# Patient Record
Sex: MALE | Race: WHITE | NOT HISPANIC OR LATINO | URBAN - METROPOLITAN AREA
[De-identification: names, ages, dates, MRNs, and addresses within clinical notes are randomized per-mention and may not be internally consistent; named-entity substitution may affect disease eponyms.]

---

## 2018-12-12 ENCOUNTER — INPATIENT (INPATIENT)
Facility: HOSPITAL | Age: 58
LOS: 6 days | Discharge: HOME | DRG: 270 | End: 2018-12-19
Attending: FAMILY MEDICINE | Admitting: FAMILY MEDICINE
Payer: COMMERCIAL

## 2018-12-12 VITALS
WEIGHT: 250 LBS | TEMPERATURE: 98 F | OXYGEN SATURATION: 94 % | SYSTOLIC BLOOD PRESSURE: 170 MMHG | RESPIRATION RATE: 22 BRPM | DIASTOLIC BLOOD PRESSURE: 95 MMHG | HEIGHT: 73 IN | HEART RATE: 120 BPM

## 2018-12-12 DIAGNOSIS — J18.9 PNEUMONIA, UNSPECIFIED ORGANISM: ICD-10-CM

## 2018-12-12 LAB
ALBUMIN SERPL ELPH-MCNC: 3.6 G/DL — SIGNIFICANT CHANGE UP (ref 3.3–5)
ALP SERPL-CCNC: 96 U/L — SIGNIFICANT CHANGE UP (ref 40–120)
ALT FLD-CCNC: 33 U/L — SIGNIFICANT CHANGE UP (ref 12–78)
ANION GAP SERPL CALC-SCNC: 10 MMOL/L — SIGNIFICANT CHANGE UP (ref 5–17)
AST SERPL-CCNC: 27 U/L — SIGNIFICANT CHANGE UP (ref 15–37)
BASOPHILS # BLD AUTO: 0.03 K/UL — SIGNIFICANT CHANGE UP (ref 0–0.2)
BASOPHILS NFR BLD AUTO: 0.3 % — SIGNIFICANT CHANGE UP (ref 0–2)
BILIRUB SERPL-MCNC: 1 MG/DL — SIGNIFICANT CHANGE UP (ref 0.2–1.2)
BUN SERPL-MCNC: 6 MG/DL — LOW (ref 7–23)
CALCIUM SERPL-MCNC: 8 MG/DL — LOW (ref 8.5–10.1)
CHLORIDE SERPL-SCNC: 83 MMOL/L — LOW (ref 96–108)
CO2 SERPL-SCNC: 26 MMOL/L — SIGNIFICANT CHANGE UP (ref 22–31)
CREAT SERPL-MCNC: 0.52 MG/DL — SIGNIFICANT CHANGE UP (ref 0.5–1.3)
EOSINOPHIL # BLD AUTO: 0.18 K/UL — SIGNIFICANT CHANGE UP (ref 0–0.5)
EOSINOPHIL NFR BLD AUTO: 2.1 % — SIGNIFICANT CHANGE UP (ref 0–6)
GLUCOSE SERPL-MCNC: 119 MG/DL — HIGH (ref 70–99)
HCT VFR BLD CALC: 33.8 % — LOW (ref 39–50)
HGB BLD-MCNC: 12.2 G/DL — LOW (ref 13–17)
IMM GRANULOCYTES NFR BLD AUTO: 0.2 % — SIGNIFICANT CHANGE UP (ref 0–1.5)
LACTATE SERPL-SCNC: 1.4 MMOL/L — SIGNIFICANT CHANGE UP (ref 0.7–2)
LYMPHOCYTES # BLD AUTO: 2.43 K/UL — SIGNIFICANT CHANGE UP (ref 1–3.3)
LYMPHOCYTES # BLD AUTO: 27.9 % — SIGNIFICANT CHANGE UP (ref 13–44)
MCHC RBC-ENTMCNC: 28.6 PG — SIGNIFICANT CHANGE UP (ref 27–34)
MCHC RBC-ENTMCNC: 36.1 GM/DL — HIGH (ref 32–36)
MCV RBC AUTO: 79.3 FL — LOW (ref 80–100)
MONOCYTES # BLD AUTO: 0.71 K/UL — SIGNIFICANT CHANGE UP (ref 0–0.9)
MONOCYTES NFR BLD AUTO: 8.2 % — SIGNIFICANT CHANGE UP (ref 2–14)
NEUTROPHILS # BLD AUTO: 5.34 K/UL — SIGNIFICANT CHANGE UP (ref 1.8–7.4)
NEUTROPHILS NFR BLD AUTO: 61.3 % — SIGNIFICANT CHANGE UP (ref 43–77)
NRBC # BLD: 0 /100 WBCS — SIGNIFICANT CHANGE UP (ref 0–0)
PLATELET # BLD AUTO: 182 K/UL — SIGNIFICANT CHANGE UP (ref 150–400)
POTASSIUM SERPL-MCNC: 3.7 MMOL/L — SIGNIFICANT CHANGE UP (ref 3.5–5.3)
POTASSIUM SERPL-SCNC: 3.7 MMOL/L — SIGNIFICANT CHANGE UP (ref 3.5–5.3)
PROT SERPL-MCNC: 6.5 G/DL — SIGNIFICANT CHANGE UP (ref 6–8.3)
RBC # BLD: 4.26 M/UL — SIGNIFICANT CHANGE UP (ref 4.2–5.8)
RBC # FLD: 12.3 % — SIGNIFICANT CHANGE UP (ref 10.3–14.5)
SODIUM SERPL-SCNC: 119 MMOL/L — CRITICAL LOW (ref 135–145)
URATE SERPL-MCNC: 1.8 MG/DL — LOW (ref 3.4–8.8)
WBC # BLD: 8.71 K/UL — SIGNIFICANT CHANGE UP (ref 3.8–10.5)
WBC # FLD AUTO: 8.71 K/UL — SIGNIFICANT CHANGE UP (ref 3.8–10.5)

## 2018-12-12 PROCEDURE — 71046 X-RAY EXAM CHEST 2 VIEWS: CPT | Mod: 26

## 2018-12-12 PROCEDURE — 99285 EMERGENCY DEPT VISIT HI MDM: CPT

## 2018-12-12 PROCEDURE — 93010 ELECTROCARDIOGRAM REPORT: CPT

## 2018-12-12 RX ORDER — SODIUM CHLORIDE 9 MG/ML
1 INJECTION INTRAMUSCULAR; INTRAVENOUS; SUBCUTANEOUS THREE TIMES A DAY
Qty: 0 | Refills: 0 | Status: DISCONTINUED | OUTPATIENT
Start: 2018-12-12 | End: 2018-12-13

## 2018-12-12 RX ORDER — IPRATROPIUM/ALBUTEROL SULFATE 18-103MCG
3 AEROSOL WITH ADAPTER (GRAM) INHALATION ONCE
Qty: 0 | Refills: 0 | Status: COMPLETED | OUTPATIENT
Start: 2018-12-12 | End: 2018-12-12

## 2018-12-12 RX ORDER — IPRATROPIUM/ALBUTEROL SULFATE 18-103MCG
3 AEROSOL WITH ADAPTER (GRAM) INHALATION EVERY 6 HOURS
Qty: 0 | Refills: 0 | Status: DISCONTINUED | OUTPATIENT
Start: 2018-12-12 | End: 2018-12-15

## 2018-12-12 RX ORDER — SODIUM CHLORIDE 9 MG/ML
1000 INJECTION INTRAMUSCULAR; INTRAVENOUS; SUBCUTANEOUS
Qty: 0 | Refills: 0 | Status: DISCONTINUED | OUTPATIENT
Start: 2018-12-12 | End: 2018-12-13

## 2018-12-12 RX ORDER — SODIUM CHLORIDE 9 MG/ML
1 INJECTION INTRAMUSCULAR; INTRAVENOUS; SUBCUTANEOUS DAILY
Qty: 0 | Refills: 0 | Status: DISCONTINUED | OUTPATIENT
Start: 2018-12-12 | End: 2018-12-12

## 2018-12-12 RX ORDER — SODIUM CHLORIDE 9 MG/ML
1000 INJECTION INTRAMUSCULAR; INTRAVENOUS; SUBCUTANEOUS ONCE
Qty: 0 | Refills: 0 | Status: COMPLETED | OUTPATIENT
Start: 2018-12-12 | End: 2018-12-12

## 2018-12-12 RX ORDER — HEPARIN SODIUM 5000 [USP'U]/ML
5000 INJECTION INTRAVENOUS; SUBCUTANEOUS EVERY 12 HOURS
Qty: 0 | Refills: 0 | Status: DISCONTINUED | OUTPATIENT
Start: 2018-12-12 | End: 2018-12-13

## 2018-12-12 RX ORDER — BUDESONIDE, MICRONIZED 100 %
0.5 POWDER (GRAM) MISCELLANEOUS
Qty: 0 | Refills: 0 | Status: DISCONTINUED | OUTPATIENT
Start: 2018-12-12 | End: 2018-12-15

## 2018-12-12 RX ORDER — LORATADINE 10 MG/1
10 TABLET ORAL DAILY
Qty: 0 | Refills: 0 | Status: DISCONTINUED | OUTPATIENT
Start: 2018-12-12 | End: 2018-12-15

## 2018-12-12 RX ORDER — POTASSIUM CHLORIDE 20 MEQ
10 PACKET (EA) ORAL DAILY
Qty: 0 | Refills: 0 | Status: DISCONTINUED | OUTPATIENT
Start: 2018-12-12 | End: 2018-12-12

## 2018-12-12 RX ORDER — METOPROLOL TARTRATE 50 MG
100 TABLET ORAL DAILY
Qty: 0 | Refills: 0 | Status: DISCONTINUED | OUTPATIENT
Start: 2018-12-12 | End: 2018-12-14

## 2018-12-12 RX ORDER — LOSARTAN POTASSIUM 100 MG/1
100 TABLET, FILM COATED ORAL DAILY
Qty: 0 | Refills: 0 | Status: DISCONTINUED | OUTPATIENT
Start: 2018-12-12 | End: 2018-12-14

## 2018-12-12 RX ORDER — POTASSIUM CHLORIDE 20 MEQ
10 PACKET (EA) ORAL DAILY
Qty: 0 | Refills: 0 | Status: DISCONTINUED | OUTPATIENT
Start: 2018-12-12 | End: 2018-12-13

## 2018-12-12 RX ORDER — TIOTROPIUM BROMIDE 18 UG/1
1 CAPSULE ORAL; RESPIRATORY (INHALATION) DAILY
Qty: 0 | Refills: 0 | Status: DISCONTINUED | OUTPATIENT
Start: 2018-12-12 | End: 2018-12-15

## 2018-12-12 RX ADMIN — Medication 125 MILLIGRAM(S): at 21:45

## 2018-12-12 RX ADMIN — SODIUM CHLORIDE 1000 MILLILITER(S): 9 INJECTION INTRAMUSCULAR; INTRAVENOUS; SUBCUTANEOUS at 22:48

## 2018-12-12 RX ADMIN — Medication 3 MILLILITER(S): at 21:45

## 2018-12-12 RX ADMIN — SODIUM CHLORIDE 1000 MILLILITER(S): 9 INJECTION INTRAMUSCULAR; INTRAVENOUS; SUBCUTANEOUS at 21:48

## 2018-12-12 NOTE — ED PROVIDER NOTE - OBJECTIVE STATEMENT
59yo male who presents with cough and sob for several weeks. pt c/o dry non productive cough, no fever, chills, pt was seen at urgent care had xr done and was put on zpak last day was today, maribell pt states his cough was worse, no sick contacts, recent travel, no other copmalints

## 2018-12-12 NOTE — ED PROVIDER NOTE - CARE PLAN
Principal Discharge DX:	Pneumonia Principal Discharge DX:	Pneumonia  Secondary Diagnosis:	Hyponatremia

## 2018-12-12 NOTE — PROGRESS NOTE ADULT - SUBJECTIVE AND OBJECTIVE BOX
Hyponatremia - Asymptomatic. No indication for hypertonic saline. Full consult to follow in AM. Urine Na, K, Osm. Serum Osm, TSH, cortisol level, uric acid. NaCl tabs.     Full consult to follow in AM

## 2018-12-12 NOTE — ED ADULT NURSE NOTE - NSIMPLEMENTINTERV_GEN_ALL_ED
Implemented All Universal Safety Interventions:  Irene to call system. Call bell, personal items and telephone within reach. Instruct patient to call for assistance. Room bathroom lighting operational. Non-slip footwear when patient is off stretcher. Physically safe environment: no spills, clutter or unnecessary equipment. Stretcher in lowest position, wheels locked, appropriate side rails in place.

## 2018-12-12 NOTE — ED PROVIDER NOTE - CPE EDP MUSC NORM
Pt. States \"I ate at a restaurant and I started having real bad hot flashes, trembling and vomiting\" Denies any diarrhea. normal...

## 2018-12-12 NOTE — ED ADULT NURSE NOTE - OBJECTIVE STATEMENT
Received pt awake alert and oriented x 3. MILLER airway patent. Pt presents C/O cough and cold like symptoms for a few days, pt currently finishing his Z-Pack but still not feeling well. Iv placed, lab sent. Meds given as ordered. will continue to monitor

## 2018-12-13 DIAGNOSIS — J44.0 CHRONIC OBSTRUCTIVE PULMONARY DISEASE WITH (ACUTE) LOWER RESPIRATORY INFECTION: ICD-10-CM

## 2018-12-13 DIAGNOSIS — R55 SYNCOPE AND COLLAPSE: ICD-10-CM

## 2018-12-13 DIAGNOSIS — E87.1 HYPO-OSMOLALITY AND HYPONATREMIA: ICD-10-CM

## 2018-12-13 DIAGNOSIS — I10 ESSENTIAL (PRIMARY) HYPERTENSION: ICD-10-CM

## 2018-12-13 DIAGNOSIS — F17.200 NICOTINE DEPENDENCE, UNSPECIFIED, UNCOMPLICATED: ICD-10-CM

## 2018-12-13 DIAGNOSIS — J18.9 PNEUMONIA, UNSPECIFIED ORGANISM: ICD-10-CM

## 2018-12-13 LAB
ANION GAP SERPL CALC-SCNC: 11 MMOL/L — SIGNIFICANT CHANGE UP (ref 5–17)
ANION GAP SERPL CALC-SCNC: 11 MMOL/L — SIGNIFICANT CHANGE UP (ref 5–17)
ANION GAP SERPL CALC-SCNC: 9 MMOL/L — SIGNIFICANT CHANGE UP (ref 5–17)
BUN SERPL-MCNC: 12 MG/DL — SIGNIFICANT CHANGE UP (ref 7–23)
BUN SERPL-MCNC: 5 MG/DL — LOW (ref 7–23)
BUN SERPL-MCNC: 7 MG/DL — SIGNIFICANT CHANGE UP (ref 7–23)
CALCIUM SERPL-MCNC: 7.9 MG/DL — LOW (ref 8.5–10.1)
CALCIUM SERPL-MCNC: 8.3 MG/DL — LOW (ref 8.5–10.1)
CALCIUM SERPL-MCNC: 8.3 MG/DL — LOW (ref 8.5–10.1)
CHLORIDE SERPL-SCNC: 83 MMOL/L — LOW (ref 96–108)
CHLORIDE SERPL-SCNC: 84 MMOL/L — LOW (ref 96–108)
CHLORIDE SERPL-SCNC: 84 MMOL/L — LOW (ref 96–108)
CO2 SERPL-SCNC: 24 MMOL/L — SIGNIFICANT CHANGE UP (ref 22–31)
CO2 SERPL-SCNC: 25 MMOL/L — SIGNIFICANT CHANGE UP (ref 22–31)
CO2 SERPL-SCNC: 27 MMOL/L — SIGNIFICANT CHANGE UP (ref 22–31)
CORTIS AM PEAK SERPL-MCNC: 25.5 UG/DL — HIGH (ref 6–18.4)
CREAT SERPL-MCNC: 0.55 MG/DL — SIGNIFICANT CHANGE UP (ref 0.5–1.3)
CREAT SERPL-MCNC: 0.64 MG/DL — SIGNIFICANT CHANGE UP (ref 0.5–1.3)
CREAT SERPL-MCNC: 0.67 MG/DL — SIGNIFICANT CHANGE UP (ref 0.5–1.3)
GLUCOSE SERPL-MCNC: 135 MG/DL — HIGH (ref 70–99)
GLUCOSE SERPL-MCNC: 148 MG/DL — HIGH (ref 70–99)
GLUCOSE SERPL-MCNC: 148 MG/DL — HIGH (ref 70–99)
HBA1C BLD-MCNC: 5.6 % — SIGNIFICANT CHANGE UP (ref 4–5.6)
OSMOLALITY SERPL: 242 MOS/KG — LOW (ref 275–300)
OSMOLALITY SERPL: 245 MOS/KG — LOW (ref 275–300)
OSMOLALITY UR: 415 MOS/KG — SIGNIFICANT CHANGE UP (ref 50–1200)
POTASSIUM SERPL-MCNC: 3.9 MMOL/L — SIGNIFICANT CHANGE UP (ref 3.5–5.3)
POTASSIUM SERPL-MCNC: 4 MMOL/L — SIGNIFICANT CHANGE UP (ref 3.5–5.3)
POTASSIUM SERPL-MCNC: 4 MMOL/L — SIGNIFICANT CHANGE UP (ref 3.5–5.3)
POTASSIUM SERPL-SCNC: 3.9 MMOL/L — SIGNIFICANT CHANGE UP (ref 3.5–5.3)
POTASSIUM SERPL-SCNC: 4 MMOL/L — SIGNIFICANT CHANGE UP (ref 3.5–5.3)
POTASSIUM SERPL-SCNC: 4 MMOL/L — SIGNIFICANT CHANGE UP (ref 3.5–5.3)
POTASSIUM UR-SCNC: 50 MMOL/L — SIGNIFICANT CHANGE UP
RAPID RVP RESULT: SIGNIFICANT CHANGE UP
SODIUM SERPL-SCNC: 119 MMOL/L — CRITICAL LOW (ref 135–145)
SODIUM SERPL-SCNC: 119 MMOL/L — CRITICAL LOW (ref 135–145)
SODIUM SERPL-SCNC: 120 MMOL/L — CRITICAL LOW (ref 135–145)
SODIUM UR-SCNC: 33 MMOL/L — SIGNIFICANT CHANGE UP
TSH SERPL-MCNC: 0.33 UIU/ML — LOW (ref 0.36–3.74)

## 2018-12-13 PROCEDURE — 71250 CT THORAX DX C-: CPT | Mod: 26

## 2018-12-13 PROCEDURE — 93010 ELECTROCARDIOGRAM REPORT: CPT

## 2018-12-13 RX ORDER — FUROSEMIDE 40 MG
20 TABLET ORAL ONCE
Qty: 0 | Refills: 0 | Status: COMPLETED | OUTPATIENT
Start: 2018-12-13 | End: 2018-12-13

## 2018-12-13 RX ORDER — SODIUM CHLORIDE 9 MG/ML
2 INJECTION INTRAMUSCULAR; INTRAVENOUS; SUBCUTANEOUS THREE TIMES A DAY
Qty: 0 | Refills: 0 | Status: DISCONTINUED | OUTPATIENT
Start: 2018-12-13 | End: 2018-12-15

## 2018-12-13 RX ORDER — HEPARIN SODIUM 5000 [USP'U]/ML
5000 INJECTION INTRAVENOUS; SUBCUTANEOUS EVERY 8 HOURS
Qty: 0 | Refills: 0 | Status: DISCONTINUED | OUTPATIENT
Start: 2018-12-13 | End: 2018-12-14

## 2018-12-13 RX ORDER — MECLIZINE HCL 12.5 MG
12.5 TABLET ORAL THREE TIMES A DAY
Qty: 0 | Refills: 0 | Status: DISCONTINUED | OUTPATIENT
Start: 2018-12-13 | End: 2018-12-15

## 2018-12-13 RX ORDER — AZITHROMYCIN 500 MG/1
250 TABLET, FILM COATED ORAL DAILY
Qty: 0 | Refills: 0 | Status: DISCONTINUED | OUTPATIENT
Start: 2018-12-13 | End: 2018-12-13

## 2018-12-13 RX ORDER — NICOTINE POLACRILEX 2 MG
1 GUM BUCCAL DAILY
Qty: 0 | Refills: 0 | Status: DISCONTINUED | OUTPATIENT
Start: 2018-12-13 | End: 2018-12-15

## 2018-12-13 RX ORDER — INFLUENZA VIRUS VACCINE 15; 15; 15; 15 UG/.5ML; UG/.5ML; UG/.5ML; UG/.5ML
0.5 SUSPENSION INTRAMUSCULAR ONCE
Qty: 0 | Refills: 0 | Status: DISCONTINUED | OUTPATIENT
Start: 2018-12-13 | End: 2018-12-19

## 2018-12-13 RX ADMIN — Medication 0.5 MILLIGRAM(S): at 07:39

## 2018-12-13 RX ADMIN — LORATADINE 10 MILLIGRAM(S): 10 TABLET ORAL at 12:34

## 2018-12-13 RX ADMIN — Medication 3 MILLILITER(S): at 01:16

## 2018-12-13 RX ADMIN — Medication 3 MILLILITER(S): at 19:11

## 2018-12-13 RX ADMIN — SODIUM CHLORIDE 2 GRAM(S): 9 INJECTION INTRAMUSCULAR; INTRAVENOUS; SUBCUTANEOUS at 22:11

## 2018-12-13 RX ADMIN — Medication 20 MILLIGRAM(S): at 22:11

## 2018-12-13 RX ADMIN — HEPARIN SODIUM 5000 UNIT(S): 5000 INJECTION INTRAVENOUS; SUBCUTANEOUS at 05:15

## 2018-12-13 RX ADMIN — SODIUM CHLORIDE 1 GRAM(S): 9 INJECTION INTRAMUSCULAR; INTRAVENOUS; SUBCUTANEOUS at 13:11

## 2018-12-13 RX ADMIN — Medication 50 MILLIGRAM(S): at 05:17

## 2018-12-13 RX ADMIN — Medication 1 PATCH: at 12:32

## 2018-12-13 RX ADMIN — Medication 1 PATCH: at 21:17

## 2018-12-13 RX ADMIN — HEPARIN SODIUM 5000 UNIT(S): 5000 INJECTION INTRAVENOUS; SUBCUTANEOUS at 13:11

## 2018-12-13 RX ADMIN — SODIUM CHLORIDE 1 GRAM(S): 9 INJECTION INTRAMUSCULAR; INTRAVENOUS; SUBCUTANEOUS at 05:17

## 2018-12-13 RX ADMIN — SODIUM CHLORIDE 2 GRAM(S): 9 INJECTION INTRAMUSCULAR; INTRAVENOUS; SUBCUTANEOUS at 13:11

## 2018-12-13 RX ADMIN — Medication 3 MILLILITER(S): at 07:40

## 2018-12-13 RX ADMIN — Medication 40 MILLIGRAM(S): at 22:12

## 2018-12-13 RX ADMIN — SODIUM CHLORIDE 50 MILLILITER(S): 9 INJECTION INTRAMUSCULAR; INTRAVENOUS; SUBCUTANEOUS at 13:10

## 2018-12-13 RX ADMIN — LOSARTAN POTASSIUM 100 MILLIGRAM(S): 100 TABLET, FILM COATED ORAL at 05:17

## 2018-12-13 RX ADMIN — HEPARIN SODIUM 5000 UNIT(S): 5000 INJECTION INTRAVENOUS; SUBCUTANEOUS at 22:10

## 2018-12-13 RX ADMIN — Medication 3 MILLILITER(S): at 13:57

## 2018-12-13 RX ADMIN — Medication 100 MILLIGRAM(S): at 05:17

## 2018-12-13 RX ADMIN — Medication 0.5 MILLIGRAM(S): at 19:11

## 2018-12-13 NOTE — CONSULT NOTE ADULT - ASSESSMENT
Hyponatremia  Coughing  Low BUN  Possible Seizure  HTN  +Smoker      -Renal indices are stable; BUN is abnormally low; poor nutrition for the past few days  -Uric acid is low; urine studies are pending; high likelihood of SIADH; urine is dark appears where dehydration may be a factor, but with active lung symptoms and chronic smoker, SIADH seems likely  -Check a CXR or consider a CT chest with history   -Oral fluid restriction to 1L for now  -Salt tabs 1gm TID for now  -Pending morning labs for review; serial BMPs  -No acute indication for hypertonic saline or tolvaptan at this time  -Smoking cessation discussed  -Increase protein intake; no salt restriction in diet  -BP is stable; continue with current regimen    Thank you

## 2018-12-13 NOTE — DIETITIAN INITIAL EVALUATION ADULT. - ORAL INTAKE PTA
Pt reports acute poor po intake x1 week PTA. Pt could not specify why, just stated he had lack of appetite and was not at baseline. Pt endorses typically good appetite however only consumes 1 meal per day; works overnight and typically eats leftover dinner for breakfast. Pt admits he dislikes vegetables and most fish, mostly eats meat and potatoes. Pt states he does not drink many fluids throughout the day (only coffee and soda). Purposely does not drink many fluids during overnight work shift () as he does not have access to a bathroom.

## 2018-12-13 NOTE — DIETITIAN INITIAL EVALUATION ADULT. - OTHER INFO
Pt seen for nutrition referral for education. Per chart, pt is 59 Y/O M with PMH of COPD, HTN, type 2 DM (HgbA1c 5.6%) admitted for intractable cough and near syncope. Pt found to have hyponatremia upon admission, on 1L fluid restriction. Pt states UBW of 250 pounds with highest weight 275 pounds. Endorsed improved po intake in house, consuming 75% of lunch and 100% of breakfast.  Denied N/V/diarrhea/constipation, last BM yesterday night. No difficulties chewing/swallowing, NKFA.

## 2018-12-13 NOTE — DIETITIAN INITIAL EVALUATION ADULT. - NS AS NUTRI INTERV MEALS SNACK
Continue regular diet as it remains appropriate at this time with 1L fluid restriction as per medical team. Pt provided with written and verbal nutrition education related to weight management and fluid restriction: topics discussed include portion-controlled frequent meals (4-5 meals per day), increased intake of fruits/vegetables/whole grains/lean protein, sources of fluids, fluid restriction 1000 ml per day, and adequate hydration as pt states he was not drinking very much prior to admission. Food preferences obtained and communicated to diet office./General/healthful diet

## 2018-12-13 NOTE — DIETITIAN INITIAL EVALUATION ADULT. - ENERGY NEEDS
Wt: 250 pounds (admission) AdjustedBW: 184 pounds Ht: 73 inches, BMI: 33.0 kg/m2, IBW: 184 pounds +/-10%, %IBW: 136%  +1 generalized edema, no pressure injuries noted

## 2018-12-13 NOTE — CHART NOTE - NSCHARTNOTEFT_GEN_A_CORE
Case discussed with Dr. Marquez of thoracic surgery at [facility]    He has agreed to accept the patient in transfer. The patient may require EBUS or mediastinoscopy for tissue diagnosis

## 2018-12-13 NOTE — CHART NOTE - NSCHARTNOTEFT_GEN_A_CORE
consult dictated    Impression:    Acute Bronchitis  COPD  Active Smoker  Extensive mediastinal and hilar adenopathy - possibly sarcoid or neoplasm  Hyponatremia    Plan:    IV antibiotics  Will require EBUS in future  Treatment of hyponatremia

## 2018-12-13 NOTE — H&P ADULT - RS GEN PE MLT RESP DETAILS PC
wheezes/airway patent/no subcutaneous emphysema/diminished breath sounds, R/diminished breath sounds, L/no chest wall tenderness

## 2018-12-13 NOTE — H&P ADULT - NSHPLABSRESULTS_GEN_ALL_CORE
12.2   8.71  )-----------( 182      ( 12 Dec 2018 21:38 )             33.8     13 Dec 2018 07:22    119    |  84     |  5      ----------------------------<  148    3.9     |  24     |  0.67     Ca    7.9        13 Dec 2018 07:22    TPro  6.5    /  Alb  3.6    /  TBili  1.0    /  DBili  x      /  AST  27     /  ALT  33     /  AlkPhos  96     12 Dec 2018 21:39    LIVER FUNCTIONS - ( 12 Dec 2018 21:39 )  Alb: 3.6 g/dL / Pro: 6.5 g/dL / ALK PHOS: 96 U/L / ALT: 33 U/L / AST: 27 U/L / GGT: x             CAPILLARY BLOOD GLUCOSE

## 2018-12-13 NOTE — PROVIDER CONTACT NOTE (CRITICAL VALUE NOTIFICATION) - ASSESSMENT
Pt resting in bed, denies dizziness, A & O x 4
Pt resting in bed, denies dizziness, no decrease in LOC, Pt resting inbed

## 2018-12-13 NOTE — CONSULT NOTE ADULT - SUBJECTIVE AND OBJECTIVE BOX
Patient is a 58y old  Male who presents with a chief complaint of  Acute  renal failure.    HPI: 58 year old male with PMH of HTN, DM2 presented after having a severe cough for 2-3 days. The patient's wife brought the patient to the ER after he coughed severely, fell to the floor, and had a seizure like episode. The patient was found to have hyponatremia prompting a renal consult. Denies a history of CHF and liver diease. He is an active smoker. Admits to poor intake for the past 2-3 days. Denies imbalance, frequent falls, dizziness, lightheadedness, abd symptoms, urinary symptoms.    Hx renal stones x1 not aware of type, no other renal  problems; elevated creatinine on admission.    PAST MEDICAL & SURGICAL HISTORY:  HTN (hypertension)   DM 2, MO, hypothyroidism, prior DVT and IVC filter; Cholecystectomy    FAMILY HISTORY:  [place]    Social History:Non smoker    MEDICATIONS  (STANDING):  ALBUTerol/ipratropium for Nebulization 3 milliLiter(s) Nebulizer every 6 hours  buDESOnide   0.5 milliGRAM(s) Respule 0.5 milliGRAM(s) Inhalation two times a day  heparin  Injectable 5000 Unit(s) SubCutaneous every 12 hours  influenza   Vaccine 0.5 milliLiter(s) IntraMuscular once  levoFLOXacin IVPB 500 milliGRAM(s) IV Intermittent every 24 hours  loratadine 10 milliGRAM(s) Oral daily  losartan 100 milliGRAM(s) Oral daily  metoprolol succinate  milliGRAM(s) Oral daily  potassium chloride    Tablet ER 10 milliEquivalent(s) Oral daily  predniSONE   Tablet 50 milliGRAM(s) Oral daily  sodium chloride 1 Gram(s) Oral three times a day  sodium chloride 0.9%. 1000 milliLiter(s) (50 mL/Hr) IV Continuous <Continuous>  tiotropium 18 MICROgram(s) Capsule 1 Capsule(s) Inhalation daily    MEDICATIONS  (PRN):  guaiFENesin    Syrup 100 milliGRAM(s) Oral every 6 hours PRN Cough   Meds reviewed    Allergies    penicillin (Unknown)    Intolerances         REVIEW OF SYSTEMS:    CONSTITUTIONAL:  no fever, no chills, +poor intake  EYES: No eye pain, visual disturbances, or discharge  ENMT:  No difficulty hearing, tinnitus, vertigo; No sinus or throat pain  NECK: No pain or stiffness  BREASTS: No pain, masses, or nipple discharge  RESPIRATORY: coughing, SOB  CARDIOVASCULAR: No chest pain, palpitations, dizziness  GASTROINTESTINAL: No abdominal or epigastric pain. No nausea, vomiting, or hematemesis; No diarrhea or constipation. No melena   GENITOURINARY: No dysuria, frequency, hematuria, or incontinence  NEUROLOGICAL: +seizure like episode  SKIN: No erythema, no blisters  LYMPH NODES: No enlarged glands  ENDOCRINE: No heat or cold intolerance; No hair loss  MUSCULOSKELETAL: No edema   PSYCHIATRIC: No depression, anxiety, mood swings, or difficulty sleeping  HEME/LYMPH: No easy bruising, or bleeding gums  ALLERGY AND IMMUNOLOGIC: No hives or eczema    Vital Signs Last 24 Hrs  T(C): 36.6 (13 Dec 2018 04:15), Max: 36.6 (13 Dec 2018 04:15)  T(F): 97.9 (13 Dec 2018 04:15), Max: 97.9 (13 Dec 2018 04:15)  HR: 112 (13 Dec 2018 04:15) (93 - 120)  BP: 144/79 (13 Dec 2018 04:15) (130/92 - 170/95)  BP(mean): --  RR: 19 (13 Dec 2018 04:15) (19 - 22)  SpO2: 98% (13 Dec 2018 04:15) (94% - 98%)  Daily Height in cm: 185.42 (12 Dec 2018 21:07)    Daily Weight in k.6 (13 Dec 2018 04:15)    PHYSICAL EXAM:    GENERAL: No distress, well built  HEAD:  Atraumatic, Normocephalic  EYES: Conjunctiva and sclera clear  ENMT: Moist mucous membranes, Good dentition, No lesions  NECK: Supple, no JVD  NERVOUS SYSTEM:  Alert & Oriented X3, Good concentration; Motor Strength wnl upper and lower extremities  CHEST/LUNG: Clear to percussion bilaterally; No rales, rhonchi, wheezing, or rubs  HEART: Regular rate and rhythm; No murmurs, rubs, or gallops  ABDOMEN: Soft, Nontender, Nondistended; Bowel sounds present  EXTREMITIES:  Trace edema to right lower leg only  LYMPH: No lymphadenopathy noted  SKIN: No rashes or lesions    LABS:                        12.2   8.71  )-----------( 182      ( 12 Dec 2018 21:38 )             33.8     12-12    119<LL>  |  83<L>  |  6<L>  ----------------------------<  119<H>  3.7   |  26  |  0.52    Ca    8.0<L>      12 Dec 2018 21:39    TPro  6.5  /  Alb  3.6  /  TBili  1.0  /  DBili  x   /  AST  27  /  ALT  33  /  AlkPhos  96  12-12              RADIOLOGY & ADDITIONAL TESTS:

## 2018-12-13 NOTE — PROGRESS NOTE ADULT - SUBJECTIVE AND OBJECTIVE BOX
Patient is a 58y old  Male who presents with a chief complaint of cough, hyponatremia (13 Dec 2018 07:12)      INTERVAL /OVERNIGHT EVENTS: feels shaky occasionally    MEDICATIONS  (STANDING):  ALBUTerol/ipratropium for Nebulization 3 milliLiter(s) Nebulizer every 6 hours  buDESOnide   0.5 milliGRAM(s) Respule 0.5 milliGRAM(s) Inhalation two times a day  heparin  Injectable 5000 Unit(s) SubCutaneous every 8 hours  influenza   Vaccine 0.5 milliLiter(s) IntraMuscular once  loratadine 10 milliGRAM(s) Oral daily  losartan 100 milliGRAM(s) Oral daily  metoprolol succinate  milliGRAM(s) Oral daily  nicotine - 21 mG/24Hr(s) Patch 1 Patch Transdermal daily  predniSONE   Tablet 40 milliGRAM(s) Oral daily  sodium chloride 2 Gram(s) Oral three times a day  tiotropium 18 MICROgram(s) Capsule 1 Capsule(s) Inhalation daily    MEDICATIONS  (PRN):  guaiFENesin    Syrup 100 milliGRAM(s) Oral every 6 hours PRN Cough      Allergies    penicillin (Unknown)    Intolerances        REVIEW OF SYSTEMS:  CONSTITUTIONAL: No fever, weight loss, or fatigue  EYES: No eye pain, visual disturbances, or discharge  ENMT:  No difficulty hearing, tinnitus, vertigo; No sinus or throat pain  NECK: No pain or stiffness  RESPIRATORY: + cough, wheezing, chills or hemoptysis; No shortness of breath  CARDIOVASCULAR: No chest pain, palpitations, dizziness, or leg swelling  GASTROINTESTINAL: No abdominal or epigastric pain. No nausea, vomiting, or hematemesis; No diarrhea or constipation. No melena or hematochezia.  GENITOURINARY: No dysuria, frequency, hematuria, or incontinence  NEUROLOGICAL: No headaches, memory loss, loss of strength, numbness, or tremors  SKIN: No itching, burning, rashes, or lesions   LYMPH NODES: No enlarged glands  ENDOCRINE: No heat or cold intolerance; No hair loss; No polydipsia or polyuria  MUSCULOSKELETAL: No joint pain or swelling; No muscle, back, or extremity pain  PSYCHIATRIC: No depression, anxiety, mood swings, or difficulty sleeping  HEME/LYMPH: No easy bruising, or bleeding gums  ALLERGY AND IMMUNOLOGIC: No hives or eczema    Vital Signs Last 24 Hrs  T(C): 36.6 (13 Dec 2018 19:48), Max: 36.7 (13 Dec 2018 16:00)  T(F): 97.8 (13 Dec 2018 19:48), Max: 98.1 (13 Dec 2018 16:00)  HR: 100 (13 Dec 2018 19:48) (93 - 120)  BP: 135/77 (13 Dec 2018 19:48) (130/92 - 170/95)  BP(mean): --  RR: 18 (13 Dec 2018 19:48) (18 - 22)  SpO2: 94% (13 Dec 2018 19:48) (92% - 98%)    PHYSICAL EXAM:  GENERAL: NAD, well-groomed, well-developed  HEAD:  Atraumatic, Normocephalic  EYES: EOMI, PERRLA, conjunctiva and sclera clear  ENMT: No tonsillar erythema, exudates, or enlargement; Moist mucous membranes, Good dentition, No lesions  NECK: Supple, No JVD, Normal thyroid  NERVOUS SYSTEM:  Alert & Oriented X3, Good concentration; Motor Strength 5/5 B/L upper and lower extremities; DTRs 2+ intact and symmetric  CHEST/LUNG: Clear to auscultation bilaterally; No rales, rhonchi, wheezing, or rubs  HEART: Regular rate and rhythm; No murmurs, rubs, or gallops  ABDOMEN: Soft, Nontender, Nondistended; Bowel sounds present  EXTREMITIES:  2+ Peripheral Pulses, No clubbing, cyanosis, or edema  LYMPH: No lymphadenopathy noted  SKIN: No rashes or lesions    LABS:                        12.2   8.71  )-----------( 182      ( 12 Dec 2018 21:38 )             33.8     13 Dec 2018 18:35    120    |  84     |  12     ----------------------------<  148    4.0     |  27     |  0.64     Ca    8.3        13 Dec 2018 18:35    TPro  6.5    /  Alb  3.6    /  TBili  1.0    /  DBili  x      /  AST  27     /  ALT  33     /  AlkPhos  96     12 Dec 2018 21:39        CAPILLARY BLOOD GLUCOSE          RADIOLOGY & ADDITIONAL TESTS:    Notes Reviewed:  [x ] YES  [ ] NO    Care Discussed with Consultants/Other Providers [x ] YES  [ ] NO

## 2018-12-14 ENCOUNTER — TRANSCRIPTION ENCOUNTER (OUTPATIENT)
Age: 58
End: 2018-12-14

## 2018-12-14 DIAGNOSIS — I48.91 UNSPECIFIED ATRIAL FIBRILLATION: ICD-10-CM

## 2018-12-14 DIAGNOSIS — E22.2 SYNDROME OF INAPPROPRIATE SECRETION OF ANTIDIURETIC HORMONE: ICD-10-CM

## 2018-12-14 DIAGNOSIS — I31.3 PERICARDIAL EFFUSION (NONINFLAMMATORY): ICD-10-CM

## 2018-12-14 PROBLEM — Z00.00 ENCOUNTER FOR PREVENTIVE HEALTH EXAMINATION: Status: ACTIVE | Noted: 2018-12-14

## 2018-12-14 LAB
ANION GAP SERPL CALC-SCNC: 10 MMOL/L — SIGNIFICANT CHANGE UP (ref 5–17)
ANION GAP SERPL CALC-SCNC: 9 MMOL/L — SIGNIFICANT CHANGE UP (ref 5–17)
BUN SERPL-MCNC: 12 MG/DL — SIGNIFICANT CHANGE UP (ref 7–23)
BUN SERPL-MCNC: 9 MG/DL — SIGNIFICANT CHANGE UP (ref 7–23)
CALCIUM SERPL-MCNC: 8.5 MG/DL — SIGNIFICANT CHANGE UP (ref 8.5–10.1)
CALCIUM SERPL-MCNC: 8.6 MG/DL — SIGNIFICANT CHANGE UP (ref 8.5–10.1)
CHLORIDE SERPL-SCNC: 84 MMOL/L — LOW (ref 96–108)
CHLORIDE SERPL-SCNC: 85 MMOL/L — LOW (ref 96–108)
CK MB BLD-MCNC: 2.4 % — SIGNIFICANT CHANGE UP (ref 0–3.5)
CK MB CFR SERPL CALC: 15 NG/ML — HIGH (ref 0–3.6)
CK SERPL-CCNC: 596 U/L — HIGH (ref 26–308)
CK SERPL-CCNC: 624 U/L — HIGH (ref 26–308)
CK SERPL-CCNC: 647 U/L — HIGH (ref 26–308)
CO2 SERPL-SCNC: 28 MMOL/L — SIGNIFICANT CHANGE UP (ref 22–31)
CO2 SERPL-SCNC: 29 MMOL/L — SIGNIFICANT CHANGE UP (ref 22–31)
CREAT SERPL-MCNC: 0.61 MG/DL — SIGNIFICANT CHANGE UP (ref 0.5–1.3)
CREAT SERPL-MCNC: 0.69 MG/DL — SIGNIFICANT CHANGE UP (ref 0.5–1.3)
D DIMER BLD IA.RAPID-MCNC: 308 NG/ML DDU — HIGH
GLUCOSE SERPL-MCNC: 136 MG/DL — HIGH (ref 70–99)
GLUCOSE SERPL-MCNC: 140 MG/DL — HIGH (ref 70–99)
HCT VFR BLD CALC: 33.4 % — LOW (ref 39–50)
HGB BLD-MCNC: 12 G/DL — LOW (ref 13–17)
MAGNESIUM SERPL-MCNC: 1.6 MG/DL — SIGNIFICANT CHANGE UP (ref 1.6–2.6)
MAGNESIUM SERPL-MCNC: 2 MG/DL — SIGNIFICANT CHANGE UP (ref 1.6–2.6)
MCHC RBC-ENTMCNC: 28.6 PG — SIGNIFICANT CHANGE UP (ref 27–34)
MCHC RBC-ENTMCNC: 35.9 GM/DL — SIGNIFICANT CHANGE UP (ref 32–36)
MCV RBC AUTO: 79.7 FL — LOW (ref 80–100)
NRBC # BLD: 0 /100 WBCS — SIGNIFICANT CHANGE UP (ref 0–0)
NT-PROBNP SERPL-SCNC: 605 PG/ML — HIGH (ref 0–125)
PHOSPHATE SERPL-MCNC: 3.4 MG/DL — SIGNIFICANT CHANGE UP (ref 2.5–4.5)
PHOSPHATE SERPL-MCNC: 3.4 MG/DL — SIGNIFICANT CHANGE UP (ref 2.5–4.5)
PLATELET # BLD AUTO: 220 K/UL — SIGNIFICANT CHANGE UP (ref 150–400)
POTASSIUM SERPL-MCNC: 3.7 MMOL/L — SIGNIFICANT CHANGE UP (ref 3.5–5.3)
POTASSIUM SERPL-MCNC: 4.2 MMOL/L — SIGNIFICANT CHANGE UP (ref 3.5–5.3)
POTASSIUM SERPL-SCNC: 3.7 MMOL/L — SIGNIFICANT CHANGE UP (ref 3.5–5.3)
POTASSIUM SERPL-SCNC: 4.2 MMOL/L — SIGNIFICANT CHANGE UP (ref 3.5–5.3)
RBC # BLD: 4.19 M/UL — LOW (ref 4.2–5.8)
RBC # FLD: 12.5 % — SIGNIFICANT CHANGE UP (ref 10.3–14.5)
SODIUM SERPL-SCNC: 122 MMOL/L — LOW (ref 135–145)
SODIUM SERPL-SCNC: 123 MMOL/L — LOW (ref 135–145)
T3 SERPL-MCNC: 102 NG/DL — SIGNIFICANT CHANGE UP (ref 80–200)
T4 AB SER-ACNC: 7.9 UG/DL — SIGNIFICANT CHANGE UP (ref 4.6–12)
TROPONIN I SERPL-MCNC: 0.05 NG/ML — HIGH (ref 0.01–0.04)
TROPONIN I SERPL-MCNC: 0.05 NG/ML — HIGH (ref 0.01–0.04)
WBC # BLD: 13.6 K/UL — HIGH (ref 3.8–10.5)
WBC # FLD AUTO: 13.6 K/UL — HIGH (ref 3.8–10.5)

## 2018-12-14 PROCEDURE — 93010 ELECTROCARDIOGRAM REPORT: CPT | Mod: 76

## 2018-12-14 PROCEDURE — 99291 CRITICAL CARE FIRST HOUR: CPT

## 2018-12-14 PROCEDURE — 93971 EXTREMITY STUDY: CPT | Mod: 26,RT

## 2018-12-14 PROCEDURE — 93306 TTE W/DOPPLER COMPLETE: CPT | Mod: 26

## 2018-12-14 PROCEDURE — 70450 CT HEAD/BRAIN W/O DYE: CPT | Mod: 26

## 2018-12-14 RX ORDER — MECLIZINE HCL 12.5 MG
1 TABLET ORAL
Qty: 0 | Refills: 0 | COMMUNITY
Start: 2018-12-14

## 2018-12-14 RX ORDER — TOLVAPTAN 15 MG/1
15 TABLET ORAL ONCE
Qty: 0 | Refills: 0 | Status: COMPLETED | OUTPATIENT
Start: 2018-12-14 | End: 2018-12-14

## 2018-12-14 RX ORDER — MAGNESIUM SULFATE 500 MG/ML
1 VIAL (ML) INJECTION ONCE
Qty: 0 | Refills: 0 | Status: DISCONTINUED | OUTPATIENT
Start: 2018-12-14 | End: 2018-12-14

## 2018-12-14 RX ORDER — IPRATROPIUM/ALBUTEROL SULFATE 18-103MCG
3 AEROSOL WITH ADAPTER (GRAM) INHALATION
Qty: 0 | Refills: 0 | COMMUNITY
Start: 2018-12-14

## 2018-12-14 RX ORDER — ACETAMINOPHEN AND CHLORPHENIRAMINE MALEATE 325; 2 MG/1; MG/1
1 TABLET ORAL
Qty: 0 | Refills: 0 | COMMUNITY

## 2018-12-14 RX ORDER — MAGNESIUM SULFATE 500 MG/ML
1 VIAL (ML) INJECTION ONCE
Qty: 0 | Refills: 0 | Status: COMPLETED | OUTPATIENT
Start: 2018-12-14 | End: 2018-12-14

## 2018-12-14 RX ORDER — LORATADINE 10 MG/1
1 TABLET ORAL
Qty: 0 | Refills: 0 | DISCHARGE
Start: 2018-12-14

## 2018-12-14 RX ORDER — MAGNESIUM OXIDE 400 MG ORAL TABLET 241.3 MG
400 TABLET ORAL DAILY
Qty: 0 | Refills: 0 | Status: DISCONTINUED | OUTPATIENT
Start: 2018-12-14 | End: 2018-12-15

## 2018-12-14 RX ORDER — IRBESARTAN 75 MG/1
1 TABLET ORAL
Qty: 0 | Refills: 0 | COMMUNITY

## 2018-12-14 RX ORDER — TIOTROPIUM BROMIDE 18 UG/1
1 CAPSULE ORAL; RESPIRATORY (INHALATION)
Qty: 0 | Refills: 0 | COMMUNITY
Start: 2018-12-14

## 2018-12-14 RX ORDER — ASPIRIN/CALCIUM CARB/MAGNESIUM 324 MG
325 TABLET ORAL DAILY
Qty: 0 | Refills: 0 | Status: DISCONTINUED | OUTPATIENT
Start: 2018-12-14 | End: 2018-12-14

## 2018-12-14 RX ORDER — SODIUM CHLORIDE 9 MG/ML
2 INJECTION INTRAMUSCULAR; INTRAVENOUS; SUBCUTANEOUS
Qty: 0 | Refills: 0 | COMMUNITY
Start: 2018-12-14

## 2018-12-14 RX ORDER — MAGNESIUM OXIDE 400 MG ORAL TABLET 241.3 MG
1 TABLET ORAL
Qty: 0 | Refills: 0 | DISCHARGE
Start: 2018-12-14

## 2018-12-14 RX ORDER — FUROSEMIDE 40 MG
40 TABLET ORAL ONCE
Qty: 0 | Refills: 0 | Status: COMPLETED | OUTPATIENT
Start: 2018-12-14 | End: 2018-12-14

## 2018-12-14 RX ORDER — ENOXAPARIN SODIUM 100 MG/ML
113 INJECTION SUBCUTANEOUS ONCE
Qty: 0 | Refills: 0 | Status: COMPLETED | OUTPATIENT
Start: 2018-12-14 | End: 2018-12-14

## 2018-12-14 RX ORDER — METOPROLOL TARTRATE 50 MG
50 TABLET ORAL EVERY 6 HOURS
Qty: 0 | Refills: 0 | Status: DISCONTINUED | OUTPATIENT
Start: 2018-12-14 | End: 2018-12-15

## 2018-12-14 RX ORDER — METOPROLOL TARTRATE 50 MG
100 TABLET ORAL DAILY
Qty: 0 | Refills: 0 | Status: DISCONTINUED | OUTPATIENT
Start: 2018-12-14 | End: 2018-12-14

## 2018-12-14 RX ORDER — CETIRIZINE HYDROCHLORIDE 10 MG/1
1 TABLET ORAL
Qty: 0 | Refills: 0 | COMMUNITY

## 2018-12-14 RX ORDER — BUDESONIDE, MICRONIZED 100 %
0 POWDER (GRAM) MISCELLANEOUS
Qty: 0 | Refills: 0 | COMMUNITY
Start: 2018-12-14

## 2018-12-14 RX ORDER — NICOTINE POLACRILEX 2 MG
0 GUM BUCCAL
Qty: 0 | Refills: 0 | COMMUNITY
Start: 2018-12-14

## 2018-12-14 RX ORDER — METOPROLOL TARTRATE 50 MG
25 TABLET ORAL
Qty: 0 | Refills: 0 | Status: DISCONTINUED | OUTPATIENT
Start: 2018-12-14 | End: 2018-12-14

## 2018-12-14 RX ADMIN — Medication 50 MILLIGRAM(S): at 17:19

## 2018-12-14 RX ADMIN — Medication 3 MILLILITER(S): at 00:34

## 2018-12-14 RX ADMIN — ENOXAPARIN SODIUM 113 MILLIGRAM(S): 100 INJECTION SUBCUTANEOUS at 08:52

## 2018-12-14 RX ADMIN — Medication 3 MILLILITER(S): at 13:20

## 2018-12-14 RX ADMIN — HEPARIN SODIUM 5000 UNIT(S): 5000 INJECTION INTRAVENOUS; SUBCUTANEOUS at 05:04

## 2018-12-14 RX ADMIN — Medication 25 MILLIGRAM(S): at 08:53

## 2018-12-14 RX ADMIN — Medication 100 MILLIGRAM(S): at 05:05

## 2018-12-14 RX ADMIN — Medication 3 MILLILITER(S): at 19:51

## 2018-12-14 RX ADMIN — SODIUM CHLORIDE 2 GRAM(S): 9 INJECTION INTRAMUSCULAR; INTRAVENOUS; SUBCUTANEOUS at 22:00

## 2018-12-14 RX ADMIN — TOLVAPTAN 15 MILLIGRAM(S): 15 TABLET ORAL at 19:26

## 2018-12-14 RX ADMIN — Medication 1 PATCH: at 07:30

## 2018-12-14 RX ADMIN — Medication 50 MILLIGRAM(S): at 23:47

## 2018-12-14 RX ADMIN — Medication 1 PATCH: at 12:44

## 2018-12-14 RX ADMIN — LOSARTAN POTASSIUM 100 MILLIGRAM(S): 100 TABLET, FILM COATED ORAL at 05:05

## 2018-12-14 RX ADMIN — Medication 40 MILLIGRAM(S): at 05:05

## 2018-12-14 RX ADMIN — SODIUM CHLORIDE 2 GRAM(S): 9 INJECTION INTRAMUSCULAR; INTRAVENOUS; SUBCUTANEOUS at 05:04

## 2018-12-14 RX ADMIN — Medication 0.5 MILLIGRAM(S): at 19:52

## 2018-12-14 RX ADMIN — Medication 100 GRAM(S): at 05:04

## 2018-12-14 NOTE — PROGRESS NOTE ADULT - SUBJECTIVE AND OBJECTIVE BOX
Patient is a 58y old  Male who presents with a chief complaint of Intractable cough and Near Syncope (14 Dec 2018 15:41)      INTERVAL HPI/OVERNIGHT EVENTS:    Transferred to ICU earlier today. Found to have large pericardial effusion with evidence of tamponade. No change in cough and shortness of breath    MEDICATIONS  (STANDING):  ALBUTerol/ipratropium for Nebulization 3 milliLiter(s) Nebulizer every 6 hours  buDESOnide   0.5 milliGRAM(s) Respule 0.5 milliGRAM(s) Inhalation two times a day  influenza   Vaccine 0.5 milliLiter(s) IntraMuscular once  loratadine 10 milliGRAM(s) Oral daily  magnesium oxide 400 milliGRAM(s) Oral daily  metoprolol tartrate 50 milliGRAM(s) Oral every 6 hours  nicotine - 21 mG/24Hr(s) Patch 1 Patch Transdermal daily  predniSONE   Tablet 40 milliGRAM(s) Oral daily  sodium chloride 2 Gram(s) Oral three times a day  tiotropium 18 MICROgram(s) Capsule 1 Capsule(s) Inhalation daily      MEDICATIONS  (PRN):  guaiFENesin    Syrup 100 milliGRAM(s) Oral every 6 hours PRN Cough  meclizine 12.5 milliGRAM(s) Oral three times a day PRN Dizziness      Allergies    penicillin (Unknown)    Intolerances        PAST MEDICAL & SURGICAL HISTORY:  HTN (hypertension)  No significant past surgical history      Vital Signs Last 24 Hrs  T(C): 37.2 (14 Dec 2018 15:58), Max: 37.2 (14 Dec 2018 15:58)  T(F): 98.9 (14 Dec 2018 15:58), Max: 98.9 (14 Dec 2018 15:58)  HR: 107 (14 Dec 2018 19:52) (90 - 121)  BP: 125/77 (14 Dec 2018 19:00) (111/72 - 149/95)  BP(mean): 92 (14 Dec 2018 19:00) (87 - 92)  RR: 33 (14 Dec 2018 19:00) (18 - 33)  SpO2: 95% (14 Dec 2018 19:52) (93% - 98%)    PHYSICAL EXAMINATION:    GENERAL: The patient is awake and alert, and coughing frequently but in no apparent distress.     HEENT: Head is normocephalic and atraumatic. Extraocular muscles are intact. Mucous membranes are moist.    NECK: Supple.    LUNGS: Clear to auscultation without wheezing, rales or rhonchi; respirations unlabored    HEART: Regular rate and rhythm with pericardial rub    ABDOMEN: Soft, nontender, and nondistended.      EXTREMITIES: Without any cyanosis, clubbing, rash, lesions or edema.    NEUROLOGIC: Grossly intact.    SKIN: No ulceration or induration present.      LABS:                        12.0   13.60 )-----------( 220      ( 14 Dec 2018 09:07 )             33.4     12-14    123<L>  |  85<L>  |  12  ----------------------------<  140<H>  3.7   |  28  |  0.69    Ca    8.5      14 Dec 2018 18:22  Phos  3.4     12-14  Mg     2.0     12-14    TPro  6.5  /  Alb  3.6  /  TBili  1.0  /  DBili  x   /  AST  27  /  ALT  33  /  AlkPhos  96  12-12          CARDIAC MARKERS ( 14 Dec 2018 14:39 )  .053 ng/mL / x     / 596 U/L / x     / x      CARDIAC MARKERS ( 14 Dec 2018 03:50 )  .054 ng/mL / x     / 624 U/L / x     / 15.0 ng/mL      D-Dimer Assay, Quantitative: 308 ng/mL DDU (12-14-18 @ 09:07)    Serum Pro-Brain Natriuretic Peptide: 605 pg/mL (12-14-18 @ 09:07)      Procalcitonin, Serum: 0.16 ng/mL (12-12-18 @ 21:39)      MICROBIOLOGY:  Culture Results:   No growth to date. (12-13 @ 00:52)  Culture Results:   No growth to date. 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

## 2018-12-14 NOTE — DISCHARGE NOTE ADULT - HOSPITAL COURSE
admitted for hyponatremeia  fluid restriction and NACL  found to have pericardial effusion on CT  possible tamponade on TTE  new onset AF  near syncope  transfer to tertiary care for escalation of care admitted for hyponatremeia  fluid restriction and NACL  found to have pericardial effusion on CT  possible tamponade on TTE  new onset AF  near syncope  underwent pericardial window  rate control with cardizem and beta blocker  DC after cardio and surgical clearance

## 2018-12-14 NOTE — PROGRESS NOTE ADULT - SUBJECTIVE AND OBJECTIVE BOX
Patient is a 58y old  Male who presents with a chief complaint of Intractable cough and Near Syncope (14 Dec 2018 14:17)      INTERVAL /OVERNIGHT EVENTS: denies SOB, cough better    MEDICATIONS  (STANDING):  ALBUTerol/ipratropium for Nebulization 3 milliLiter(s) Nebulizer every 6 hours  buDESOnide   0.5 milliGRAM(s) Respule 0.5 milliGRAM(s) Inhalation two times a day  influenza   Vaccine 0.5 milliLiter(s) IntraMuscular once  loratadine 10 milliGRAM(s) Oral daily  magnesium oxide 400 milliGRAM(s) Oral daily  metoprolol tartrate 50 milliGRAM(s) Oral every 6 hours  nicotine - 21 mG/24Hr(s) Patch 1 Patch Transdermal daily  predniSONE   Tablet 40 milliGRAM(s) Oral daily  sodium chloride 2 Gram(s) Oral three times a day  tiotropium 18 MICROgram(s) Capsule 1 Capsule(s) Inhalation daily    MEDICATIONS  (PRN):  guaiFENesin    Syrup 100 milliGRAM(s) Oral every 6 hours PRN Cough  meclizine 12.5 milliGRAM(s) Oral three times a day PRN Dizziness      Allergies    penicillin (Unknown)    Intolerances        REVIEW OF SYSTEMS:  CONSTITUTIONAL: No fever, weight loss, or fatigue  EYES: No eye pain, visual disturbances, or discharge  ENMT:  No difficulty hearing, tinnitus, vertigo; No sinus or throat pain  NECK: No pain or stiffness  BREASTS: No pain, masses, or nipple discharge  RESPIRATORY: + cough, wheezing, chills or hemoptysis; No shortness of breath  CARDIOVASCULAR: No chest pain, palpitations, dizziness, or leg swelling  GASTROINTESTINAL: No abdominal or epigastric pain. No nausea, vomiting, or hematemesis; No diarrhea or constipation. No melena or hematochezia.  GENITOURINARY: No dysuria, frequency, hematuria, or incontinence  NEUROLOGICAL: No headaches, memory loss, loss of strength, numbness, or tremors  SKIN: No itching, burning, rashes, or lesions   LYMPH NODES: No enlarged glands  ENDOCRINE: No heat or cold intolerance; No hair loss; No polydipsia or polyuria  MUSCULOSKELETAL: No joint pain or swelling; No muscle, back, or extremity pain  PSYCHIATRIC: No depression, anxiety, mood swings, or difficulty sleeping  HEME/LYMPH: No easy bruising, or bleeding gums  ALLERGY AND IMMUNOLOGIC: No hives or eczema    Vital Signs Last 24 Hrs  T(C): 36.4 (14 Dec 2018 07:33), Max: 36.7 (13 Dec 2018 16:00)  T(F): 97.5 (14 Dec 2018 07:33), Max: 98.1 (13 Dec 2018 16:00)  HR: 90 (14 Dec 2018 13:21) (90 - 121)  BP: 134/85 (14 Dec 2018 07:33) (122/88 - 149/95)  BP(mean): --  RR: 19 (14 Dec 2018 07:33) (18 - 19)  SpO2: 94% (14 Dec 2018 07:33) (92% - 98%)    PHYSICAL EXAM:  GENERAL: NAD, well-groomed, well-developed  HEAD:  Atraumatic, Normocephalic  EYES: EOMI, PERRLA, conjunctiva and sclera clear  ENMT: No tonsillar erythema, exudates, or enlargement; Moist mucous membranes, Good dentition, No lesions  NECK: Supple, No JVD, Normal thyroid  NERVOUS SYSTEM:  Alert & Oriented X3, Good concentration; Motor Strength 5/5 B/L upper and lower extremities; DTRs 2+ intact and symmetric  CHEST/LUNG: Clear to auscultation bilaterally; No rales, rhonchi, wheezing, or rubs  HEART: Regular rate and rhythm; No murmurs, rubs, or gallops  ABDOMEN: Soft, Nontender, Nondistended; Bowel sounds present  EXTREMITIES:  2+ Peripheral Pulses, No clubbing, cyanosis, or edema  LYMPH: No lymphadenopathy noted  SKIN: No rashes or lesions    LABS:                        12.0   13.60 )-----------( 220      ( 14 Dec 2018 09:07 )             33.4     14 Dec 2018 05:41    122    |  84     |  9      ----------------------------<  136    4.2     |  29     |  0.61     Ca    8.6        14 Dec 2018 05:41  Phos  3.4       14 Dec 2018 03:50  Mg     1.6       14 Dec 2018 03:50          CAPILLARY BLOOD GLUCOSE          RADIOLOGY & ADDITIONAL TESTS:    Notes Reviewed:  [x ] YES  [ ] NO    Care Discussed with Consultants/Other Providers [x ] YES  [ ] NO

## 2018-12-14 NOTE — CONSULT NOTE ADULT - SUBJECTIVE AND OBJECTIVE BOX
[facility]         Gurpreet Bush, Emily, Corey, Andrae, Sajan, Vida        [phone] (office)    CHIEF COMPLAINT: Patient is a 58y old  Male who presents with a chief complaint of Intractable cough and Near Syncope (13 Dec 2018 20:37)    CHARTING IN PROGRESS    HPI:  RAFA SHAFER is a 58 year old male brought to ER after having severe cough for 2-3 days. Before patient's wife brought him to the ER after he coughed severely, fell to the floor, and had a seizure like activity. The patient have hyponatremia on ER blood work. Denies a history of CHF and liver diease. He is an active smoker. Admits to poor intake for the past 2-3 days. Denies imbalance, frequent falls, dizziness, lightheadedness, abd symptoms, urinary symptoms.   Past medical history of HTN, DM2. (13 Dec 2018 05:59)      PAST MEDICAL & SURGICAL HISTORY:  HTN (hypertension)  No significant past surgical history      SOCIAL HISTORY: No active tobacco, alcohol or illicit drug use.    FAMILY HISTORY:  No pertinent family history in first degree relatives      Home Medications:  Coricidin 325 mg-2 mg oral tablet: 1 tab(s) orally every 6 hours (12 Dec 2018 23:03)  irbesartan 300 mg oral tablet: 1 tab(s) orally once a day (12 Dec 2018 23:03)  metoprolol succinate 100 mg oral tablet, extended release: 1 tab(s) orally once a day (12 Dec 2018 23:03)  Wal-Zyr 10 mg oral tablet: 1 tab(s) orally once a day (12 Dec 2018 23:03)      MEDICATIONS  (STANDING):  ALBUTerol/ipratropium for Nebulization 3 milliLiter(s) Nebulizer every 6 hours  buDESOnide   0.5 milliGRAM(s) Respule 0.5 milliGRAM(s) Inhalation two times a day  influenza   Vaccine 0.5 milliLiter(s) IntraMuscular once  loratadine 10 milliGRAM(s) Oral daily  magnesium oxide 400 milliGRAM(s) Oral daily  metoprolol tartrate 25 milliGRAM(s) Oral four times a day  nicotine - 21 mG/24Hr(s) Patch 1 Patch Transdermal daily  predniSONE   Tablet 40 milliGRAM(s) Oral daily  sodium chloride 2 Gram(s) Oral three times a day  tiotropium 18 MICROgram(s) Capsule 1 Capsule(s) Inhalation daily    MEDICATIONS  (PRN):  guaiFENesin    Syrup 100 milliGRAM(s) Oral every 6 hours PRN Cough  meclizine 12.5 milliGRAM(s) Oral three times a day PRN Dizziness      Allergies    penicillin (Unknown)    Intolerances      REVIEW OF SYSTEMS: Is negative for eye, ENT, GI, , allergic, dermatologic, musculoskeletal and neurologic, except as described above.    VITAL SIGNS:   Vital Signs Last 24 Hrs  T(C): 36.4 (14 Dec 2018 07:33), Max: 36.7 (13 Dec 2018 16:00)  T(F): 97.5 (14 Dec 2018 07:33), Max: 98.1 (13 Dec 2018 16:00)  HR: 105 (14 Dec 2018 07:33) (97 - 121)  BP: 134/85 (14 Dec 2018 07:33) (122/88 - 149/95)  BP(mean): --  RR: 19 (14 Dec 2018 07:33) (18 - 19)  SpO2: 94% (14 Dec 2018 07:33) (92% - 98%)    I&O's Summary    13 Dec 2018 07:01  -  14 Dec 2018 07:00  --------------------------------------------------------  IN: 300 mL / OUT: 0 mL / NET: 300 mL      PHYSICAL EXAM:  Constitutional: NAD, awake and alert, well-developed  Eyes: EOMI, no oral cyanosis, conjunctivae clear, anicteric  Pulmonary: Non-labored, breath sounds are clear bilaterally, no wheezing, rales or rhonchi  Cardiovascular: Irregular S1 and S2, in af, normal rate. No murmur  Gastrointestinal: Bowel sounds present, soft, nontender  Lymph: No peripheral edema   Neurological: Alert, strength and sensitivity are grossly intact  Skin: Warm, well-perfused  Psych: Mood and affect appropriate    LABS: All Labs Reviewed:                        12.0   13.60 )-----------( 220      ( 14 Dec 2018 09:07 )             33.4                         12.2   8.71  )-----------( 182      ( 12 Dec 2018 21:38 )             33.8     14 Dec 2018 05:41    122    |  84     |  9      ----------------------------<  136    4.2     |  29     |  0.61   13 Dec 2018 18:35    120    |  84     |  12     ----------------------------<  148    4.0     |  27     |  0.64   13 Dec 2018 12:46    119    |  83     |  7      ----------------------------<  135    4.0     |  25     |  0.55     Ca    8.6        14 Dec 2018 05:41  Ca    8.3        13 Dec 2018 18:35  Ca    8.3        13 Dec 2018 12:46  Phos  3.4       14 Dec 2018 03:50  Mg     1.6       14 Dec 2018 03:50    TPro  6.5    /  Alb  3.6    /  TBili  1.0    /  DBili  x      /  AST  27     /  ALT  33     /  AlkPhos  96     12 Dec 2018 21:39    CARDIAC MARKERS ( 14 Dec 2018 03:50 )  .054 ng/mL / x     / 624 U/L / x     / 15.0 ng/mL    Blood Culture: Organism --  Gram Stain Blood -- Gram Stain --  Specimen Source .Blood Blood-Peripheral  Culture-Blood --    12-14 @ 05:41  TSH: 0.36  12-13 @ 12:46  TSH: 0.28  12-13 @ 07:22  TSH: 0.33    RADIOLOGY:  < from: CT Chest No Cont (12.13.18 @ 08:30) >  1. Small bilateral pleural effusions, moderate-sized pericardial   effusion, and interlobular septal thickening suggestive of edema.   Findings suggest volume overload.  2. Extensive mediastinal adenopathy and hilar adenopathy, difficult to   delineate without intravenous contrast. No suspicious lung mass   identified, though hilar mass cannot be excluded. Consider repeat imaging   with contrast.   3. Emphysema. 2 mm right upper lobe nodules for which follow-up CT in 12   months is recommended.  < end of copied text >    EKG: A fib with RVR at 129 bpm, nonspecific ST abnormality\ [facility]         Gurpreet Bush, Emily, Corey, Andrae, Sajan, Vida        [phone] (office)    CHIEF COMPLAINT: Patient is a 58y old  Male who presents with a chief complaint of Intractable cough and Near Syncope (13 Dec 2018 20:37)    CHARTING IN PROGRESS    HPI:  RAFA SHAFER is a 58 year old male brought to ER after having severe cough for 2-3 days. Before patient's wife brought him to the ER after he coughed severely, fell to the floor, and had a seizure like activity. The patient have hyponatremia on ER blood work. Denies a history of CHF and liver diease. He is an active smoker. Admits to poor intake for the past 2-3 days. Denies imbalance, frequent falls, dizziness, lightheadedness, abd symptoms, urinary symptoms.   Past medical history of HTN, DM2. (13 Dec 2018 05:59)    Patient seen and examined at bedside for cardiology evaluation. He reports that he has been coughing and had new onset dyspnea on exertion for a couple weeks. Previously, he did not have SOB with exertion. Currently, he is gasping for air after climbing ~1-2 flights of stairs. He came to the ED due to a coughing fit after which he felt dizzy, fell forward and hit the radiator at home. Denied chest pain, palpitations, SOB at rest, PND, orthopnea, near syncope, syncope, or lower extremity edema.    Patient had stress echo performed in May or June 2018 for work, reports that it was "fine." PMD is Dr. Aranda. Does not see a cardiologist. Previously on diuretic for HTN but now only taking Irbesartan and Metoprolol to decrease his HR. He denies any history of A fib to his knowledge. Denies having CHF, MI, CVA or cardiac history.      PAST MEDICAL & SURGICAL HISTORY:  HTN (hypertension)  No significant past surgical history    SOCIAL HISTORY: No active tobacco (currently quitting on Chantix, previously smoked 1PPD for 40 years), alcohol or illicit drug use.    FAMILY HISTORY:  No pertinent family history in first degree relatives      Home Medications:  Coricidin 325 mg-2 mg oral tablet: 1 tab(s) orally every 6 hours (12 Dec 2018 23:03)  irbesartan 300 mg oral tablet: 1 tab(s) orally once a day (12 Dec 2018 23:03)  metoprolol succinate 100 mg oral tablet, extended release: 1 tab(s) orally once a day (12 Dec 2018 23:03)  Wal-Zyr 10 mg oral tablet: 1 tab(s) orally once a day (12 Dec 2018 23:03)      MEDICATIONS  (STANDING):  ALBUTerol/ipratropium for Nebulization 3 milliLiter(s) Nebulizer every 6 hours  buDESOnide   0.5 milliGRAM(s) Respule 0.5 milliGRAM(s) Inhalation two times a day  influenza   Vaccine 0.5 milliLiter(s) IntraMuscular once  loratadine 10 milliGRAM(s) Oral daily  magnesium oxide 400 milliGRAM(s) Oral daily  metoprolol tartrate 25 milliGRAM(s) Oral four times a day  nicotine - 21 mG/24Hr(s) Patch 1 Patch Transdermal daily  predniSONE   Tablet 40 milliGRAM(s) Oral daily  sodium chloride 2 Gram(s) Oral three times a day  tiotropium 18 MICROgram(s) Capsule 1 Capsule(s) Inhalation daily    MEDICATIONS  (PRN):  guaiFENesin    Syrup 100 milliGRAM(s) Oral every 6 hours PRN Cough  meclizine 12.5 milliGRAM(s) Oral three times a day PRN Dizziness      Allergies    penicillin (Unknown)    Intolerances      REVIEW OF SYSTEMS: Is negative for eye, ENT, GI, , allergic, dermatologic, musculoskeletal and neurologic, except as described above.    VITAL SIGNS:   Vital Signs Last 24 Hrs  T(C): 36.4 (14 Dec 2018 07:33), Max: 36.7 (13 Dec 2018 16:00)  T(F): 97.5 (14 Dec 2018 07:33), Max: 98.1 (13 Dec 2018 16:00)  HR: 105 (14 Dec 2018 07:33) (97 - 121)  BP: 134/85 (14 Dec 2018 07:33) (122/88 - 149/95)  BP(mean): --  RR: 19 (14 Dec 2018 07:33) (18 - 19)  SpO2: 94% (14 Dec 2018 07:33) (92% - 98%)    I&O's Summary    13 Dec 2018 07:01  -  14 Dec 2018 07:00  --------------------------------------------------------  IN: 300 mL / OUT: 0 mL / NET: 300 mL      PHYSICAL EXAM:  Constitutional: NAD, awake and alert, well-developed  Eyes: EOMI, no oral cyanosis, conjunctivae clear, anicteric  Pulmonary: Non-labored, on nasal cannula, scattered wheezes bilaterally  Cardiovascular: Regular S1 and S2. No murmur  Gastrointestinal: Bowel sounds present, soft, nontender  Lymph: No peripheral edema   Neurological: Alert, strength and sensitivity are grossly intact  Skin: Warm, well-perfused  Psych: Mood and affect appropriate    LABS: All Labs Reviewed:                        12.0   13.60 )-----------( 220      ( 14 Dec 2018 09:07 )             33.4                         12.2   8.71  )-----------( 182      ( 12 Dec 2018 21:38 )             33.8     14 Dec 2018 05:41    122    |  84     |  9      ----------------------------<  136    4.2     |  29     |  0.61   13 Dec 2018 18:35    120    |  84     |  12     ----------------------------<  148    4.0     |  27     |  0.64   13 Dec 2018 12:46    119    |  83     |  7      ----------------------------<  135    4.0     |  25     |  0.55     Ca    8.6        14 Dec 2018 05:41  Ca    8.3        13 Dec 2018 18:35  Ca    8.3        13 Dec 2018 12:46  Phos  3.4       14 Dec 2018 03:50  Mg     1.6       14 Dec 2018 03:50    TPro  6.5    /  Alb  3.6    /  TBili  1.0    /  DBili  x      /  AST  27     /  ALT  33     /  AlkPhos  96     12 Dec 2018 21:39    CARDIAC MARKERS ( 14 Dec 2018 03:50 )  .054 ng/mL / x     / 624 U/L / x     / 15.0 ng/mL    Blood Culture: Organism --  Gram Stain Blood -- Gram Stain --  Specimen Source .Blood Blood-Peripheral  Culture-Blood --    12-14 @ 05:41  TSH: 0.36  12-13 @ 12:46  TSH: 0.28  12-13 @ 07:22  TSH: 0.33    RADIOLOGY:  < from: CT Chest No Cont (12.13.18 @ 08:30) >  1. Small bilateral pleural effusions, moderate-sized pericardial   effusion, and interlobular septal thickening suggestive of edema.   Findings suggest volume overload.  2. Extensive mediastinal adenopathy and hilar adenopathy, difficult to   delineate without intravenous contrast. No suspicious lung mass   identified, though hilar mass cannot be excluded. Consider repeat imaging   with contrast.   3. Emphysema. 2 mm right upper lobe nodules for which follow-up CT in 12   months is recommended.  < end of copied text >    EKG: A fib with RVR at 129 bpm, nonspecific ST abnormality\ [facility]         Gurpreet Bush, Emily, Corey, Andrae, Sajan, Vida        [phone] (office)    CHIEF COMPLAINT: Patient is a 58y old  Male who presents with a chief complaint of Intractable cough and Near Syncope (13 Dec 2018 20:37)    HPI: RAFA SHAFER is a 58 year old male brought to ER after having severe cough for 2-3 days. Before patient's wife brought him to the ER after he coughed severely, fell to the floor, and had a seizure like activity. The patient have hyponatremia on ER blood work. Denies a history of CHF and liver diease. He is an active smoker. Admits to poor intake for the past 2-3 days. Denies imbalance, frequent falls, dizziness, lightheadedness, abd symptoms, urinary symptoms.   Past medical history of HTN, DM2. (13 Dec 2018 05:59)    Patient seen and examined at bedside for cardiology evaluation. He reports that he has been coughing and had new onset dyspnea on exertion for a couple weeks. Previously, he did not have SOB with exertion. Currently, he is gasping for air after climbing ~1-2 flights of stairs. He came to the ED due to a coughing fit after which he felt dizzy, fell forward and hit the radiator at home. Denied chest pain, palpitations, SOB at rest, PND, orthopnea, near syncope, syncope, or lower extremity edema.    Patient had stress echo performed in May or June 2018 for work, reports that it was "fine." PMD is Dr. Aranda. Does not see a cardiologist. Previously on diuretic for HTN but now only taking Irbesartan and Metoprolol to decrease his HR. He denies any history of A fib to his knowledge. Denies having CHF, MI, CVA or cardiac history.    PAST MEDICAL & SURGICAL HISTORY:  HTN (hypertension)  No significant past surgical history    SOCIAL HISTORY: No active tobacco (currently quitting on Chantix, previously smoked 1PPD for 40 years), alcohol or illicit drug use.    FAMILY HISTORY: No pertinent family history of CAD in first degree relatives    Home Medications:  Coricidin 325 mg-2 mg oral tablet: 1 tab(s) orally every 6 hours (12 Dec 2018 23:03)  irbesartan 300 mg oral tablet: 1 tab(s) orally once a day (12 Dec 2018 23:03)  metoprolol succinate 100 mg oral tablet, extended release: 1 tab(s) orally once a day (12 Dec 2018 23:03)  Wal-Zyr 10 mg oral tablet: 1 tab(s) orally once a day (12 Dec 2018 23:03)      MEDICATIONS  (STANDING):  ALBUTerol/ipratropium for Nebulization 3 milliLiter(s) Nebulizer every 6 hours  buDESOnide   0.5 milliGRAM(s) Respule 0.5 milliGRAM(s) Inhalation two times a day  influenza   Vaccine 0.5 milliLiter(s) IntraMuscular once  loratadine 10 milliGRAM(s) Oral daily  magnesium oxide 400 milliGRAM(s) Oral daily  metoprolol tartrate 25 milliGRAM(s) Oral four times a day  nicotine - 21 mG/24Hr(s) Patch 1 Patch Transdermal daily  predniSONE   Tablet 40 milliGRAM(s) Oral daily  sodium chloride 2 Gram(s) Oral three times a day  tiotropium 18 MICROgram(s) Capsule 1 Capsule(s) Inhalation daily    MEDICATIONS  (PRN):  guaiFENesin    Syrup 100 milliGRAM(s) Oral every 6 hours PRN Cough  meclizine 12.5 milliGRAM(s) Oral three times a day PRN Dizziness      Allergies    penicillin (Unknown)    Intolerances      REVIEW OF SYSTEMS: Is negative for eye, ENT, GI, , allergic, dermatologic, musculoskeletal and neurologic, except as described above.    VITAL SIGNS:   Vital Signs Last 24 Hrs  T(C): 36.4 (14 Dec 2018 07:33), Max: 36.7 (13 Dec 2018 16:00)  T(F): 97.5 (14 Dec 2018 07:33), Max: 98.1 (13 Dec 2018 16:00)  HR: 105 (14 Dec 2018 07:33) (97 - 121)  BP: 134/85 (14 Dec 2018 07:33) (122/88 - 149/95)  BP(mean): --  RR: 19 (14 Dec 2018 07:33) (18 - 19)  SpO2: 94% (14 Dec 2018 07:33) (92% - 98%)    I&O's Summary    13 Dec 2018 07:01  -  14 Dec 2018 07:00  --------------------------------------------------------  IN: 300 mL / OUT: 0 mL / NET: 300 mL      PHYSICAL EXAM:  Constitutional: NAD, awake and alert, well-developed  Eyes: EOMI, no oral cyanosis, conjunctivae clear, anicteric  Pulmonary: Non-labored, on nasal cannula, scattered wheezes bilaterally  Cardiovascular: Irregular S1 and S2. No murmur  Gastrointestinal: Bowel sounds present, soft, nontender  Lymph: +Peripheral edema (R > L)  Neurological: Alert, strength and sensitivity are grossly intact  Skin: Warm, well-perfused  Psych: Mood and affect appropriate    LABS: All Labs Reviewed:                        12.0   13.60 )-----------( 220      ( 14 Dec 2018 09:07 )             33.4                         12.2   8.71  )-----------( 182      ( 12 Dec 2018 21:38 )             33.8     14 Dec 2018 05:41    122    |  84     |  9      ----------------------------<  136    4.2     |  29     |  0.61   13 Dec 2018 18:35    120    |  84     |  12     ----------------------------<  148    4.0     |  27     |  0.64   13 Dec 2018 12:46    119    |  83     |  7      ----------------------------<  135    4.0     |  25     |  0.55     Ca    8.6        14 Dec 2018 05:41  Ca    8.3        13 Dec 2018 18:35  Ca    8.3        13 Dec 2018 12:46  Phos  3.4       14 Dec 2018 03:50  Mg     1.6       14 Dec 2018 03:50    TPro  6.5    /  Alb  3.6    /  TBili  1.0    /  DBili  x      /  AST  27     /  ALT  33     /  AlkPhos  96     12 Dec 2018 21:39    CARDIAC MARKERS ( 14 Dec 2018 03:50 )  .054 ng/mL / x     / 624 U/L / x     / 15.0 ng/mL    Blood Culture: Organism --  Gram Stain Blood -- Gram Stain --  Specimen Source .Blood Blood-Peripheral  Culture-Blood --    12-14 @ 05:41  TSH: 0.36  12-13 @ 12:46  TSH: 0.28  12-13 @ 07:22  TSH: 0.33    RADIOLOGY:  < from: CT Chest No Cont (12.13.18 @ 08:30) >  1. Small bilateral pleural effusions, moderate-sized pericardial   effusion, and interlobular septal thickening suggestive of edema.   Findings suggest volume overload.  2. Extensive mediastinal adenopathy and hilar adenopathy, difficult to   delineate without intravenous contrast. No suspicious lung mass   identified, though hilar mass cannot be excluded. Consider repeat imaging   with contrast.   3. Emphysema. 2 mm right upper lobe nodules for which follow-up CT in 12   months is recommended.  < end of copied text >    EKG: A fib with RVR at 129 bpm, nonspecific ST abnormality\ [facility]         Gurpreet Bush, Emily, Corey, Andrae, Sajan, Vida        [phone] (office)    CHIEF COMPLAINT: Patient is a 58y old  Male who presents with a chief complaint of Intractable cough and Near Syncope (13 Dec 2018 20:37)    HPI: RAFA SHAFER is a 58 year old male brought to ER after having severe cough for 2-3 days. Before patient's wife brought him to the ER after he coughed severely, fell to the floor, and had a seizure like activity. The patient have hyponatremia on ER blood work. Denies a history of CHF and liver diease. He is an active smoker. Admits to poor intake for the past 2-3 days. Denies imbalance, frequent falls, dizziness, lightheadedness, abd symptoms, urinary symptoms.   Past medical history of HTN, DM2. (13 Dec 2018 05:59)    Patient seen and examined at bedside for cardiology evaluation. He reports that he has been coughing and had new onset dyspnea on exertion for a couple weeks. Previously, he did not have SOB with exertion. Currently, he is gasping for air after climbing ~1-2 flights of stairs. He came to the ED due to a coughing fit after which he felt dizzy, fell forward and hit the radiator at home. Denied chest pain, palpitations, SOB at rest, PND, orthopnea, near syncope, syncope, or lower extremity edema.    Patient had stress echo performed in May or June 2018 for work, reports that it was "fine." PMD is Dr. Aranda. Does not see a cardiologist. Previously on diuretic for HTN but now only taking Irbesartan and Metoprolol to decrease his HR. He denies any history of A fib to his knowledge. Denies having CHF, MI, CVA or cardiac history.    PAST MEDICAL & SURGICAL HISTORY:  HTN (hypertension)  No significant past surgical history    SOCIAL HISTORY: No active tobacco (currently quitting on Chantix, previously smoked 1PPD for 40 years), alcohol or illicit drug use.    FAMILY HISTORY: No pertinent family history of CAD or SCD in first degree relatives    Home Medications:  Coricidin 325 mg-2 mg oral tablet: 1 tab(s) orally every 6 hours (12 Dec 2018 23:03)  irbesartan 300 mg oral tablet: 1 tab(s) orally once a day (12 Dec 2018 23:03)  metoprolol succinate 100 mg oral tablet, extended release: 1 tab(s) orally once a day (12 Dec 2018 23:03)  Wal-Zyr 10 mg oral tablet: 1 tab(s) orally once a day (12 Dec 2018 23:03)      MEDICATIONS  (STANDING):  ALBUTerol/ipratropium for Nebulization 3 milliLiter(s) Nebulizer every 6 hours  buDESOnide   0.5 milliGRAM(s) Respule 0.5 milliGRAM(s) Inhalation two times a day  influenza   Vaccine 0.5 milliLiter(s) IntraMuscular once  loratadine 10 milliGRAM(s) Oral daily  magnesium oxide 400 milliGRAM(s) Oral daily  metoprolol tartrate 25 milliGRAM(s) Oral four times a day  nicotine - 21 mG/24Hr(s) Patch 1 Patch Transdermal daily  predniSONE   Tablet 40 milliGRAM(s) Oral daily  sodium chloride 2 Gram(s) Oral three times a day  tiotropium 18 MICROgram(s) Capsule 1 Capsule(s) Inhalation daily    MEDICATIONS  (PRN):  guaiFENesin    Syrup 100 milliGRAM(s) Oral every 6 hours PRN Cough  meclizine 12.5 milliGRAM(s) Oral three times a day PRN Dizziness      Allergies    penicillin (Unknown)    Intolerances      REVIEW OF SYSTEMS: Is negative for eye, ENT, GI, , allergic, dermatologic, musculoskeletal and neurologic, except as described above.    VITAL SIGNS:   Vital Signs Last 24 Hrs  T(C): 36.4 (14 Dec 2018 07:33), Max: 36.7 (13 Dec 2018 16:00)  T(F): 97.5 (14 Dec 2018 07:33), Max: 98.1 (13 Dec 2018 16:00)  HR: 105 (14 Dec 2018 07:33) (97 - 121)  BP: 134/85 (14 Dec 2018 07:33) (122/88 - 149/95)  BP(mean): --  RR: 19 (14 Dec 2018 07:33) (18 - 19)  SpO2: 94% (14 Dec 2018 07:33) (92% - 98%)    I&O's Summary    13 Dec 2018 07:01  -  14 Dec 2018 07:00  --------------------------------------------------------  IN: 300 mL / OUT: 0 mL / NET: 300 mL      PHYSICAL EXAM:  Constitutional: NAD, awake and alert, well-developed  Eyes: EOMI, no oral cyanosis, conjunctivae clear, anicteric  Pulmonary: Non-labored, on nasal cannula, scattered wheezes bilaterally  Cardiovascular: Irregular S1 and S2. No murmur  Gastrointestinal: Bowel sounds present, soft, nontender  Lymph: +Peripheral edema (R > L)  Neurological: Alert, strength and sensitivity are grossly intact  Skin: Warm, well-perfused  Psych: Mood and affect appropriate    LABS: All Labs Reviewed:                        12.0   13.60 )-----------( 220      ( 14 Dec 2018 09:07 )             33.4                         12.2   8.71  )-----------( 182      ( 12 Dec 2018 21:38 )             33.8     14 Dec 2018 05:41    122    |  84     |  9      ----------------------------<  136    4.2     |  29     |  0.61   13 Dec 2018 18:35    120    |  84     |  12     ----------------------------<  148    4.0     |  27     |  0.64   13 Dec 2018 12:46    119    |  83     |  7      ----------------------------<  135    4.0     |  25     |  0.55     Ca    8.6        14 Dec 2018 05:41  Ca    8.3        13 Dec 2018 18:35  Ca    8.3        13 Dec 2018 12:46  Phos  3.4       14 Dec 2018 03:50  Mg     1.6       14 Dec 2018 03:50    TPro  6.5    /  Alb  3.6    /  TBili  1.0    /  DBili  x      /  AST  27     /  ALT  33     /  AlkPhos  96     12 Dec 2018 21:39    CARDIAC MARKERS ( 14 Dec 2018 03:50 )  .054 ng/mL / x     / 624 U/L / x     / 15.0 ng/mL    Blood Culture: Organism --  Gram Stain Blood -- Gram Stain --  Specimen Source .Blood Blood-Peripheral  Culture-Blood --    12-14 @ 05:41  TSH: 0.36  12-13 @ 12:46  TSH: 0.28  12-13 @ 07:22  TSH: 0.33    RADIOLOGY:  < from: CT Chest No Cont (12.13.18 @ 08:30) >  1. Small bilateral pleural effusions, moderate-sized pericardial   effusion, and interlobular septal thickening suggestive of edema.   Findings suggest volume overload.  2. Extensive mediastinal adenopathy and hilar adenopathy, difficult to   delineate without intravenous contrast. No suspicious lung mass   identified, though hilar mass cannot be excluded. Consider repeat imaging   with contrast.   3. Emphysema. 2 mm right upper lobe nodules for which follow-up CT in 12   months is recommended.  < end of copied text >    EKG: A fib with RVR at 129 bpm, nonspecific ST abnormality\

## 2018-12-14 NOTE — DISCHARGE NOTE ADULT - PATIENT PORTAL LINK FT
You can access the [email or web address], offered by [facility], by registering with the following website: [email or web address]

## 2018-12-14 NOTE — DISCHARGE NOTE ADULT - CARE PROVIDERS DIRECT ADDRESSES
,[email or web address]

## 2018-12-14 NOTE — DISCHARGE NOTE ADULT - MEDICATION SUMMARY - MEDICATIONS TO TAKE
I will START or STAY ON the medications listed below when I get home from the hospital:    predniSONE 20 mg oral tablet  -- 2 tab(s) by mouth once a day  -- Indication: For =    budesonide 0.5 mg/2 mL inhalation suspension  -- inhaled 2 times a day  -- Indication: For =    meclizine 12.5 mg oral tablet  -- 1 tab(s) by mouth 3 times a day, As needed, Dizziness  -- Indication: For =    loratadine 10 mg oral tablet  -- 1 tab(s) by mouth once a day  -- Indication: For =    metoprolol succinate 100 mg oral tablet, extended release  -- 1 tab(s) by mouth once a day  -- Indication: For =    ipratropium-albuterol 0.5 mg-2.5 mg/3 mLinhalation solution  -- 3 milliliter(s) inhaled every 6 hours  -- Indication: For =    tiotropium 18 mcg inhalation capsule  -- 1 cap(s) inhaled once a day  -- Indication: For =    guaiFENesin 100 mg/5 mL oral liquid  -- 5 milliliter(s) by mouth every 6 hours, As needed, Cough  -- Indication: For =    magnesium oxide 400 mg (241.3 mg elemental magnesium) oral tablet  -- 1 tab(s) by mouth once a day  -- Indication: For =    sodium chloride 1 g oral tablet  -- 2 tab(s) by mouth 3 times a day  -- Indication: For =    nicotine 21 mg/24 hr transdermal film, extended release  -- transdermal once a day  -- Indication: For = I will START or STAY ON the medications listed below when I get home from the hospital:    aspirin 81 mg oral tablet, chewable  -- 1 tab(s) by mouth once a day  -- Indication: For Prevent heart diasease    Cardizem  mg/24 hours oral tablet, extended release  -- 1 tab(s) by mouth once a day  -- Indication: For Atrial fibrillation with rapid ventricular response    loratadine 10 mg oral tablet  -- 1 tab(s) by mouth once a day  -- Indication: For Allergies    Toprol- mg oral tablet, extended release  -- 1 tab(s) by mouth once a day  -- Indication: For Atrial fibrillation with rapid ventricular response    Proventil HFA 90 mcg/inh inhalation aerosol  -- 2 puff(s) inhaled 4 times a day, As Needed   -- For inhalation only.  It is very important that you take or use this exactly as directed.  Do not skip doses or discontinue unless directed by your doctor.  Obtain medical advice before taking any non-prescription drugs as some may affect the action of this medication.  Shake well before use.    -- Indication: For COPD (chronic obstructive pulmonary disease) with acute bronchitis    tiotropium 18 mcg inhalation capsule  -- 1 cap(s) inhaled once a day  -- Indication: For COPD (chronic obstructive pulmonary disease) with acute bronchitis    Advair Diskus 250 mcg-50 mcg inhalation powder  -- 1 puff(s) inhaled 2 times a day   -- Check with your doctor before becoming pregnant.  For inhalation only.  Obtain medical advice before taking any non-prescription drugs as some may affect the action of this medication.  Rinse mouth thoroughly after use.    -- Indication: For COPD (chronic obstructive pulmonary disease) with acute bronchitis    guaiFENesin 100 mg/5 mL oral liquid  -- 5 milliliter(s) by mouth every 6 hours, As needed, Cough  -- Indication: For COugh    magnesium oxide 400 mg (241.3 mg elemental magnesium) oral tablet  -- 1 tab(s) by mouth once a day  -- Indication: For Suplement    magnesium oxide 400 mg (241.3 mg elemental magnesium) oral tablet  -- 1 tab(s) by mouth once a day   -- Indication: For Suplement    Sodium Chloride 1 g oral tablet  -- 2 tab(s) by mouth 3 times a day   -- Indication: For Suplement

## 2018-12-14 NOTE — CONSULT NOTE ADULT - ATTENDING COMMENTS
I saw and examined the patient personally. Spoke with above provider regarding this case. I reviewed the above findings completely.  I agree with the above history, physical, and plan which I have edited where appropriate.   He was noted to be more orthopneic and dyspneic. Prelim echo shows a large pericardial effusion at ~2.1 cm with echo signs of tamponade. Possibly malignant given symptoms. Clinically hemodynamically stable.   Spoke with Dr. Weiss (CTsx) plan for pericardial window and biopsy tomorrow.   Hold all AC.   Avoid preload reduction.   Cont bb.   ICU evaluation    Patient at high risk for decompensation. Critically ill. >35 minutes of critical care time was spent with this patient.
Agree with above  57 y/o male with moderate pericardial effusion, likely malignant   Clinically stable, SBP 120s, HR   Scheduled for pericardial window in am   Overnight monitoring in ICU  Hold lovenox  NPO past midnight

## 2018-12-14 NOTE — CONSULT NOTE ADULT - ASSESSMENT
57 yo M with PMH of HTN presents with new Prajapati, LE edema, and now found to be in A fib.    - Elevated cardiac enzymes, continue to trend.   - CHADSVASC score of 1, start Aspirin 325 mg daily.  - If cardiac enzymes uptrending, may need further anticoagulation.  - For volume overload, gave 1 time dose of IV Lasix 40 mg.  - Will follow up echo as well as RLE doppler to R/O DVT.  - BP slightly elevated at times, continue home BP meds and monitor routine hemodynamics.  - Monitor and replete lytes, keep K>4, Mg>2.  - Other cardiovascular workup will depend on clinical course. All other workup per primary team.  - Will follow. 59 yo M with PMH of HTN presents with new Prajapati, LE edema, and now found to be in A fib.    - Elevated cardiac enzymes, continue to trend.   - CHADSVASC score of 1, start Aspirin 325 mg daily.  - If cardiac enzymes uptrending, may need further anticoagulation.  - Will follow up echo as well as RLE doppler to R/O DVT.  - BP slightly elevated at times, continue home BP meds and monitor routine hemodynamics.  - Monitor and replete lytes, keep K>4, Mg>2.  - Other cardiovascular workup will depend on clinical course. All other workup per primary team.  - Will follow.

## 2018-12-14 NOTE — PROGRESS NOTE ADULT - ASSESSMENT
Hyponatremia  Coughing  Low BUN  Possible Seizure  HTN  +Smoker      -Renal indices are stable; BUN is abnormally low; poor nutrition for the past few days  -Uric acid is low; urine studies are pending; high likelihood of SIADH  -Check a CXR or consider a CT chest with history   -Oral fluid restriction to 1L for now  -Salt tabs 2 gm TID for now. We will give one dose Tolvaptan if not further improve   -No acute indication for hypertonic saline or tolvaptan at this time  -Smoking cessation discussed  -Increase protein intake; no salt restriction in diet  -BP is stable; continue with current regimen    Thank you

## 2018-12-14 NOTE — DISCHARGE NOTE ADULT - PROVIDER TOKENS
TOKEN:'[id number]:'[id number]' TOKEN:'[id number]:'[id number]'

## 2018-12-14 NOTE — CHART NOTE - NSCHARTNOTEFT_GEN_A_CORE
Called by RN because patient tachycardic to 130s on monitor. 57 yo M PMH HTN, COPD, admitted with PNA and hyponatremia, found to have mediastinal adenopathy on CT. Tele tech unsure if patient in a sinus rhythm. Patient seen and examined. Denies CP, dizziness, confusion, N/V, abdominal pain. Admits some SOB that has been present since admission.    Vital Signs Last 24 Hrs  T(C): 36.7 (14 Dec 2018 00:00), Max: 36.7 (13 Dec 2018 16:00)  T(F): 98 (14 Dec 2018 00:00), Max: 98.1 (13 Dec 2018 16:00)  HR: 101 (14 Dec 2018 00:35) (96 - 112)  BP: 142/92 (14 Dec 2018 00:00) (135/77 - 148/80)  BP(mean): --  RR: 19 (14 Dec 2018 00:00) (18 - 19)  SpO2: 93% (14 Dec 2018 00:35) (92% - 98%)    Physical Exam:  General: No Acute Distress  HEENT: Normocephallic Atraumatic, PERRLA, EOMI bl, moist mucous membranes   Neurology: AA&Ox3, nonfocal  Respiratory: Rhonchi in RLL  CV: tachycardic, irregular rhythm, +S1/S2, no murmurs, rubs or gallops  Abdominal: Soft, Non-Tender, obese +Bowel Soundsx4  Extremities: No Clubbing, cyanosis or edema, + peripheral pulses    A/P 57 yo M PMH HTN, COPD admitted with PNA and hyponatremia with mediastinal/hilar adenopathy now tachycardic and asymptomatic.  - 12 lead EKG showed Afib with RVR. EKG from admission was sinus rhythm. On metoprolol succinate 100 mg qd  - Patient has no known history of Afib, not on AC    - 10 mg IV cardizem push given  - follow up stat cardiac enzymes  - follow up stat mag and phos  - RN to call with updates Called by RN because patient tachycardic to 130s on monitor. 59 yo M PMH HTN, COPD, admitted with PNA and hyponatremia, found to have mediastinal adenopathy on CT. Tele tech unsure if patient in a sinus rhythm. Patient seen and examined. Denies CP, dizziness, confusion, N/V, abdominal pain. Admits some SOB that has been present since admission.    Vital Signs Last 24 Hrs  T(C): 36.7 (14 Dec 2018 00:00), Max: 36.7 (13 Dec 2018 16:00)  T(F): 98 (14 Dec 2018 00:00), Max: 98.1 (13 Dec 2018 16:00)  HR: 101 (14 Dec 2018 00:35) (96 - 112)  BP: 142/92 (14 Dec 2018 00:00) (135/77 - 148/80)  BP(mean): --  RR: 19 (14 Dec 2018 00:00) (18 - 19)  SpO2: 93% (14 Dec 2018 00:35) (92% - 98%)    Physical Exam:  General: No Acute Distress  HEENT: Normocephallic Atraumatic, PERRLA, EOMI bl, moist mucous membranes   Neurology: AA&Ox3, nonfocal  Respiratory: Rhonchi in RLL  CV: tachycardic, irregular rhythm, +S1/S2, no murmurs, rubs or gallops  Abdominal: Soft, Non-Tender, obese +Bowel Soundsx4  Extremities: No Clubbing, cyanosis or edema, + peripheral pulses    A/P 59 yo M PMH HTN, COPD admitted with PNA and hyponatremia with mediastinal/hilar adenopathy now tachycardic and asymptomatic.  - 12 lead EKG showed Afib with RVR. EKG from admission was sinus rhythm. On metoprolol succinate 100 mg qd  - Patient has no known history of Afib, not on AC    - 10 mg IV cardizem push given  - follow up stat cardiac enzymes  - follow up stat mag and phos  - RN to call with updates    Addendum:    Patient still tachycardic to 120s, still in Afib. Labs significant for trops of 0.054, , Mag 1.6 Patient asymptomatic  Elevated trops likely 2/2 demand ischemia  - 5 mg IV cardizem ordered  - mag sulfate IVPB ordered  - will follow

## 2018-12-14 NOTE — DISCHARGE NOTE ADULT - MEDICATION SUMMARY - MEDICATIONS TO STOP TAKING
I will STOP taking the medications listed below when I get home from the hospital:  None I will STOP taking the medications listed below when I get home from the hospital:    Wal-Zyr 10 mg oral tablet  -- 1 tab(s) by mouth once a day    Coricidin 325 mg-2 mg oral tablet  -- 1 tab(s) by mouth every 6 hours    irbesartan 300 mg oral tablet  -- 1 tab(s) by mouth once a day

## 2018-12-14 NOTE — PROGRESS NOTE ADULT - SUBJECTIVE AND OBJECTIVE BOX
neuro cons dict.  presented with dizziness and feeling of passing out,   preceded by severe cough cw near syncope doubt tia,  hyponatremia.  mediastinal lymphadenopathy.  follow up bmp luiza. NA.

## 2018-12-14 NOTE — DISCHARGE NOTE ADULT - SECONDARY DIAGNOSIS.
Essential hypertension Near syncope COPD (chronic obstructive pulmonary disease) with acute bronchitis

## 2018-12-14 NOTE — CONSULT NOTE ADULT - SUBJECTIVE AND OBJECTIVE BOX
Patient is a 58y old  Male who presents with a chief complaint of Intractable cough and Near Syncope (14 Dec 2018 14:17)      BRIEF HOSPITAL COURSE: 59 y/o male with pmhx of HTN presented to ED on 12/12   Events last 24 hours: ***  PAST MEDICAL & SURGICAL HISTORY:  HTN (hypertension)  No significant past surgical history    Allergies    penicillin (Unknown)    Intolerances      FAMILY HISTORY:  No pertinent family history in first degree relatives      Family history otherwise noncontributory.      Review of Systems:  CONSTITUTIONAL: No fever, chills, or fatigue  EYES: No eye pain, visual disturbances, or discharge  ENMT:  No difficulty hearing, tinnitus, vertigo; No sinus or throat pain  NECK: No pain or stiffness  RESPIRATORY: No cough, wheezing, chills or hemoptysis; No shortness of breath  CARDIOVASCULAR: No chest pain, palpitations, dizziness, or leg swelling  GASTROINTESTINAL: No abdominal or epigastric pain. No nausea, vomiting, or hematemesis; No diarrhea or constipation. No melena or hematochezia.  GENITOURINARY: No dysuria, frequency, hematuria, or incontinence  NEUROLOGICAL: No headaches, memory loss, loss of strength, numbness, or tremors  SKIN: No itching, burning, rashes, or lesions   MUSCULOSKELETAL: No joint pain or swelling; No muscle, back, or extremity pain  PSYCHIATRIC: No depression, anxiety, mood swings, or difficulty sleeping    All other review of systems negative except as mentioned in HPI.      Social History: ***      Medications:    metoprolol tartrate 50 milliGRAM(s) Oral every 6 hours    ALBUTerol/ipratropium for Nebulization 3 milliLiter(s) Nebulizer every 6 hours  buDESOnide   0.5 milliGRAM(s) Respule 0.5 milliGRAM(s) Inhalation two times a day  guaiFENesin    Syrup 100 milliGRAM(s) Oral every 6 hours PRN  loratadine 10 milliGRAM(s) Oral daily  tiotropium 18 MICROgram(s) Capsule 1 Capsule(s) Inhalation daily    meclizine 12.5 milliGRAM(s) Oral three times a day PRN            predniSONE   Tablet 40 milliGRAM(s) Oral daily    magnesium oxide 400 milliGRAM(s) Oral daily  sodium chloride 2 Gram(s) Oral three times a day    influenza   Vaccine 0.5 milliLiter(s) IntraMuscular once      nicotine - 21 mG/24Hr(s) Patch 1 Patch Transdermal daily          ICU Vital Signs Last 24 Hrs  T(C): 36.4 (14 Dec 2018 07:33), Max: 36.7 (13 Dec 2018 16:00)  T(F): 97.5 (14 Dec 2018 07:33), Max: 98.1 (13 Dec 2018 16:00)  HR: 90 (14 Dec 2018 13:21) (90 - 121)  BP: 134/85 (14 Dec 2018 07:33) (122/88 - 149/95)  BP(mean): --  ABP: --  ABP(mean): --  RR: 19 (14 Dec 2018 07:33) (18 - 19)  SpO2: 94% (14 Dec 2018 07:33) (92% - 98%)    Vital Signs Last 24 Hrs  T(C): 36.4 (14 Dec 2018 07:33), Max: 36.7 (13 Dec 2018 16:00)  T(F): 97.5 (14 Dec 2018 07:33), Max: 98.1 (13 Dec 2018 16:00)  HR: 90 (14 Dec 2018 13:21) (90 - 121)  BP: 134/85 (14 Dec 2018 07:33) (122/88 - 149/95)  BP(mean): --  RR: 19 (14 Dec 2018 07:33) (18 - 19)  SpO2: 94% (14 Dec 2018 07:33) (92% - 98%)        I&O's Detail    13 Dec 2018 07:01  -  14 Dec 2018 07:00  --------------------------------------------------------  IN:    sodium chloride 0.9%: 300 mL  Total IN: 300 mL    OUT:  Total OUT: 0 mL    Total NET: 300 mL            LABS:                        12.0   13.60 )-----------( 220      ( 14 Dec 2018 09:07 )             33.4     12-14    122<L>  |  84<L>  |  9   ----------------------------<  136<H>  4.2   |  29  |  0.61    Ca    8.6      14 Dec 2018 05:41  Phos  3.4     12-14  Mg     1.6     12-14    TPro  6.5  /  Alb  3.6  /  TBili  1.0  /  DBili  x   /  AST  27  /  ALT  33  /  AlkPhos  96  12-12      CARDIAC MARKERS ( 14 Dec 2018 14:39 )  .053 ng/mL / x     / 596 U/L / x     / x      CARDIAC MARKERS ( 14 Dec 2018 03:50 )  .054 ng/mL / x     / 624 U/L / x     / 15.0 ng/mL      CAPILLARY BLOOD GLUCOSE            CULTURES:  Culture Results:   No growth to date. (12-13 @ 00:52)  Culture Results:   No growth to date. (12-13 @ 00:52)      Physical Examination:    GENERAL: No acute distress.      EYES: Pupils equal, reactive to light.  Symmetric.    EARS, NOSE, THROAT: Normal; supple neck, no lymphadenopathy; trachea midline    PULM: Clear to auscultation bilaterally, no significant sputum production. No use of accessory respiratory muscles.    CVS: Regular rate and rhythm, no murmurs, rubs, or gallops    GI: Soft, nondistended, nontender, normoactive bowel sounds, no masses, no guarding    EXTREMITIES: No edema. DP and radial pulses 2+ bilaterally.    SKIN: Warm and well perfused, no rashes noted.    NEURO: Alert, oriented, interactive, nonfocal    DEVICES:     RADIOLOGY: ***    CRITICAL CARE TIME SPENT: *** Patient is a 58y old  Male who presents with a chief complaint of Intractable cough and Near Syncope (14 Dec 2018 14:17)      BRIEF HOSPITAL COURSE: 59 y/o male with pmhx of HTN presented to ED on 12/12 with ?seizure like activity and increasing shortness of breath over the last few weeks but especially over the last 2-3 days. His wife brought him to the ER after he had a severe coughing episode, fell to the ground, and had seizure like activity. He works as an [employer] employee and has trouble going up a flight of stairs because he gets short of breath. He was found to be hyponatremic (119). Overnight residents were called to bedside after patient developed afib with RVR rate to 130, given 5 mg cardizem IVP with transient improvement.   Events last 24 hours: ***  PAST MEDICAL & SURGICAL HISTORY:  HTN (hypertension)  No significant past surgical history    Allergies    penicillin (Unknown)    Intolerances      FAMILY HISTORY:  No pertinent family history in first degree relatives      Family history otherwise noncontributory.      Review of Systems:  CONSTITUTIONAL: No fever, chills, or fatigue  EYES: No eye pain, visual disturbances, or discharge  ENMT:  No difficulty hearing, tinnitus, vertigo; No sinus or throat pain  NECK: No pain or stiffness  RESPIRATORY: No cough, wheezing, chills or hemoptysis; No shortness of breath  CARDIOVASCULAR: No chest pain, palpitations, dizziness, or leg swelling  GASTROINTESTINAL: No abdominal or epigastric pain. No nausea, vomiting, or hematemesis; No diarrhea or constipation. No melena or hematochezia.  GENITOURINARY: No dysuria, frequency, hematuria, or incontinence  NEUROLOGICAL: No headaches, memory loss, loss of strength, numbness, or tremors  SKIN: No itching, burning, rashes, or lesions   MUSCULOSKELETAL: No joint pain or swelling; No muscle, back, or extremity pain  PSYCHIATRIC: No depression, anxiety, mood swings, or difficulty sleeping    All other review of systems negative except as mentioned in HPI.      Social History: ***      Medications:    metoprolol tartrate 50 milliGRAM(s) Oral every 6 hours    ALBUTerol/ipratropium for Nebulization 3 milliLiter(s) Nebulizer every 6 hours  buDESOnide   0.5 milliGRAM(s) Respule 0.5 milliGRAM(s) Inhalation two times a day  guaiFENesin    Syrup 100 milliGRAM(s) Oral every 6 hours PRN  loratadine 10 milliGRAM(s) Oral daily  tiotropium 18 MICROgram(s) Capsule 1 Capsule(s) Inhalation daily    meclizine 12.5 milliGRAM(s) Oral three times a day PRN            predniSONE   Tablet 40 milliGRAM(s) Oral daily    magnesium oxide 400 milliGRAM(s) Oral daily  sodium chloride 2 Gram(s) Oral three times a day    influenza   Vaccine 0.5 milliLiter(s) IntraMuscular once      nicotine - 21 mG/24Hr(s) Patch 1 Patch Transdermal daily          ICU Vital Signs Last 24 Hrs  T(C): 36.4 (14 Dec 2018 07:33), Max: 36.7 (13 Dec 2018 16:00)  T(F): 97.5 (14 Dec 2018 07:33), Max: 98.1 (13 Dec 2018 16:00)  HR: 90 (14 Dec 2018 13:21) (90 - 121)  BP: 134/85 (14 Dec 2018 07:33) (122/88 - 149/95)  BP(mean): --  ABP: --  ABP(mean): --  RR: 19 (14 Dec 2018 07:33) (18 - 19)  SpO2: 94% (14 Dec 2018 07:33) (92% - 98%)    Vital Signs Last 24 Hrs  T(C): 36.4 (14 Dec 2018 07:33), Max: 36.7 (13 Dec 2018 16:00)  T(F): 97.5 (14 Dec 2018 07:33), Max: 98.1 (13 Dec 2018 16:00)  HR: 90 (14 Dec 2018 13:21) (90 - 121)  BP: 134/85 (14 Dec 2018 07:33) (122/88 - 149/95)  BP(mean): --  RR: 19 (14 Dec 2018 07:33) (18 - 19)  SpO2: 94% (14 Dec 2018 07:33) (92% - 98%)        I&O's Detail    13 Dec 2018 07:01  -  14 Dec 2018 07:00  --------------------------------------------------------  IN:    sodium chloride 0.9%: 300 mL  Total IN: 300 mL    OUT:  Total OUT: 0 mL    Total NET: 300 mL            LABS:                        12.0   13.60 )-----------( 220      ( 14 Dec 2018 09:07 )             33.4     12-14    122<L>  |  84<L>  |  9   ----------------------------<  136<H>  4.2   |  29  |  0.61    Ca    8.6      14 Dec 2018 05:41  Phos  3.4     12-14  Mg     1.6     12-14    TPro  6.5  /  Alb  3.6  /  TBili  1.0  /  DBili  x   /  AST  27  /  ALT  33  /  AlkPhos  96  12-12      CARDIAC MARKERS ( 14 Dec 2018 14:39 )  .053 ng/mL / x     / 596 U/L / x     / x      CARDIAC MARKERS ( 14 Dec 2018 03:50 )  .054 ng/mL / x     / 624 U/L / x     / 15.0 ng/mL      CAPILLARY BLOOD GLUCOSE            CULTURES:  Culture Results:   No growth to date. (12-13 @ 00:52)  Culture Results:   No growth to date. (12-13 @ 00:52)      Physical Examination:    GENERAL: No acute distress.      EYES: Pupils equal, reactive to light.  Symmetric.    EARS, NOSE, THROAT: Normal; supple neck, no lymphadenopathy; trachea midline    PULM: Clear to auscultation bilaterally, no significant sputum production. No use of accessory respiratory muscles.    CVS: Regular rate and rhythm, no murmurs, rubs, or gallops    GI: Soft, nondistended, nontender, normoactive bowel sounds, no masses, no guarding    EXTREMITIES: No edema. DP and radial pulses 2+ bilaterally.    SKIN: Warm and well perfused, no rashes noted.    NEURO: Alert, oriented, interactive, nonfocal    DEVICES:     RADIOLOGY: ***    CRITICAL CARE TIME SPENT: *** Patient is a 58y old  Male who presents with a chief complaint of Intractable cough and Near Syncope (14 Dec 2018 14:17)      BRIEF HOSPITAL COURSE: 59 y/o male with pmhx of HTN presented to ED on 12/12 with ?seizure like activity and increasing shortness of breath over the last few weeks but especially over the last 2-3 days. His wife brought him to the ER after he had a severe coughing episode, fell to the ground, and had seizure like activity. He works as an [employer] employee and has trouble going up a flight of stairs because he gets short of breath. He was found to be hyponatremic (119). Overnight residents were called to bedside after patient developed afib with RVR rate to 130, given 5 mg cardizem IVP with transient improvement. Patient seen by nephrology and likely diagnosed with SIADH.  Events last 24 hours: ***  PAST MEDICAL & SURGICAL HISTORY:  HTN (hypertension)  No significant past surgical history    Allergies    penicillin (Unknown)    Intolerances      FAMILY HISTORY:  No pertinent family history in first degree relatives      Family history otherwise noncontributory.      Review of Systems:  CONSTITUTIONAL: No fever, chills, or fatigue  EYES: No eye pain, visual disturbances, or discharge  ENMT:  No difficulty hearing, tinnitus, vertigo; No sinus or throat pain  NECK: No pain or stiffness  RESPIRATORY: No cough, wheezing, chills or hemoptysis; No shortness of breath  CARDIOVASCULAR: No chest pain, palpitations, dizziness, or leg swelling  GASTROINTESTINAL: No abdominal or epigastric pain. No nausea, vomiting, or hematemesis; No diarrhea or constipation. No melena or hematochezia.  GENITOURINARY: No dysuria, frequency, hematuria, or incontinence  NEUROLOGICAL: No headaches, memory loss, loss of strength, numbness, or tremors  SKIN: No itching, burning, rashes, or lesions   MUSCULOSKELETAL: No joint pain or swelling; No muscle, back, or extremity pain  PSYCHIATRIC: No depression, anxiety, mood swings, or difficulty sleeping    All other review of systems negative except as mentioned in HPI.      Social History: ***      Medications:    metoprolol tartrate 50 milliGRAM(s) Oral every 6 hours    ALBUTerol/ipratropium for Nebulization 3 milliLiter(s) Nebulizer every 6 hours  buDESOnide   0.5 milliGRAM(s) Respule 0.5 milliGRAM(s) Inhalation two times a day  guaiFENesin    Syrup 100 milliGRAM(s) Oral every 6 hours PRN  loratadine 10 milliGRAM(s) Oral daily  tiotropium 18 MICROgram(s) Capsule 1 Capsule(s) Inhalation daily    meclizine 12.5 milliGRAM(s) Oral three times a day PRN            predniSONE   Tablet 40 milliGRAM(s) Oral daily    magnesium oxide 400 milliGRAM(s) Oral daily  sodium chloride 2 Gram(s) Oral three times a day    influenza   Vaccine 0.5 milliLiter(s) IntraMuscular once      nicotine - 21 mG/24Hr(s) Patch 1 Patch Transdermal daily          ICU Vital Signs Last 24 Hrs  T(C): 36.4 (14 Dec 2018 07:33), Max: 36.7 (13 Dec 2018 16:00)  T(F): 97.5 (14 Dec 2018 07:33), Max: 98.1 (13 Dec 2018 16:00)  HR: 90 (14 Dec 2018 13:21) (90 - 121)  BP: 134/85 (14 Dec 2018 07:33) (122/88 - 149/95)  BP(mean): --  ABP: --  ABP(mean): --  RR: 19 (14 Dec 2018 07:33) (18 - 19)  SpO2: 94% (14 Dec 2018 07:33) (92% - 98%)    Vital Signs Last 24 Hrs  T(C): 36.4 (14 Dec 2018 07:33), Max: 36.7 (13 Dec 2018 16:00)  T(F): 97.5 (14 Dec 2018 07:33), Max: 98.1 (13 Dec 2018 16:00)  HR: 90 (14 Dec 2018 13:21) (90 - 121)  BP: 134/85 (14 Dec 2018 07:33) (122/88 - 149/95)  BP(mean): --  RR: 19 (14 Dec 2018 07:33) (18 - 19)  SpO2: 94% (14 Dec 2018 07:33) (92% - 98%)        I&O's Detail    13 Dec 2018 07:01  -  14 Dec 2018 07:00  --------------------------------------------------------  IN:    sodium chloride 0.9%: 300 mL  Total IN: 300 mL    OUT:  Total OUT: 0 mL    Total NET: 300 mL            LABS:                        12.0   13.60 )-----------( 220      ( 14 Dec 2018 09:07 )             33.4     12-14    122<L>  |  84<L>  |  9   ----------------------------<  136<H>  4.2   |  29  |  0.61    Ca    8.6      14 Dec 2018 05:41  Phos  3.4     12-14  Mg     1.6     12-14    TPro  6.5  /  Alb  3.6  /  TBili  1.0  /  DBili  x   /  AST  27  /  ALT  33  /  AlkPhos  96  12-12      CARDIAC MARKERS ( 14 Dec 2018 14:39 )  .053 ng/mL / x     / 596 U/L / x     / x      CARDIAC MARKERS ( 14 Dec 2018 03:50 )  .054 ng/mL / x     / 624 U/L / x     / 15.0 ng/mL      CAPILLARY BLOOD GLUCOSE            CULTURES:  Culture Results:   No growth to date. (12-13 @ 00:52)  Culture Results:   No growth to date. (12-13 @ 00:52)      Physical Examination:    GENERAL: No acute distress.      EYES: Pupils equal, reactive to light.  Symmetric.    EARS, NOSE, THROAT: Normal; supple neck, no lymphadenopathy; trachea midline    PULM: Clear to auscultation bilaterally, no significant sputum production. No use of accessory respiratory muscles.    CVS: Regular rate and rhythm, no murmurs, rubs, or gallops    GI: Soft, nondistended, nontender, normoactive bowel sounds, no masses, no guarding    EXTREMITIES: No edema. DP and radial pulses 2+ bilaterally.    SKIN: Warm and well perfused, no rashes noted.    NEURO: Alert, oriented, interactive, nonfocal    DEVICES:     RADIOLOGY: ***    CRITICAL CARE TIME SPENT: *** Patient is a 58y old  Male who presents with a chief complaint of Intractable cough and Near Syncope (14 Dec 2018 14:17)      BRIEF HOSPITAL COURSE: 59 y/o male with pmhx of HTN presented to ED on 12/12 with ?seizure like activity and increasing shortness of breath over the last few weeks but especially over the last 2-3 days. His wife brought him to the ER after he had a severe coughing episode, fell to the ground, and had seizure like activity. He works as an [employer] employee and has trouble going up a flight of stairs because he gets short of breath. He was found to be hyponatremic (119). Overnight residents were called to bedside after patient developed afib with RVR rate to 130, given 5 mg cardizem IVP with transient improvement. Patient seen by nephrology and likely diagnosed with SIADH. ICU was consulted today after TTE showed moderately sized pericardial effusion with tamponade physiology that is worsened when patients afib is not controlled. BP is 120/84. HR is 124 in afib. CT showed mediastinal adenopathy.    Given that patient received 100 mg lovenox this morning and is currently hemodynamically stable, cardiac window is scheduled for tomorrow (12/15) morning unless patients condition worsens. Will be brought to ICU for monitoring.     Of note, patient admits to 30 pound involuntary weight loss over the last couple of months.    Events last 24 hours: as above    Denies chest pain, shortness of breath, abdominal pain, night sweats, fever.    PAST MEDICAL & SURGICAL HISTORY:  HTN (hypertension)  No significant past surgical history    Allergies    penicillin (Unknown)    Intolerances      FAMILY HISTORY:  No pertinent family history in first degree relatives      Family history otherwise noncontributory.      Review of Systems:  CONSTITUTIONAL: No fever, chills, or fatigue  EYES: No eye pain, visual disturbances, or discharge  ENMT:  No difficulty hearing, tinnitus, vertigo; No sinus or throat pain  NECK: No pain or stiffness  RESPIRATORY: No cough, wheezing, chills or hemoptysis; No shortness of breath  CARDIOVASCULAR: No chest pain, palpitations, dizziness, or leg swelling  GASTROINTESTINAL: No abdominal or epigastric pain. No nausea, vomiting, or hematemesis; No diarrhea or constipation. No melena or hematochezia.  GENITOURINARY: No dysuria, frequency, hematuria, or incontinence  NEUROLOGICAL: No headaches, memory loss, loss of strength, numbness, or tremors  SKIN: No itching, burning, rashes, or lesions   MUSCULOSKELETAL: No joint pain or swelling; No muscle, back, or extremity pain  PSYCHIATRIC: No depression, anxiety, mood swings, or difficulty sleeping    All other review of systems negative except as mentioned in HPI.      Social History: smokes 1 ppd (states he quit 2 days ago, was prescribed chantix). denies etoh abuse or illicit drug use. [employer] employee      Medications:    metoprolol tartrate 50 milliGRAM(s) Oral every 6 hours    ALBUTerol/ipratropium for Nebulization 3 milliLiter(s) Nebulizer every 6 hours  buDESOnide   0.5 milliGRAM(s) Respule 0.5 milliGRAM(s) Inhalation two times a day  guaiFENesin    Syrup 100 milliGRAM(s) Oral every 6 hours PRN  loratadine 10 milliGRAM(s) Oral daily  tiotropium 18 MICROgram(s) Capsule 1 Capsule(s) Inhalation daily    meclizine 12.5 milliGRAM(s) Oral three times a day PRN            predniSONE   Tablet 40 milliGRAM(s) Oral daily    magnesium oxide 400 milliGRAM(s) Oral daily  sodium chloride 2 Gram(s) Oral three times a day    influenza   Vaccine 0.5 milliLiter(s) IntraMuscular once      nicotine - 21 mG/24Hr(s) Patch 1 Patch Transdermal daily          ICU Vital Signs Last 24 Hrs  T(C): 36.4 (14 Dec 2018 07:33), Max: 36.7 (13 Dec 2018 16:00)  T(F): 97.5 (14 Dec 2018 07:33), Max: 98.1 (13 Dec 2018 16:00)  HR: 90 (14 Dec 2018 13:21) (90 - 121)  BP: 134/85 (14 Dec 2018 07:33) (122/88 - 149/95)  BP(mean): --  ABP: --  ABP(mean): --  RR: 19 (14 Dec 2018 07:33) (18 - 19)  SpO2: 94% (14 Dec 2018 07:33) (92% - 98%)    Vital Signs Last 24 Hrs  T(C): 36.4 (14 Dec 2018 07:33), Max: 36.7 (13 Dec 2018 16:00)  T(F): 97.5 (14 Dec 2018 07:33), Max: 98.1 (13 Dec 2018 16:00)  HR: 90 (14 Dec 2018 13:21) (90 - 121)  BP: 134/85 (14 Dec 2018 07:33) (122/88 - 149/95)  BP(mean): --  RR: 19 (14 Dec 2018 07:33) (18 - 19)  SpO2: 94% (14 Dec 2018 07:33) (92% - 98%)        I&O's Detail    13 Dec 2018 07:01  -  14 Dec 2018 07:00  --------------------------------------------------------  IN:    sodium chloride 0.9%: 300 mL  Total IN: 300 mL    OUT:  Total OUT: 0 mL    Total NET: 300 mL            LABS:                        12.0   13.60 )-----------( 220      ( 14 Dec 2018 09:07 )             33.4     12-14    122<L>  |  84<L>  |  9   ----------------------------<  136<H>  4.2   |  29  |  0.61    Ca    8.6      14 Dec 2018 05:41  Phos  3.4     12-14  Mg     1.6     12-14    TPro  6.5  /  Alb  3.6  /  TBili  1.0  /  DBili  x   /  AST  27  /  ALT  33  /  AlkPhos  96  12-12      CARDIAC MARKERS ( 14 Dec 2018 14:39 )  .053 ng/mL / x     / 596 U/L / x     / x      CARDIAC MARKERS ( 14 Dec 2018 03:50 )  .054 ng/mL / x     / 624 U/L / x     / 15.0 ng/mL      CAPILLARY BLOOD GLUCOSE            CULTURES:  Culture Results:   No growth to date. (12-13 @ 00:52)  Culture Results:   No growth to date. (12-13 @ 00:52)      Physical Examination:    GENERAL: No acute distress.      EYES: Pupils equal, reactive to light.  Symmetric.    EARS, NOSE, THROAT: Normal; supple neck, no lymphadenopathy; trachea midline    PULM: Clear to auscultation bilaterally, no significant sputum production. No use of accessory respiratory muscles.    CVS: Regular rate and rhythm, no murmurs, rubs, or gallops    GI: Soft, nondistended, nontender, normoactive bowel sounds, no masses, no guarding    EXTREMITIES: No edema. DP and radial pulses 2+ bilaterally.    SKIN: Warm and well perfused, no rashes noted.    NEURO: Alert, oriented, interactive, nonfocal    DEVICES: none        RADIOLOGY:   EXAM:  CT CHEST                            PROCEDURE DATE:  12/13/2018          INTERPRETATION:  CLINICAL INFORMATION: Smoker with severe cough, found to   have hyponatremia. Rule out lung mass.    COMPARISON: None.    PROCEDURE:   CT of the Chest was performed without intravenous contrast.  Sagittal and coronal reformats were performed.      FINDINGS:    CHEST:     LUNGS AND LARGE AIRWAYS: Patent central airways.  Emphysema. Subsegmental   right middle lobe atelectasis. 2 mm right upper lobe nodules (example   series 2, image 57). Interlobular septal thickening suggestive of edema.  PLEURA: Small bilateral pleural effusions.  VESSELS: Atherosclerotic ossifications of the aorta and coronary arteries.  HEART: Heart size is normal. Moderate-sized pericardial effusion.  MEDIASTINUM AND NELLA: Extensive mediastinal adenopathy including large   with the conglomerate of paratracheal nodes, difficult to delineate   without intravenous contrast. Bilateral hilar adenopathy also identified   but difficult to delineate.  CHEST WALL AND LOWER NECK: Multinodular right thyroid lobe.  VISUALIZED UPPER ABDOMEN: Within normal limits.  BONES: No suspicious osseous lesion. Multilevel Schmorl's nodes and loss   of vertebral body height, likely on a chronic basis.    IMPRESSION:     1. Small bilateral pleural effusions, moderate-sized pericardial   effusion, and interlobular septal thickening suggestive of edema.   Findings suggest volume overload.  2. Extensive mediastinal adenopathy and hilar adenopathy, difficult to   delineate without intravenous contrast. No suspicious lung mass   identified, though hilar mass cannot be excluded. Consider repeat imaging   with contrast.   3. Emphysema. 2 mm right upper lobe nodules for which follow-up CT in 12   months is recommended.                    ARNOLD RYAN M.D., ATTENDING RADIOLOGIST  This document has been electronically signed. Dec 13 2018  9:08AM    CT head negative and duplex right lower extremity negative for DVT.

## 2018-12-14 NOTE — CONSULT NOTE ADULT - ASSESSMENT
57 y/o male with pmhx of HTN admitted with hyponatremia now found to have mediastinal adenopathy and moderate sized pericardial effusion with tamponade physiology TTE with hospital course further complicated by new onset afib with RVR. Although patient is currently hemodynamically stable, he requires ICU level of care due to high risk for cardiovascular decompensation if effusion worsens and/or if HR not well controlled. Cannot rule out oncologic etiology of SIADH, effusion, weight loss and adenopathy.      NEURO: no active issues  CVS: scheduled for cardiac window tomorrow since he is stable and received full dose lovenox this morning. Urgent pericardial drain if patient becomes unstable. rate control afib-metoprolol 50 q6 hours ordered, but first dose not given yet. Will give first dose now and monitor for worsening of symptoms. trend cardiac enzymes, first two 0.54 and 0.053  PULM: small bilateral pleural effusion. respiratory status stable on room air  GI: NPO after midnight.  RENAL: patient received 40 lasix. hyponatremia secondary to SIADH. continue fluid restriction as per nephro. salt tabs. bmp q 6 hours.   ID: no active issues. elevated white count likely reactionary. afebrile and no symptoms of infection.   PROPH: hold chemical DVT prophylaxis as going for cardiac window tomorrow.

## 2018-12-14 NOTE — DISCHARGE NOTE ADULT - CARE PLAN
Principal Discharge DX:	Hyponatremia  Goal:	better sodium levels  Assessment and plan of treatment:	follow up with PMD  Secondary Diagnosis:	Essential hypertension  Secondary Diagnosis:	Near syncope  Secondary Diagnosis:	COPD (chronic obstructive pulmonary disease) with acute bronchitis

## 2018-12-14 NOTE — DISCHARGE NOTE ADULT - CARE PROVIDER_API CALL
Isiah Moeller), Internal Medicine  [address] [place] [address]  Phone: [phone]  Fax: [phone]    Jermaine Marquez), Surgery; Thoracic Surgery  [address] [place] [address]  Phone: [phone]  Fax: [phone] Isiah Moeller), Internal Medicine  [address] [place] [address]  Phone: [phone]  Fax: [phone]    Wale Weiss), Thoracic Surgery  [address] [place] [address]  Phone: [phone]  Fax: [phone]    Shahab Soto (MD), Internal Medicine; Pulmonary Disease  [address] [place] [address] [address]  [place] [address]  Phone: [phone]  Fax: [phone]    Jermaine Diaz), Cardiovascular Disease  [address] [place] [address]  Phone: [phone]  Fax: [phone]

## 2018-12-14 NOTE — DISCHARGE NOTE ADULT - MEDICATION SUMMARY - MEDICATIONS TO CHANGE
I will SWITCH the dose or number of times a day I take the medications listed below when I get home from the hospital:  None I will SWITCH the dose or number of times a day I take the medications listed below when I get home from the hospital:    metoprolol succinate 100 mg oral tablet, extended release  -- 1 tab(s) by mouth once a day

## 2018-12-14 NOTE — PROGRESS NOTE ADULT - SUBJECTIVE AND OBJECTIVE BOX
Patient is a 58y old  Male who presents with a chief complaint of  Acute  renal failure.    HPI: 58 year old male with PMH of HTN, DM2 presented after having a severe cough for 2-3 days. The patient's wife brought the patient to the ER after he coughed severely, fell to the floor, and had a seizure like episode. The patient was found to have hyponatremia prompting a renal consult. Denies a history of CHF and liver diease. He is an active smoker. Admits to poor intake for the past 2-3 days. Denies imbalance, frequent falls, dizziness, lightheadedness, abd symptoms, urinary symptoms.       PAST MEDICAL & SURGICAL HISTORY:  HTN (hypertension)   DM 2, MO, hypothyroidism, prior DVT and IVC filter; Cholecystectomy    FAMILY HISTORY:  NC    Social History:Non smoker    MEDICATIONS  (STANDING):  ALBUTerol/ipratropium for Nebulization 3 milliLiter(s) Nebulizer every 6 hours  buDESOnide   0.5 milliGRAM(s) Respule 0.5 milliGRAM(s) Inhalation two times a day  heparin  Injectable 5000 Unit(s) SubCutaneous every 12 hours  influenza   Vaccine 0.5 milliLiter(s) IntraMuscular once  levoFLOXacin IVPB 500 milliGRAM(s) IV Intermittent every 24 hours  loratadine 10 milliGRAM(s) Oral daily  losartan 100 milliGRAM(s) Oral daily  metoprolol succinate  milliGRAM(s) Oral daily  potassium chloride    Tablet ER 10 milliEquivalent(s) Oral daily  predniSONE   Tablet 50 milliGRAM(s) Oral daily  sodium chloride 1 Gram(s) Oral three times a day  sodium chloride 0.9%. 1000 milliLiter(s) (50 mL/Hr) IV Continuous <Continuous>  tiotropium 18 MICROgram(s) Capsule 1 Capsule(s) Inhalation daily    MEDICATIONS  (PRN):  guaiFENesin    Syrup 100 milliGRAM(s) Oral every 6 hours PRN Cough   Meds reviewed    Allergies    penicillin (Unknown)    Intolerances         REVIEW OF SYSTEMS:    CONSTITUTIONAL:  no fever, no chills, +poor intake  EYES: No eye pain, visual disturbances, or discharge  ENMT:  No difficulty hearing, tinnitus, vertigo; No sinus or throat pain  NECK: No pain or stiffness  BREASTS: No pain, masses, or nipple discharge  RESPIRATORY: coughing, SOB  CARDIOVASCULAR: No chest pain, palpitations, dizziness  GASTROINTESTINAL: No abdominal or epigastric pain. No nausea, vomiting, or hematemesis; No diarrhea or constipation. No melena   GENITOURINARY: No dysuria, frequency, hematuria, or incontinence  NEUROLOGICAL: +seizure like episode  SKIN: No erythema, no blisters  LYMPH NODES: No enlarged glands  ENDOCRINE: No heat or cold intolerance; No hair loss  MUSCULOSKELETAL: No edema   PSYCHIATRIC: No depression, anxiety, mood swings, or difficulty sleeping  HEME/LYMPH: No easy bruising, or bleeding gums  ALLERGY AND IMMUNOLOGIC: No hives or eczema    Vital Signs Last 24 Hrs  T(C): 36.6 (13 Dec 2018 04:15), Max: 36.6 (13 Dec 2018 04:15)  T(F): 97.9 (13 Dec 2018 04:15), Max: 97.9 (13 Dec 2018 04:15)  HR: 112 (13 Dec 2018 04:15) (93 - 120)  BP: 144/79 (13 Dec 2018 04:15) (130/92 - 170/95)  BP(mean): --  RR: 19 (13 Dec 2018 04:15) (19 - 22)  SpO2: 98% (13 Dec 2018 04:15) (94% - 98%)  Daily Height in cm: 185.42 (12 Dec 2018 21:07)    Daily Weight in k.6 (13 Dec 2018 04:15)    PHYSICAL EXAM:    GENERAL: No distress, well built  HEAD:  Atraumatic, Normocephalic  EYES: Conjunctiva and sclera clear  ENMT: Moist mucous membranes, Good dentition, No lesions  NECK: Supple, no JVD  NERVOUS SYSTEM:  Alert & Oriented X3, Good concentration; Motor Strength wnl upper and lower extremities  CHEST/LUNG: Clear to percussion bilaterally; No rales, rhonchi, wheezing, or rubs  HEART: Regular rate and rhythm; No murmurs, rubs, or gallops  ABDOMEN: Soft, Nontender, Nondistended; Bowel sounds present  EXTREMITIES:  Trace edema to right lower leg only  LYMPH: No lymphadenopathy noted  SKIN: No rashes or lesions    LABS:  Na 122

## 2018-12-15 ENCOUNTER — APPOINTMENT (OUTPATIENT)
Dept: THORACIC SURGERY | Facility: HOSPITAL | Age: 58
End: 2018-12-15
Payer: SELF-PAY

## 2018-12-15 ENCOUNTER — RESULT REVIEW (OUTPATIENT)
Age: 58
End: 2018-12-15

## 2018-12-15 PROBLEM — I10 ESSENTIAL (PRIMARY) HYPERTENSION: Chronic | Status: ACTIVE | Noted: 2018-12-12

## 2018-12-15 LAB
ALBUMIN SERPL ELPH-MCNC: 3.4 G/DL — SIGNIFICANT CHANGE UP (ref 3.3–5)
ALP SERPL-CCNC: 78 U/L — SIGNIFICANT CHANGE UP (ref 40–120)
ALT FLD-CCNC: 43 U/L — SIGNIFICANT CHANGE UP (ref 12–78)
ANION GAP SERPL CALC-SCNC: 3 MMOL/L — LOW (ref 5–17)
ANION GAP SERPL CALC-SCNC: 7 MMOL/L — SIGNIFICANT CHANGE UP (ref 5–17)
ANION GAP SERPL CALC-SCNC: 7 MMOL/L — SIGNIFICANT CHANGE UP (ref 5–17)
ANION GAP SERPL CALC-SCNC: 9 MMOL/L — SIGNIFICANT CHANGE UP (ref 5–17)
APPEARANCE UR: CLEAR — SIGNIFICANT CHANGE UP
APTT BLD: 30.3 SEC — SIGNIFICANT CHANGE UP (ref 27.5–36.3)
AST SERPL-CCNC: 38 U/L — HIGH (ref 15–37)
B PERT IGG+IGM PNL SER: ABNORMAL
BASOPHILS # BLD AUTO: 0.02 K/UL — SIGNIFICANT CHANGE UP (ref 0–0.2)
BASOPHILS NFR BLD AUTO: 0.2 % — SIGNIFICANT CHANGE UP (ref 0–2)
BILIRUB SERPL-MCNC: 0.7 MG/DL — SIGNIFICANT CHANGE UP (ref 0.2–1.2)
BILIRUB UR-MCNC: NEGATIVE — SIGNIFICANT CHANGE UP
BUN SERPL-MCNC: 10 MG/DL — SIGNIFICANT CHANGE UP (ref 7–23)
BUN SERPL-MCNC: 11 MG/DL — SIGNIFICANT CHANGE UP (ref 7–23)
BUN SERPL-MCNC: 12 MG/DL — SIGNIFICANT CHANGE UP (ref 7–23)
BUN SERPL-MCNC: 14 MG/DL — SIGNIFICANT CHANGE UP (ref 7–23)
CALCIUM SERPL-MCNC: 7.9 MG/DL — LOW (ref 8.5–10.1)
CALCIUM SERPL-MCNC: 8.4 MG/DL — LOW (ref 8.5–10.1)
CALCIUM SERPL-MCNC: 8.5 MG/DL — SIGNIFICANT CHANGE UP (ref 8.5–10.1)
CALCIUM SERPL-MCNC: 8.6 MG/DL — SIGNIFICANT CHANGE UP (ref 8.5–10.1)
CHLORIDE SERPL-SCNC: 88 MMOL/L — LOW (ref 96–108)
CHLORIDE SERPL-SCNC: 96 MMOL/L — SIGNIFICANT CHANGE UP (ref 96–108)
CHLORIDE SERPL-SCNC: 97 MMOL/L — SIGNIFICANT CHANGE UP (ref 96–108)
CHLORIDE SERPL-SCNC: 98 MMOL/L — SIGNIFICANT CHANGE UP (ref 96–108)
CK SERPL-CCNC: 451 U/L — HIGH (ref 26–308)
CO2 SERPL-SCNC: 30 MMOL/L — SIGNIFICANT CHANGE UP (ref 22–31)
CO2 SERPL-SCNC: 31 MMOL/L — SIGNIFICANT CHANGE UP (ref 22–31)
CO2 SERPL-SCNC: 32 MMOL/L — HIGH (ref 22–31)
CO2 SERPL-SCNC: 35 MMOL/L — HIGH (ref 22–31)
COLOR FLD: SIGNIFICANT CHANGE UP
COLOR SPEC: YELLOW — SIGNIFICANT CHANGE UP
CREAT SERPL-MCNC: 0.61 MG/DL — SIGNIFICANT CHANGE UP (ref 0.5–1.3)
CREAT SERPL-MCNC: 0.68 MG/DL — SIGNIFICANT CHANGE UP (ref 0.5–1.3)
CREAT SERPL-MCNC: 0.71 MG/DL — SIGNIFICANT CHANGE UP (ref 0.5–1.3)
CREAT SERPL-MCNC: 0.75 MG/DL — SIGNIFICANT CHANGE UP (ref 0.5–1.3)
DIFF PNL FLD: NEGATIVE — SIGNIFICANT CHANGE UP
EOSINOPHIL # BLD AUTO: 0.07 K/UL — SIGNIFICANT CHANGE UP (ref 0–0.5)
EOSINOPHIL # FLD: 3 % — SIGNIFICANT CHANGE UP
EOSINOPHIL NFR BLD AUTO: 0.6 % — SIGNIFICANT CHANGE UP (ref 0–6)
FLUID INTAKE SUBSTANCE CLASS: SIGNIFICANT CHANGE UP
FLUID SEGMENTED GRANULOCYTES: 30 % — SIGNIFICANT CHANGE UP
GLUCOSE SERPL-MCNC: 100 MG/DL — HIGH (ref 70–99)
GLUCOSE SERPL-MCNC: 106 MG/DL — HIGH (ref 70–99)
GLUCOSE SERPL-MCNC: 174 MG/DL — HIGH (ref 70–99)
GLUCOSE SERPL-MCNC: 190 MG/DL — HIGH (ref 70–99)
GLUCOSE UR QL: NEGATIVE — SIGNIFICANT CHANGE UP
GRAM STN FLD: SIGNIFICANT CHANGE UP
HCT VFR BLD CALC: 34.5 % — LOW (ref 39–50)
HGB BLD-MCNC: 11.9 G/DL — LOW (ref 13–17)
IMM GRANULOCYTES NFR BLD AUTO: 0.4 % — SIGNIFICANT CHANGE UP (ref 0–1.5)
KETONES UR-MCNC: NEGATIVE — SIGNIFICANT CHANGE UP
LEUKOCYTE ESTERASE UR-ACNC: NEGATIVE — SIGNIFICANT CHANGE UP
LYMPHOCYTES # BLD AUTO: 2.89 K/UL — SIGNIFICANT CHANGE UP (ref 1–3.3)
LYMPHOCYTES # BLD AUTO: 26.6 % — SIGNIFICANT CHANGE UP (ref 13–44)
LYMPHOCYTES # FLD: 15 % — SIGNIFICANT CHANGE UP
MAGNESIUM SERPL-MCNC: 2.3 MG/DL — SIGNIFICANT CHANGE UP (ref 1.6–2.6)
MCHC RBC-ENTMCNC: 28.7 PG — SIGNIFICANT CHANGE UP (ref 27–34)
MCHC RBC-ENTMCNC: 34.5 GM/DL — SIGNIFICANT CHANGE UP (ref 32–36)
MCV RBC AUTO: 83.3 FL — SIGNIFICANT CHANGE UP (ref 80–100)
MESOTHL CELL # FLD: 13 % — SIGNIFICANT CHANGE UP
MONOCYTES # BLD AUTO: 1.11 K/UL — HIGH (ref 0–0.9)
MONOCYTES NFR BLD AUTO: 10.2 % — SIGNIFICANT CHANGE UP (ref 2–14)
MONOS+MACROS # FLD: 35 % — SIGNIFICANT CHANGE UP
NEUTROPHILS # BLD AUTO: 6.72 K/UL — SIGNIFICANT CHANGE UP (ref 1.8–7.4)
NEUTROPHILS NFR BLD AUTO: 62 % — SIGNIFICANT CHANGE UP (ref 43–77)
NITRITE UR-MCNC: NEGATIVE — SIGNIFICANT CHANGE UP
OTHER CELLS FLD MANUAL: 4 % — SIGNIFICANT CHANGE UP
PH UR: 7 — SIGNIFICANT CHANGE UP (ref 5–8)
PHOSPHATE SERPL-MCNC: 3.1 MG/DL — SIGNIFICANT CHANGE UP (ref 2.5–4.5)
PLATELET # BLD AUTO: 226 K/UL — SIGNIFICANT CHANGE UP (ref 150–400)
POTASSIUM SERPL-MCNC: 3.7 MMOL/L — SIGNIFICANT CHANGE UP (ref 3.5–5.3)
POTASSIUM SERPL-MCNC: 3.9 MMOL/L — SIGNIFICANT CHANGE UP (ref 3.5–5.3)
POTASSIUM SERPL-MCNC: 3.9 MMOL/L — SIGNIFICANT CHANGE UP (ref 3.5–5.3)
POTASSIUM SERPL-MCNC: 4.1 MMOL/L — SIGNIFICANT CHANGE UP (ref 3.5–5.3)
POTASSIUM SERPL-SCNC: 3.7 MMOL/L — SIGNIFICANT CHANGE UP (ref 3.5–5.3)
POTASSIUM SERPL-SCNC: 3.9 MMOL/L — SIGNIFICANT CHANGE UP (ref 3.5–5.3)
POTASSIUM SERPL-SCNC: 3.9 MMOL/L — SIGNIFICANT CHANGE UP (ref 3.5–5.3)
POTASSIUM SERPL-SCNC: 4.1 MMOL/L — SIGNIFICANT CHANGE UP (ref 3.5–5.3)
PROT SERPL-MCNC: 6.2 G/DL — SIGNIFICANT CHANGE UP (ref 6–8.3)
PROT UR-MCNC: NEGATIVE — SIGNIFICANT CHANGE UP
RBC # BLD: 4.14 M/UL — LOW (ref 4.2–5.8)
RBC # FLD: 12.6 % — SIGNIFICANT CHANGE UP (ref 10.3–14.5)
RCV VOL RI: HIGH /UL (ref 0–5)
SODIUM SERPL-SCNC: 128 MMOL/L — LOW (ref 135–145)
SODIUM SERPL-SCNC: 134 MMOL/L — LOW (ref 135–145)
SODIUM SERPL-SCNC: 134 MMOL/L — LOW (ref 135–145)
SODIUM SERPL-SCNC: 137 MMOL/L — SIGNIFICANT CHANGE UP (ref 135–145)
SP GR SPEC: 1 — LOW (ref 1.01–1.02)
SPECIMEN SOURCE: SIGNIFICANT CHANGE UP
T3 SERPL-MCNC: 99 NG/DL — SIGNIFICANT CHANGE UP (ref 80–200)
T4 AB SER-ACNC: 8.3 UG/DL — SIGNIFICANT CHANGE UP (ref 4.6–12)
TOTAL NUCLEATED CELL COUNT, BODY FLUID: 4136 /UL — HIGH (ref 0–5)
TUBE TYPE: SIGNIFICANT CHANGE UP
UROBILINOGEN FLD QL: NEGATIVE — SIGNIFICANT CHANGE UP
WBC # BLD: 10.85 K/UL — HIGH (ref 3.8–10.5)
WBC # FLD AUTO: 10.85 K/UL — HIGH (ref 3.8–10.5)

## 2018-12-15 PROCEDURE — 33025 INCISION OF HEART SAC: CPT

## 2018-12-15 PROCEDURE — 88311 DECALCIFY TISSUE: CPT | Mod: 26

## 2018-12-15 PROCEDURE — 88305 TISSUE EXAM BY PATHOLOGIST: CPT | Mod: 26

## 2018-12-15 PROCEDURE — ZZZZZ: CPT

## 2018-12-15 PROCEDURE — 71045 X-RAY EXAM CHEST 1 VIEW: CPT | Mod: 26

## 2018-12-15 PROCEDURE — 99291 CRITICAL CARE FIRST HOUR: CPT

## 2018-12-15 PROCEDURE — 88108 CYTOPATH CONCENTRATE TECH: CPT | Mod: 26

## 2018-12-15 PROCEDURE — 88304 TISSUE EXAM BY PATHOLOGIST: CPT | Mod: 26

## 2018-12-15 RX ORDER — DILTIAZEM HCL 120 MG
5 CAPSULE, EXT RELEASE 24 HR ORAL
Qty: 125 | Refills: 0 | Status: DISCONTINUED | OUTPATIENT
Start: 2018-12-15 | End: 2018-12-16

## 2018-12-15 RX ORDER — ENOXAPARIN SODIUM 100 MG/ML
40 INJECTION SUBCUTANEOUS DAILY
Qty: 0 | Refills: 0 | Status: DISCONTINUED | OUTPATIENT
Start: 2018-12-15 | End: 2018-12-19

## 2018-12-15 RX ORDER — CHLORHEXIDINE GLUCONATE 213 G/1000ML
1 SOLUTION TOPICAL
Qty: 0 | Refills: 0 | Status: DISCONTINUED | OUTPATIENT
Start: 2018-12-15 | End: 2018-12-19

## 2018-12-15 RX ORDER — HYDROMORPHONE HYDROCHLORIDE 2 MG/ML
0.5 INJECTION INTRAMUSCULAR; INTRAVENOUS; SUBCUTANEOUS EVERY 4 HOURS
Qty: 0 | Refills: 0 | Status: DISCONTINUED | OUTPATIENT
Start: 2018-12-15 | End: 2018-12-16

## 2018-12-15 RX ORDER — SODIUM CHLORIDE 9 MG/ML
1000 INJECTION, SOLUTION INTRAVENOUS
Qty: 0 | Refills: 0 | Status: DISCONTINUED | OUTPATIENT
Start: 2018-12-15 | End: 2018-12-16

## 2018-12-15 RX ORDER — BUDESONIDE, MICRONIZED 100 %
0.5 POWDER (GRAM) MISCELLANEOUS
Qty: 0 | Refills: 0 | Status: DISCONTINUED | OUTPATIENT
Start: 2018-12-15 | End: 2018-12-19

## 2018-12-15 RX ORDER — HYDROMORPHONE HYDROCHLORIDE 2 MG/ML
0.5 INJECTION INTRAMUSCULAR; INTRAVENOUS; SUBCUTANEOUS
Qty: 0 | Refills: 0 | Status: DISCONTINUED | OUTPATIENT
Start: 2018-12-15 | End: 2018-12-16

## 2018-12-15 RX ORDER — ASPIRIN/CALCIUM CARB/MAGNESIUM 324 MG
81 TABLET ORAL DAILY
Qty: 0 | Refills: 0 | Status: DISCONTINUED | OUTPATIENT
Start: 2018-12-15 | End: 2018-12-19

## 2018-12-15 RX ORDER — METOPROLOL TARTRATE 50 MG
5 TABLET ORAL ONCE
Qty: 0 | Refills: 0 | Status: COMPLETED | OUTPATIENT
Start: 2018-12-15 | End: 2018-12-15

## 2018-12-15 RX ORDER — ONDANSETRON 8 MG/1
4 TABLET, FILM COATED ORAL ONCE
Qty: 0 | Refills: 0 | Status: DISCONTINUED | OUTPATIENT
Start: 2018-12-15 | End: 2018-12-17

## 2018-12-15 RX ORDER — MAGNESIUM OXIDE 400 MG ORAL TABLET 241.3 MG
400 TABLET ORAL DAILY
Qty: 0 | Refills: 0 | Status: DISCONTINUED | OUTPATIENT
Start: 2018-12-15 | End: 2018-12-19

## 2018-12-15 RX ORDER — SODIUM CHLORIDE 9 MG/ML
1000 INJECTION, SOLUTION INTRAVENOUS
Qty: 0 | Refills: 0 | Status: DISCONTINUED | OUTPATIENT
Start: 2018-12-15 | End: 2018-12-15

## 2018-12-15 RX ORDER — LORATADINE 10 MG/1
10 TABLET ORAL DAILY
Qty: 0 | Refills: 0 | Status: DISCONTINUED | OUTPATIENT
Start: 2018-12-15 | End: 2018-12-19

## 2018-12-15 RX ORDER — MECLIZINE HCL 12.5 MG
12.5 TABLET ORAL THREE TIMES A DAY
Qty: 0 | Refills: 0 | Status: DISCONTINUED | OUTPATIENT
Start: 2018-12-15 | End: 2018-12-17

## 2018-12-15 RX ORDER — METOPROLOL TARTRATE 50 MG
50 TABLET ORAL EVERY 6 HOURS
Qty: 0 | Refills: 0 | Status: DISCONTINUED | OUTPATIENT
Start: 2018-12-15 | End: 2018-12-19

## 2018-12-15 RX ORDER — METOPROLOL TARTRATE 50 MG
5 TABLET ORAL EVERY 6 HOURS
Qty: 0 | Refills: 0 | Status: DISCONTINUED | OUTPATIENT
Start: 2018-12-15 | End: 2018-12-15

## 2018-12-15 RX ORDER — METOPROLOL TARTRATE 50 MG
50 TABLET ORAL EVERY 6 HOURS
Qty: 0 | Refills: 0 | Status: CANCELLED | OUTPATIENT
Start: 2018-12-16 | End: 2018-12-15

## 2018-12-15 RX ORDER — HYDROMORPHONE HYDROCHLORIDE 2 MG/ML
1 INJECTION INTRAMUSCULAR; INTRAVENOUS; SUBCUTANEOUS EVERY 4 HOURS
Qty: 0 | Refills: 0 | Status: DISCONTINUED | OUTPATIENT
Start: 2018-12-15 | End: 2018-12-16

## 2018-12-15 RX ORDER — DESMOPRESSIN ACETATE 0.1 MG/1
2 TABLET ORAL ONCE
Qty: 0 | Refills: 0 | Status: COMPLETED | OUTPATIENT
Start: 2018-12-15 | End: 2018-12-15

## 2018-12-15 RX ORDER — HYDROMORPHONE HYDROCHLORIDE 2 MG/ML
1 INJECTION INTRAMUSCULAR; INTRAVENOUS; SUBCUTANEOUS
Qty: 0 | Refills: 0 | Status: DISCONTINUED | OUTPATIENT
Start: 2018-12-15 | End: 2018-12-16

## 2018-12-15 RX ORDER — TIOTROPIUM BROMIDE 18 UG/1
1 CAPSULE ORAL; RESPIRATORY (INHALATION) DAILY
Qty: 0 | Refills: 0 | Status: COMPLETED | OUTPATIENT
Start: 2018-12-15 | End: 2019-11-13

## 2018-12-15 RX ORDER — NICOTINE POLACRILEX 2 MG
1 GUM BUCCAL DAILY
Qty: 0 | Refills: 0 | Status: DISCONTINUED | OUTPATIENT
Start: 2018-12-15 | End: 2018-12-19

## 2018-12-15 RX ADMIN — SODIUM CHLORIDE 125 MILLILITER(S): 9 INJECTION, SOLUTION INTRAVENOUS at 21:01

## 2018-12-15 RX ADMIN — DESMOPRESSIN ACETATE 2 MICROGRAM(S): 0.1 TABLET ORAL at 18:22

## 2018-12-15 RX ADMIN — Medication 3 MILLILITER(S): at 07:31

## 2018-12-15 RX ADMIN — Medication 20 MILLIGRAM(S): at 11:20

## 2018-12-15 RX ADMIN — CHLORHEXIDINE GLUCONATE 1 APPLICATION(S): 213 SOLUTION TOPICAL at 17:11

## 2018-12-15 RX ADMIN — SODIUM CHLORIDE 75 MILLILITER(S): 9 INJECTION, SOLUTION INTRAVENOUS at 07:40

## 2018-12-15 RX ADMIN — Medication 5 MG/HR: at 17:10

## 2018-12-15 RX ADMIN — Medication 81 MILLIGRAM(S): at 17:08

## 2018-12-15 RX ADMIN — Medication 0.5 MILLIGRAM(S): at 19:56

## 2018-12-15 RX ADMIN — Medication 100 MILLIGRAM(S): at 23:25

## 2018-12-15 RX ADMIN — Medication 3 MILLILITER(S): at 02:07

## 2018-12-15 RX ADMIN — SODIUM CHLORIDE 75 MILLILITER(S): 9 INJECTION, SOLUTION INTRAVENOUS at 10:19

## 2018-12-15 RX ADMIN — Medication 1 PATCH: at 19:15

## 2018-12-15 RX ADMIN — SODIUM CHLORIDE 125 MILLILITER(S): 9 INJECTION, SOLUTION INTRAVENOUS at 14:40

## 2018-12-15 RX ADMIN — HYDROMORPHONE HYDROCHLORIDE 0.5 MILLIGRAM(S): 2 INJECTION INTRAMUSCULAR; INTRAVENOUS; SUBCUTANEOUS at 22:50

## 2018-12-15 RX ADMIN — Medication 5 MILLIGRAM(S): at 09:53

## 2018-12-15 RX ADMIN — MAGNESIUM OXIDE 400 MG ORAL TABLET 400 MILLIGRAM(S): 241.3 TABLET ORAL at 11:11

## 2018-12-15 RX ADMIN — Medication 5 MILLIGRAM(S): at 06:14

## 2018-12-15 RX ADMIN — Medication 50 MILLIGRAM(S): at 17:08

## 2018-12-15 RX ADMIN — HYDROMORPHONE HYDROCHLORIDE 1 MILLIGRAM(S): 2 INJECTION INTRAMUSCULAR; INTRAVENOUS; SUBCUTANEOUS at 17:06

## 2018-12-15 RX ADMIN — Medication 1 PATCH: at 11:11

## 2018-12-15 RX ADMIN — HYDROMORPHONE HYDROCHLORIDE 1 MILLIGRAM(S): 2 INJECTION INTRAMUSCULAR; INTRAVENOUS; SUBCUTANEOUS at 17:36

## 2018-12-15 RX ADMIN — Medication 50 MILLIGRAM(S): at 11:11

## 2018-12-15 RX ADMIN — Medication 100 MILLIGRAM(S): at 17:11

## 2018-12-15 RX ADMIN — HYDROMORPHONE HYDROCHLORIDE 0.5 MILLIGRAM(S): 2 INJECTION INTRAMUSCULAR; INTRAVENOUS; SUBCUTANEOUS at 23:05

## 2018-12-15 RX ADMIN — LORATADINE 10 MILLIGRAM(S): 10 TABLET ORAL at 11:11

## 2018-12-15 RX ADMIN — Medication 0.5 MILLIGRAM(S): at 07:31

## 2018-12-15 NOTE — PROGRESS NOTE ADULT - ASSESSMENT
59 y/o M PMH HTN admitted with hyponatremia now found to have mediastinal adenopathy and moderate sized pericardial effusion with tamponade physiology TTE with hospital course further complicated by new onset afib with RVR. Although patient is currently hemodynamically stable, he requires ICU level of care due to high risk for cardiovascular decompensation if effusion worsens and/or if HR not well controlled. Cannot rule out oncologic etiology of SIADH, effusion, weight loss and adenopathy.    1. Pericardial Effusion with tamponade physiology  2. Afib with RVR   3. Hyponatremia in the setting of SIADH   4. Mediastinal LAD     Plan                   Critical Care time: 60 min assessing presenting problems of acute illness that poses high probability of life threatening deterioration or end organ damage/dysfunction.  Medical decision making inculding initiating plan of care, reviewing data, reviewing radiology, direct patient bedside evaluation and interpretation of vital signs, any necessary ventilator management, discussion with multidisciplinary team, discussing goals of care with patient/family, all non inclusive of procedures. 59 y/o M PMH HTN admitted with hyponatremia now found to have mediastinal adenopathy and moderate sized pericardial effusion with tamponade physiology TTE with hospital course further complicated by new onset afib with RVR. Although patient is currently hemodynamically stable, he requires ICU level of care due to high risk for cardiovascular decompensation if effusion worsens and/or if HR not well controlled. Cannot rule out oncologic etiology of SIADH, effusion, weight loss and adenopathy.    1. Pericardial Effusion with tamponade physiology  2. Afib with RVR   3. Hyponatremia in the setting of SIADH   4. Mediastinal LAD     Plan   1.  Pt is scheduled for cardiac window this AM. L radial Beatrice placed ON for cardiac monitoring and in preparation for procedure. Urgent pericardial drain if patient becomes unstable.   2. Afib rate control with metoprolol, monitor for worsening of symptoms. Trend cardiac enzymes, first two 0.054 and 0.053  3. Pt given sodium tabs for SIADH, sodium improving and back up, Na now 127, f/u AM sodium   4.  Mediastinal LAD, cannot r/o malignancy, plan to acquire bx specimen during procedure, moderate pericardial effusion, likely malignant                   Critical Care time: 60 min assessing presenting problems of acute illness that poses high probability of life threatening deterioration or end organ damage/dysfunction.  Medical decision making inculding initiating plan of care, reviewing data, reviewing radiology, direct patient bedside evaluation and interpretation of vital signs, any necessary ventilator management, discussion with multidisciplinary team, discussing goals of care with patient/family, all non inclusive of procedures.

## 2018-12-15 NOTE — PROGRESS NOTE ADULT - SUBJECTIVE AND OBJECTIVE BOX
Patient is a 58y old  Male who presents with a chief complaint of Intractable cough and Near Syncope (15 Dec 2018 09:06)    24 hour events: s/p pericardial window with drainage of 650ml of hemorrhagic fluid  brought to ICU from OR - slightly confused from anesthesia   denies pain    REVIEW OF SYSTEMS: UTO    T(F): 97.5 (12-15-18 @ 07:35), Max: 98.9 (18 @ 15:58)  HR: 104 (12-15-18 @ 07:34) (90 - 131)  BP: 115/86 (12-15-18 @ 07:00) (95/68 - 141/60)  RR: 25 (12-15-18 @ 07:00) (18 - 35)  SpO2: 95% (12-15-18 @ 07:34) (91% - 99%)    I&O's Summary     @ 07:01  -  12-15 @ 07:00  --------------------------------------------------------  IN: 420 mL / OUT: 4700 mL / NET: -4280 mL    12-15 @ 07:01  -  12-15 @ 09:49  --------------------------------------------------------  IN: 0 mL / OUT: 600 mL / NET: -600 mL    PHYSICAL EXAM  General: seen post-op, NAD  CNS: little confused from anesthesia, moves all extremities  HEENT: pupils equal and reactive, abnormal eye movements   Resp: clear b/l, no wheeze  CVS: S1S2, tachy, irregular. subxiphoid drain with hemorrhagic fluid  Abd: soft, NT, +BS  Ext: no edema  Skin: warm, not mottled     MEDICATIONS  metoprolol tartrate Injectable IV Push  influenza   Vaccine IntraMuscular                        11.9   10.85 )-----------( 226      ( 15 Dec 2018 05:32 )             34.5     12-15    134<L>  |  96  |  12  ----------------------------<  106<H>  4.1   |  35<H>  |  0.75    Ca    8.6      15 Dec 2018 05:32  Phos  3.1     12-15  Mg     2.3     12-15    TPro  6.2  /  Alb  3.4  /  TBili  0.7  /  DBili  x   /  AST  38<H>  /  ALT  43  /  AlkPhos  78  12-15    CARDIAC MARKERS ( 15 Dec 2018 05:32 )  x     / x     / 451 U/L / x     / x      CARDIAC MARKERS ( 14 Dec 2018 20:47 )  x     / x     / 647 U/L / x     / x      CARDIAC MARKERS ( 14 Dec 2018 14:39 )  .053 ng/mL / x     / 596 U/L / x     / x      CARDIAC MARKERS ( 14 Dec 2018 03:50 )  .054 ng/mL / x     / 624 U/L / x     / 15.0 ng/mL    PT/INR - ( 15 Dec 2018 05:32 )   PT: 13.9 sec;   INR: 1.22 ratio       PTT - ( 15 Dec 2018 05:32 )  PTT:30.3 sec  Urinalysis Basic - ( 15 Dec 2018 05:44 )    Color: Yellow / Appearance: Clear / S.000 / pH: x  Gluc: x / Ketone: Negative  / Bili: Negative / Urobili: Negative   Blood: x / Protein: Negative / Nitrite: Negative   Leuk Esterase: Negative / RBC: x / WBC x   Sq Epi: x / Non Sq Epi: x / Bacteria: x    .Blood Blood-Peripheral   No growth to date. --  @ 00:52    Rapid RVP Result: NotDetec ( @ 22:33)    CENTRAL LINE: N           BENAVIDES: N                        A-LINE: N                           GLOBAL ISSUE/BEST PRACTICE  Analgesia:   Sedation:   CAM-ICU:   HOB elevation: yes  Stress ulcer prophylaxis:   VTE prophylaxis:   Glycemic control:   Nutrition:     CODE STATUS: full  GOC discussion: Y

## 2018-12-15 NOTE — PROGRESS NOTE ADULT - ASSESSMENT
Hyponatremia  Coughing  Low BUN  Possible Seizure  HTN  +Smoker  Pericardial tamponade s/p window     -Renal indices are stable; BUN is abnormally low; poor nutrition for the past few days  -Uric acid is low; urine studies are pending; high likelihood of SIADH  -Oral fluid restriction to 1L for now  -Off NaCl tabs now NPO. On LR. We will monitor sodium level   -S/p Tolvaptan 15 mg po x 1 on 12/14/18  -No acute indication for hypertonic saline or tolvaptan at this time  -Smoking cessation discussed  -Increase protein intake; no salt restriction in diet  -BP is stable; continue with current regimen  -Follow up pericardial fluid pathology     Thank you

## 2018-12-15 NOTE — PROCEDURE NOTE - NSINDICATIONS_GEN_A_CORE
blood sampling/cannulation purposes/arterial puncture to obtain ABG's/monitoring purposes/critical patient

## 2018-12-15 NOTE — PROGRESS NOTE ADULT - SUBJECTIVE AND OBJECTIVE BOX
Patient is a 58y old  Male who presents with a chief complaint of Intractable cough and Near Syncope (15 Dec 2018 11:28)      INTERVAL /OVERNIGHT EVENTS: denies pain    MEDICATIONS  (STANDING):  aspirin  chewable 81 milliGRAM(s) Oral daily  buDESOnide   0.5 milliGRAM(s) Respule 0.5 milliGRAM(s) Inhalation two times a day  chlorhexidine 2% Cloths 1 Application(s) Topical two times a day  dextrose 5%. 1000 milliLiter(s) (125 mL/Hr) IV Continuous <Continuous>  diltiazem Infusion 5 mG/Hr (5 mL/Hr) IV Continuous <Continuous>  enoxaparin Injectable 40 milliGRAM(s) SubCutaneous daily  influenza   Vaccine 0.5 milliLiter(s) IntraMuscular once  loratadine 10 milliGRAM(s) Oral daily  magnesium oxide 400 milliGRAM(s) Oral daily  metoprolol tartrate 50 milliGRAM(s) Oral every 6 hours  nicotine - 21 mG/24Hr(s) Patch 1 Patch Transdermal daily  predniSONE   Tablet 20 milliGRAM(s) Oral daily  tiotropium 18 MICROgram(s) Capsule 1 Capsule(s) Inhalation daily    MEDICATIONS  (PRN):  guaiFENesin    Syrup 100 milliGRAM(s) Oral every 6 hours PRN Cough  HYDROmorphone  Injectable 0.5 milliGRAM(s) IV Push every 10 minutes PRN Moderate Pain (4 - 6)  HYDROmorphone  Injectable 1 milliGRAM(s) IV Push every 10 minutes PRN Severe Pain (7 - 10)  HYDROmorphone  Injectable 0.5 milliGRAM(s) IV Push every 4 hours PRN Moderate Pain (4 - 6)  HYDROmorphone  Injectable 1 milliGRAM(s) IV Push every 4 hours PRN Severe Pain (7 - 10)  meclizine 12.5 milliGRAM(s) Oral three times a day PRN Dizziness  ondansetron Injectable 4 milliGRAM(s) IV Push once PRN Nausea and/or Vomiting      Allergies    penicillin (Unknown)    Intolerances        REVIEW OF SYSTEMS:  CONSTITUTIONAL: No fever, weight loss, or fatigue  EYES: No eye pain, visual disturbances, or discharge  ENMT:  No difficulty hearing, tinnitus, vertigo; No sinus or throat pain  NECK: No pain or stiffness  RESPIRATORY: No cough, wheezing, chills or hemoptysis; No shortness of breath  CARDIOVASCULAR: No chest pain, palpitations, dizziness, or leg swelling  GASTROINTESTINAL: No abdominal or epigastric pain. No nausea, vomiting, or hematemesis; No diarrhea or constipation. No melena or hematochezia.  GENITOURINARY: No dysuria, frequency, hematuria, or incontinence  NEUROLOGICAL: No headaches, memory loss, loss of strength, numbness, or tremors  SKIN: No itching, burning, rashes, or lesions   LYMPH NODES: No enlarged glands  ENDOCRINE: No heat or cold intolerance; No hair loss; No polydipsia or polyuria  MUSCULOSKELETAL: No joint pain or swelling; No muscle, back, or extremity pain  PSYCHIATRIC: No depression, anxiety, mood swings, or difficulty sleeping  HEME/LYMPH: No easy bruising, or bleeding gums  ALLERGY AND IMMUNOLOGIC: No hives or eczema    Vital Signs Last 24 Hrs  T(C): 37.1 (15 Dec 2018 15:39), Max: 37.1 (15 Dec 2018 15:39)  T(F): 98.7 (15 Dec 2018 15:39), Max: 98.7 (15 Dec 2018 15:39)  HR: 119 (15 Dec 2018 16:00) (99 - 140)  BP: 129/98 (15 Dec 2018 16:00) (95/68 - 169/67)  BP(mean): 110 (15 Dec 2018 16:00) (74 - 110)  RR: 26 (15 Dec 2018 16:00) (14 - 35)  SpO2: 95% (15 Dec 2018 16:00) (91% - 100%)    PHYSICAL EXAM:  GENERAL: NAD, well-groomed, well-developed  HEAD:  Atraumatic, Normocephalic  EYES: EOMI, PERRLA, conjunctiva and sclera clear  ENMT: No tonsillar erythema, exudates, or enlargement; Moist mucous membranes, Good dentition, No lesions  NECK: Supple, No JVD, Normal thyroid  NERVOUS SYSTEM:  Alert & Oriented X3, Good concentration; Motor Strength 5/5 B/L upper and lower extremities; DTRs 2+ intact and symmetric  CHEST/LUNG: Clear to auscultation bilaterally; No rales, rhonchi, wheezing, or rubs  HEART: Regular rate and rhythm; No murmurs, rubs, or gallops  ABDOMEN: Soft, Nontender, Nondistended; Bowel sounds present  EXTREMITIES:  2+ Peripheral Pulses, No clubbing, cyanosis, or edema  LYMPH: No lymphadenopathy noted  SKIN: No rashes or lesions    LABS:                        11.9   10.85 )-----------( 226      ( 15 Dec 2018 05:32 )             34.5     15 Dec 2018 13:46    137    |  98     |  10     ----------------------------<  190    3.9     |  32     |  0.68     Ca    8.5        15 Dec 2018 13:46  Phos  3.1       15 Dec 2018 05:32  Mg     2.3       15 Dec 2018 05:32    TPro  6.2    /  Alb  3.4    /  TBili  0.7    /  DBili  x      /  AST  38     /  ALT  43     /  AlkPhos  78     15 Dec 2018 05:32    PT/INR - ( 15 Dec 2018 05:32 )   PT: 13.9 sec;   INR: 1.22 ratio         PTT - ( 15 Dec 2018 05:32 )  PTT:30.3 sec  Urinalysis Basic - ( 15 Dec 2018 05:44 )    Color: Yellow / Appearance: Clear / S.000 / pH: x  Gluc: x / Ketone: Negative  / Bili: Negative / Urobili: Negative   Blood: x / Protein: Negative / Nitrite: Negative   Leuk Esterase: Negative / RBC: x / WBC x   Sq Epi: x / Non Sq Epi: x / Bacteria: x      CAPILLARY BLOOD GLUCOSE          RADIOLOGY & ADDITIONAL TESTS:    Notes Reviewed:  [x ] YES  [ ] NO    Care Discussed with Consultants/Other Providers [x ] YES  [ ] NO

## 2018-12-15 NOTE — PROGRESS NOTE ADULT - SUBJECTIVE AND OBJECTIVE BOX
REASON FOR CONSULT: ***    CONSULT REQUESTED BY: ***    Patient is a 58y old  Male who presents with a chief complaint of Intractable cough and Near Syncope (14 Dec 2018 22:00)      BRIEF HOSPITAL COURSE: ***    Events last 24 hours: ***    PAST MEDICAL & SURGICAL HISTORY:  HTN (hypertension)  No significant past surgical history    Allergies    penicillin (Unknown)    Intolerances      FAMILY HISTORY:  No pertinent family history in first degree relatives      Review of Systems:  CONSTITUTIONAL: No fever, chills, or fatigue  EYES: No eye pain, visual disturbances, or discharge  ENMT:  No difficulty hearing, tinnitus, vertigo; No sinus or throat pain  NECK: No pain or stiffness  RESPIRATORY: No cough, wheezing, chills or hemoptysis; No shortness of breath  CARDIOVASCULAR: No chest pain, palpitations, dizziness, or leg swelling  GASTROINTESTINAL: No abdominal or epigastric pain. No nausea, vomiting, or hematemesis; No diarrhea or constipation. No melena or hematochezia.  GENITOURINARY: No dysuria, frequency, hematuria, or incontinence  NEUROLOGICAL: No headaches, memory loss, loss of strength, numbness, or tremors  SKIN: No itching, burning, rashes, or lesions   MUSCULOSKELETAL: No joint pain or swelling; No muscle, back, or extremity pain  PSYCHIATRIC: No depression, anxiety, mood swings, or difficulty sleeping      Medications:    metoprolol tartrate 50 milliGRAM(s) Oral every 6 hours  metoprolol tartrate Injectable 5 milliGRAM(s) IV Push every 6 hours    ALBUTerol/ipratropium for Nebulization 3 milliLiter(s) Nebulizer every 6 hours  buDESOnide   0.5 milliGRAM(s) Respule 0.5 milliGRAM(s) Inhalation two times a day  guaiFENesin    Syrup 100 milliGRAM(s) Oral every 6 hours PRN  loratadine 10 milliGRAM(s) Oral daily  tiotropium 18 MICROgram(s) Capsule 1 Capsule(s) Inhalation daily    meclizine 12.5 milliGRAM(s) Oral three times a day PRN            predniSONE   Tablet 40 milliGRAM(s) Oral daily    magnesium oxide 400 milliGRAM(s) Oral daily  sodium chloride 2 Gram(s) Oral three times a day    influenza   Vaccine 0.5 milliLiter(s) IntraMuscular once      nicotine - 21 mG/24Hr(s) Patch 1 Patch Transdermal daily          ICU Vital Signs Last 24 Hrs  T(C): 36.6 (14 Dec 2018 20:50), Max: 37.2 (14 Dec 2018 15:58)  T(F): 97.9 (14 Dec 2018 20:50), Max: 98.9 (14 Dec 2018 15:58)  HR: 115 (15 Dec 2018 00:25) (90 - 121)  BP: 112/76 (15 Dec 2018 00:25) (105/86 - 149/95)  BP(mean): 90 (15 Dec 2018 00:25) (87 - 96)  ABP: 103/93 (15 Dec 2018 00:25) (103/93 - 103/93)  ABP(mean): 98 (15 Dec 2018 00:25) (98 - 98)  RR: 33 (15 Dec 2018 00:00) (18 - 35)  SpO2: 94% (15 Dec 2018 00:25) (92% - 99%)    Vital Signs Last 24 Hrs  T(C): 36.6 (14 Dec 2018 20:50), Max: 37.2 (14 Dec 2018 15:58)  T(F): 97.9 (14 Dec 2018 20:50), Max: 98.9 (14 Dec 2018 15:58)  HR: 115 (15 Dec 2018 00:25) (90 - 121)  BP: 112/76 (15 Dec 2018 00:25) (105/86 - 149/95)  BP(mean): 90 (15 Dec 2018 00:25) (87 - 96)  RR: 33 (15 Dec 2018 00:00) (18 - 35)  SpO2: 94% (15 Dec 2018 00:25) (92% - 99%)        I&O's Detail    13 Dec 2018 07:01  -  14 Dec 2018 07:00  --------------------------------------------------------  IN:    sodium chloride 0.9%: 300 mL  Total IN: 300 mL    OUT:  Total OUT: 0 mL    Total NET: 300 mL      14 Dec 2018 07:01  -  15 Dec 2018 01:01  --------------------------------------------------------  IN:    Oral Fluid: 420 mL  Total IN: 420 mL    OUT:    Voided: 2550 mL  Total OUT: 2550 mL    Total NET: -2130 mL            LABS:                        12.0   13.60 )-----------( 220      ( 14 Dec 2018 09:07 )             33.4     12-14    128<L>  |  88<L>  |  14  ----------------------------<  100<H>  3.7   |  31  |  0.61    Ca    8.4<L>      14 Dec 2018 23:53  Phos  3.4     12-14  Mg     2.0     12-14        CARDIAC MARKERS ( 14 Dec 2018 20:47 )  x     / x     / 647 U/L / x     / x      CARDIAC MARKERS ( 14 Dec 2018 14:39 )  .053 ng/mL / x     / 596 U/L / x     / x      CARDIAC MARKERS ( 14 Dec 2018 03:50 )  .054 ng/mL / x     / 624 U/L / x     / 15.0 ng/mL      CAPILLARY BLOOD GLUCOSE            CULTURES:  Culture Results:   No growth to date. (12-13 @ 00:52)  Culture Results:   No growth to date. (12-13 @ 00:52)        Physical Examination:  General: No acute distress.  Alert, oriented, interactive, nonfocal  HEENT: Pupils equal, reactive to light.  Symmetric.  PULM: Clear to auscultation bilaterally, no significant sputum production  CVS: Regular rate and rhythm, no murmurs, rubs, or gallops  ABD: Soft, nondistended, nontender, normoactive bowel sounds, no masses  EXT: No edema, nontender  SKIN: Warm and well perfused, no rashes noted.    EKG: ***    RADIOLOGY: ***    POCUS:          LUNG:               +/- A-lines  +/- B-lines predominantly anteriorly              +/- Effusions +/- infiltrate +/- atelectasis +/- B-lines at bases, +/- curtain's sign               +/-  Lung sliding          CARDIAC:               +/- LV contractility/EF WNL, +/-LVH              +/- parasternal short view concentric w/o wall motion abnormality              +/- No noted signs of RV strain, with septal flattening/D'ing, +/-Irvin's sign               +/- No noted pericardial effusion               +/- No noted valvular dz or notable vegetations               +/- 5 chamber VTI              +/-  Subcostal view IVC ***cm              ABDOMEN:              +/- abdominal ascites              +/- hydronephrosis          DVT STUDY: +/- noted thrombus [X] common femoral [X] deep femoral [X] popliteal       CENTRAL LINE: Y/N          DATE INSERTED:              REMOVE: Y/N    BENAVIDES: Y/N                        DATE INSERTED:              REMOVE: Y/N    A-LINE: Y/N                       DATE INSERTED:              REMOVE: Y/N    GLOBAL ISSUE/BEST PRACTICE:  Analgesia:   Sedation:  HOB elevation: yes  Stress ulcer prophylaxis: protonix   VTE prophylaxis: SCD/Heparin SQ/Lovenox  Glycemic control: ISS  Nutrition: NPO/Diet      CODE STATUS: ***  GOC discussion: Y      Critical Care time: ***assessing presenting problems of acute illness that poses high probability of life threatening deterioration or end organ damage/dysfunction.  Medical decision making inculding initiating plan of care, reviewing data, reviewing radiology,direct patient bedside evaluation and interpretation of vital signs, any necessary ventilator management, discusion with multidisciplinary team, discussing goals of care with patient/family, all non inclusive of procedures. Patient is a 58y old  Male who presents with a chief complaint of Intractable cough and Near Syncope (14 Dec 2018 22:00)      BRIEF HOSPITAL COURSE:    57 y/o male with pmhx of HTN presented to ED on 12/12 with ?seizure like activity and increasing shortness of breath over the last few weeks but especially over the last 2-3 days. His wife brought him to the ER after he had a severe coughing episode, fell to the ground, and had seizure like activity. He works as an [employer] employee and has trouble going up a flight of stairs because he gets short of breath. Pt admitted to the floor, hospital course c/b afib with RVR on floor and hyponatremic (119), nephrology stating likely 2/2 SIADH. ICU was consulted today after TTE showed moderately sized pericardial effusion with tamponade physiology that is worsened when patients afib is not controlled. CT showed mediastinal adenopathy, admits to 30 pound involuntary weight loss over the last couple of months.    Pt brought the ICU to monitor ON. Cardiac window is scheduled for (12/15) morning unless patients condition worsens    Events last 24 hours: ON a-line was placed in preparation for cardiac window in AM.  Pt rate controlled with lopressor for afib. Pt started on sodium tabs for SIADH, repeat chemistries performed, Na trending back up last sodium 127   UOP responding well to diuresis during the day     PAST MEDICAL & SURGICAL HISTORY:  HTN (hypertension)  No significant past surgical history    Allergies    penicillin (Unknown)    Intolerances      FAMILY HISTORY:  No pertinent family history in first degree relatives      Review of Systems:  CONSTITUTIONAL: No fever, chills, or fatigue  EYES: No eye pain, visual disturbances, or discharge  RESPIRATORY: + dry cough, No wheezing, chills or hemoptysis; No shortness of breath  CARDIOVASCULAR: No chest pain, palpitations, dizziness, or leg swelling  GASTROINTESTINAL: No abdominal or epigastric pain. No nausea, vomiting, or hematemesis; No diarrhea or constipation. No melena or hematochezia.  GENITOURINARY: No dysuria, frequency, hematuria, or incontinence  NEUROLOGICAL: No headaches, memory loss, loss of strength, numbness, or tremors  SKIN: No itching, burning, rashes, or lesions   MUSCULOSKELETAL: No joint pain or swelling; No muscle, back, or extremity pain  PSYCHIATRIC: No depression, anxiety, mood swings, or difficulty sleeping      Medications:    metoprolol tartrate 50 milliGRAM(s) Oral every 6 hours  metoprolol tartrate Injectable 5 milliGRAM(s) IV Push every 6 hours  ALBUTerol/ipratropium for Nebulization 3 milliLiter(s) Nebulizer every 6 hours  buDESOnide   0.5 milliGRAM(s) Respule 0.5 milliGRAM(s) Inhalation two times a day  guaiFENesin    Syrup 100 milliGRAM(s) Oral every 6 hours PRN  loratadine 10 milliGRAM(s) Oral daily  tiotropium 18 MICROgram(s) Capsule 1 Capsule(s) Inhalation daily  meclizine 12.5 milliGRAM(s) Oral three times a day PRN  predniSONE   Tablet 40 milliGRAM(s) Oral daily  magnesium oxide 400 milliGRAM(s) Oral daily  sodium chloride 2 Gram(s) Oral three times a day  influenza   Vaccine 0.5 milliLiter(s) IntraMuscular once  nicotine - 21 mG/24Hr(s) Patch 1 Patch Transdermal daily          ICU Vital Signs Last 24 Hrs  T(C): 36.6 (14 Dec 2018 20:50), Max: 37.2 (14 Dec 2018 15:58)  T(F): 97.9 (14 Dec 2018 20:50), Max: 98.9 (14 Dec 2018 15:58)  HR: 115 (15 Dec 2018 00:25) (90 - 121)  BP: 112/76 (15 Dec 2018 00:25) (105/86 - 149/95)  BP(mean): 90 (15 Dec 2018 00:25) (87 - 96)  ABP: 103/93 (15 Dec 2018 00:25) (103/93 - 103/93)  ABP(mean): 98 (15 Dec 2018 00:25) (98 - 98)  RR: 33 (15 Dec 2018 00:00) (18 - 35)  SpO2: 94% (15 Dec 2018 00:25) (92% - 99%)    Vital Signs Last 24 Hrs  T(C): 36.6 (14 Dec 2018 20:50), Max: 37.2 (14 Dec 2018 15:58)  T(F): 97.9 (14 Dec 2018 20:50), Max: 98.9 (14 Dec 2018 15:58)  HR: 115 (15 Dec 2018 00:25) (90 - 121)  BP: 112/76 (15 Dec 2018 00:25) (105/86 - 149/95)  BP(mean): 90 (15 Dec 2018 00:25) (87 - 96)  RR: 33 (15 Dec 2018 00:00) (18 - 35)  SpO2: 94% (15 Dec 2018 00:25) (92% - 99%)        I&O's Detail    13 Dec 2018 07:01  -  14 Dec 2018 07:00  --------------------------------------------------------  IN:    sodium chloride 0.9%: 300 mL  Total IN: 300 mL    OUT:  Total OUT: 0 mL    Total NET: 300 mL      14 Dec 2018 07:01  -  15 Dec 2018 01:01  --------------------------------------------------------  IN:    Oral Fluid: 420 mL  Total IN: 420 mL    OUT:    Voided: 2550 mL  Total OUT: 2550 mL    Total NET: -2130 mL            LABS:                        12.0   13.60 )-----------( 220      ( 14 Dec 2018 09:07 )             33.4     12-14    128<L>  |  88<L>  |  14  ----------------------------<  100<H>  3.7   |  31  |  0.61    Ca    8.4<L>      14 Dec 2018 23:53  Phos  3.4     12-14  Mg     2.0     12-14        CARDIAC MARKERS ( 14 Dec 2018 20:47 )  x     / x     / 647 U/L / x     / x      CARDIAC MARKERS ( 14 Dec 2018 14:39 )  .053 ng/mL / x     / 596 U/L / x     / x      CARDIAC MARKERS ( 14 Dec 2018 03:50 )  .054 ng/mL / x     / 624 U/L / x     / 15.0 ng/mL      CAPILLARY BLOOD GLUCOSE      CULTURES:  Culture Results:   No growth to date. (12-13 @ 00:52)  Culture Results:   No growth to date. (12-13 @ 00:52)        Physical Examination:  General: No acute distress.  Alert, oriented, interactive, nonfocal  HEENT: Pupils equal, reactive to light.  Symmetric.  PULM: Clear to auscultation bilaterally, no significant sputum production  CVS: Regular rate and irregular rhythm, no murmurs, rubs, or gallops  ABD: Soft, nondistended, nontender, normoactive bowel sounds, no masses  EXT: No edema, nontender  SKIN: Warm and well perfused, no rashes noted.    EKG: afib HR 80's     RADIOLOGY: < from: CT Chest No Cont (12.13.18 @ 08:30) >  IMPRESSION:     1. Small bilateral pleural effusions, moderate-sized pericardial   effusion, and interlobular septal thickening suggestive of edema.   Findings suggest volume overload.  2. Extensive mediastinal adenopathy and hilar adenopathy, difficult to   delineate without intravenous contrast. No suspicious lung mass   identified, though hilar mass cannot be excluded. Consider repeat imaging   with contrast.   3. Emphysema. 2 mm right upper lobe nodules for which follow-up CT in 12   months is recommended.    < end of copied text >      CENTRAL LINE: Y         DATE INSERTED:              REMOVE: Y/N    BENAVIDES: N                        DATE INSERTED:              REMOVE: Y/N    A-LINE: Y                       DATE INSERTED: 12/15             REMOVE: Y/N    GLOBAL ISSUE/BEST PRACTICE:  Analgesia: N/A  Sedation: N/A  HOB elevation: yes  Stress ulcer prophylaxis: protonix   VTE prophylaxis: SCD  Glycemic control:   Nutrition: NPO      CODE STATUS: Full Code   GOC discussion: Y

## 2018-12-15 NOTE — CONSULT NOTE ADULT - SUBJECTIVE AND OBJECTIVE BOX
Consult Note Adult-Cardiology Resident/Attending [Charted Location: [facility] [address] W1] [Authored: 14-Dec-2018 09:08]- for Visit: [id number], Complete, Revised, Signed in Full, General      CHIEF COMPLAINT: Patient is a 58y old  Male who presents with a chief complaint of Intractable cough and Near Syncope     HPI: RAFA SHAFER is a 58 year old male brought to ER after having severe cough for 2-3 days. Before patient's wife brought him to the ER after he coughed severely, fell to the floor, and had a seizure like activity. The patient have hyponatremia on ER blood work. Denies a history of CHF and liver diease. He is an active smoker. Admits to poor intake for the past 2-3 days. Denies imbalance, frequent falls, dizziness, lightheadedness, abd symptoms, urinary symptoms.   Past medical history of HTN, DM2.  Patient seen and examined at bedside for cardiology evaluation. He reports that he has been coughing and had new onset dyspnea on exertion for a couple weeks. Previously, he did not have SOB with exertion. Currently, he is gasping for air after climbing ~1-2 flights of stairs. He came to the ED due to a coughing fit after which he felt dizzy, fell forward and hit the radiator at home. Denied chest pain, palpitations, SOB at rest, PND, orthopnea, near syncope, syncope, or lower extremity edema.    Patient had stress echo performed in May or June 2018 for work, reports that it was "fine." PMD is Dr. Aranda. Does not see a cardiologist. Previously on diuretic for HTN but now only taking Irbesartan and Metoprolol to decrease his HR. He denies any history of A fib to his knowledge. Denies having CHF, MI, CVA or cardiac history.    PAST MEDICAL & SURGICAL HISTORY:  HTN (hypertension)  No significant past surgical history    SOCIAL HISTORY: No active tobacco (currently quitting on Chantix, previously smoked 1PPD for 40 years), alcohol or illicit drug use.    FAMILY HISTORY: No pertinent family history of CAD or SCD in first degree relatives    Home Medications:  Coricidin 325 mg-2 mg oral tablet: 1 tab(s) orally every 6 hours (12 Dec 2018 23:03)  irbesartan 300 mg oral tablet: 1 tab(s) orally once a day (12 Dec 2018 23:03)  metoprolol succinate 100 mg oral tablet, extended release: 1 tab(s) orally once a day (12 Dec 2018 23:03)  Wal-Zyr 10 mg oral tablet: 1 tab(s) orally once a day (12 Dec 2018 23:03)      MEDICATIONS  (STANDING):  ALBUTerol/ipratropium for Nebulization 3 milliLiter(s) Nebulizer every 6 hours  buDESOnide   0.5 milliGRAM(s) Respule 0.5 milliGRAM(s) Inhalation two times a day  influenza   Vaccine 0.5 milliLiter(s) IntraMuscular once  loratadine 10 milliGRAM(s) Oral daily  magnesium oxide 400 milliGRAM(s) Oral daily  metoprolol tartrate 25 milliGRAM(s) Oral four times a day  nicotine - 21 mG/24Hr(s) Patch 1 Patch Transdermal daily  predniSONE   Tablet 40 milliGRAM(s) Oral daily  sodium chloride 2 Gram(s) Oral three times a day  tiotropium 18 MICROgram(s) Capsule 1 Capsule(s) Inhalation daily    MEDICATIONS  (PRN):  guaiFENesin    Syrup 100 milliGRAM(s) Oral every 6 hours PRN Cough  meclizine 12.5 milliGRAM(s) Oral three times a day PRN Dizziness      Allergies    penicillin (Unknown)    Intolerances      REVIEW OF SYSTEMS: Is negative for eye, ENT, GI, , allergic, dermatologic, musculoskeletal and neurologic, except as described above.    VITAL SIGNS:   Vital Signs Last 24 Hrs  T(C): 36.4 (14 Dec 2018 07:33), Max: 36.7 (13 Dec 2018 16:00)  T(F): 97.5 (14 Dec 2018 07:33), Max: 98.1 (13 Dec 2018 16:00)  HR: 105 (14 Dec 2018 07:33) (97 - 121)  BP: 134/85 (14 Dec 2018 07:33) (122/88 - 149/95)  BP(mean): --  RR: 19 (14 Dec 2018 07:33) (18 - 19)  SpO2: 94% (14 Dec 2018 07:33) (92% - 98%)    I&O's Summary    13 Dec 2018 07:01  -  14 Dec 2018 07:00  --------------------------------------------------------  IN: 300 mL / OUT: 0 mL / NET: 300 mL      PHYSICAL EXAM:  Constitutional: NAD, awake and alert, well-developed  Eyes: EOMI, no oral cyanosis, conjunctivae clear, anicteric  Pulmonary: Non-labored, on nasal cannula, scattered wheezes bilaterally  Cardiovascular: Irregular S1 and S2. No murmur  Gastrointestinal: Bowel sounds present, soft, nontender  Lymph: +Peripheral edema (R > L)

## 2018-12-15 NOTE — PROGRESS NOTE ADULT - SUBJECTIVE AND OBJECTIVE BOX
NEPHROLOGY INTERVAL HPI/OVERNIGHT EVENTS:  HPI:  RAFA SHAFER is a 58 year old male brought to ER after having severe cough for 2-3 days. Before patient's wife brought him to the ER after he coughed severely, fell to the floor, and had a seizure like activity. The patient have hyponatremia on ER blood work. Denies a history of CHF and liver diease. He is an active smoker. Admits to poor intake for the past 2-3 days. Denies imbalance, frequent falls, dizziness, lightheadedness, abd symptoms, urinary symptoms.     Follow up hyponatremia  S/p pericardial window     PAST MEDICAL & SURGICAL HISTORY:  HTN (hypertension)  No significant past surgical history      MEDICATIONS  (STANDING):  influenza   Vaccine 0.5 milliLiter(s) IntraMuscular once  lactated ringers. 1000 milliLiter(s) (50 mL/Hr) IV Continuous <Continuous>  metoprolol tartrate Injectable 5 milliGRAM(s) IV Push once    MEDICATIONS  (PRN):  HYDROmorphone  Injectable 0.5 milliGRAM(s) IV Push every 10 minutes PRN Moderate Pain (4 - 6)  HYDROmorphone  Injectable 1 milliGRAM(s) IV Push every 10 minutes PRN Severe Pain (7 - 10)  ondansetron Injectable 4 milliGRAM(s) IV Push once PRN Nausea and/or Vomiting      Allergies    penicillin (Unknown)    Intolerances        Vital Signs Last 24 Hrs  T(C): 36.7 (15 Dec 2018 09:35), Max: 37.2 (14 Dec 2018 15:58)  T(F): 98 (15 Dec 2018 09:35), Max: 98.9 (14 Dec 2018 15:58)  HR: 132 (15 Dec 2018 09:50) (90 - 133)  BP: 143/91 (15 Dec 2018 09:50) (95/68 - 169/67)  BP(mean): 95 (15 Dec 2018 07:00) (74 - 96)  RR: 14 (15 Dec 2018 09:50) (14 - 35)  SpO2: 100% (15 Dec 2018 09:50) (91% - 100%)  Daily Height in cm: 185.42 (15 Dec 2018 01:29)    Daily Weight in k.5 (15 Dec 2018 05:00)    PHYSICAL EXAM:  General: No acute distress.  Alert, oriented, interactive, nonfocal  HEENT: Pupils equal, reactive to light.  Symmetric.  PULM: Clear to auscultation bilaterally, no significant sputum production  CVS: Regular rate and irregular rhythm, no murmurs, rubs, or gallops  ABD: Soft, nondistended, nontender, normoactive bowel sounds, no masses  EXT: No edema, nontender  SKIN: Warm and well perfused, no rashes noted.  LABS:                        11.9   10.85 )-----------( 226      ( 15 Dec 2018 05:32 )             34.5     12-15    134<L>  |  96  |  12  ----------------------------<  106<H>  4.1   |  35<H>  |  0.75    Ca    8.6      15 Dec 2018 05:32  Phos  3.1     12-15  Mg     2.3     12-15    TPro  6.2  /  Alb  3.4  /  TBili  0.7  /  DBili  x   /  AST  38<H>  /  ALT  43  /  AlkPhos  78  12-15    PT/INR - ( 15 Dec 2018 05:32 )   PT: 13.9 sec;   INR: 1.22 ratio         PTT - ( 15 Dec 2018 05:32 )  PTT:30.3 sec  Urinalysis Basic - ( 15 Dec 2018 05:44 )    Color: Yellow / Appearance: Clear / S.000 / pH: x  Gluc: x / Ketone: Negative  / Bili: Negative / Urobili: Negative   Blood: x / Protein: Negative / Nitrite: Negative   Leuk Esterase: Negative / RBC: x / WBC x   Sq Epi: x / Non Sq Epi: x / Bacteria: x      Magnesium, Serum: 2.3 mg/dL (12-15 @ 05:32)  Phosphorus Level, Serum: 3.1 mg/dL (12-15 @ 05:32)  Magnesium, Serum: 2.0 mg/dL ( @ 18:22)  Phosphorus Level, Serum: 3.4 mg/dL ( @ 18:22)          RADIOLOGY & ADDITIONAL TESTS:

## 2018-12-15 NOTE — PROGRESS NOTE ADULT - SUBJECTIVE AND OBJECTIVE BOX
Neurology follow up note  COVERING DR VENESSA SHAFER58yMale      Interval History:    Patient feels ok no new complaints.    MEDICATIONS    buDESOnide   0.5 milliGRAM(s) Respule 0.5 milliGRAM(s) Inhalation two times a day  dextrose 5%. 1000 milliLiter(s) IV Continuous <Continuous>  guaiFENesin    Syrup 100 milliGRAM(s) Oral every 6 hours PRN  HYDROmorphone  Injectable 0.5 milliGRAM(s) IV Push every 10 minutes PRN  HYDROmorphone  Injectable 1 milliGRAM(s) IV Push every 10 minutes PRN  HYDROmorphone  Injectable 0.5 milliGRAM(s) IV Push every 4 hours PRN  HYDROmorphone  Injectable 1 milliGRAM(s) IV Push every 4 hours PRN  influenza   Vaccine 0.5 milliLiter(s) IntraMuscular once  loratadine 10 milliGRAM(s) Oral daily  magnesium oxide 400 milliGRAM(s) Oral daily  meclizine 12.5 milliGRAM(s) Oral three times a day PRN  metoprolol tartrate 50 milliGRAM(s) Oral every 6 hours  nicotine - 21 mG/24Hr(s) Patch 1 Patch Transdermal daily  ondansetron Injectable 4 milliGRAM(s) IV Push once PRN  predniSONE   Tablet 20 milliGRAM(s) Oral daily  tiotropium 18 MICROgram(s) Capsule 1 Capsule(s) Inhalation daily      Allergies    penicillin (Unknown)    Intolerances        Height (cm): 185.42 (12-15 @ 05:34)  Weight (kg): 106.5 (12-15 @ 05:34)  BMI (kg/m2): 31 (12-15 @ 05:34)    Vital Signs Last 24 Hrs  T(C): 36.7 (15 Dec 2018 10:05), Max: 37.2 (14 Dec 2018 15:58)  T(F): 98.1 (15 Dec 2018 10:05), Max: 98.9 (14 Dec 2018 15:58)  HR: 137 (15 Dec 2018 11:00) (90 - 137)  BP: 146/90 (15 Dec 2018 11:00) (95/68 - 169/67)  BP(mean): 105 (15 Dec 2018 11:00) (74 - 105)  RR: 20 (15 Dec 2018 11:00) (14 - 35)  SpO2: 91% (15 Dec 2018 11:00) (91% - 100%)      REVIEW OF SYSTEMS:    Constitutional: No fever, chills, fatigue, weakness  Eyes: no eye pain, visual disturbances, or discharge  ENT:  No difficulty hearing, tinnitus, vertigo; No sinus or throat pain  Neck: No pain or stiffness  Respiratory: No cough, dyspnea, wheezing   Cardiovascular: No chest pain, palpitations,   Gastrointestinal: No abdominal or epigastric pain. No nausea, vomiting  No diarrhea or constipation.   Genitourinary: No dysuria, frequency, hematuria or incontinence  Neurological: No headaches, lightheadedness, vertigo, numbness or tremors  Psychiatric: No depression, anxiety, mood swings or difficulty sleeping  Musculoskeletal: No joint pain or swelling; No muscle, back or extremity pain  Skin: No itching, burning, rashes or lesions   Lymph Nodes: No enlarged glands  Endocrine: No heat or cold intolerance; No hair loss   Allergy and Immunologic: No hives or eczema    On Neurological Examination:    Mental Status - Patient is alert, awake, oriented X3.       Follow simple commands  Follow complex commands      Speech -   Fluent             Cranial Nerves - Pupils 3 mm equal and reactive to light,   extraocular eye movements intact.   smile symmetric  intact bilateral NLF    Motor Exam -   Right upper 5/5  Left upper5/5  Right lower5/5  Left lower 5/5    Muscle tone - is normal all over.  No asymmetry is seen.      Sensory    Bilateral intact to light touch    GENERAL Exam: Nontoxic , No Acute Distress   	  HEENT:  normocephalic, atraumatic  		  LUNGS:   No Wheeze    	  HEART: Normal S1S2   No murmur RRR        	  GI/ ABDOMEN:  Soft  Non tender    EXTREMITIES:   No Edema  No Clubbing  No Cyanosis No Edema    MUSCULOSKELETAL: Normal Range of Motion  	   SKIN: Normal  No Ecchymosis               LABS:  CBC Full  -  ( 15 Dec 2018 05:32 )  WBC Count : 10.85 K/uL  Hemoglobin : 11.9 g/dL  Hematocrit : 34.5 %  Platelet Count - Automated : 226 K/uL  Mean Cell Volume : 83.3 fl  Mean Cell Hemoglobin : 28.7 pg  Mean Cell Hemoglobin Concentration : 34.5 gm/dL  Auto Neutrophil # : 6.72 K/uL  Auto Lymphocyte # : 2.89 K/uL  Auto Monocyte # : 1.11 K/uL  Auto Eosinophil # : 0.07 K/uL  Auto Basophil # : 0.02 K/uL  Auto Neutrophil % : 62.0 %  Auto Lymphocyte % : 26.6 %  Auto Monocyte % : 10.2 %  Auto Eosinophil % : 0.6 %  Auto Basophil % : 0.2 %    Urinalysis Basic - ( 15 Dec 2018 05:44 )    Color: Yellow / Appearance: Clear / S.000 / pH: x  Gluc: x / Ketone: Negative  / Bili: Negative / Urobili: Negative   Blood: x / Protein: Negative / Nitrite: Negative   Leuk Esterase: Negative / RBC: x / WBC x   Sq Epi: x / Non Sq Epi: x / Bacteria: x      12-15    134<L>  |  96  |  12  ----------------------------<  106<H>  4.1   |  35<H>  |  0.75    Ca    8.6      15 Dec 2018 05:32  Phos  3.1     12-15  Mg     2.3     12-15    TPro  6.2  /  Alb  3.4  /  TBili  0.7  /  DBili  x   /  AST  38<H>  /  ALT  43  /  AlkPhos  78  12-15    Hemoglobin A1C:     LIVER FUNCTIONS - ( 15 Dec 2018 05:32 )  Alb: 3.4 g/dL / Pro: 6.2 g/dL / ALK PHOS: 78 U/L / ALT: 43 U/L / AST: 38 U/L / GGT: x           Vitamin B12   PT/INR - ( 15 Dec 2018 05:32 )   PT: 13.9 sec;   INR: 1.22 ratio         PTT - ( 15 Dec 2018 05:32 )  PTT:30.3 sec      RADIOLOGY    ASSESSMENT AND PLAN     This is a 58-year-old male presented to the emergency room with episodes of lightheadedness followed by generalized weakness, fall preceded by severe bout of coughing consisting with near pre syncope  suspect underlying cardiac issues   S/P window  cariology follow up  doubt CVA/SZ  30 minutes spent on total encounter; more than 50% of the visit was spent counseling and/or coordinating care by the attending physician.

## 2018-12-15 NOTE — PROGRESS NOTE ADULT - ATTENDING COMMENTS
58M PMH HTN, COPD, active smoker, now with moderate pericardial effusion and new onset A.fib. S/p pericardial window today, POD1.     PRN dilaudid for pain  Monitor neuro/mental status  A.fib with RVR now - gave metoprolol 5mg ivp, will start po metoprolol once recovered  Will resume AC once cleared by thoracic sx  Resp status stable  Continue BDs  Order IS  Pericardial effusion likely malignant, f/u cyto, path, cx, LDH  Hyponatremia due to SIADH, sodium improved by 15 in 48hrs - appropriate, avoid further increase at this time, was given tolvaptan last night with subsequent NPO and lack of water - likely causing rapid sodium increase, hold off on salt tabs at this time, place on D5, repeat BMP in pm  Further w/u and mx based on pericardial fluid result    CC time: 35mins 58M PMH HTN, COPD, active smoker, now with moderate pericardial effusion and new onset A.fib. S/p pericardial window today, POD1.     PRN dilaudid for pain  Monitor neuro/mental status  A.fib with RVR now - gave metoprolol 5mg ivp, will start po metoprolol once recovered  Will resume AC once cleared by thoracic sx  Resp status stable  Continue BDs  Order IS  Pericardial effusion likely malignant, f/u cyto, path, cx, LDH  TSH is normal  Hyponatremia due to SIADH, sodium improved by 15 in 48hrs - appropriate, avoid further increase at this time, was given tolvaptan last night with subsequent NPO and lack of water - likely causing rapid sodium increase, hold off on salt tabs at this time, place on D5, repeat BMP in pm  Further w/u and mx based on pericardial fluid result    CC time: 35mins

## 2018-12-15 NOTE — PROGRESS NOTE ADULT - SUBJECTIVE AND OBJECTIVE BOX
Patient is a 58y old  Male who presents with a chief complaint of Intractable cough and Near Syncope (15 Dec 2018 11:28)      INTERVAL HPI/OVERNIGHT EVENTS:    Underwent pericardial window today in OR. Shortness of breath has improved    MEDICATIONS  (STANDING):  aspirin  chewable 81 milliGRAM(s) Oral daily  buDESOnide   0.5 milliGRAM(s) Respule 0.5 milliGRAM(s) Inhalation two times a day  chlorhexidine 2% Cloths 1 Application(s) Topical two times a day  dextrose 5%. 1000 milliLiter(s) (125 mL/Hr) IV Continuous <Continuous>  diltiazem Infusion 5 mG/Hr (5 mL/Hr) IV Continuous <Continuous>  enoxaparin Injectable 40 milliGRAM(s) SubCutaneous daily  influenza   Vaccine 0.5 milliLiter(s) IntraMuscular once  loratadine 10 milliGRAM(s) Oral daily  magnesium oxide 400 milliGRAM(s) Oral daily  metoprolol tartrate 50 milliGRAM(s) Oral every 6 hours  nicotine - 21 mG/24Hr(s) Patch 1 Patch Transdermal daily  predniSONE   Tablet 20 milliGRAM(s) Oral daily  tiotropium 18 MICROgram(s) Capsule 1 Capsule(s) Inhalation daily      MEDICATIONS  (PRN):  guaiFENesin    Syrup 100 milliGRAM(s) Oral every 6 hours PRN Cough  HYDROmorphone  Injectable 0.5 milliGRAM(s) IV Push every 10 minutes PRN Moderate Pain (4 - 6)  HYDROmorphone  Injectable 1 milliGRAM(s) IV Push every 10 minutes PRN Severe Pain (7 - 10)  HYDROmorphone  Injectable 0.5 milliGRAM(s) IV Push every 4 hours PRN Moderate Pain (4 - 6)  HYDROmorphone  Injectable 1 milliGRAM(s) IV Push every 4 hours PRN Severe Pain (7 - 10)  meclizine 12.5 milliGRAM(s) Oral three times a day PRN Dizziness  ondansetron Injectable 4 milliGRAM(s) IV Push once PRN Nausea and/or Vomiting      Allergies    penicillin (Unknown)    Intolerances        PAST MEDICAL & SURGICAL HISTORY:  HTN (hypertension)  No significant past surgical history      Vital Signs Last 24 Hrs  T(C): 37.1 (15 Dec 2018 15:39), Max: 37.1 (15 Dec 2018 15:39)  T(F): 98.7 (15 Dec 2018 15:39), Max: 98.7 (15 Dec 2018 15:39)  HR: 119 (15 Dec 2018 16:00) (99 - 140)  BP: 129/98 (15 Dec 2018 16:00) (95/68 - 169/67)  BP(mean): 110 (15 Dec 2018 16:00) (74 - 110)  RR: 26 (15 Dec 2018 16:00) (14 - 35)  SpO2: 95% (15 Dec 2018 16:00) (91% - 100%)    PHYSICAL EXAMINATION:    GENERAL: The patient is awake and alert in no apparent distress.     HEENT: Head is normocephalic and atraumatic. Extraocular muscles are intact. Mucous membranes are moist.    NECK: Supple.    LUNGS: Clear to auscultation without wheezing, rales or rhonchi; respirations unlabored    HEART: Regular rate and rhythm without murmur.    ABDOMEN: Soft, nontender, and nondistended.      EXTREMITIES: Without any cyanosis, clubbing, rash, lesions or edema.    NEUROLOGIC: Grossly intact.    SKIN: No ulceration or induration present.      LABS:                        11.9   10.85 )-----------( 226      ( 15 Dec 2018 05:32 )             34.5     12-15    137  |  98  |  10  ----------------------------<  190<H>  3.9   |  32<H>  |  0.68    Ca    8.5      15 Dec 2018 13:46  Phos  3.1     12-15  Mg     2.3     12-15    TPro  6.2  /  Alb  3.4  /  TBili  0.7  /  DBili  x   /  AST  38<H>  /  ALT  43  /  AlkPhos  78  12-15    PT/INR - ( 15 Dec 2018 05:32 )   PT: 13.9 sec;   INR: 1.22 ratio         PTT - ( 15 Dec 2018 05:32 )  PTT:30.3 sec  Urinalysis Basic - ( 15 Dec 2018 05:44 )    Color: Yellow / Appearance: Clear / S.000 / pH: x  Gluc: x / Ketone: Negative  / Bili: Negative / Urobili: Negative   Blood: x / Protein: Negative / Nitrite: Negative   Leuk Esterase: Negative / RBC: x / WBC x   Sq Epi: x / Non Sq Epi: x / Bacteria: x        CARDIAC MARKERS ( 15 Dec 2018 05:32 )  .048 ng/mL / x     / 451 U/L / x     / x      CARDIAC MARKERS ( 14 Dec 2018 20:47 )  x     / x     / 647 U/L / x     / x      CARDIAC MARKERS ( 14 Dec 2018 14:39 )  .053 ng/mL / x     / 596 U/L / x     / x      CARDIAC MARKERS ( 14 Dec 2018 03:50 )  .054 ng/mL / x     / 624 U/L / x     / 15.0 ng/mL        Serum Pro-Brain Natriuretic Peptide: 605 pg/mL (18 @ 09:07)      Procalcitonin, Serum: 0.16 ng/mL (18 @ 21:39)      MICROBIOLOGY:  Culture Results:   No growth to date. ( @ 00:52)  Culture Results:   No growth to date. 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

## 2018-12-15 NOTE — PROCEDURE NOTE - NSPROCDETAILS_GEN_ALL_CORE
connected to a pressurized flush line/sutured in place/all materials/supplies accounted for at end of procedure/positive blood return obtained via catheter/Seldinger technique/location identified, draped/prepped, sterile technique used, needle inserted/introduced/hemostasis with direct pressure, dressing applied

## 2018-12-15 NOTE — CONSULT NOTE ADULT - REASON FOR ADMISSION
Intractable cough and Near Syncope
Intractable cough and Near Syncope
cough, hyponatremia
Intractable cough and Near Syncope

## 2018-12-15 NOTE — BRIEF OPERATIVE NOTE - PROCEDURE
<<-----Click on this checkbox to enter Procedure Pericardial window operation  12/15/2018    Active  [id number]

## 2018-12-16 LAB
ALBUMIN SERPL ELPH-MCNC: 3.1 G/DL — LOW (ref 3.3–5)
ALP SERPL-CCNC: 79 U/L — SIGNIFICANT CHANGE UP (ref 40–120)
ALT FLD-CCNC: 38 U/L — SIGNIFICANT CHANGE UP (ref 12–78)
ANION GAP SERPL CALC-SCNC: 6 MMOL/L — SIGNIFICANT CHANGE UP (ref 5–17)
ANION GAP SERPL CALC-SCNC: 7 MMOL/L — SIGNIFICANT CHANGE UP (ref 5–17)
APTT BLD: 29.7 SEC — SIGNIFICANT CHANGE UP (ref 28.5–37)
AST SERPL-CCNC: 25 U/L — SIGNIFICANT CHANGE UP (ref 15–37)
BASOPHILS # BLD AUTO: 0.03 K/UL — SIGNIFICANT CHANGE UP (ref 0–0.2)
BASOPHILS NFR BLD AUTO: 0.2 % — SIGNIFICANT CHANGE UP (ref 0–2)
BILIRUB SERPL-MCNC: 1.2 MG/DL — SIGNIFICANT CHANGE UP (ref 0.2–1.2)
BUN SERPL-MCNC: 10 MG/DL — SIGNIFICANT CHANGE UP (ref 7–23)
BUN SERPL-MCNC: 9 MG/DL — SIGNIFICANT CHANGE UP (ref 7–23)
CALCIUM SERPL-MCNC: 8.1 MG/DL — LOW (ref 8.5–10.1)
CALCIUM SERPL-MCNC: 8.2 MG/DL — LOW (ref 8.5–10.1)
CHLORIDE SERPL-SCNC: 90 MMOL/L — LOW (ref 96–108)
CHLORIDE SERPL-SCNC: 92 MMOL/L — LOW (ref 96–108)
CO2 SERPL-SCNC: 31 MMOL/L — SIGNIFICANT CHANGE UP (ref 22–31)
CO2 SERPL-SCNC: 32 MMOL/L — HIGH (ref 22–31)
CREAT SERPL-MCNC: 0.49 MG/DL — LOW (ref 0.5–1.3)
CREAT SERPL-MCNC: 0.63 MG/DL — SIGNIFICANT CHANGE UP (ref 0.5–1.3)
EOSINOPHIL # BLD AUTO: 0.08 K/UL — SIGNIFICANT CHANGE UP (ref 0–0.5)
EOSINOPHIL NFR BLD AUTO: 0.6 % — SIGNIFICANT CHANGE UP (ref 0–6)
GLUCOSE SERPL-MCNC: 136 MG/DL — HIGH (ref 70–99)
GLUCOSE SERPL-MCNC: 152 MG/DL — HIGH (ref 70–99)
HCT VFR BLD CALC: 34.2 % — LOW (ref 39–50)
HGB BLD-MCNC: 11.9 G/DL — LOW (ref 13–17)
IMM GRANULOCYTES NFR BLD AUTO: 0.5 % — SIGNIFICANT CHANGE UP (ref 0–1.5)
INR BLD: 1.32 RATIO — HIGH (ref 0.88–1.16)
LYMPHOCYTES # BLD AUTO: 1.55 K/UL — SIGNIFICANT CHANGE UP (ref 1–3.3)
LYMPHOCYTES # BLD AUTO: 12 % — LOW (ref 13–44)
MAGNESIUM SERPL-MCNC: 2 MG/DL — SIGNIFICANT CHANGE UP (ref 1.6–2.6)
MCHC RBC-ENTMCNC: 28.7 PG — SIGNIFICANT CHANGE UP (ref 27–34)
MCHC RBC-ENTMCNC: 34.8 GM/DL — SIGNIFICANT CHANGE UP (ref 32–36)
MCV RBC AUTO: 82.6 FL — SIGNIFICANT CHANGE UP (ref 80–100)
MONOCYTES # BLD AUTO: 1.35 K/UL — HIGH (ref 0–0.9)
MONOCYTES NFR BLD AUTO: 10.4 % — SIGNIFICANT CHANGE UP (ref 2–14)
NEUTROPHILS # BLD AUTO: 9.88 K/UL — HIGH (ref 1.8–7.4)
NEUTROPHILS NFR BLD AUTO: 76.3 % — SIGNIFICANT CHANGE UP (ref 43–77)
NIGHT BLUE STAIN TISS: SIGNIFICANT CHANGE UP
PHOSPHATE SERPL-MCNC: 4.1 MG/DL — SIGNIFICANT CHANGE UP (ref 2.5–4.5)
PLATELET # BLD AUTO: 202 K/UL — SIGNIFICANT CHANGE UP (ref 150–400)
POTASSIUM SERPL-MCNC: 3.6 MMOL/L — SIGNIFICANT CHANGE UP (ref 3.5–5.3)
POTASSIUM SERPL-MCNC: 4.2 MMOL/L — SIGNIFICANT CHANGE UP (ref 3.5–5.3)
POTASSIUM SERPL-SCNC: 3.6 MMOL/L — SIGNIFICANT CHANGE UP (ref 3.5–5.3)
POTASSIUM SERPL-SCNC: 4.2 MMOL/L — SIGNIFICANT CHANGE UP (ref 3.5–5.3)
PROT SERPL-MCNC: 6 G/DL — SIGNIFICANT CHANGE UP (ref 6–8.3)
PROTHROM AB SERPL-ACNC: 15.2 SEC — HIGH (ref 10–12.9)
RBC # BLD: 4.14 M/UL — LOW (ref 4.2–5.8)
RBC # FLD: 12.7 % — SIGNIFICANT CHANGE UP (ref 10.3–14.5)
SODIUM SERPL-SCNC: 128 MMOL/L — LOW (ref 135–145)
SODIUM SERPL-SCNC: 130 MMOL/L — LOW (ref 135–145)
SPECIMEN SOURCE: SIGNIFICANT CHANGE UP
WBC # BLD: 12.95 K/UL — HIGH (ref 3.8–10.5)
WBC # FLD AUTO: 12.95 K/UL — HIGH (ref 3.8–10.5)

## 2018-12-16 PROCEDURE — 99233 SBSQ HOSP IP/OBS HIGH 50: CPT

## 2018-12-16 PROCEDURE — 99291 CRITICAL CARE FIRST HOUR: CPT

## 2018-12-16 RX ORDER — SODIUM CHLORIDE 9 MG/ML
2 INJECTION INTRAMUSCULAR; INTRAVENOUS; SUBCUTANEOUS THREE TIMES A DAY
Qty: 0 | Refills: 0 | Status: DISCONTINUED | OUTPATIENT
Start: 2018-12-16 | End: 2018-12-19

## 2018-12-16 RX ADMIN — ENOXAPARIN SODIUM 40 MILLIGRAM(S): 100 INJECTION SUBCUTANEOUS at 05:58

## 2018-12-16 RX ADMIN — Medication 1 PATCH: at 21:34

## 2018-12-16 RX ADMIN — Medication 1 PATCH: at 12:30

## 2018-12-16 RX ADMIN — Medication 81 MILLIGRAM(S): at 12:30

## 2018-12-16 RX ADMIN — CHLORHEXIDINE GLUCONATE 1 APPLICATION(S): 213 SOLUTION TOPICAL at 17:33

## 2018-12-16 RX ADMIN — Medication 50 MILLIGRAM(S): at 17:33

## 2018-12-16 RX ADMIN — SODIUM CHLORIDE 125 MILLILITER(S): 9 INJECTION, SOLUTION INTRAVENOUS at 04:10

## 2018-12-16 RX ADMIN — SODIUM CHLORIDE 2 GRAM(S): 9 INJECTION INTRAMUSCULAR; INTRAVENOUS; SUBCUTANEOUS at 22:10

## 2018-12-16 RX ADMIN — Medication 50 MILLIGRAM(S): at 00:03

## 2018-12-16 RX ADMIN — Medication 50 MILLIGRAM(S): at 05:59

## 2018-12-16 RX ADMIN — Medication 50 MILLIGRAM(S): at 23:54

## 2018-12-16 RX ADMIN — Medication 20 MILLIGRAM(S): at 05:58

## 2018-12-16 RX ADMIN — Medication 50 MILLIGRAM(S): at 12:30

## 2018-12-16 RX ADMIN — MAGNESIUM OXIDE 400 MG ORAL TABLET 400 MILLIGRAM(S): 241.3 TABLET ORAL at 12:30

## 2018-12-16 RX ADMIN — CHLORHEXIDINE GLUCONATE 1 APPLICATION(S): 213 SOLUTION TOPICAL at 05:59

## 2018-12-16 RX ADMIN — Medication 0.5 MILLIGRAM(S): at 07:53

## 2018-12-16 RX ADMIN — Medication 0.5 MILLIGRAM(S): at 20:08

## 2018-12-16 RX ADMIN — Medication 5 MG/HR: at 03:09

## 2018-12-16 RX ADMIN — LORATADINE 10 MILLIGRAM(S): 10 TABLET ORAL at 12:30

## 2018-12-16 NOTE — PROGRESS NOTE ADULT - SUBJECTIVE AND OBJECTIVE BOX
Patient is a 58y old  Male who presents with a chief complaint of Intractable cough and Near Syncope (15 Dec 2018 11:28)      INTERVAL /OVERNIGHT EVENTS: denies pain    MEDICATIONS  (STANDING):  aspirin  chewable 81 milliGRAM(s) Oral daily  buDESOnide   0.5 milliGRAM(s) Respule 0.5 milliGRAM(s) Inhalation two times a day  chlorhexidine 2% Cloths 1 Application(s) Topical two times a day  dextrose 5%. 1000 milliLiter(s) (125 mL/Hr) IV Continuous <Continuous>  diltiazem Infusion 5 mG/Hr (5 mL/Hr) IV Continuous <Continuous>  enoxaparin Injectable 40 milliGRAM(s) SubCutaneous daily  influenza   Vaccine 0.5 milliLiter(s) IntraMuscular once  loratadine 10 milliGRAM(s) Oral daily  magnesium oxide 400 milliGRAM(s) Oral daily  metoprolol tartrate 50 milliGRAM(s) Oral every 6 hours  nicotine - 21 mG/24Hr(s) Patch 1 Patch Transdermal daily  predniSONE   Tablet 20 milliGRAM(s) Oral daily  tiotropium 18 MICROgram(s) Capsule 1 Capsule(s) Inhalation daily    MEDICATIONS  (PRN):  guaiFENesin    Syrup 100 milliGRAM(s) Oral every 6 hours PRN Cough  HYDROmorphone  Injectable 0.5 milliGRAM(s) IV Push every 10 minutes PRN Moderate Pain (4 - 6)  HYDROmorphone  Injectable 1 milliGRAM(s) IV Push every 10 minutes PRN Severe Pain (7 - 10)  HYDROmorphone  Injectable 0.5 milliGRAM(s) IV Push every 4 hours PRN Moderate Pain (4 - 6)  HYDROmorphone  Injectable 1 milliGRAM(s) IV Push every 4 hours PRN Severe Pain (7 - 10)  meclizine 12.5 milliGRAM(s) Oral three times a day PRN Dizziness  ondansetron Injectable 4 milliGRAM(s) IV Push once PRN Nausea and/or Vomiting      Allergies    penicillin (Unknown)    Intolerances        REVIEW OF SYSTEMS:  CONSTITUTIONAL: No fever, weight loss, or fatigue  EYES: No eye pain, visual disturbances, or discharge  ENMT:  No difficulty hearing, tinnitus, vertigo; No sinus or throat pain  NECK: No pain or stiffness  RESPIRATORY: No cough, wheezing, chills or hemoptysis; No shortness of breath  CARDIOVASCULAR: No chest pain, palpitations, dizziness, or leg swelling  GASTROINTESTINAL: No abdominal or epigastric pain. No nausea, vomiting, or hematemesis; No diarrhea or constipation. No melena or hematochezia.  GENITOURINARY: No dysuria, frequency, hematuria, or incontinence  NEUROLOGICAL: No headaches, memory loss, loss of strength, numbness, or tremors  SKIN: No itching, burning, rashes, or lesions   LYMPH NODES: No enlarged glands  ENDOCRINE: No heat or cold intolerance; No hair loss; No polydipsia or polyuria  MUSCULOSKELETAL: No joint pain or swelling; No muscle, back, or extremity pain  PSYCHIATRIC: No depression, anxiety, mood swings, or difficulty sleeping  HEME/LYMPH: No easy bruising, or bleeding gums  ALLERGY AND IMMUNOLOGIC: No hives or eczema    ICU Vital Signs Last 24 Hrs  T(C): 36.9 (16 Dec 2018 11:53), Max: 37.2 (16 Dec 2018 07:39)  T(F): 98.5 (16 Dec 2018 11:53), Max: 98.9 (16 Dec 2018 07:39)  HR: 109 (16 Dec 2018 13:00) (79 - 132)  BP: 111/63 (16 Dec 2018 13:00) (96/64 - 133/83)  BP(mean): 82 (16 Dec 2018 13:00) (72 - 110)  ABP: 60/30 (16 Dec 2018 06:00) (60/30 - 151/74)  ABP(mean): 57 (16 Dec 2018 06:00) (57 - 97)  RR: 25 (16 Dec 2018 13:00) (16 - 33)  SpO2: 100% (16 Dec 2018 13:00) (91% - 100%)    PHYSICAL EXAM:  GENERAL: NAD, well-groomed, well-developed  HEAD:  Atraumatic, Normocephalic  EYES: EOMI, PERRLA, conjunctiva and sclera clear  ENMT: No tonsillar erythema, exudates, or enlargement; Moist mucous membranes, Good dentition, No lesions  NECK: Supple, No JVD, Normal thyroid  NERVOUS SYSTEM:  Alert & Oriented X3, Good concentration; Motor Strength 5/5 B/L upper and lower extremities; DTRs 2+ intact and symmetric  CHEST/LUNG: Clear to auscultation bilaterally; No rales, rhonchi, wheezing, or rubs  HEART: Regular rate and rhythm; No murmurs, rubs, or gallops  ABDOMEN: Soft, Nontender, Nondistended; Bowel sounds present  EXTREMITIES:  2+ Peripheral Pulses, No clubbing, cyanosis, or edema  LYMPH: No lymphadenopathy noted  SKIN: No rashes or lesions    LABS:                        11.9   12.95 )-----------( 202      ( 16 Dec 2018 05:39 )             34.2     16 Dec 2018 05:39    130    |  92     |  9      ----------------------------<  136    3.6     |  31     |  0.49     Ca    8.1        16 Dec 2018 05:39  Phos  4.1       16 Dec 2018 05:39  Mg     2.0       16 Dec 2018 05:39    TPro  6.0    /  Alb  3.1    /  TBili  1.2    /  DBili  x      /  AST  25     /  ALT  38     /  AlkPhos  79     16 Dec 2018 05:39    LIVER FUNCTIONS - ( 16 Dec 2018 05:39 )  Alb: 3.1 g/dL / Pro: 6.0 g/dL / ALK PHOS: 79 U/L / ALT: 38 U/L / AST: 25 U/L / GGT: x           PT/INR - ( 16 Dec 2018 05:39 )   PT: 15.2 sec;   INR: 1.32 ratio         PTT - ( 16 Dec 2018 05:39 )  PTT:29.7 sec  CAPILLARY BLOOD GLUCOSE    RADIOLOGY & ADDITIONAL TESTS:    Notes Reviewed:  [x ] YES  [ ] NO    Care Discussed with Consultants/Other Providers [x ] YES  [ ] NO

## 2018-12-16 NOTE — PROGRESS NOTE ADULT - ASSESSMENT
Hyponatremia  Coughing  Low BUN  Possible Seizure  HTN  +Smoker  Pericardial tamponade s/p window     -Sodium was improving faster than optimal, and D5W was started. Now Na back down to 130. Can d/c all IVF  -Oral fluid restriction to 1L for now  -S/p Tolvaptan 15 mg po x 1 on 12/14/18  -Smoking cessation discussed  -Increase protein intake; no salt restriction in diet  -BP is stable; continue with current regimen  -Follow up pericardial fluid pathology     Thank you

## 2018-12-16 NOTE — PROGRESS NOTE ADULT - SUBJECTIVE AND OBJECTIVE BOX
Patient is a 58y old  Male who presents with a chief complaint of Intractable cough and Near Syncope (16 Dec 2018 16:34)      INTERVAL HPI/OVERNIGHT EVENTS:    Admits to occasional cough. Denies shortness of breath    MEDICATIONS  (STANDING):  aspirin  chewable 81 milliGRAM(s) Oral daily  buDESOnide   0.5 milliGRAM(s) Respule 0.5 milliGRAM(s) Inhalation two times a day  chlorhexidine 2% Cloths 1 Application(s) Topical two times a day  diltiazem    Tablet 30 milliGRAM(s) Oral every 6 hours  enoxaparin Injectable 40 milliGRAM(s) SubCutaneous daily  influenza   Vaccine 0.5 milliLiter(s) IntraMuscular once  loratadine 10 milliGRAM(s) Oral daily  magnesium oxide 400 milliGRAM(s) Oral daily  metoprolol tartrate 50 milliGRAM(s) Oral every 6 hours  nicotine - 21 mG/24Hr(s) Patch 1 Patch Transdermal daily  sodium chloride 2 Gram(s) Oral three times a day  tiotropium 18 MICROgram(s) Capsule 1 Capsule(s) Inhalation daily      MEDICATIONS  (PRN):  guaiFENesin    Syrup 100 milliGRAM(s) Oral every 6 hours PRN Cough  meclizine 12.5 milliGRAM(s) Oral three times a day PRN Dizziness  ondansetron Injectable 4 milliGRAM(s) IV Push once PRN Nausea and/or Vomiting      Allergies    penicillin (Unknown)    Intolerances        PAST MEDICAL & SURGICAL HISTORY:  HTN (hypertension)  No significant past surgical history      Vital Signs Last 24 Hrs  T(C): 36.6 (16 Dec 2018 15:59), Max: 37.2 (16 Dec 2018 07:39)  T(F): 97.9 (16 Dec 2018 15:59), Max: 98.9 (16 Dec 2018 07:39)  HR: 96 (16 Dec 2018 16:00) (79 - 121)  BP: 114/64 (16 Dec 2018 16:00) (96/64 - 133/83)  BP(mean): 84 (16 Dec 2018 16:00) (72 - 93)  RR: 19 (16 Dec 2018 16:00) (16 - 33)  SpO2: 99% (16 Dec 2018 16:00) (91% - 100%)    PHYSICAL EXAMINATION:    GENERAL: The patient is awake and alert in no apparent distress.     HEENT: Head is normocephalic and atraumatic. Extraocular muscles are intact. Mucous membranes are moist.    NECK: Supple.    LUNGS: Clear to auscultation without wheezing, rales or rhonchi; respirations unlabored    HEART: Regular rate and rhythm without murmur.    ABDOMEN: Soft, nontender, and nondistended.      EXTREMITIES: Without any cyanosis, clubbing, rash, lesions or edema.    NEUROLOGIC: Grossly intact.    SKIN: No ulceration or induration present.      LABS:                        11.9   12.95 )-----------( 202      ( 16 Dec 2018 05:39 )             34.2     12-16    128<L>  |  90<L>  |  10  ----------------------------<  152<H>  4.2   |  32<H>  |  0.63    Ca    8.2<L>      16 Dec 2018 14:51  Phos  4.1       Mg     2.0         TPro  6.0  /  Alb  3.1<L>  /  TBili  1.2  /  DBili  x   /  AST  25  /  ALT  38  /  AlkPhos  79  12-16    PT/INR - ( 16 Dec 2018 05:39 )   PT: 15.2 sec;   INR: 1.32 ratio         PTT - ( 16 Dec 2018 05:39 )  PTT:29.7 sec  Urinalysis Basic - ( 15 Dec 2018 05:44 )    Color: Yellow / Appearance: Clear / S.000 / pH: x  Gluc: x / Ketone: Negative  / Bili: Negative / Urobili: Negative   Blood: x / Protein: Negative / Nitrite: Negative   Leuk Esterase: Negative / RBC: x / WBC x   Sq Epi: x / Non Sq Epi: x / Bacteria: x        CARDIAC MARKERS ( 15 Dec 2018 05:32 )  .048 ng/mL / x     / 451 U/L / x     / x      CARDIAC MARKERS ( 14 Dec 2018 20:47 )  x     / x     / 647 U/L / x     / x            Serum Pro-Brain Natriuretic Peptide: 605 pg/mL (18 @ 09:07)          MICROBIOLOGY:  Culture Results:   No growth (12-15 @ 13:46)  Culture Results:   No growth to date. ( @ 00:52)  Culture Results:   No growth to date. ( @ 00:52)      RADIOLOGY & ADDITIONAL STUDIES:    Assessment:    S/P Pericardial Window  Mediastinal and hilar adenopathy - suspect small cell carcinoma  SIADH  Hx Hypertension  Nicotine addiction    Plan:    Continue oxygen  Smoke Cessation  Follow serum sodium  Probable transfer to floor  Check pathology results

## 2018-12-16 NOTE — PROGRESS NOTE ADULT - ATTENDING COMMENTS
58M PMH HTN, COPD, active smoker, now with moderate pericardial effusion and new onset A.fib. S/p pericardial window and Bx 12/15. Started on diltiazem drip for A.fib with RVR - now off.     Clinically stable, feels better  Denies pain - d/c dilaudid  Remains in A.fib, but rate controlled, SBP , hold off on starting po diltiazem, continue po metoprolol  Continue aspirin, not a candidate for AC  Resp status stable  Continue BDs, IS  Pericardial effusion likely malignant (patient aware), f/u cyto, path, cx, LDH  Hyponatremia due to SIADH, sodium increased rapidly and had polyuria - gave a dose of DDAVP 2mcg 12/15 with decrease in sodium, will d/c D5  Repeat BMP in pm  OOB    Keep in ICU today, likely transfer to tele tomorrow 58M PMH HTN, COPD, active smoker, now with moderate pericardial effusion and new onset A.fib. S/p pericardial window and Bx 12/15. Started on diltiazem drip for A.fib with RVR - now off.     Clinically stable, feels better  Denies pain - d/c dilaudid  Remains in A.fib, but rate controlled, SBP , hold off on starting po diltiazem, continue po metoprolol  Continue aspirin, not a candidate for AC  Resp status stable  Continue BDs, IS  Pericardial effusion likely malignant (patient aware), f/u cyto, path, cx, LDH  Hyponatremia due to SIADH, sodium increased rapidly and had polyuria - gave a dose of DDAVP 2mcg 12/15 with decrease in sodium, will d/c D5  Repeat BMP in pm  OOB  Lovenox for DVT ppx    Keep in ICU today, likely transfer to tele tomorrow 58M PMH HTN, COPD, active smoker, now with moderate pericardial effusion and new onset A.fib. S/p pericardial window and Bx 12/15. Started on diltiazem drip for A.fib with RVR - now off.     Clinically stable, feels better  Denies pain - d/c dilaudid  Remains in A.fib, but rate controlled, SBP , hold off on starting po diltiazem, continue po metoprolol  Continue aspirin, not a candidate for AC  Resp status stable  Continue BDs, IS  Wean off prednisone  Pericardial effusion likely malignant (patient aware), f/u cyto, path, cx, LDH  Hyponatremia due to SIADH, sodium increased rapidly and had polyuria - gave a dose of DDAVP 2mcg 12/15 with decrease in sodium, will d/c D5  Repeat BMP in pm  OOB  Lovenox for DVT ppx    Keep in ICU today, likely transfer to tele tomorrow

## 2018-12-16 NOTE — PROGRESS NOTE ADULT - SUBJECTIVE AND OBJECTIVE BOX
NEPHROLOGY INTERVAL HPI/OVERNIGHT EVENTS:  HPI:  RAFA SHAFER is a 58 year old male brought to ER after having severe cough for 2-3 days. Before patient's wife brought him to the ER after he coughed severely, fell to the floor, and had a seizure like activity. The patient have hyponatremia on ER blood work. Denies a history of CHF and liver diease. He is an active smoker. Admits to poor intake for the past 2-3 days. Denies imbalance, frequent falls, dizziness, lightheadedness, abd symptoms, urinary symptoms.     Follow up hyponatremia  S/p pericardial window     PAST MEDICAL & SURGICAL HISTORY:  HTN (hypertension)  No significant past surgical history      MEDICATIONS  (STANDING):  influenza   Vaccine 0.5 milliLiter(s) IntraMuscular once  lactated ringers. 1000 milliLiter(s) (50 mL/Hr) IV Continuous <Continuous>  metoprolol tartrate Injectable 5 milliGRAM(s) IV Push once    MEDICATIONS  (PRN):  HYDROmorphone  Injectable 0.5 milliGRAM(s) IV Push every 10 minutes PRN Moderate Pain (4 - 6)  HYDROmorphone  Injectable 1 milliGRAM(s) IV Push every 10 minutes PRN Severe Pain (7 - 10)  ondansetron Injectable 4 milliGRAM(s) IV Push once PRN Nausea and/or Vomiting      Allergies    penicillin (Unknown)    Intolerances        Vital Signs Last 24 Hrs  T(C): 36.7 (15 Dec 2018 09:35), Max: 37.2 (14 Dec 2018 15:58)  T(F): 98 (15 Dec 2018 09:35), Max: 98.9 (14 Dec 2018 15:58)  HR: 132 (15 Dec 2018 09:50) (90 - 133)  BP: 143/91 (15 Dec 2018 09:50) (95/68 - 169/67)  BP(mean): 95 (15 Dec 2018 07:00) (74 - 96)  RR: 14 (15 Dec 2018 09:50) (14 - 35)  SpO2: 100% (15 Dec 2018 09:50) (91% - 100%)  Daily Height in cm: 185.42 (15 Dec 2018 01:29)    Daily Weight in k.5 (15 Dec 2018 05:00)    PHYSICAL EXAM:  General: No acute distress.  Alert, oriented, interactive, nonfocal  HEENT: Pupils equal, reactive to light.  Symmetric.  PULM: Clear to auscultation bilaterally, no significant sputum production  CVS: Regular rate and irregular rhythm, no murmurs, rubs, or gallops  ABD: Soft, nondistended, nontender, normoactive bowel sounds, no masses  EXT: No edema, nontender  SKIN: Warm and well perfused, no rashes noted.  LABS:  Na - 130

## 2018-12-16 NOTE — PROGRESS NOTE ADULT - SUBJECTIVE AND OBJECTIVE BOX
Patient is a 58y old  Male who presents with a chief complaint of Intractable cough and Near Syncope (16 Dec 2018 08:54)    24 hour events: s/p pericardial window 12/15  feels better today  a-line d/c'd overnight  off diltiazem drip as HR controlled    REVIEW OF SYSTEMS  Constitutional: No fever, chills, fatigue  Neuro: No headache, numbness, weakness  Resp: +cough, No wheezing, shortness of breath  CVS: No chest pain, palpitations, leg swelling  GI: No abdominal pain, nausea, vomiting, diarrhea   : No dysuria, frequency, incontinence  Skin: No itching, burning, rashes, or lesions   Msk: No joint pain or swelling  Psych: No depression, anxiety, mood swings  Heme: No bleeding    T(F): 98.9 (18 @ 07:39), Max: 98.9 (18 @ 07:39)  HR: 89 (18 @ 09:00) (79 - 140)  BP: 101/58 (18 @ 09:00) (101/58 - 151/80)  RR: 24 (18 @ 09:00) (16 - 33)  SpO2: 93% (18 @ 09:00) (91% - 100%)    I&O's Summary    12-15 @ 07:01  -   @ 07:00  --------------------------------------------------------  IN: 2755 mL / OUT: 4390 mL / NET: -1635 mL     @ 07:01  -   @ 10:01  --------------------------------------------------------  IN: 210 mL / OUT: 200 mL / NET: 10 mL    PHYSICAL EXAM  General: NAD  CNS: AAOx3, no focal deficits  HEENT: pupils equal and reactive, moist mucus membranes  Resp: clear with scattered rales, no wheezing  CVS: S1S2, regular, not tachy  Abd: soft, NT, +BS  Ext: no edema  Skin: warm    MEDICATIONS  diltiazem Infusion IV Continuous  metoprolol tartrate Oral  buDESOnide   0.5 milliGRAM(s) Respule Inhalation  guaiFENesin    Syrup Oral PRN  loratadine Oral  tiotropium 18 MICROgram(s) Capsule Inhalation  meclizine Oral PRN  ondansetron Injectable IV Push PRN  aspirin  chewable Oral  enoxaparin Injectable SubCutaneous  magnesium oxide Oral  influenza   Vaccine IntraMuscular  chlorhexidine 2% Cloths Topical  nicotine - 21 mG/24Hr(s) Patch Transdermal                        11.9   12.95 )-----------( 202      ( 16 Dec 2018 05:39 )             34.2     12-16    130<L>  |  92<L>  |  9   ----------------------------<  136<H>  3.6   |  31  |  0.49<L>    Ca    8.1<L>      16 Dec 2018 05:39  Phos  4.1     12-  Mg     2.0     12-16    TPro  6.0  /  Alb  3.1<L>  /  TBili  1.2  /  DBili  x   /  AST  25  /  ALT  38  /  AlkPhos  79  12-16    CARDIAC MARKERS ( 15 Dec 2018 05:32 )  .048 ng/mL / x     / 451 U/L / x     / x      CARDIAC MARKERS ( 14 Dec 2018 20:47 )  x     / x     / 647 U/L / x     / x      CARDIAC MARKERS ( 14 Dec 2018 14:39 )  .053 ng/mL / x     / 596 U/L / x     / x        PT/INR - ( 16 Dec 2018 05:39 )   PT: 15.2 sec;   INR: 1.32 ratio      PTT - ( 16 Dec 2018 05:39 )  PTT:29.7 sec  Urinalysis Basic - ( 15 Dec 2018 05:44 )    Color: Yellow / Appearance: Clear / S.000 / pH: x  Gluc: x / Ketone: Negative  / Bili: Negative / Urobili: Negative   Blood: x / Protein: Negative / Nitrite: Negative   Leuk Esterase: Negative / RBC: x / WBC x   Sq Epi: x / Non Sq Epi: x / Bacteria: x    .Body Fluid Pericardial Fluid   No growth   polymorphonuclear leukocytes seen per low power field  No organisms seen per oil power field  by cytocentrifuge 12-15 @ 13:46  .Blood Blood-Peripheral   No growth to date. --  @ 00:52    Rapid RVP Result: NotDetec ( @ 22:33)    CENTRAL LINE: N         BENAVIDES: N                          A-LINE: N                        GLOBAL ISSUE/BEST PRACTICE  Analgesia: NA  Sedation: NA  CAM-ICU: neg  HOB elevation: yes  Stress ulcer prophylaxis: NA  VTE prophylaxis: Y  Glycemic control: Y  Nutrition: Y    CODE STATUS: full  GOC discussion: Y

## 2018-12-16 NOTE — PROGRESS NOTE ADULT - SUBJECTIVE AND OBJECTIVE BOX
[facility] -- Gurpreet Bush, Emily, Corey, Sajan Abebe Savella  Office # [phone]      Follow Up:  AF, tamponade    Subjective/Observations: Patient seen and examined. Events noted. Resting comfortably in bed. No complaints of chest pain,  or palpitations reported. No signs of orthopnea or PND. Dyspnea improved. Still with cough      REVIEW OF SYSTEMS: All other review of systems is negative unless indicated above    PAST MEDICAL & SURGICAL HISTORY:  HTN (hypertension)  No significant past surgical history      MEDICATIONS  (STANDING):  aspirin  chewable 81 milliGRAM(s) Oral daily  buDESOnide   0.5 milliGRAM(s) Respule 0.5 milliGRAM(s) Inhalation two times a day  chlorhexidine 2% Cloths 1 Application(s) Topical two times a day  diltiazem Infusion 5 mG/Hr (5 mL/Hr) IV Continuous <Continuous>  enoxaparin Injectable 40 milliGRAM(s) SubCutaneous daily  influenza   Vaccine 0.5 milliLiter(s) IntraMuscular once  loratadine 10 milliGRAM(s) Oral daily  magnesium oxide 400 milliGRAM(s) Oral daily  metoprolol tartrate 50 milliGRAM(s) Oral every 6 hours  nicotine - 21 mG/24Hr(s) Patch 1 Patch Transdermal daily  tiotropium 18 MICROgram(s) Capsule 1 Capsule(s) Inhalation daily    MEDICATIONS  (PRN):  guaiFENesin    Syrup 100 milliGRAM(s) Oral every 6 hours PRN Cough  HYDROmorphone  Injectable 0.5 milliGRAM(s) IV Push every 10 minutes PRN Moderate Pain (4 - 6)  HYDROmorphone  Injectable 1 milliGRAM(s) IV Push every 10 minutes PRN Severe Pain (7 - 10)  HYDROmorphone  Injectable 0.5 milliGRAM(s) IV Push every 4 hours PRN Moderate Pain (4 - 6)  HYDROmorphone  Injectable 1 milliGRAM(s) IV Push every 4 hours PRN Severe Pain (7 - 10)  meclizine 12.5 milliGRAM(s) Oral three times a day PRN Dizziness  ondansetron Injectable 4 milliGRAM(s) IV Push once PRN Nausea and/or Vomiting      Allergies    penicillin (Unknown)    Intolerances            Vital Signs Last 24 Hrs  T(C): 37.2 (16 Dec 2018 07:39), Max: 37.2 (16 Dec 2018 07:39)  T(F): 98.9 (16 Dec 2018 07:39), Max: 98.9 (16 Dec 2018 07:39)  HR: 92 (16 Dec 2018 08:00) (79 - 140)  BP: 115/67 (16 Dec 2018 08:00) (104/64 - 169/67)  BP(mean): 82 (16 Dec 2018 08:00) (75 - 110)  RR: 20 (16 Dec 2018 08:00) (14 - 33)  SpO2: 98% (16 Dec 2018 08:00) (91% - 100%)    I&O's Summary    15 Dec 2018 07:01  -  16 Dec 2018 07:00  --------------------------------------------------------  IN: 2755 mL / OUT: 4390 mL / NET: -1635 mL    16 Dec 2018 07:01  -  16 Dec 2018 08:54  --------------------------------------------------------  IN: 5 mL / OUT: 0 mL / NET: 5 mL          PHYSICAL EXAM:  TELE: AF 90-130s  Constitutional: NAD, awake and alert, well-developed  HEENT: Moist Mucous Membranes, Anicteric  Pulmonary: Decreased breath sounds b/l. coarse  Cardiovascular: IRRR, S1 and S2, No murmurs, rubs, gallops or clicks  Gastrointestinal: Bowel Sounds present, soft, nontender.   Lymph: trace peripheral edema. No lymphadenopathy.  Skin: No visible rashes or ulcers. pericardial drain  Psych:  Mood & affect appropriate for situation    LABS: All Labs Reviewed:                        11.9   12.95 )-----------( 202      ( 16 Dec 2018 05:39 )             34.2                         11.9   10.85 )-----------( 226      ( 15 Dec 2018 05:32 )             34.5                         12.0   13.60 )-----------( 220      ( 14 Dec 2018 09:07 )             33.4     16 Dec 2018 05:39    130    |  92     |  9      ----------------------------<  136    3.6     |  31     |  0.49   15 Dec 2018 20:18    134    |  97     |  11     ----------------------------<  174    3.9     |  30     |  0.71   15 Dec 2018 13:46    137    |  98     |  10     ----------------------------<  190    3.9     |  32     |  0.68     Ca    8.1        16 Dec 2018 05:39  Ca    7.9        15 Dec 2018 20:18  Ca    8.5        15 Dec 2018 13:46  Phos  4.1       16 Dec 2018 05:39  Phos  3.1       15 Dec 2018 05:32  Phos  3.4       14 Dec 2018 18:22  Mg     2.0       16 Dec 2018 05:39  Mg     2.3       15 Dec 2018 05:32  Mg     2.0       14 Dec 2018 18:22    TPro  6.0    /  Alb  3.1    /  TBili  1.2    /  DBili  x      /  AST  25     /  ALT  38     /  AlkPhos  79     16 Dec 2018 05:39  TPro  6.2    /  Alb  3.4    /  TBili  0.7    /  DBili  x      /  AST  38     /  ALT  43     /  AlkPhos  78     15 Dec 2018 05:32    PT/INR - ( 16 Dec 2018 05:39 )   PT: 15.2 sec;   INR: 1.32 ratio         PTT - ( 16 Dec 2018 05:39 )  PTT:29.7 sec  CARDIAC MARKERS ( 15 Dec 2018 05:32 )  .048 ng/mL / x     / 451 U/L / x     / x      CARDIAC MARKERS ( 14 Dec 2018 20:47 )  x     / x     / 647 U/L / x     / x      CARDIAC MARKERS ( 14 Dec 2018 14:39 )  .053 ng/mL / x     / 596 U/L / x     / x

## 2018-12-16 NOTE — PROGRESS NOTE ADULT - SUBJECTIVE AND OBJECTIVE BOX
The patient was interviewed and evaluated.    58y Male, OOB.  No complaints.    T(C): 37.2 (12-16-18 @ 07:39), Max: 37.2 (12-16-18 @ 07:39)  HR: 91 (12-16-18 @ 10:00) (79 - 140)  BP: 96/64 (12-16-18 @ 10:00) (96/64 - 146/90)  RR: 20 (12-16-18 @ 10:00) (16 - 33)  SpO2: 98% (12-16-18 @ 10:00) (91% - 98%)  Wt(kg): --    No Nausea/vomiting, recall, sore throat or headache.    No anesthesia related complaints or sequelae.

## 2018-12-16 NOTE — PROGRESS NOTE ADULT - SUBJECTIVE AND OBJECTIVE BOX
Patient is a 58y old  Male who presents with a chief complaint of Intractable cough and Near Syncope (16 Dec 2018 13:58)    PAST MEDICAL & SURGICAL HISTORY:  HTN (hypertension)  No significant past surgical history    RAFA SHAFER   58y    Male    BRIEF HOSPITAL COURSE:    Review of Systems:                       All other ROS are negative.    Allergies    penicillin (Unknown)    Intolerances          ICU Vital Signs Last 24 Hrs  T(C): 36.6 (16 Dec 2018 15:59), Max: 37.2 (16 Dec 2018 07:39)  T(F): 97.9 (16 Dec 2018 15:59), Max: 98.9 (16 Dec 2018 07:39)  HR: 95 (16 Dec 2018 14:00) (79 - 127)  BP: 110/61 (16 Dec 2018 14:00) (96/64 - 133/83)  BP(mean): 78 (16 Dec 2018 14:00) (72 - 96)  ABP: 60/30 (16 Dec 2018 06:00) (60/30 - 143/73)  ABP(mean): 57 (16 Dec 2018 06:00) (57 - 96)  RR: 23 (16 Dec 2018 14:00) (16 - 33)  SpO2: 100% (16 Dec 2018 14:00) (91% - 100%)    Physical Examination:    General: NAD  CNS: AAOx3, no focal deficits  HEENT: pupils equal and reactive, moist mucus membranes  Resp: clear with scattered rales, no wheezing  CVS: S1S2,irregular, not tachy  Abd: soft, NT, +BS  Ext: no edema  Skin: warm no rash         LABS:                        11.9   12.95 )-----------( 202      ( 16 Dec 2018 05:39 )             34.2     12-16    128<L>  |  90<L>  |  10  ----------------------------<  152<H>  4.2   |  32<H>  |  0.63    Ca    8.2<L>      16 Dec 2018 14:51  Phos  4.1     12-16  Mg     2.0     12-16    TPro  6.0  /  Alb  3.1<L>  /  TBili  1.2  /  DBili  x   /  AST  25  /  ALT  38  /  AlkPhos  79  12-16      CARDIAC MARKERS ( 15 Dec 2018 05:32 )  .048 ng/mL / x     / 451 U/L / x     / x      CARDIAC MARKERS ( 14 Dec 2018 20:47 )  x     / x     / 647 U/L / x     / x          CAPILLARY BLOOD GLUCOSE        PT/INR - ( 16 Dec 2018 05:39 )   PT: 15.2 sec;   INR: 1.32 ratio         PTT - ( 16 Dec 2018 05:39 )  PTT:29.7 sec  Urinalysis Basic - ( 15 Dec 2018 05:44 )    Color: Yellow / Appearance: Clear / S.000 / pH: x  Gluc: x / Ketone: Negative  / Bili: Negative / Urobili: Negative   Blood: x / Protein: Negative / Nitrite: Negative   Leuk Esterase: Negative / RBC: x / WBC x   Sq Epi: x / Non Sq Epi: x / Bacteria: x      CULTURES:  Culture Results:   No growth (12-15 @ 13:46)  Culture Results:   No growth to date. ( @ 00:52)  Culture Results:   No growth to date. ( @ 00:52)  Rapid RVP Result: NotDetec ( @ 22:33)      Medications:  MEDICATIONS  (STANDING):  aspirin  chewable 81 milliGRAM(s) Oral daily  buDESOnide   0.5 milliGRAM(s) Respule 0.5 milliGRAM(s) Inhalation two times a day  chlorhexidine 2% Cloths 1 Application(s) Topical two times a day  diltiazem    Tablet 30 milliGRAM(s) Oral every 6 hours  enoxaparin Injectable 40 milliGRAM(s) SubCutaneous daily  influenza   Vaccine 0.5 milliLiter(s) IntraMuscular once  loratadine 10 milliGRAM(s) Oral daily  magnesium oxide 400 milliGRAM(s) Oral daily  metoprolol tartrate 50 milliGRAM(s) Oral every 6 hours  nicotine - 21 mG/24Hr(s) Patch 1 Patch Transdermal daily  sodium chloride 2 Gram(s) Oral three times a day  tiotropium 18 MICROgram(s) Capsule 1 Capsule(s) Inhalation daily    MEDICATIONS  (PRN):  guaiFENesin    Syrup 100 milliGRAM(s) Oral every 6 hours PRN Cough  meclizine 12.5 milliGRAM(s) Oral three times a day PRN Dizziness  ondansetron Injectable 4 milliGRAM(s) IV Push once PRN Nausea and/or Vomiting        -15 @ 07:01  -  12-16 @ 07:00  --------------------------------------------------------  IN: 2755 mL / OUT: 4390 mL / NET: -1635 mL    12 @ 07:01  -  1216 @ 16:35  --------------------------------------------------------  IN: 210 mL / OUT: 600 mL / NET: -390 mL        RADIOLOGY/IMAGING/ECHO    < from: CT Chest No Cont (18 @ 08:30) >  IMPRESSION:     1. Small bilateral pleural effusions, moderate-sized pericardial   effusion, and interlobular septal thickening suggestive of edema.   Findings suggest volume overload.  2. Extensive mediastinal adenopathy and hilar adenopathy, difficult to   delineate without intravenous contrast. No suspicious lung mass   identified, though hilar mass cannot be excluded. Consider repeat imaging   with contrast.   3. Emphysema. 2 mm right upper lobe nodules for which follow-up CT in 12   months is recommended.        < from: Xray Chest 1 View-PORTABLE IMMEDIATE (12.15.18 @ 10:21) >  Impression: Extensive mediastinal lymphadenopathy. No significant change.          Assessment/Plan:    58M hx HTN admit with near syncope and cough.    POD #1 pericardial window for bloody pericardial effusion in the setting of mediastinal adenopathy concerning for malignancy     New onset a fib with RVR  Cardizem drip tapered off placed on Cardizem 30 q6hrs   &  metoprolol 50 q6     Hyponatremia SIADH  restart salt tabs    DVT P   ASA  Full AC not indicate [id number]  =1. Patient is a 58y old  Male who presents with a chief complaint of Intractable cough and Near Syncope (16 Dec 2018 13:58)    PAST MEDICAL & SURGICAL HISTORY:  HTN (hypertension)  No significant past surgical history    RAFA SHAFER   58y    Male      Review of Systems:  + cough no SOB         All other ROS are negative.    Allergies    penicillin (Unknown)    Intolerances          ICU Vital Signs Last 24 Hrs  T(C): 36.6 (16 Dec 2018 15:59), Max: 37.2 (16 Dec 2018 07:39)  T(F): 97.9 (16 Dec 2018 15:59), Max: 98.9 (16 Dec 2018 07:39)  HR: 95 (16 Dec 2018 14:00) (79 - 127)  BP: 110/61 (16 Dec 2018 14:00) (96/64 - 133/83)  BP(mean): 78 (16 Dec 2018 14:00) (72 - 96)  ABP: 60/30 (16 Dec 2018 06:00) (60/30 - 143/73)  ABP(mean): 57 (16 Dec 2018 06:00) (57 - 96)  RR: 23 (16 Dec 2018 14:00) (16 - 33)  SpO2: 100% (16 Dec 2018 14:00) (91% - 100%)    Physical Examination:    General: NAD  CNS: AAOx3, no focal deficits  HEENT: pupils equal and reactive, moist mucus membranes  Resp: clear with scattered rales, no wheezing  CVS: S1S2,irregular, not tachy  Abd: soft, NT, +BS  Ext: no edema  Skin: warm no rash         LABS:                        11.9   12.95 )-----------( 202      ( 16 Dec 2018 05:39 )             34.2     12-16    128<L>  |  90<L>  |  10  ----------------------------<  152<H>  4.2   |  32<H>  |  0.63    Ca    8.2<L>      16 Dec 2018 14:51  Phos  4.1     12-16  Mg     2.0     12-16    TPro  6.0  /  Alb  3.1<L>  /  TBili  1.2  /  DBili  x   /  AST  25  /  ALT  38  /  AlkPhos  79  12-16      CARDIAC MARKERS ( 15 Dec 2018 05:32 )  .048 ng/mL / x     / 451 U/L / x     / x      CARDIAC MARKERS ( 14 Dec 2018 20:47 )  x     / x     / 647 U/L / x     / x          CAPILLARY BLOOD GLUCOSE        PT/INR - ( 16 Dec 2018 05:39 )   PT: 15.2 sec;   INR: 1.32 ratio         PTT - ( 16 Dec 2018 05:39 )  PTT:29.7 sec  Urinalysis Basic - ( 15 Dec 2018 05:44 )    Color: Yellow / Appearance: Clear / S.000 / pH: x  Gluc: x / Ketone: Negative  / Bili: Negative / Urobili: Negative   Blood: x / Protein: Negative / Nitrite: Negative   Leuk Esterase: Negative / RBC: x / WBC x   Sq Epi: x / Non Sq Epi: x / Bacteria: x      CULTURES:  Culture Results:   No growth (12-15 @ 13:46)  Culture Results:   No growth to date. ( @ 00:52)  Culture Results:   No growth to date. ( @ 00:52)  Rapid RVP Result: NotDetec ( @ 22:33)      Medications:  MEDICATIONS  (STANDING):  aspirin  chewable 81 milliGRAM(s) Oral daily  buDESOnide   0.5 milliGRAM(s) Respule 0.5 milliGRAM(s) Inhalation two times a day  chlorhexidine 2% Cloths 1 Application(s) Topical two times a day  diltiazem    Tablet 30 milliGRAM(s) Oral every 6 hours  enoxaparin Injectable 40 milliGRAM(s) SubCutaneous daily  influenza   Vaccine 0.5 milliLiter(s) IntraMuscular once  loratadine 10 milliGRAM(s) Oral daily  magnesium oxide 400 milliGRAM(s) Oral daily  metoprolol tartrate 50 milliGRAM(s) Oral every 6 hours  nicotine - 21 mG/24Hr(s) Patch 1 Patch Transdermal daily  sodium chloride 2 Gram(s) Oral three times a day  tiotropium 18 MICROgram(s) Capsule 1 Capsule(s) Inhalation daily    MEDICATIONS  (PRN):  guaiFENesin    Syrup 100 milliGRAM(s) Oral every 6 hours PRN Cough  meclizine 12.5 milliGRAM(s) Oral three times a day PRN Dizziness  ondansetron Injectable 4 milliGRAM(s) IV Push once PRN Nausea and/or Vomiting        -15 @ 07:01  -  12-16 @ 07:00  --------------------------------------------------------  IN: 2755 mL / OUT: 4390 mL / NET: -1635 mL    12 @ 07:01  -  1216 @ 16:35  --------------------------------------------------------  IN: 210 mL / OUT: 600 mL / NET: -390 mL        RADIOLOGY/IMAGING/ECHO    < from: CT Chest No Cont (18 @ 08:30) >  IMPRESSION:     1. Small bilateral pleural effusions, moderate-sized pericardial   effusion, and interlobular septal thickening suggestive of edema.   Findings suggest volume overload.  2. Extensive mediastinal adenopathy and hilar adenopathy, difficult to   delineate without intravenous contrast. No suspicious lung mass   identified, though hilar mass cannot be excluded. Consider repeat imaging   with contrast.   3. Emphysema. 2 mm right upper lobe nodules for which follow-up CT in 12   months is recommended.        < from: Xray Chest 1 View-PORTABLE IMMEDIATE (12.15.18 @ 10:21) >  Impression: Extensive mediastinal lymphadenopathy. No significant change.          Assessment/Plan:    58M hx HTN admit with near syncope and cough.    POD #1 pericardial window for bloody pericardial effusion in the setting of mediastinal adenopathy concerning for malignancy     New onset a fib with RVR  Cardizem drip tapered off placed on Cardizem 30 q6hrs   &  metoprolol 50 q6     Hyponatremia due to SIADH  restart salt tabs    DVT P   ASA  Full AC not indicate [id number]  =1.

## 2018-12-16 NOTE — CHART NOTE - NSCHARTNOTEFT_GEN_A_CORE
Assessment/Follow up: Pt A+Ox4. Tolerating Dash/TLC diet well w/ good appetite/po intake since admission. 100% consumed 12/16 per plate waste observation. No report N/V. BM 12/15. S/p pericardial window 12/15. Hyponatremia overall improved since admission however appears to have dropped slightly today, Na 130(12/16). Per conversation with Cardiologist likely SIADH. Recommend 1.5L po FR per MD discretion if Na worsens. Per CT, extensive mediastinal & hilar adenopathy. Per MD consult 12/16, pt with likely malignant process given clinical scenario. Awaiting path from pericardium. Per pt gradual weight loss of ~40lbs over the past year (unintentional vs intentional). Food preferences/meal alternatives obtained. Encourage consumption of HBV protein sources. Pt with no further dietary questions/concerns. Will remain available prn.     Factors impacting intake: [x ] none [ ] nausea  [ ] vomiting [ ] diarrhea [ ] constipation  [ ]chewing problems [ ] swallowing issues  [ ] other:     Diet Presciption: Diet, DASH/TLC:   Sodium & Cholesterol Restricted (12-15-18 @ 11:11)    Intake: good; 100% intake 12/16 per plate waste observation    Current Weight: Weight (kg): 106.5 (12-15 @ 05:34); generalize 1+edema, elona legs 1+edema       Pertinent Medications: MEDICATIONS  (STANDING):  aspirin  chewable 81 milliGRAM(s) Oral daily  buDESOnide   0.5 milliGRAM(s) Respule 0.5 milliGRAM(s) Inhalation two times a day  chlorhexidine 2% Cloths 1 Application(s) Topical two times a day  diltiazem Infusion 5 mG/Hr (5 mL/Hr) IV Continuous <Continuous>  enoxaparin Injectable 40 milliGRAM(s) SubCutaneous daily  influenza   Vaccine 0.5 milliLiter(s) IntraMuscular once  loratadine 10 milliGRAM(s) Oral daily  magnesium oxide 400 milliGRAM(s) Oral daily  metoprolol tartrate 50 milliGRAM(s) Oral every 6 hours  nicotine - 21 mG/24Hr(s) Patch 1 Patch Transdermal daily  tiotropium 18 MICROgram(s) Capsule 1 Capsule(s) Inhalation daily    MEDICATIONS  (PRN):  guaiFENesin    Syrup 100 milliGRAM(s) Oral every 6 hours PRN Cough  HYDROmorphone  Injectable 0.5 milliGRAM(s) IV Push every 10 minutes PRN Moderate Pain (4 - 6)  HYDROmorphone  Injectable 1 milliGRAM(s) IV Push every 10 minutes PRN Severe Pain (7 - 10)  HYDROmorphone  Injectable 0.5 milliGRAM(s) IV Push every 4 hours PRN Moderate Pain (4 - 6)  HYDROmorphone  Injectable 1 milliGRAM(s) IV Push every 4 hours PRN Severe Pain (7 - 10)  meclizine 12.5 milliGRAM(s) Oral three times a day PRN Dizziness  ondansetron Injectable 4 milliGRAM(s) IV Push once PRN Nausea and/or Vomiting    Pertinent Labs: 12-16 Na130 mmol/L<L> Glu 136 mg/dL<H> K+ 3.6 mmol/L Cr  0.49 mg/dL<L> BUN 9 mg/dL 12-16 Phos 4.1 mg/dL 12-16 Alb 3.1 g/dL<L> 12-13 [id number] 5.6 %     CAPILLARY BLOOD GLUCOSE        Skin: chest surgical incision. Aditya 21    Estimated Needs:   [x ] no change since previous assessment  [ ] recalculated:     Previous Nutrition Diagnosis:   [ ] Inadequate Energy Intake [ ]Inadequate Oral Intake [ ] Excessive Energy Intake   [ ] Underweight [ ] Increased Nutrient Needs [x ] Overweight/Obesity   [ ] Altered GI Function [ ] Unintended Weight Loss [ ] Food & Nutrition Related Knowledge Deficit [ ] Malnutrition     Nutrition Diagnosis is [x ] ongoing  [ ] resolved [ ] not applicable     New Nutrition Diagnosis: [ x] Altered nutrition related lab values (Sodium) related to likely SIADH as evidenced by Na 130 improved since admission Na 119, previously on NaCl & po fluid restriction       Interventions:   Recommend  [ ] Change Diet To:   [ ] Nutrition Supplement  [ ] Nutrition Support  [x ] Other: If Na levels worsen recommend 1.5L po FR per MD discretion, Encourage consumption of HBV protein sources.     Monitoring and Evaluation:   [ x] PO intake [ x ] Tolerance to diet prescription [ x ] weights [ x ] labs[ x ] follow up per protocol  [ ] other: Assessment/Follow up: Pt A+Ox4. Tolerating Dash/TLC diet well w/ good appetite/po intake since admission. 100% consumed 12/16 per plate waste observation. No report N/V. BM 12/15. S/p pericardial window 12/15. Hyponatremia overall improved since admission however appears to have dropped slightly today, Na 130(12/16). Per conversation with Cardiologist likely SIADH. Recommend 1.5L po FR per MD discretion if Na worsens. Per CT, extensive mediastinal & hilar adenopathy. Per MD consult 12/16, pt with likely malignant process given clinical scenario. Awaiting path from pericardium. Per pt gradual weight loss of ~40lbs over the past year (unintentional vs intentional). Food preferences/meal alternatives obtained. Encourage consumption of HBV protein sources. Pt with no further dietary questions/concerns. Will remain available prn.     Factors impacting intake: [x ] none [ ] nausea  [ ] vomiting [ ] diarrhea [ ] constipation  [ ]chewing problems [ ] swallowing issues  [ ] other:     Diet Presciption: Diet, DASH/TLC:   Sodium & Cholesterol Restricted (12-15-18 @ 11:11)    Intake: good; 100% intake 12/16 per plate waste observation    Current Weight: Weight (kg): 106.5 (12-15 @ 05:34); generalize 1+edema, leona legs 1+edema       Pertinent Medications: MEDICATIONS  (STANDING):  aspirin  chewable 81 milliGRAM(s) Oral daily  buDESOnide   0.5 milliGRAM(s) Respule 0.5 milliGRAM(s) Inhalation two times a day  chlorhexidine 2% Cloths 1 Application(s) Topical two times a day  diltiazem Infusion 5 mG/Hr (5 mL/Hr) IV Continuous <Continuous>  enoxaparin Injectable 40 milliGRAM(s) SubCutaneous daily  influenza   Vaccine 0.5 milliLiter(s) IntraMuscular once  loratadine 10 milliGRAM(s) Oral daily  magnesium oxide 400 milliGRAM(s) Oral daily  metoprolol tartrate 50 milliGRAM(s) Oral every 6 hours  nicotine - 21 mG/24Hr(s) Patch 1 Patch Transdermal daily  tiotropium 18 MICROgram(s) Capsule 1 Capsule(s) Inhalation daily    MEDICATIONS  (PRN):  guaiFENesin    Syrup 100 milliGRAM(s) Oral every 6 hours PRN Cough  HYDROmorphone  Injectable 0.5 milliGRAM(s) IV Push every 10 minutes PRN Moderate Pain (4 - 6)  HYDROmorphone  Injectable 1 milliGRAM(s) IV Push every 10 minutes PRN Severe Pain (7 - 10)  HYDROmorphone  Injectable 0.5 milliGRAM(s) IV Push every 4 hours PRN Moderate Pain (4 - 6)  HYDROmorphone  Injectable 1 milliGRAM(s) IV Push every 4 hours PRN Severe Pain (7 - 10)  meclizine 12.5 milliGRAM(s) Oral three times a day PRN Dizziness  ondansetron Injectable 4 milliGRAM(s) IV Push once PRN Nausea and/or Vomiting    Pertinent Labs: 12-16 Na130 mmol/L<L> Glu 136 mg/dL<H> K+ 3.6 mmol/L Cr  0.49 mg/dL<L> BUN 9 mg/dL 12-16 Phos 4.1 mg/dL 12-16 Alb 3.1 g/dL<L> 12-13 [id number] 5.6 %     CAPILLARY BLOOD GLUCOSE        Skin: chest surgical incision. Aditya 21    Estimated Needs:   [x ] no change since previous assessment  [ ] recalculated:     Previous Nutrition Diagnosis:   [ ] Inadequate Energy Intake [ ]Inadequate Oral Intake [ ] Excessive Energy Intake   [ ] Underweight [ ] Increased Nutrient Needs [x ] Overweight/Obesity   [ ] Altered GI Function [ ] Unintended Weight Loss [ ] Food & Nutrition Related Knowledge Deficit [ ] Malnutrition     Nutrition Diagnosis is [x ] ongoing  [ ] resolved [ ] not applicable     New Nutrition Diagnosis: [ x] Altered nutrition related lab values (Sodium) related to likely SIADH as evidenced by Na 130 improved since admission Na 119, previously on NaCl & po fluid restriction       Interventions:   Recommend  [x ] Change Diet To: Liberalize to regular per MD discretion  [ ] Nutrition Supplement  [ ] Nutrition Support  [x ] Other: If Na levels worsen recommend 1.5L po FR per MD discretion, Encourage consumption of HBV protein sources.     Monitoring and Evaluation:   [ x] PO intake [ x ] Tolerance to diet prescription [ x ] weights [ x ] labs[ x ] follow up per protocol  [ ] other:

## 2018-12-16 NOTE — PROGRESS NOTE ADULT - SUBJECTIVE AND OBJECTIVE BOX
Neurology follow up note  COVERING DR VENESSA SHAFER58yMale      Interval History:    Patient feels ok no new complaints.    MEDICATIONS    aspirin  chewable 81 milliGRAM(s) Oral daily  buDESOnide   0.5 milliGRAM(s) Respule 0.5 milliGRAM(s) Inhalation two times a day  chlorhexidine 2% Cloths 1 Application(s) Topical two times a day  diltiazem    Tablet 30 milliGRAM(s) Oral every 6 hours  enoxaparin Injectable 40 milliGRAM(s) SubCutaneous daily  guaiFENesin    Syrup 100 milliGRAM(s) Oral every 6 hours PRN  influenza   Vaccine 0.5 milliLiter(s) IntraMuscular once  loratadine 10 milliGRAM(s) Oral daily  magnesium oxide 400 milliGRAM(s) Oral daily  meclizine 12.5 milliGRAM(s) Oral three times a day PRN  metoprolol tartrate 50 milliGRAM(s) Oral every 6 hours  nicotine - 21 mG/24Hr(s) Patch 1 Patch Transdermal daily  ondansetron Injectable 4 milliGRAM(s) IV Push once PRN  tiotropium 18 MICROgram(s) Capsule 1 Capsule(s) Inhalation daily      Allergies    penicillin (Unknown)    Intolerances            Vital Signs Last 24 Hrs  T(C): 36.9 (16 Dec 2018 11:53), Max: 37.2 (16 Dec 2018 07:39)  T(F): 98.5 (16 Dec 2018 11:53), Max: 98.9 (16 Dec 2018 07:39)  HR: 102 (16 Dec 2018 12:00) (79 - 132)  BP: 108/56 (16 Dec 2018 12:00) (96/64 - 133/83)  BP(mean): 77 (16 Dec 2018 12:00) (72 - 110)  RR: 19 (16 Dec 2018 12:00) (16 - 33)  SpO2: 99% (16 Dec 2018 12:00) (91% - 99%)      REVIEW OF SYSTEMS:    Constitutional: No fever, chills, fatigue, weakness  Eyes: no eye pain, visual disturbances, or discharge  ENT:  No difficulty hearing, tinnitus, vertigo; No sinus or throat pain  Neck: No pain or stiffness  Respiratory: No cough, dyspnea, wheezing   Cardiovascular: No chest pain, palpitations,   Gastrointestinal: No abdominal or epigastric pain. No nausea, vomiting  No diarrhea or constipation.   Genitourinary: No dysuria, frequency, hematuria or incontinence  Neurological: No headaches, lightheadedness, vertigo, numbness or tremors  Psychiatric: No depression, anxiety, mood swings or difficulty sleeping  Musculoskeletal: No joint pain or swelling; No muscle, back or extremity pain  Skin: No itching, burning, rashes or lesions   Lymph Nodes: No enlarged glands  Endocrine: No heat or cold intolerance; No hair loss   Allergy and Immunologic: No hives or eczema    On Neurological Examination:    Mental Status - Patient is alert, awake, oriented X3.       Follow simple commands  Follow complex commands      Speech -   Fluent             Cranial Nerves - Pupils 3 mm equal and reactive to light,   extraocular eye movements intact.   smile symmetric  intact bilateral NLF    Motor Exam -   Right upper 5/5  Left upper5/5  Right lower5/5  Left lower 5/5    Muscle tone - is normal all over.  No asymmetry is seen.      Sensory    Bilateral intact to light touch    GENERAL Exam: Nontoxic , No Acute Distress   	  HEENT:  normocephalic, atraumatic  		  LUNGS:   No Wheeze    	  HEART: Normal S1S2   No murmur RRR        	  GI/ ABDOMEN:  Soft  Non tender    EXTREMITIES:   No Edema  No Clubbing  No Cyanosis     MUSCULOSKELETAL: Normal Range of Motion  	   SKIN: Normal  No Ecchymosis               LABS:  CBC Full  -  ( 16 Dec 2018 05:39 )  WBC Count : 12.95 K/uL  Hemoglobin : 11.9 g/dL  Hematocrit : 34.2 %  Platelet Count - Automated : 202 K/uL  Mean Cell Volume : 82.6 fl  Mean Cell Hemoglobin : 28.7 pg  Mean Cell Hemoglobin Concentration : 34.8 gm/dL  Auto Neutrophil # : 9.88 K/uL  Auto Lymphocyte # : 1.55 K/uL  Auto Monocyte # : 1.35 K/uL  Auto Eosinophil # : 0.08 K/uL  Auto Basophil # : 0.03 K/uL  Auto Neutrophil % : 76.3 %  Auto Lymphocyte % : 12.0 %  Auto Monocyte % : 10.4 %  Auto Eosinophil % : 0.6 %  Auto Basophil % : 0.2 %    Urinalysis Basic - ( 15 Dec 2018 05:44 )    Color: Yellow / Appearance: Clear / S.000 / pH: x  Gluc: x / Ketone: Negative  / Bili: Negative / Urobili: Negative   Blood: x / Protein: Negative / Nitrite: Negative   Leuk Esterase: Negative / RBC: x / WBC x   Sq Epi: x / Non Sq Epi: x / Bacteria: x      12-    130<L>  |  92<L>  |  9   ----------------------------<  136<H>  3.6   |  31  |  0.49<L>    Ca    8.1<L>      16 Dec 2018 05:39  Phos  4.1       Mg     2.0         TPro  6.0  /  Alb  3.1<L>  /  TBili  1.2  /  DBili  x   /  AST  25  /  ALT  38  /  AlkPhos  79  -    Hemoglobin A1C:     LIVER FUNCTIONS - ( 16 Dec 2018 05:39 )  Alb: 3.1 g/dL / Pro: 6.0 g/dL / ALK PHOS: 79 U/L / ALT: 38 U/L / AST: 25 U/L / GGT: x           Vitamin B12   PT/INR - ( 16 Dec 2018 05:39 )   PT: 15.2 sec;   INR: 1.32 ratio         PTT - ( 16 Dec 2018 05:39 )  PTT:29.7 sec      RADIOLOGY    ASSESSMENT AND PLAN     This is a 58-year-old male presented to the emergency room with episodes of lightheadedness followed by generalized weakness, fall preceded by severe bout of coughing consisting with near pre syncope  suspect underlying cardiac issues   S/P window  cariology follow up  doubt CVA/SZ  no new events  30 minutes spent on total encounter; more than 50% of the visit was spent counseling and/or coordinating care by the attending physician.

## 2018-12-16 NOTE — PROGRESS NOTE ADULT - ASSESSMENT
57 yo M with PMH of HTN presents with [facility].     Course has been complicated by findings of hilar adenopathy on CT. Developed AF with RVR. Large pericardial effusion with signs of increased pericardial pressures on echo. Now s/p pericardial window with 650cc of blood fluid drained on 12/15.    - pt with likely malignant process given clinical senario.   - Will await path from pericardium  - Pericardial drain in place with minimal outpt. F/U CTsx  - Likely will need Heme/Onc input    - Rapid AF.   - Cont  metoprolol 50mg PO q6  - Cont cardizem gtt. Would start Cardizem at 30mg q6 and try to titrate off gtt.   - CHADS-VaSc = 1. Would at this point just give ASA.     - Maintain adequately hydrated.   - Will need pulm f/u    - Monitor and replete electrolytes. Keep K>4.0 and Mg>2.0.   - Further cardiac workup will depend on clinical course.     Patient at high risk for decompensation. Critically ill. >35 minutes of critical care time was spent with this patient.

## 2018-12-17 LAB
ANION GAP SERPL CALC-SCNC: 7 MMOL/L — SIGNIFICANT CHANGE UP (ref 5–17)
BUN SERPL-MCNC: 10 MG/DL — SIGNIFICANT CHANGE UP (ref 7–23)
CALCIUM SERPL-MCNC: 8.1 MG/DL — LOW (ref 8.5–10.1)
CHLORIDE SERPL-SCNC: 91 MMOL/L — LOW (ref 96–108)
CO2 SERPL-SCNC: 32 MMOL/L — HIGH (ref 22–31)
CREAT SERPL-MCNC: 0.49 MG/DL — LOW (ref 0.5–1.3)
GLUCOSE SERPL-MCNC: 108 MG/DL — HIGH (ref 70–99)
HCT VFR BLD CALC: 33.4 % — LOW (ref 39–50)
HGB BLD-MCNC: 11.5 G/DL — LOW (ref 13–17)
MAGNESIUM SERPL-MCNC: 1.9 MG/DL — SIGNIFICANT CHANGE UP (ref 1.6–2.6)
MCHC RBC-ENTMCNC: 28.5 PG — SIGNIFICANT CHANGE UP (ref 27–34)
MCHC RBC-ENTMCNC: 34.4 GM/DL — SIGNIFICANT CHANGE UP (ref 32–36)
MCV RBC AUTO: 82.7 FL — SIGNIFICANT CHANGE UP (ref 80–100)
NRBC # BLD: 0 /100 WBCS — SIGNIFICANT CHANGE UP (ref 0–0)
PHOSPHATE SERPL-MCNC: 4.4 MG/DL — SIGNIFICANT CHANGE UP (ref 2.5–4.5)
PLATELET # BLD AUTO: 196 K/UL — SIGNIFICANT CHANGE UP (ref 150–400)
POTASSIUM SERPL-MCNC: 3.6 MMOL/L — SIGNIFICANT CHANGE UP (ref 3.5–5.3)
POTASSIUM SERPL-SCNC: 3.6 MMOL/L — SIGNIFICANT CHANGE UP (ref 3.5–5.3)
RBC # BLD: 4.04 M/UL — LOW (ref 4.2–5.8)
RBC # FLD: 12.5 % — SIGNIFICANT CHANGE UP (ref 10.3–14.5)
SODIUM SERPL-SCNC: 130 MMOL/L — LOW (ref 135–145)
WBC # BLD: 12.26 K/UL — HIGH (ref 3.8–10.5)
WBC # FLD AUTO: 12.26 K/UL — HIGH (ref 3.8–10.5)

## 2018-12-17 PROCEDURE — 99291 CRITICAL CARE FIRST HOUR: CPT

## 2018-12-17 PROCEDURE — 99233 SBSQ HOSP IP/OBS HIGH 50: CPT | Mod: GC

## 2018-12-17 RX ORDER — TIOTROPIUM BROMIDE 18 UG/1
1 CAPSULE ORAL; RESPIRATORY (INHALATION) DAILY
Qty: 0 | Refills: 0 | Status: DISCONTINUED | OUTPATIENT
Start: 2018-12-17 | End: 2018-12-19

## 2018-12-17 RX ADMIN — SODIUM CHLORIDE 2 GRAM(S): 9 INJECTION INTRAMUSCULAR; INTRAVENOUS; SUBCUTANEOUS at 11:33

## 2018-12-17 RX ADMIN — CHLORHEXIDINE GLUCONATE 1 APPLICATION(S): 213 SOLUTION TOPICAL at 17:37

## 2018-12-17 RX ADMIN — MAGNESIUM OXIDE 400 MG ORAL TABLET 400 MILLIGRAM(S): 241.3 TABLET ORAL at 11:30

## 2018-12-17 RX ADMIN — Medication 0.5 MILLIGRAM(S): at 19:56

## 2018-12-17 RX ADMIN — SODIUM CHLORIDE 2 GRAM(S): 9 INJECTION INTRAMUSCULAR; INTRAVENOUS; SUBCUTANEOUS at 21:57

## 2018-12-17 RX ADMIN — Medication 10 MILLIGRAM(S): at 06:08

## 2018-12-17 RX ADMIN — SODIUM CHLORIDE 2 GRAM(S): 9 INJECTION INTRAMUSCULAR; INTRAVENOUS; SUBCUTANEOUS at 06:08

## 2018-12-17 RX ADMIN — Medication 50 MILLIGRAM(S): at 23:11

## 2018-12-17 RX ADMIN — Medication 50 MILLIGRAM(S): at 06:08

## 2018-12-17 RX ADMIN — Medication 1 PATCH: at 21:47

## 2018-12-17 RX ADMIN — Medication 1 PATCH: at 11:31

## 2018-12-17 RX ADMIN — Medication 81 MILLIGRAM(S): at 11:31

## 2018-12-17 RX ADMIN — LORATADINE 10 MILLIGRAM(S): 10 TABLET ORAL at 11:30

## 2018-12-17 RX ADMIN — CHLORHEXIDINE GLUCONATE 1 APPLICATION(S): 213 SOLUTION TOPICAL at 06:07

## 2018-12-17 RX ADMIN — Medication 1 PATCH: at 11:29

## 2018-12-17 RX ADMIN — Medication 50 MILLIGRAM(S): at 11:31

## 2018-12-17 RX ADMIN — Medication 0.5 MILLIGRAM(S): at 07:51

## 2018-12-17 RX ADMIN — ENOXAPARIN SODIUM 40 MILLIGRAM(S): 100 INJECTION SUBCUTANEOUS at 11:30

## 2018-12-17 RX ADMIN — Medication 50 MILLIGRAM(S): at 17:37

## 2018-12-17 NOTE — PROGRESS NOTE ADULT - SUBJECTIVE AND OBJECTIVE BOX
NEPHROLOGY INTERVAL HPI/OVERNIGHT EVENTS:  HPI:  RAFA SHAFER is a 58 year old male brought to ER after having severe cough for 2-3 days. Before patient's wife brought him to the ER after he coughed severely, fell to the floor, and had a seizure like activity. The patient have hyponatremia on ER blood work. Denies a history of CHF and liver diease. He is an active smoker. Admits to poor intake for the past 2-3 days. Denies imbalance, frequent falls, dizziness, lightheadedness, abd symptoms, urinary symptoms.   Past medical history of HTN, DM2. (13 Dec 2018 05:59)    Follow up hyponatremia/SIADH  No complaints  S/P Pericardial window; seen in ICU    PAST MEDICAL & SURGICAL HISTORY:  HTN (hypertension)  No significant past surgical history      MEDICATIONS  (STANDING):  aspirin  chewable 81 milliGRAM(s) Oral daily  buDESOnide   0.5 milliGRAM(s) Respule 0.5 milliGRAM(s) Inhalation two times a day  chlorhexidine 2% Cloths 1 Application(s) Topical two times a day  diltiazem    Tablet 30 milliGRAM(s) Oral every 6 hours  enoxaparin Injectable 40 milliGRAM(s) SubCutaneous daily  influenza   Vaccine 0.5 milliLiter(s) IntraMuscular once  loratadine 10 milliGRAM(s) Oral daily  magnesium oxide 400 milliGRAM(s) Oral daily  metoprolol tartrate 50 milliGRAM(s) Oral every 6 hours  nicotine - 21 mG/24Hr(s) Patch 1 Patch Transdermal daily  predniSONE   Tablet 10 milliGRAM(s) Oral daily  sodium chloride 2 Gram(s) Oral three times a day  tiotropium 18 MICROgram(s) Capsule 1 Capsule(s) Inhalation daily    MEDICATIONS  (PRN):  guaiFENesin    Syrup 100 milliGRAM(s) Oral every 6 hours PRN Cough  meclizine 12.5 milliGRAM(s) Oral three times a day PRN Dizziness  ondansetron Injectable 4 milliGRAM(s) IV Push once PRN Nausea and/or Vomiting      Allergies    penicillin (Unknown)    Intolerances        Vital Signs Last 24 Hrs  T(C): 36.4 (17 Dec 2018 07:39), Max: 36.9 (16 Dec 2018 11:53)  T(F): 97.6 (17 Dec 2018 07:39), Max: 98.5 (16 Dec 2018 11:53)  HR: 87 (17 Dec 2018 07:53) (80 - 151)  BP: 116/76 (17 Dec 2018 07:00) (96/64 - 135/72)  BP(mean): 90 (17 Dec 2018 07:00) (72 - 99)  RR: 19 (17 Dec 2018 07:00) (17 - 35)  SpO2: 100% (17 Dec 2018 07:53) (93% - 100%)  Daily     Daily Weight in k (17 Dec 2018 06:00)    PHYSICAL EXAM:  GENERAL: No distress, occasional coughing noted  HEAD:  Atraumatic, Normocephalic  EYES: Conjunctiva and sclera clear  ENMT: Moist mucous membranes, Good dentition, No lesions  NECK: Supple  NERVOUS SYSTEM:  Alert & Oriented X3, follows commands well  CHEST/LUNG: Clear to percussion bilaterally; No rales, rhonchi, wheezing, or rubs  HEART: irregular rhythm; tachycardia noted  ABDOMEN: Soft, Nontender, Nondistended; Bowel sounds present  EXTREMITIES:  2+ Peripheral Pulses, No clubbing, cyanosis, or edema  SKIN: No rashes or lesions    LABS:                        11.5   12.26 )-----------( 196      ( 17 Dec 2018 05:48 )             33.4         130<L>  |  91<L>  |  10  ----------------------------<  108<H>  3.6   |  32<H>  |  0.49<L>    Ca    8.1<L>      17 Dec 2018 05:48  Phos  4.4       Mg     1.9         TPro  6.0  /  Alb  3.1<L>  /  TBili  1.2  /  DBili  x   /  AST  25  /  ALT  38  /  AlkPhos  79  12-16    PT/INR - ( 16 Dec 2018 05:39 )   PT: 15.2 sec;   INR: 1.32 ratio         PTT - ( 16 Dec 2018 05:39 )  PTT:29.7 sec    Magnesium, Serum: 1.9 mg/dL ( @ 05:48)  Phosphorus Level, Serum: 4.4 mg/dL ( @ 05:48)        RADIOLOGY & ADDITIONAL TESTS:

## 2018-12-17 NOTE — PROGRESS NOTE ADULT - SUBJECTIVE AND OBJECTIVE BOX
REASON FOR CONSULT: ***    CONSULT REQUESTED BY: ***    Patient is a 58y old  Male who presents with a chief complaint of Intractable cough and Near Syncope (16 Dec 2018 18:16)      BRIEF HOSPITAL COURSE: ***    Events last 24 hours: ***    PAST MEDICAL & SURGICAL HISTORY:  HTN (hypertension)  No significant past surgical history    Allergies    penicillin (Unknown)    Intolerances      FAMILY HISTORY:  No pertinent family history in first degree relatives      Review of Systems:  CONSTITUTIONAL: No fever, chills, or fatigue  EYES: No eye pain, visual disturbances, or discharge  ENMT:  No difficulty hearing, tinnitus, vertigo; No sinus or throat pain  NECK: No pain or stiffness  RESPIRATORY: No cough, wheezing, chills or hemoptysis; No shortness of breath  CARDIOVASCULAR: No chest pain, palpitations, dizziness, or leg swelling  GASTROINTESTINAL: No abdominal or epigastric pain. No nausea, vomiting, or hematemesis; No diarrhea or constipation. No melena or hematochezia.  GENITOURINARY: No dysuria, frequency, hematuria, or incontinence  NEUROLOGICAL: No headaches, memory loss, loss of strength, numbness, or tremors  SKIN: No itching, burning, rashes, or lesions   MUSCULOSKELETAL: No joint pain or swelling; No muscle, back, or extremity pain  PSYCHIATRIC: No depression, anxiety, mood swings, or difficulty sleeping      Medications:    diltiazem    Tablet 30 milliGRAM(s) Oral every 6 hours  metoprolol tartrate 50 milliGRAM(s) Oral every 6 hours    buDESOnide   0.5 milliGRAM(s) Respule 0.5 milliGRAM(s) Inhalation two times a day  guaiFENesin    Syrup 100 milliGRAM(s) Oral every 6 hours PRN  loratadine 10 milliGRAM(s) Oral daily  tiotropium 18 MICROgram(s) Capsule 1 Capsule(s) Inhalation daily    meclizine 12.5 milliGRAM(s) Oral three times a day PRN  ondansetron Injectable 4 milliGRAM(s) IV Push once PRN      aspirin  chewable 81 milliGRAM(s) Oral daily  enoxaparin Injectable 40 milliGRAM(s) SubCutaneous daily        predniSONE   Tablet 10 milliGRAM(s) Oral daily    magnesium oxide 400 milliGRAM(s) Oral daily  sodium chloride 2 Gram(s) Oral three times a day    influenza   Vaccine 0.5 milliLiter(s) IntraMuscular once    chlorhexidine 2% Cloths 1 Application(s) Topical two times a day    nicotine - 21 mG/24Hr(s) Patch 1 Patch Transdermal daily          ICU Vital Signs Last 24 Hrs  T(C): 36.8 (17 Dec 2018 00:00), Max: 37.2 (16 Dec 2018 07:39)  T(F): 98.2 (17 Dec 2018 00:00), Max: 98.9 (16 Dec 2018 07:39)  HR: 114 (17 Dec 2018 00:00) (80 - 121)  BP: 121/80 (17 Dec 2018 00:00) (96/64 - 135/72)  BP(mean): 99 (17 Dec 2018 00:00) (72 - 99)  ABP: 60/30 (16 Dec 2018 06:00) (60/30 - 143/68)  ABP(mean): 57 (16 Dec 2018 06:00) (57 - 90)  RR: 21 (17 Dec 2018 00:00) (16 - 33)  SpO2: 100% (17 Dec 2018 00:00) (91% - 100%)    Vital Signs Last 24 Hrs  T(C): 36.8 (17 Dec 2018 00:00), Max: 37.2 (16 Dec 2018 07:39)  T(F): 98.2 (17 Dec 2018 00:00), Max: 98.9 (16 Dec 2018 07:39)  HR: 114 (17 Dec 2018 00:00) (80 - 121)  BP: 121/80 (17 Dec 2018 00:00) (96/64 - 135/72)  BP(mean): 99 (17 Dec 2018 00:00) (72 - 99)  RR: 21 (17 Dec 2018 00:00) (16 - 33)  SpO2: 100% (17 Dec 2018 00:00) (91% - 100%)        I&O's Detail    15 Dec 2018 07:01  -  16 Dec 2018 07:00  --------------------------------------------------------  IN:    dextrose 5%.: 75 mL    dextrose 5%.: 375 mL    dextrose 5%.: 1925 mL    diltiazem Infusion: 140 mL    Oral Fluid: 240 mL  Total IN: 2755 mL    OUT:    Bulb: 390 mL    Voided: 4000 mL  Total OUT: 4390 mL    Total NET: -1635 mL      16 Dec 2018 07:01  -  17 Dec 2018 01:45  --------------------------------------------------------  IN:    diltiazem Infusion: 10 mL    Oral Fluid: 320 mL  Total IN: 330 mL    OUT:    Bulb: 20 mL    Voided: 1450 mL  Total OUT: 1470 mL    Total NET: -1140 mL            LABS:                        11.9   12.95 )-----------( 202      ( 16 Dec 2018 05:39 )             34.2         128<L>  |  90<L>  |  10  ----------------------------<  152<H>  4.2   |  32<H>  |  0.63    Ca    8.2<L>      16 Dec 2018 14:51  Phos  4.1       Mg     2.0         TPro  6.0  /  Alb  3.1<L>  /  TBili  1.2  /  DBili  x   /  AST  25  /  ALT  38  /  AlkPhos  79  12-      CARDIAC MARKERS ( 15 Dec 2018 05:32 )  .048 ng/mL / x     / 451 U/L / x     / x          CAPILLARY BLOOD GLUCOSE        PT/INR - ( 16 Dec 2018 05:39 )   PT: 15.2 sec;   INR: 1.32 ratio         PTT - ( 16 Dec 2018 05:39 )  PTT:29.7 sec  Urinalysis Basic - ( 15 Dec 2018 05:44 )    Color: Yellow / Appearance: Clear / S.000 / pH: x  Gluc: x / Ketone: Negative  / Bili: Negative / Urobili: Negative   Blood: x / Protein: Negative / Nitrite: Negative   Leuk Esterase: Negative / RBC: x / WBC x   Sq Epi: x / Non Sq Epi: x / Bacteria: x      CULTURES:  Culture Results:   No growth (12-15 @ 13:46)  Culture Results:   No growth to date. ( @ 00:52)  Culture Results:   No growth to date. ( @ 00:52)        Physical Examination:  General: No acute distress.  Alert, oriented, interactive, nonfocal  HEENT: Pupils equal, reactive to light.  Symmetric.  PULM: Clear to auscultation bilaterally, no significant sputum production  CVS: Regular rate and rhythm, no murmurs, rubs, or gallops  ABD: Soft, nondistended, nontender, normoactive bowel sounds, no masses  EXT: No edema, nontender  SKIN: Warm and well perfused, no rashes noted.    EKG: ***    RADIOLOGY: ***    POCUS:          LUNG:               +/- A-lines  +/- B-lines predominantly anteriorly              +/- Effusions +/- infiltrate +/- atelectasis +/- B-lines at bases, +/- curtain's sign               +/-  Lung sliding          CARDIAC:               +/- LV contractility/EF WNL, +/-LVH              +/- parasternal short view concentric w/o wall motion abnormality              +/- No noted signs of RV strain, with septal flattening/D'ing, +/-Irvin's sign               +/- No noted pericardial effusion               +/- No noted valvular dz or notable vegetations               +/- 5 chamber VTI              +/-  Subcostal view IVC ***cm              ABDOMEN:              +/- abdominal ascites              +/- hydronephrosis          DVT STUDY: +/- noted thrombus [X] common femoral [X] deep femoral [X] popliteal       CENTRAL LINE: Y/N          DATE INSERTED:              REMOVE: Y/N    BENAVIDES: Y/N                        DATE INSERTED:              REMOVE: Y/N    A-LINE: Y/N                       DATE INSERTED:              REMOVE: Y/N    GLOBAL ISSUE/BEST PRACTICE:  Analgesia:   Sedation:  HOB elevation: yes  Stress ulcer prophylaxis: protonix   VTE prophylaxis: SCD/Heparin SQ/Lovenox  Glycemic control: ISS  Nutrition: NPO/Diet      CODE STATUS: ***  GOC discussion: Y      Critical Care time: ***assessing presenting problems of acute illness that poses high probability of life threatening deterioration or end organ damage/dysfunction.  Medical decision making inculding initiating plan of care, reviewing data, reviewing radiology,direct patient bedside evaluation and interpretation of vital signs, any necessary ventilator management, discusion with multidisciplinary team, discussing goals of care with patient/family, all non inclusive of procedures.

## 2018-12-17 NOTE — PROGRESS NOTE ADULT - SUBJECTIVE AND OBJECTIVE BOX
Neurology Follow up note    RAFA [id number]    HPI:  RAFA SHAFER is a 58 year old male brought to ER after having severe cough for 2-3 days. Before patient's wife brought him to the ER after he coughed severely, fell to the floor, and had a seizure like activity. The patient have hyponatremia on ER blood work. Denies a history of CHF and liver diease. He is an active smoker. Admits to poor intake for the past 2-3 days. Denies imbalance, frequent falls, dizziness, lightheadedness, abd symptoms, urinary symptoms.   Past medical history of HTN, DM2. (13 Dec 2018 05:59)      Interval History - no new events,    Patient is seen, chart was reviewed and case was discussed with the treatment team.  Pt is not in any distress.   Lying on bed comfortably.   No events reported overnight.   No clinical seizure was reported.  Sitting on chair bed comfortably.    is at bedside.    Vital Signs Last 24 Hrs  T(C): 36.7 (17 Dec 2018 12:00), Max: 36.9 (16 Dec 2018 20:00)  T(F): 98.1 (17 Dec 2018 12:00), Max: 98.4 (16 Dec 2018 20:00)  HR: 120 (17 Dec 2018 12:00) (83 - 151)  BP: 116/72 (17 Dec 2018 12:00) (102/71 - 135/72)  BP(mean): 111 (17 Dec 2018 11:00) (76 - 111)  RR: 26 (17 Dec 2018 12:00) (17 - 35)  SpO2: 100% (17 Dec 2018 12:00) (95% - 100%)        REVIEW OF SYSTEMS:    Constitutional: No fever, weight loss or fatigue  Eyes: No eye pain, visual disturbances, or discharge  ENT:  No difficulty hearing, tinnitus, vertigo; No sinus or throat pain  Neck: No pain or stiffness  Respiratory: No cough, wheezing, chills or hemoptysis  Cardiovascular: No chest pain, palpitations,   Gastrointestinal: No abdominal or epigastric pain. No nausea, vomiting or hematemesis;  Genitourinary: No dysuria, frequency, hematuria or incontinence  Neurological: No headaches, memory loss, loss of strength, numbness or tremors  Psychiatric: No depression, anxiety, mood swings or difficulty sleeping  Musculoskeletal: No joint pain or swelling;  Skin: No itching, burning, rashes or lesions   Lymph Nodes: No enlarged glands  Endocrine: No heat or cold intolerance; No hair loss, No h/o diabetes or thyroid dysfunction  Allergy and Immunologic: No hives or eczema    On Neurological Examination:    Mental Status - Pt is alert, awake, oriented X3.Follows commands well and able to answer questions appropriately  .Mood and affect  normal    Speech -  Normal.     Cranial Nerves - Pupils 3 mm equal and reactive to light, extraocular eye movements intact. P  Pt has no  facial asymmetry. Facial sensation is intact.Tongue - is in midline.    Muscle tone - is normal all over. Moves all extremities equally. No asymmetry is seen.      Motor Exam - 5/5     Sensory Exam - P Pt withdraws all extremities equally on stimulation. No asymmetry seen. No complaints of tingling, numbness.      Finger to nose: normal      Deep tendon Reflexes - 2 plus all over.       Neck Supple -  Yes.     MEDICATIONS    aspirin  chewable 81 milliGRAM(s) Oral daily  buDESOnide   0.5 milliGRAM(s) Respule 0.5 milliGRAM(s) Inhalation two times a day  chlorhexidine 2% Cloths 1 Application(s) Topical two times a day  diltiazem    Tablet 60 milliGRAM(s) Oral every 6 hours  enoxaparin Injectable 40 milliGRAM(s) SubCutaneous daily  influenza   Vaccine 0.5 milliLiter(s) IntraMuscular once  loratadine 10 milliGRAM(s) Oral daily  magnesium oxide 400 milliGRAM(s) Oral daily  metoprolol tartrate 50 milliGRAM(s) Oral every 6 hours  nicotine - 21 mG/24Hr(s) Patch 1 Patch Transdermal daily  predniSONE   Tablet 10 milliGRAM(s) Oral daily  sodium chloride 2 Gram(s) Oral three times a day  tiotropium 18 MICROgram(s) Capsule 1 Capsule(s) Inhalation daily      Allergies    penicillin (Unknown)    Intolerances        LABS:  CBC Full  -  ( 17 Dec 2018 05:48 )  WBC Count : 12.26 K/uL  Hemoglobin : 11.5 g/dL  Hematocrit : 33.4 %  Platelet Count - Automated : 196 K/uL  Mean Cell Volume : 82.7 fl  Mean Cell Hemoglobin : 28.5 pg  Mean Cell Hemoglobin Concentration : 34.4 gm/dL  Auto Neutrophil # : x  Auto Lymphocyte # : x  Auto Monocyte # : x  Auto Eosinophil # : x  Auto Basophil # : x  Auto Neutrophil % : x  Auto Lymphocyte % : x  Auto Monocyte % : x  Auto Eosinophil % : x  Auto Basophil % : x      12-17    130<L>  |  91<L>  |  10  ----------------------------<  108<H>  3.6   |  32<H>  |  0.49<L>    Ca    8.1<L>      17 Dec 2018 05:48  Phos  4.4     12-17  Mg     1.9     12-17    TPro  6.0  /  Alb  3.1<L>  /  TBili  1.2  /  DBili  x   /  AST  25  /  ALT  38  /  AlkPhos  79  12-16    Hemoglobin A1C:     Vitamin B12     RADIOLOGY    ASSESSMENT AND PLAN:      syncope preceded by coughing.  ?seizure related to hyponatremia,  sp pericardial window,    would get brain mri wwo as part of malignancy /hyponatremia work up.  Physical therapy evaluation.  OOB to chair/ambulation with assistance only.  Pain is accessed and addressed.  Would continue to follow.

## 2018-12-17 NOTE — PROGRESS NOTE ADULT - SUBJECTIVE AND OBJECTIVE BOX
Patient is a 58y old  Male who presents with a chief complaint of Intractable cough and Near Syncope (17 Dec 2018 13:02)    Interval History: NICK drain removed this am. Patient remains in afib in 100-140's.    REVIEW OF SYSTEMS  Constitutional: No fever, chills, fatigue  Neuro: No headache, numbness, weakness  Resp: No cough, wheezing, shortness of breath  CVS: No chest pain, palpitations, leg swelling  GI: No abdominal pain, nausea, vomiting, diarrhea   : No dysuria, frequency, incontinence  Skin: No itching, burning, rashes, or lesions   Msk: No joint pain or swelling  Psych: No depression, anxiety, mood swings  Heme: No bleeding    T(F): 98.1 (12-17-18 @ 12:00), Max: 98.4 (12-16-18 @ 20:00)  HR: 86 (12-17-18 @ 13:00) (83 - 151)  BP: 110/65 (12-17-18 @ 13:00) (102/71 - 135/72)  RR: 23 (12-17-18 @ 13:00) (17 - 35)  SpO2: 100% (12-17-18 @ 13:00) (95% - 100%)  Wt(kg): --      CAPILLARY BLOOD GLUCOSE      I&O's Summary    12-16 @ 07:01  -  12-17 @ 07:00  --------------------------------------------------------  IN: 390 mL / OUT: 2140 mL / NET: -1750 mL      PHYSICAL EXAM  General: NAD  CNS: AAOx3, no focal deficits  HEENT: pupils equal and reactive, moist mucus membranes  Resp: clear with scattered rales, no wheezing  CVS: S1S2, irreg rate, irreg rhythm  Abd: soft, NT, +BS  Ext: no edema  Skin: warm    MEDICATIONS    diltiazem    Tablet Oral  metoprolol tartrate Oral    predniSONE   Tablet Oral    buDESOnide   0.5 milliGRAM(s) Respule Inhalation  loratadine Oral  tiotropium 18 MICROgram(s) Capsule Inhalation        aspirin  chewable Oral  enoxaparin Injectable SubCutaneous        magnesium oxide Oral  sodium chloride Oral    influenza   Vaccine IntraMuscular    chlorhexidine 2% Cloths Topical    nicotine - 21 mG/24Hr(s) Patch Transdermal                          11.5   12.26 )-----------( 196      ( 17 Dec 2018 05:48 )             33.4       12-17    130<L>  |  91<L>  |  10  ----------------------------<  108<H>  3.6   |  32<H>  |  0.49<L>    Ca    8.1<L>      17 Dec 2018 05:48  Phos  4.4     12-17  Mg     1.9     12-17    TPro  6.0  /  Alb  3.1<L>  /  TBili  1.2  /  DBili  x   /  AST  25  /  ALT  38  /  AlkPhos  79  12-16          PT/INR - ( 16 Dec 2018 05:39 )   PT: 15.2 sec;   INR: 1.32 ratio         PTT - ( 16 Dec 2018 05:39 )  PTT:29.7 sec    .Body Fluid Pericardial Fluid   No growth   polymorphonuclear leukocytes seen per low power field  No organisms seen per oil power field  by cytocentrifuge 12-15 @ 13:46  .Blood Blood-Peripheral   No growth to date. -- 12-13 @ 00:52      Rapid RVP Result: NotDetec (12-12 @ 22:33)    Radiology: ***  Bedside lung ultrasound: ***  Bedside ECHO: ***    CENTRAL LINE: Y/N          DATE INSERTED:              REMOVE: Y/N  BENAVIDES: Y/N                        DATE INSERTED:              REMOVE: Y/N  A-LINE: Y/N                       DATE INSERTED:              REMOVE: Y/N    GLOBAL ISSUE/BEST PRACTICE  Analgesia: N/A  Sedation: N/A  HOB elevation: Y  Stress ulcer prophylaxis:   VTE prophylaxis:   Glycemic control: N/A  Nutrition:     CODE STATUS: ***  [facility] discussion: Y Patient is a 58y old  Male who presents with a chief complaint of Intractable cough and Near Syncope (17 Dec 2018 13:02)    Interval History: NICK drain removed this am. Patient remains in afib in 100-140's.    REVIEW OF SYSTEMS  Constitutional: No fever, chills, fatigue  Neuro: No headache, numbness, weakness  Resp: No cough, wheezing, shortness of breath  CVS: No chest pain, palpitations, leg swelling  GI: No abdominal pain, nausea, vomiting, diarrhea   : No dysuria, frequency, incontinence  Skin: No itching, burning, rashes, or lesions   Heme: No bleeding    T(F): 98.1 (12-17-18 @ 12:00), Max: 98.4 (12-16-18 @ 20:00)  HR: 86 (12-17-18 @ 13:00) (83 - 151)  BP: 110/65 (12-17-18 @ 13:00) (102/71 - 135/72)  RR: 23 (12-17-18 @ 13:00) (17 - 35)  SpO2: 100% (12-17-18 @ 13:00) (95% - 100%)  Wt(kg): --      CAPILLARY BLOOD GLUCOSE      I&O's Summary    12-16 @ 07:01  -  12-17 @ 07:00  --------------------------------------------------------  IN: 390 mL / OUT: 2140 mL / NET: -1750 mL      PHYSICAL EXAM  General: NAD  CNS: AAOx3, no focal deficits  HEENT: pupils equal and reactive, moist mucus membranes  Resp: clear with scattered rales, no wheezing  CVS: S1S2, irreg rate, irreg rhythm  Abd: soft, NT, +BS  Ext: no edema  Skin: warm    MEDICATIONS    diltiazem    Tablet Oral  metoprolol tartrate Oral    predniSONE   Tablet Oral    buDESOnide   0.5 milliGRAM(s) Respule Inhalation  loratadine Oral  tiotropium 18 MICROgram(s) Capsule Inhalation        aspirin  chewable Oral  enoxaparin Injectable SubCutaneous        magnesium oxide Oral  sodium chloride Oral    influenza   Vaccine IntraMuscular    chlorhexidine 2% Cloths Topical    nicotine - 21 mG/24Hr(s) Patch Transdermal                          11.5   12.26 )-----------( 196      ( 17 Dec 2018 05:48 )             33.4       12-17    130<L>  |  91<L>  |  10  ----------------------------<  108<H>  3.6   |  32<H>  |  0.49<L>    Ca    8.1<L>      17 Dec 2018 05:48  Phos  4.4     12-17  Mg     1.9     12-17    TPro  6.0  /  Alb  3.1<L>  /  TBili  1.2  /  DBili  x   /  AST  25  /  ALT  38  /  AlkPhos  79  12-16          PT/INR - ( 16 Dec 2018 05:39 )   PT: 15.2 sec;   INR: 1.32 ratio         PTT - ( 16 Dec 2018 05:39 )  PTT:29.7 sec    .Body Fluid Pericardial Fluid   No growth   polymorphonuclear leukocytes seen per low power field  No organisms seen per oil power field  by cytocentrifuge 12-15 @ 13:46  .Blood Blood-Peripheral   No growth to date. -- 12-13 @ 00:52      Rapid RVP Result: NotDetec (12-12 @ 22:33)    Radiology: Pending MRI head  Bedside lung ultrasound: N/A  Bedside ECHO: N/A    CENTRAL LINE: N               BENAVIDES: N                         A-LINE: N                         GLOBAL ISSUE/BEST PRACTICE  Analgesia: N/A  Sedation: N/A  HOB elevation: Y  Stress ulcer prophylaxis:   VTE prophylaxis:   Glycemic control: N/A  Nutrition:     CODE STATUS: Full code  GOC discussion: Y Patient is a 58y old  Male who presents with a chief complaint of Intractable cough and Near Syncope (17 Dec 2018 13:02)    Interval History: NICK drain removed this am. Patient remains in afib in 100-140's.    REVIEW OF SYSTEMS  Constitutional: No fever, chills, fatigue  Neuro: No headache, numbness, weakness  Resp: No cough, wheezing, shortness of breath  CVS: No chest pain, palpitations, leg swelling  GI: No abdominal pain, nausea, vomiting, diarrhea   : No dysuria, frequency, incontinence  Skin: No itching, burning, rashes, or lesions   Heme: No bleeding    T(F): 98.1 (12-17-18 @ 12:00), Max: 98.4 (12-16-18 @ 20:00)  HR: 86 (12-17-18 @ 13:00) (83 - 151)  BP: 110/65 (12-17-18 @ 13:00) (102/71 - 135/72)  RR: 23 (12-17-18 @ 13:00) (17 - 35)  SpO2: 100% (12-17-18 @ 13:00) (95% - 100%)  Wt(kg): --      CAPILLARY BLOOD GLUCOSE      I&O's Summary    12-16 @ 07:01  -  12-17 @ 07:00  --------------------------------------------------------  IN: 390 mL / OUT: 2140 mL / NET: -1750 mL      PHYSICAL EXAM  General: NAD  CNS: AAOx3, no focal deficits  HEENT: pupils equal and reactive, moist mucus membranes  Resp: clear with scattered rales, no wheezing  CVS: S1S2, irreg rate, irreg rhythm  Abd: soft, NT, +BS  Ext: no edema  Skin: Dressing, clean, dry, intact    MEDICATIONS    diltiazem    Tablet Oral  metoprolol tartrate Oral    predniSONE   Tablet Oral    buDESOnide   0.5 milliGRAM(s) Respule Inhalation  loratadine Oral  tiotropium 18 MICROgram(s) Capsule Inhalation        aspirin  chewable Oral  enoxaparin Injectable SubCutaneous        magnesium oxide Oral  sodium chloride Oral    influenza   Vaccine IntraMuscular    chlorhexidine 2% Cloths Topical    nicotine - 21 mG/24Hr(s) Patch Transdermal                          11.5   12.26 )-----------( 196      ( 17 Dec 2018 05:48 )             33.4       12-17    130<L>  |  91<L>  |  10  ----------------------------<  108<H>  3.6   |  32<H>  |  0.49<L>    Ca    8.1<L>      17 Dec 2018 05:48  Phos  4.4     12-17  Mg     1.9     12-17    TPro  6.0  /  Alb  3.1<L>  /  TBili  1.2  /  DBili  x   /  AST  25  /  ALT  38  /  AlkPhos  79  12-16          PT/INR - ( 16 Dec 2018 05:39 )   PT: 15.2 sec;   INR: 1.32 ratio         PTT - ( 16 Dec 2018 05:39 )  PTT:29.7 sec    .Body Fluid Pericardial Fluid   No growth   polymorphonuclear leukocytes seen per low power field  No organisms seen per oil power field  by cytocentrifuge 12-15 @ 13:46  .Blood Blood-Peripheral   No growth to date. -- 12-13 @ 00:52      Rapid RVP Result: NotDetec (12-12 @ 22:33)    Radiology: Pending MRI head  Bedside lung ultrasound: N/A  Bedside ECHO: N/A    CENTRAL LINE: N               BENAVIDES: N                         A-LINE: N                         GLOBAL ISSUE/BEST PRACTICE  Analgesia: N/A  Sedation: N/A  HOB elevation: Y  Stress ulcer prophylaxis:   VTE prophylaxis:   Glycemic control: N/A  Nutrition:     CODE STATUS: Full code  [facility] discussion: Y Patient is a 58y old  Male who presents with a chief complaint of Intractable cough and Near Syncope (17 Dec 2018 13:02)    Interval History: NICK drain removed this am. Patient remains in afib in 100-140's.    REVIEW OF SYSTEMS  Constitutional: No fever, chills, fatigue  Neuro: No headache, numbness, weakness  Resp: No cough, wheezing, shortness of breath  CVS: No chest pain, palpitations, leg swelling  GI: No abdominal pain, nausea, vomiting, diarrhea   : No dysuria, frequency, incontinence  Skin: No itching, burning, rashes, or lesions   Heme: No bleeding    T(F): 98.1 (12-17-18 @ 12:00), Max: 98.4 (12-16-18 @ 20:00)  HR: 86 (12-17-18 @ 13:00) (83 - 151)  BP: 110/65 (12-17-18 @ 13:00) (102/71 - 135/72)  RR: 23 (12-17-18 @ 13:00) (17 - 35)  SpO2: 100% (12-17-18 @ 13:00) (95% - 100%)  Wt(kg): --      CAPILLARY BLOOD GLUCOSE      I&O's Summary    12-16 @ 07:01  -  12-17 @ 07:00  --------------------------------------------------------  IN: 390 mL / OUT: 2140 mL / NET: -1750 mL      PHYSICAL EXAM  General: NAD  CNS: AAOx3, no focal deficits  HEENT: pupils equal and reactive, moist mucus membranes  Resp: clear with scattered rales, no wheezing  CVS: S1S2, irreg rate, irreg rhythm  Abd: soft, NT, +BS  Ext: no edema  Skin: Dressing, clean, dry, intact    MEDICATIONS    diltiazem    Tablet Oral  metoprolol tartrate Oral    predniSONE   Tablet Oral    buDESOnide   0.5 milliGRAM(s) Respule Inhalation  loratadine Oral  tiotropium 18 MICROgram(s) Capsule Inhalation        aspirin  chewable Oral  enoxaparin Injectable SubCutaneous        magnesium oxide Oral  sodium chloride Oral    influenza   Vaccine IntraMuscular    chlorhexidine 2% Cloths Topical    nicotine - 21 mG/24Hr(s) Patch Transdermal                          11.5   12.26 )-----------( 196      ( 17 Dec 2018 05:48 )             33.4       12-17    130<L>  |  91<L>  |  10  ----------------------------<  108<H>  3.6   |  32<H>  |  0.49<L>    Ca    8.1<L>      17 Dec 2018 05:48  Phos  4.4     12-17  Mg     1.9     12-17    TPro  6.0  /  Alb  3.1<L>  /  TBili  1.2  /  DBili  x   /  AST  25  /  ALT  38  /  AlkPhos  79  12-16          PT/INR - ( 16 Dec 2018 05:39 )   PT: 15.2 sec;   INR: 1.32 ratio         PTT - ( 16 Dec 2018 05:39 )  PTT:29.7 sec    .Body Fluid Pericardial Fluid   No growth   polymorphonuclear leukocytes seen per low power field  No organisms seen per oil power field  by cytocentrifuge 12-15 @ 13:46  .Blood Blood-Peripheral   No growth to date. -- 12-13 @ 00:52      Rapid RVP Result: NotDetec (12-12 @ 22:33)    Radiology: Pending MRI head      CENTRAL LINE: N               BENAVIDES: N                         A-LINE: N                           GLOBAL ISSUE/BEST PRACTICE  Analgesia: N/A  Sedation: N/A  HOB elevation: Y  Stress ulcer prophylaxis:   VTE prophylaxis:   Glycemic control: N/A  Nutrition:     CODE STATUS: Full code

## 2018-12-17 NOTE — PROGRESS NOTE ADULT - ATTENDING COMMENTS
58M PMH HTN, COPD, active smoker, found to have extensive mediastinal/hilar LAD with moderate pericardial effusion and new onset A.fib, with hyponatremia. Now s/p pericardial window and Bx POD 2.  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    --Afib poorly controlled  increase dilt to 60 q6h, continue lopressor  no AC for now  --s/p pericardial window, drain removed this am  await for cyto and path  --COPD exacerbation  cont prednisone taper, pulmicort and spiriva  --tolerating diet  --hyponatremia improving, cont salt tabs  --if Afib better controlled is ok for tele  --discussed with pt

## 2018-12-17 NOTE — PROGRESS NOTE ADULT - ASSESSMENT
59 yo M with PMH of HTN presents with [facility].     Course has been complicated by findings of hilar adenopathy on CT. Developed AF with RVR. Large pericardial effusion with signs of increased pericardial pressures on echo. Now s/p pericardial window with 650cc of blood fluid drained on 12/15.    - pt with likely malignant process given clinical senario.   - await path from pericardium  - Pericardial drain with minimal outpt, removed this am by cts    - Rapid AF.   - Cont  metoprolol 50mg PO q6  - off cardizem gtt, but was tachycardic on 30 q6 of dilt po; agree with 60 q6 for now, can uptitrate as needed.    - CHADS-VaSc = 1. Would at this point just give ASA, though he is presumably hypercoagulable on the basis of malignancy.     - Maintain adequately hydrated.   - Will need pulm f/u    - Monitor and replete electrolytes. Keep K>4.0 and Mg>2.0.   - Further cardiac workup will depend on clinical course.     Patient at high risk for decompensation. Critically ill. >35 minutes of critical care time was spent with this patient.

## 2018-12-17 NOTE — PROGRESS NOTE ADULT - SUBJECTIVE AND OBJECTIVE BOX
[facility]    Gurpreet Bush, Emily, Corey, Andrae, Sajan, Vida      [phone]    CHIEF COMPLAINT: Patient is a 58y old  Male who presents with a chief complaint of Intractable cough and Near Syncope (17 Dec 2018 10:47)      Follow Up: af, tamponade, malignancy pending workup    Interim history: The patient reports persistent cough.  Denies chest discomfort and shortness of breath.  No abdominal pain.  No new neurologic symptoms.      MEDICATIONS  (STANDING):  aspirin  chewable 81 milliGRAM(s) Oral daily  buDESOnide   0.5 milliGRAM(s) Respule 0.5 milliGRAM(s) Inhalation two times a day  chlorhexidine 2% Cloths 1 Application(s) Topical two times a day  diltiazem    Tablet 60 milliGRAM(s) Oral every 6 hours  enoxaparin Injectable 40 milliGRAM(s) SubCutaneous daily  influenza   Vaccine 0.5 milliLiter(s) IntraMuscular once  loratadine 10 milliGRAM(s) Oral daily  magnesium oxide 400 milliGRAM(s) Oral daily  metoprolol tartrate 50 milliGRAM(s) Oral every 6 hours  nicotine - 21 mG/24Hr(s) Patch 1 Patch Transdermal daily  predniSONE   Tablet 10 milliGRAM(s) Oral daily  sodium chloride 2 Gram(s) Oral three times a day  tiotropium 18 MICROgram(s) Capsule 1 Capsule(s) Inhalation daily    MEDICATIONS  (PRN):      REVIEW OF SYSTEMS:  eye, ent, GI, , allergic, dermatologic, musculoskeletal and neurologic are negative except as described above    Vital Signs Last 24 Hrs  T(C): 36.4 (17 Dec 2018 07:39), Max: 36.9 (16 Dec 2018 11:53)  T(F): 97.6 (17 Dec 2018 07:39), Max: 98.5 (16 Dec 2018 11:53)  HR: 137 (17 Dec 2018 10:00) (83 - 151)  BP: 117/75 (17 Dec 2018 10:00) (102/71 - 135/72)  BP(mean): 97 (17 Dec 2018 10:00) (76 - 99)  RR: 21 (17 Dec 2018 10:00) (17 - 35)  SpO2: 98% (17 Dec 2018 10:00) (96% - 100%)    I&O's Summary    16 Dec 2018 07:01  -  17 Dec 2018 07:00  --------------------------------------------------------  IN: 390 mL / OUT: 2140 mL / NET: -1750 mL        Telemetry past 24h: af accel rao 130s-150s this am    PHYSICAL EXAM:    Constitutional: well-nourished, well-developed, NAD   HEENT:  MMM, sclerae anicteric, conjunctivae clear, no oral cyanosis.  Pulmonary: Non-labored, breath sounds are clear bilaterally, No wheezing, rales or rhonchi  Cardiovascular: tachy irregular, S1 and S2.  No murmur.  No rubs, gallops or clicks  Gastrointestinal: Bowel Sounds present, soft, nontender.   Lymph: mild peripheral edema.   Neurological: Alert, no focal deficits  Skin: No rashes.  Psych:  Mood & affect appropriate    LABS: All Labs Reviewed:                        11.5   12.26 )-----------( 196      ( 17 Dec 2018 05:48 )             33.4                         11.9   12.95 )-----------( 202      ( 16 Dec 2018 05:39 )             34.2                         11.9   10.85 )-----------( 226      ( 15 Dec 2018 05:32 )             34.5     17 Dec 2018 05:48    130    |  91     |  10     ----------------------------<  108    3.6     |  32     |  0.49   16 Dec 2018 14:51    128    |  90     |  10     ----------------------------<  152    4.2     |  32     |  0.63   16 Dec 2018 05:39    130    |  92     |  9      ----------------------------<  136    3.6     |  31     |  0.49     Ca    8.1        17 Dec 2018 05:48  Ca    8.2        16 Dec 2018 14:51  Ca    8.1        16 Dec 2018 05:39  Phos  4.4       17 Dec 2018 05:48  Phos  4.1       16 Dec 2018 05:39  Phos  3.1       15 Dec 2018 05:32  Mg     1.9       17 Dec 2018 05:48  Mg     2.0       16 Dec 2018 05:39  Mg     2.3       15 Dec 2018 05:32    TPro  6.0    /  Alb  3.1    /  TBili  1.2    /  DBili  x      /  AST  25     /  ALT  38     /  AlkPhos  79     16 Dec 2018 05:39  TPro  6.2    /  Alb  3.4    /  TBili  0.7    /  DBili  x      /  AST  38     /  ALT  43     /  AlkPhos  78     15 Dec 2018 05:32    PT/INR - ( 16 Dec 2018 05:39 )   PT: 15.2 sec;   INR: 1.32 ratio         PTT - ( 16 Dec 2018 05:39 )  PTT:29.7 sec      Blood Culture: Organism --  Gram Stain Blood -- Gram Stain   polymorphonuclear leukocytes seen per low power field  No organisms seen per oil power field  by cytocentrifuge  Specimen Source .Body Fluid Pericardial Fluid  Culture-Blood --    Organism --  Gram Stain Blood -- Gram Stain --  Specimen Source .Blood Blood-Peripheral  Culture-Blood --            RADIOLOGY:    EKG:    Echo:

## 2018-12-17 NOTE — PROGRESS NOTE ADULT - SUBJECTIVE AND OBJECTIVE BOX
Patient is a 58y old  Male who presents with a chief complaint of Intractable cough and Near Syncope (17 Dec 2018 11:38)      INTERVAL /OVERNIGHT EVENTS: waiting for path reports    MEDICATIONS  (STANDING):  aspirin  chewable 81 milliGRAM(s) Oral daily  buDESOnide   0.5 milliGRAM(s) Respule 0.5 milliGRAM(s) Inhalation two times a day  chlorhexidine 2% Cloths 1 Application(s) Topical two times a day  diltiazem    Tablet 60 milliGRAM(s) Oral every 6 hours  enoxaparin Injectable 40 milliGRAM(s) SubCutaneous daily  influenza   Vaccine 0.5 milliLiter(s) IntraMuscular once  loratadine 10 milliGRAM(s) Oral daily  magnesium oxide 400 milliGRAM(s) Oral daily  metoprolol tartrate 50 milliGRAM(s) Oral every 6 hours  nicotine - 21 mG/24Hr(s) Patch 1 Patch Transdermal daily  predniSONE   Tablet 10 milliGRAM(s) Oral daily  sodium chloride 2 Gram(s) Oral three times a day  tiotropium 18 MICROgram(s) Capsule 1 Capsule(s) Inhalation daily    MEDICATIONS  (PRN):      Allergies    penicillin (Unknown)    Intolerances        REVIEW OF SYSTEMS:  CONSTITUTIONAL: No fever, weight loss, or fatigue  EYES: No eye pain, visual disturbances, or discharge  ENMT:  No difficulty hearing, tinnitus, vertigo; No sinus or throat pain  NECK: No pain or stiffness  RESPIRATORY: No cough, wheezing, chills or hemoptysis; No shortness of breath  CARDIOVASCULAR: No chest pain, palpitations, dizziness, or leg swelling  GASTROINTESTINAL: No abdominal or epigastric pain. No nausea, vomiting, or hematemesis; No diarrhea or constipation. No melena or hematochezia.  GENITOURINARY: No dysuria, frequency, hematuria, or incontinence  NEUROLOGICAL: No headaches, memory loss, loss of strength, numbness, or tremors  SKIN: No itching, burning, rashes, or lesions   LYMPH NODES: +enlarged glands  ENDOCRINE: No heat or cold intolerance; No hair loss; No polydipsia or polyuria  MUSCULOSKELETAL: No joint pain or swelling; No muscle, back, or extremity pain  PSYCHIATRIC: No depression, anxiety, mood swings, or difficulty sleeping  HEME/LYMPH: No easy bruising, or bleeding gums  ALLERGY AND IMMUNOLOGIC: No hives or eczema    Vital Signs Last 24 Hrs  T(C): 36.7 (17 Dec 2018 12:00), Max: 36.9 (16 Dec 2018 20:00)  T(F): 98.1 (17 Dec 2018 12:00), Max: 98.4 (16 Dec 2018 20:00)  HR: 120 (17 Dec 2018 12:00) (83 - 151)  BP: 116/72 (17 Dec 2018 12:00) (102/71 - 135/72)  BP(mean): 111 (17 Dec 2018 11:00) (76 - 111)  RR: 26 (17 Dec 2018 12:00) (17 - 35)  SpO2: 100% (17 Dec 2018 12:00) (95% - 100%)    PHYSICAL EXAM:  GENERAL: NAD, well-groomed, well-developed  HEAD:  Atraumatic, Normocephalic  EYES: EOMI, PERRLA, conjunctiva and sclera clear  ENMT: No tonsillar erythema, exudates, or enlargement; Moist mucous membranes, Good dentition, No lesions  NECK: Supple, No JVD, Normal thyroid  NERVOUS SYSTEM:  Alert & Oriented X3, Good concentration; Motor Strength 5/5 B/L upper and lower extremities; DTRs 2+ intact and symmetric  CHEST/LUNG: Clear to auscultation bilaterally; No rales, rhonchi, wheezing, or rubs  HEART: Regular rate and rhythm; No murmurs, rubs, or gallops  ABDOMEN: Soft, Nontender, Nondistended; Bowel sounds present  EXTREMITIES:  2+ Peripheral Pulses, No clubbing, cyanosis, or edema  LYMPH: No lymphadenopathy noted  SKIN: No rashes or lesions    LABS:                        11.5   12.26 )-----------( 196      ( 17 Dec 2018 05:48 )             33.4     17 Dec 2018 05:48    130    |  91     |  10     ----------------------------<  108    3.6     |  32     |  0.49     Ca    8.1        17 Dec 2018 05:48  Phos  4.4       17 Dec 2018 05:48  Mg     1.9       17 Dec 2018 05:48      PT/INR - ( 16 Dec 2018 05:39 )   PT: 15.2 sec;   INR: 1.32 ratio         PTT - ( 16 Dec 2018 05:39 )  PTT:29.7 sec    CAPILLARY BLOOD GLUCOSE          RADIOLOGY & ADDITIONAL TESTS:    Notes Reviewed:  [x ] YES  [ ] NO    Care Discussed with Consultants/Other Providers [ x] YES  [ ] NO

## 2018-12-17 NOTE — PROGRESS NOTE ADULT - SUBJECTIVE AND OBJECTIVE BOX
58M with PMHx of htn, copd, active smoker who presented with sob, weakness, syncopal episode with possible seizure activity, new onset afib. Pt found to be hyponatremic (119), moderate pericardial effusion on ct chest with tamponade physiology on TTE. Pt now s/p pericardial window placement 12/15, with 650cc sanguinous fluid drained.     24 hour events: Pt seen and examined at bedside. Chart/labs reviewed. Pericardial drain dc'd.    PAST MEDICAL & SURGICAL HISTORY:  HTN (hypertension)  No significant past surgical history      Review of Systems:  Constitutional: No fever, chills, fatigue  Neuro: No headache, numbness, weakness  Resp: No cough, wheezing, shortness of breath  CVS: No chest pain, palpitations, leg swelling  GI: No abdominal pain, nausea, vomiting, diarrhea   : No dysuria, frequency, incontinence  Skin: No itching, burning, rashes, or lesions   Msk: No joint pain or swelling  Psych: No depression, anxiety, mood swings    T(F): 98.4 (12-17-18 @ 20:24), Max: 98.4 (12-17-18 @ 20:24)  HR: 81 (12-17-18 @ 21:00) (80 - 151)  BP: 104/66 (12-17-18 @ 21:00) (102/71 - 125/78)  RR: 23 (12-17-18 @ 21:00) (17 - 37)  SpO2: 100% (12-17-18 @ 21:00) (95% - 100%)  Wt(kg): --        I&O's Summary    16 Dec 2018 07:01  -  17 Dec 2018 07:00  --------------------------------------------------------  IN: 390 mL / OUT: 2140 mL / NET: -1750 mL    17 Dec 2018 07:01  -  17 Dec 2018 22:13  --------------------------------------------------------  IN: 0 mL / OUT: 350 mL / NET: -350 mL        Physical Exam:     Gen: WD/WG male  Neuro: A&Ox3, non-focal  HEENT: NC/AT  Resp: Scattered rales  CVS: nl S1/S2, irregular, wound c/d/i  Abd: soft, nt, nd, +bs  Ext: no edema, +pulses  Skin: well perfused, warm      Meds:    diltiazem    Tablet Oral  metoprolol tartrate Oral  predniSONE   Tablet Oral  buDESOnide   0.5 milliGRAM(s) Respule Inhalation  loratadine Oral  tiotropium 18 MICROgram(s) Capsule Inhalation  aspirin  chewable Oral  enoxaparin Injectable SubCutaneous  magnesium oxide Oral  sodium chloride Oral  influenza   Vaccine IntraMuscular  chlorhexidine 2% Cloths Topical  nicotine - 21 mG/24Hr(s) Patch Transdermal                            11.5   12.26 )-----------( 196      ( 17 Dec 2018 05:48 )             33.4       12-17    130<L>  |  91<L>  |  10  ----------------------------<  108<H>  3.6   |  32<H>  |  0.49<L>    Ca    8.1<L>      17 Dec 2018 05:48  Phos  4.4     12-17  Mg     1.9     12-17    TPro  6.0  /  Alb  3.1<L>  /  TBili  1.2  /  DBili  x   /  AST  25  /  ALT  38  /  AlkPhos  79  12-16          PT/INR - ( 16 Dec 2018 05:39 )   PT: 15.2 sec;   INR: 1.32 ratio         PTT - ( 16 Dec 2018 05:39 )  PTT:29.7 sec    .Body Fluid Pericardial Fluid   No growth   polymorphonuclear leukocytes seen per low power field  No organisms seen per oil power field  by cytocentrifuge 12-15 @ 13:46  .Blood Blood-Peripheral   No growth to date. -- 12-13 @ 00:52      Rapid RVP Result: NotDetec (12-12 @ 22:33)        CENTRAL LINE: no    BENAVIDES: no    A-LINE: no    GLOBAL ISSUE/BEST PRACTICE:  Analgesia: yes  Sedation: no  HOB elevation: yes  Stress ulcer prophylaxis: no  VTE prophylaxis: yes  Glycemic control: no  Nutrition: yes      CODE STATUS: Full  GOC discussion: Y

## 2018-12-17 NOTE — PROGRESS NOTE ADULT - ASSESSMENT
58M with PMHx of htn, copd, active smoker who presented with sob, weakness, syncopal episode with possible seizure activity, new onset afib. Pt found to be hyponatremic (119), moderate pericardial effusion on ct chest with tamponade physiology on TTE. Pt now s/p pericardial window placement 12/15, with 650cc sanguinous fluid drained. SIADH, hypomagnesemia    -Pain management  -Afib rate controlled with BB/cardizem po. Monitor HD's  -F/U fluid cytology/path  -Copd exacerbation, continue chest pt/nebs/steroids taper  -PO diet  -Monitor UOP/Lytes  -Continue salt tabs  -Na+ slowly improving  -DVT ppx  -Supportive care

## 2018-12-17 NOTE — PROGRESS NOTE ADULT - PROBLEM SELECTOR PLAN 6
? etiology, S/p pericardial window   TTE with possible tamponade  CT SX eval with Dr. KEN COLES appreciated

## 2018-12-17 NOTE — PROGRESS NOTE ADULT - ASSESSMENT
Hyponatremia  Coughing  Low BUN  Possible Seizure  HTN  +Smoker  Pericardial tamponade s/p window   Hilar mass?? adenopathy noted    -Sodium was improving faster than optimal s/p Tolvaptan; received D5W which stabilized sodium which is now stable at 130  -Oral fluid restriction to 1L for now  -Continue with NaCl tabs 2gm TID for now  -No IVF needed  -Smoking cessation discussed  -Increase protein intake; no salt restriction in diet  -BP is stable; continue with current regimen  -Follow up pericardial fluid pathology   -Pulm follow up noted    Thank you    D/W ICU PA

## 2018-12-17 NOTE — PROGRESS NOTE ADULT - ASSESSMENT
58M PMH HTN, COPD, tobacco abuse admitted with hyponatremia with syncopal found to have hilar lymphadenopathy with moderate pericardial effusion and new onset A.fib. S/p pericardial window and Bx 12/15. Started on diltiazem drip for A.fib with RVR .    NEURO: D/c'd meclizine. MRI head w/wo contrast. Physical therapy cs.  CARDIO: AFIB in 100-140's. Increased Diltiazem 60mg q6h. Continue with metoprolol and ASA. Patient not a candidate AC. NICK drain removed. Pericardial effusion likely malignant (patient aware), f/u cyto, path, cx, LDH  PULM: Prednisone taper. Continue with Spiriva and Pulmicort. Tobacco cessation: Nicotine.   GI: Regular diet fluid restriction   : Hyponatremia - improved -continue with sodium tabs  HEME/ONC:  ID:   SKIN: N/A  ENDO: N/A  PPX: DVT PPX - lovenox 40mg daily 58M PMH HTN, COPD, tobacco abuse admitted with hyponatremia with syncopal found to have mediastinal and hilar adenopathy with moderate pericardial effusion and new onset A.fib. S/p pericardial window and Bx 12/15 with 650cc of blood fluid. S/P diltiazem drip now on po diltiazem for A.fib with RVR.    NEURO: D/c'd meclizine. MRI head w/wo contrast. Physical therapy cs.  CARDIO: AFIB in 100-140's. Increased Diltiazem 60mg q6h. Continue with metoprolol and ASA. CHADSVASC 1. Patient not a candidate AC. NICK drain removed. Pericardial effusion likely malignant (patient aware), f/u cyto, path, cx, LDH  PULM: Prednisone taper. Continue with Spiriva and Pulmicort. Tobacco cessation: Nicotine. Oxygen via NC.  GI: Regular diet fluid restriction. D/C'd zofran.  : Hyponatremia - improved -continue with sodium tabs  HEME/ONC:  ID: leukocytosis stable likely 2/2 steroids  SKIN: N/A  ENDO: N/A  PPX: DVT PPX - lovenox 40mg daily 58M PMH HTN, COPD, tobacco abuse admitted with hyponatremia with syncopal found to have mediastinal and hilar adenopathy with moderate pericardial effusion and new onset A.fib. S/p pericardial window and Bx 12/15 with 650cc of blood fluid. S/P diltiazem drip now on po diltiazem for A.fib with RVR.    NEURO: D/c'd meclizine. MRI head w/wo contrast. Physical therapy cs.  CARDIO: AFIB in 100-140's. Increased Diltiazem 60mg q6h. Continue with metoprolol and ASA. CHADSVASC 1. Patient not a candidate AC. NICK drain removed. Pericardial effusion likely malignant possible small cell ca (patient aware), f/u cyto, path, cx, LDH  PULM: Prednisone taper. Continue with Spiriva, Pulmicort, and claritin. Tobacco cessation: Nicotine. Oxygen via NC.  GI: Regular diet fluid restriction. D/C'd zofran.  : Hyponatremia - improved -continue with sodium tabs  HEME/ONC: Hemodynamically stable. anemia. Monitor H/H  ID: leukocytosis stable likely 2/2 steroids  SKIN: N/A  ENDO: N/A  PPX: DVT PPX - lovenox 40mg daily

## 2018-12-17 NOTE — PROGRESS NOTE ADULT - SUBJECTIVE AND OBJECTIVE BOX
Patient is a 58y old  Male who presents with a chief complaint of Intractable cough and Near Syncope (17 Dec 2018 15:45)      INTERVAL HPI/OVERNIGHT EVENTS:    Feels better. Has minimal cough    MEDICATIONS  (STANDING):  aspirin  chewable 81 milliGRAM(s) Oral daily  buDESOnide   0.5 milliGRAM(s) Respule 0.5 milliGRAM(s) Inhalation two times a day  chlorhexidine 2% Cloths 1 Application(s) Topical two times a day  diltiazem    Tablet 60 milliGRAM(s) Oral every 6 hours  enoxaparin Injectable 40 milliGRAM(s) SubCutaneous daily  influenza   Vaccine 0.5 milliLiter(s) IntraMuscular once  loratadine 10 milliGRAM(s) Oral daily  magnesium oxide 400 milliGRAM(s) Oral daily  metoprolol tartrate 50 milliGRAM(s) Oral every 6 hours  nicotine - 21 mG/24Hr(s) Patch 1 Patch Transdermal daily  predniSONE   Tablet 10 milliGRAM(s) Oral daily  sodium chloride 2 Gram(s) Oral three times a day  tiotropium 18 MICROgram(s) Capsule 1 Capsule(s) Inhalation daily      MEDICATIONS  (PRN):      Allergies    penicillin (Unknown)    Intolerances        PAST MEDICAL & SURGICAL HISTORY:  HTN (hypertension)  No significant past surgical history      Vital Signs Last 24 Hrs  T(C): 36.9 (17 Dec 2018 20:24), Max: 36.9 (17 Dec 2018 20:24)  T(F): 98.4 (17 Dec 2018 20:24), Max: 98.4 (17 Dec 2018 20:24)  HR: 80 (17 Dec 2018 20:04) (80 - 151)  BP: 118/82 (17 Dec 2018 19:00) (102/71 - 132/71)  BP(mean): 107 (17 Dec 2018 19:00) (82 - 111)  RR: 37 (17 Dec 2018 19:00) (17 - 37)  SpO2: 100% (17 Dec 2018 20:04) (95% - 100%)    PHYSICAL EXAMINATION:    GENERAL: The patient is awake and alert in no apparent distress.     HEENT: Head is normocephalic and atraumatic. Extraocular muscles are intact. Mucous membranes are moist.    NECK: Supple.    LUNGS: Clear to auscultation without wheezing, rales or rhonchi; respirations unlabored    HEART: Regular rate and rhythm without murmur.    ABDOMEN: Soft, nontender, and nondistended.      EXTREMITIES: Without any cyanosis, clubbing, rash, lesions or edema.    NEUROLOGIC: Grossly intact.    SKIN: No ulceration or induration present.      LABS:                        11.5   12.26 )-----------( 196      ( 17 Dec 2018 05:48 )             33.4     12-17    130<L>  |  91<L>  |  10  ----------------------------<  108<H>  3.6   |  32<H>  |  0.49<L>    Ca    8.1<L>      17 Dec 2018 05:48  Phos  4.4     12-17  Mg     1.9     12-17    TPro  6.0  /  Alb  3.1<L>  /  TBili  1.2  /  DBili  x   /  AST  25  /  ALT  38  /  AlkPhos  79  12-16    PT/INR - ( 16 Dec 2018 05:39 )   PT: 15.2 sec;   INR: 1.32 ratio         PTT - ( 16 Dec 2018 05:39 )  PTT:29.7 sec                    MICROBIOLOGY:  Culture Results:   No growth (12-15 @ 13:46)  Culture Results:   Testing in progress (12-15 @ 13:46)      RADIOLOGY & ADDITIONAL STUDIES:    Assessment:    Pericardial Effusion - S/P Pericardial Window and biopsy  Mediastinal and hilar adenopathy  SIADH  Atrial Fibrillation    Plan:    Check pericardial fluid cytology and pericardial biopsy results  Cardiac monitoring

## 2018-12-18 LAB
ALBUMIN SERPL ELPH-MCNC: 3 G/DL — LOW (ref 3.3–5)
ALP SERPL-CCNC: 75 U/L — SIGNIFICANT CHANGE UP (ref 40–120)
ALT FLD-CCNC: 59 U/L — SIGNIFICANT CHANGE UP (ref 12–78)
ANION GAP SERPL CALC-SCNC: 9 MMOL/L — SIGNIFICANT CHANGE UP (ref 5–17)
AST SERPL-CCNC: 31 U/L — SIGNIFICANT CHANGE UP (ref 15–37)
BASOPHILS # BLD AUTO: 0.04 K/UL — SIGNIFICANT CHANGE UP (ref 0–0.2)
BASOPHILS NFR BLD AUTO: 0.3 % — SIGNIFICANT CHANGE UP (ref 0–2)
BILIRUB SERPL-MCNC: 1 MG/DL — SIGNIFICANT CHANGE UP (ref 0.2–1.2)
BUN SERPL-MCNC: 11 MG/DL — SIGNIFICANT CHANGE UP (ref 7–23)
CALCIUM SERPL-MCNC: 8.3 MG/DL — LOW (ref 8.5–10.1)
CHLORIDE SERPL-SCNC: 91 MMOL/L — LOW (ref 96–108)
CO2 SERPL-SCNC: 29 MMOL/L — SIGNIFICANT CHANGE UP (ref 22–31)
COMMENT - FLUIDS: SIGNIFICANT CHANGE UP
COMMENT - FLUIDS: SIGNIFICANT CHANGE UP
CREAT SERPL-MCNC: 0.52 MG/DL — SIGNIFICANT CHANGE UP (ref 0.5–1.3)
CULTURE RESULTS: SIGNIFICANT CHANGE UP
CULTURE RESULTS: SIGNIFICANT CHANGE UP
EOSINOPHIL # BLD AUTO: 0.32 K/UL — SIGNIFICANT CHANGE UP (ref 0–0.5)
EOSINOPHIL NFR BLD AUTO: 2.6 % — SIGNIFICANT CHANGE UP (ref 0–6)
GLUCOSE SERPL-MCNC: 109 MG/DL — HIGH (ref 70–99)
HCT VFR BLD CALC: 35.1 % — LOW (ref 39–50)
HGB BLD-MCNC: 12.3 G/DL — LOW (ref 13–17)
IMM GRANULOCYTES NFR BLD AUTO: 0.2 % — SIGNIFICANT CHANGE UP (ref 0–1.5)
LYMPHOCYTES # BLD AUTO: 2.49 K/UL — SIGNIFICANT CHANGE UP (ref 1–3.3)
LYMPHOCYTES # BLD AUTO: 20 % — SIGNIFICANT CHANGE UP (ref 13–44)
MAGNESIUM SERPL-MCNC: 1.9 MG/DL — SIGNIFICANT CHANGE UP (ref 1.6–2.6)
MCHC RBC-ENTMCNC: 29.1 PG — SIGNIFICANT CHANGE UP (ref 27–34)
MCHC RBC-ENTMCNC: 35 GM/DL — SIGNIFICANT CHANGE UP (ref 32–36)
MCV RBC AUTO: 83 FL — SIGNIFICANT CHANGE UP (ref 80–100)
MONOCYTES # BLD AUTO: 1.3 K/UL — HIGH (ref 0–0.9)
MONOCYTES NFR BLD AUTO: 10.5 % — SIGNIFICANT CHANGE UP (ref 2–14)
NEUTROPHILS # BLD AUTO: 8.26 K/UL — HIGH (ref 1.8–7.4)
NEUTROPHILS NFR BLD AUTO: 66.4 % — SIGNIFICANT CHANGE UP (ref 43–77)
PHOSPHATE SERPL-MCNC: 4 MG/DL — SIGNIFICANT CHANGE UP (ref 2.5–4.5)
PLATELET # BLD AUTO: 221 K/UL — SIGNIFICANT CHANGE UP (ref 150–400)
POTASSIUM SERPL-MCNC: 3.9 MMOL/L — SIGNIFICANT CHANGE UP (ref 3.5–5.3)
POTASSIUM SERPL-SCNC: 3.9 MMOL/L — SIGNIFICANT CHANGE UP (ref 3.5–5.3)
PROT SERPL-MCNC: 6.2 G/DL — SIGNIFICANT CHANGE UP (ref 6–8.3)
RBC # BLD: 4.23 M/UL — SIGNIFICANT CHANGE UP (ref 4.2–5.8)
RBC # FLD: 12.5 % — SIGNIFICANT CHANGE UP (ref 10.3–14.5)
SODIUM SERPL-SCNC: 129 MMOL/L — LOW (ref 135–145)
SPECIMEN SOURCE: SIGNIFICANT CHANGE UP
SPECIMEN SOURCE: SIGNIFICANT CHANGE UP
WBC # BLD: 12.44 K/UL — HIGH (ref 3.8–10.5)
WBC # FLD AUTO: 12.44 K/UL — HIGH (ref 3.8–10.5)

## 2018-12-18 PROCEDURE — 99233 SBSQ HOSP IP/OBS HIGH 50: CPT

## 2018-12-18 RX ORDER — FUROSEMIDE 40 MG
20 TABLET ORAL ONCE
Qty: 0 | Refills: 0 | Status: COMPLETED | OUTPATIENT
Start: 2018-12-18 | End: 2018-12-18

## 2018-12-18 RX ADMIN — Medication 10 MILLIGRAM(S): at 05:31

## 2018-12-18 RX ADMIN — LORATADINE 10 MILLIGRAM(S): 10 TABLET ORAL at 11:38

## 2018-12-18 RX ADMIN — MAGNESIUM OXIDE 400 MG ORAL TABLET 400 MILLIGRAM(S): 241.3 TABLET ORAL at 11:39

## 2018-12-18 RX ADMIN — Medication 81 MILLIGRAM(S): at 11:39

## 2018-12-18 RX ADMIN — ENOXAPARIN SODIUM 40 MILLIGRAM(S): 100 INJECTION SUBCUTANEOUS at 11:38

## 2018-12-18 RX ADMIN — Medication 50 MILLIGRAM(S): at 11:40

## 2018-12-18 RX ADMIN — Medication 0.5 MILLIGRAM(S): at 07:33

## 2018-12-18 RX ADMIN — CHLORHEXIDINE GLUCONATE 1 APPLICATION(S): 213 SOLUTION TOPICAL at 05:32

## 2018-12-18 RX ADMIN — CHLORHEXIDINE GLUCONATE 1 APPLICATION(S): 213 SOLUTION TOPICAL at 17:24

## 2018-12-18 RX ADMIN — Medication 1 PATCH: at 11:39

## 2018-12-18 RX ADMIN — Medication 20 MILLIGRAM(S): at 08:52

## 2018-12-18 RX ADMIN — SODIUM CHLORIDE 2 GRAM(S): 9 INJECTION INTRAMUSCULAR; INTRAVENOUS; SUBCUTANEOUS at 14:00

## 2018-12-18 RX ADMIN — Medication 0.5 MILLIGRAM(S): at 19:54

## 2018-12-18 RX ADMIN — Medication 1 PATCH: at 19:27

## 2018-12-18 RX ADMIN — Medication 50 MILLIGRAM(S): at 17:18

## 2018-12-18 RX ADMIN — SODIUM CHLORIDE 2 GRAM(S): 9 INJECTION INTRAMUSCULAR; INTRAVENOUS; SUBCUTANEOUS at 05:32

## 2018-12-18 RX ADMIN — SODIUM CHLORIDE 2 GRAM(S): 9 INJECTION INTRAMUSCULAR; INTRAVENOUS; SUBCUTANEOUS at 22:25

## 2018-12-18 RX ADMIN — Medication 50 MILLIGRAM(S): at 05:31

## 2018-12-18 RX ADMIN — Medication 1 PATCH: at 06:07

## 2018-12-18 NOTE — PROGRESS NOTE ADULT - SUBJECTIVE AND OBJECTIVE BOX
SURGERY            58y male s/p pericardial window, POD 3,    SUBJECTIVE:   Patient seen at bedside, no overnight events, no complaints noted.  Patient denies any headaches, chest pain, shortness of breath, nausea, vomiting, fever, chills, weakness  Patient A+Ox3 in NAD at time of visit.    OBJECTIVE:   T(C): 36.5 (12-18-18 @ 08:00), Max: 36.9 (12-17-18 @ 20:24)  HR: 90 (12-18-18 @ 08:00) (80 - 129)  BP: 113/69 (12-18-18 @ 08:00) (104/66 - 129/81)  RR: 19 (12-18-18 @ 04:32) (18 - 37)  SpO2: 98% (12-18-18 @ 07:30) (95% - 100%)  Wt(kg): --  CAPILLARY BLOOD GLUCOSE        I&O's Detail    17 Dec 2018 07:01  -  18 Dec 2018 07:00  --------------------------------------------------------  IN:    Oral Fluid: 305 mL  Total IN: 305 mL    OUT:    Voided: 750 mL  Total OUT: 750 mL    Total NET: -445 mL          Physical exam:  General: A+O x 3 in NAD  HEENT: PERRLA, EOM intact  Neck: trachea midline  Chest: good air entry, mild bibasilar rales noted  Heart: irregulary irregular,   Abdomen: soft non distended, non tympanic, BS x 4,, incision , no guarding noted  Incision: clean with some serosanguinous drainage on gauze from open drain site    MEDICATIONS  (STANDING):  aspirin  chewable 81 milliGRAM(s) Oral daily  buDESOnide   0.5 milliGRAM(s) Respule 0.5 milliGRAM(s) Inhalation two times a day  chlorhexidine 2% Cloths 1 Application(s) Topical two times a day  diltiazem    Tablet 60 milliGRAM(s) Oral every 6 hours  enoxaparin Injectable 40 milliGRAM(s) SubCutaneous daily  influenza   Vaccine 0.5 milliLiter(s) IntraMuscular once  loratadine 10 milliGRAM(s) Oral daily  magnesium oxide 400 milliGRAM(s) Oral daily  metoprolol tartrate 50 milliGRAM(s) Oral every 6 hours  nicotine - 21 mG/24Hr(s) Patch 1 Patch Transdermal daily  sodium chloride 2 Gram(s) Oral three times a day  tiotropium 18 MICROgram(s) Capsule 1 Capsule(s) Inhalation daily    MEDICATIONS  (PRN):      LABS:                        12.3   12.44 )-----------( 221      ( 18 Dec 2018 06:42 )             35.1     12-18    129<L>  |  91<L>  |  11  ----------------------------<  109<H>  3.9   |  29  |  0.52    Ca    8.3<L>      18 Dec 2018 06:42  Phos  4.0     12-18  Mg     1.9     12-18    TPro  6.2  /  Alb  3.0<L>  /  TBili  1.0  /  DBili  x   /  AST  31  /  ALT  59  /  AlkPhos  75  12-18    Body Fluid Pericardial Fluid   No growth   polymorphonuclear leukocytes seen per low power field  No organisms seen per oil power field  No acid fast noted      RADIOLOGY & ADDITIONAL STUDIES:    Assessment  Patient is a 58y old  Male s/p pericardial window, POD 3 , HD stable with no complaints      Plan  - f/u cytology  - dressing changed  - plan as per primary team

## 2018-12-18 NOTE — PROGRESS NOTE ADULT - SUBJECTIVE AND OBJECTIVE BOX
NEPHROLOGY INTERVAL HPI/OVERNIGHT EVENTS:  HPI:  RAFA SHAFER is a 58 year old male brought to ER after having severe cough for 2-3 days. Before patient's wife brought him to the ER after he coughed severely, fell to the floor, and had a seizure like activity. The patient have hyponatremia on ER blood work. Denies a history of CHF and liver diease. He is an active smoker. Admits to poor intake for the past 2-3 days. Denies imbalance, frequent falls, dizziness, lightheadedness, abd symptoms, urinary symptoms.   Past medical history of HTN, DM2. (13 Dec 2018 05:59)    Follow up Hyponatremia/SIADH  No complaints    PAST MEDICAL & SURGICAL HISTORY:  HTN (hypertension)  No significant past surgical history      MEDICATIONS  (STANDING):  aspirin  chewable 81 milliGRAM(s) Oral daily  buDESOnide   0.5 milliGRAM(s) Respule 0.5 milliGRAM(s) Inhalation two times a day  chlorhexidine 2% Cloths 1 Application(s) Topical two times a day  diltiazem    Tablet 60 milliGRAM(s) Oral every 6 hours  enoxaparin Injectable 40 milliGRAM(s) SubCutaneous daily  influenza   Vaccine 0.5 milliLiter(s) IntraMuscular once  loratadine 10 milliGRAM(s) Oral daily  magnesium oxide 400 milliGRAM(s) Oral daily  metoprolol tartrate 50 milliGRAM(s) Oral every 6 hours  nicotine - 21 mG/24Hr(s) Patch 1 Patch Transdermal daily  sodium chloride 2 Gram(s) Oral three times a day  tiotropium 18 MICROgram(s) Capsule 1 Capsule(s) Inhalation daily    MEDICATIONS  (PRN):      Allergies    penicillin (Unknown)    Intolerances        Vital Signs Last 24 Hrs  T(C): 36.3 (18 Dec 2018 04:32), Max: 36.9 (17 Dec 2018 20:24)  T(F): 97.4 (18 Dec 2018 04:32), Max: 98.4 (17 Dec 2018 20:24)  HR: 88 (18 Dec 2018 04:32) (80 - 137)  BP: 125/72 (18 Dec 2018 04:32) (104/66 - 129/81)  BP(mean): 90 (17 Dec 2018 23:00) (78 - 111)  RR: 19 (18 Dec 2018 04:32) (18 - 37)  SpO2: 97% (18 Dec 2018 04:32) (95% - 100%)  Daily     Daily Weight in k.7 (18 Dec 2018 04:32)    PHYSICAL EXAM:  GENERAL: No distress, sitting in chair, comfortable  HEAD:  Atraumatic, Normocephalic  EYES: Conjunctiva and sclera clear  ENMT: Moist mucous membranes, Good dentition, No lesions  NECK: Supple  NERVOUS SYSTEM:  Alert & Oriented X3, follows commands well  CHEST/LUNG: Clear to percussion bilaterally; No rales, rhonchi, wheezing, or rubs  HEART: irregular rhythm  ABDOMEN: Soft, Nontender, Nondistended; Bowel sounds present  EXTREMITIES:  2+ Peripheral Pulses, No clubbing, cyanosis, or edema  SKIN: No rashes or lesions    LABS:                        11.5   12.26 )-----------( 196      ( 17 Dec 2018 05:48 )             33.4     12-17    130<L>  |  91<L>  |  10  ----------------------------<  108<H>  3.6   |  32<H>  |  0.49<L>    Ca    8.1<L>      17 Dec 2018 05:48  Phos  4.4     12-  Mg     1.9     12-17                RADIOLOGY & ADDITIONAL TESTS:

## 2018-12-18 NOTE — PHYSICAL THERAPY INITIAL EVALUATION ADULT - ADDITIONAL COMMENTS
Pt lives at home in a private house /c 3 DIAMANTE and one rail. Pt has 1 flight of steps inside to his bedroom /c one rail. Pt was independent /c all functional mobility and ADLs prior to admission. Pt drives and does work. Pt does not have or use any adaptive or assistive devices prior to admission.

## 2018-12-18 NOTE — PROGRESS NOTE ADULT - ASSESSMENT
Hyponatremia  Coughing  Low BUN  Possible Seizure  HTN  +Smoker  Pericardial tamponade s/p window   Hilar mass?? adenopathy noted    -Sodium was improving faster than optimal s/p Tolvaptan; received D5W which stabilized sodium  -Last sodium measured to be 130; acceptable if remains above 130. Pending repeat BMP. Will follow up  -Oral fluid restriction to 1L for now  -Continue with NaCl tabs 2gm TID for now  -No IVF needed  -Smoking cessation discussed  -Increase protein intake; no salt restriction in diet  -BP is stable; continue with current regimen  -Follow up pericardial fluid pathology   -Pulm follow up noted    Thank you Hyponatremia  Coughing  Low BUN  Possible Seizure  HTN  +Smoker  Pericardial tamponade s/p window   Hilar mass?? adenopathy noted    -Sodium was improving faster than optimal s/p Tolvaptan; received D5W which stabilized sodium  -Last sodium measured to be 130; acceptable if remains above 130. Pending repeat BMP. Will follow up  -Oral fluid restriction to 1L for now  -Continue with NaCl tabs 2gm TID for now  -Lasix 20mg x 1  -No IVF needed  -Smoking cessation discussed  -Increase protein intake; no salt restriction in diet  -BP is stable; continue with current regimen  -Follow up pericardial fluid pathology   -Pulm follow up noted    Thank you

## 2018-12-18 NOTE — PHYSICAL THERAPY INITIAL EVALUATION ADULT - PLANNED THERAPY INTERVENTIONS, PT EVAL
bed mobility training/Assess stairs in 1-3 sessions or when appropriate/gait training/balance training/transfer training

## 2018-12-18 NOTE — PROGRESS NOTE ADULT - SUBJECTIVE AND OBJECTIVE BOX
Neurology Follow up note    RAFA [id number]    HPI:  RAFA SHAFER is a 58 year old male brought to ER after having severe cough for 2-3 days. Before patient's wife brought him to the ER after he coughed severely, fell to the floor, and had a seizure like activity. The patient have hyponatremia on ER blood work. Denies a history of CHF and liver diease. He is an active smoker. Admits to poor intake for the past 2-3 days. Denies imbalance, frequent falls, dizziness, lightheadedness, abd symptoms, urinary symptoms.   Past medical history of HTN, DM2. (13 Dec 2018 05:59)      Interval History - overall doing well.    Patient is seen, chart was reviewed and case was discussed with the treatment team.  Pt is not in any distress.   Lying on bed comfortably.   No events reported overnight.   No clinical seizure was reported.  Sitting on chair bed comfortably.    is at bedside.    Vital Signs Last 24 Hrs  T(C): 36.3 (18 Dec 2018 16:06), Max: 36.9 (17 Dec 2018 20:24)  T(F): 97.3 (18 Dec 2018 16:06), Max: 98.4 (17 Dec 2018 20:24)  HR: 82 (18 Dec 2018 16:06) (80 - 115)  BP: 108/66 (18 Dec 2018 16:06) (104/66 - 129/81)  BP(mean): 90 (17 Dec 2018 23:00) (78 - 107)  RR: 18 (18 Dec 2018 16:06) (18 - 37)  SpO2: 96% (18 Dec 2018 16:06) (96% - 100%)        REVIEW OF SYSTEMS:    Constitutional: No fever, weight loss or fatigue  Eyes: No eye pain, visual disturbances, or discharge  ENT:  No difficulty hearing, tinnitus, vertigo; No sinus or throat pain  Neck: No pain or stiffness  Respiratory: No  wheezing, chills or hemoptysis  Cardiovascular: No chest pain, palpitations,   Gastrointestinal: No abdominal or epigastric pain. No nausea, vomiting or hematemesis;  Genitourinary: No dysuria, frequency, hematuria or incontinence  Neurological: No headaches, memory loss, loss of strength, numbness or tremors  Psychiatric: No depression, anxiety, mood swings or difficulty sleeping  Musculoskeletal: No joint pain or swelling;  Skin: No itching, burning, rashes or lesions   Lymph Nodes: No enlarged glands  Endocrine: No heat or cold intolerance; No hair loss, No h/o diabetes or thyroid dysfunction  Allergy and Immunologic: No hives or eczema    On Neurological Examination:    Mental Status - Pt is alert, awake, oriented X3.Follows commands well and able to answer questions appropriately  .Mood and affect  normal    Speech -  Normal.     Cranial Nerves - Pupils 3 mm equal and reactive to light, extraocular eye movements intact. P  Pt has no  facial asymmetry. Facial sensation is intact.Tongue - is in midline.    Muscle tone - is normal all over. Moves all extremities equally. No asymmetry is seen.      Motor Exam - 5/5     Sensory Exam - P Pt withdraws all extremities equally on stimulation. No asymmetry seen. No complaints of tingling, numbness.      Finger to nose: normal      Deep tendon Reflexes - 2 plus all over.       Neck Supple -  Yes.     MEDICATIONS    aspirin  chewable 81 milliGRAM(s) Oral daily  buDESOnide   0.5 milliGRAM(s) Respule 0.5 milliGRAM(s) Inhalation two times a day  chlorhexidine 2% Cloths 1 Application(s) Topical two times a day  diltiazem    Tablet 60 milliGRAM(s) Oral every 6 hours  enoxaparin Injectable 40 milliGRAM(s) SubCutaneous daily  influenza   Vaccine 0.5 milliLiter(s) IntraMuscular once  loratadine 10 milliGRAM(s) Oral daily  magnesium oxide 400 milliGRAM(s) Oral daily  metoprolol tartrate 50 milliGRAM(s) Oral every 6 hours  nicotine - 21 mG/24Hr(s) Patch 1 Patch Transdermal daily  predniSONE   Tablet 10 milliGRAM(s) Oral daily  sodium chloride 2 Gram(s) Oral three times a day  tiotropium 18 MICROgram(s) Capsule 1 Capsule(s) Inhalation daily      Allergies    penicillin (Unknown)    Intolerances        LABS:  CBC Full  -  ( 17 Dec 2018 05:48 )  WBC Count : 12.26 K/uL  Hemoglobin : 11.5 g/dL  Hematocrit : 33.4 %  Platelet Count - Automated : 196 K/uL  Mean Cell Volume : 82.7 fl  Mean Cell Hemoglobin : 28.5 pg  Mean Cell Hemoglobin Concentration : 34.4 gm/dL  Auto Neutrophil # : x  Auto Lymphocyte # : x  Auto Monocyte # : x  : x  Auto Basophil % : x      12-17    130<L>  |  91<L>  |  10  ----------------------------<  108<H>  3.6   |  32<H>  |  0.49<L>    Ca    8.1<L>      17 Dec 2018 05:48  Phos  4.4     12-17  Mg     1.9     12-17    TPro  6.0  /  Alb  3.1<L>  /  TBili  1.2  /  DBili  x   /  AST  25  /  ALT  38  /  AlkPhos  79  12-16    Hemoglobin A1C:     Vitamin B12     RADIOLOGY    ASSESSMENT AND PLAN:      syncope preceded by coughing.  ?seizure related to hyponatremia,  sp pericardial window,     brain mri wwo is pending  as part of malignancy /hyponatremia work up.  Physical therapy evaluation.  OOB to chair/ambulation with assistance only.  Pain is accessed and addressed.  Would continue to follow.

## 2018-12-18 NOTE — PROGRESS NOTE ADULT - SUBJECTIVE AND OBJECTIVE BOX
Patient is a 58y old  Male who presents with a chief complaint of Intractable cough and Near Syncope (18 Dec 2018 17:59)      INTERVAL HPI/OVERNIGHT EVENTS:    Denies shortness of breath. Pathology from pericardial effusion and pericardial fluid is negative    MEDICATIONS  (STANDING):  aspirin  chewable 81 milliGRAM(s) Oral daily  buDESOnide   0.5 milliGRAM(s) Respule 0.5 milliGRAM(s) Inhalation two times a day  chlorhexidine 2% Cloths 1 Application(s) Topical two times a day  diltiazem    Tablet 60 milliGRAM(s) Oral every 6 hours  enoxaparin Injectable 40 milliGRAM(s) SubCutaneous daily  influenza   Vaccine 0.5 milliLiter(s) IntraMuscular once  loratadine 10 milliGRAM(s) Oral daily  magnesium oxide 400 milliGRAM(s) Oral daily  metoprolol tartrate 50 milliGRAM(s) Oral every 6 hours  nicotine - 21 mG/24Hr(s) Patch 1 Patch Transdermal daily  sodium chloride 2 Gram(s) Oral three times a day  tiotropium 18 MICROgram(s) Capsule 1 Capsule(s) Inhalation daily      MEDICATIONS  (PRN):      Allergies    penicillin (Unknown)    Intolerances        PAST MEDICAL & SURGICAL HISTORY:  HTN (hypertension)  No significant past surgical history      Vital Signs Last 24 Hrs  T(C): 36.3 (18 Dec 2018 16:06), Max: 36.5 (18 Dec 2018 08:00)  T(F): 97.3 (18 Dec 2018 16:06), Max: 97.7 (18 Dec 2018 08:00)  HR: 85 (18 Dec 2018 19:54) (82 - 115)  BP: 108/66 (18 Dec 2018 16:06) (107/71 - 129/81)  BP(mean): 90 (17 Dec 2018 23:00) (85 - 90)  RR: 18 (18 Dec 2018 16:06) (18 - 37)  SpO2: 98% (18 Dec 2018 19:54) (96% - 100%)    PHYSICAL EXAMINATION:    GENERAL: The patient is awake and alert in no apparent distress.     HEENT: Head is normocephalic and atraumatic. Extraocular muscles are intact. Mucous membranes are moist.    NECK: Supple.    LUNGS: Clear to auscultation without wheezing, rales or rhonchi; respirations unlabored    HEART: Regular rate and rhythm without murmur.    ABDOMEN: Soft, nontender, and nondistended.      EXTREMITIES: Without any cyanosis, clubbing, rash, lesions or edema.    NEUROLOGIC: Grossly intact.    SKIN: No ulceration or induration present.      LABS:                        12.3   12.44 )-----------( 221      ( 18 Dec 2018 06:42 )             35.1     12-18    129<L>  |  91<L>  |  11  ----------------------------<  109<H>  3.9   |  29  |  0.52    Ca    8.3<L>      18 Dec 2018 06:42  Phos  4.0     12-18  Mg     1.9     12-18    TPro  6.2  /  Alb  3.0<L>  /  TBili  1.0  /  DBili  x   /  AST  31  /  ALT  59  /  AlkPhos  75  12-18                        MICROBIOLOGY:  Culture Results:   No growth (12-15 @ 13:46)  Culture Results:   Testing in progress (12-15 @ 13:46)      RADIOLOGY & ADDITIONAL STUDIES:    Assessment:    Probable small cell CA with mediastinal and hilar adenopathy and pericardial effusion and SIADH    Plan:    Will require EBUS and/or mediastinoscopy  Discussed with Dr. Wale Weiss  Will consider hospital discharge and schedule procedure as out-patient

## 2018-12-18 NOTE — PROGRESS NOTE ADULT - SUBJECTIVE AND OBJECTIVE BOX
Patient is a 58y old  Male who presents with a chief complaint of Intractable cough and Near Syncope (18 Dec 2018 10:29)      INTERVAL /OVERNIGHT EVENTS: still tachycardic on ambulation    MEDICATIONS  (STANDING):  aspirin  chewable 81 milliGRAM(s) Oral daily  buDESOnide   0.5 milliGRAM(s) Respule 0.5 milliGRAM(s) Inhalation two times a day  chlorhexidine 2% Cloths 1 Application(s) Topical two times a day  diltiazem    Tablet 60 milliGRAM(s) Oral every 6 hours  enoxaparin Injectable 40 milliGRAM(s) SubCutaneous daily  influenza   Vaccine 0.5 milliLiter(s) IntraMuscular once  loratadine 10 milliGRAM(s) Oral daily  magnesium oxide 400 milliGRAM(s) Oral daily  metoprolol tartrate 50 milliGRAM(s) Oral every 6 hours  nicotine - 21 mG/24Hr(s) Patch 1 Patch Transdermal daily  sodium chloride 2 Gram(s) Oral three times a day  tiotropium 18 MICROgram(s) Capsule 1 Capsule(s) Inhalation daily    MEDICATIONS  (PRN):      Allergies    penicillin (Unknown)    Intolerances        REVIEW OF SYSTEMS:  CONSTITUTIONAL: No fever, weight loss, or fatigue  EYES: No eye pain, visual disturbances, or discharge  ENMT:  No difficulty hearing, tinnitus, vertigo; No sinus or throat pain  NECK: No pain or stiffness  RESPIRATORY: No cough, wheezing, chills or hemoptysis; No shortness of breath  CARDIOVASCULAR: No chest pain, palpitations, dizziness, or leg swelling  GASTROINTESTINAL: No abdominal or epigastric pain. No nausea, vomiting, or hematemesis; No diarrhea or constipation. No melena or hematochezia.  GENITOURINARY: No dysuria, frequency, hematuria, or incontinence  NEUROLOGICAL: No headaches, memory loss, loss of strength, numbness, or tremors  SKIN: No itching, burning, rashes, or lesions   LYMPH NODES: No enlarged glands  ENDOCRINE: No heat or cold intolerance; No hair loss; No polydipsia or polyuria  MUSCULOSKELETAL: No joint pain or swelling; No muscle, back, or extremity pain  PSYCHIATRIC: No depression, anxiety, mood swings, or difficulty sleeping  HEME/LYMPH: No easy bruising, or bleeding gums  ALLERGY AND IMMUNOLOGIC: No hives or eczema    Vital Signs Last 24 Hrs  T(C): 36.3 (18 Dec 2018 16:06), Max: 36.9 (17 Dec 2018 20:24)  T(F): 97.3 (18 Dec 2018 16:06), Max: 98.4 (17 Dec 2018 20:24)  HR: 82 (18 Dec 2018 16:06) (80 - 115)  BP: 108/66 (18 Dec 2018 16:06) (104/66 - 129/81)  BP(mean): 90 (17 Dec 2018 23:00) (78 - 107)  RR: 18 (18 Dec 2018 16:06) (18 - 37)  SpO2: 96% (18 Dec 2018 16:06) (96% - 100%)    PHYSICAL EXAM:  GENERAL: NAD, well-groomed, well-developed  HEAD:  Atraumatic, Normocephalic  EYES: EOMI, PERRLA, conjunctiva and sclera clear  ENMT: No tonsillar erythema, exudates, or enlargement; Moist mucous membranes, Good dentition, No lesions  NECK: Supple, No JVD, Normal thyroid  NERVOUS SYSTEM:  Alert & Oriented X3, Good concentration; Motor Strength 5/5 B/L upper and lower extremities; DTRs 2+ intact and symmetric  CHEST/LUNG: Clear to auscultation bilaterally; No rales, rhonchi, wheezing, or rubs  HEART: Regular rate and rhythm; No murmurs, rubs, or gallops  ABDOMEN: Soft, Nontender, Nondistended; Bowel sounds present  EXTREMITIES:  2+ Peripheral Pulses, No clubbing, cyanosis, or edema  LYMPH: No lymphadenopathy noted  SKIN: No rashes or lesions    LABS:                        12.3   12.44 )-----------( 221      ( 18 Dec 2018 06:42 )             35.1     18 Dec 2018 06:42    129    |  91     |  11     ----------------------------<  109    3.9     |  29     |  0.52     Ca    8.3        18 Dec 2018 06:42  Phos  4.0       18 Dec 2018 06:42  Mg     1.9       18 Dec 2018 06:42    TPro  6.2    /  Alb  3.0    /  TBili  1.0    /  DBili  x      /  AST  31     /  ALT  59     /  AlkPhos  75     18 Dec 2018 06:42        CAPILLARY BLOOD GLUCOSE          RADIOLOGY & ADDITIONAL TESTS:    Notes Reviewed:  [x ] YES  [ ] NO    Care Discussed with Consultants/Other Providers [x ] YES  [ ] NO

## 2018-12-18 NOTE — PROGRESS NOTE ADULT - NSHPATTENDINGPLANDISCUSS_GEN_ALL_CORE
patient
wife at bedside
patient and RN
patient
patient and RN
patient, son and RN

## 2018-12-18 NOTE — PROGRESS NOTE ADULT - PROBLEM SELECTOR PLAN 6
? etiology, S/p pericardial window   TTE with possible tamponade, repeat TTE to r/o reccurence  CT SX eval with Dr. KEN COLES appreciated

## 2018-12-18 NOTE — PHYSICAL THERAPY INITIAL EVALUATION ADULT - DID THE PATIENT HAVE SURGERY?
Chest X-ray: Extensive mediastinal lymphadenopathy; CT Head: (-) acute pathology; CT Chest: Small bilateral pleural effusion, emphysema, 2 mm upper lobe; pending MRI due to seizure activity & dizziness/n/a

## 2018-12-18 NOTE — PHYSICAL THERAPY INITIAL EVALUATION ADULT - GAIT DEVIATIONS NOTED, PT EVAL
decreased weight-shifting ability/decreased velocity of limb motion/decreased stride length/postural sway noted, veers to right at times, looks down and deconditioning

## 2018-12-18 NOTE — PROGRESS NOTE ADULT - SUBJECTIVE AND OBJECTIVE BOX
58M with PMHx of htn, copd, active smoker who presented with sob, weakness, syncopal episode with possible seizure activity, new onset afib. Pt found to be hyponatremic (119), moderate pericardial effusion on ct chest with tamponade physiology on TTE. Pt now s/p pericardial window placement 12/15, with 650cc sanguinous fluid drained.     24 hour events: Pt seen and examined at bedside. Chart/labs reviewed. Pericardial drain dc'd.    PAST MEDICAL & SURGICAL HISTORY:  HTN (hypertension)  No significant past surgical history      Review of Systems:  Constitutional: No fever, chills, fatigue  Neuro: No headache, numbness, weakness  Resp: No cough, wheezing, shortness of breath  CVS: No chest pain, palpitations, leg swelling  GI: No abdominal pain, nausea, vomiting, diarrhea   : No dysuria, frequency, incontinence  Skin: No itching, burning, rashes, or lesions   Msk: No joint pain or swelling  Psych: No depression, anxiety, mood swings    T(F): 97.3 (12-18-18 @ 00:00), Max: 98.4 (12-17-18 @ 20:24)  HR: 90 (12-18-18 @ 00:00) (80 - 151)  BP: 129/81 (12-18-18 @ 00:00) (102/71 - 129/81)  RR: 20 (12-18-18 @ 00:00) (18 - 37)  SpO2: 98% (12-18-18 @ 00:00) (95% - 100%)  Wt(kg): --        I&O's Summary    16 Dec 2018 07:01  -  17 Dec 2018 07:00  --------------------------------------------------------  IN: 390 mL / OUT: 2140 mL / NET: -1750 mL    17 Dec 2018 07:01  -  18 Dec 2018 01:58  --------------------------------------------------------  IN: 185 mL / OUT: 550 mL / NET: -365 mL        Physical Exam:     Gen: WD/WG male  Neuro: A&Ox3, non-focal  HEENT: NC/AT  Resp: Scattered rales  CVS: nl S1/S2, irregular, wound c/d/i  Abd: soft, nt, nd, +bs  Ext: no edema, +pulses  Skin: well perfused, warm      Meds:    diltiazem    Tablet Oral  metoprolol tartrate Oral  predniSONE   Tablet Oral  buDESOnide   0.5 milliGRAM(s) Respule Inhalation  loratadine Oral  tiotropium 18 MICROgram(s) Capsule Inhalation  aspirin  chewable Oral  enoxaparin Injectable SubCutaneous  magnesium oxide Oral  sodium chloride Oral  influenza   Vaccine IntraMuscular  chlorhexidine 2% Cloths Topical  nicotine - 21 mG/24Hr(s) Patch Transdermal                            11.5   12.26 )-----------( 196      ( 17 Dec 2018 05:48 )             33.4       12-17    130<L>  |  91<L>  |  10  ----------------------------<  108<H>  3.6   |  32<H>  |  0.49<L>    Ca    8.1<L>      17 Dec 2018 05:48  Phos  4.4     12-17  Mg     1.9     12-17    TPro  6.0  /  Alb  3.1<L>  /  TBili  1.2  /  DBili  x   /  AST  25  /  ALT  38  /  AlkPhos  79  12-16          PT/INR - ( 16 Dec 2018 05:39 )   PT: 15.2 sec;   INR: 1.32 ratio         PTT - ( 16 Dec 2018 05:39 )  PTT:29.7 sec    .Body Fluid Pericardial Fluid   No growth   polymorphonuclear leukocytes seen per low power field  No organisms seen per oil power field  by cytocentrifuge 12-15 @ 13:46        CENTRAL LINE: no    BENAVIDES: no    A-LINE: no    GLOBAL ISSUE/BEST PRACTICE:  Analgesia: yes  Sedation: no  HOB elevation: yes  Stress ulcer prophylaxis: no  VTE prophylaxis: yes  Glycemic control: no  Nutrition: yes      CODE STATUS: Full  GOC discussion: Y

## 2018-12-18 NOTE — PROGRESS NOTE ADULT - PROBLEM SELECTOR PLAN 1
CT Brain  neuro eval
CT Brain  neuro eval with Dr. CLIFFORD  likely vasovagal
CT Brain  neuro eval with Dr. CLIFFORD appreciated  likely vasovagal
CT Brain  neuro eval with Dr. CLIFFORD appreciated  likely vasovagal
CT Brain, check MRI  neuro eval with Dr. CLIFFORD appreciated  likely vasovagal
CT Brain, check MRI to r/o mets  neuro eval with Dr. CLIFFORD appreciated  likely vasovagal
-Drain removed  -Plan as per ICU

## 2018-12-18 NOTE — PHYSICAL THERAPY INITIAL EVALUATION ADULT - PERTINENT HX OF CURRENT PROBLEM, REHAB EVAL
As per H&P:"RAFA SHAFER is a 58 year old male brought to ER after having severe cough for 2-3 days. Before patient's wife brought him to the ER after he coughed severely, fell to the floor, and had a seizure like activity. The patient have hyponatremia on ER blood work. Denies a history of CHF and liver diease. He is an active smoker. Admits to poor intake for the past 2-3 days."

## 2018-12-18 NOTE — PROGRESS NOTE ADULT - SUBJECTIVE AND OBJECTIVE BOX
[facility] -- Gurpreet Bush, Emily, Corey, Sajan Abebe Savella  Office # [phone]      Follow Up:    af, tamponade, malignancy pending workup  Subjective/Observations:   No events overnight resting comfortably in chair.  No complaints of chest pain, dyspnea, or palpitations reported. No signs of orthopnea or PND.  Tachy to 130 with ambulation w/ physical therapy    REVIEW OF SYSTEMS: All other review of systems is negative unless indicated above    PAST MEDICAL & SURGICAL HISTORY:  HTN (hypertension)  No significant past surgical history      MEDICATIONS  (STANDING):  aspirin  chewable 81 milliGRAM(s) Oral daily  buDESOnide   0.5 milliGRAM(s) Respule 0.5 milliGRAM(s) Inhalation two times a day  chlorhexidine 2% Cloths 1 Application(s) Topical two times a day  diltiazem    Tablet 60 milliGRAM(s) Oral every 6 hours  enoxaparin Injectable 40 milliGRAM(s) SubCutaneous daily  influenza   Vaccine 0.5 milliLiter(s) IntraMuscular once  loratadine 10 milliGRAM(s) Oral daily  magnesium oxide 400 milliGRAM(s) Oral daily  metoprolol tartrate 50 milliGRAM(s) Oral every 6 hours  nicotine - 21 mG/24Hr(s) Patch 1 Patch Transdermal daily  sodium chloride 2 Gram(s) Oral three times a day  tiotropium 18 MICROgram(s) Capsule 1 Capsule(s) Inhalation daily    MEDICATIONS  (PRN):      Allergies    penicillin (Unknown)    Intolerances        Vital Signs Last 24 Hrs  T(C): 36.5 (18 Dec 2018 08:00), Max: 36.9 (17 Dec 2018 20:24)  T(F): 97.7 (18 Dec 2018 08:00), Max: 98.4 (17 Dec 2018 20:24)  HR: 115 (18 Dec 2018 10:15) (80 - 129)  BP: 113/69 (18 Dec 2018 08:00) (104/66 - 129/81)  BP(mean): 90 (17 Dec 2018 23:00) (78 - 111)  RR: 19 (18 Dec 2018 04:32) (18 - 37)  SpO2: 96% (18 Dec 2018 10:15) (95% - 100%)    I&O's Summary    17 Dec 2018 07:01  -  18 Dec 2018 07:00  --------------------------------------------------------  IN: 305 mL / OUT: 750 mL / NET: -445 mL          PHYSICAL EXAM:  TELE: afib No events of tele overnight   Constitutional: NAD, awake and alert, well-developed  HEENT: Moist Mucous Membranes, Anicteric  Pulmonary: Non-labored, breath sounds are clear bilaterally, No wheezing, crackles or rhonchi  Cardiovascular: Regular, S1 and S2 nl, No murmurs, rubs, gallops or clicks  Gastrointestinal: Bowel Sounds present, soft, nontender.   Lymph: No lymphadenopathy. No peripheral edema.  Skin: No visible rashes or ulcers. exphoid puncture site DSD not signs of bleeding  Psych:  Mood & affect appropriate    LABS: All Labs Reviewed:                        12.3   12.44 )-----------( 221      ( 18 Dec 2018 06:42 )             35.1                         11.5   12.26 )-----------( 196      ( 17 Dec 2018 05:48 )             33.4                         11.9   12.95 )-----------( 202      ( 16 Dec 2018 05:39 )             34.2     18 Dec 2018 06:42    129    |  91     |  11     ----------------------------<  109    3.9     |  29     |  0.52   17 Dec 2018 05:48    130    |  91     |  10     ----------------------------<  108    3.6     |  32     |  0.49   16 Dec 2018 14:51    128    |  90     |  10     ----------------------------<  152    4.2     |  32     |  0.63     Ca    8.3        18 Dec 2018 06:42  Ca    8.1        17 Dec 2018 05:48  Ca    8.2        16 Dec 2018 14:51  Phos  4.0       18 Dec 2018 06:42  Phos  4.4       17 Dec 2018 05:48  Phos  4.1       16 Dec 2018 05:39  Mg     1.9       18 Dec 2018 06:42  Mg     1.9       17 Dec 2018 05:48  Mg     2.0       16 Dec 2018 05:39    TPro  6.2    /  Alb  3.0    /  TBili  1.0    /  DBili  x      /  AST  31     /  ALT  59     /  AlkPhos  75     18 Dec 2018 06:42  TPro  6.0    /  Alb  3.1    /  TBili  1.2    /  DBili  x      /  AST  25     /  ALT  38     /  AlkPhos  79     16 Dec 2018 05:39             ECG:  < from: 12 Lead ECG (12.14.18 @ 09:41) >  Ventricular Rate 116 BPM    Atrial Rate 120 BPM    QRS Duration 102 ms    Q-T Interval 328 ms    QTC Calculation(Bezet) 455 ms    R Axis 61 degrees    T Axis -10 degrees    Diagnosis Line Atrial fibrillation  Abnormal ECG  When compared with ECG of14-DEC-2018 03:03, (Unconfirmed)  No significant change was found  Confirmed by KWAN AGUAYO ([id number]) on 12/15/2018 10:15:08 AM    < end of copied text >    Echo:  < from: TTE Echo Doppler w/o Cont (12.14.18 @ 12:28) >  EXAM:  ECHO TTE WO CON COMP W DOPPLR         PROCEDURE DATE:  12/14/2018        INTERPRETATION:  INDICATION: AFIB  Referring M.D.:Alamuri  Blood Pressure 134/85        Weight (kg) :113     Height (cm):185       BSA (sq m): 2.37  Technician: AS    Dimensions:    LA 4.7       Normal Values: 2.0 - 4.0 cm    Ao 3.6        Normal Values: 2.0 - 3.8 cm  SEPTUM 0.9       Normal Values: 0.6 - 1.2 cm  PWT 1.0       Normal Values: 0.6 - 1.1 cm  LVIDd 5.3         Normal Values: 3.0 - 5.6 cm  LVIDs 3.2    Normal Values: 1.8 - 4.0 cm      OBSERVATIONS:  Technically difficult study  Mitral Valve: Mac with normal mitral valve leaflet opening. Trace mitral   regurgitation.  Aortic Valve/Aorta: The aortic valve is not well visualized. Appears to   opennormally  Tricuspid Valve: Normal tricuspid valve. Trace tricuspid regurgitation.  Pulmonic Valve: The pulmonic valve is not well visualized. Probably   normal.  Left Atrium: Mildly enlarged  Right Atrium: Normal  Left Ventricle: Overall preserved left ventricular systolic function. The   EF is approximately 65-70%.  Right Ventricle: Normal right ventricular size and function.  Pericardium/Pleura: Thickened pericardium. Large pericardial effusion   that appears to be circumferential with the largest diameter at 2.1 cm   surrounding the apical lateral wall area. The atrial fibrillation   fibrillation makes assessment of tamponade physiology difficult. However   there is greater than 25% difference in the mitral E-wave inflow.  The   IVC measures 2.4 cm shows no signs of respiratory collapse. These   findings are consistent with early cardiographic tamponade findings.   Appears to be some layering of heterogeneous material in the pericardial   space consistent with possibly an inflammatory/malignant process.  Pulmonary/RV Pressure: Insufficient tricuspid regurgitation Doppler in   order to estimate the right ventricular systolic pressure      Conclusion: Technically difficult. Overall preserved left ventricular   systolic function. The EF is approximately 65-70%. Thickened pericardium.   Large pericardial effusion that appears to be circumferential with the   largest diameter at 2.1 cm surrounding the apical lateral wall area. The   atrial fibrillation fibrillation makes assessment of tamponade physiology   difficult. However there is greater than 25% difference in the mitral   E-wave inflow.  The IVC measures 2.4 cm shows no signs of respiratory   collapse. These findings are consistent with early cardiographic   tamponade findings. Appears to be some layering of heterogeneous material   in the pericardial space consistent with possibly an   inflammatory/malignant process.      KWAN AGUAYO M.D., ATTENDING CARDIOLOGIST  This document has been electronically signed. Dec 15 2018  7:57AM    < end of copied text >    Radiology:  < from: Xray Chest 1 View-PORTABLE IMMEDIATE (12.15.18 @ 10:21) >  EXAM:  XR CHEST PORTABLE IMMED 1V                            PROCEDURE DATE:  12/15/2018          INTERPRETATION:  History: Pericardial effusion status post pericardial   window    Semiupright portable chest radiograph compared to 12/12/2018.    The heart is again enlarged. The trachea is midline. There is no focal   infiltrate or pleural effusion. There is again widening of the superior   mediastinum consistent with known adenopathy. The osseous structures are   unremarkable.    Impression: Extensive mediastinal lymphadenopathy. No significant change.          MAIRA GANNON M.D.,ATTENDING RADIOLOGIST  This document has been electronically signed. Dec 15 2018 11:08AM    < end of copied text >           Jermaine Dave [facility]   Cardiology [facility] -- Gurpreet Bush, Emily, Corey, Sajan Abebe Savella  Office # [phone]      Follow Up:    af, tamponade, malignancy pending workup  Subjective/Observations:   No events overnight resting comfortably in chair.  No complaints of chest pain, dyspnea, or palpitations reported. No signs of orthopnea or PND.  Tachy to 130 with ambulation w/ physical therapy    REVIEW OF SYSTEMS: All other review of systems is negative unless indicated above    PAST MEDICAL & SURGICAL HISTORY:  HTN (hypertension)  No significant past surgical history      MEDICATIONS  (STANDING):  aspirin  chewable 81 milliGRAM(s) Oral daily  buDESOnide   0.5 milliGRAM(s) Respule 0.5 milliGRAM(s) Inhalation two times a day  chlorhexidine 2% Cloths 1 Application(s) Topical two times a day  diltiazem    Tablet 60 milliGRAM(s) Oral every 6 hours  enoxaparin Injectable 40 milliGRAM(s) SubCutaneous daily  influenza   Vaccine 0.5 milliLiter(s) IntraMuscular once  loratadine 10 milliGRAM(s) Oral daily  magnesium oxide 400 milliGRAM(s) Oral daily  metoprolol tartrate 50 milliGRAM(s) Oral every 6 hours  nicotine - 21 mG/24Hr(s) Patch 1 Patch Transdermal daily  sodium chloride 2 Gram(s) Oral three times a day  tiotropium 18 MICROgram(s) Capsule 1 Capsule(s) Inhalation daily    MEDICATIONS  (PRN):      Allergies    penicillin (Unknown)    Intolerances        Vital Signs Last 24 Hrs  T(C): 36.5 (18 Dec 2018 08:00), Max: 36.9 (17 Dec 2018 20:24)  T(F): 97.7 (18 Dec 2018 08:00), Max: 98.4 (17 Dec 2018 20:24)  HR: 115 (18 Dec 2018 10:15) (80 - 129)  BP: 113/69 (18 Dec 2018 08:00) (104/66 - 129/81)  BP(mean): 90 (17 Dec 2018 23:00) (78 - 111)  RR: 19 (18 Dec 2018 04:32) (18 - 37)  SpO2: 96% (18 Dec 2018 10:15) (95% - 100%)    I&O's Summary    17 Dec 2018 07:01  -  18 Dec 2018 07:00  --------------------------------------------------------  IN: 305 mL / OUT: 750 mL / NET: -445 mL          PHYSICAL EXAM:  TELE: afib No events of tele overnight   Constitutional: NAD, awake and alert, well-developed  HEENT: Moist Mucous Membranes, Anicteric  Pulmonary: Non-labored, breath sounds are clear bilaterally, No wheezing, crackles or rhonchi  Cardiovascular: Regular, S1 and S2 nl, No murmurs, rubs, gallops or clicks  Gastrointestinal: Bowel Sounds present, soft, nontender.   Lymph: No lymphadenopathy. Bilat LE peripheral edema.  Skin: No visible rashes or ulcers. exphoid puncture site DSD not signs of bleeding  Psych:  Mood & affect appropriate    LABS: All Labs Reviewed:                        12.3   12.44 )-----------( 221      ( 18 Dec 2018 06:42 )             35.1                         11.5   12.26 )-----------( 196      ( 17 Dec 2018 05:48 )             33.4                         11.9   12.95 )-----------( 202      ( 16 Dec 2018 05:39 )             34.2     18 Dec 2018 06:42    129    |  91     |  11     ----------------------------<  109    3.9     |  29     |  0.52   17 Dec 2018 05:48    130    |  91     |  10     ----------------------------<  108    3.6     |  32     |  0.49   16 Dec 2018 14:51    128    |  90     |  10     ----------------------------<  152    4.2     |  32     |  0.63     Ca    8.3        18 Dec 2018 06:42  Ca    8.1        17 Dec 2018 05:48  Ca    8.2        16 Dec 2018 14:51  Phos  4.0       18 Dec 2018 06:42  Phos  4.4       17 Dec 2018 05:48  Phos  4.1       16 Dec 2018 05:39  Mg     1.9       18 Dec 2018 06:42  Mg     1.9       17 Dec 2018 05:48  Mg     2.0       16 Dec 2018 05:39    TPro  6.2    /  Alb  3.0    /  TBili  1.0    /  DBili  x      /  AST  31     /  ALT  59     /  AlkPhos  75     18 Dec 2018 06:42  TPro  6.0    /  Alb  3.1    /  TBili  1.2    /  DBili  x      /  AST  25     /  ALT  38     /  AlkPhos  79     16 Dec 2018 05:39             ECG:  < from: 12 Lead ECG (12.14.18 @ 09:41) >  Ventricular Rate 116 BPM    Atrial Rate 120 BPM    QRS Duration 102 ms    Q-T Interval 328 ms    QTC Calculation(Bezet) 455 ms    R Axis 61 degrees    T Axis -10 degrees    Diagnosis Line Atrial fibrillation  Abnormal ECG  When compared with ECG of14-DEC-2018 03:03, (Unconfirmed)  No significant change was found  Confirmed by KWAN AGUAYO ([id number]) on 12/15/2018 10:15:08 AM    < end of copied text >    Echo:  < from: TTE Echo Doppler w/o Cont (12.14.18 @ 12:28) >  EXAM:  ECHO TTE WO CON COMP W DOPPLR         PROCEDURE DATE:  12/14/2018        INTERPRETATION:  INDICATION: AFIB  Referring M.D.:Alamuri  Blood Pressure 134/85        Weight (kg) :113     Height (cm):185       BSA (sq m): 2.37  Technician: AS    Dimensions:    LA 4.7       Normal Values: 2.0 - 4.0 cm    Ao 3.6        Normal Values: 2.0 - 3.8 cm  SEPTUM 0.9       Normal Values: 0.6 - 1.2 cm  PWT 1.0       Normal Values: 0.6 - 1.1 cm  LVIDd 5.3         Normal Values: 3.0 - 5.6 cm  LVIDs 3.2    Normal Values: 1.8 - 4.0 cm      OBSERVATIONS:  Technically difficult study  Mitral Valve: Mac with normal mitral valve leaflet opening. Trace mitral   regurgitation.  Aortic Valve/Aorta: The aortic valve is not well visualized. Appears to   opennormally  Tricuspid Valve: Normal tricuspid valve. Trace tricuspid regurgitation.  Pulmonic Valve: The pulmonic valve is not well visualized. Probably   normal.  Left Atrium: Mildly enlarged  Right Atrium: Normal  Left Ventricle: Overall preserved left ventricular systolic function. The   EF is approximately 65-70%.  Right Ventricle: Normal right ventricular size and function.  Pericardium/Pleura: Thickened pericardium. Large pericardial effusion   that appears to be circumferential with the largest diameter at 2.1 cm   surrounding the apical lateral wall area. The atrial fibrillation   fibrillation makes assessment of tamponade physiology difficult. However   there is greater than 25% difference in the mitral E-wave inflow.  The   IVC measures 2.4 cm shows no signs of respiratory collapse. These   findings are consistent with early cardiographic tamponade findings.   Appears to be some layering of heterogeneous material in the pericardial   space consistent with possibly an inflammatory/malignant process.  Pulmonary/RV Pressure: Insufficient tricuspid regurgitation Doppler in   order to estimate the right ventricular systolic pressure      Conclusion: Technically difficult. Overall preserved left ventricular   systolic function. The EF is approximately 65-70%. Thickened pericardium.   Large pericardial effusion that appears to be circumferential with the   largest diameter at 2.1 cm surrounding the apical lateral wall area. The   atrial fibrillation fibrillation makes assessment of tamponade physiology   difficult. However there is greater than 25% difference in the mitral   E-wave inflow.  The IVC measures 2.4 cm shows no signs of respiratory   collapse. These findings are consistent with early cardiographic   tamponade findings. Appears to be some layering of heterogeneous material   in the pericardial space consistent with possibly an   inflammatory/malignant process.      KWAN AGUAYO M.D., ATTENDING CARDIOLOGIST  This document has been electronically signed. Dec 15 2018  7:57AM    < end of copied text >    Radiology:  < from: Xray Chest 1 View-PORTABLE IMMEDIATE (12.15.18 @ 10:21) >  EXAM:  XR CHEST PORTABLE IMMED 1V                            PROCEDURE DATE:  12/15/2018          INTERPRETATION:  History: Pericardial effusion status post pericardial   window    Semiupright portable chest radiograph compared to 12/12/2018.    The heart is again enlarged. The trachea is midline. There is no focal   infiltrate or pleural effusion. There is again widening of the superior   mediastinum consistent with known adenopathy. The osseous structures are   unremarkable.    Impression: Extensive mediastinal lymphadenopathy. No significant change.          MAIRA GANNON M.D.,ATTENDING RADIOLOGIST  This document has been electronically signed. Dec 15 2018 11:08AM    < end of copied text >           Jermaine Dave [facility]   Cardiology

## 2018-12-18 NOTE — PHYSICAL THERAPY INITIAL EVALUATION ADULT - ANKLE STRATEGY ASSESSMENT, REHAB EVAL
SaO2 at rest 99% /c 3 L of O2, at rest 98% room air and after ambulation 95-96% room air and HR max 137 bpm (A-fib)

## 2018-12-18 NOTE — PROGRESS NOTE ADULT - ASSESSMENT
59 yo M with PMH of HTN presents with [facility].     Course has been complicated by findings of hilar adenopathy on CT. Developed AF with RVR. Large pericardial effusion with signs of increased pericardial pressures on echo. Now s/p pericardial window with 650cc of blood fluid drained on 12/15.  S/p pericardial drain    - pt with likely malignant process given clinical senario.   - await path from pericardium      - Rapid AF.   - Cont  metoprolol 50mg PO q6  -  C/w cardizem 60 q6 for now, can uptitrate as needed.    - CHADS-VaSc = 1. Would at this point just give ASA, though he is presumably hypercoagulable on the basis of malignancy.     - Maintain adequately hydrated.   - Will need pulm f/u    -Monitor and replete lytes, keep K>4 and Mg >2  - All other workup per primary team  Thank you for the consult  Will continue to follow  Jermaine LOVETT  Cardiology 57 yo M with PMH of HTN presents with PARKS.     Course has been complicated by findings of hilar adenopathy on CT. Developed AF with RVR. Large pericardial effusion with signs of increased pericardial pressures on echo. Now s/p pericardial window with 650cc of blood fluid drained on 12/15.  S/p pericardial drain    - pt with likely malignant process given clinical senario.   - await path from pericardium  - Drain d/c yesterday.  Repeat echocardiogram to assess for reaccumulation.  There is no evidence to this clinically.      - Rapid AF.   - Cont  metoprolol 50mg PO q6  -  C/w cardizem 60 q6 for now, can uptitrate as needed.    - CHADS-VaSc = 1. Would at this point just give ASA, though he is presumably hypercoagulable on the basis of presumed malignancy  - May consider AC in the future.  It is too high risk to start at the present time    - Maintain adequately hydrated.   - Will need pulm f/u    -Monitor and replete lytes, keep K>4 and Mg >2  - All other workup per primary team  Thank you for the consult  Will continue to follow  Jermaine LOVETT  Cardiology

## 2018-12-18 NOTE — PHYSICAL THERAPY INITIAL EVALUATION ADULT - GENERAL OBSERVATIONS, REHAB EVAL
Pt rec'd sitting in chair in room in tele /c NAD or c/o. Pt on 3 L of O2 via nasal canula and eager to ambulate and participate /c PT consult.

## 2018-12-19 VITALS — HEART RATE: 68 BPM | SYSTOLIC BLOOD PRESSURE: 133 MMHG | DIASTOLIC BLOOD PRESSURE: 75 MMHG

## 2018-12-19 LAB
ANION GAP SERPL CALC-SCNC: 8 MMOL/L — SIGNIFICANT CHANGE UP (ref 5–17)
BUN SERPL-MCNC: 13 MG/DL — SIGNIFICANT CHANGE UP (ref 7–23)
CALCIUM SERPL-MCNC: 8.2 MG/DL — LOW (ref 8.5–10.1)
CHLORIDE SERPL-SCNC: 93 MMOL/L — LOW (ref 96–108)
CO2 SERPL-SCNC: 29 MMOL/L — SIGNIFICANT CHANGE UP (ref 22–31)
CREAT SERPL-MCNC: 0.47 MG/DL — LOW (ref 0.5–1.3)
GLUCOSE SERPL-MCNC: 96 MG/DL — SIGNIFICANT CHANGE UP (ref 70–99)
HCT VFR BLD CALC: 35.7 % — LOW (ref 39–50)
HGB BLD-MCNC: 12 G/DL — LOW (ref 13–17)
MCHC RBC-ENTMCNC: 28.2 PG — SIGNIFICANT CHANGE UP (ref 27–34)
MCHC RBC-ENTMCNC: 33.6 GM/DL — SIGNIFICANT CHANGE UP (ref 32–36)
MCV RBC AUTO: 83.8 FL — SIGNIFICANT CHANGE UP (ref 80–100)
NRBC # BLD: 0 /100 WBCS — SIGNIFICANT CHANGE UP (ref 0–0)
PLATELET # BLD AUTO: 232 K/UL — SIGNIFICANT CHANGE UP (ref 150–400)
POTASSIUM SERPL-MCNC: 3.9 MMOL/L — SIGNIFICANT CHANGE UP (ref 3.5–5.3)
POTASSIUM SERPL-SCNC: 3.9 MMOL/L — SIGNIFICANT CHANGE UP (ref 3.5–5.3)
RBC # BLD: 4.26 M/UL — SIGNIFICANT CHANGE UP (ref 4.2–5.8)
RBC # FLD: 12.5 % — SIGNIFICANT CHANGE UP (ref 10.3–14.5)
SODIUM SERPL-SCNC: 130 MMOL/L — LOW (ref 135–145)
WBC # BLD: 10.34 K/UL — SIGNIFICANT CHANGE UP (ref 3.8–10.5)
WBC # FLD AUTO: 10.34 K/UL — SIGNIFICANT CHANGE UP (ref 3.8–10.5)

## 2018-12-19 PROCEDURE — 84480 ASSAY TRIIODOTHYRONINE (T3): CPT

## 2018-12-19 PROCEDURE — 81003 URINALYSIS AUTO W/O SCOPE: CPT

## 2018-12-19 PROCEDURE — 93971 EXTREMITY STUDY: CPT

## 2018-12-19 PROCEDURE — 83605 ASSAY OF LACTIC ACID: CPT

## 2018-12-19 PROCEDURE — 88311 DECALCIFY TISSUE: CPT

## 2018-12-19 PROCEDURE — 84550 ASSAY OF BLOOD/URIC ACID: CPT

## 2018-12-19 PROCEDURE — 99221 1ST HOSP IP/OBS SF/LOW 40: CPT

## 2018-12-19 PROCEDURE — A9579: CPT

## 2018-12-19 PROCEDURE — 71250 CT THORAX DX C-: CPT

## 2018-12-19 PROCEDURE — 93306 TTE W/DOPPLER COMPLETE: CPT | Mod: 26

## 2018-12-19 PROCEDURE — 88108 CYTOPATH CONCENTRATE TECH: CPT

## 2018-12-19 PROCEDURE — 89051 BODY FLUID CELL COUNT: CPT

## 2018-12-19 PROCEDURE — 84300 ASSAY OF URINE SODIUM: CPT

## 2018-12-19 PROCEDURE — 84484 ASSAY OF TROPONIN QUANT: CPT

## 2018-12-19 PROCEDURE — 99232 SBSQ HOSP IP/OBS MODERATE 35: CPT

## 2018-12-19 PROCEDURE — 86850 RBC ANTIBODY SCREEN: CPT

## 2018-12-19 PROCEDURE — 70450 CT HEAD/BRAIN W/O DYE: CPT

## 2018-12-19 PROCEDURE — 87486 CHLMYD PNEUM DNA AMP PROBE: CPT

## 2018-12-19 PROCEDURE — 86901 BLOOD TYPING SEROLOGIC RH(D): CPT

## 2018-12-19 PROCEDURE — 87206 SMEAR FLUORESCENT/ACID STAI: CPT

## 2018-12-19 PROCEDURE — 83880 ASSAY OF NATRIURETIC PEPTIDE: CPT

## 2018-12-19 PROCEDURE — 97110 THERAPEUTIC EXERCISES: CPT

## 2018-12-19 PROCEDURE — 94640 AIRWAY INHALATION TREATMENT: CPT

## 2018-12-19 PROCEDURE — 94760 N-INVAS EAR/PLS OXIMETRY 1: CPT

## 2018-12-19 PROCEDURE — 80053 COMPREHEN METABOLIC PANEL: CPT

## 2018-12-19 PROCEDURE — 96365 THER/PROPH/DIAG IV INF INIT: CPT

## 2018-12-19 PROCEDURE — 97116 GAIT TRAINING THERAPY: CPT

## 2018-12-19 PROCEDURE — 84100 ASSAY OF PHOSPHORUS: CPT

## 2018-12-19 PROCEDURE — 82533 TOTAL CORTISOL: CPT

## 2018-12-19 PROCEDURE — 88305 TISSUE EXAM BY PATHOLOGIST: CPT

## 2018-12-19 PROCEDURE — 83930 ASSAY OF BLOOD OSMOLALITY: CPT

## 2018-12-19 PROCEDURE — 87015 SPECIMEN INFECT AGNT CONCNTJ: CPT

## 2018-12-19 PROCEDURE — 99285 EMERGENCY DEPT VISIT HI MDM: CPT | Mod: 25

## 2018-12-19 PROCEDURE — 93005 ELECTROCARDIOGRAM TRACING: CPT

## 2018-12-19 PROCEDURE — 86900 BLOOD TYPING SEROLOGIC ABO: CPT

## 2018-12-19 PROCEDURE — 71046 X-RAY EXAM CHEST 2 VIEWS: CPT

## 2018-12-19 PROCEDURE — 96375 TX/PRO/DX INJ NEW DRUG ADDON: CPT

## 2018-12-19 PROCEDURE — 84443 ASSAY THYROID STIM HORMONE: CPT

## 2018-12-19 PROCEDURE — 85610 PROTHROMBIN TIME: CPT

## 2018-12-19 PROCEDURE — 87798 DETECT AGENT NOS DNA AMP: CPT

## 2018-12-19 PROCEDURE — 80048 BASIC METABOLIC PNL TOTAL CA: CPT

## 2018-12-19 PROCEDURE — 83615 LACTATE (LD) (LDH) ENZYME: CPT

## 2018-12-19 PROCEDURE — 85027 COMPLETE CBC AUTOMATED: CPT

## 2018-12-19 PROCEDURE — 83935 ASSAY OF URINE OSMOLALITY: CPT

## 2018-12-19 PROCEDURE — 88304 TISSUE EXAM BY PATHOLOGIST: CPT

## 2018-12-19 PROCEDURE — 36415 COLL VENOUS BLD VENIPUNCTURE: CPT

## 2018-12-19 PROCEDURE — 85379 FIBRIN DEGRADATION QUANT: CPT

## 2018-12-19 PROCEDURE — 83036 HEMOGLOBIN GLYCOSYLATED A1C: CPT

## 2018-12-19 PROCEDURE — 87116 MYCOBACTERIA CULTURE: CPT

## 2018-12-19 PROCEDURE — 85730 THROMBOPLASTIN TIME PARTIAL: CPT

## 2018-12-19 PROCEDURE — 82553 CREATINE MB FRACTION: CPT

## 2018-12-19 PROCEDURE — 82550 ASSAY OF CK (CPK): CPT

## 2018-12-19 PROCEDURE — 87633 RESP VIRUS 12-25 TARGETS: CPT

## 2018-12-19 PROCEDURE — 84145 PROCALCITONIN (PCT): CPT

## 2018-12-19 PROCEDURE — 70553 MRI BRAIN STEM W/O & W/DYE: CPT

## 2018-12-19 PROCEDURE — 87581 M.PNEUMON DNA AMP PROBE: CPT

## 2018-12-19 PROCEDURE — 84133 ASSAY OF URINE POTASSIUM: CPT

## 2018-12-19 PROCEDURE — 84436 ASSAY OF TOTAL THYROXINE: CPT

## 2018-12-19 PROCEDURE — 87040 BLOOD CULTURE FOR BACTERIA: CPT

## 2018-12-19 PROCEDURE — 87102 FUNGUS ISOLATION CULTURE: CPT

## 2018-12-19 PROCEDURE — 87075 CULTR BACTERIA EXCEPT BLOOD: CPT

## 2018-12-19 PROCEDURE — 71045 X-RAY EXAM CHEST 1 VIEW: CPT

## 2018-12-19 PROCEDURE — 70553 MRI BRAIN STEM W/O & W/DYE: CPT | Mod: 26

## 2018-12-19 PROCEDURE — 93306 TTE W/DOPPLER COMPLETE: CPT

## 2018-12-19 PROCEDURE — 87070 CULTURE OTHR SPECIMN AEROBIC: CPT

## 2018-12-19 PROCEDURE — 97162 PT EVAL MOD COMPLEX 30 MIN: CPT

## 2018-12-19 PROCEDURE — 87205 SMEAR GRAM STAIN: CPT

## 2018-12-19 PROCEDURE — 83735 ASSAY OF MAGNESIUM: CPT

## 2018-12-19 RX ORDER — DILTIAZEM HCL 120 MG
1 CAPSULE, EXT RELEASE 24 HR ORAL
Qty: 30 | Refills: 0 | OUTPATIENT
Start: 2018-12-19 | End: 2019-01-17

## 2018-12-19 RX ORDER — METOPROLOL TARTRATE 50 MG
1 TABLET ORAL
Qty: 30 | Refills: 0
Start: 2018-12-19 | End: 2019-01-17

## 2018-12-19 RX ORDER — SODIUM CHLORIDE 9 MG/ML
2 INJECTION INTRAMUSCULAR; INTRAVENOUS; SUBCUTANEOUS
Qty: 180 | Refills: 0 | OUTPATIENT
Start: 2018-12-19 | End: 2019-01-17

## 2018-12-19 RX ORDER — ALBUTEROL 90 UG/1
2 AEROSOL, METERED ORAL
Qty: 1 | Refills: 0 | OUTPATIENT
Start: 2018-12-19

## 2018-12-19 RX ORDER — FLUTICASONE PROPIONATE AND SALMETEROL 50; 250 UG/1; UG/1
1 POWDER ORAL; RESPIRATORY (INHALATION)
Qty: 1 | Refills: 0
Start: 2018-12-19 | End: 2019-01-17

## 2018-12-19 RX ORDER — ASPIRIN/CALCIUM CARB/MAGNESIUM 324 MG
1 TABLET ORAL
Qty: 0 | Refills: 0 | DISCHARGE
Start: 2018-12-19

## 2018-12-19 RX ORDER — MAGNESIUM OXIDE 400 MG ORAL TABLET 241.3 MG
1 TABLET ORAL
Qty: 30 | Refills: 0 | OUTPATIENT
Start: 2018-12-19 | End: 2019-01-17

## 2018-12-19 RX ORDER — TIOTROPIUM BROMIDE 18 UG/1
1 CAPSULE ORAL; RESPIRATORY (INHALATION)
Qty: 30 | Refills: 0 | OUTPATIENT
Start: 2018-12-19 | End: 2019-01-17

## 2018-12-19 RX ORDER — METOPROLOL TARTRATE 50 MG
1 TABLET ORAL
Qty: 0 | Refills: 0 | COMMUNITY

## 2018-12-19 RX ADMIN — LORATADINE 10 MILLIGRAM(S): 10 TABLET ORAL at 12:02

## 2018-12-19 RX ADMIN — Medication 50 MILLIGRAM(S): at 17:32

## 2018-12-19 RX ADMIN — Medication 1 PATCH: at 07:30

## 2018-12-19 RX ADMIN — Medication 0.5 MILLIGRAM(S): at 07:28

## 2018-12-19 RX ADMIN — ENOXAPARIN SODIUM 40 MILLIGRAM(S): 100 INJECTION SUBCUTANEOUS at 12:02

## 2018-12-19 RX ADMIN — Medication 81 MILLIGRAM(S): at 12:02

## 2018-12-19 RX ADMIN — SODIUM CHLORIDE 2 GRAM(S): 9 INJECTION INTRAMUSCULAR; INTRAVENOUS; SUBCUTANEOUS at 05:38

## 2018-12-19 RX ADMIN — Medication 50 MILLIGRAM(S): at 12:02

## 2018-12-19 RX ADMIN — MAGNESIUM OXIDE 400 MG ORAL TABLET 400 MILLIGRAM(S): 241.3 TABLET ORAL at 12:02

## 2018-12-19 RX ADMIN — Medication 1 PATCH: at 11:50

## 2018-12-19 RX ADMIN — CHLORHEXIDINE GLUCONATE 1 APPLICATION(S): 213 SOLUTION TOPICAL at 17:33

## 2018-12-19 RX ADMIN — Medication 50 MILLIGRAM(S): at 05:38

## 2018-12-19 RX ADMIN — Medication 1 PATCH: at 12:10

## 2018-12-19 RX ADMIN — Medication 50 MILLIGRAM(S): at 00:46

## 2018-12-19 RX ADMIN — CHLORHEXIDINE GLUCONATE 1 APPLICATION(S): 213 SOLUTION TOPICAL at 05:38

## 2018-12-19 RX ADMIN — TIOTROPIUM BROMIDE 1 CAPSULE(S): 18 CAPSULE ORAL; RESPIRATORY (INHALATION) at 12:03

## 2018-12-19 RX ADMIN — SODIUM CHLORIDE 2 GRAM(S): 9 INJECTION INTRAMUSCULAR; INTRAVENOUS; SUBCUTANEOUS at 15:03

## 2018-12-19 NOTE — PROGRESS NOTE ADULT - ASSESSMENT
Hyponatremia  Coughing  Possible Seizure  HTN  +Smoker  Pericardial tamponade s/p window   Hilar mass?? adenopathy noted    -Sodium was improving faster than optimal s/p Tolvaptan; received D5W which stabilized sodium  -Last sodium measured to be 130; acceptable if remains above 128  -Oral fluid restriction to 1L for now  -Continue with NaCl tabs 2gm TID for now  -S/p Lasix 20mg x 1  -No IVF needed  -Smoking cessation discussed  -Increase protein intake; no salt restriction in diet  -BP is stable; continue with current regimen  -Follow up pericardial fluid pathology   -Pulm follow up noted    Thank you

## 2018-12-19 NOTE — PROGRESS NOTE ADULT - REASON FOR ADMISSION
Intractable cough and Near Syncope

## 2018-12-19 NOTE — PROGRESS NOTE ADULT - SUBJECTIVE AND OBJECTIVE BOX
NEPHROLOGY INTERVAL HPI/OVERNIGHT EVENTS:  HPI:  RAFA SHAFER is a 58 year old male brought to ER after having severe cough for 2-3 days. Before patient's wife brought him to the ER after he coughed severely, fell to the floor, and had a seizure like activity. The patient have hyponatremia on ER blood work. Denies a history of CHF and liver diease. He is an active smoker. Admits to poor intake for the past 2-3 days. Denies imbalance, frequent falls, dizziness, lightheadedness, abd symptoms, urinary symptoms.   Past medical history of HTN, DM2. (13 Dec 2018 05:59)    Follow up Hyponatremia/SIADH  No complaints    PAST MEDICAL & SURGICAL HISTORY:  HTN (hypertension)  No significant past surgical history      MEDICATIONS  (STANDING):  aspirin  chewable 81 milliGRAM(s) Oral daily  buDESOnide   0.5 milliGRAM(s) Respule 0.5 milliGRAM(s) Inhalation two times a day  chlorhexidine 2% Cloths 1 Application(s) Topical two times a day  diltiazem    Tablet 60 milliGRAM(s) Oral every 6 hours  enoxaparin Injectable 40 milliGRAM(s) SubCutaneous daily  influenza   Vaccine 0.5 milliLiter(s) IntraMuscular once  loratadine 10 milliGRAM(s) Oral daily  magnesium oxide 400 milliGRAM(s) Oral daily  metoprolol tartrate 50 milliGRAM(s) Oral every 6 hours  nicotine - 21 mG/24Hr(s) Patch 1 Patch Transdermal daily  sodium chloride 2 Gram(s) Oral three times a day  tiotropium 18 MICROgram(s) Capsule 1 Capsule(s) Inhalation daily    MEDICATIONS  (PRN):      Allergies    penicillin (Unknown)    Intolerances        Vital Signs Last 24 Hrs  T(C): 36.3 (18 Dec 2018 04:32), Max: 36.9 (17 Dec 2018 20:24)  T(F): 97.4 (18 Dec 2018 04:32), Max: 98.4 (17 Dec 2018 20:24)  HR: 88 (18 Dec 2018 04:32) (80 - 137)  BP: 125/72 (18 Dec 2018 04:32) (104/66 - 129/81)  BP(mean): 90 (17 Dec 2018 23:00) (78 - 111)  RR: 19 (18 Dec 2018 04:32) (18 - 37)  SpO2: 97% (18 Dec 2018 04:32) (95% - 100%)  Daily     Daily Weight in k.7 (18 Dec 2018 04:32)    PHYSICAL EXAM:  GENERAL: No distress, sitting in chair, comfortable  HEAD:  Atraumatic, Normocephalic  EYES: Conjunctiva and sclera clear  ENMT: Moist mucous membranes, Good dentition, No lesions  NECK: Supple  NERVOUS SYSTEM:  Alert & Oriented X3, follows commands well  CHEST/LUNG: Clear to percussion bilaterally; No rales, rhonchi, wheezing, or rubs  HEART: irregular rhythm  ABDOMEN: Soft, Nontender, Nondistended; Bowel sounds present  EXTREMITIES:  2+ Peripheral Pulses, No clubbing, cyanosis, or edema  SKIN: No rashes or lesions    LABS:  Na 130

## 2018-12-19 NOTE — PROGRESS NOTE ADULT - SUBJECTIVE AND OBJECTIVE BOX
Neurology Follow up note    RAFA [id number]    HPI:  RAFA SHAFER is a 58 year old male brought to ER after having severe cough for 2-3 days. Before patient's wife brought him to the ER after he coughed severely, fell to the floor, and had a seizure like activity. The patient have hyponatremia on ER blood work. Denies a history of CHF and liver diease. He is an active smoker. Admits to poor intake for the past 2-3 days. Denies imbalance, frequent falls, dizziness, lightheadedness, abd symptoms, urinary symptoms.   Past medical history of HTN, DM2. (13 Dec 2018 05:59)      Interval History - overall doing well.    Patient is seen, chart was reviewed and case was discussed with the treatment team.  Pt is not in any distress.   Lying on bed comfortably.   No events reported overnight.   No clinical seizure was reported.  Sitting on chair bed comfortably.    is at bedside.    Vital Signs Last 24 Hrs  T(C): 36.3 (18 Dec 2018 16:06), Max: 36.9 (17 Dec 2018 20:24)  T(F): 97.3 (18 Dec 2018 16:06), Max: 98.4 (17 Dec 2018 20:24)  HR: 82 (18 Dec 2018 16:06) (80 - 115)  BP: 108/66 (18 Dec 2018 16:06) (104/66 - 129/81)  BP(mean): 90 (17 Dec 2018 23:00) (78 - 107)  RR: 18 (18 Dec 2018 16:06) (18 - 37)  SpO2: 96% (18 Dec 2018 16:06) (96% - 100%)        REVIEW OF SYSTEMS:    Constitutional: No fever, weight loss or fatigue  Eyes: No eye pain, visual disturbances, or discharge  ENT:  No difficulty hearing, tinnitus, vertigo; No sinus or throat pain  Neck: No pain or stiffness  Respiratory: No  wheezing, chills or hemoptysis  Cardiovascular: No chest pain, palpitations,   Gastrointestinal: No abdominal or epigastric pain. No nausea, vomiting or hematemesis;  Genitourinary: No dysuria, frequency, hematuria or incontinence  Neurological: No headaches, memory loss, loss of strength, numbness or tremors  Psychiatric: No depression, anxiety, mood swings or difficulty sleeping  Musculoskeletal: No joint pain or swelling;  Skin: No itching, burning, rashes or lesions   Lymph Nodes: No enlarged glands  Endocrine: No heat or cold intolerance; No hair loss, No h/o diabetes or thyroid dysfunction  Allergy and Immunologic: No hives or eczema    On Neurological Examination:    Mental Status - Pt is alert, awake, oriented X3.Follows commands well and able to answer questions appropriately  .Mood and affect  normal    Speech -  Normal.     Cranial Nerves - Pupils 3 mm equal and reactive to light, extraocular eye movements intact. P  Pt has no  facial asymmetry. Facial sensation is intact.Tongue - is in midline.    Muscle tone - is normal all over. Moves all extremities equally. No asymmetry is seen.      Motor Exam - 5/5     Sensory Exam - P Pt withdraws all extremities equally on stimulation. No asymmetry seen. No complaints of tingling, numbness.      Finger to nose: normal      Deep tendon Reflexes - 2 plus all over.       Neck Supple -  Yes.     MEDICATIONS    aspirin  chewable 81 milliGRAM(s) Oral daily  buDESOnide   0.5 milliGRAM(s) Respule 0.5 milliGRAM(s) Inhalation two times a day  chlorhexidine 2% Cloths 1 Application(s) Topical two times a day  diltiazem    Tablet 60 milliGRAM(s) Oral every 6 hours  enoxaparin Injectable 40 milliGRAM(s) SubCutaneous daily  influenza   Vaccine 0.5 milliLiter(s) IntraMuscular once  loratadine 10 milliGRAM(s) Oral daily  magnesium oxide 400 milliGRAM(s) Oral daily  metoprolol tartrate 50 milliGRAM(s) Oral every 6 hours  nicotine - 21 mG/24Hr(s) Patch 1 Patch Transdermal daily  predniSONE   Tablet 10 milliGRAM(s) Oral daily  sodium chloride 2 Gram(s) Oral three times a day  tiotropium 18 MICROgram(s) Capsule 1 Capsule(s) Inhalation daily      Allergies    penicillin (Unknown)    Intolerances          CBC Full  -  ( 19 Dec 2018 07:01 )  WBC Count : 10.34 K/uL  Hemoglobin : 12.0 g/dL  Hematocrit : 35.7 %  Platelet Count - Automated : 232 K/uL  Mean Cell Volume : 83.8 fl  Mean Cell Hemoglobin : 28.2 pg  Mean Cell Hemoglobin Concentration : 33.6 gm/dL  Auto Neutrophil # : x  Auto Lymphocyte # : x  Auto Monocyte # : x        Hemoglobin A1C:     Vitamin B12     RADIOLOGY    ASSESSMENT AND PLAN:      syncope preceded by coughing.  ?seizure related to hyponatremia,  sp pericardial window,     BRAIN MRI WWO REVEALED NO METS/CVA.  Physical therapy evaluation in progress.  OOB to chair/ambulation with assistance only.  Pain is accessed and addressed.  Would continue to follow.

## 2018-12-19 NOTE — PROGRESS NOTE ADULT - SUBJECTIVE AND OBJECTIVE BOX
[facility] -- Gurpreet Bush, Emily, Corey, Sajan Abebe Savella  Office # [phone]      Follow Up:      Subjective/Observations:       REVIEW OF SYSTEMS: All other review of systems is negative unless indicated above    PAST MEDICAL & SURGICAL HISTORY:  HTN (hypertension)  No significant past surgical history      MEDICATIONS  (STANDING):  aspirin  chewable 81 milliGRAM(s) Oral daily  buDESOnide   0.5 milliGRAM(s) Respule 0.5 milliGRAM(s) Inhalation two times a day  chlorhexidine 2% Cloths 1 Application(s) Topical two times a day  diltiazem    Tablet 60 milliGRAM(s) Oral every 6 hours  enoxaparin Injectable 40 milliGRAM(s) SubCutaneous daily  influenza   Vaccine 0.5 milliLiter(s) IntraMuscular once  loratadine 10 milliGRAM(s) Oral daily  magnesium oxide 400 milliGRAM(s) Oral daily  metoprolol tartrate 50 milliGRAM(s) Oral every 6 hours  nicotine - 21 mG/24Hr(s) Patch 1 Patch Transdermal daily  sodium chloride 2 Gram(s) Oral three times a day  tiotropium 18 MICROgram(s) Capsule 1 Capsule(s) Inhalation daily    MEDICATIONS  (PRN):      Allergies    penicillin (Unknown)    Intolerances        Vital Signs Last 24 Hrs  T(C): 36.6 (19 Dec 2018 08:30), Max: 37 (19 Dec 2018 04:43)  T(F): 97.8 (19 Dec 2018 08:30), Max: 98.6 (19 Dec 2018 04:43)  HR: 84 (19 Dec 2018 08:30) (80 - 92)  BP: 101/64 (19 Dec 2018 08:30) (101/64 - 123/77)  BP(mean): --  RR: 18 (19 Dec 2018 04:43) (18 - 18)  SpO2: 97% (19 Dec 2018 07:35) (96% - 100%)    I&O's Summary        PHYSICAL EXAM:  TELE:   Constitutional: NAD, awake and alert, well-developed  HEENT: Moist Mucous Membranes, Anicteric  Pulmonary: Non-labored, breath sounds are clear bilaterally, No wheezing, crackles or rhonchi  Cardiovascular: Regular, S1 and S2 nl, No murmurs, rubs, gallops or clicks  Gastrointestinal: Bowel Sounds present, soft, nontender.   Lymph: No lymphadenopathy. No peripheral edema.  Skin: No visible rashes or ulcers.  Psych:  Mood & affect appropriate    LABS: All Labs Reviewed:                        12.0   10.34 )-----------( 232      ( 19 Dec 2018 07:01 )             35.7                         12.3   12.44 )-----------( 221      ( 18 Dec 2018 06:42 )             35.1                         11.5   12.26 )-----------( 196      ( 17 Dec 2018 05:48 )             33.4     19 Dec 2018 07:01    130    |  93     |  13     ----------------------------<  96     3.9     |  29     |  0.47   18 Dec 2018 06:42    129    |  91     |  11     ----------------------------<  109    3.9     |  29     |  0.52   17 Dec 2018 05:48    130    |  91     |  10     ----------------------------<  108    3.6     |  32     |  0.49     Ca    8.2        19 Dec 2018 07:01  Ca    8.3        18 Dec 2018 06:42  Ca    8.1        17 Dec 2018 05:48  Phos  4.0       18 Dec 2018 06:42  Phos  4.4       17 Dec 2018 05:48  Mg     1.9       18 Dec 2018 06:42  Mg     1.9       17 Dec 2018 05:48    TPro  6.2    /  Alb  3.0    /  TBili  1.0    /  DBili  x      /  AST  31     /  ALT  59     /  AlkPhos  75     18 Dec 2018 06:42             ECG:  < from: 12 Lead ECG (12.14.18 @ 09:41) >  Ventricular Rate 116 BPM    Atrial Rate 120 BPM    QRS Duration 102 ms    Q-T Interval 328 ms    QTC Calculation(Bezet) 455 ms    R Axis 61 degrees    T Axis -10 degrees    Diagnosis Line Atrial fibrillation  Abnormal ECG  When compared with ECG of14-DEC-2018 03:03, (Unconfirmed)  No significant change was found  Confirmed by KWAN AGUAYO ([id number]) on 12/15/2018 10:15:08 AM    < end of copied text >    Echo:  < from: 12 Lead ECG (12.14.18 @ 09:41) >  Ventricular Rate 116 BPM    Atrial Rate 120 BPM    QRS Duration 102 ms    Q-T Interval 328 ms    QTC Calculation(Bezet) 455 ms    R Axis 61 degrees    T Axis -10 degrees    Diagnosis Line Atrial fibrillation  Abnormal ECG  When compared with ECG of14-DEC-2018 03:03, (Unconfirmed)  No significant change was found  Confirmed by KWAN AGUAYO ([id number]) on 12/15/2018 10:15:08 AM    < end of copied text >    Radiology:  < from: MR Head w/wo IV Cont (12.19.18 @ 10:06) >  EXAM:  MR BRAIN WAW IC                            PROCEDURE DATE:  12/19/2018          INTERPRETATION:  CLINICAL STATEMENT: Seizure-like activity. Dizziness.    TECHNIQUE: MRI of the brain was performed with and without gadolinium.    COMPARISON: CT head 12/14/2018    FINDINGS:  There is no abnormal intraparenchymal or leptomeningeal enhancement.    There is mild diffuse parenchymal volume loss. There are T2 prolongation   signal abnormalities in the periventricular white matter likely related   to mild chronic microvascular ischemic changes.    There is no acute parenchymal hemorrhage, parenchymal mass, mass effect   or midline shift. There is no extra-axial fluid collection.  There is no   hydrocephalus.  There is no acute infarct.    Retention cyst/polyp maxillary sinuses.    IMPRESSION:  No parenchymal mass, acute intracranial hemorrhage or acute infarct.                    KRISTINA GRIFFIN M.D., ATTENDING RADIOLOGIST  This document has been electronically signed. Dec 19 2018 10:32AM        < end of copied text >           Jermaine Dave [facility]   Cardiology [facility] -- Gurpreet Bush, Emily, Corey, Sajan Abebe Savella  Office # [phone]      Follow Up:    af, tamponade, malignancy pending workup  Subjective/Observations:   No events overnight resting comfortably in bed.  No complaints of chest pain, dyspnea, or palpitations reported. No signs of orthopnea or PND.     REVIEW OF SYSTEMS: All other review of systems is negative unless indicated above    PAST MEDICAL & SURGICAL HISTORY:  HTN (hypertension)  No significant past surgical history      MEDICATIONS  (STANDING):  aspirin  chewable 81 milliGRAM(s) Oral daily  buDESOnide   0.5 milliGRAM(s) Respule 0.5 milliGRAM(s) Inhalation two times a day  chlorhexidine 2% Cloths 1 Application(s) Topical two times a day  diltiazem    Tablet 60 milliGRAM(s) Oral every 6 hours  enoxaparin Injectable 40 milliGRAM(s) SubCutaneous daily  influenza   Vaccine 0.5 milliLiter(s) IntraMuscular once  loratadine 10 milliGRAM(s) Oral daily  magnesium oxide 400 milliGRAM(s) Oral daily  metoprolol tartrate 50 milliGRAM(s) Oral every 6 hours  nicotine - 21 mG/24Hr(s) Patch 1 Patch Transdermal daily  sodium chloride 2 Gram(s) Oral three times a day  tiotropium 18 MICROgram(s) Capsule 1 Capsule(s) Inhalation daily    MEDICATIONS  (PRN):      Allergies    penicillin (Unknown)    Intolerances        Vital Signs Last 24 Hrs  T(C): 36.6 (19 Dec 2018 08:30), Max: 37 (19 Dec 2018 04:43)  T(F): 97.8 (19 Dec 2018 08:30), Max: 98.6 (19 Dec 2018 04:43)  HR: 84 (19 Dec 2018 08:30) (80 - 92)  BP: 101/64 (19 Dec 2018 08:30) (101/64 - 123/77)  BP(mean): --  RR: 18 (19 Dec 2018 04:43) (18 - 18)  SpO2: 97% (19 Dec 2018 07:35) (96% - 100%)    I&O's Summary        PHYSICAL EXAM:  TELE: afib No events of tele overnight   Constitutional: NAD, awake and alert, well-developed  HEENT: Moist Mucous Membranes, Anicteric  Pulmonary: Non-labored, breath sounds are clear bilaterally, No wheezing, crackles or rhonchi  Cardiovascular: Regular, S1 and S2 nl, No murmurs, rubs, gallops or clicks  Gastrointestinal: Bowel Sounds present, soft, nontender.   Lymph: No lymphadenopathy. No peripheral edema.  Skin: No visible rashes or ulcers.  Psych:  Mood & affect appropriate    LABS: All Labs Reviewed:                        12.0   10.34 )-----------( 232      ( 19 Dec 2018 07:01 )             35.7                         12.3   12.44 )-----------( 221      ( 18 Dec 2018 06:42 )             35.1                         11.5   12.26 )-----------( 196      ( 17 Dec 2018 05:48 )             33.4     19 Dec 2018 07:01    130    |  93     |  13     ----------------------------<  96     3.9     |  29     |  0.47   18 Dec 2018 06:42    129    |  91     |  11     ----------------------------<  109    3.9     |  29     |  0.52   17 Dec 2018 05:48    130    |  91     |  10     ----------------------------<  108    3.6     |  32     |  0.49     Ca    8.2        19 Dec 2018 07:01  Ca    8.3        18 Dec 2018 06:42  Ca    8.1        17 Dec 2018 05:48  Phos  4.0       18 Dec 2018 06:42  Phos  4.4       17 Dec 2018 05:48  Mg     1.9       18 Dec 2018 06:42  Mg     1.9       17 Dec 2018 05:48    TPro  6.2    /  Alb  3.0    /  TBili  1.0    /  DBili  x      /  AST  31     /  ALT  59     /  AlkPhos  75     18 Dec 2018 06:42             ECG:  < from: 12 Lead ECG (12.14.18 @ 09:41) >  Ventricular Rate 116 BPM    Atrial Rate 120 BPM    QRS Duration 102 ms    Q-T Interval 328 ms    QTC Calculation(Bezet) 455 ms    R Axis 61 degrees    T Axis -10 degrees    Diagnosis Line Atrial fibrillation  Abnormal ECG  When compared with ECG of14-DEC-2018 03:03, (Unconfirmed)  No significant change was found  Confirmed by KWAN AGUAYO ([id number]) on 12/15/2018 10:15:08 AM    < end of copied text >    Echo:  < from: 12 Lead ECG (12.14.18 @ 09:41) >  Ventricular Rate 116 BPM    Atrial Rate 120 BPM    QRS Duration 102 ms    Q-T Interval 328 ms    QTC Calculation(Bezet) 455 ms    R Axis 61 degrees    T Axis -10 degrees    Diagnosis Line Atrial fibrillation  Abnormal ECG  When compared with ECG of14-DEC-2018 03:03, (Unconfirmed)  No significant change was found  Confirmed by KWAN AGUAYO ([id number]) on 12/15/2018 10:15:08 AM    < end of copied text >    Radiology:  < from: MR Head w/wo IV Cont (12.19.18 @ 10:06) >  EXAM:  MR BRAIN WAW IC                            PROCEDURE DATE:  12/19/2018          INTERPRETATION:  CLINICAL STATEMENT: Seizure-like activity. Dizziness.    TECHNIQUE: MRI of the brain was performed with and without gadolinium.    COMPARISON: CT head 12/14/2018    FINDINGS:  There is no abnormal intraparenchymal or leptomeningeal enhancement.    There is mild diffuse parenchymal volume loss. There are T2 prolongation   signal abnormalities in the periventricular white matter likely related   to mild chronic microvascular ischemic changes.    There is no acute parenchymal hemorrhage, parenchymal mass, mass effect   or midline shift. There is no extra-axial fluid collection.  There is no   hydrocephalus.  There is no acute infarct.    Retention cyst/polyp maxillary sinuses.    IMPRESSION:  No parenchymal mass, acute intracranial hemorrhage or acute infarct.                    KRISTINA GRIFFIN M.D., ATTENDING RADIOLOGIST  This document has been electronically signed. Dec 19 2018 10:32AM        < end of copied text >           Jermaine Dave [facility]   Cardiology

## 2018-12-19 NOTE — PROGRESS NOTE ADULT - ASSESSMENT
57 yo M with PMH of HTN presents with PARKS.     Course has been complicated by findings of hilar adenopathy on CT. Developed AF with RVR. Large pericardial effusion with signs of increased pericardial pressures on echo. Now s/p pericardial window with 650cc of blood fluid drained on 12/15.  S/p pericardial drain    - pt with likely malignant process given clinical senario.   - await path from pericardium  - Repeat echocardiogram to assess for reaccumulation (pending).  There is no evidence to this clinically.      - Rapid AF.   - Cont  metoprolol 50mg PO q6  -  C/w cardizem 60 q6 for now, can uptitrate as needed.    - CHADS-VaSc = 1. Would at this point just give ASA, though he is presumably hypercoagulable on the basis of presumed malignancy  - May consider AC in the future.  It is too high risk to start at the present time    - Maintain adequately hydrated.   - Will need pulm f/u    -Monitor and replete lytes, keep K>4 and Mg >2  - All other workup per primary team  Thank you for the consult  Will continue to follow  Jermaine LOVETT  Cardiology 59 yo M with PMH of HTN presents with PARKS.     Course has been complicated by findings of hilar adenopathy on CT. Developed AF with RVR. Large pericardial effusion with signs of increased pericardial pressures on echo. Now s/p pericardial window with 650cc of blood fluid drained on 12/15.  S/p pericardial drain      - await path from pericardium  - Repeat echocardiogram to assess for reaccumulation (pending).  There is no evidence to this clinically.  - Cont  metoprolol   -  C/w cardizem 60 q6 for now, can uptitrate as needed.    - CHADS-VaSc = 1. Would at this point just give ASA, though he is presumably hypercoagulable on the basis of presumed malignancy  - May consider AC in the future.  It is too high risk to start at the present time  - Maintain adequately hydrated.   - Will need pulm f/u  -Monitor and replete lytes, keep K>4 and Mg >2  - All other workup per primary team    Will continue to follow  Jermaine LOVETT  Cardiology

## 2018-12-19 NOTE — PROGRESS NOTE ADULT - PROVIDER SPECIALTY LIST ADULT
Anesthesia
Anesthesia
CT Surgery
Cardiology
Critical Care
Family Medicine
Hospitalist
Nephrology
Neurology
Pulmonology
Surgery
Cardiology
Pulmonology

## 2018-12-20 LAB
CULTURE RESULTS: SIGNIFICANT CHANGE UP
SPECIMEN SOURCE: SIGNIFICANT CHANGE UP

## 2018-12-31 ENCOUNTER — INPATIENT (INPATIENT)
Facility: HOSPITAL | Age: 58
LOS: 14 days | Discharge: HOME | DRG: 177 | End: 2019-01-15
Attending: FAMILY MEDICINE | Admitting: FAMILY MEDICINE
Payer: COMMERCIAL

## 2018-12-31 VITALS
HEART RATE: 89 BPM | RESPIRATION RATE: 20 BRPM | HEIGHT: 73 IN | SYSTOLIC BLOOD PRESSURE: 124 MMHG | OXYGEN SATURATION: 97 % | DIASTOLIC BLOOD PRESSURE: 67 MMHG | TEMPERATURE: 98 F | WEIGHT: 244.93 LBS

## 2018-12-31 DIAGNOSIS — J18.9 PNEUMONIA, UNSPECIFIED ORGANISM: ICD-10-CM

## 2018-12-31 LAB
ALBUMIN SERPL ELPH-MCNC: 2.8 G/DL — LOW (ref 3.3–5)
ALP SERPL-CCNC: 94 U/L — SIGNIFICANT CHANGE UP (ref 40–120)
ALT FLD-CCNC: 26 U/L — SIGNIFICANT CHANGE UP (ref 12–78)
ANION GAP SERPL CALC-SCNC: 10 MMOL/L — SIGNIFICANT CHANGE UP (ref 5–17)
AST SERPL-CCNC: 21 U/L — SIGNIFICANT CHANGE UP (ref 15–37)
BASE EXCESS BLDA CALC-SCNC: -0.2 MMOL/L — SIGNIFICANT CHANGE UP (ref -2–2)
BASOPHILS # BLD AUTO: 0.03 K/UL — SIGNIFICANT CHANGE UP (ref 0–0.2)
BASOPHILS NFR BLD AUTO: 0.2 % — SIGNIFICANT CHANGE UP (ref 0–2)
BILIRUB SERPL-MCNC: 0.6 MG/DL — SIGNIFICANT CHANGE UP (ref 0.2–1.2)
BLOOD GAS COMMENTS ARTERIAL: SIGNIFICANT CHANGE UP
BLOOD GAS COMMENTS ARTERIAL: SIGNIFICANT CHANGE UP
BUN SERPL-MCNC: 8 MG/DL — SIGNIFICANT CHANGE UP (ref 7–23)
CALCIUM SERPL-MCNC: 7.7 MG/DL — LOW (ref 8.5–10.1)
CHLORIDE SERPL-SCNC: 86 MMOL/L — LOW (ref 96–108)
CO2 SERPL-SCNC: 24 MMOL/L — SIGNIFICANT CHANGE UP (ref 22–31)
CREAT SERPL-MCNC: 0.53 MG/DL — SIGNIFICANT CHANGE UP (ref 0.5–1.3)
EOSINOPHIL # BLD AUTO: 0.13 K/UL — SIGNIFICANT CHANGE UP (ref 0–0.5)
EOSINOPHIL NFR BLD AUTO: 1 % — SIGNIFICANT CHANGE UP (ref 0–6)
GLUCOSE SERPL-MCNC: 124 MG/DL — HIGH (ref 70–99)
HCO3 BLDA-SCNC: 24 MMOL/L — SIGNIFICANT CHANGE UP (ref 23–27)
HCT VFR BLD CALC: 32 % — LOW (ref 39–50)
HGB BLD-MCNC: 11.4 G/DL — LOW (ref 13–17)
HOROWITZ INDEX BLDA+IHG-RTO: 30 — SIGNIFICANT CHANGE UP
IMM GRANULOCYTES NFR BLD AUTO: 0.4 % — SIGNIFICANT CHANGE UP (ref 0–1.5)
LACTATE SERPL-SCNC: 1.5 MMOL/L — SIGNIFICANT CHANGE UP (ref 0.7–2)
LYMPHOCYTES # BLD AUTO: 19 % — SIGNIFICANT CHANGE UP (ref 13–44)
LYMPHOCYTES # BLD AUTO: 2.38 K/UL — SIGNIFICANT CHANGE UP (ref 1–3.3)
MCHC RBC-ENTMCNC: 27.9 PG — SIGNIFICANT CHANGE UP (ref 27–34)
MCHC RBC-ENTMCNC: 35.6 GM/DL — SIGNIFICANT CHANGE UP (ref 32–36)
MCV RBC AUTO: 78.2 FL — LOW (ref 80–100)
MONOCYTES # BLD AUTO: 1.2 K/UL — HIGH (ref 0–0.9)
MONOCYTES NFR BLD AUTO: 9.6 % — SIGNIFICANT CHANGE UP (ref 2–14)
NEUTROPHILS # BLD AUTO: 8.72 K/UL — HIGH (ref 1.8–7.4)
NEUTROPHILS NFR BLD AUTO: 69.8 % — SIGNIFICANT CHANGE UP (ref 43–77)
NT-PROBNP SERPL-SCNC: 1158 PG/ML — HIGH (ref 0–125)
PCO2 BLDA: 35 MMHG — SIGNIFICANT CHANGE UP (ref 32–46)
PH BLDA: 7.44 — SIGNIFICANT CHANGE UP (ref 7.35–7.45)
PLATELET # BLD AUTO: 300 K/UL — SIGNIFICANT CHANGE UP (ref 150–400)
PO2 BLDA: 108 MMHG — SIGNIFICANT CHANGE UP (ref 74–108)
POTASSIUM SERPL-MCNC: 3.6 MMOL/L — SIGNIFICANT CHANGE UP (ref 3.5–5.3)
POTASSIUM SERPL-SCNC: 3.6 MMOL/L — SIGNIFICANT CHANGE UP (ref 3.5–5.3)
PROT SERPL-MCNC: 6.1 G/DL — SIGNIFICANT CHANGE UP (ref 6–8.3)
RBC # BLD: 4.09 M/UL — LOW (ref 4.2–5.8)
RBC # FLD: 12.5 % — SIGNIFICANT CHANGE UP (ref 10.3–14.5)
SAO2 % BLDA: 98 % — HIGH (ref 92–96)
SODIUM SERPL-SCNC: 120 MMOL/L — CRITICAL LOW (ref 135–145)
WBC # BLD: 12.51 K/UL — HIGH (ref 3.8–10.5)
WBC # FLD AUTO: 12.51 K/UL — HIGH (ref 3.8–10.5)

## 2018-12-31 PROCEDURE — 71046 X-RAY EXAM CHEST 2 VIEWS: CPT | Mod: 26

## 2018-12-31 PROCEDURE — 93010 ELECTROCARDIOGRAM REPORT: CPT

## 2018-12-31 PROCEDURE — 99284 EMERGENCY DEPT VISIT MOD MDM: CPT

## 2018-12-31 RX ORDER — TOLVAPTAN 15 MG/1
15 TABLET ORAL ONCE
Qty: 0 | Refills: 0 | Status: COMPLETED | OUTPATIENT
Start: 2018-12-31 | End: 2018-12-31

## 2018-12-31 RX ORDER — AZTREONAM 2 G
1000 VIAL (EA) INJECTION ONCE
Qty: 0 | Refills: 0 | Status: COMPLETED | OUTPATIENT
Start: 2018-12-31 | End: 2018-12-31

## 2018-12-31 RX ORDER — INFLUENZA VIRUS VACCINE 15; 15; 15; 15 UG/.5ML; UG/.5ML; UG/.5ML; UG/.5ML
0.5 SUSPENSION INTRAMUSCULAR ONCE
Qty: 0 | Refills: 0 | Status: COMPLETED | OUTPATIENT
Start: 2018-12-31 | End: 2019-01-15

## 2018-12-31 RX ORDER — ASPIRIN/CALCIUM CARB/MAGNESIUM 324 MG
81 TABLET ORAL DAILY
Qty: 0 | Refills: 0 | Status: DISCONTINUED | OUTPATIENT
Start: 2018-12-31 | End: 2019-01-01

## 2018-12-31 RX ORDER — DILTIAZEM HCL 120 MG
300 CAPSULE, EXT RELEASE 24 HR ORAL DAILY
Qty: 0 | Refills: 0 | Status: DISCONTINUED | OUTPATIENT
Start: 2018-12-31 | End: 2019-01-03

## 2018-12-31 RX ORDER — TIOTROPIUM BROMIDE 18 UG/1
1 CAPSULE ORAL; RESPIRATORY (INHALATION) DAILY
Qty: 0 | Refills: 0 | Status: DISCONTINUED | OUTPATIENT
Start: 2018-12-31 | End: 2019-01-15

## 2018-12-31 RX ORDER — IPRATROPIUM/ALBUTEROL SULFATE 18-103MCG
3 AEROSOL WITH ADAPTER (GRAM) INHALATION ONCE
Qty: 0 | Refills: 0 | Status: COMPLETED | OUTPATIENT
Start: 2018-12-31 | End: 2018-12-31

## 2018-12-31 RX ORDER — SODIUM CHLORIDE 9 MG/ML
1 INJECTION INTRAMUSCULAR; INTRAVENOUS; SUBCUTANEOUS THREE TIMES A DAY
Qty: 0 | Refills: 0 | Status: DISCONTINUED | OUTPATIENT
Start: 2018-12-31 | End: 2019-01-12

## 2018-12-31 RX ORDER — IPRATROPIUM/ALBUTEROL SULFATE 18-103MCG
3 AEROSOL WITH ADAPTER (GRAM) INHALATION EVERY 6 HOURS
Qty: 0 | Refills: 0 | Status: DISCONTINUED | OUTPATIENT
Start: 2018-12-31 | End: 2019-01-15

## 2018-12-31 RX ORDER — HEPARIN SODIUM 5000 [USP'U]/ML
5000 INJECTION INTRAVENOUS; SUBCUTANEOUS EVERY 12 HOURS
Qty: 0 | Refills: 0 | Status: DISCONTINUED | OUTPATIENT
Start: 2018-12-31 | End: 2019-01-01

## 2018-12-31 RX ORDER — SODIUM CHLORIDE 9 MG/ML
1000 INJECTION INTRAMUSCULAR; INTRAVENOUS; SUBCUTANEOUS ONCE
Qty: 0 | Refills: 0 | Status: COMPLETED | OUTPATIENT
Start: 2018-12-31 | End: 2018-12-31

## 2018-12-31 RX ORDER — BUDESONIDE, MICRONIZED 100 %
0.5 POWDER (GRAM) MISCELLANEOUS
Qty: 0 | Refills: 0 | Status: DISCONTINUED | OUTPATIENT
Start: 2018-12-31 | End: 2019-01-15

## 2018-12-31 RX ORDER — AZTREONAM 2 G
1000 VIAL (EA) INJECTION EVERY 8 HOURS
Qty: 0 | Refills: 0 | Status: DISCONTINUED | OUTPATIENT
Start: 2018-12-31 | End: 2019-01-01

## 2018-12-31 RX ORDER — LACTOBACILLUS ACIDOPHILUS 100MM CELL
1 CAPSULE ORAL
Qty: 0 | Refills: 0 | Status: DISCONTINUED | OUTPATIENT
Start: 2018-12-31 | End: 2019-01-15

## 2018-12-31 RX ORDER — LORATADINE 10 MG/1
10 TABLET ORAL DAILY
Qty: 0 | Refills: 0 | Status: DISCONTINUED | OUTPATIENT
Start: 2018-12-31 | End: 2019-01-15

## 2018-12-31 RX ORDER — METOPROLOL TARTRATE 50 MG
200 TABLET ORAL DAILY
Qty: 0 | Refills: 0 | Status: DISCONTINUED | OUTPATIENT
Start: 2018-12-31 | End: 2019-01-15

## 2018-12-31 RX ORDER — BUDESONIDE, MICRONIZED 100 %
0.5 POWDER (GRAM) MISCELLANEOUS ONCE
Qty: 0 | Refills: 0 | Status: COMPLETED | OUTPATIENT
Start: 2018-12-31 | End: 2018-12-31

## 2018-12-31 RX ORDER — SODIUM CHLORIDE 9 MG/ML
3 INJECTION INTRAMUSCULAR; INTRAVENOUS; SUBCUTANEOUS ONCE
Qty: 0 | Refills: 0 | Status: COMPLETED | OUTPATIENT
Start: 2018-12-31 | End: 2018-12-31

## 2018-12-31 RX ADMIN — Medication 3 MILLILITER(S): at 20:22

## 2018-12-31 RX ADMIN — Medication 0.5 MILLIGRAM(S): at 21:17

## 2018-12-31 RX ADMIN — Medication 50 MILLIGRAM(S): at 21:17

## 2018-12-31 RX ADMIN — TOLVAPTAN 15 MILLIGRAM(S): 15 TABLET ORAL at 21:17

## 2018-12-31 RX ADMIN — SODIUM CHLORIDE 3 MILLILITER(S): 9 INJECTION INTRAMUSCULAR; INTRAVENOUS; SUBCUTANEOUS at 20:21

## 2018-12-31 RX ADMIN — SODIUM CHLORIDE 1000 MILLILITER(S): 9 INJECTION INTRAMUSCULAR; INTRAVENOUS; SUBCUTANEOUS at 20:22

## 2018-12-31 NOTE — H&P ADULT - PMH
COPD (chronic obstructive pulmonary disease)    HTN (hypertension) AF (atrial fibrillation)    COPD (chronic obstructive pulmonary disease)    Dyspnea    HTN (hypertension)    Hyponatremia    Mediastinal adenopathy    Pericardial effusion

## 2018-12-31 NOTE — ED PROVIDER NOTE - OBJECTIVE STATEMENT
pt is a 57yo male with pmhx of htn, copd, cardiac tamponade s/p window 12/2018 c/o cough x days. pt reports non-productive cough pt is a 57yo male with pmhx of htn, copd, cardiac tamponade s/p window 12/2018 c/o cough x days. pt reports non-productive cough with associated dyspnea on exertion. pt reports "it feels like something is stuck in my throat". pt reports bl le swelling without pain. pt recently admitted for pericardial effusion s/p window. pt quit smoking aprox 2 weeks ago.   pmd: stone/bacci

## 2018-12-31 NOTE — H&P ADULT - NSHPREVIEWOFSYSTEMS_GEN_ALL_CORE
· CONSTITUTIONAL: no fever and no chills.  · CARDIOVASCULAR: - - -  · Cardiovascular [+]: PERIPHERAL EDEMA  · Cardiovascular [-]: no chest pain, no palpitations, no syncope  · RESPIRATORY: - - -  · Respiratory [+]: COUGH, EXERTIONAL DYSPNEA, ORTHOPNEA, SHORTNESS OF BREATH  · Respiratory [-]: no hemoptysis  · GASTROINTESTINAL: no abdominal pain,  · SKIN: no abrasions, no jaundice, no lesions, no pruritis, and no rashes.  · NEURO: no loss of consciousness, no gait abnormality, no headache, no sensory deficits, and no weakness.  · ROS STATEMENT: all other ROS negative except as per HPI

## 2018-12-31 NOTE — ED PROVIDER NOTE - PROGRESS NOTE DETAILS
+ pna will give abx and do blood cultures/lactate. pt with hyponatremia. will give NS. pt evaluated by nephro last admission. will admit to dr villanueva. will consult dr dunlap for pulm. + LLL pna with possible effusions.  will give abx and do blood cultures/lactate. pt with hyponatremia. will give NS. pt evaluated by nephro last admission.

## 2018-12-31 NOTE — PROGRESS NOTE ADULT - SUBJECTIVE AND OBJECTIVE BOX
Admitted for PNA. Consulted for hyponatremia. 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 He was sent home with NaCl tabs. I suspect he was not compliant with fluid restriction at home. No hypertonic saline needed. We will give tolvaptan 15 mg po x 1 (which he responded well last hospitalization). D/w pharmacy.  No fluid restriction while on tolvaptan. Repeat BMP in AM. Full consult to follow in AM

## 2018-12-31 NOTE — H&P ADULT - NSHPPHYSICALEXAM_GEN_ALL_CORE
· CONSTITUTIONAL: - - -  · Appearance: well appearing  · Development: well developed  · Distress: MILD  · Manner: appropriate for situation  · Mentation: awake, alert, oriented to person, place, time/situation  · Mood: appropriate  · Nourishment: OBESE  · HENMT: - - -  · Face: no lymph node enlargement  · EYES: Clear bilaterally  · Mouth: normal mucosa  · CARDIAC: - - -  · CARDIAC RHYTHM: regular  · CARDIAC RATE: normal  · CARDIAC SOUNDS: S1-S2  · RESPIRATORY: - - -  · Respiratory Distress: no  · Breath Sounds: normal, RHONCHI  · Breath Sounds Normal: RIGHT UPPER LOBE, RIGHT MIDDLE LOBE, LEFT UPPER LOBE  · Rhonchi: RIGHT LOWER LOBE, LEFT LOWER LOBE  · GASTROINTESTINAL: Abdomen soft, non-tender, no guarding. + well healing scar epigastric  · NEUROLOGICAL: Alert and oriented, no focal deficits, no motor or sensory deficits.  · SKIN: Skin normal color for race, warm, dry and intact. No evidence of rash.

## 2018-12-31 NOTE — H&P ADULT - NSHPSOCIALHISTORY_GEN_ALL_CORE
· Tobacco Usage  Former smoker  · Last Tobacco Use (dd-[id number])  18-Dec-2018  · Longest Period Tobacco-Free  2 weeks

## 2018-12-31 NOTE — ED PROVIDER NOTE - ATTENDING CONTRIBUTION TO CARE
I have personally performed a face to face diagnostic evaluation on this patient.  I have reviewed the PA note and agree with the history, exam, and plan of care, except as noted.  History and Exam by me shows  cough and sob since dc from here on 12/19/19.  Admitted for hyponatremia.  lungs- prolonged exp phase.  heart s1s2, rrr, no murmur.  lab sig for wbc 12.5, cxr left infiltrate cw pneumonia.

## 2018-12-31 NOTE — ED ADULT NURSE NOTE - NSIMPLEMENTINTERV_GEN_ALL_ED
Implemented All Universal Safety Interventions:  [place] to call system. Call bell, personal items and telephone within reach. Instruct patient to call for assistance. Room bathroom lighting operational. Non-slip footwear when patient is off stretcher. Physically safe environment: no spills, clutter or unnecessary equipment. Stretcher in lowest position, wheels locked, appropriate side rails in place.

## 2018-12-31 NOTE — H&P ADULT - NSHPLABSRESULTS_GEN_ALL_CORE
12-31    120<LL>  |  86<L>  |  8   ----------------------------<  124<H>  3.6   |  24  |  0.53    Ca    7.7<L>      31 Dec 2018 19:15    TPro  6.1  /  Alb  2.8<L>  /  TBili  0.6  /  DBili  x   /  AST  21  /  ALT  26  /  AlkPhos  94  12-31                            12.6   10.65 )-----------( 357      ( 01 Jan 2019 08:06 )             36.9             LIVER FUNCTIONS - ( 31 Dec 2018 19:15 )  Alb: 2.8 g/dL / Pro: 6.1 g/dL / ALK PHOS: 94 U/L / ALT: 26 U/L / AST: 21 U/L / GGT: x

## 2019-01-01 ENCOUNTER — TRANSCRIPTION ENCOUNTER (OUTPATIENT)
Age: 59
End: 2019-01-01

## 2019-01-01 DIAGNOSIS — R05 COUGH: ICD-10-CM

## 2019-01-01 DIAGNOSIS — J18.9 PNEUMONIA, UNSPECIFIED ORGANISM: ICD-10-CM

## 2019-01-01 DIAGNOSIS — Z29.9 ENCOUNTER FOR PROPHYLACTIC MEASURES, UNSPECIFIED: ICD-10-CM

## 2019-01-01 DIAGNOSIS — E87.1 HYPO-OSMOLALITY AND HYPONATREMIA: ICD-10-CM

## 2019-01-01 DIAGNOSIS — Z98.890 OTHER SPECIFIED POSTPROCEDURAL STATES: Chronic | ICD-10-CM

## 2019-01-01 LAB
ANION GAP SERPL CALC-SCNC: 9 MMOL/L — SIGNIFICANT CHANGE UP (ref 5–17)
BUN SERPL-MCNC: 6 MG/DL — LOW (ref 7–23)
CALCIUM SERPL-MCNC: 9.3 MG/DL — SIGNIFICANT CHANGE UP (ref 8.5–10.1)
CHLORIDE SERPL-SCNC: 99 MMOL/L — SIGNIFICANT CHANGE UP (ref 96–108)
CO2 SERPL-SCNC: 29 MMOL/L — SIGNIFICANT CHANGE UP (ref 22–31)
CREAT SERPL-MCNC: 0.61 MG/DL — SIGNIFICANT CHANGE UP (ref 0.5–1.3)
GLUCOSE SERPL-MCNC: 119 MG/DL — HIGH (ref 70–99)
HCT VFR BLD CALC: 36.9 % — LOW (ref 39–50)
HGB BLD-MCNC: 12.6 G/DL — LOW (ref 13–17)
MAGNESIUM SERPL-MCNC: 2.3 MG/DL — SIGNIFICANT CHANGE UP (ref 1.6–2.6)
MCHC RBC-ENTMCNC: 27.6 PG — SIGNIFICANT CHANGE UP (ref 27–34)
MCHC RBC-ENTMCNC: 34.1 GM/DL — SIGNIFICANT CHANGE UP (ref 32–36)
MCV RBC AUTO: 80.7 FL — SIGNIFICANT CHANGE UP (ref 80–100)
NRBC # BLD: 0 /100 WBCS — SIGNIFICANT CHANGE UP (ref 0–0)
PLATELET # BLD AUTO: 357 K/UL — SIGNIFICANT CHANGE UP (ref 150–400)
POTASSIUM SERPL-MCNC: 4.2 MMOL/L — SIGNIFICANT CHANGE UP (ref 3.5–5.3)
POTASSIUM SERPL-SCNC: 4.2 MMOL/L — SIGNIFICANT CHANGE UP (ref 3.5–5.3)
RAPID RVP RESULT: SIGNIFICANT CHANGE UP
RBC # BLD: 4.57 M/UL — SIGNIFICANT CHANGE UP (ref 4.2–5.8)
RBC # FLD: 12.4 % — SIGNIFICANT CHANGE UP (ref 10.3–14.5)
SODIUM SERPL-SCNC: 137 MMOL/L — SIGNIFICANT CHANGE UP (ref 135–145)
WBC # BLD: 10.65 K/UL — HIGH (ref 3.8–10.5)
WBC # FLD AUTO: 10.65 K/UL — HIGH (ref 3.8–10.5)

## 2019-01-01 RX ORDER — VANCOMYCIN HCL 1 G
1750 VIAL (EA) INTRAVENOUS ONCE
Qty: 0 | Refills: 0 | Status: COMPLETED | OUTPATIENT
Start: 2019-01-01 | End: 2019-01-01

## 2019-01-01 RX ORDER — VANCOMYCIN HCL 1 G
1750 VIAL (EA) INTRAVENOUS EVERY 12 HOURS
Qty: 0 | Refills: 0 | Status: DISCONTINUED | OUTPATIENT
Start: 2019-01-01 | End: 2019-01-03

## 2019-01-01 RX ORDER — MEROPENEM 1 G/30ML
INJECTION INTRAVENOUS
Qty: 0 | Refills: 0 | Status: DISCONTINUED | OUTPATIENT
Start: 2019-01-01 | End: 2019-01-04

## 2019-01-01 RX ORDER — VANCOMYCIN HCL 1 G
VIAL (EA) INTRAVENOUS
Qty: 0 | Refills: 0 | Status: DISCONTINUED | OUTPATIENT
Start: 2019-01-01 | End: 2019-01-01

## 2019-01-01 RX ORDER — VANCOMYCIN HCL 1 G
1750 VIAL (EA) INTRAVENOUS EVERY 12 HOURS
Qty: 0 | Refills: 0 | Status: DISCONTINUED | OUTPATIENT
Start: 2019-01-01 | End: 2019-01-01

## 2019-01-01 RX ORDER — HEPARIN SODIUM 5000 [USP'U]/ML
5000 INJECTION INTRAVENOUS; SUBCUTANEOUS EVERY 8 HOURS
Qty: 0 | Refills: 0 | Status: DISCONTINUED | OUTPATIENT
Start: 2019-01-01 | End: 2019-01-01

## 2019-01-01 RX ORDER — MEROPENEM 1 G/30ML
1000 INJECTION INTRAVENOUS EVERY 8 HOURS
Qty: 0 | Refills: 0 | Status: DISCONTINUED | OUTPATIENT
Start: 2019-01-01 | End: 2019-01-04

## 2019-01-01 RX ORDER — MEROPENEM 1 G/30ML
1000 INJECTION INTRAVENOUS ONCE
Qty: 0 | Refills: 0 | Status: COMPLETED | OUTPATIENT
Start: 2019-01-01 | End: 2019-01-01

## 2019-01-01 RX ADMIN — LORATADINE 10 MILLIGRAM(S): 10 TABLET ORAL at 12:34

## 2019-01-01 RX ADMIN — SODIUM CHLORIDE 1 GRAM(S): 9 INJECTION INTRAMUSCULAR; INTRAVENOUS; SUBCUTANEOUS at 06:07

## 2019-01-01 RX ADMIN — Medication 3 MILLILITER(S): at 01:35

## 2019-01-01 RX ADMIN — Medication 1 TABLET(S): at 17:11

## 2019-01-01 RX ADMIN — SODIUM CHLORIDE 1 GRAM(S): 9 INJECTION INTRAMUSCULAR; INTRAVENOUS; SUBCUTANEOUS at 22:49

## 2019-01-01 RX ADMIN — MEROPENEM 100 MILLIGRAM(S): 1 INJECTION INTRAVENOUS at 22:49

## 2019-01-01 RX ADMIN — Medication 50 MILLIGRAM(S): at 06:08

## 2019-01-01 RX ADMIN — Medication 200 MILLIGRAM(S): at 17:11

## 2019-01-01 RX ADMIN — SODIUM CHLORIDE 1 GRAM(S): 9 INJECTION INTRAMUSCULAR; INTRAVENOUS; SUBCUTANEOUS at 14:25

## 2019-01-01 RX ADMIN — Medication 3 MILLILITER(S): at 07:44

## 2019-01-01 RX ADMIN — HEPARIN SODIUM 5000 UNIT(S): 5000 INJECTION INTRAVENOUS; SUBCUTANEOUS at 14:25

## 2019-01-01 RX ADMIN — Medication 100 MILLIGRAM(S): at 08:43

## 2019-01-01 RX ADMIN — SODIUM CHLORIDE 1 GRAM(S): 9 INJECTION INTRAMUSCULAR; INTRAVENOUS; SUBCUTANEOUS at 00:38

## 2019-01-01 RX ADMIN — Medication 81 MILLIGRAM(S): at 12:35

## 2019-01-01 RX ADMIN — Medication 100 MILLIGRAM(S): at 00:38

## 2019-01-01 RX ADMIN — Medication 0.5 MILLIGRAM(S): at 07:44

## 2019-01-01 RX ADMIN — Medication 1 TABLET(S): at 12:34

## 2019-01-01 RX ADMIN — Medication 1 TABLET(S): at 08:43

## 2019-01-01 RX ADMIN — Medication 200 MILLIGRAM(S): at 06:07

## 2019-01-01 RX ADMIN — Medication 250 MILLIGRAM(S): at 14:22

## 2019-01-01 RX ADMIN — Medication 3 MILLILITER(S): at 19:53

## 2019-01-01 RX ADMIN — Medication 0.5 MILLIGRAM(S): at 19:53

## 2019-01-01 RX ADMIN — HEPARIN SODIUM 5000 UNIT(S): 5000 INJECTION INTRAVENOUS; SUBCUTANEOUS at 06:08

## 2019-01-01 RX ADMIN — MEROPENEM 100 MILLIGRAM(S): 1 INJECTION INTRAVENOUS at 12:34

## 2019-01-01 RX ADMIN — Medication 300 MILLIGRAM(S): at 06:07

## 2019-01-01 RX ADMIN — Medication 3 MILLILITER(S): at 14:15

## 2019-01-01 NOTE — CONSULT NOTE ADULT - ASSESSMENT
Possible pneumonia though WBC not impressive, no fevers  Procalcitonin minimally elevated but not nearly as reliable as one would hope  I would be more concerned that his infiltrates represent progression of undiagnosed malignancy more than potential infection  Hoarseness nonspecific but recurrent laryngeal nerve involvement within DDx  No anisocoria or ptosis to suggest Luca's; no CNS lesions on prior MRI; only LAD no apical tumor on prior CT  Current antibiotics offer no anaerobic, atypical, or gram-positive coverage  No RVP done, will order given appearance of infiltrate

## 2019-01-01 NOTE — CONSULT NOTE ADULT - SUBJECTIVE AND OBJECTIVE BOX
Patient is a 58y old  Male who presents with a chief complaint of sob (31 Dec 2018 21:51)       HPI:  pt is a 57yo male with pmhx of htn, copd, cardiac tamponade s/p window 2018 c/o cough x days. pt reports non-productive cough with associated dyspnea on exertion. pt reports "it feels like something is stuck in my throat". pt reports bl le swelling without pain. pt recently admitted for pericardial effusion s/p window. pt quit smoking aprox 2 weeks ago.  In ER patient was found to have PNA on CXR.    Renal is being consulted for hyponatremia. No c/o        PAST MEDICAL & SURGICAL HISTORY:  Dyspnea  Mediastinal adenopathy  Pericardial effusion  AF (atrial fibrillation)  Hyponatremia  COPD (chronic obstructive pulmonary disease)  HTN (hypertension)  S/P pericardial window creation       FAMILY HISTORY:  No pertinent family history in first degree relatives  [place]    Social History:Non smoker    MEDICATIONS  (STANDING):  ALBUTerol/ipratropium for Nebulization 3 milliLiter(s) Nebulizer every 6 hours  aspirin  chewable 81 milliGRAM(s) Oral daily  aztreonam  IVPB 1000 milliGRAM(s) IV Intermittent every 8 hours  buDESOnide   0.5 milliGRAM(s) Respule 0.5 milliGRAM(s) Inhalation two times a day  diltiazem    milliGRAM(s) Oral daily  heparin  Injectable 5000 Unit(s) SubCutaneous every 8 hours  influenza   Vaccine 0.5 milliLiter(s) IntraMuscular once  lactobacillus acidophilus 1 Tablet(s) Oral three times a day with meals  loratadine 10 milliGRAM(s) Oral daily  metoprolol succinate  milliGRAM(s) Oral daily  sodium chloride 1 Gram(s) Oral three times a day  tiotropium 18 MICROgram(s) Capsule 1 Capsule(s) Inhalation daily    MEDICATIONS  (PRN):  guaiFENesin    Syrup 100 milliGRAM(s) Oral every 6 hours PRN Cough   Meds reviewed    Allergies    penicillin (Unknown)    Intolerances         REVIEW OF SYSTEMS:    CONSTITUTIONAL:  No weight loss   EYES: No eye pain, visual disturbances, or discharge  ENMT:  No difficulty hearing, tinnitus, vertigo; No sinus or throat pain  NECK: No pain or stiffness  BREASTS: No pain, masses, or nipple discharge  RESPIRATORY: No SOB. No wheeze. No PARKS  CARDIOVASCULAR: No chest pain, palpitations, dizziness,   GASTROINTESTINAL: No abdominal or epigastric pain. No nausea, vomiting, or hematemesis; No diarrhea or constipation. No melena or hematochezia.Trunckal obesity  GENITOURINARY: No dysuria, frequency, hematuria, or incontinence  NEUROLOGICAL: No headaches, memory loss, loss of strength, numbness, or tremors  SKIN: Diffuse erythema, no blisters  LYMPH NODES: No enlarged glands  ENDOCRINE: No heat or cold intolerance; No hair loss  MUSCULOSKELETAL: No joint pain or swelling   PSYCHIATRIC: No depression, anxiety, mood swings, or difficulty sleeping  HEME/LYMPH: No easy bruising, or bleeding gums  ALLERY AND IMMUNOLOGIC: No hives or eczema      Vital Signs Last 24 Hrs  T(C): 36.4 (2019 05:24), Max: 36.6 (31 Dec 2018 18:41)  T(F): 97.6 (2019 05:24), Max: 97.8 (31 Dec 2018 18:41)  HR: 94 (2019 07:45) (80 - 99)  BP: 138/77 (2019 05:24) (107/64 - 138/77)  BP(mean): --  RR: 17 (2019 05:24) (17 - 20)  SpO2: 98% (2019 07:45) (97% - 99%)  Daily Height in cm: 185.42 (31 Dec 2018 18:41)    Daily Weight in k.2 (31 Dec 2018 22:44)    PHYSICAL EXAM:    GENERAL: NAD  HEAD:  Atraumatic, Normocephalic  EYES: EOMI, PERRLA, conjunctiva and sclera clear  ENMT: No tonsillar erythema, exudates, or enlargement; Moist mucous membranes, Good dentition, No lesions  NECK: Supple, neck  veins full  NERVOUS SYSTEM:  Alert & Oriented X3, Good concentration; Motor Strength wnl upper and lower extremities  CHEST/LUNG: Clear to percussion bilaterally; No rales, rhonchi, wheezing, or rubs  HEART: Regular rate and rhythm; No murmurs, rubs, or gallops  ABDOMEN: Soft, Nontender, Nondistended; Bowel sounds present, body wall and flank edema  EXTREMITIES:  Edema  LYMPH: No lymphadenopathy noted  SKIN: No rashes or lesions, pale      LABS:                        12.6   10.65 )-----------( 357      ( 2019 08:06 )             36.9     01-01    137  |  99  |  6<L>  ----------------------------<  119<H>  4.2   |  29  |  0.61    Ca    9.3      2019 08:06  Mg     2.3         TPro  6.1  /  Alb  2.8<L>  /  TBili  0.6  /  DBili  x   /  AST  21  /  ALT  26  /  AlkPhos  94          Magnesium, Serum: 2.3 mg/dL ( @ 08:06)    ABG - ( 31 Dec 2018 23:38 )  pH, Arterial: 7.44  pH, Blood: x     /  pCO2: 35    /  pO2: 108   / HCO3: 24    / Base Excess: -0.2  /  SaO2: 98                    RADIOLOGY & ADDITIONAL TESTS:

## 2019-01-01 NOTE — CHART NOTE - NSCHARTNOTEFT_GEN_A_CORE
Impression:    Left lower lobe pneumonia - ? 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

## 2019-01-01 NOTE — CONSULT NOTE ADULT - SUBJECTIVE AND OBJECTIVE BOX
[facility], Division of Infectious Diseases  ANH Valderrama A. Lee    SONI, RAFA  58y, Male  [id number]    HPI--  58M discharged from [facility] ~2 weeks ago after pericardial window for concern of tamponade. Patient at that time also noted to have mediastinal LAD and concern of malignancy. Pathologic analysis of the pericardial fluid/tissue however did not confirm the suspicion of malignancy. Patient initially had decreased SOB after discharge, but he's had some progression. Cough has persisted, steadily worsening to the point where he asked his wife to bring him back to the ER. Patient has the sensation that something is stuck in his throat that he cannot get up. Also developed hoarseness over the last 3 days. No fevers, chills, or rigors. Cough is dry. No CP.    Here found to have hyponatremia, worse than on discharge. Patient seen by nephrology  Patient also seen by pulmonary with plans for bronchoscopy tomorrow.    PMH/PSH--  Dyspnea  Mediastinal adenopathy  Pericardial effusion  AF (atrial fibrillation)  Hyponatremia  COPD (chronic obstructive pulmonary disease)  HTN (hypertension)  S/P pericardial window creation  No significant past surgical history      Allergies-- PCN as a baby, manifestation unknown      Medications--  Antibiotics: aztreonam  IVPB 1000 milliGRAM(s) IV Intermittent every 8 hours    Immunologic: influenza   Vaccine 0.5 milliLiter(s) IntraMuscular once    Other: ALBUTerol/ipratropium for Nebulization  aspirin  chewable  buDESOnide   0.5 milliGRAM(s) Respule  diltiazem   CD  guaiFENesin    Syrup PRN  heparin  Injectable  lactobacillus acidophilus  loratadine  metoprolol succinate ER  sodium chloride  tiotropium 18 MICROgram(s) Capsule      Social History--  EtOH: denies   Tobacco: quit after last admission  Drug Use: denies     Family/Marital History--    No pertinent family history in first degree relatives    Remainder not relevant to clinical concern.    Travel/Environmental/Occupational History:  Works for [employer], [employer] .    Review of Systems:  A >=10-point review of systems was obtained.   Review of systems otherwise negative except as previously noted.    Physical Exam--  Vital Signs: T(F): 97.6 (01-01-19 @ 05:24), Max: 97.8 (12-31-18 @ 18:41)  HR: 94 (01-01-19 @ 07:45)  BP: 138/77 (01-01-19 @ 05:24)  RR: 17 (01-01-19 @ 05:24)  SpO2: 98% (01-01-19 @ 07:45)  Wt(kg): --  General: Nontoxic-appearing Male in no acute distress.  HEENT: AT/NC. PERRL. EOMI. Anicteric. Conjunctiva pink and moist. Oropharynx clear. Dentition fair.  Neck: Not rigid. No sense of mass.  Nodes: None palpable.  Lungs: Diminihshed breath sounds bilaterally L>R without rales, wheezing or rhonchi  Heart: Regular rate and rhythm. No Murmur. No rub. No gallop. No palpable thrill.  Abdomen: Bowel sounds present and normoactive. Soft. Nondistended. Nontender. No sense of mass. No organomegaly.  Back: No spinal tenderness. No costovertebral angle tenderness.   Extremities: No cyanosis or clubbing. No edema.   Skin: Warm. Dry. Good turgor. No rash. No vasculitic stigmata.  Psychiatric: Appropriate affect and mood for situation.         Laboratory & Imaging Data--  CBC                        12.6   10.65 )-----------( 357      ( 01 Jan 2019 08:06 )             36.9       Chemistries  01-01    137  |  99  |  6<L>  ----------------------------<  119<H>  4.2   |  29  |  0.61    Ca    9.3      01 Jan 2019 08:06  Mg     2.3     01-01    TPro  6.1  /  Alb  2.8<L>  /  TBili  0.6  /  DBili  x   /  AST  21  /  ALT  26  /  AlkPhos  94  12-31    Blood Gas Profile - Arterial (12.31.18 @ 23:38)    pH, Arterial: 7.44    pCO2, Arterial: 35 mmHg    pO2, Arterial: 108 mmHg    HCO3, Arterial: 24 mmol/L    Base Excess, Arterial: -0.2 mmol/L    Oxygen Saturation, Arterial: 98 %    FIO2, Arterial: 30.0    Blood Gas Comments Arterial: rra-pmat    Procalcitonin, Serum (12.31.18 @ 19:15)    Procalcitonin, Serum: 0.21: Procalcitonin (PCT) Interpretation (ng/mL) - Diagnosis of systemic  bacterial infection/sepsis  PCT < 0.5: Systemic infection (sepsis) is not likely and risk for  progression to severe systemic infection is low. Local bacterial  infection is possible. If early sepsis is suspected clinically, PCT  should be re-assessed in 6-24 hours.  PCT >/= 0.5 but < 2.0: Systemic infection (sepsis) is possible, but other  conditions are known to elevate PCT as well. Moderate risk for  progression to severe systemic infection. The patient should be closely  monitored both clinically and by re-assessing PCT within 6-24 hours.  PCT >/= 2.0 but < 10.0: Systemic infection (sepsis) is likely, unless  other causes are known. High risk of progression to severe systemic  infection (severe sepsis/septic shock).  PCT >/= 10.0: Important systemic inflammatory response, almost  exclusively due to severe bacterial sepsis or septic shock. High  likelihood of severe sepsis or septic shock. ng/mL        Culture Data  None    < from: Xray Chest 2 Views PA/Lat (12.31.18 @ 19:21) > (personally reviewed, compared with 12/15)   INTERPRETATION:  Shortness of breath, cough.    PA lateral. Prior 12/15/2018.    No change heart mediastinum. Superior mediastinal widening similar to   priorconsistent with known adenopathy. There is new groundglass   infiltrate in the left mid and lower lung field consistent with pneumonia   in appropriate clinical setting. New small left pleural effusion and   trace right pleural effusion.    Impression: As above      < end of copied text >    < from: CT Chest No Cont (12.13.18 @ 08:30) >    EXAM:  CT CHEST                            PROCEDURE DATE:  12/13/2018          INTERPRETATION:  CLINICAL INFORMATION: Smoker with severe cough, found to   have hyponatremia. Rule out lung mass.    COMPARISON: None.    PROCEDURE:   CT of the Chest was performed without intravenous contrast.  Sagittal and coronal reformats were performed.      FINDINGS:    CHEST:     LUNGS AND LARGE AIRWAYS: Patent central airways.  Emphysema. Subsegmental   right middle lobe atelectasis. 2 mm right upper lobe nodules (example   series 2, image 57). Interlobular septal thickening suggestive of edema.  PLEURA: Small bilateral pleural effusions.  VESSELS: Atherosclerotic ossifications of the aorta and coronary arteries.  HEART: Heart size is normal. Moderate-sized pericardial effusion.  MEDIASTINUM AND NELLA: Extensive mediastinal adenopathy including large   with the conglomerate of paratracheal nodes, difficult to delineate   without intravenous contrast. Bilateral hilar adenopathy also identified   but difficult to delineate.  CHEST WALL AND LOWER NECK: Multinodular right thyroid lobe.  VISUALIZED UPPER ABDOMEN: Within normal limits.  BONES: No suspicious osseous lesion. Multilevel Schmorl's nodes and loss   of vertebral body height, likely on a chronic basis.    IMPRESSION:     1. Small bilateral pleural effusions, moderate-sized pericardial   effusion, and interlobular septal thickening suggestive of edema.   Findings suggest volume overload.  2. Extensive mediastinal adenopathy and hilar adenopathy, difficult to   delineate without intravenous contrast. No suspicious lung mass   identified, though hilar mass cannot be excluded. Consider repeat imaging   with contrast.   3. Emphysema. 2 mm right upper lobe nodules for which follow-up CT in 12   months is recommended.    ARNOLD RYAN M.D., ATTENDING RADIOLOGIST  This document has been electronically signed. Dec 13 2018  9:08AM    < end of copied text >

## 2019-01-01 NOTE — PROGRESS NOTE ADULT - SUBJECTIVE AND OBJECTIVE BOX
Patient is a 58y old  Male who presents with a chief complaint of sob (01 Jan 2019 18:53)      INTERVAL HPI/OVERNIGHT EVENTS:    Admits to hemoptysis today.    MEDICATIONS  (STANDING):  ALBUTerol/ipratropium for Nebulization 3 milliLiter(s) Nebulizer every 6 hours  aspirin  chewable 81 milliGRAM(s) Oral daily  buDESOnide   0.5 milliGRAM(s) Respule 0.5 milliGRAM(s) Inhalation two times a day  diltiazem    milliGRAM(s) Oral daily  heparin  Injectable 5000 Unit(s) SubCutaneous every 8 hours  influenza   Vaccine 0.5 milliLiter(s) IntraMuscular once  lactobacillus acidophilus 1 Tablet(s) Oral three times a day with meals  loratadine 10 milliGRAM(s) Oral daily  meropenem  IVPB 1000 milliGRAM(s) IV Intermittent every 8 hours  meropenem  IVPB      metoprolol succinate  milliGRAM(s) Oral daily  sodium chloride 1 Gram(s) Oral three times a day  tiotropium 18 MICROgram(s) Capsule 1 Capsule(s) Inhalation daily  vancomycin  IVPB 1750 milliGRAM(s) IV Intermittent every 12 hours      MEDICATIONS  (PRN):  guaiFENesin    Syrup 200 milliGRAM(s) Oral every 6 hours PRN Cough      Allergies    penicillin (Unknown)    Intolerances        PAST MEDICAL & SURGICAL HISTORY:  Dyspnea  Mediastinal adenopathy  Pericardial effusion  AF (atrial fibrillation)  Hyponatremia  COPD (chronic obstructive pulmonary disease)  HTN (hypertension)  S/P pericardial window creation      Vital Signs Last 24 Hrs  T(C): 36.6 (01 Jan 2019 21:07), Max: 36.6 (31 Dec 2018 22:12)  T(F): 97.8 (01 Jan 2019 21:07), Max: 97.9 (01 Jan 2019 13:47)  HR: 78 (01 Jan 2019 21:07) (78 - 101)  BP: 113/64 (01 Jan 2019 21:07) (103/58 - 138/77)  BP(mean): --  RR: 17 (01 Jan 2019 21:07) (16 - 20)  SpO2: 100% (01 Jan 2019 21:07) (95% - 100%)    PHYSICAL EXAMINATION:    GENERAL: The patient is awake and alert in no apparent distress.     HEENT: Head is normocephalic and atraumatic. Extraocular muscles are intact. Mucous membranes are moist.    NECK: Supple.    LUNGS: scattered bilateral rhonchi    HEART: Regular rate and rhythm without murmur.    ABDOMEN: Soft, nontender, and nondistended.      EXTREMITIES: Without any cyanosis, clubbing, rash, lesions or edema.    NEUROLOGIC: Grossly intact.    SKIN: No ulceration or induration present.      LABS:                        12.6   10.65 )-----------( 357      ( 01 Jan 2019 08:06 )             36.9     01-01    137  |  99  |  6<L>  ----------------------------<  119<H>  4.2   |  29  |  0.61    Ca    9.3      01 Jan 2019 08:06  Mg     2.3     01-01    TPro  6.1  /  Alb  2.8<L>  /  TBili  0.6  /  DBili  x   /  AST  21  /  ALT  26  /  AlkPhos  94  12-31        ABG - ( 31 Dec 2018 23:38 )  pH, Arterial: 7.44  pH, Blood: x     /  pCO2: 35    /  pO2: 108   / HCO3: 24    / Base Excess: -0.2  /  SaO2: 98                      Serum Pro-Brain Natriuretic Peptide: 1158 pg/mL (12-31-18 @ 19:15)      Procalcitonin, Serum: 0.21 ng/mL (12-31-18 @ 19:15)      MICROBIOLOGY:      RADIOLOGY & ADDITIONAL STUDIES:    Assessment:    Hemoptysis probably secondary to endobronchial malignancy - suspect small cell carcinoma with SIADH and mediastinal and hilar adenopathy    Plan:    Antibiotics per ID  NPO for bronchoscopy in AM if endoscopy time is available

## 2019-01-01 NOTE — PROGRESS NOTE ADULT - SUBJECTIVE AND OBJECTIVE BOX
Patient is a 58y old  Male who presents with a chief complaint of sob (01 Jan 2019 10:07)      INTERVAL /OVERNIGHT EVENTS: still with dry cough, feels SOB    MEDICATIONS  (STANDING):  ALBUTerol/ipratropium for Nebulization 3 milliLiter(s) Nebulizer every 6 hours  aspirin  chewable 81 milliGRAM(s) Oral daily  buDESOnide   0.5 milliGRAM(s) Respule 0.5 milliGRAM(s) Inhalation two times a day  diltiazem    milliGRAM(s) Oral daily  heparin  Injectable 5000 Unit(s) SubCutaneous every 8 hours  influenza   Vaccine 0.5 milliLiter(s) IntraMuscular once  lactobacillus acidophilus 1 Tablet(s) Oral three times a day with meals  loratadine 10 milliGRAM(s) Oral daily  meropenem  IVPB 1000 milliGRAM(s) IV Intermittent every 8 hours  meropenem  IVPB      metoprolol succinate  milliGRAM(s) Oral daily  sodium chloride 1 Gram(s) Oral three times a day  tiotropium 18 MICROgram(s) Capsule 1 Capsule(s) Inhalation daily  vancomycin  IVPB 1750 milliGRAM(s) IV Intermittent every 12 hours    MEDICATIONS  (PRN):  guaiFENesin    Syrup 200 milliGRAM(s) Oral every 6 hours PRN Cough      Allergies    penicillin (Unknown)    Intolerances        REVIEW OF SYSTEMS:  CONSTITUTIONAL: No fever, weight loss, or fatigue  EYES: No eye pain, visual disturbances, or discharge  ENMT:  No difficulty hearing, tinnitus, vertigo; No sinus or throat pain  NECK: No pain or stiffness  RESPIRATORY: + cough, wheezing, chills or hemoptysis; + shortness of breath  CARDIOVASCULAR: No chest pain, palpitations, dizziness, or leg swelling  GASTROINTESTINAL: No abdominal or epigastric pain. No nausea, vomiting, or hematemesis; No diarrhea or constipation. No melena or hematochezia.  GENITOURINARY: No dysuria, frequency, hematuria, or incontinence  NEUROLOGICAL: No headaches, memory loss, loss of strength, numbness, or tremors  SKIN: No itching, burning, rashes, or lesions   LYMPH NODES: No enlarged glands  ENDOCRINE: No heat or cold intolerance; No hair loss; No polydipsia or polyuria  MUSCULOSKELETAL: No joint pain or swelling; No muscle, back, or extremity pain  PSYCHIATRIC: No depression, anxiety, mood swings, or difficulty sleeping  HEME/LYMPH: No easy bruising, or bleeding gums  ALLERGY AND IMMUNOLOGIC: No hives or eczema    Vital Signs Last 24 Hrs  T(C): 36.6 (01 Jan 2019 13:47), Max: 36.6 (31 Dec 2018 22:12)  T(F): 97.9 (01 Jan 2019 13:47), Max: 97.9 (01 Jan 2019 13:47)  HR: 96 (01 Jan 2019 14:16) (80 - 99)  BP: 116/57 (01 Jan 2019 13:47) (107/64 - 138/77)  BP(mean): --  RR: 16 (01 Jan 2019 13:47) (16 - 20)  SpO2: 97% (01 Jan 2019 14:16) (96% - 99%)    PHYSICAL EXAM:  GENERAL: NAD, well-groomed, well-developed  HEAD:  Atraumatic, Normocephalic  EYES: EOMI, PERRLA, conjunctiva and sclera clear  ENMT: No tonsillar erythema, exudates, or enlargement; Moist mucous membranes, Good dentition, No lesions  NECK: Supple, No JVD, Normal thyroid  NERVOUS SYSTEM:  Alert & Oriented X3, Good concentration; Motor Strength 5/5 B/L upper and lower extremities; DTRs 2+ intact and symmetric  CHEST/LUNG: Clear to auscultation bilaterally; No rales, rhonchi, wheezing, or rubs  HEART: Regular rate and rhythm; No murmurs, rubs, or gallops  ABDOMEN: Soft, Nontender, Nondistended; Bowel sounds present  EXTREMITIES:  2+ Peripheral Pulses, No clubbing, cyanosis, or edema  LYMPH: No lymphadenopathy noted  SKIN: No rashes or lesions    LABS:                        12.6   10.65 )-----------( 357      ( 01 Jan 2019 08:06 )             36.9     01 Jan 2019 08:06    137    |  99     |  6      ----------------------------<  119    4.2     |  29     |  0.61     Ca    9.3        01 Jan 2019 08:06  Mg     2.3       01 Jan 2019 08:06    TPro  6.1    /  Alb  2.8    /  TBili  0.6    /  DBili  x      /  AST  21     /  ALT  26     /  AlkPhos  94     31 Dec 2018 19:15        CAPILLARY BLOOD GLUCOSE          RADIOLOGY & ADDITIONAL TESTS:    Notes Reviewed:  [x ] YES  [ ] NO    Care Discussed with Consultants/Other Providers [x ] YES  [ ] NO

## 2019-01-01 NOTE — CONSULT NOTE ADULT - ASSESSMENT
Hyponatremia  PNA      - Known to me last hospitalization with SIADH. He was sent home with NaCl tabs. He claimed to be compliant with salt tablets but not fluid restriction. No hypertonic saline needed. We gave tolvaptan 15 mg po x 1   - Sodium improved  - Continue NaCl tabs. Fluid restriction

## 2019-01-01 NOTE — CONSULT NOTE ADULT - PROBLEM SELECTOR RECOMMENDATION 2
Low suspicion  Check RVP  As patient recently hospitalized and received antibiotics will add Vancomycin vs. gram positives and give meropenem vs. GNR. Meropenem will also give anaerobic coverage in the event that there is a post-obstructive component  Doubt atypicals here, will not add Azithro at present

## 2019-01-02 ENCOUNTER — APPOINTMENT (OUTPATIENT)
Dept: CARDIOLOGY | Facility: CLINIC | Age: 59
End: 2019-01-02

## 2019-01-02 ENCOUNTER — RESULT REVIEW (OUTPATIENT)
Age: 59
End: 2019-01-02

## 2019-01-02 DIAGNOSIS — I48.91 UNSPECIFIED ATRIAL FIBRILLATION: ICD-10-CM

## 2019-01-02 LAB
ANION GAP SERPL CALC-SCNC: 9 MMOL/L — SIGNIFICANT CHANGE UP (ref 5–17)
BUN SERPL-MCNC: 7 MG/DL — SIGNIFICANT CHANGE UP (ref 7–23)
CALCIUM SERPL-MCNC: 8.8 MG/DL — SIGNIFICANT CHANGE UP (ref 8.5–10.1)
CHLORIDE SERPL-SCNC: 99 MMOL/L — SIGNIFICANT CHANGE UP (ref 96–108)
CO2 SERPL-SCNC: 29 MMOL/L — SIGNIFICANT CHANGE UP (ref 22–31)
CREAT SERPL-MCNC: 0.6 MG/DL — SIGNIFICANT CHANGE UP (ref 0.5–1.3)
GLUCOSE SERPL-MCNC: 121 MG/DL — HIGH (ref 70–99)
GRAM STN FLD: SIGNIFICANT CHANGE UP
HCT VFR BLD CALC: 33.9 % — LOW (ref 39–50)
HGB BLD-MCNC: 11.4 G/DL — LOW (ref 13–17)
MCHC RBC-ENTMCNC: 27.7 PG — SIGNIFICANT CHANGE UP (ref 27–34)
MCHC RBC-ENTMCNC: 33.6 GM/DL — SIGNIFICANT CHANGE UP (ref 32–36)
MCV RBC AUTO: 82.3 FL — SIGNIFICANT CHANGE UP (ref 80–100)
NRBC # BLD: 0 /100 WBCS — SIGNIFICANT CHANGE UP (ref 0–0)
PLATELET # BLD AUTO: 302 K/UL — SIGNIFICANT CHANGE UP (ref 150–400)
POTASSIUM SERPL-MCNC: 3.8 MMOL/L — SIGNIFICANT CHANGE UP (ref 3.5–5.3)
POTASSIUM SERPL-SCNC: 3.8 MMOL/L — SIGNIFICANT CHANGE UP (ref 3.5–5.3)
RBC # BLD: 4.12 M/UL — LOW (ref 4.2–5.8)
RBC # FLD: 12.9 % — SIGNIFICANT CHANGE UP (ref 10.3–14.5)
SODIUM SERPL-SCNC: 137 MMOL/L — SIGNIFICANT CHANGE UP (ref 135–145)
SPECIMEN SOURCE: SIGNIFICANT CHANGE UP
WBC # BLD: 11.72 K/UL — HIGH (ref 3.8–10.5)
WBC # FLD AUTO: 11.72 K/UL — HIGH (ref 3.8–10.5)

## 2019-01-02 PROCEDURE — 93010 ELECTROCARDIOGRAM REPORT: CPT | Mod: 76

## 2019-01-02 PROCEDURE — 88305 TISSUE EXAM BY PATHOLOGIST: CPT | Mod: 26

## 2019-01-02 PROCEDURE — 88108 CYTOPATH CONCENTRATE TECH: CPT | Mod: 26

## 2019-01-02 PROCEDURE — 99255 IP/OBS CONSLTJ NEW/EST HI 80: CPT

## 2019-01-02 PROCEDURE — 93970 EXTREMITY STUDY: CPT | Mod: 26

## 2019-01-02 PROCEDURE — 88342 IMHCHEM/IMCYTCHM 1ST ANTB: CPT | Mod: 26

## 2019-01-02 PROCEDURE — 88341 IMHCHEM/IMCYTCHM EA ADD ANTB: CPT | Mod: 26

## 2019-01-02 RX ORDER — METOPROLOL TARTRATE 50 MG
5 TABLET ORAL ONCE
Qty: 0 | Refills: 0 | Status: DISCONTINUED | OUTPATIENT
Start: 2019-01-02 | End: 2019-01-02

## 2019-01-02 RX ORDER — LORATADINE 5 MG/5 ML
SOLUTION, ORAL ORAL
Refills: 0 | Status: ACTIVE | COMMUNITY

## 2019-01-02 RX ORDER — ASPIRIN 81 MG
81 TABLET, DELAYED RELEASE (ENTERIC COATED) ORAL
Refills: 0 | Status: ACTIVE | COMMUNITY

## 2019-01-02 RX ORDER — NICOTINE TRANSDERMAL SYSTEM 21 MG/24H
21 PATCH, EXTENDED RELEASE TRANSDERMAL
Refills: 0 | Status: ACTIVE | COMMUNITY

## 2019-01-02 RX ORDER — HEPARIN SODIUM 5000 [USP'U]/ML
5000 INJECTION INTRAVENOUS; SUBCUTANEOUS EVERY 12 HOURS
Qty: 0 | Refills: 0 | Status: DISCONTINUED | OUTPATIENT
Start: 2019-01-02 | End: 2019-01-03

## 2019-01-02 RX ORDER — METOPROLOL TARTRATE 50 MG
1.25 TABLET ORAL ONCE
Qty: 0 | Refills: 0 | Status: COMPLETED | OUTPATIENT
Start: 2019-01-02 | End: 2019-01-02

## 2019-01-02 RX ORDER — ASPIRIN/CALCIUM CARB/MAGNESIUM 324 MG
81 TABLET ORAL DAILY
Qty: 0 | Refills: 0 | Status: DISCONTINUED | OUTPATIENT
Start: 2019-01-02 | End: 2019-01-04

## 2019-01-02 RX ADMIN — Medication 1 TABLET(S): at 17:24

## 2019-01-02 RX ADMIN — Medication 0.5 MILLIGRAM(S): at 19:40

## 2019-01-02 RX ADMIN — MEROPENEM 100 MILLIGRAM(S): 1 INJECTION INTRAVENOUS at 15:23

## 2019-01-02 RX ADMIN — Medication 3 MILLILITER(S): at 19:40

## 2019-01-02 RX ADMIN — MEROPENEM 100 MILLIGRAM(S): 1 INJECTION INTRAVENOUS at 06:37

## 2019-01-02 RX ADMIN — Medication 81 MILLIGRAM(S): at 17:26

## 2019-01-02 RX ADMIN — Medication 3 MILLILITER(S): at 00:39

## 2019-01-02 RX ADMIN — SODIUM CHLORIDE 1 GRAM(S): 9 INJECTION INTRAMUSCULAR; INTRAVENOUS; SUBCUTANEOUS at 06:36

## 2019-01-02 RX ADMIN — Medication 3 MILLILITER(S): at 07:50

## 2019-01-02 RX ADMIN — HEPARIN SODIUM 5000 UNIT(S): 5000 INJECTION INTRAVENOUS; SUBCUTANEOUS at 17:25

## 2019-01-02 RX ADMIN — Medication 200 MILLIGRAM(S): at 06:37

## 2019-01-02 RX ADMIN — Medication 300 MILLIGRAM(S): at 06:37

## 2019-01-02 RX ADMIN — MEROPENEM 100 MILLIGRAM(S): 1 INJECTION INTRAVENOUS at 22:07

## 2019-01-02 RX ADMIN — LORATADINE 10 MILLIGRAM(S): 10 TABLET ORAL at 15:23

## 2019-01-02 RX ADMIN — Medication 200 MILLIGRAM(S): at 17:25

## 2019-01-02 RX ADMIN — Medication 250 MILLIGRAM(S): at 16:03

## 2019-01-02 RX ADMIN — Medication 0.5 MILLIGRAM(S): at 07:49

## 2019-01-02 RX ADMIN — SODIUM CHLORIDE 1 GRAM(S): 9 INJECTION INTRAMUSCULAR; INTRAVENOUS; SUBCUTANEOUS at 15:23

## 2019-01-02 RX ADMIN — SODIUM CHLORIDE 1 GRAM(S): 9 INJECTION INTRAMUSCULAR; INTRAVENOUS; SUBCUTANEOUS at 22:06

## 2019-01-02 RX ADMIN — Medication 1 TABLET(S): at 08:40

## 2019-01-02 RX ADMIN — Medication 250 MILLIGRAM(S): at 01:56

## 2019-01-02 RX ADMIN — Medication 3 MILLILITER(S): at 13:48

## 2019-01-02 NOTE — CONSULT NOTE ADULT - ASSESSMENT
57 y/o male with pmhx of HTN, recently admitted December 2018 for hyponatremia and found to have mediastinal adenopathy and moderate sized pericardial effusion with tamponade physiology TTE with hospital course further complicated by new onset afib with RVR.     Currently on cardizem 300 mg daily,  metoprolol 200 mg daily.  lopressor 5 mg IV x 1  aspirin 81 mg  Cardizem  mg/24 hours   loratadine 10 mg oral tablet  Toprol- mg   Proventil HFA 90 mcg/inh inhalation aerosol  -- 2 puff(s) inhaled 4 times a day, As Needed   tiotropium 18 mcg inhalation capsule  Advair Diskus 250 mcg-50 mcg inhalation powder  guaiFENesin 100 mg/5 mL oral liquid  magnesium oxide 400 mg (241.3 mg elemental magnesium) oral tablet  magnesium oxide 400 mg (241.3 mg elemental magnesium) oral tablet  Sodium Chloride 1 g oral tablet  -- 2 tab(s) by mouth 3 times a day   -- Indication: For Suplement. 57 y/o male with pmhx of HTN, recently admitted December 2018 for hyponatremia and found to have mediastinal adenopathy and moderate sized pericardial effusion with tamponade physiology TTE with hospital course further complicated by new onset afib with RVR, who presented with SOB and cough. Found to be hyponatremic and pna. Presently being worked up to r/o malignancy. Patient had bronchoscopy today and s/p bronch, patient went into rapid a fib. Rapid afib likely 2/2 SOB. Currently patient is in afib in 90s.      Recommend:  -Continue cardizem 300 mg daily and metoprolol 200 mg daily   -Patient has LISA VASC score of 1. Continue ASA 81 mg. May need to add AC because presumably hypercoagulable on the basis of presumed malignancy  - Patient is not complaining of any cardiac symptoms at this time.  - No acute changes on EKG compared to previous.  - BP well controlled, monitor routine hemodynamics.  - Monitor and replete lytes, keep K>4, Mg>2.  - Other cardiovascular workup will depend on clinical course.  - All other workup per primary team.  - Will continue to follow. 59 y/o male with pmhx of HTN, recently admitted December 2018 for hyponatremia and found to have mediastinal adenopathy and moderate sized pericardial effusion with tamponade physiology TTE with hospital course further complicated by new onset afib with RVR, who presented with SOB and cough. Found to be hyponatremic and pna. Presently being worked up to r/o malignancy. Patient had bronchoscopy today and s/p bronch, patient went into rapid a fib which resolved on own. Rapid afib likely 2/2 SOB. Currently patient is in afib in 90s and not complaining of any cardiac symptoms.     Recommend:  -Continue cardizem 300 mg daily and metoprolol 200 mg daily   -Recommend echo  -Will consider antiarrythmic therapy pending clinical coarse   -Continue tele monitor   -B/l LE doppler r/o DVT with noted b/l LE edema.   -Patient has LISA VASC score of 1. Continue ASA 81 mg.   - BP well controlled, monitor routine hemodynamics.  - Monitor and replete lytes, keep K>4, Mg>2.  - Other cardiovascular workup will depend on clinical course.  - All other workup per primary team.  - Will continue to follow. 58M pmhx afib diagnosed December 2018 (not on AC), COPD, HTN, cardiac tamponade s/p window 12/2018 discharged from [facility] ~2 weeks for hyponatremia and found to have mediastinal adenopathy and moderate sized pericardial effusion with tamponade physiology with hospital course further complicated by new onset afib with RVR, who presented with worsening SOB and cough. Found to be hyponatremic and pna. Presently being worked up to r/o malignancy. Patient had bronchoscopy today and s/p bronch, patient went into rapid a fib which resolved on own. Rapid afib likely 2/2 SOB. Currently patient is in afib in 90s and not complaining of any cardiac symptoms.     Recommend:  -Continue cardizem 300 mg daily and metoprolol 200 mg daily   -Will consider antiarrythmic therapy pending clinical coarse   -Continue tele monitor   -B/l LE doppler r/o DVT with noted b/l LE edema.   -Echo December 2018: Overall preserved left ventricular systolic function. Left atrium mildly enlarged. Thickened pericardium with trace pericardial effusion noted.  -Patient has LISA VASC score of 1. Continue ASA 81 mg.   -BP well controlled, monitor routine hemodynamics.  -Monitor and replete lytes, keep K>4, Mg>2.  -Other cardiovascular workup will depend on clinical course.  -All other workup per primary team.  -Will continue to follow. 58M pmhx afib diagnosed December 2018 (not on AC), COPD, HTN, cardiac tamponade s/p window 12/2018 discharged from [facility] ~2 weeks for hyponatremia and found to have mediastinal adenopathy and moderate sized pericardial effusion with tamponade physiology with hospital course further complicated by new onset afib with RVR, who presented with worsening SOB and cough. Found to be hyponatremic and pna. Presently being worked up to r/o malignancy. Patient had bronchoscopy today and s/p bronch, patient went into rapid a fib which resolved on own. Rapid afib likely 2/2 SOB. Currently patient is in afib in 90s and not complaining of any cardiac symptoms.     Recommend:  -Continue cardizem 300 mg daily and metoprolol 200 mg daily   -Will consider antiarrythmic therapy pending clinical coarse   -Continue tele monitor   -B/l LE doppler r/o DVT with noted b/l LE edema.   -Echo December 2018: Overall preserved left ventricular systolic function. Left atrium mildly enlarged. Thickened pericardium with trace pericardial effusion noted.  -Patient has LISA VASC score of 1. Continue ASA 81 mg.   -Pulm f/u  -BP well controlled, monitor routine hemodynamics.  -Monitor and replete lytes, keep K>4, Mg>2.  -Other cardiovascular workup will depend on clinical course.  -All other workup per primary team.  -Will continue to follow. 58M pmhx afib diagnosed December 2018 (not on AC), COPD, HTN, cardiac tamponade s/p window 12/2018 discharged from [facility] ~2 weeks for hyponatremia and found to have mediastinal adenopathy and moderate sized pericardial effusion with tamponade physiology with hospital course further complicated by new onset afib with RVR, who presented with worsening SOB and cough. Found to be hyponatremic and pna. Presently being worked up to r/o malignancy. Patient had bronchoscopy today and s/p bronch, patient went into rapid a fib which resolved on own. Rapid afib likely 2/2 SOB. Currently patient is in afib in 90s and not complaining of any cardiac symptoms.     Recommend:  -Continue cardizem 300 mg daily and metoprolol 200 mg daily   -Will consider antiarrythmic therapy pending clinical course  - repeat echo   -Continue tele monitor   -B/l LE doppler r/o DVT with noted b/l LE edema.   -Echo December 2018: Overall preserved left ventricular systolic function. Left atrium mildly enlarged. Thickened pericardium with trace pericardial effusion noted.  -Patient has LISA VASC score of 1. Continue ASA 81 mg.   -Pulm f/u  -BP well controlled, monitor routine hemodynamics.  -Monitor and replete lytes, keep K>4, Mg>2.  -Other cardiovascular workup will depend on clinical course.  -All other workup per primary team.  -Will continue to follow. 58M pmhx afib diagnosed December 2018 (not on AC), COPD, HTN, cardiac tamponade s/p window 12/2018 discharged from [facility] ~2 weeks for hyponatremia and found to have mediastinal adenopathy and moderate sized pericardial effusion with tamponade physiology with hospital course further complicated by new onset afib with RVR, who presented with worsening SOB and cough and was found to be hyponatremic and have pna. Presently being worked up to r/o malignancy. Patient had bronchoscopy today and s/p bronch, patient went into rapid a fib which resolved on own. Rapid afib likely 2/2 SOB. Currently patient is in afib in 90s and not complaining of any cardiac symptoms.     Recommend:  -Continue cardizem 300 mg daily and metoprolol 200 mg daily   -Will consider antiarrythmic therapy pending clinical course  -Continue tele monitor   -B/l LE doppler r/o DVT with noted b/l LE edema.   -Echo December 2018: Overall preserved left ventricular systolic function. Left atrium mildly enlarged. Thickened pericardium with trace pericardial effusion noted.  -Patient has LISA VASC score of 1. Continue ASA 81 mg.   -Pulm f/u  -BP well controlled, monitor routine hemodynamics.  -Monitor and replete lytes, keep K>4, Mg>2.  -Other cardiovascular workup will depend on clinical course.  -All other workup per primary team.  -Will continue to follow.

## 2019-01-02 NOTE — CHART NOTE - NSCHARTNOTEFT_GEN_A_CORE
bronchoscopy revealed irregular mucosal studding of distal trachea and both main stem bronchi with nodular densities of main ricardo    biopsies, brushings, and lavage performed

## 2019-01-02 NOTE — PROGRESS NOTE ADULT - ASSESSMENT
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 He was sent home with NaCl tabs. He claimed to be compliant with salt tablets but not fluid restriction. No hypertonic saline needed. S/p tolvaptan 15 mg po x 1   - Sodium improved  - Continue NaCl tabs. Fluid restriction   - For bronch to r/o malignancy

## 2019-01-02 NOTE — PROGRESS NOTE ADULT - SUBJECTIVE AND OBJECTIVE BOX
[facility], Division of Infectious Diseases  ANH Valderrama A. Lee  [phone]    Name: RAFA SHAFER  Age: 58y  Gender: Male  MRN: [id number]    Interval History--  Notes reviewed. NPO for potential bronchoscopy, though no slot as of yet.  No fevers, chills, or rigors. No CP  No SOB. Cough and hoarseness unchanged.  Reportedly with episode of hemoptysis but patient did not recall.       Past Medical History--  Dyspnea  Mediastinal adenopathy  Pericardial effusion  AF (atrial fibrillation)  Hyponatremia  COPD (chronic obstructive pulmonary disease)  HTN (hypertension)  S/P pericardial window creation  No significant past surgical history      For details regarding the patient's social history, family history, and other miscellaneous elements, please refer the initial infectious diseases consultation and/or the admitting history and physical examination for this admission.    Allergies    penicillin (Unknown)    Intolerances        Medications--  Antibiotics:  meropenem  IVPB 1000 milliGRAM(s) IV Intermittent every 8 hours  meropenem  IVPB      vancomycin  IVPB 1750 milliGRAM(s) IV Intermittent every 12 hours    Immunologic:  influenza   Vaccine 0.5 milliLiter(s) IntraMuscular once    Other:  ALBUTerol/ipratropium for Nebulization  buDESOnide   0.5 milliGRAM(s) Respule  diltiazem   CD  guaiFENesin    Syrup PRN  lactobacillus acidophilus  loratadine  metoprolol succinate ER  sodium chloride  tiotropium 18 MICROgram(s) Capsule      Review of Systems--  A 10-point review of systems was obtained.   Review of systems otherwise unchanged compared to prior visit except as previously noted.    Physical Examination--  Vital Signs: T(F): 98.6 (01-02-19 @ 05:44), Max: 98.6 (01-02-19 @ 05:44)  HR: 108 (01-02-19 @ 08:38)  BP: 120/72 (01-02-19 @ 05:44)  RR: 17 (01-02-19 @ 08:38)  SpO2: 96% (01-02-19 @ 08:38)  Wt(kg): --  General: Nontoxic-appearing Male in no acute distress.  HEENT: AT/NC. Anicteric. Conjunctiva pink and moist. Oropharynx clear.   Neck: Not rigid. No sense of mass.  Nodes: None palpable.  Lungs: Diminihshed breath sounds bilaterally L>R without rales, wheezing or rhonchi  Heart: Regular rate and rhythm. No Murmur. No rub. No gallop. No palpable thrill.  Abdomen: Bowel sounds present and normoactive. Soft. Nondistended. Nontender. No sense of mass. No organomegaly.  Extremities: No cyanosis. +/- clubbing. No edema.   Skin: Warm. Dry. Good turgor. No rash. No vasculitic stigmata.  Psychiatric: Appropriate affect and mood for situation.       Laboratory Studies--  CBC                        11.4   11.72 )-----------( 302      ( 02 Jan 2019 08:36 )             33.9       Chemistries  01-02    137  |  99  |  7   ----------------------------<  121<H>  3.8   |  29  |  0.60    Ca    8.8      02 Jan 2019 08:36  Mg     2.3     01-01    TPro  6.1  /  Alb  2.8<L>  /  TBili  0.6  /  DBili  x   /  AST  21  /  ALT  26  /  AlkPhos  94  12-31    Rapid RVP Result: NotDete: This Respiratory Panel uses polymerase chain reaction (PCR) to detect for  adenovirus; coronavirus (HKU1, NL63, 229E, OC43); human metapneumovirus  (hMPV); human enterovirus/rhinovirus (Entero/RV); influenza A; influenza  A/H1; influenza A/H3; influenza A/H1-2009; influenza B; parainfluenza  viruses 1, 2, 3, 4; respiratory syncytial virus; Mycoplasma pneumoniae;  and Chlamydophila pneumoniae. (01.01.19 @ 14:24)        Culture Data    Culture - Sputum (collected 02 Jan 2019 01:18)  Source: .Sputum Sputum  Gram Stain (02 Jan 2019 07:08):    Few polymorphonuclear leukocytes per low power field    Few Squamous epithelial cells per low power field    Few Gram Variable Cocci seen per oil power field    Few Gram Negative Rods seen per oil power field    Culture - Blood (collected 31 Dec 2018 22:20)  Source: .Blood Blood-Peripheral  Preliminary Report (01 Jan 2019 23:01):    No growth to date.    Culture - Blood (collected 31 Dec 2018 22:20)  Source: .Blood Blood-Peripheral  Preliminary Report (01 Jan 2019 23:01):    No growth to date.

## 2019-01-02 NOTE — PROGRESS NOTE ADULT - SUBJECTIVE AND OBJECTIVE BOX
Patient is a 58y old  Male who presents with a chief complaint of sob (31 Dec 2018 21:51)       HPI:  pt is a 59yo male with pmhx of htn, copd, cardiac tamponade s/p window 2018 c/o cough x days. pt reports non-productive cough with associated dyspnea on exertion. pt reports "it feels like something is stuck in my throat". pt reports bl le swelling without pain. pt recently admitted for pericardial effusion s/p window. pt quit smoking aprox 2 weeks ago.  In ER patient was found to have PNA on CXR.    Renal is being consulted for hyponatremia. No c/o        PAST MEDICAL & SURGICAL HISTORY:  Dyspnea  Mediastinal adenopathy  Pericardial effusion  AF (atrial fibrillation)  Hyponatremia  COPD (chronic obstructive pulmonary disease)  HTN (hypertension)  S/P pericardial window creation       FAMILY HISTORY:  No pertinent family history in first degree relatives  [place]    Social History:Non smoker    MEDICATIONS  (STANDING):  ALBUTerol/ipratropium for Nebulization 3 milliLiter(s) Nebulizer every 6 hours  aspirin  chewable 81 milliGRAM(s) Oral daily  aztreonam  IVPB 1000 milliGRAM(s) IV Intermittent every 8 hours  buDESOnide   0.5 milliGRAM(s) Respule 0.5 milliGRAM(s) Inhalation two times a day  diltiazem    milliGRAM(s) Oral daily  heparin  Injectable 5000 Unit(s) SubCutaneous every 8 hours  influenza   Vaccine 0.5 milliLiter(s) IntraMuscular once  lactobacillus acidophilus 1 Tablet(s) Oral three times a day with meals  loratadine 10 milliGRAM(s) Oral daily  metoprolol succinate  milliGRAM(s) Oral daily  sodium chloride 1 Gram(s) Oral three times a day  tiotropium 18 MICROgram(s) Capsule 1 Capsule(s) Inhalation daily    MEDICATIONS  (PRN):  guaiFENesin    Syrup 100 milliGRAM(s) Oral every 6 hours PRN Cough   Meds reviewed    Allergies    penicillin (Unknown)    Intolerances         REVIEW OF SYSTEMS:    CONSTITUTIONAL:  No weight loss   EYES: No eye pain, visual disturbances, or discharge  ENMT:  No difficulty hearing, tinnitus, vertigo; No sinus or throat pain  NECK: No pain or stiffness  BREASTS: No pain, masses, or nipple discharge  RESPIRATORY: No SOB. No wheeze. No PARKS  CARDIOVASCULAR: No chest pain, palpitations, dizziness,   GASTROINTESTINAL: No abdominal or epigastric pain. No nausea, vomiting, or hematemesis; No diarrhea or constipation. No melena or hematochezia.Trunckal obesity  GENITOURINARY: No dysuria, frequency, hematuria, or incontinence  NEUROLOGICAL: No headaches, memory loss, loss of strength, numbness, or tremors  SKIN: Diffuse erythema, no blisters  LYMPH NODES: No enlarged glands  ENDOCRINE: No heat or cold intolerance; No hair loss  MUSCULOSKELETAL: No joint pain or swelling   PSYCHIATRIC: No depression, anxiety, mood swings, or difficulty sleeping  HEME/LYMPH: No easy bruising, or bleeding gums  ALLERY AND IMMUNOLOGIC: No hives or eczema      Vital Signs Last 24 Hrs  T(C): 36.4 (2019 05:24), Max: 36.6 (31 Dec 2018 18:41)  T(F): 97.6 (2019 05:24), Max: 97.8 (31 Dec 2018 18:41)  HR: 94 (2019 07:45) (80 - 99)  BP: 138/77 (2019 05:24) (107/64 - 138/77)  BP(mean): --  RR: 17 (2019 05:24) (17 - 20)  SpO2: 98% (2019 07:45) (97% - 99%)  Daily Height in cm: 185.42 (31 Dec 2018 18:41)    Daily Weight in k.2 (31 Dec 2018 22:44)    PHYSICAL EXAM:    GENERAL: NAD  HEAD:  Atraumatic, Normocephalic  EYES: EOMI, PERRLA, conjunctiva and sclera clear  ENMT: No tonsillar erythema, exudates, or enlargement; Moist mucous membranes, Good dentition, No lesions  NECK: Supple, neck  veins full  NERVOUS SYSTEM:  Alert & Oriented X3, Good concentration; Motor Strength wnl upper and lower extremities  CHEST/LUNG: Clear to percussion bilaterally; No rales, rhonchi, wheezing, or rubs  HEART: Regular rate and rhythm; No murmurs, rubs, or gallops  ABDOMEN: Soft, Nontender, Nondistended; Bowel sounds present, body wall and flank edema  EXTREMITIES:  Edema  LYMPH: No lymphadenopathy noted  SKIN: No rashes or lesions, pale      LABS:  Na 137

## 2019-01-02 NOTE — PROGRESS NOTE ADULT - SUBJECTIVE AND OBJECTIVE BOX
Patient is a 58y old  Male who presents with a chief complaint of sob (02 Jan 2019 11:49)      INTERVAL /OVERNIGHT EVENTS: seen in endo, denies palpitations, feels anxious    MEDICATIONS  (STANDING):  ALBUTerol/ipratropium for Nebulization 3 milliLiter(s) Nebulizer every 6 hours  aspirin  chewable 81 milliGRAM(s) Oral daily  buDESOnide   0.5 milliGRAM(s) Respule 0.5 milliGRAM(s) Inhalation two times a day  diltiazem    milliGRAM(s) Oral daily  heparin  Injectable 5000 Unit(s) SubCutaneous every 12 hours  influenza   Vaccine 0.5 milliLiter(s) IntraMuscular once  lactobacillus acidophilus 1 Tablet(s) Oral three times a day with meals  loratadine 10 milliGRAM(s) Oral daily  meropenem  IVPB 1000 milliGRAM(s) IV Intermittent every 8 hours  meropenem  IVPB      metoprolol succinate  milliGRAM(s) Oral daily  metoprolol tartrate Injectable 5 milliGRAM(s) IV Push once  sodium chloride 1 Gram(s) Oral three times a day  tiotropium 18 MICROgram(s) Capsule 1 Capsule(s) Inhalation daily  vancomycin  IVPB 1750 milliGRAM(s) IV Intermittent every 12 hours    MEDICATIONS  (PRN):  guaiFENesin    Syrup 200 milliGRAM(s) Oral every 6 hours PRN Cough  promethazine 25 milliGRAM(s) Oral four times a day PRN breakthrough cough      Allergies    penicillin (Unknown)    Intolerances        REVIEW OF SYSTEMS:  CONSTITUTIONAL: No fever, weight loss, or fatigue  EYES: No eye pain, visual disturbances, or discharge  ENMT:  No difficulty hearing, tinnitus, vertigo; No sinus or throat pain  NECK: No pain or stiffness  RESPIRATORY: + cough, wheezing, chills or hemoptysis; No shortness of breath  CARDIOVASCULAR: No chest pain, palpitations, dizziness, or leg swelling  GASTROINTESTINAL: No abdominal or epigastric pain. No nausea, vomiting, or hematemesis; No diarrhea or constipation. No melena or hematochezia.  GENITOURINARY: No dysuria, frequency, hematuria, or incontinence  NEUROLOGICAL: No headaches, memory loss, loss of strength, numbness, or tremors  SKIN: No itching, burning, rashes, or lesions   LYMPH NODES: No enlarged glands  ENDOCRINE: No heat or cold intolerance; No hair loss; No polydipsia or polyuria  MUSCULOSKELETAL: No joint pain or swelling; No muscle, back, or extremity pain  PSYCHIATRIC: No depression, anxiety, mood swings, or difficulty sleeping  HEME/LYMPH: No easy bruising, or bleeding gums  ALLERGY AND IMMUNOLOGIC: No hives or eczema    Vital Signs Last 24 Hrs  T(C): 37.1 (02 Jan 2019 15:27), Max: 37.1 (02 Jan 2019 15:27)  T(F): 98.8 (02 Jan 2019 15:27), Max: 98.8 (02 Jan 2019 15:27)  HR: 92 (02 Jan 2019 15:27) (74 - 120)  BP: 113/75 (02 Jan 2019 15:27) (103/58 - 125/74)  BP(mean): --  RR: 18 (02 Jan 2019 15:27) (17 - 31)  SpO2: 96% (02 Jan 2019 15:27) (92% - 100%)    PHYSICAL EXAM:  GENERAL: NAD, well-groomed, well-developed  HEAD:  Atraumatic, Normocephalic  EYES: EOMI, PERRLA, conjunctiva and sclera clear  ENMT: No tonsillar erythema, exudates, or enlargement; Moist mucous membranes, Good dentition, No lesions  NECK: Supple, No JVD, Normal thyroid  NERVOUS SYSTEM:  Alert & Oriented X3, Good concentration; Motor Strength 5/5 B/L upper and lower extremities; DTRs 2+ intact and symmetric  CHEST/LUNG: Clear to auscultation bilaterally; No rales, rhonchi, wheezing, or rubs  HEART: Regular rate and rhythm; No murmurs, rubs, or gallops  ABDOMEN: Soft, Nontender, Nondistended; Bowel sounds present  EXTREMITIES:  2+ Peripheral Pulses, No clubbing, cyanosis, or edema  LYMPH: No lymphadenopathy noted  SKIN: No rashes or lesions    LABS:                        11.4   11.72 )-----------( 302      ( 02 Jan 2019 08:36 )             33.9     02 Jan 2019 08:36    137    |  99     |  7      ----------------------------<  121    3.8     |  29     |  0.60     Ca    8.8        02 Jan 2019 08:36          CAPILLARY BLOOD GLUCOSE          RADIOLOGY & ADDITIONAL TESTS:    Notes Reviewed:  [x ] YES  [ ] NO    Care Discussed with Consultants/Other Providers [x ] YES  [ ] NO

## 2019-01-02 NOTE — CONSULT NOTE ADULT - ATTENDING COMMENTS
Chart reviewed    Patient seen and examined    Agree with plan as outlined above
Overall outlook quite guarded.    Thank you for the courtesy of this referral.    Jorge Hussein MD  [phone]

## 2019-01-02 NOTE — CONSULT NOTE ADULT - SUBJECTIVE AND OBJECTIVE BOX
[facility]         Gurpreet Bush, Emily, Corey, Andrae, Sajan, Vida        [phone] (office)    Reason for Consult: a fib with rvr  HPI:  58M pmhx COPD, HTN, cardiac tamponade s/p window 12/2018 discharged from [facility] ~2 weeks. Patient at that time also noted to have mediastinal LAD and concern of malignancy. Pathologic analysis of the pericardial fluid/tissue however did not confirm the suspicion of malignancy. Patient initially had decreased SOB after discharge, but he's had some progression. Cough has persisted, steadily worsening to the point where he asked his wife to bring him back to the ER. Patient has the sensation that something is stuck in his throat that he cannot get up. Also developed hoarseness over the last 3 days. No fevers, chills, or rigors. Cough is dry. No CP. Pt quit smoking approx. 2 weeks ago. In ER patient was found to have PNA on CXR. Patient is being admitted for further work up and treatment. (31 Dec 2018)    Interval HPI: Patient had bronch performed this afternoon to r/o malignancy.  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 Bronch revealed revealed irregular mucosal studding of distal trachea and both main stem bronchi with nodular densities of main ricardo. Biopsies, brushings, and lavage performed. After bronch, patient had episode fo a fib with RVR    Patient seen and examined at bedside for cardiology evaluation. He reports that he has been coughing and had new onset dyspnea on exertion for a couple weeks. Previously, he did not have SOB with exertion. Currently, he is gasping for air after climbing ~1-2 flights of stairs. He came to the ED due to a coughing fit after which he felt dizzy, fell forward and hit the radiator at home. Denied chest pain, palpitations, SOB at rest, PND, orthopnea, near syncope, syncope, or lower extremity edema. Patient had stress echo performed in May or June 2018 for work, reports that it was "fine." PMD is Dr. Aranda. Does not see a cardiologist. Previously on diuretic for HTN but now only taking Irbesartan and Metoprolol to decrease his HR. He denies any history of A fib to his knowledge. Denies having CHF, MI, CVA or cardiac history.    PAST MEDICAL & SURGICAL HISTORY:  Dyspnea  Mediastinal adenopathy  Pericardial effusion  AF (atrial fibrillation)  Hyponatremia  COPD (chronic obstructive pulmonary disease)  HTN (hypertension)  S/P pericardial window creation      SOCIAL HISTORY: No active tobacco, alcohol or illicit drug use    FAMILY HISTORY:  No pertinent family history in first degree relatives      Home Medications:  aspirin 81 mg oral tablet, chewable: 1 tab(s) orally once a day (31 Dec 2018 18:45)  guaiFENesin 100 mg/5 mL oral liquid: 5 milliliter(s) orally every 6 hours, As needed, Cough (31 Dec 2018 18:45)  loratadine 10 mg oral tablet: 1 tab(s) orally once a day (31 Dec 2018 18:45)  magnesium oxide 400 mg (241.3 mg elemental magnesium) oral tablet: 1 tab(s) orally once a day (31 Dec 2018 18:45)      MEDICATIONS  (STANDING):  ALBUTerol/ipratropium for Nebulization 3 milliLiter(s) Nebulizer every 6 hours  buDESOnide   0.5 milliGRAM(s) Respule 0.5 milliGRAM(s) Inhalation two times a day  diltiazem    milliGRAM(s) Oral daily  influenza   Vaccine 0.5 milliLiter(s) IntraMuscular once  lactobacillus acidophilus 1 Tablet(s) Oral three times a day with meals  loratadine 10 milliGRAM(s) Oral daily  meropenem  IVPB 1000 milliGRAM(s) IV Intermittent every 8 hours  meropenem  IVPB      metoprolol succinate  milliGRAM(s) Oral daily  metoprolol tartrate Injectable 5 milliGRAM(s) IV Push once  sodium chloride 1 Gram(s) Oral three times a day  tiotropium 18 MICROgram(s) Capsule 1 Capsule(s) Inhalation daily  vancomycin  IVPB 1750 milliGRAM(s) IV Intermittent every 12 hours    MEDICATIONS  (PRN):  guaiFENesin    Syrup 200 milliGRAM(s) Oral every 6 hours PRN Cough      Allergies    penicillin (Unknown)    Intolerances        REVIEW OF SYSTEMS: Negative except as per HPI.    VITAL SIGNS:   Vital Signs Last 24 Hrs  T(C): 36.6 (02 Jan 2019 13:36), Max: 37 (02 Jan 2019 05:44)  T(F): 97.9 (02 Jan 2019 13:36), Max: 98.6 (02 Jan 2019 05:44)  HR: 118 (02 Jan 2019 13:36) (74 - 120)  BP: 124/71 (02 Jan 2019 13:36) (103/58 - 125/74)  BP(mean): --  RR: 31 (02 Jan 2019 13:36) (17 - 31)  SpO2: 92% (02 Jan 2019 13:36) (92% - 100%)    I&O's Summary    01 Jan 2019 07:01  -  02 Jan 2019 07:00  --------------------------------------------------------  IN: 1150 mL / OUT: 1300 mL / NET: -150 mL        PHYSICAL EXAM:  Constitutional: NAD, well-developed  HEENT NC/AT, moist mucous membranes  Pulmonary: Non-labored, breath sounds are clear bilaterally, no wheezing, rales or rhonchi  Cardiovascular: +S1, S2, RRR, no murmur  Gastrointestinal: Soft, nontender, nondistended, normoactive bowel sounds  Extremities: No peripheral edema   Neurological: Alert, strength and sensitivity are grossly intact  Skin: No obvious lesions/rashes  Psych: Mood & affect appropriate    LABS: All Labs Reviewed:                        11.4   11.72 )-----------( 302      ( 02 Jan 2019 08:36 )             33.9                         12.6   10.65 )-----------( 357      ( 01 Jan 2019 08:06 )             36.9                         11.4   12.51 )-----------( 300      ( 31 Dec 2018 19:15 )             32.0     02 Jan 2019 08:36    137    |  99     |  7      ----------------------------<  121    3.8     |  29     |  0.60   01 Jan 2019 08:06    137    |  99     |  6      ----------------------------<  119    4.2     |  29     |  0.61   31 Dec 2018 19:15    120    |  86     |  8      ----------------------------<  124    3.6     |  24     |  0.53     Ca    8.8        02 Jan 2019 08:36  Ca    9.3        01 Jan 2019 08:06  Ca    7.7        31 Dec 2018 19:15  Mg     2.3       01 Jan 2019 08:06    TPro  6.1    /  Alb  2.8    /  TBili  0.6    /  DBili  x      /  AST  21     /  ALT  26     /  AlkPhos  94     31 Dec 2018 19:15          Blood Culture: Organism --  Gram Stain Blood -- Gram Stain   Few polymorphonuclear leukocytes per low power field  Few Squamous epithelial cells per low power field  Few Gram Variable Cocci seen per oil power field  Few Gram Negative Rods seen per oil power field  Specimen Source .Sputum Sputum  Culture-Blood --    Organism --  Gram Stain Blood -- Gram Stain --  Specimen Source .Blood Blood-Peripheral  Culture-Blood --      12-31 @ 19:15  Pro Bnp 1158        EKG:    RADIOLOGY:    CXR: [facility]         Gurpreet Bush, Emily, Corey, Andrae, Sajan, Vida        [phone] (office)    Reason for Consult: a fib with rvr    HPI:  58M pmhx afib (not on AC), COPD, HTN, cardiac tamponade s/p window 12/2018 discharged from [facility] ~2 weeks. Patient at that time also noted to have mediastinal LAD and concern of malignancy. Pathologic analysis of the pericardial fluid/tissue however did not confirm the suspicion of malignancy. Patient initially had decreased SOB after discharge, but he's had some progression. Cough has persisted, steadily worsening to the point where he asked his wife to bring him back to the ER. Patient has the sensation that something is stuck in his throat that he cannot get up. Also developed hoarseness over the last 3 days. No fevers, chills, or rigors. Cough is dry. No CP. Pt quit smoking approx. 2 weeks ago. In ER patient was found to have PNA on CXR. Patient is being admitted for further work up and treatment. (31 Dec 2018)    Interval HPI: Patient had bronch performed this afternoon to r/o malignancy.  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 Bronch revealed revealed irregular mucosal studding of distal trachea and both main stem bronchi with nodular densities of main ricardo. Biopsies, brushings, and lavage performed. After bronch, patient had episode fo a fib with RVR.  Patient seen and examined at bedside for cardiology evaluation. Patient states that he has been told his heart rate is high, however never feels palpitations. Currently, main complaints are cough that cause SOB. Patient states he feels as though he has phlegm however is unable to cough it up. He reports that he has been coughing and had new onset dyspnea on exertion for a couple weeks. Previously, he did not have SOB with exertion, but now he does due to cough. Currently, he is gasping for air after climbing ~1-2 flights of stairs. Patient also states that he is unable to lay flat due to SOB. Sleeps in chair. Patient also reports increase in swelling in bl LE due patient not being able to elevate legs because he is unable to lay flat due to SOB. Patient had stress echo performed in May or June 2018 for work, reports that it was "fine." PMD is Dr. Aranda. Does not see a cardiologist. Previously on diuretic for HTN but now only taking Irbesartan and Metoprolol to decrease his HR. Denies having CHF, MI, CVA or cardiac history.    PAST MEDICAL & SURGICAL HISTORY:  Dyspnea  Mediastinal adenopathy  Pericardial effusion  AF (atrial fibrillation)  Hyponatremia  COPD (chronic obstructive pulmonary disease)  HTN (hypertension)  S/P pericardial window creation      SOCIAL HISTORY:   Tobacco hx: patient states he quit smoking on December 12, 2018. 1 ppd x 40 years prior     FAMILY HISTORY:  No pertinent family history in first degree relatives      Home Medications:  aspirin 81 mg oral tablet, chewable: 1 tab(s) orally once a day (31 Dec 2018 18:45)  guaiFENesin 100 mg/5 mL oral liquid: 5 milliliter(s) orally every 6 hours, As needed, Cough (31 Dec 2018 18:45)  loratadine 10 mg oral tablet: 1 tab(s) orally once a day (31 Dec 2018 18:45)  magnesium oxide 400 mg (241.3 mg elemental magnesium) oral tablet: 1 tab(s) orally once a day (31 Dec 2018 18:45)      MEDICATIONS  (STANDING):  ALBUTerol/ipratropium for Nebulization 3 milliLiter(s) Nebulizer every 6 hours  buDESOnide   0.5 milliGRAM(s) Respule 0.5 milliGRAM(s) Inhalation two times a day  diltiazem    milliGRAM(s) Oral daily  influenza   Vaccine 0.5 milliLiter(s) IntraMuscular once  lactobacillus acidophilus 1 Tablet(s) Oral three times a day with meals  loratadine 10 milliGRAM(s) Oral daily  meropenem  IVPB 1000 milliGRAM(s) IV Intermittent every 8 hours  meropenem  IVPB      metoprolol succinate  milliGRAM(s) Oral daily  metoprolol tartrate Injectable 5 milliGRAM(s) IV Push once  sodium chloride 1 Gram(s) Oral three times a day  tiotropium 18 MICROgram(s) Capsule 1 Capsule(s) Inhalation daily  vancomycin  IVPB 1750 milliGRAM(s) IV Intermittent every 12 hours    MEDICATIONS  (PRN):  guaiFENesin    Syrup 200 milliGRAM(s) Oral every 6 hours PRN Cough      Allergies    penicillin (Unknown)    Intolerances    REVIEW OF SYSTEMS: Negative except as per HPI.    VITAL SIGNS:   Vital Signs Last 24 Hrs  T(C): 36.6 (02 Jan 2019 13:36), Max: 37 (02 Jan 2019 05:44)  T(F): 97.9 (02 Jan 2019 13:36), Max: 98.6 (02 Jan 2019 05:44)  HR: 118 (02 Jan 2019 13:36) (74 - 120)  BP: 124/71 (02 Jan 2019 13:36) (103/58 - 125/74)  BP(mean): --  RR: 31 (02 Jan 2019 13:36) (17 - 31)  SpO2: 92% (02 Jan 2019 13:36) (92% - 100%)    I&O's Summary    01 Jan 2019 07:01  -  02 Jan 2019 07:00  --------------------------------------------------------  IN: 1150 mL / OUT: 1300 mL / NET: -150 mL        PHYSICAL EXAM:  Constitutional: NAD, well-developed  HEENT NC/AT, moist mucous membranes  Pulmonary: coarse breath sounds throughout, no crackles, rales or rhonchi  Cardiovascular: +S1, S2, RRR, no murmur  Gastrointestinal: Soft, nontender, nondistended, normoactive bowel sounds  Extremities: nonpitting peripheral edema   Neurological: Alert, strength and sensitivity are grossly intact  Psych: Mood & affect appropriate    LABS: All Labs Reviewed:                        11.4   11.72 )-----------( 302      ( 02 Jan 2019 08:36 )             33.9                         12.6   10.65 )-----------( 357      ( 01 Jan 2019 08:06 )             36.9                         11.4   12.51 )-----------( 300      ( 31 Dec 2018 19:15 )             32.0     02 Jan 2019 08:36    137    |  99     |  7      ----------------------------<  121    3.8     |  29     |  0.60   01 Jan 2019 08:06    137    |  99     |  6      ----------------------------<  119    4.2     |  29     |  0.61   31 Dec 2018 19:15    120    |  86     |  8      ----------------------------<  124    3.6     |  24     |  0.53     Ca    8.8        02 Jan 2019 08:36  Ca    9.3        01 Jan 2019 08:06  Ca    7.7        31 Dec 2018 19:15  Mg     2.3       01 Jan 2019 08:06    TPro  6.1    /  Alb  2.8    /  TBili  0.6    /  DBili  x      /  AST  21     /  ALT  26     /  AlkPhos  94     31 Dec 2018 19:15          Blood Culture: Organism --  Gram Stain Blood -- Gram Stain   Few polymorphonuclear leukocytes per low power field  Few Squamous epithelial cells per low power field  Few Gram Variable Cocci seen per oil power field  Few Gram Negative Rods seen per oil power field  Specimen Source .Sputum Sputum  Culture-Blood --    Organism --  Gram Stain Blood -- Gram Stain --  Specimen Source .Blood Blood-Peripheral  Culture-Blood --      12-31 @ 19:15  Pro Bnp 1158        EKG: [facility]   Gurpreet Bush, Emily, Corey, Andrae, Sajan, Vida   [phone] (office)    Reason for Consult: a fib with rvr    HPI:  58M pmhx afib diagnosed December 2018 (not on AC), COPD, HTN, cardiac tamponade s/p window 12/2018 discharged from [facility] ~2 weeks. Patient at that time also noted to have mediastinal LAD and concern of malignancy. Pathologic analysis of the pericardial fluid/tissue however did not confirm the suspicion of malignancy. Patient initially had decreased SOB after discharge, but he's had some progression. Cough has persisted, steadily worsening to the point where he asked his wife to bring him back to the ER. Patient has the sensation that something is stuck in his throat that he cannot get up. Also developed hoarseness over the last 3 days. No fevers, chills, or rigors. Cough is dry. No CP. Pt quit smoking approx. 2 weeks ago. In ER patient was found to have PNA on CXR. Patient is being admitted for further work up and treatment. (31 Dec 2018)    Interval HPI: Patient had bronch performed this afternoon to r/o malignancy.  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 Bronch revealed revealed irregular mucosal studding of distal trachea and both main stem bronchi with nodular densities of main ricardo. Biopsies, brushings, and lavage performed. After bronch, patient had episode fo a fib with RVR.  Patient seen and examined at bedside for cardiology evaluation. Patient states that he has been told his heart rate is high, however never feels palpitations. Currently, main complaints are cough that cause SOB. Patient states he feels as though he has phlegm however is unable to cough it up. He reports that he has been coughing and had new onset dyspnea on exertion for a couple weeks. Previously, he did not have SOB with exertion, but now he does due to cough. Currently, he is gasping for air after climbing ~1-2 flights of stairs and after coughing. Patient also states that he is unable to lay flat due to SOB. Sleeps in chair. Patient also reports increase in swelling in bl LE due patient not being able to elevate legs because he is unable to lay flat due to SOB. Patient had stress echo performed in May or June 2018 for work, reports that it was "fine." PMD is Dr. Aranda. Does not see a cardiologist. Previously on diuretic for HTN but now only taking Irbesartan and Metoprolol to decrease his HR. Denies having CHF, MI, CVA or cardiac history.    PAST MEDICAL & SURGICAL HISTORY:  Dyspnea  Mediastinal adenopathy  Pericardial effusion  AF (atrial fibrillation)  Hyponatremia  COPD (chronic obstructive pulmonary disease)  HTN (hypertension)  S/P pericardial window creation      SOCIAL HISTORY:   Tobacco hx: patient states he quit smoking on December 12, 2018. 1 ppd x 40 years prior     FAMILY HISTORY:  No pertinent family history in first degree relatives      Home Medications:  aspirin 81 mg oral tablet, chewable: 1 tab(s) orally once a day (31 Dec 2018 18:45)  guaiFENesin 100 mg/5 mL oral liquid: 5 milliliter(s) orally every 6 hours, As needed, Cough (31 Dec 2018 18:45)  loratadine 10 mg oral tablet: 1 tab(s) orally once a day (31 Dec 2018 18:45)  magnesium oxide 400 mg (241.3 mg elemental magnesium) oral tablet: 1 tab(s) orally once a day (31 Dec 2018 18:45)      MEDICATIONS  (STANDING):  ALBUTerol/ipratropium for Nebulization 3 milliLiter(s) Nebulizer every 6 hours  buDESOnide   0.5 milliGRAM(s) Respule 0.5 milliGRAM(s) Inhalation two times a day  diltiazem    milliGRAM(s) Oral daily  influenza   Vaccine 0.5 milliLiter(s) IntraMuscular once  lactobacillus acidophilus 1 Tablet(s) Oral three times a day with meals  loratadine 10 milliGRAM(s) Oral daily  meropenem  IVPB 1000 milliGRAM(s) IV Intermittent every 8 hours  meropenem  IVPB      metoprolol succinate  milliGRAM(s) Oral daily  metoprolol tartrate Injectable 5 milliGRAM(s) IV Push once  sodium chloride 1 Gram(s) Oral three times a day  tiotropium 18 MICROgram(s) Capsule 1 Capsule(s) Inhalation daily  vancomycin  IVPB 1750 milliGRAM(s) IV Intermittent every 12 hours    MEDICATIONS  (PRN):  guaiFENesin    Syrup 200 milliGRAM(s) Oral every 6 hours PRN Cough      Allergies    penicillin (Unknown)    Intolerances    REVIEW OF SYSTEMS: Negative except as per HPI.    VITAL SIGNS:   Vital Signs Last 24 Hrs  T(C): 36.6 (02 Jan 2019 13:36), Max: 37 (02 Jan 2019 05:44)  T(F): 97.9 (02 Jan 2019 13:36), Max: 98.6 (02 Jan 2019 05:44)  HR: 118 (02 Jan 2019 13:36) (74 - 120)  BP: 124/71 (02 Jan 2019 13:36) (103/58 - 125/74)  BP(mean): --  RR: 31 (02 Jan 2019 13:36) (17 - 31)  SpO2: 92% (02 Jan 2019 13:36) (92% - 100%)    I&O's Summary    01 Jan 2019 07:01  -  02 Jan 2019 07:00  --------------------------------------------------------  IN: 1150 mL / OUT: 1300 mL / NET: -150 mL        PHYSICAL EXAM:  Constitutional: NAD, well-developed  HEENT NC/AT, moist mucous membranes  Pulmonary: coarse breath sounds throughout, no crackles, rales or rhonchi  Cardiovascular: +S1, S2, RRR, no murmur  Gastrointestinal: Soft, nontender, nondistended, normoactive bowel sounds  Extremities: nonpitting peripheral edema   Neurological: Alert, strength and sensitivity are grossly intact  Psych: Mood & affect appropriate    LABS: All Labs Reviewed:                        11.4   11.72 )-----------( 302      ( 02 Jan 2019 08:36 )             33.9                         12.6   10.65 )-----------( 357      ( 01 Jan 2019 08:06 )             36.9                         11.4   12.51 )-----------( 300      ( 31 Dec 2018 19:15 )             32.0     02 Jan 2019 08:36    137    |  99     |  7      ----------------------------<  121    3.8     |  29     |  0.60   01 Jan 2019 08:06    137    |  99     |  6      ----------------------------<  119    4.2     |  29     |  0.61   31 Dec 2018 19:15    120    |  86     |  8      ----------------------------<  124    3.6     |  24     |  0.53     Ca    8.8        02 Jan 2019 08:36  Ca    9.3        01 Jan 2019 08:06  Ca    7.7        31 Dec 2018 19:15  Mg     2.3       01 Jan 2019 08:06    TPro  6.1    /  Alb  2.8    /  TBili  0.6    /  DBili  x      /  AST  21     /  ALT  26     /  AlkPhos  94     31 Dec 2018 19:15          Blood Culture: Organism --  Gram Stain Blood -- Gram Stain   Few polymorphonuclear leukocytes per low power field  Few Squamous epithelial cells per low power field  Few Gram Variable Cocci seen per oil power field  Few Gram Negative Rods seen per oil power field  Specimen Source .Sputum Sputum  Culture-Blood --    Organism --  Gram Stain Blood -- Gram Stain --  Specimen Source .Blood Blood-Peripheral  Culture-Blood --      12-31 @ 19:15  Pro Bnp 1158        EKG: NSR at 85 bpm [facility]   Gurpreet Bush, Emily, Corey, Andrae, Sajan, Vida   [phone] (office)    Reason for Consult: a fib with rvr    HPI:  58M pmhx afib diagnosed December 2018 (not on AC), COPD, HTN, cardiac tamponade s/p window 12/2018 discharged from [facility] ~2 weeks. Patient at that time also noted to have mediastinal LAD and concern of malignancy. Pathologic analysis of the pericardial fluid/tissue however did not confirm the suspicion of malignancy. Patient initially had decreased SOB after discharge, but he's had some progression. Cough has persisted, steadily worsening to the point where he asked his wife to bring him back to the ER. Patient has the sensation that something is stuck in his throat that he cannot get up. Also developed hoarseness over the last 3 days. No fevers, chills, or rigors. Cough is dry. No CP. Pt quit smoking approx. 2 weeks ago. In ER patient was found to have PNA on CXR. Patient is being admitted for further work up and treatment. (31 Dec 2018)    Interval HPI: Patient had bronch performed this afternoon to r/o malignancy.  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 Bronch revealed revealed irregular mucosal studding of distal trachea and both main stem bronchi with nodular densities of main ricardo. Biopsies, brushings, and lavage performed. After bronch, patient had episode fo a fib with RVR.  Patient seen and examined at bedside for cardiology evaluation. Patient states that he has been told his heart rate is high, however never feels palpitations. Currently, main complaints are cough that cause SOB. Patient states he feels as though he has phlegm however is unable to cough it up. He reports that he has been coughing and had new onset dyspnea on exertion for a couple weeks. Previously, he did not have SOB with exertion, but now he does due to cough. Currently, he is gasping for air after climbing ~1-2 flights of stairs and after coughing. Patient also states that he is unable to lay flat due to SOB. Sleeps in chair. Patient also reports increase in swelling in bl LE due patient not being able to elevate legs because he is unable to lay flat due to SOB. Patient had stress echo performed in May or June 2018 for work, reports that it was "fine." PMD is Dr. Aranda. Does not see a cardiologist. Previously on diuretic for HTN but now only taking Irbesartan and Metoprolol to decrease his HR. Denies having CHF, MI, CVA or cardiac history.    PAST MEDICAL & SURGICAL HISTORY:  Dyspnea  Mediastinal adenopathy  Pericardial effusion  AF (atrial fibrillation)  Hyponatremia  COPD (chronic obstructive pulmonary disease)  HTN (hypertension)  S/P pericardial window creation      SOCIAL HISTORY:   Tobacco hx: patient states he quit smoking on December 12, 2018. 1 ppd x 40 years prior     FAMILY HISTORY:  No pertinent family history in first degree relatives      Home Medications:  aspirin 81 mg oral tablet, chewable: 1 tab(s) orally once a day (31 Dec 2018 18:45)  guaiFENesin 100 mg/5 mL oral liquid: 5 milliliter(s) orally every 6 hours, As needed, Cough (31 Dec 2018 18:45)  loratadine 10 mg oral tablet: 1 tab(s) orally once a day (31 Dec 2018 18:45)  magnesium oxide 400 mg (241.3 mg elemental magnesium) oral tablet: 1 tab(s) orally once a day (31 Dec 2018 18:45)      MEDICATIONS  (STANDING):  ALBUTerol/ipratropium for Nebulization 3 milliLiter(s) Nebulizer every 6 hours  buDESOnide   0.5 milliGRAM(s) Respule 0.5 milliGRAM(s) Inhalation two times a day  diltiazem    milliGRAM(s) Oral daily  influenza   Vaccine 0.5 milliLiter(s) IntraMuscular once  lactobacillus acidophilus 1 Tablet(s) Oral three times a day with meals  loratadine 10 milliGRAM(s) Oral daily  meropenem  IVPB 1000 milliGRAM(s) IV Intermittent every 8 hours  meropenem  IVPB      metoprolol succinate  milliGRAM(s) Oral daily  metoprolol tartrate Injectable 5 milliGRAM(s) IV Push once  sodium chloride 1 Gram(s) Oral three times a day  tiotropium 18 MICROgram(s) Capsule 1 Capsule(s) Inhalation daily  vancomycin  IVPB 1750 milliGRAM(s) IV Intermittent every 12 hours    MEDICATIONS  (PRN):  guaiFENesin    Syrup 200 milliGRAM(s) Oral every 6 hours PRN Cough      Allergies    penicillin (Unknown)    Intolerances    REVIEW OF SYSTEMS: Negative except as per HPI.    VITAL SIGNS:   Vital Signs Last 24 Hrs  T(C): 36.6 (02 Jan 2019 13:36), Max: 37 (02 Jan 2019 05:44)  T(F): 97.9 (02 Jan 2019 13:36), Max: 98.6 (02 Jan 2019 05:44)  HR: 118 (02 Jan 2019 13:36) (74 - 120)  BP: 124/71 (02 Jan 2019 13:36) (103/58 - 125/74)  BP(mean): --  RR: 31 (02 Jan 2019 13:36) (17 - 31)  SpO2: 92% (02 Jan 2019 13:36) (92% - 100%)    I&O's Summary    01 Jan 2019 07:01  -  02 Jan 2019 07:00  --------------------------------------------------------  IN: 1150 mL / OUT: 1300 mL / NET: -150 mL        PHYSICAL EXAM:  Constitutional: NAD, well-developed  HEENT NC/AT, moist mucous membranes  Pulmonary: coarse breath sounds throughout, no crackles, rales or rhonchi  Cardiovascular: +S1, S2, RRR, no murmur  Gastrointestinal: Soft, nontender, nondistended, normoactive bowel sounds  Extremities: nonpitting b/l LE edema  Neurological: Alert, strength and sensitivity are grossly intact  Psych: Mood & affect appropriate    LABS: All Labs Reviewed:                        11.4   11.72 )-----------( 302      ( 02 Jan 2019 08:36 )             33.9                         12.6   10.65 )-----------( 357      ( 01 Jan 2019 08:06 )             36.9                         11.4   12.51 )-----------( 300      ( 31 Dec 2018 19:15 )             32.0     02 Jan 2019 08:36    137    |  99     |  7      ----------------------------<  121    3.8     |  29     |  0.60   01 Jan 2019 08:06    137    |  99     |  6      ----------------------------<  119    4.2     |  29     |  0.61   31 Dec 2018 19:15    120    |  86     |  8      ----------------------------<  124    3.6     |  24     |  0.53     Ca    8.8        02 Jan 2019 08:36  Ca    9.3        01 Jan 2019 08:06  Ca    7.7        31 Dec 2018 19:15  Mg     2.3       01 Jan 2019 08:06    TPro  6.1    /  Alb  2.8    /  TBili  0.6    /  DBili  x      /  AST  21     /  ALT  26     /  AlkPhos  94     31 Dec 2018 19:15          Blood Culture: Organism --  Gram Stain Blood -- Gram Stain   Few polymorphonuclear leukocytes per low power field  Few Squamous epithelial cells per low power field  Few Gram Variable Cocci seen per oil power field  Few Gram Negative Rods seen per oil power field  Specimen Source .Sputum Sputum  Culture-Blood --    Organism --  Gram Stain Blood -- Gram Stain --  Specimen Source .Blood Blood-Peripheral  Culture-Blood --      12-31 @ 19:15  Pro Bnp 1158        EKG: NSR at 85 bpm with nonspecific ST changes

## 2019-01-02 NOTE — CHART NOTE - NSCHARTNOTEFT_GEN_A_CORE
RN called because the patient had HR 120s. 58M pmhx afib diagnosed December 2018 (not on AC), COPD, HTN, cardiac tamponade s/p window 12/2018 discharged from [facility] ~2 weeks for hyponatremia and found to have mediastinal adenopathy and moderate sized pericardial effusion with tamponade physiology with hospital course further complicated by new onset afib with RVR, who presented with worsening SOB and cough and was found to be hyponatremic and have pna. Presently being worked up to r/o malignancy. Patient had bronchoscopy today and s/p bronch, patient went into rapid a fib which resolved on own. Rapid afib likely 2/2 SOB. The patient was seen and examined at bedside. The patient complains of continued SOB that has been unchanged during hospitalization. Denies headache, dizziness, chest pain, palpitations. Admits to cough, congestion, and SOB. The patient is currently on cardizem for his Afib.    Vital Signs Last 24 Hrs  T(C): 36.7 (02 Jan 2019 20:19), Max: 37.1 (02 Jan 2019 15:27)  T(F): 98.1 (02 Jan 2019 20:19), Max: 98.8 (02 Jan 2019 15:27)  HR: 104 (02 Jan 2019 22:02) (74 - 124)  BP: 115/78 (02 Jan 2019 22:02) (108/63 - 127/80)  BP(mean): --  RR: 18 (02 Jan 2019 22:02) (17 - 31)  SpO2: 96% (02 Jan 2019 22:02) (92% - 100%)    PHYSICAL EXAM:  GENERAL: dyspneic  HEAD:  Atraumatic, Normocephalic  ENMT: Moist mucous membranes  NERVOUS SYSTEM:  Alert & Oriented X3, Good concentration  CHEST/LUNG: Diffuse rhonchi   HEART: Irregular rhythm, tachy; No murmurs, rubs, or gallops  ABDOMEN: Soft, Nontender, Nondistended; Bowel sounds present  EXTREMITIES:  2+ Peripheral Pulses, 2+ LE pitting edema      Assessment/Plan: 58M pmhx afib diagnosed December 2018 (not on AC), COPD, HTN, cardiac tamponade s/p window 12/2018 discharged from [facility] ~2 weeks for hyponatremia and found to have mediastinal adenopathy and moderate sized pericardial effusion with tamponade physiology with hospital course further complicated by new onset afib with RVR,   Rapid A-fib  -Likely 2/2 to SOB  -STAT cardizem 10 mg IV x 1  -Monitor on telemetry  -Monitor VS  -Monitor for hypoxia  -Will follow up as needed.

## 2019-01-02 NOTE — PROGRESS NOTE ADULT - ASSESSMENT
Possible pneumonia though WBC not impressive, no fevers  Procalcitonin minimally elevated but not nearly as reliable as one would hope  I would be more concerned that his infiltrates represent progression of undiagnosed malignancy more than potential infection  RVP negative  Cx NTD  WBC normal  Awaiting bronchoscopy

## 2019-01-02 NOTE — PROGRESS NOTE ADULT - SUBJECTIVE AND OBJECTIVE BOX
Patient is a 58y old  Male who presents with a chief complaint of sob (02 Jan 2019 10:19)      INTERVAL HPI/OVERNIGHT EVENTS:    No change in cough and blood streaked sputum    MEDICATIONS  (STANDING):  ALBUTerol/ipratropium for Nebulization 3 milliLiter(s) Nebulizer every 6 hours  buDESOnide   0.5 milliGRAM(s) Respule 0.5 milliGRAM(s) Inhalation two times a day  diltiazem    milliGRAM(s) Oral daily  influenza   Vaccine 0.5 milliLiter(s) IntraMuscular once  lactobacillus acidophilus 1 Tablet(s) Oral three times a day with meals  loratadine 10 milliGRAM(s) Oral daily  meropenem  IVPB 1000 milliGRAM(s) IV Intermittent every 8 hours  meropenem  IVPB      metoprolol succinate  milliGRAM(s) Oral daily  sodium chloride 1 Gram(s) Oral three times a day  tiotropium 18 MICROgram(s) Capsule 1 Capsule(s) Inhalation daily  vancomycin  IVPB 1750 milliGRAM(s) IV Intermittent every 12 hours      MEDICATIONS  (PRN):  guaiFENesin    Syrup 200 milliGRAM(s) Oral every 6 hours PRN Cough      Allergies    penicillin (Unknown)    Intolerances        PAST MEDICAL & SURGICAL HISTORY:  Dyspnea  Mediastinal adenopathy  Pericardial effusion  AF (atrial fibrillation)  Hyponatremia  COPD (chronic obstructive pulmonary disease)  HTN (hypertension)  S/P pericardial window creation      Vital Signs Last 24 Hrs  T(C): 36.8 (02 Jan 2019 11:45), Max: 37 (02 Jan 2019 05:44)  T(F): 98.2 (02 Jan 2019 11:45), Max: 98.6 (02 Jan 2019 05:44)  HR: 108 (02 Jan 2019 11:45) (74 - 120)  BP: 125/74 (02 Jan 2019 11:45) (103/58 - 125/74)  BP(mean): --  RR: 22 (02 Jan 2019 11:45) (16 - 22)  SpO2: 92% (02 Jan 2019 11:45) (92% - 100%)    PHYSICAL EXAMINATION:    GENERAL: The patient is awake and alert in no apparent distress.     HEENT: Head is normocephalic and atraumatic. Extraocular muscles are intact. Mucous membranes are moist.    NECK: Supple.    LUNGS: scattered bilateral rhonchi    HEART: Regular rate and rhythm without murmur.    ABDOMEN: Soft, nontender, and nondistended.      EXTREMITIES: Without any cyanosis, clubbing, rash, lesions or edema.    NEUROLOGIC: Grossly intact.    SKIN: No ulceration or induration present.      LABS:                        11.4   11.72 )-----------( 302      ( 02 Jan 2019 08:36 )             33.9     01-02    137  |  99  |  7   ----------------------------<  121<H>  3.8   |  29  |  0.60    Ca    8.8      02 Jan 2019 08:36  Mg     2.3     01-01    TPro  6.1  /  Alb  2.8<L>  /  TBili  0.6  /  DBili  x   /  AST  21  /  ALT  26  /  AlkPhos  94  12-31        ABG - ( 31 Dec 2018 23:38 )  pH, Arterial: 7.44  pH, Blood: x     /  pCO2: 35    /  pO2: 108   / HCO3: 24    / Base Excess: -0.2  /  SaO2: 98                      Serum Pro-Brain Natriuretic Peptide: 1158 pg/mL (12-31-18 @ 19:15)      Procalcitonin, Serum: 0.21 ng/mL (12-31-18 @ 19:15)      MICROBIOLOGY:  Culture Results:   No growth to date. (12-31 @ 22:20)  Culture Results:   No growth to date. (12-31 @ 22:20)      RADIOLOGY & ADDITIONAL STUDIES:    Assessment:    Hemoptysis - suspect endobronchial carcinoma  SIADH  Mediastinal and hilar adenopathy    Plan:    Continue antibiotics  For bronchoscopy today

## 2019-01-03 DIAGNOSIS — R59.0 LOCALIZED ENLARGED LYMPH NODES: ICD-10-CM

## 2019-01-03 LAB
CULTURE RESULTS: SIGNIFICANT CHANGE UP
HCT VFR BLD CALC: 33.7 % — LOW (ref 39–50)
HGB BLD-MCNC: 11.3 G/DL — LOW (ref 13–17)
MCHC RBC-ENTMCNC: 27.8 PG — SIGNIFICANT CHANGE UP (ref 27–34)
MCHC RBC-ENTMCNC: 33.5 GM/DL — SIGNIFICANT CHANGE UP (ref 32–36)
MCV RBC AUTO: 83 FL — SIGNIFICANT CHANGE UP (ref 80–100)
NIGHT BLUE STAIN TISS: SIGNIFICANT CHANGE UP
NON-GYNECOLOGICAL CYTOLOGY STUDY: SIGNIFICANT CHANGE UP
NON-GYNECOLOGICAL CYTOLOGY STUDY: SIGNIFICANT CHANGE UP
NRBC # BLD: 0 /100 WBCS — SIGNIFICANT CHANGE UP (ref 0–0)
PLATELET # BLD AUTO: 292 K/UL — SIGNIFICANT CHANGE UP (ref 150–400)
RBC # BLD: 4.06 M/UL — LOW (ref 4.2–5.8)
RBC # FLD: 12.8 % — SIGNIFICANT CHANGE UP (ref 10.3–14.5)
SPECIMEN SOURCE: SIGNIFICANT CHANGE UP
SPECIMEN SOURCE: SIGNIFICANT CHANGE UP
SURGICAL PATHOLOGY STUDY: SIGNIFICANT CHANGE UP
VANCOMYCIN TROUGH SERPL-MCNC: 8.5 UG/ML — LOW (ref 10–20)
WBC # BLD: 12.74 K/UL — HIGH (ref 3.8–10.5)
WBC # FLD AUTO: 12.74 K/UL — HIGH (ref 3.8–10.5)

## 2019-01-03 PROCEDURE — 99233 SBSQ HOSP IP/OBS HIGH 50: CPT

## 2019-01-03 PROCEDURE — 93010 ELECTROCARDIOGRAM REPORT: CPT

## 2019-01-03 RX ORDER — HEPARIN SODIUM 5000 [USP'U]/ML
5000 INJECTION INTRAVENOUS; SUBCUTANEOUS EVERY 8 HOURS
Qty: 0 | Refills: 0 | Status: DISCONTINUED | OUTPATIENT
Start: 2019-01-03 | End: 2019-01-15

## 2019-01-03 RX ORDER — ALBUTEROL 90 UG/1
2 AEROSOL, METERED ORAL EVERY 6 HOURS
Qty: 0 | Refills: 0 | Status: DISCONTINUED | OUTPATIENT
Start: 2019-01-03 | End: 2019-01-15

## 2019-01-03 RX ORDER — DILTIAZEM HCL 120 MG
360 CAPSULE, EXT RELEASE 24 HR ORAL DAILY
Qty: 0 | Refills: 0 | Status: DISCONTINUED | OUTPATIENT
Start: 2019-01-04 | End: 2019-01-05

## 2019-01-03 RX ORDER — ALPRAZOLAM 0.25 MG
0.25 TABLET ORAL THREE TIMES A DAY
Qty: 0 | Refills: 0 | Status: DISCONTINUED | OUTPATIENT
Start: 2019-01-03 | End: 2019-01-06

## 2019-01-03 RX ORDER — DIGOXIN 250 MCG
0.12 TABLET ORAL DAILY
Qty: 0 | Refills: 0 | Status: DISCONTINUED | OUTPATIENT
Start: 2019-01-03 | End: 2019-01-06

## 2019-01-03 RX ADMIN — HEPARIN SODIUM 5000 UNIT(S): 5000 INJECTION INTRAVENOUS; SUBCUTANEOUS at 22:16

## 2019-01-03 RX ADMIN — Medication 0.12 MILLIGRAM(S): at 15:23

## 2019-01-03 RX ADMIN — Medication 3 MILLILITER(S): at 13:54

## 2019-01-03 RX ADMIN — HEPARIN SODIUM 5000 UNIT(S): 5000 INJECTION INTRAVENOUS; SUBCUTANEOUS at 14:30

## 2019-01-03 RX ADMIN — Medication 300 MILLIGRAM(S): at 06:00

## 2019-01-03 RX ADMIN — Medication 0.5 MILLIGRAM(S): at 19:58

## 2019-01-03 RX ADMIN — Medication 3 MILLILITER(S): at 00:36

## 2019-01-03 RX ADMIN — SODIUM CHLORIDE 1 GRAM(S): 9 INJECTION INTRAMUSCULAR; INTRAVENOUS; SUBCUTANEOUS at 22:16

## 2019-01-03 RX ADMIN — Medication 200 MILLIGRAM(S): at 06:00

## 2019-01-03 RX ADMIN — HEPARIN SODIUM 5000 UNIT(S): 5000 INJECTION INTRAVENOUS; SUBCUTANEOUS at 06:00

## 2019-01-03 RX ADMIN — Medication 3 MILLILITER(S): at 19:58

## 2019-01-03 RX ADMIN — Medication 3 MILLILITER(S): at 07:35

## 2019-01-03 RX ADMIN — MEROPENEM 100 MILLIGRAM(S): 1 INJECTION INTRAVENOUS at 22:15

## 2019-01-03 RX ADMIN — Medication 1 TABLET(S): at 08:41

## 2019-01-03 RX ADMIN — SODIUM CHLORIDE 1 GRAM(S): 9 INJECTION INTRAMUSCULAR; INTRAVENOUS; SUBCUTANEOUS at 06:01

## 2019-01-03 RX ADMIN — Medication 1 TABLET(S): at 17:31

## 2019-01-03 RX ADMIN — Medication 81 MILLIGRAM(S): at 12:12

## 2019-01-03 RX ADMIN — Medication 0.5 MILLIGRAM(S): at 07:35

## 2019-01-03 RX ADMIN — MEROPENEM 100 MILLIGRAM(S): 1 INJECTION INTRAVENOUS at 14:30

## 2019-01-03 RX ADMIN — LORATADINE 10 MILLIGRAM(S): 10 TABLET ORAL at 12:12

## 2019-01-03 RX ADMIN — Medication 250 MILLIGRAM(S): at 02:18

## 2019-01-03 RX ADMIN — Medication 1 TABLET(S): at 12:12

## 2019-01-03 RX ADMIN — SODIUM CHLORIDE 1 GRAM(S): 9 INJECTION INTRAMUSCULAR; INTRAVENOUS; SUBCUTANEOUS at 14:29

## 2019-01-03 RX ADMIN — MEROPENEM 100 MILLIGRAM(S): 1 INJECTION INTRAVENOUS at 06:04

## 2019-01-03 RX ADMIN — Medication 40 MILLIGRAM(S): at 22:15

## 2019-01-03 NOTE — PROGRESS NOTE ADULT - SUBJECTIVE AND OBJECTIVE BOX
[facility] -- Gurpreet Bush, Emily, Corey, Sajan Abebe Savella  Office # [phone]    Follow Up:  PAF    Subjective/Observations: c/o SOB, dyspnea, cough.  No c/o CP or palpitations    REVIEW OF SYSTEMS: All other review of systems is negative unless indicated above    PAST MEDICAL & SURGICAL HISTORY:  Dyspnea  Mediastinal adenopathy  Pericardial effusion  AF (atrial fibrillation)  Hyponatremia  COPD (chronic obstructive pulmonary disease)  HTN (hypertension)  S/P pericardial window creation    MEDICATIONS  (STANDING):  ALBUTerol/ipratropium for Nebulization 3 milliLiter(s) Nebulizer every 6 hours  aspirin  chewable 81 milliGRAM(s) Oral daily  buDESOnide   0.5 milliGRAM(s) Respule 0.5 milliGRAM(s) Inhalation two times a day  heparin  Injectable 5000 Unit(s) SubCutaneous every 8 hours  influenza   Vaccine 0.5 milliLiter(s) IntraMuscular once  lactobacillus acidophilus 1 Tablet(s) Oral three times a day with meals  loratadine 10 milliGRAM(s) Oral daily  meropenem  IVPB 1000 milliGRAM(s) IV Intermittent every 8 hours  meropenem  IVPB      metoprolol succinate  milliGRAM(s) Oral daily  sodium chloride 1 Gram(s) Oral three times a day  tiotropium 18 MICROgram(s) Capsule 1 Capsule(s) Inhalation daily    MEDICATIONS  (PRN):  ALPRAZolam 0.25 milliGRAM(s) Oral three times a day PRN anxiety  guaiFENesin    Syrup 200 milliGRAM(s) Oral every 6 hours PRN Cough  promethazine 25 milliGRAM(s) Oral four times a day PRN breakthrough cough      Allergies    penicillin (Unknown)    Intolerances        Vital Signs Last 24 Hrs  T(C): 36.4 (03 Jan 2019 05:03), Max: 37.1 (02 Jan 2019 15:27)  T(F): 97.6 (03 Jan 2019 05:03), Max: 98.8 (02 Jan 2019 15:27)  HR: 96 (03 Jan 2019 07:40) (92 - 124)  BP: 116/75 (03 Jan 2019 05:03) (108/63 - 127/80)  BP(mean): --  RR: 18 (03 Jan 2019 05:03) (17 - 31)  SpO2: 97% (03 Jan 2019 07:40) (92% - 100%)    I&O's Summary    02 Jan 2019 07:01  -  03 Jan 2019 07:00  --------------------------------------------------------  IN: 0 mL / OUT: 200 mL / NET: -200 mL    03 Jan 2019 07:01  -  03 Jan 2019 13:07  --------------------------------------------------------  IN: 480 mL / OUT: 400 mL / NET: 80 mL    PHYSICAL EXAM:  TELE: Afib  Constitutional: NAD, awake and alert, obese  HEENT: Moist Mucous Membranes, Anicteric  Pulmonary: Non-labored, breath sounds are clear bilaterally, No wheezing, rales.  +scattered rhonchi  Cardiovascular: Regular, S1 and S2, No murmurs, rubs, gallops or clicks  Gastrointestinal: Bowel Sounds present, soft, nontender.   Lymph: No peripheral edema. No lymphadenopathy.  Skin: No visible rashes or ulcers.  Psych:  Mood & affect appropriate    LABS: All Labs Reviewed:                        11.3   12.74 )-----------( 292      ( 03 Jan 2019 08:33 )             33.7                         11.4   11.72 )-----------( 302      ( 02 Jan 2019 08:36 )             33.9                         12.6   10.65 )-----------( 357      ( 01 Jan 2019 08:06 )             36.9     02 Jan 2019 08:36    137    |  99     |  7      ----------------------------<  121    3.8     |  29     |  0.60   01 Jan 2019 08:06    137    |  99     |  6      ----------------------------<  119    4.2     |  29     |  0.61   31 Dec 2018 19:15    120    |  86     |  8      ----------------------------<  124    3.6     |  24     |  0.53     Ca    8.8        02 Jan 2019 08:36  Ca    9.3        01 Jan 2019 08:06  Ca    7.7        31 Dec 2018 19:15  Mg     2.3       01 Jan 2019 08:06    TPro  6.1    /  Alb  2.8    /  TBili  0.6    /  DBili  x      /  AST  21     /  ALT  26     /  AlkPhos  94     31 Dec 2018 19:15    < from: TTE Echo Doppler w/o Cont (12.19.18 @ 10:35) >     EXAM:  ECHO TTE WO CON COMP W DOPPLR      PROCEDURE DATE:  12/19/2018        INTERPRETATION:  INDICATION: pericardial effusion  Referring M.DZeyad:Andrae  Blood Pressure 101/64        Weight (kg) :106     Height (cm):185       BSA (sq m): na  Technician: PH    Dimensions:    LA 4.5       Normal Values: 2.0 - 4.0 cm    Ao 3.8        Normal Values: 2.0 - 3.8 cm  SEPTUM 1.2       Normal Values: 0.6 - 1.2 cm  PWT 1.0       Normal Values: 0.6 - 1.1 cm  LVIDd 4.8         Normal Values: 3.0 - 5.6 cm  LVIDs 3.0         Normal Values: 1.8 - 4.0 cm      OBSERVATIONS:  Technically limited and difficult study  Mitral Valve: Mac with normal mitral valve leaflet opening. Trace mitral   regurgitation.  Aortic Valve/Aorta: Mildly calcified trileaflet aortic valve with   visually normal opening  Tricuspid Valve: Normal tricuspid valve. Trace tricuspid regurgitation.  Pulmonic Valve: The pulmonic valve is not well visualized. Probably   normal.  Left Atrium: Mildly enlarged  Right Atrium: Normal  Left Ventricle: Overall preserved left ventricular systolic function.    The EF is approximately 55-60%.  Right Ventricle: Normal right ventricular size and function.  Pericardium/Pleura: Thickened pericardium. Trace pericardial effusion   noted.  Pulmonary/RV Pressure: Insufficient tricuspid regurgitation Doppler in   order to estimate the right ventricular systolic pressure    Conclusion: Technically limited and difficult study. Overall preserved   left ventricular systolic function. Left atrium mildly enlarged.   Thickened pericardium with trace pericardial effusion noted. No   echocardiographic evidence of tamponade.      KWAN AGUAYO M.D., ATTENDING CARDIOLOGIST  This document has been electronically signed. Dec 20 2018  5:02PM     < end of copied text >    < from: Xray Chest 2 Views PA/Lat (12.31.18 @ 19:21) >    EXAM:  XR CHEST PA LAT 2V                          PROCEDURE DATE:  12/31/2018      INTERPRETATION:  Shortness of breath, cough.    PA lateral. Prior 12/15/2018.    No change heart mediastinum. Superior mediastinal widening similar to   priorconsistent with known adenopathy. There is new groundglass   infiltrate in the left mid and lower lung field consistent with pneumonia   in appropriate clinical setting. New small left pleural effusion and   trace right pleural effusion.    Impression: As above    JERICA FOSS M.D., ATTENDING RADIOLOGIST  This document has been electronically signed. Dec 31 2018  8:58PM      < end of copied text > [facility] -- Gurpreet Bush, Emily, Corey, Sajan Abebe Savella  Office # [phone]    Follow Up:  PAF    Subjective/Observations: c/o SOB, dyspnea, cough.  No c/o CP or palpitations    REVIEW OF SYSTEMS: All other review of systems is negative unless indicated above    PAST MEDICAL & SURGICAL HISTORY:  Dyspnea  Mediastinal adenopathy  Pericardial effusion  AF (atrial fibrillation)  Hyponatremia  COPD (chronic obstructive pulmonary disease)  HTN (hypertension)  S/P pericardial window creation    MEDICATIONS  (STANDING):  ALBUTerol/ipratropium for Nebulization 3 milliLiter(s) Nebulizer every 6 hours  aspirin  chewable 81 milliGRAM(s) Oral daily  buDESOnide   0.5 milliGRAM(s) Respule 0.5 milliGRAM(s) Inhalation two times a day  heparin  Injectable 5000 Unit(s) SubCutaneous every 8 hours  influenza   Vaccine 0.5 milliLiter(s) IntraMuscular once  lactobacillus acidophilus 1 Tablet(s) Oral three times a day with meals  loratadine 10 milliGRAM(s) Oral daily  meropenem  IVPB 1000 milliGRAM(s) IV Intermittent every 8 hours  meropenem  IVPB      metoprolol succinate  milliGRAM(s) Oral daily  sodium chloride 1 Gram(s) Oral three times a day  tiotropium 18 MICROgram(s) Capsule 1 Capsule(s) Inhalation daily    MEDICATIONS  (PRN):  ALPRAZolam 0.25 milliGRAM(s) Oral three times a day PRN anxiety  guaiFENesin    Syrup 200 milliGRAM(s) Oral every 6 hours PRN Cough  promethazine 25 milliGRAM(s) Oral four times a day PRN breakthrough cough      Allergies    penicillin (Unknown)    Intolerances        Vital Signs Last 24 Hrs  T(C): 36.4 (03 Jan 2019 05:03), Max: 37.1 (02 Jan 2019 15:27)  T(F): 97.6 (03 Jan 2019 05:03), Max: 98.8 (02 Jan 2019 15:27)  HR: 96 (03 Jan 2019 07:40) (92 - 124)  BP: 116/75 (03 Jan 2019 05:03) (108/63 - 127/80)  BP(mean): --  RR: 18 (03 Jan 2019 05:03) (17 - 31)  SpO2: 97% (03 Jan 2019 07:40) (92% - 100%)    I&O's Summary    02 Jan 2019 07:01  -  03 Jan 2019 07:00  --------------------------------------------------------  IN: 0 mL / OUT: 200 mL / NET: -200 mL    03 Jan 2019 07:01  -  03 Jan 2019 13:07  --------------------------------------------------------  IN: 480 mL / OUT: 400 mL / NET: 80 mL    PHYSICAL EXAM:  TELE: Afib  Constitutional: NAD, awake and alert, obese  HEENT: Moist Mucous Membranes, Anicteric  Pulmonary: Non-labored, breath sounds are clear bilaterally, No wheezing, rales.  +scattered rhonchi  Cardiovascular: Regular, S1 and S2, No murmurs, rubs, gallops or clicks  Gastrointestinal: Bowel Sounds present, soft, nontender.   Lymph: +1 BLE edema. No lymphadenopathy.  Skin: No visible rashes or ulcers.  Psych:  Mood & affect appropriate    LABS: All Labs Reviewed:                        11.3   12.74 )-----------( 292      ( 03 Jan 2019 08:33 )             33.7                         11.4   11.72 )-----------( 302      ( 02 Jan 2019 08:36 )             33.9                         12.6   10.65 )-----------( 357      ( 01 Jan 2019 08:06 )             36.9     02 Jan 2019 08:36    137    |  99     |  7      ----------------------------<  121    3.8     |  29     |  0.60   01 Jan 2019 08:06    137    |  99     |  6      ----------------------------<  119    4.2     |  29     |  0.61   31 Dec 2018 19:15    120    |  86     |  8      ----------------------------<  124    3.6     |  24     |  0.53     Ca    8.8        02 Jan 2019 08:36  Ca    9.3        01 Jan 2019 08:06  Ca    7.7        31 Dec 2018 19:15  Mg     2.3       01 Jan 2019 08:06    TPro  6.1    /  Alb  2.8    /  TBili  0.6    /  DBili  x      /  AST  21     /  ALT  26     /  AlkPhos  94     31 Dec 2018 19:15    < from: TTE Echo Doppler w/o Cont (12.19.18 @ 10:35) >     EXAM:  ECHO TTE WO CON COMP W DOPPLR      PROCEDURE DATE:  12/19/2018        INTERPRETATION:  INDICATION: pericardial effusion  Referring M.DZeyad:Andrae  Blood Pressure 101/64        Weight (kg) :106     Height (cm):185       BSA (sq m): na  Technician: PH    Dimensions:    LA 4.5       Normal Values: 2.0 - 4.0 cm    Ao 3.8        Normal Values: 2.0 - 3.8 cm  SEPTUM 1.2       Normal Values: 0.6 - 1.2 cm  PWT 1.0       Normal Values: 0.6 - 1.1 cm  LVIDd 4.8         Normal Values: 3.0 - 5.6 cm  LVIDs 3.0         Normal Values: 1.8 - 4.0 cm      OBSERVATIONS:  Technically limited and difficult study  Mitral Valve: Mac with normal mitral valve leaflet opening. Trace mitral   regurgitation.  Aortic Valve/Aorta: Mildly calcified trileaflet aortic valve with   visually normal opening  Tricuspid Valve: Normal tricuspid valve. Trace tricuspid regurgitation.  Pulmonic Valve: The pulmonic valve is not well visualized. Probably   normal.  Left Atrium: Mildly enlarged  Right Atrium: Normal  Left Ventricle: Overall preserved left ventricular systolic function.    The EF is approximately 55-60%.  Right Ventricle: Normal right ventricular size and function.  Pericardium/Pleura: Thickened pericardium. Trace pericardial effusion   noted.  Pulmonary/RV Pressure: Insufficient tricuspid regurgitation Doppler in   order to estimate the right ventricular systolic pressure    Conclusion: Technically limited and difficult study. Overall preserved   left ventricular systolic function. Left atrium mildly enlarged.   Thickened pericardium with trace pericardial effusion noted. No   echocardiographic evidence of tamponade.      KWAN AGUAYO M.D., ATTENDING CARDIOLOGIST  This document has been electronically signed. Dec 20 2018  5:02PM     < end of copied text >    < from: Xray Chest 2 Views PA/Lat (12.31.18 @ 19:21) >    EXAM:  XR CHEST PA LAT 2V                          PROCEDURE DATE:  12/31/2018      INTERPRETATION:  Shortness of breath, cough.    PA lateral. Prior 12/15/2018.    No change heart mediastinum. Superior mediastinal widening similar to   priorconsistent with known adenopathy. There is new groundglass   infiltrate in the left mid and lower lung field consistent with pneumonia   in appropriate clinical setting. New small left pleural effusion and   trace right pleural effusion.    Impression: As above    JERICA FOSS M.D., ATTENDING RADIOLOGIST  This document has been electronically signed. Dec 31 2018  8:58PM      < end of copied text >

## 2019-01-03 NOTE — PROGRESS NOTE ADULT - SUBJECTIVE AND OBJECTIVE BOX
Patient is a 58y old  Male who presents with a chief complaint of sob (03 Jan 2019 16:26)      INTERVAL HPI/OVERNIGHT EVENTS:    Continues to have shortness of breath and wheezing    MEDICATIONS  (STANDING):  ALBUTerol/ipratropium for Nebulization 3 milliLiter(s) Nebulizer every 6 hours  aspirin  chewable 81 milliGRAM(s) Oral daily  buDESOnide   0.5 milliGRAM(s) Respule 0.5 milliGRAM(s) Inhalation two times a day  digoxin     Tablet 0.125 milliGRAM(s) Oral daily  heparin  Injectable 5000 Unit(s) SubCutaneous every 8 hours  influenza   Vaccine 0.5 milliLiter(s) IntraMuscular once  lactobacillus acidophilus 1 Tablet(s) Oral three times a day with meals  loratadine 10 milliGRAM(s) Oral daily  meropenem  IVPB 1000 milliGRAM(s) IV Intermittent every 8 hours  meropenem  IVPB      methylPREDNISolone sodium succinate Injectable 40 milliGRAM(s) IV Push three times a day  metoprolol succinate  milliGRAM(s) Oral daily  sodium chloride 1 Gram(s) Oral three times a day  tiotropium 18 MICROgram(s) Capsule 1 Capsule(s) Inhalation daily      MEDICATIONS  (PRN):  ALBUTerol    90 MICROgram(s) HFA Inhaler 2 Puff(s) Inhalation every 6 hours PRN Shortness of Breath  ALPRAZolam 0.25 milliGRAM(s) Oral three times a day PRN anxiety  guaiFENesin    Syrup 200 milliGRAM(s) Oral every 6 hours PRN Cough  promethazine 25 milliGRAM(s) Oral four times a day PRN breakthrough cough      Allergies    penicillin (Unknown)    Intolerances        PAST MEDICAL & SURGICAL HISTORY:  Dyspnea  Mediastinal adenopathy  Pericardial effusion  AF (atrial fibrillation)  Hyponatremia  COPD (chronic obstructive pulmonary disease)  HTN (hypertension)  S/P pericardial window creation      Vital Signs Last 24 Hrs  T(C): 36.8 (03 Jan 2019 20:56), Max: 37.1 (03 Jan 2019 13:55)  T(F): 98.3 (03 Jan 2019 20:56), Max: 98.7 (03 Jan 2019 13:55)  HR: 131 (03 Jan 2019 20:56) (56 - 131)  BP: 127/66 (03 Jan 2019 20:56) (114/72 - 127/66)  BP(mean): --  RR: 15 (03 Jan 2019 20:56) (15 - 18)  SpO2: 99% (03 Jan 2019 20:56) (95% - 99%)    PHYSICAL EXAMINATION:    GENERAL: The patient is awake and alert in no apparent distress.     HEENT: Head is normocephalic and atraumatic. Extraocular muscles are intact. Mucous membranes are moist.    NECK: Supple.    LUNGS: bilateral wheezing and rhonchi    HEART: Regular rate and rhythm without murmur.    ABDOMEN: Soft, nontender, and nondistended.      EXTREMITIES: Without any cyanosis, clubbing, rash, lesions or edema.    NEUROLOGIC: Grossly intact.    SKIN: No ulceration or induration present.      LABS:                        11.3   12.74 )-----------( 292      ( 03 Jan 2019 08:33 )             33.7     01-02    137  |  99  |  7   ----------------------------<  121<H>  3.8   |  29  |  0.60    Ca    8.8      02 Jan 2019 08:36          Pathology:     bronchial biopsy positive for malignant cells suggestive of small cell CA                MICROBIOLOGY:  Culture Results:   Testing in progress (01-02 @ 19:14)  Culture Results:   Normal Respiratory Sasha present (01-02 @ 01:18)  Culture Results:   No growth to date. (12-31 @ 22:20)  Culture Results:   No growth to date. 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 Prescott/Serina/Steven  Suggest starting chemotherapy prior to final immunopathology in view of progressive findings and respiratory symptoms  IV Solumedrol ordered  Continue nebulizer treatments  Ventolin PRN

## 2019-01-03 NOTE — PROGRESS NOTE ADULT - SUBJECTIVE AND OBJECTIVE BOX
NEPHROLOGY INTERVAL HPI/OVERNIGHT EVENTS:  HPI:  pt is a 59yo male with pmhx of htn, copd, cardiac tamponade s/p window 12/2018 c/o cough x days. pt reports non-productive cough with associated dyspnea on exertion. pt reports "it feels like something is stuck in my throat". pt reports bl le swelling without pain. pt recently admitted for pericardial effusion s/p window. pt quit smoking aprox 2 weeks ago.  In ER patient was found to have PNA on CXR.  Patient is being admitted for further work up and treatment. (31 Dec 2018 21:51)    Follow up hyponatremia  No complaints    PAST MEDICAL & SURGICAL HISTORY:  Dyspnea  Mediastinal adenopathy  Pericardial effusion  AF (atrial fibrillation)  Hyponatremia  COPD (chronic obstructive pulmonary disease)  HTN (hypertension)  S/P pericardial window creation      MEDICATIONS  (STANDING):  ALBUTerol/ipratropium for Nebulization 3 milliLiter(s) Nebulizer every 6 hours  aspirin  chewable 81 milliGRAM(s) Oral daily  buDESOnide   0.5 milliGRAM(s) Respule 0.5 milliGRAM(s) Inhalation two times a day  diltiazem    milliGRAM(s) Oral daily  heparin  Injectable 5000 Unit(s) SubCutaneous every 12 hours  influenza   Vaccine 0.5 milliLiter(s) IntraMuscular once  lactobacillus acidophilus 1 Tablet(s) Oral three times a day with meals  loratadine 10 milliGRAM(s) Oral daily  meropenem  IVPB 1000 milliGRAM(s) IV Intermittent every 8 hours  meropenem  IVPB      metoprolol succinate  milliGRAM(s) Oral daily  sodium chloride 1 Gram(s) Oral three times a day  tiotropium 18 MICROgram(s) Capsule 1 Capsule(s) Inhalation daily  vancomycin  IVPB 1750 milliGRAM(s) IV Intermittent every 12 hours    MEDICATIONS  (PRN):  guaiFENesin    Syrup 200 milliGRAM(s) Oral every 6 hours PRN Cough  promethazine 25 milliGRAM(s) Oral four times a day PRN breakthrough cough      Allergies    penicillin (Unknown)    Intolerances        Vital Signs Last 24 Hrs  T(C): 36.4 (03 Jan 2019 05:03), Max: 37.1 (02 Jan 2019 15:27)  T(F): 97.6 (03 Jan 2019 05:03), Max: 98.8 (02 Jan 2019 15:27)  HR: 110 (03 Jan 2019 05:03) (74 - 124)  BP: 116/75 (03 Jan 2019 05:03) (108/63 - 127/80)  BP(mean): --  RR: 18 (03 Jan 2019 05:03) (17 - 31)  SpO2: 96% (03 Jan 2019 05:03) (92% - 100%)  Daily     Daily     PHYSICAL EXAM:  GENERAL: NAD  HEAD:  Atraumatic, Normocephalic  EYES: Conjunctiva and sclera clear  ENMT: Moist mucous membranes, Good dentition, No lesions  NECK: Supple  NERVOUS SYSTEM:  Alert & Oriented X3, Good concentration; Motor Strength wnl upper and lower extremities  CHEST/LUNG: Scattered rhonchi B/L  HEART: Regular rate and rhythm; No murmurs, rubs, or gallops  ABDOMEN: Soft, Nontender, Nondistended; Bowel sounds present  EXTREMITIES: trace Edema  LYMPH: No lymphadenopathy noted  SKIN: No rashes or lesions    LABS:                        11.4   11.72 )-----------( 302      ( 02 Jan 2019 08:36 )             33.9     01-02    137  |  99  |  7   ----------------------------<  121<H>  3.8   |  29  |  0.60    Ca    8.8      02 Jan 2019 08:36  Mg     2.3     01-01                RADIOLOGY & ADDITIONAL TESTS:

## 2019-01-03 NOTE — PROGRESS NOTE ADULT - SUBJECTIVE AND OBJECTIVE BOX
infectious diseases progress note:    RAFA SHAFER is a 58y y. o. Male patient    Patient with no concerning overnight events    Allergies    penicillin (Unknown)    Intolerances        ANTIBIOTICS/RELEVANT:  antimicrobials  meropenem  IVPB 1000 milliGRAM(s) IV Intermittent every 8 hours  meropenem  IVPB      vancomycin  IVPB 1750 milliGRAM(s) IV Intermittent every 12 hours    immunologic:  influenza   Vaccine 0.5 milliLiter(s) IntraMuscular once    OTHER:  ALBUTerol/ipratropium for Nebulization 3 milliLiter(s) Nebulizer every 6 hours  aspirin  chewable 81 milliGRAM(s) Oral daily  buDESOnide   0.5 milliGRAM(s) Respule 0.5 milliGRAM(s) Inhalation two times a day  guaiFENesin    Syrup 200 milliGRAM(s) Oral every 6 hours PRN  heparin  Injectable 5000 Unit(s) SubCutaneous every 8 hours  lactobacillus acidophilus 1 Tablet(s) Oral three times a day with meals  loratadine 10 milliGRAM(s) Oral daily  metoprolol succinate  milliGRAM(s) Oral daily  promethazine 25 milliGRAM(s) Oral four times a day PRN  sodium chloride 1 Gram(s) Oral three times a day  tiotropium 18 MICROgram(s) Capsule 1 Capsule(s) Inhalation daily      Objective:  Vital Signs Last 24 Hrs  T(C): 36.4 (03 Jan 2019 05:03), Max: 37.1 (02 Jan 2019 15:27)  T(F): 97.6 (03 Jan 2019 05:03), Max: 98.8 (02 Jan 2019 15:27)  HR: 96 (03 Jan 2019 07:40) (92 - 124)  BP: 116/75 (03 Jan 2019 05:03) (108/63 - 127/80)  BP(mean): --  RR: 18 (03 Jan 2019 05:03) (17 - 31)  SpO2: 97% (03 Jan 2019 07:40) (92% - 100%)    T(C): 36.4 (01-03-19 @ 05:03), Max: 37.1 (01-02-19 @ 15:27)  T(C): 36.4 (01-03-19 @ 05:03), Max: 37.1 (01-02-19 @ 15:27)  T(C): 36.4 (01-03-19 @ 05:03), Max: 37.1 (01-02-19 @ 15:27)    PHYSICAL EXAM:  Constitutional: Well-developed, well nourished  Eyes: PERRLA, EOMI  Ear/Nose/Throat: oropharynx normal	  Neck: no JVD, no lymphadenopathy, supple  Respiratory: no accessory muscle use  Cardiovascular: RRR,   Gastrointestinal: soft, NT  Extremities: no clubbing, no cyanosis, edema absent      LABS:                        11.3   12.74 )-----------( 292      ( 03 Jan 2019 08:33 )             33.7       12.74 01-03 @ 08:33  11.72 01-02 @ 08:36  10.65 01-01 @ 08:06  12.51 12-31 @ 19:15      01-02    137  |  99  |  7   ----------------------------<  121<H>  3.8   |  29  |  0.60    Ca    8.8      02 Jan 2019 08:36        Creatinine, Serum: 0.60 mg/dL (01-02-19 @ 08:36)  Creatinine, Serum: 0.61 mg/dL (01-01-19 @ 08:06)  Creatinine, Serum: 0.53 mg/dL (12-31-18 @ 19:15)      MICROBIOLOGY:  Culture Results:   Testing in progress (01-02 @ 19:14)    Culture - Sputum (01.02.19 @ 01:18)    Gram Stain:   Few polymorphonuclear leukocytes per low power field  Few Squamous epithelial cells per low power field  Few Gram Variable Cocci seen per oil power field  Few Gram Negative Rods seen per oil power field    Specimen Source: .Sputum Sputum    Culture Results:   Normal Respiratory Sasha present        RADIOLOGY & ADDITIONAL STUDIES:

## 2019-01-03 NOTE — PROGRESS NOTE ADULT - SUBJECTIVE AND OBJECTIVE BOX
DEPARTMENT OF ANESTHESIA  POST ANESTHETIC EVALUATION    The Patient was interviewed and evaluated.  The patient is S/P a bronchoscopy    Vital Signs Last 24 Hrs  T(C): 36.4 (03 Jan 2019 05:03), Max: 37.1 (02 Jan 2019 15:27)  T(F): 97.6 (03 Jan 2019 05:03), Max: 98.8 (02 Jan 2019 15:27)  HR: 96 (03 Jan 2019 07:40) (92 - 124)  BP: 116/75 (03 Jan 2019 05:03) (108/63 - 127/80)  BP(mean): --  RR: 18 (03 Jan 2019 05:03) (17 - 31)  SpO2: 97% (03 Jan 2019 07:40) (92% - 100%)    Evaluation:  The patient is doing  well    (x) No apparent complications or complaints regarding anesthesia care at this time  (x ) All questions were answered    Condition:  ( x) Stable      ( ) Guarded      ( ) Critical    Recommendations:  (x ) None     ( ) Other:

## 2019-01-03 NOTE — PROGRESS NOTE ADULT - ASSESSMENT
58M pmhx afib diagnosed December 2018 (not on AC), COPD, HTN, cardiac tamponade s/p window 12/2018 discharged from [facility] ~2 weeks for hyponatremia and found to have mediastinal adenopathy and moderate sized pericardial effusion with tamponade physiology with hospital course further complicated by new onset afib with RVR, who presented with worsening SOB and cough and was found to be hyponatremic and have pna. Presently being worked up to r/o malignancy. Patient had bronchoscopy today and s/p bronch, patient went into rapid a fib which resolved on own. Rapid afib likely 2/2 SOB. Currently patient is in afib in 90s and not complaining of any cardiac symptoms.     Recommend:  - s/p bronchoscopy complicated by Afib with RVR.  Remains in Afib with bursts of rapid rate at 140's, although, for the most part, rate is better controlled  - HR will be difficult to control due to ongoing pulmonary process  - Continue Cardizem  mg daily and Toprol  mg daily  - Add Digoxin to current rate-control regimen  - Continue tele monitor     - B/l LE doppler negative of DVT  - Echo December 2018: Overall preserved left ventricular systolic function. Left atrium mildly enlarged. Thickened pericardium with trace pericardial effusion noted.  s/p cardiac tamponade requiring pericardial window.  No evidence of cardiac tamponade this time.  However, if becomes acutely symptomatic, please repeat TTE  - Patient has LISA VASC score of 1. Continue ASA 81 mg.     - Pulm f/u.  Continue bronchodilators.  Encourage incentive spirometer  - BP well controlled, monitor routine hemodynamics.  - Monitor and replete lytes, keep K>4, Mg>2.    -Other cardiovascular workup will depend on clinical course.  -All other workup per primary team.  -Will continue to follow.    Steph Pradhan NP  Cardiology 58M pmhx afib diagnosed December 2018 (not on AC), COPD, HTN, cardiac tamponade s/p window 12/2018 discharged from [facility] ~2 weeks for hyponatremia and found to have mediastinal adenopathy and moderate sized pericardial effusion with tamponade physiology with hospital course further complicated by new onset afib with RVR, who presented with worsening SOB and cough and was found to be hyponatremic and have pna. Presently being worked up to r/o malignancy. Patient had bronchoscopy today and s/p bronch, patient went into rapid a fib which resolved on own. Rapid afib likely 2/2 SOB. Currently patient is in afib in 90s and not complaining of any cardiac symptoms.     Recommend:  - s/p bronchoscopy complicated by Afib with RVR.  Remains in Afib with bursts of rapid rate at 140's, although, for the most part, rate is better controlled  - HR will be difficult to control due to ongoing pulmonary process  - Continue Cardizem  mg daily and Toprol  mg daily  - Add Digoxin to current rate-control regimen as his heart rate is uncontrolled despite current medical therapy  - Continue tele monitor     - B/l LE doppler negative of DVT  - Echo December 2018: Overall preserved left ventricular systolic function. Left atrium mildly enlarged. Thickened pericardium with trace pericardial effusion noted.  s/p cardiac tamponade requiring pericardial window.  No evidence of cardiac tamponade this time.  However, if becomes acutely symptomatic, please repeat TTE  - Patient has LISA VASC score of 1. Continue ASA 81 mg.     - Pulm f/u.  Continue bronchodilators.  Encourage incentive spirometer  - BP well controlled, monitor routine hemodynamics.  - Monitor and replete lytes, keep K>4, Mg>2.    -Other cardiovascular workup will depend on clinical course.  -All other workup per primary team.  -Will continue to follow.    Steph Pradhan NP  Cardiology

## 2019-01-03 NOTE — PROGRESS NOTE ADULT - ASSESSMENT
Hyponatremia  SIADH  PNA  Hemoptysis       - Known to our group from last hospitalization with SIADH. He was sent home with NaCl tabs. He claimed to be compliant with salt tablets but not fluid restriction. No hypertonic saline needed. S/p tolvaptan 15 mg po x 1   - Renal indices are stable; sodium levels improved well and within normal limits  - Continue NaCl tabs. Fluid restriction as ordered  - S/p Bronch on 1/2/18  - Abx   - Monitor chemistries    Thank you

## 2019-01-03 NOTE — PROGRESS NOTE ADULT - SUBJECTIVE AND OBJECTIVE BOX
Patient is a 58y old  Male who presents with a chief complaint of sob (03 Jan 2019 13:06)      INTERVAL /OVERNIGHT EVENTS: feels anxious    MEDICATIONS  (STANDING):  ALBUTerol/ipratropium for Nebulization 3 milliLiter(s) Nebulizer every 6 hours  aspirin  chewable 81 milliGRAM(s) Oral daily  buDESOnide   0.5 milliGRAM(s) Respule 0.5 milliGRAM(s) Inhalation two times a day  digoxin     Tablet 0.125 milliGRAM(s) Oral daily  heparin  Injectable 5000 Unit(s) SubCutaneous every 8 hours  influenza   Vaccine 0.5 milliLiter(s) IntraMuscular once  lactobacillus acidophilus 1 Tablet(s) Oral three times a day with meals  loratadine 10 milliGRAM(s) Oral daily  meropenem  IVPB 1000 milliGRAM(s) IV Intermittent every 8 hours  meropenem  IVPB      metoprolol succinate  milliGRAM(s) Oral daily  sodium chloride 1 Gram(s) Oral three times a day  tiotropium 18 MICROgram(s) Capsule 1 Capsule(s) Inhalation daily    MEDICATIONS  (PRN):  ALPRAZolam 0.25 milliGRAM(s) Oral three times a day PRN anxiety  guaiFENesin    Syrup 200 milliGRAM(s) Oral every 6 hours PRN Cough  promethazine 25 milliGRAM(s) Oral four times a day PRN breakthrough cough      Allergies    penicillin (Unknown)    Intolerances        REVIEW OF SYSTEMS:  CONSTITUTIONAL: No fever, weight loss, or fatigue  EYES: No eye pain, visual disturbances, or discharge  ENMT:  No difficulty hearing, tinnitus, vertigo; No sinus or throat pain  NECK: No pain or stiffness  RESPIRATORY: No cough, wheezing, chills or hemoptysis; No shortness of breath  CARDIOVASCULAR: No chest pain, palpitations, dizziness, or leg swelling  GASTROINTESTINAL: No abdominal or epigastric pain. No nausea, vomiting, or hematemesis; No diarrhea or constipation. No melena or hematochezia.  GENITOURINARY: No dysuria, frequency, hematuria, or incontinence  NEUROLOGICAL: No headaches, memory loss, loss of strength, numbness, or tremors  SKIN: No itching, burning, rashes, or lesions   LYMPH NODES: No enlarged glands  ENDOCRINE: No heat or cold intolerance; No hair loss; No polydipsia or polyuria  MUSCULOSKELETAL: No joint pain or swelling; No muscle, back, or extremity pain  PSYCHIATRIC: No depression, anxiety, mood swings, or difficulty sleeping  HEME/LYMPH: No easy bruising, or bleeding gums  ALLERGY AND IMMUNOLOGIC: No hives or eczema    Vital Signs Last 24 Hrs  T(C): 37.1 (03 Jan 2019 13:55), Max: 37.1 (03 Jan 2019 13:55)  T(F): 98.7 (03 Jan 2019 13:55), Max: 98.7 (03 Jan 2019 13:55)  HR: 112 (03 Jan 2019 13:55) (95 - 124)  BP: 114/72 (03 Jan 2019 13:55) (108/63 - 127/80)  BP(mean): --  RR: 18 (03 Jan 2019 13:55) (17 - 18)  SpO2: 98% (03 Jan 2019 13:55) (96% - 100%)    PHYSICAL EXAM:  GENERAL: NAD, well-groomed, well-developed  HEAD:  Atraumatic, Normocephalic  EYES: EOMI, PERRLA, conjunctiva and sclera clear  ENMT: No tonsillar erythema, exudates, or enlargement; Moist mucous membranes, Good dentition, No lesions  NECK: Supple, No JVD, Normal thyroid  NERVOUS SYSTEM:  Alert & Oriented X3, Good concentration; Motor Strength 5/5 B/L upper and lower extremities; DTRs 2+ intact and symmetric  CHEST/LUNG: Clear to auscultation bilaterally; No rales, rhonchi, wheezing, or rubs  HEART: Regular rate and rhythm; No murmurs, rubs, or gallops  ABDOMEN: Soft, Nontender, Nondistended; Bowel sounds present  EXTREMITIES:  2+ Peripheral Pulses, No clubbing, cyanosis, or edema  LYMPH: No lymphadenopathy noted  SKIN: No rashes or lesions    LABS:                        11.3   12.74 )-----------( 292      ( 03 Jan 2019 08:33 )             33.7       Ca    8.8        02 Jan 2019 08:36          CAPILLARY BLOOD GLUCOSE          RADIOLOGY & ADDITIONAL TESTS:    Notes Reviewed:  [x ] YES  [ ] NO    Care Discussed with Consultants/Other Providers [x ] YES  [ ] NO

## 2019-01-03 NOTE — CHART NOTE - NSCHARTNOTEFT_GEN_A_CORE
Called by RN for tachycardia with hr in 120's-140's (afib) on remote tele. 58M pmhx afib diagnosed December 2018 (not on AC), COPD, HTN, cardiac tamponade s/p window 12/2018 discharged from [facility] ~2 weeks for hyponatremia and found to have mediastinal adenopathy and moderate sized pericardial effusion with tamponade physiology. Patient complains of SOB that is  Denies headaches, dizziness, blurred vision, chest pain, palpitations, nausea, vomiting, abdominal pain.          T(C): 37.1 (01-03-19 @ 13:55), Max: 37.1 (01-03-19 @ 13:55)  HR: 129 (01-03-19 @ 19:38) (95 - 129)  BP: 114/72 (01-03-19 @ 13:55) (108/63 - 127/80)  RR: 18 (01-03-19 @ 13:55) (17 - 18)  SpO2: 98% (01-03-19 @ 13:55) (96% - 100%)  Wt(kg): --    Physical :  Gen- NAD, ncat, appears uncomfortable  Cardio - s+1,s+2, rrr, no murmur  Lung - + wheezing diffuse   Abdomen- +BS, NT/ND  Ext- no edema, 2+ pulses b/l    LABS:                        11.3   12.74 )-----------( 292      ( 03 Jan 2019 08:33 )             33.7     01-02    137  |  99  |  7   ----------------------------<  121<H>  3.8   |  29  |  0.60    Ca    8.8      02 Jan 2019 08:36 Called by RN for tachycardia with hr in 120's-140's (afib) on remote tele. 58M pmhx afib diagnosed December 2018 (not on AC), COPD, HTN, cardiac tamponade s/p window 12/2018 discharged from [facility] ~2 weeks for hyponatremia and found to have mediastinal adenopathy and moderate sized pericardial effusion with tamponade physiology. Patient complains of SOB that is  Denies headaches, dizziness, blurred vision, chest pain, palpitations, nausea, vomiting, abdominal pain.          T(C): 37.1 (01-03-19 @ 13:55), Max: 37.1 (01-03-19 @ 13:55)  HR: 129 (01-03-19 @ 19:38) (95 - 129)  BP: 114/72 (01-03-19 @ 13:55) (108/63 - 127/80)  RR: 18 (01-03-19 @ 13:55) (17 - 18)  SpO2: 98% (01-03-19 @ 13:55) (96% - 100%)  Wt(kg): --    Physical :  Gen- NAD, ncat, appears uncomfortable  Cardio - s+1,s+2, rrr, no murmur  Lung - + wheezing diffuse   Abdomen- +BS, NT/ND  Ext- no edema, 2+ pulses b/l    LABS:                        11.3   12.74 )-----------( 292      ( 03 Jan 2019 08:33 )             33.7     01-02    137  |  99  |  7   ----------------------------<  121<H>  3.8   |  29  |  0.60    Ca    8.8      02 Jan 2019 08:36    Assessment/Plan: 58M pmhx afib diagnosed December 2018 (not on AC), COPD, HTN, cardiac tamponade s/p window 12/2018 discharged from [facility] ~2 weeks for hyponatremia and found to have mediastinal adenopathy and moderate sized pericardial effusion with tamponade physiology with hospital course further complicated by Rapid A-fib  - EKG done today with no acute changes  -STAT cardizem 30mg po x 1  -Continue to monitor on telemetry  - RN to call with any changes  - D/w  Called by RN for tachycardia with hr in 120's-140's (afib) on remote tele. 58M pmhx afib diagnosed December 2018 (not on AC), COPD, HTN, cardiac tamponade s/p window 12/2018 discharged from [facility] ~2 weeks for hyponatremia and found to have mediastinal adenopathy and moderate sized pericardial effusion with tamponade physiology. Patient complains of SOB that has been persistent since admission.  Denies headaches, dizziness, blurred vision, chest pain, palpitations, nausea, vomiting, abdominal pain.          T(C): 37.1 (01-03-19 @ 13:55), Max: 37.1 (01-03-19 @ 13:55)  HR: 129 (01-03-19 @ 19:38) (95 - 129)  BP: 114/72 (01-03-19 @ 13:55) (108/63 - 127/80)  RR: 18 (01-03-19 @ 13:55) (17 - 18)  SpO2: 98% (01-03-19 @ 13:55) (96% - 100%)  Wt(kg): --    Physical :  Gen- NAD, ncat, appears uncomfortable  Cardio - s+1,s+2, rrr, no murmur  Lung - + wheezing diffuse   Abdomen- +BS, NT/ND  Ext- no edema, 2+ pulses b/l    LABS:                        11.3   12.74 )-----------( 292      ( 03 Jan 2019 08:33 )             33.7     01-02    137  |  99  |  7   ----------------------------<  121<H>  3.8   |  29  |  0.60    Ca    8.8      02 Jan 2019 08:36    Assessment/Plan: 58M pmhx afib diagnosed December 2018 (not on AC), COPD, HTN, cardiac tamponade s/p window 12/2018 discharged from [facility] ~2 weeks for hyponatremia and found to have mediastinal adenopathy and moderate sized pericardial effusion with tamponade physiology with hospital course further complicated by Rapid A-fib  - EKG done today with no acute changes  -STAT cardizem 30mg po x 1  -Continue to monitor on telemetry  - RN to call with any changes  - D/w

## 2019-01-03 NOTE — PROGRESS NOTE ADULT - ASSESSMENT
59 yo male admitted 12/31 with Possible pneumonia though WBC not impressive, no fevers  Procalcitonin minimally elevated but not nearly as reliable as one would hope  concerned that his infiltrates represent progression of undiagnosed malignancy more than potential infection  RVP negative  Cx NTD  WBC normal  1/2/19 bronchoscopy

## 2019-01-04 DIAGNOSIS — J44.1 CHRONIC OBSTRUCTIVE PULMONARY DISEASE WITH (ACUTE) EXACERBATION: ICD-10-CM

## 2019-01-04 DIAGNOSIS — I48.2 CHRONIC ATRIAL FIBRILLATION: ICD-10-CM

## 2019-01-04 DIAGNOSIS — C34.80 MALIGNANT NEOPLASM OF OVERLAPPING SITES OF UNSPECIFIED BRONCHUS AND LUNG: ICD-10-CM

## 2019-01-04 DIAGNOSIS — E22.2 SYNDROME OF INAPPROPRIATE SECRETION OF ANTIDIURETIC HORMONE: ICD-10-CM

## 2019-01-04 LAB
ALBUMIN SERPL ELPH-MCNC: 2.5 G/DL — LOW (ref 3.3–5)
ALP SERPL-CCNC: 100 U/L — SIGNIFICANT CHANGE UP (ref 40–120)
ALT FLD-CCNC: 55 U/L — SIGNIFICANT CHANGE UP (ref 12–78)
ANION GAP SERPL CALC-SCNC: 9 MMOL/L — SIGNIFICANT CHANGE UP (ref 5–17)
AST SERPL-CCNC: 52 U/L — HIGH (ref 15–37)
BILIRUB SERPL-MCNC: 0.5 MG/DL — SIGNIFICANT CHANGE UP (ref 0.2–1.2)
BUN SERPL-MCNC: 20 MG/DL — SIGNIFICANT CHANGE UP (ref 7–23)
CALCIUM SERPL-MCNC: 8.7 MG/DL — SIGNIFICANT CHANGE UP (ref 8.5–10.1)
CHLORIDE SERPL-SCNC: 95 MMOL/L — LOW (ref 96–108)
CO2 SERPL-SCNC: 29 MMOL/L — SIGNIFICANT CHANGE UP (ref 22–31)
CREAT SERPL-MCNC: 0.61 MG/DL — SIGNIFICANT CHANGE UP (ref 0.5–1.3)
GLUCOSE SERPL-MCNC: 173 MG/DL — HIGH (ref 70–99)
HCT VFR BLD CALC: 32.6 % — LOW (ref 39–50)
HGB BLD-MCNC: 11.1 G/DL — LOW (ref 13–17)
MCHC RBC-ENTMCNC: 27.8 PG — SIGNIFICANT CHANGE UP (ref 27–34)
MCHC RBC-ENTMCNC: 34 GM/DL — SIGNIFICANT CHANGE UP (ref 32–36)
MCV RBC AUTO: 81.7 FL — SIGNIFICANT CHANGE UP (ref 80–100)
NRBC # BLD: 0 /100 WBCS — SIGNIFICANT CHANGE UP (ref 0–0)
PLATELET # BLD AUTO: 283 K/UL — SIGNIFICANT CHANGE UP (ref 150–400)
POTASSIUM SERPL-MCNC: 4.1 MMOL/L — SIGNIFICANT CHANGE UP (ref 3.5–5.3)
POTASSIUM SERPL-SCNC: 4.1 MMOL/L — SIGNIFICANT CHANGE UP (ref 3.5–5.3)
PROT SERPL-MCNC: 6.3 G/DL — SIGNIFICANT CHANGE UP (ref 6–8.3)
RBC # BLD: 3.99 M/UL — LOW (ref 4.2–5.8)
RBC # FLD: 12.6 % — SIGNIFICANT CHANGE UP (ref 10.3–14.5)
SODIUM SERPL-SCNC: 133 MMOL/L — LOW (ref 135–145)
WBC # BLD: 10.91 K/UL — HIGH (ref 3.8–10.5)
WBC # FLD AUTO: 10.91 K/UL — HIGH (ref 3.8–10.5)

## 2019-01-04 PROCEDURE — 99291 CRITICAL CARE FIRST HOUR: CPT

## 2019-01-04 PROCEDURE — 93306 TTE W/DOPPLER COMPLETE: CPT | Mod: 26

## 2019-01-04 RX ORDER — SODIUM CHLORIDE 9 MG/ML
1000 INJECTION INTRAMUSCULAR; INTRAVENOUS; SUBCUTANEOUS ONCE
Qty: 0 | Refills: 0 | Status: COMPLETED | OUTPATIENT
Start: 2019-01-04 | End: 2019-01-04

## 2019-01-04 RX ADMIN — Medication 200 MILLIGRAM(S): at 04:05

## 2019-01-04 RX ADMIN — HEPARIN SODIUM 5000 UNIT(S): 5000 INJECTION INTRAVENOUS; SUBCUTANEOUS at 05:54

## 2019-01-04 RX ADMIN — HEPARIN SODIUM 5000 UNIT(S): 5000 INJECTION INTRAVENOUS; SUBCUTANEOUS at 15:23

## 2019-01-04 RX ADMIN — MEROPENEM 100 MILLIGRAM(S): 1 INJECTION INTRAVENOUS at 05:54

## 2019-01-04 RX ADMIN — Medication 3 MILLILITER(S): at 20:39

## 2019-01-04 RX ADMIN — Medication 3 MILLILITER(S): at 07:52

## 2019-01-04 RX ADMIN — Medication 1 TABLET(S): at 18:13

## 2019-01-04 RX ADMIN — Medication 360 MILLIGRAM(S): at 04:05

## 2019-01-04 RX ADMIN — Medication 40 MILLIGRAM(S): at 21:38

## 2019-01-04 RX ADMIN — Medication 0.12 MILLIGRAM(S): at 04:05

## 2019-01-04 RX ADMIN — Medication 40 MILLIGRAM(S): at 05:53

## 2019-01-04 RX ADMIN — SODIUM CHLORIDE 1 GRAM(S): 9 INJECTION INTRAMUSCULAR; INTRAVENOUS; SUBCUTANEOUS at 21:37

## 2019-01-04 RX ADMIN — Medication 3 MILLILITER(S): at 14:16

## 2019-01-04 RX ADMIN — Medication 1 TABLET(S): at 09:34

## 2019-01-04 RX ADMIN — HEPARIN SODIUM 5000 UNIT(S): 5000 INJECTION INTRAVENOUS; SUBCUTANEOUS at 21:37

## 2019-01-04 RX ADMIN — SODIUM CHLORIDE 1 GRAM(S): 9 INJECTION INTRAMUSCULAR; INTRAVENOUS; SUBCUTANEOUS at 05:54

## 2019-01-04 RX ADMIN — Medication 3 MILLILITER(S): at 00:17

## 2019-01-04 RX ADMIN — Medication 200 MILLIGRAM(S): at 21:41

## 2019-01-04 RX ADMIN — Medication 1 TABLET(S): at 12:02

## 2019-01-04 RX ADMIN — Medication 40 MILLIGRAM(S): at 15:24

## 2019-01-04 RX ADMIN — LORATADINE 10 MILLIGRAM(S): 10 TABLET ORAL at 12:02

## 2019-01-04 RX ADMIN — SODIUM CHLORIDE 1 GRAM(S): 9 INJECTION INTRAMUSCULAR; INTRAVENOUS; SUBCUTANEOUS at 15:23

## 2019-01-04 RX ADMIN — Medication 81 MILLIGRAM(S): at 12:02

## 2019-01-04 RX ADMIN — Medication 0.5 MILLIGRAM(S): at 07:52

## 2019-01-04 RX ADMIN — Medication 0.5 MILLIGRAM(S): at 20:40

## 2019-01-04 NOTE — PROGRESS NOTE ADULT - SUBJECTIVE AND OBJECTIVE BOX
Patient is a 58y old  Male who presents with a chief complaint of sob (04 Jan 2019 15:35)      INTERVAL /OVERNIGHT EVENTS: still with cough    MEDICATIONS  (STANDING):  ALBUTerol/ipratropium for Nebulization 3 milliLiter(s) Nebulizer every 6 hours  aspirin  chewable 81 milliGRAM(s) Oral daily  buDESOnide   0.5 milliGRAM(s) Respule 0.5 milliGRAM(s) Inhalation two times a day  digoxin     Tablet 0.125 milliGRAM(s) Oral daily  diltiazem    milliGRAM(s) Oral daily  heparin  Injectable 5000 Unit(s) SubCutaneous every 8 hours  influenza   Vaccine 0.5 milliLiter(s) IntraMuscular once  lactobacillus acidophilus 1 Tablet(s) Oral three times a day with meals  loratadine 10 milliGRAM(s) Oral daily  methylPREDNISolone sodium succinate Injectable 40 milliGRAM(s) IV Push three times a day  metoprolol succinate  milliGRAM(s) Oral daily  sodium chloride 1 Gram(s) Oral three times a day  tiotropium 18 MICROgram(s) Capsule 1 Capsule(s) Inhalation daily    MEDICATIONS  (PRN):  ALBUTerol    90 MICROgram(s) HFA Inhaler 2 Puff(s) Inhalation every 6 hours PRN Shortness of Breath  ALPRAZolam 0.25 milliGRAM(s) Oral three times a day PRN anxiety  guaiFENesin    Syrup 200 milliGRAM(s) Oral every 6 hours PRN Cough  promethazine 25 milliGRAM(s) Oral four times a day PRN breakthrough cough      Allergies    penicillin (Unknown)    Intolerances        REVIEW OF SYSTEMS:  CONSTITUTIONAL: No fever, weight loss, or fatigue  EYES: No eye pain, visual disturbances, or discharge  ENMT:  No difficulty hearing, tinnitus, vertigo; No sinus or throat pain  NECK: No pain or stiffness  RESPIRATORY: + cough, wheezing, chills or hemoptysis; + shortness of breath  CARDIOVASCULAR: No chest pain, palpitations, dizziness, or leg swelling  GASTROINTESTINAL: No abdominal or epigastric pain. No nausea, vomiting, or hematemesis; No diarrhea or constipation. No melena or hematochezia.  GENITOURINARY: No dysuria, frequency, hematuria, or incontinence  NEUROLOGICAL: No headaches, memory loss, loss of strength, numbness, or tremors  SKIN: No itching, burning, rashes, or lesions   LYMPH NODES: No enlarged glands  ENDOCRINE: No heat or cold intolerance; No hair loss; No polydipsia or polyuria  MUSCULOSKELETAL: No joint pain or swelling; No muscle, back, or extremity pain  PSYCHIATRIC: No depression, anxiety, mood swings, or difficulty sleeping  HEME/LYMPH: No easy bruising, or bleeding gums  ALLERGY AND IMMUNOLOGIC: No hives or eczema    Vital Signs Last 24 Hrs  T(C): 36.7 (04 Jan 2019 13:42), Max: 36.8 (03 Jan 2019 20:56)  T(F): 98 (04 Jan 2019 13:42), Max: 98.3 (03 Jan 2019 20:56)  HR: 98 (04 Jan 2019 14:16) (56 - 132)  BP: 150/83 (04 Jan 2019 13:42) (127/66 - 150/83)  BP(mean): --  RR: 17 (04 Jan 2019 13:42) (15 - 18)  SpO2: 98% (04 Jan 2019 14:16) (95% - 99%)    PHYSICAL EXAM:  GENERAL: NAD, well-groomed, well-developed  HEAD:  Atraumatic, Normocephalic  EYES: EOMI, PERRLA, conjunctiva and sclera clear  ENMT: No tonsillar erythema, exudates, or enlargement; Moist mucous membranes, Good dentition, No lesions  NECK: Supple, No JVD, Normal thyroid  NERVOUS SYSTEM:  Alert & Oriented X3, Good concentration; Motor Strength 5/5 B/L upper and lower extremities; DTRs 2+ intact and symmetric  CHEST/LUNG: Clear to auscultation bilaterally; No rales, rhonchi, wheezing, or rubs  HEART: Regular rate and rhythm; No murmurs, rubs, or gallops  ABDOMEN: Soft, Nontender, Nondistended; Bowel sounds present  EXTREMITIES:  2+ Peripheral Pulses, No clubbing, cyanosis, or edema  LYMPH: No lymphadenopathy noted  SKIN: No rashes or lesions    LABS:                        11.1   10.91 )-----------( 283      ( 04 Jan 2019 10:22 )             32.6               CAPILLARY BLOOD GLUCOSE          RADIOLOGY & ADDITIONAL TESTS:    Notes Reviewed:  [x ] YES  [ ] NO    Care Discussed with Consultants/Other Providers [x ] YES  [ ] NO

## 2019-01-04 NOTE — CONSULT NOTE ADULT - SUBJECTIVE AND OBJECTIVE BOX
NOTE TO FOLLO INCOMPLETE  NOTE:   Pt seen and evaluated. Full note to follow.   CONSULTATION & DOCUMENTATION IN PROGRESS    Patient is a 58y old  Male who presents with a chief complaint of sob (04 Jan 2019 17:05)      HPI:  pt is a 59yo male with pmhx of htn, copd, cardiac tamponade s/p window 12/2018 c/o cough x days. pt reports non-productive cough with associated dyspnea on exertion. pt reports "it feels like something is stuck in my throat". pt reports bl le swelling without pain. pt recently admitted for pericardial effusion s/p window. pt quit smoking aprox 2 weeks ago.  In ER patient was found to have PNA on CXR.  Patient is being admitted for further work up and treatment. (31 Dec 2018 21:51)      PAST MEDICAL & SURGICAL HISTORY:  Dyspnea  Mediastinal adenopathy  Pericardial effusion  AF (atrial fibrillation)  Hyponatremia  COPD (chronic obstructive pulmonary disease)  HTN (hypertension)  S/P pericardial window creation      HEALTH ISSUES - PROBLEM Dx:  Mediastinal adenopathy: Mediastinal adenopathy  AF (atrial fibrillation): AF (atrial fibrillation)  Preventive measure: Preventive measure  Cough: Cough  Hyponatremia: Hyponatremia  PNA (pneumonia): PNA (pneumonia)          FAMILY HISTORY:  No pertinent family history in first degree relatives        [SOCIAL HISTORY: ]     smoking:       EtOH:       illicit drugs:       occupation:       marital status:       Other:       [ALLERGIES/INTOLERANCES:]  Allergies    penicillin (Unknown)    Intolerances          [MEDICATIONS]  MEDICATIONS  (STANDING):  ALBUTerol/ipratropium for Nebulization 3 milliLiter(s) Nebulizer every 6 hours  buDESOnide   0.5 milliGRAM(s) Respule 0.5 milliGRAM(s) Inhalation two times a day  digoxin     Tablet 0.125 milliGRAM(s) Oral daily  diltiazem    milliGRAM(s) Oral daily  heparin  Injectable 5000 Unit(s) SubCutaneous every 8 hours  influenza   Vaccine 0.5 milliLiter(s) IntraMuscular once  lactobacillus acidophilus 1 Tablet(s) Oral three times a day with meals  loratadine 10 milliGRAM(s) Oral daily  methylPREDNISolone sodium succinate Injectable 40 milliGRAM(s) IV Push three times a day  metoprolol succinate  milliGRAM(s) Oral daily  sodium chloride 1 Gram(s) Oral three times a day  tiotropium 18 MICROgram(s) Capsule 1 Capsule(s) Inhalation daily    MEDICATIONS  (PRN):  ALBUTerol    90 MICROgram(s) HFA Inhaler 2 Puff(s) Inhalation every 6 hours PRN Shortness of Breath  ALPRAZolam 0.25 milliGRAM(s) Oral three times a day PRN anxiety  guaiFENesin    Syrup 200 milliGRAM(s) Oral every 6 hours PRN Cough  promethazine 25 milliGRAM(s) Oral four times a day PRN breakthrough cough        [REVIEW OF SYSTEMS: ]  CONSTITUTIONAL: normal, no fever, no shakes, no chills   EYES: No eye pain, no visual disturbances, no discharge  ENMT:  no discharge  NECK: No pain, no stiffness  BREASTS: No pain, no masses, no nipple discharge  RESPIRATORY: No cough, no wheezing, no chills, no hemoptysis; No shortness of breath  CARDIOVASCULAR: No chest pain, no palpitations, no dizziness, no leg swelling  GASTROINTESTINAL: No abdominal or epigastric pain. No nausea, no vomiting, no hematemesis; No diarrhea , no constipation. No melena, no hematochezia.  GENITOURINARY: No dysuria, no frequency, no hematuria, no incontinence  NEUROLOGICAL: No headaches, no memory loss, no loss of strength, no numbness, no tremors  SKIN: No itching, no burning, no rashes, no lesions   LYMPH NODES: No enlarged glands  ENDOCRINE: No heat or cold intolerance; No hair loss  MUSCULOSKELETAL: No joint pain or swelling; No muscle, no back, no extremity pain  PSYCHIATRIC: No depression, no anxiety, no mood swings, no difficulty sleeping  HEME/LYMPH: No easy bruising, no bleeding gums    [VITALS SIGNS 24hrs]  Vital Signs Last 24 Hrs  T(C): 36.7 (04 Jan 2019 21:10), Max: 36.8 (04 Jan 2019 04:51)  T(F): 98 (04 Jan 2019 21:10), Max: 98.3 (04 Jan 2019 04:51)  HR: 108 (04 Jan 2019 21:10) (96 - 132)  BP: 118/68 (04 Jan 2019 21:10) (118/68 - 150/83)  BP(mean): --  RR: 18 (04 Jan 2019 21:10) (17 - 18)  SpO2: 98% (04 Jan 2019 21:10) (96% - 98%)    [PHYSICAL EXAM]  General: adult in NAD,  WN,  WD.  HEENT: clear oropharynx, anicteric sclera, pink conjunctivae.  Neck: supple, no masses.  CV: normal S1S2, no murmur, no rubs, no gallops.  Lungs: clear to auscultation, no wheezes, no rales, no rhonchi.  Abdomen: soft, non-tender, non-distended, no hepatosplenomegaly, normal BS, no guarding.  Ext: no clubbing, no cyanosis, no edema.  Skin: no rashes,  no petechiae, no venous stasis changes.  Neuro: alert and oriented X3, no focal motor deficits.  LN: no SC STEVE.      [LABS:]                        11.1   10.91 )-----------( 283      ( 04 Jan 2019 10:22 )             32.6     CBC Full  -  ( 04 Jan 2019 10:22 )  WBC Count : 10.91 K/uL  Hemoglobin : 11.1 g/dL  Hematocrit : 32.6 %  Platelet Count - Automated : 283 K/uL  Mean Cell Volume : 81.7 fl  Mean Cell Hemoglobin : 27.8 pg  Mean Cell Hemoglobin Concentration : 34.0 gm/dL  Auto Neutrophil # : x  Auto Lymphocyte # : x  Auto Monocyte # : x  Auto Eosinophil # : x  Auto Basophil # : x  Auto Neutrophil % : x  Auto Lymphocyte % : x  Auto Monocyte % : x  Auto Eosinophil % : x  Auto Basophil % : x    01-04    133<L>  |  95<L>  |  20  ----------------------------<  173<H>  4.1   |  29  |  0.61    Ca    8.7      04 Jan 2019 21:04    TPro  6.3  /  Alb  2.5<L>  /  TBili  0.5  /  DBili  x   /  AST  52<H>  /  ALT  55  /  AlkPhos  100  01-04      LIVER FUNCTIONS - ( 04 Jan 2019 21:04 )  Alb: 2.5 g/dL / Pro: 6.3 g/dL / ALK PHOS: 100 U/L / ALT: 55 U/L / AST: 52 U/L / GGT: x                   WBC  TREND (5 Days)  WBC Count: 10.91 K/uL (01-04 @ 10:22)  WBC Count: 12.74 K/uL (01-03 @ 08:33)    HGB  TREND (5 Days)  Hemoglobin: 11.1 g/dL (01-04 @ 10:22)  Hemoglobin: 11.3 g/dL (01-03 @ 08:33)    HCT  TREND (5 Days)  Hematocrit: 32.6 % (01-04 @ 10:22)  Hematocrit: 33.7 % (01-03 @ 08:33)    PLT  TREND (5 Days)  Platelet Count - Automated: 283 K/uL (01-04 @ 10:22)  Platelet Count - Automated: 292 K/uL (01-03 @ 08:33)                        Myeloma Studies                        Microbiology        [PATHOLOGY]  Cytopathology - Non Gyn Report:   ACCESSION No:  [id number]    RAFA SHAFER                  1        Cytopathology Report            Specimen(s) Submitted  Bronchial washings      Clinical History  Suspect small cell lung carcinoma, mediastinal hilar adenopathy,  pericardial effusion  Procedure: Bronchoscopy with biopsy, brushings x3, washings.      Gross Description  60cc cloudy red fluid in 50% alcohol      Final Diagnosis  SUSPICIOUS FOR MALIGNANCY.  Scant clusters of markedly atypical cells in the background of  benign bronchial cells, neutrophils, pigmented histiocytes and  red blood cells.      Note:  See also concurrent main ricardo biopsy (30-S-19-13).    Screened by: Riccardo Spencer M.D.  Verified by: Riccardo Spencer M.D.  (Electronic Signature)  Reported on: 01/03/19 14:27 [place] [address]  _________________________________________________________________      Comment  Key portions of this case were reviewed in intradepartmental  consultation with Dr. JOSUE Vargas, with concurrence of diagnosis. (01-02 @ 13:26)  Cytopathology - Non Gyn Report:   ACCESSION No:  [id number]    RAFA SHAFER                  1        Cytopathology Report            Specimen(s) Submitted  Brushings x3 main ricardo      Clinical History  Suspect small cell lung carcinoma, mediastinal hilar adenopathy,  pericardial effusion  Procedure: Bronchoscopy with biopsy, brushings x 3, washings      Gross Description  6 smears      Final Diagnosis  SUSPICIOUS FOR MALIGNANCY.  Scant clusters of markedly atypical cells, suspicious for  malignancy, in background of benign bronchial cells, neutrophils,  pigmented histiocytes and red blood cells.      Note: See also concurrent main ricardo biopsy (30-S-19-13).    Screened by: Riccardo Spencer M.D.  Verified by: Riccardo Spencer M.D.  (Electronic Signature)  Reportedon: 01/03/19 14:20 [facility], [address] [place], [place] [id number]  _________________________________________________________________      Comment  Key portions of this case were reviewed in intradepartmental  consultation with Dr. JOSUE Vargas, with concurrence of diagnosis. (01-02 @ 13:25)  Cytopathology - Non Gyn Report:   ACCESSION No:  [id number]    RAFA SHAFER                  1        Cytopathology Report            Specimen(s) Submitted  Pericardial fluid      Clinical History  Pericardial effusion      Gross Description  80 cc thick mucoid to red/brown fluid in 50% alcohol      Statement of Adequacy  Satisfactory for evaluation, limited by abundant obscuring blood  and suboptimal cellular details.      Final Diagnosis  ATYPICAL FINDINGS.    Few single cells and small clusters of atypical epithelial cells  (some pigmented) are noted in a background of abundant obscuring  blood.    Note: Adequate assessment is precluded by obscuring blood and  suboptimal cellular details.  See corresponding  pathology report, accession # [id number] (12/15/18).    Screened by: Isela Altamirano MD  Verified by: Isela Altamirano MD  (Electronic Signature)  Reported on: 12/18/18 15:58 EST, [address] [place] [id number]  _________________________________________________________________ (12-15 @ 06:49)       [BLOOD SMEAR INTERPRETATION: ]  RBC:   WBC:   Plts:     [RADIOLOGY & ADDITIONAL STUDIES:]        *********************  [ASSESSMENT and  PLAN]  I have discussed the above plan of care with patient/family in detail. They expressed understanding of the treatment plan . Risks, benefits and alternatives discussed in detail. I have asked if they have any questions or concerns and appropriately addressed them; all questions answered to their satisfactions and in lay terms.     > xxxxxx minutes spent in direct patient care, examining and counseling patient,  reviewing  the notes, lab data/ imaging , discussion with multidisciplinary team. INCOMPLETE  NOTE:   Pt seen and evaluated. Full note to follow.   CONSULTATION & DOCUMENTATION IN PROGRESS    Patient is a 58y old  Male who presents with a chief complaint of sob (2019 17:05)      HPI:  pt is a 57yo male with pmhx of htn, copd, cardiac tamponade s/p window 2018 c/o cough x days. pt reports non-productive cough with associated dyspnea on exertion. pt reports "it feels like something is stuck in my throat". pt reports bl le swelling without pain. pt recently admitted for pericardial effusion s/p window. pt quit smoking aprox 2 weeks ago.  In ER patient was found to have PNA on CXR.  Patient is being admitted for further work up and treatment. (31 Dec 2018 21:51)    Oncology asked to see pt for eval of lung cancer.         PAST MEDICAL & SURGICAL HISTORY:  Dyspnea  Mediastinal adenopathy  Pericardial effusion  AF (atrial fibrillation)  Hyponatremia  COPD (chronic obstructive pulmonary disease)  HTN (hypertension)  S/P pericardial window creation      HEALTH ISSUES - PROBLEM Dx:  Mediastinal adenopathy: Mediastinal adenopathy  AF (atrial fibrillation): AF (atrial fibrillation)  Preventive measure: Preventive measure  Cough: Cough  Hyponatremia: Hyponatremia  PNA (pneumonia): PNA (pneumonia)          FAMILY HISTORY:  No pertinent family history in first degree relatives        [SOCIAL HISTORY: ]     smokinPPD x40yrs, quit 2018     EtOH:  denies     illicit drugs:  denies     occupation:  train operated for [employer]     marital status:   wife Yuly, 2 sons: 29yo Graham, 26yo Javier.      Other:       [ALLERGIES/INTOLERANCES:]  Allergies     penicillin (Unknown)  Intolerances          [MEDICATIONS]  MEDICATIONS  (STANDING):  ALBUTerol/ipratropium for Nebulization 3 milliLiter(s) Nebulizer every 6 hours  buDESOnide   0.5 milliGRAM(s) Respule 0.5 milliGRAM(s) Inhalation two times a day  digoxin     Tablet 0.125 milliGRAM(s) Oral daily  diltiazem    milliGRAM(s) Oral daily  heparin  Injectable 5000 Unit(s) SubCutaneous every 8 hours  influenza   Vaccine 0.5 milliLiter(s) IntraMuscular once  lactobacillus acidophilus 1 Tablet(s) Oral three times a day with meals  loratadine 10 milliGRAM(s) Oral daily  methylPREDNISolone sodium succinate Injectable 40 milliGRAM(s) IV Push three times a day  metoprolol succinate  milliGRAM(s) Oral daily  sodium chloride 1 Gram(s) Oral three times a day  tiotropium 18 MICROgram(s) Capsule 1 Capsule(s) Inhalation daily    MEDICATIONS  (PRN):  ALBUTerol    90 MICROgram(s) HFA Inhaler 2 Puff(s) Inhalation every 6 hours PRN Shortness of Breath  ALPRAZolam 0.25 milliGRAM(s) Oral three times a day PRN anxiety  guaiFENesin    Syrup 200 milliGRAM(s) Oral every 6 hours PRN Cough  promethazine 25 milliGRAM(s) Oral four times a day PRN breakthrough cough        [REVIEW OF SYSTEMS: ]  CONSTITUTIONAL: normal, no fever, no shakes, no chills   EYES: No eye pain, no visual disturbances, no discharge  ENMT:  no discharge  NECK: No pain, no stiffness  BREASTS: No pain, no masses, no nipple discharge  RESPIRATORY: No cough, no wheezing, no chills, no hemoptysis; No shortness of breath  CARDIOVASCULAR: No chest pain, no palpitations, no dizziness, no leg swelling  GASTROINTESTINAL: No abdominal or epigastric pain. No nausea, no vomiting, no hematemesis; No diarrhea , no constipation. No melena, no hematochezia.  GENITOURINARY: No dysuria, no frequency, no hematuria, no incontinence  NEUROLOGICAL: No headaches, no memory loss, no loss of strength, no numbness, no tremors  SKIN: No itching, no burning, no rashes, no lesions   LYMPH NODES: No enlarged glands  ENDOCRINE: No heat or cold intolerance; No hair loss  MUSCULOSKELETAL: No joint pain or swelling; No muscle, no back, no extremity pain  PSYCHIATRIC: No depression, no anxiety, no mood swings, no difficulty sleeping  HEME/LYMPH: No easy bruising, no bleeding gums    [VITALS SIGNS 24hrs]  Vital Signs Last 24 Hrs  T(C): 36.7 (2019 21:10), Max: 36.8 (2019 04:51)  T(F): 98 (2019 21:10), Max: 98.3 (2019 04:51)  HR: 108 (2019 21:10) (96 - 132)  BP: 118/68 (2019 21:10) (118/68 - 150/83)  BP(mean): --  RR: 18 (2019 21:10) (17 - 18)  SpO2: 98% (2019 21:10) (96% - 98%)    [PHYSICAL EXAM]  General: adult in NAD,  WN,  WD.  HEENT: clear oropharynx, anicteric sclera, pink conjunctivae.  Neck: supple, no masses.  CV: normal S1S2, no murmur, no rubs, no gallops.  Lungs: clear to auscultation, no wheezes, no rales, no rhonchi.  Abdomen: soft, non-tender, non-distended, no hepatosplenomegaly, normal BS, no guarding.  Ext: no clubbing, no cyanosis, no edema.  Skin: no rashes,  no petechiae, no venous stasis changes.  Neuro: alert and oriented X3, no focal motor deficits.  LN: no SC STEVE.      [LABS:]                        11.1   10.91 )-----------( 283      ( 2019 10:22 )             32.6     CBC Full  -  ( 2019 10:22 )  WBC Count : 10.91 K/uL  Hemoglobin : 11.1 g/dL  Hematocrit : 32.6 %  Platelet Count - Automated : 283 K/uL  Mean Cell Volume : 81.7 fl  Mean Cell Hemoglobin : 27.8 pg  Mean Cell Hemoglobin Concentration : 34.0 gm/dL  Auto Neutrophil # : x  Auto Lymphocyte # : x  Auto Monocyte # : x  Auto Eosinophil # : x  Auto Basophil # : x  Auto Neutrophil % : x  Auto Lymphocyte % : x  Auto Monocyte % : x  Auto Eosinophil % : x  Auto Basophil % : x        133<L>  |  95<L>  |  20  ----------------------------<  173<H>  4.1   |  29  |  0.61    Ca    8.7      2019 21:04    TPro  6.3  /  Alb  2.5<L>  /  TBili  0.5  /  DBili  x   /  AST  52<H>  /  ALT  55  /  AlkPhos  100  -04      LIVER FUNCTIONS - ( 2019 21:04 )  Alb: 2.5 g/dL / Pro: 6.3 g/dL / ALK PHOS: 100 U/L / ALT: 55 U/L / AST: 52 U/L / GGT: x                   WBC  TREND (5 Days)  WBC Count: 10.91 K/uL ( @ 10:22)  WBC Count: 12.74 K/uL ( @ 08:33)    HGB  TREND (5 Days)  Hemoglobin: 11.1 g/dL ( @ 10:22)  Hemoglobin: 11.3 g/dL ( @ 08:33)    HCT  TREND (5 Days)  Hematocrit: 32.6 % ( @ 10:22)  Hematocrit: 33.7 % ( @ 08:33)    PLT  TREND (5 Days)  Platelet Count - Automated: 283 K/uL ( @ 10:22)  Platelet Count - Automated: 292 K/uL ( @ 08:33)                      [PATHOLOGY]  Cytopathology - Non Gyn Report:   ACCESSION No:  [id number]  Cytopathology Report  Specimen(s) Submitted: Bronchial washings  Clinical History: Suspect small cell lung carcinoma, mediastinal hilar adenopathy, pericardial effusion  Procedure: Bronchoscopy with biopsy, brushings x3, washings.  Gross Description: 60cc cloudy red fluid in 50% alcohol  Final Diagnosis: SUSPICIOUS FOR MALIGNANCY. Scant clusters of markedly atypical cells in the background of  benign bronchial cells, neutrophils, pigmented histiocytes and red blood cells.  Note:  See also concurrent main ricardo biopsy ().    Screened by: Riccardo Spencer M.D.  Verified by: Riccardo Spencer M.D.  (Electronic Signature)  Reported on: 19 14:27 [place] [address]  _________________________________________________________________    Comment: Key portions of this case were reviewed in intradepartmental consultation with Dr. JOSUE Vargas, with concurrence of diagnosis. ( @ 13:26)  Cytopathology - Non Gyn Report:   ACCESSION No:  [id number]  Cytopathology Report  Specimen(s) Submitted: Brushings x3 main ricardo  Clinical History: Suspect small cell lung carcinoma, mediastinal hilar adenopathy, pericardial effusion  Procedure: Bronchoscopy with biopsy, brushings x 3, washings  Gross Description: 6 smears  Final Diagnosis: SUSPICIOUS FOR MALIGNANCY. Scant clusters of markedly atypical cells, suspicious for  malignancy, in background of benign bronchial cells, neutrophils, pigmented histiocytes and red blood cells.  Note: See also concurrent main ricardo biopsy (--).  Screened by: Riccardo Spencer M.D.  Verified by: Riccardo Spencer M.D.  (Electronic Signature)  Reportedon: 19 14:20 EST, [address] [place] [id number]  _________________________________________________________________    Comment: Key portions of this case were reviewed in intradepartmental consultation with Dr. JOSUE Vargas, with concurrence of diagnosis. ( @ 13:25)  Cytopathology - Non Gyn Report:   ACCESSION No:  [id number]  Cytopathology Report  Specimen(s) Submitted: Pericardial fluid  Clinical History: Pericardial effusion      Gross Description  80 cc thick mucoid to red/brown fluid in 50% alcohol      Statement of Adequacy  Satisfactory for evaluation, limited by abundant obscuring blood  and suboptimal cellular details.      Final Diagnosis  ATYPICAL FINDINGS.    Few single cells and small clusters of atypical epithelial cells    (some pigmented) are noted in a background of abundant obscuring  blood.    Note: Adequate assessment is precluded by obscuring blood and  suboptimal cellular details.  See corresponding  pathology report, accession # [id number] (12/15/18).    Screened by: Isela Altamirano MD  Verified by: Isela Altamirano MD  (Electronic Signature)  Reported on: 18 15:58 EST, [address] [place] [id number]  _________________________________________________________________ (12-15 @ 06:49)       [RADIOLOGY & ADDITIONAL STUDIES:]    < from: MR Head w/wo IV Cont (18 @ 10:06) >  EXAM:  MR BRAIN WAW IC                        PROCEDURE DATE:  2018    INTERPRETATION:  CLINICAL STATEMENT: Seizure-like activity. Dizziness.  TECHNIQUE: MRI of the brain was performed with and without gadolinium.  COMPARISON: CT head 2018    FINDINGS: There is no abnormal intraparenchymal or leptomeningeal enhancement.  There is mild diffuse parenchymal volume loss. There are T2 prolongation signal abnormalities in the periventricular white matter likely related to mild chronic microvascular ischemic changes.  There is no acute parenchymal hemorrhage, parenchymal mass, mass effect or midline shift. There is no extra-axial fluid collection.  There is no hydrocephalus.  There is no acute infarct.  Retention cyst/polyp maxillary sinuses.    IMPRESSION:  No parenchymal mass, acute intracranial hemorrhage or acute infarct.  < end of copied text >          < from: CT Head No Cont (18 @ 07:58) >  EXAM:  CT BRAIN                        PROCEDURE DATE:  2018    INTERPRETATION:  EXAM: CT HEAD WITHOUT CONTRAST.  CLINICAL INDICATION: Seizure. Concern for metastasis.  TECHNIQUE: Noncontrast imaging was performed. Axial, sagittal and coronal scans are reviewed.  PRIOR EXAM: None.     FINDINGS:    The ventricles and cortical sulci are within normal limits. Gray and white matter differentiation is fairly symmetrical. No evidence of any mass effect or extra-axial collection is seen. There are no abnormal intracranial calcifications.    Bony calvarium, visualized mastoids, sinuses and orbits are unremarkable, In view of clinical concern for metastatic disease, contrast-enhanced imaging should be considered.    IMPRESSION:   Negative study.  < end of copied text >        < from: Xray Chest 2 Views PA/Lat (18 @ 19:21) >  EXAM:  XR CHEST PA LAT 2V                        PROCEDURE DATE:  2018    INTERPRETATION:  Shortness of breath, cough.  PA lateral. Prior 12/15/2018.  No change heart mediastinum. Superior mediastinal widening similar to priorconsistent with known adenopathy. There is new groundglass infiltrate in the left mid and lower lung field consistent with pneumonia in appropriate clinical setting. New small left pleural effusion and trace right pleural effusion.  Impression: As above  < end of copied text >        < from: CT Chest No Cont (18 @ 08:30) >  EXAM:  CT CHEST                        PROCEDURE DATE:  2018    INTERPRETATION:  CLINICAL INFORMATION: Smoker with severe cough, found to have hyponatremia. Rule out lung mass.  COMPARISON: None.  PROCEDURE: CT of the Chest was performed without intravenous contrast. Sagittal and coronal reformats were performed.    FINDINGS:  CHEST:   LUNGS AND LARGE AIRWAYS: Patent central airways.  Emphysema. Subsegmental right middle lobe atelectasis. 2 mm right upper lobe nodules (example series 2, image 57). Interlobular septal thickening suggestive of edema.  PLEURA: Small bilateral pleural effusions.  VESSELS: Atherosclerotic ossifications of the aorta and coronary arteries.  HEART: Heart size is normal. Moderate-sized pericardial effusion.  MEDIASTINUM AND NELLA: Extensive mediastinal adenopathy including large with the conglomerate of paratracheal nodes, difficult to delineate without intravenous contrast. Bilateral hilar adenopathy also identified but difficult to delineate.  CHEST WALL AND LOWER NECK: Multinodular right thyroid lobe.  VISUALIZED UPPER ABDOMEN: Within normal limits.  BONES: No suspicious osseous lesion. Multilevel Schmorl's nodes and loss   of vertebral body height, likely on a chronic basis.    IMPRESSION:   1. Small bilateral pleural effusions, moderate-sized pericardial effusion, and interlobular septal thickening suggestive of edema. Findings suggest volume overload.  2. Extensive mediastinal adenopathy and hilar adenopathy, difficult to delineate without intravenous contrast. No suspicious lung mass identified, though hilar mass cannot be excluded. Consider repeat imaging with contrast.   3. Emphysema. 2 mm right upper lobe nodules for which follow-up CT in 12 months is recommended.  < end of copied text > Patient is a 58y old  Male who presents with a chief complaint of sob (2019 17:05)      HPI:  pt is a 57yo male with PMH of HTN, COPD, cardiac tamponade s/p window 2018 c/o cough x days. Reports non-productive cough with associated PARKS. Pt reports "it feels like something is stuck in my throat". Reports BL LE swelling without pain. Recently admitted for pericardial effusion s/p window. Quit smoking  2018, approx 2 weeks ago.  In ER patient was found to have PNA on CXR. Patient is being admitted for further work up and treatment. (31 Dec 2018 21:51)    Afib diagnosed 2018 (not on AC), COPD, HTN, cardiac tamponade s/p window 2018 discharged from [facility] ~2 weeks ago. At time noted to have mediastinal LAD and concern of malignancy. Pathologic analysis of the pericardial fluid/tissue however did not confirm the suspicion of malignancy. Patient initially had decreased SOB after discharge, but since some progressive sx. Cough persisted, worsening to the point where he asked his wife to bring him back to the ER. Has sensation that something stuck in throat that cannot get up. Developed hoarseness over the last 3 days. No fevers, chills, or rigors. Cough is dry, no CP.     Patient had bronch performed 18 afternoon to r/o malignancy.  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 Bronch revealed revealed irregular mucosal studding of distal trachea and both main stem bronchi with nodular densities of main ricardo. Biopsies, brushings, and lavage performed. After bronch, patient had episode fo a fib with RVR.  Patient seen and examined at bedside for cardiology evaluation. Patient states that he has been told his heart rate is high, however never feels palpitations. Pt does complain that when reclines, or supine has sx of SOB.      Currently, main complaints are cough that cause SOB. Feels as though he has phlegm however is unable to cough it up. +coughing, +new onset PARKS x couple weeks. Previously, no SOB with exertion, no PARKS. Now PARKS after climbing ~1-2 flights of stairs and after coughing. Unable to lay flat due to SOB. Sleeps in chair. Reports increase in swelling in BL LE since not being able to elevate legs because he is unable to lay flat due to SOB. Had stress echo performed in May or 2018 for work, reports that it was "fine." PMD is Dr. Aranda. Does not see a cardiologist. Previously on diuretic for HTN but now only taking Irbesartan and Metoprolol to decrease his HR. Denies having CHF, MI, CVA or cardiac history.      Oncology asked to see pt for eval of lung cancer.   Hx as above. Bronchoscopy as above.   Discussed with pt in detail, dx lung cancer, pending IHC to determine subtype. Possible SCLC, and discussed that can be aggressive. Discussed needs continue monitoring for worsening sx, and will initiate planning to start tx. Pt asked that I speak with wife.     I called and spoke with wife. pt wife reports the she feels pt is more SOB. I discussed since there is some discrepant complaints between pt complaints, and wife reports, to obtain more objective data to determine if new cause for pt sx. Discussed CTA chest to eval further.       PAST MEDICAL & SURGICAL HISTORY:  Dyspnea  Mediastinal adenopathy  Pericardial effusion  AF (atrial fibrillation)  Hyponatremia  COPD (chronic obstructive pulmonary disease)  HTN (hypertension)  S/P pericardial window creation      HEALTH ISSUES - PROBLEM Dx:  Mediastinal adenopathy: Mediastinal adenopathy  AF (atrial fibrillation): AF (atrial fibrillation)  Preventive measure: Preventive measure  Cough: Cough  Hyponatremia: Hyponatremia  PNA (pneumonia): PNA (pneumonia)          FAMILY HISTORY:  No pertinent family history in first degree relatives        [SOCIAL HISTORY: ]     smokinPPD x40yrs, quit 2018     EtOH:  denies     illicit drugs:  denies     occupation:  train operated for [employer]     marital status:   wife Yuly, 2 sons: 31yo Graham, 26yo Javier.      Other:       [ALLERGIES/INTOLERANCES:]  Allergies     penicillin (Unknown)  Intolerances          [MEDICATIONS]  MEDICATIONS  (STANDING):  ALBUTerol/ipratropium for Nebulization 3 milliLiter(s) Nebulizer every 6 hours  buDESOnide   0.5 milliGRAM(s) Respule 0.5 milliGRAM(s) Inhalation two times a day  digoxin     Tablet 0.125 milliGRAM(s) Oral daily  diltiazem    milliGRAM(s) Oral daily  heparin  Injectable 5000 Unit(s) SubCutaneous every 8 hours  influenza   Vaccine 0.5 milliLiter(s) IntraMuscular once  lactobacillus acidophilus 1 Tablet(s) Oral three times a day with meals  loratadine 10 milliGRAM(s) Oral daily  methylPREDNISolone sodium succinate Injectable 40 milliGRAM(s) IV Push three times a day  metoprolol succinate  milliGRAM(s) Oral daily  sodium chloride 1 Gram(s) Oral three times a day  tiotropium 18 MICROgram(s) Capsule 1 Capsule(s) Inhalation daily    MEDICATIONS  (PRN):  ALBUTerol    90 MICROgram(s) HFA Inhaler 2 Puff(s) Inhalation every 6 hours PRN Shortness of Breath  ALPRAZolam 0.25 milliGRAM(s) Oral three times a day PRN anxiety  guaiFENesin    Syrup 200 milliGRAM(s) Oral every 6 hours PRN Cough  promethazine 25 milliGRAM(s) Oral four times a day PRN breakthrough cough        [REVIEW OF SYSTEMS: ]  CONSTITUTIONAL: normal, no fever, no shakes, no chills   EYES: No eye pain, no visual disturbances, no discharge  ENMT:  no discharge  NECK: No pain, no stiffness  BREASTS: No pain, no masses, no nipple discharge  RESPIRATORY: No cough, no wheezing, no chills, no hemoptysis; No shortness of breath  CARDIOVASCULAR: No chest pain, no palpitations, no dizziness, no leg swelling  GASTROINTESTINAL: No abdominal or epigastric pain. No nausea, no vomiting, no hematemesis; No diarrhea , no constipation. No melena, no hematochezia.  GENITOURINARY: No dysuria, no frequency, no hematuria, no incontinence  NEUROLOGICAL: No headaches, no memory loss, no loss of strength, no numbness, no tremors  SKIN: No itching, no burning, no rashes, no lesions   LYMPH NODES: No enlarged glands  ENDOCRINE: No heat or cold intolerance; No hair loss  MUSCULOSKELETAL: No joint pain or swelling; No muscle, no back, no extremity pain  PSYCHIATRIC: No depression, no anxiety, no mood swings, no difficulty sleeping  HEME/LYMPH: No easy bruising, no bleeding gums    [VITALS SIGNS 24hrs]  Vital Signs Last 24 Hrs  T(C): 36.7 (2019 21:10), Max: 36.8 (2019 04:51)  T(F): 98 (2019 21:10), Max: 98.3 (2019 04:51)  HR: 108 (2019 21:10) (96 - 132)  BP: 118/68 (2019 21:10) (118/68 - 150/83)  BP(mean): --  RR: 18 (2019 21:10) (17 - 18)  SpO2: 98% (2019 21:10) (96% - 98%)    [PHYSICAL EXAM]  General: adult in NAD,  WN,  WD.  HEENT: clear oropharynx, anicteric sclera, pink conjunctivae.  Neck: supple, no masses.  CV: irreg, tachy, S1S2, no murmur, no rubs, no gallops.  Lungs: BL trace wheezes, no rales, faint rhonchi.  Abdomen: soft, non-tender, non-distended, no hepatosplenomegaly, normal BS, no guarding.  Ext: no clubbing, no cyanosis, 1+edema to 3/4 up shin  Skin: no rashes,  no petechiae, no venous stasis changes.  Neuro: alert and oriented X3, no focal motor deficits.  LN: no SC STEVE.      [LABS:]                        11.1   10.91 )-----------( 283      ( 2019 10:22 )             32.6     CBC Full  -  ( 2019 10:22 )  WBC Count : 10.91 K/uL  Hemoglobin : 11.1 g/dL  Hematocrit : 32.6 %  Platelet Count - Automated : 283 K/uL  Mean Cell Volume : 81.7 fl  Mean Cell Hemoglobin : 27.8 pg  Mean Cell Hemoglobin Concentration : 34.0 gm/dL  Auto Neutrophil # : x  Auto Lymphocyte # : x  Auto Monocyte # : x  Auto Eosinophil # : x  Auto Basophil # : x  Auto Neutrophil % : x  Auto Lymphocyte % : x  Auto Monocyte % : x  Auto Eosinophil % : x  Auto Basophil % : x        133<L>  |  95<L>  |  20  ----------------------------<  173<H>  4.1   |  29  |  0.61    Ca    8.7      2019 21:04    TPro  6.3  /  Alb  2.5<L>  /  TBili  0.5  /  DBili  x   /  AST  52<H>  /  ALT  55  /  AlkPhos  100        LIVER FUNCTIONS - ( 2019 21:04 )  Alb: 2.5 g/dL / Pro: 6.3 g/dL / ALK PHOS: 100 U/L / ALT: 55 U/L / AST: 52 U/L / GGT: x                   WBC  TREND (5 Days)  WBC Count: 10.91 K/uL ( @ 10:22)  WBC Count: 12.74 K/uL ( @ 08:33)    HGB  TREND (5 Days)  Hemoglobin: 11.1 g/dL ( @ 10:22)  Hemoglobin: 11.3 g/dL ( @ 08:33)    HCT  TREND (5 Days)  Hematocrit: 32.6 % ( @ 10:22)  Hematocrit: 33.7 % ( @ 08:33)    PLT  TREND (5 Days)  Platelet Count - Automated: 283 K/uL ( @ 10:22)  Platelet Count - Automated: 292 K/uL ( @ 08:33)                      [PATHOLOGY]    Cytopathology - Non Gyn Report:   ACCESSION No:  [id number]  Cytopathology Report  Specimen(s) Submitted: Bronchial washings  Clinical History: Suspect small cell lung carcinoma, mediastinal hilar adenopathy, pericardial effusion  Procedure: Bronchoscopy with biopsy, brushings x3, washings.  Gross Description: 60cc cloudy red fluid in 50% alcohol  Final Diagnosis: SUSPICIOUS FOR MALIGNANCY. Scant clusters of markedly atypical cells in the background of  benign bronchial cells, neutrophils, pigmented histiocytes and red blood cells.  Note:  See also concurrent main ricardo biopsy (30-S-19-13).    Screened by: Riccardo Spencer M.D.  Verified by: Riccardo Spencer M.D.  (Electronic Signature)  Reported on: 19 14:27 [facility], [address] [place] [address], [place] [address]  _________________________________________________________________    Comment: Key portions of this case were reviewed in intradepartmental consultation with Dr. JOSUE Vargas, with concurrence of diagnosis. ( @ 13:26)  Cytopathology - Non Gyn Report:   ACCESSION No:  [id number]  Cytopathology Report  Specimen(s) Submitted: Brushings x3 main ricardo  Clinical History: Suspect small cell lung carcinoma, mediastinal hilar adenopathy, pericardial effusion  Procedure: Bronchoscopy with biopsy, brushings x 3, washings  Gross Description: 6 smears  Final Diagnosis: SUSPICIOUS FOR MALIGNANCY. Scant clusters of markedly atypical cells, suspicious for  malignancy, in background of benign bronchial cells, neutrophils, pigmented histiocytes and red blood cells.  Note: See also concurrent main ricardo biopsy ().  Screened by: Riccardo Spencer M.D.  Verified by: Riccardo Spencer M.D.  (Electronic Signature)  Reportedon: 19 14:20 [facility], [address] [place] [id number]  _________________________________________________________________    Comment: Key portions of this case were reviewed in intradepartmental consultation with Dr. JOSUE Vargas, with concurrence of diagnosis. ( @ 13:25)  Cytopathology - Non Gyn Report:   ACCESSION No:  [id number]  Cytopathology Report  Specimen(s) Submitted: Pericardial fluid  Clinical History: Pericardial effusion      Gross Description  80 cc thick mucoid to red/brown fluid in 50% alcohol      Statement of Adequacy  Satisfactory for evaluation, limited by abundant obscuring blood  and suboptimal cellular details.      Final Diagnosis  ATYPICAL FINDINGS.    Few single cells and small clusters of atypical epithelial cells    (some pigmented) are noted in a background of abundant obscuring  blood.    Note: Adequate assessment is precluded by obscuring blood and  suboptimal cellular details.  See corresponding  pathology report, accession # [id number] (12/15/18).    Screened by: Isela Altamirano MD  Verified by: Isela Altamirano MD  (Electronic Signature)  Reported on: 18 15:58 EST, [address] [place] [id number]  _________________________________________________________________ (12-15 @ 06:49)             [RADIOLOGY & ADDITIONAL STUDIES:]    < from: MR Head w/wo IV Cont (18 @ 10:06) >  EXAM:  MR BRAIN WAW IC                        PROCEDURE DATE:  2018    INTERPRETATION:  CLINICAL STATEMENT: Seizure-like activity. Dizziness.  TECHNIQUE: MRI of the brain was performed with and without gadolinium.  COMPARISON: CT head 2018    FINDINGS: There is no abnormal intraparenchymal or leptomeningeal enhancement.  There is mild diffuse parenchymal volume loss. There are T2 prolongation signal abnormalities in the periventricular white matter likely related to mild chronic microvascular ischemic changes.  There is no acute parenchymal hemorrhage, parenchymal mass, mass effect or midline shift. There is no extra-axial fluid collection.  There is no hydrocephalus.  There is no acute infarct.  Retention cyst/polyp maxillary sinuses.    IMPRESSION: No parenchymal mass, acute intracranial hemorrhage or acute infarct.  < end of copied text >          < from: CT Head No Cont (18 @ 07:58) >  EXAM:  CT BRAIN                        PROCEDURE DATE:  2018    INTERPRETATION:  EXAM: CT HEAD WITHOUT CONTRAST.  CLINICAL INDICATION: Seizure. Concern for metastasis.  TECHNIQUE: Noncontrast imaging was performed. Axial, sagittal and coronal scans are reviewed.  PRIOR EXAM: None.    FINDINGS:  The ventricles and cortical sulci are within normal limits. Gray and white matter differentiation is fairly symmetrical. No evidence of any mass effect or extra-axial collection is seen. There are no abnormal intracranial calcifications.  Bony calvarium, visualized mastoids, sinuses and orbits are unremarkable, In view of clinical concern for metastatic disease, contrast-enhanced imaging should be considered.    IMPRESSION: Negative study.  < end of copied text >        < from: Xray Chest 2 Views PA/Lat (18 @ 19:21) >  EXAM:  XR CHEST PA LAT 2V                        PROCEDURE DATE:  2018    INTERPRETATION:  Shortness of breath, cough.  PA lateral. Prior 12/15/2018.  No change heart mediastinum. Superior mediastinal widening similar to priorconsistent with known adenopathy. There is new groundglass infiltrate in the left mid and lower lung field consistent with pneumonia in appropriate clinical setting. New small left pleural effusion and trace right pleural effusion.  Impression: As above  < end of copied text >        < from: CT Chest No Cont (18 @ 08:30) >  EXAM:  CT CHEST                        PROCEDURE DATE:  2018    INTERPRETATION:  CLINICAL INFORMATION: Smoker with severe cough, found to have hyponatremia. Rule out lung mass.  COMPARISON: None.  PROCEDURE: CT of the Chest was performed without intravenous contrast. Sagittal and coronal reformats were performed.    FINDINGS:  CHEST:   LUNGS AND LARGE AIRWAYS: Patent central airways.  Emphysema. Subsegmental right middle lobe atelectasis. 2 mm right upper lobe nodules (example series 2, image 57). Interlobular septal thickening suggestive of edema.  PLEURA: Small bilateral pleural effusions.  VESSELS: Atherosclerotic ossifications of the aorta and coronary arteries.  HEART: Heart size is normal. Moderate-sized pericardial effusion.  MEDIASTINUM AND NELLA: Extensive mediastinal adenopathy including large with the conglomerate of paratracheal nodes, difficult to delineate without intravenous contrast. Bilateral hilar adenopathy also identified but difficult to delineate.  CHEST WALL AND LOWER NECK: Multinodular right thyroid lobe.  VISUALIZED UPPER ABDOMEN: Within normal limits.  BONES: No suspicious osseous lesion. Multilevel Schmorl's nodes and loss   of vertebral body height, likely on a chronic basis.    IMPRESSION:   1. Small bilateral pleural effusions, moderate-sized pericardial effusion, and interlobular septal thickening suggestive of edema. Findings suggest volume overload.  2. Extensive mediastinal adenopathy and hilar adenopathy, difficult to delineate without intravenous contrast. No suspicious lung mass identified, though hilar mass cannot be excluded. Consider repeat imaging with contrast.   3. Emphysema. 2 mm right upper lobe nodules for which follow-up CT in 12 months is recommended.  < end of copied text >        < from: US Duplex Venous Lower Ext Complete, Bilateral (19 @ 18:01) >  EXAM:  US DPLX LWR EXT VEINS COMPL BI                        PROCEDURE DATE:  2019    INTERPRETATION:  CLINICAL INFORMATION: Lower extremity edema.  COMPARISON: Right lower extremity duplex of 2018  TECHNIQUE: Duplex sonography of the BILATERAL LOWER extremities with color and spectral Doppler, with and without compression.      FINDINGS:  There is normal compressibility of the bilateral common femoral, femoral and popliteal veins. No calf vein thrombosis is detected.  Doppler examination shows normal spontaneous and phasic flow.    IMPRESSION: No evidence of bilateral lower extremity deep venous thrombosis.  < end of copied text >        < from: US Duplex Venous Lower Ext Ltd, Right (12.14.18 @ 12:39) >  EXAM:  US DPLX LWR EXT VEINS LTD RT                        PROCEDURE DATE:  2018    INTERPRETATION:    REASON FOR EXAM: Lower extremity edema shortness of breath for several days right leg swelling.  TECHNIQUE:   Gray-scale imaging and Doppler sonographic evaluation, including duplex spectral analysis and qualitative color flow sonography was performed.        COMPARISON:   None.  FINDINGS:  There is spontaneous flow and compression of the right common femoral, superficial femoral and popliteal veins. There is no deep venous thrombus. The visualized segments of the proximal calf veins are patent.  There is normal phasicity and spontaneous flow of the visualized venous system.  The left common femoral vein is patent.    IMPRESSION: No evidence of deep vein thrombosis in the right lower extremity veins.  < end of copied text >

## 2019-01-04 NOTE — PROVIDER CONTACT NOTE (OTHER) - BACKGROUND
Pt admitted with pneumonia conducted a detailed discussion... I had a detailed discussion with the patient and/or guardian regarding the historical points, exam findings, and any diagnostic results supporting the discharge/admit diagnosis.

## 2019-01-04 NOTE — PROGRESS NOTE ADULT - ASSESSMENT
59 yo male admitted 12/31 with Possible pneumonia though WBC not impressive, no fevers  Procalcitonin minimally elevated but not nearly as reliable as one would hope  concerned that his infiltrates represent progression of malignancy more than potential infection  RVP negative  Cx NTD  WBC normal  1/2/19 bronchoscopy c/w malignancy, cytology/bx c/w maligancy.

## 2019-01-04 NOTE — CONSULT NOTE ADULT - ASSESSMENT
[ASSESSMENT and  PLAN]  Lung cancer, on bronchoscopy specimen, favor small cell. IHC is pending to confirm subtype.   Hyponatremia, better to Tolavaptan.     CT scan Head and MRI brain negative for metastasis.     Afib. mild hemoptysis.     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 Mild hyponatremia today. BUN 2, Cr 0.6. No change in Cr.     RECOMMEND:   Await IHC to confirm subtype, as likely response to tx may differ.   Supportive measures.   On nebs, steroids for COPD.     Discussed with IR, plan for chemoport Mon.   DC/Hold ASA in preparation for procedure.   To discuss with cardiology tomorrow re ASA.   Echo performed today.   Discussed recent abn troponin with cardiology. No absolute contraindication for planned chemo.   Likely Carboplatin-VP16 pending further results. Final recommendation pending additional results.     CTA chest to eval STEVE, lung cancer and for other causes of dyspnea.     IVF NS 75-85cc/hr for prevention of contrast nephropathy, per discussion with renal.       I have discussed the above plan of care with patient/family in detail. I spoke with pt wife Yuly in detail later in evening ~8PM. They expressed understanding of the treatment plan . Risks, benefits and alternatives discussed in detail. I have asked if they have any questions or concerns and appropriately addressed them; all questions answered to their satisfactions and in lay terms.     > 60minutes spent in direct patient care, examining and counseling patient,  reviewing  the notes, lab data/ imaging , discussion with multidisciplinary team. [ASSESSMENT and  PLAN]  Lung cancer, on bronchoscopy specimen, with path report favor small cell. IHC is pending to confirm subtype.     Hyponatremia /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 Mild hyponatremia today. BUN 2, Cr 0.6. No change in Cr.     CT scan Head and MRI brain negative for metastasis.   Further staging pending.   Will obtain CTA check to further eval for lung mass and separation of STEVE from mass, pneumonia other causes for dyspnea.      Afib. mild hemoptysis.     Mild anemia, likely due to malignancy.     RECOMMEND:   Await IHC to confirm subtype, as likely response to tx may differ.   Supportive measures.   On nebs, steroids for COPD.     Discussed with IR, plan for chemoport Mon.   DC/Hold ASA in preparation for procedure.   To discuss with cardiology tomorrow re ASA.   HR control per cardiology.   Echo performed today.   Discussed recent abn troponin with cardiology. No absolute contraindication for planned chemo.   Likely Carboplatin-VP16 pending further results. Final recommendation pending additional results.     on SQ heparin for prevention of DVT.   hold SQ Heparin 12hr prior to planned chemoport Mon AM.     CTA chest to eval STEVE, lung cancer and for other causes of dyspnea.     IVF NS 75-85cc/hr for prevention of contrast nephropathy, per discussion with renal.     I have discussed the above plan of care with patient/family in detail. I spoke with pt wife Yuly in detail later in evening ~8PM. They expressed understanding of the treatment plan . Risks, benefits and alternatives discussed in detail. I have asked if they have any questions or concerns and appropriately addressed them; all questions answered to their satisfactions and in lay terms.     > 60minutes spent in direct patient care, examining and counseling patient,  reviewing  the notes, lab data/ imaging , discussion with multidisciplinary team.

## 2019-01-04 NOTE — PROGRESS NOTE ADULT - SUBJECTIVE AND OBJECTIVE BOX
[facility]    Gurpreet Bush, Emily, Corey, Andrae, Sajan, Vida      [phone]    CHIEF COMPLAINT: Patient is a 58y old  Male who presents with a chief complaint of sob (03 Jan 2019 21:52)      Follow Up:  af, s/p peric window    Interim history: noted to be increasingly tachycardic, into the 130s-140s sustained    MEDICATIONS  (STANDING):  ALBUTerol/ipratropium for Nebulization 3 milliLiter(s) Nebulizer every 6 hours  aspirin  chewable 81 milliGRAM(s) Oral daily  buDESOnide   0.5 milliGRAM(s) Respule 0.5 milliGRAM(s) Inhalation two times a day  digoxin     Tablet 0.125 milliGRAM(s) Oral daily  diltiazem    milliGRAM(s) Oral daily  heparin  Injectable 5000 Unit(s) SubCutaneous every 8 hours  influenza   Vaccine 0.5 milliLiter(s) IntraMuscular once  lactobacillus acidophilus 1 Tablet(s) Oral three times a day with meals  loratadine 10 milliGRAM(s) Oral daily  meropenem  IVPB 1000 milliGRAM(s) IV Intermittent every 8 hours  meropenem  IVPB      methylPREDNISolone sodium succinate Injectable 40 milliGRAM(s) IV Push three times a day  metoprolol succinate  milliGRAM(s) Oral daily  sodium chloride 1 Gram(s) Oral three times a day  tiotropium 18 MICROgram(s) Capsule 1 Capsule(s) Inhalation daily    MEDICATIONS  (PRN):  ALBUTerol    90 MICROgram(s) HFA Inhaler 2 Puff(s) Inhalation every 6 hours PRN Shortness of Breath  ALPRAZolam 0.25 milliGRAM(s) Oral three times a day PRN anxiety  guaiFENesin    Syrup 200 milliGRAM(s) Oral every 6 hours PRN Cough  promethazine 25 milliGRAM(s) Oral four times a day PRN breakthrough cough      REVIEW OF SYSTEMS:  eye, ent, GI, , allergic, dermatologic, musculoskeletal and neurologic are negative except as described above    Vital Signs Last 24 Hrs  T(C): 36.8 (04 Jan 2019 04:51), Max: 37.1 (03 Jan 2019 13:55)  T(F): 98.3 (04 Jan 2019 04:51), Max: 98.7 (03 Jan 2019 13:55)  HR: 111 (04 Jan 2019 07:52) (56 - 132)  BP: 138/73 (04 Jan 2019 04:51) (114/72 - 138/73)  BP(mean): --  RR: 18 (04 Jan 2019 04:51) (15 - 18)  SpO2: 98% (04 Jan 2019 07:52) (95% - 99%)    I&O's Summary    03 Jan 2019 07:01  -  04 Jan 2019 07:00  --------------------------------------------------------  IN: 580 mL / OUT: 400 mL / NET: 180 mL        Telemetry past 24h: af rvr    PHYSICAL EXAM:    Constitutional: well-nourished, well-developed, NAD   HEENT:  MMM, sclerae anicteric, conjunctivae clear, no oral cyanosis.  Pulmonary: Non-labored, breath sounds are coarse with rhonchi bilaterally   Cardiovascular: tachy irregular, S1 and S2.  No murmur.  No rubs, gallops or clicks  Gastrointestinal: Bowel Sounds present, soft, nontender.   Lymph: 2-3+peripheral edema.   Neurological: Alert, no focal deficits  Skin: No rashes.  Psych:  Mood & affect appropriate    LABS: All Labs Reviewed:                        11.3   12.74 )-----------( 292      ( 03 Jan 2019 08:33 )             33.7                         11.4   11.72 )-----------( 302      ( 02 Jan 2019 08:36 )             33.9     02 Jan 2019 08:36    137    |  99     |  7      ----------------------------<  121    3.8     |  29     |  0.60     Ca    8.8        02 Jan 2019 08:36            Blood Culture: Organism --  Gram Stain Blood -- Gram Stain --  Specimen Source .Broncial Bronchoalveolar Lavage  Culture-Blood --    Organism --  Gram Stain Blood -- Gram Stain   Few polymorphonuclear leukocytes per low power field  Few Squamous epithelial cells per low power field  Few Gram Variable Cocci seen per oil power field  Few Gram Negative Rods seen per oil power field  Specimen Source .Sputum Sputum  Culture-Blood --    Organism --  Gram Stain Blood -- Gram Stain --  Specimen Source .Blood Blood-Peripheral  Culture-Blood --            RADIOLOGY:    EKG:    Echo:

## 2019-01-04 NOTE — PROGRESS NOTE ADULT - SUBJECTIVE AND OBJECTIVE BOX
NEPHROLOGY INTERVAL HPI/OVERNIGHT EVENTS:  HPI:  pt is a 59yo male with pmhx of htn, copd, cardiac tamponade s/p window 12/2018 c/o cough x days. pt reports non-productive cough with associated dyspnea on exertion. pt reports "it feels like something is stuck in my throat". pt reports bl le swelling without pain. pt recently admitted for pericardial effusion s/p window. pt quit smoking aprox 2 weeks ago.  In ER patient was found to have PNA on CXR.  Patient is being admitted for further work up and treatment. (31 Dec 2018 21:51)    Follow up hyponatremia  No complaints    PAST MEDICAL & SURGICAL HISTORY:  Dyspnea  Mediastinal adenopathy  Pericardial effusion  AF (atrial fibrillation)  Hyponatremia  COPD (chronic obstructive pulmonary disease)  HTN (hypertension)  S/P pericardial window creation      MEDICATIONS  (STANDING):  ALBUTerol/ipratropium for Nebulization 3 milliLiter(s) Nebulizer every 6 hours  aspirin  chewable 81 milliGRAM(s) Oral daily  buDESOnide   0.5 milliGRAM(s) Respule 0.5 milliGRAM(s) Inhalation two times a day  diltiazem    milliGRAM(s) Oral daily  heparin  Injectable 5000 Unit(s) SubCutaneous every 12 hours  influenza   Vaccine 0.5 milliLiter(s) IntraMuscular once  lactobacillus acidophilus 1 Tablet(s) Oral three times a day with meals  loratadine 10 milliGRAM(s) Oral daily  meropenem  IVPB 1000 milliGRAM(s) IV Intermittent every 8 hours  meropenem  IVPB      metoprolol succinate  milliGRAM(s) Oral daily  sodium chloride 1 Gram(s) Oral three times a day  tiotropium 18 MICROgram(s) Capsule 1 Capsule(s) Inhalation daily  vancomycin  IVPB 1750 milliGRAM(s) IV Intermittent every 12 hours    MEDICATIONS  (PRN):  guaiFENesin    Syrup 200 milliGRAM(s) Oral every 6 hours PRN Cough  promethazine 25 milliGRAM(s) Oral four times a day PRN breakthrough cough      Allergies    penicillin (Unknown)    Intolerances        Vital Signs Last 24 Hrs  T(C): 36.4 (03 Jan 2019 05:03), Max: 37.1 (02 Jan 2019 15:27)  T(F): 97.6 (03 Jan 2019 05:03), Max: 98.8 (02 Jan 2019 15:27)  HR: 110 (03 Jan 2019 05:03) (74 - 124)  BP: 116/75 (03 Jan 2019 05:03) (108/63 - 127/80)  BP(mean): --  RR: 18 (03 Jan 2019 05:03) (17 - 31)  SpO2: 96% (03 Jan 2019 05:03) (92% - 100%)  Daily     Daily     PHYSICAL EXAM:  GENERAL: NAD  HEAD:  Atraumatic, Normocephalic  EYES: Conjunctiva and sclera clear  ENMT: Moist mucous membranes, Good dentition, No lesions  NECK: Supple  NERVOUS SYSTEM:  Alert & Oriented X3, Good concentration; Motor Strength wnl upper and lower extremities  CHEST/LUNG: Scattered rhonchi B/L  HEART: Regular rate and rhythm; No murmurs, rubs, or gallops  ABDOMEN: Soft, Nontender, Nondistended; Bowel sounds present  EXTREMITIES: trace Edema  LYMPH: No lymphadenopathy noted  SKIN: No rashes or lesions    LABS:  BMP pending

## 2019-01-04 NOTE — PROGRESS NOTE ADULT - ASSESSMENT
58M pmhx afib diagnosed December 2018 (not on AC), COPD, HTN, cardiac tamponade s/p window 12/2018 discharged from [facility] ~2 weeks for hyponatremia and found to have mediastinal adenopathy and moderate sized pericardial effusion with tamponade physiology with hospital course further complicated by new onset afib with RVR, who presented with worsening SOB and cough and was found to be hyponatremic and have pna. Found to have a malignancy. Patient had bronchoscopy and s/p bronch, patient went into rapid a fib which resolved on own. remains in rapid af now, unclear why not responding appropriately to meds    Recommend:  - s/p bronchoscopy complicated by Afib with RVR.  Remains in Afib with bursts of rapid rate at 140's.  - stat echo does not reveal recurrent pericardial effusion of any signifiocance  - HR will be difficult to control due to ongoing pulmonary process  - Continue Cardizem  mg daily and Toprol  mg daily  - if hr does not improve would consider changing to a dilt gtt  - cont Digoxin   - Continue tele monitor     - B/l LE doppler negative of DVT      - Pulm f/u.  Continue bronchodilators.  Encourage incentive spirometer  - BP well controlled, monitor routine hemodynamics.  - Monitor and replete lytes, keep K>4, Mg>2.    -Other cardiovascular workup will depend on clinical course.  -All other workup per primary team.  -Will continue to follow.    The patient is at risk of abrupt decompensation.  35 minutes of critical care time was spent with this patient.

## 2019-01-05 DIAGNOSIS — J90 PLEURAL EFFUSION, NOT ELSEWHERE CLASSIFIED: ICD-10-CM

## 2019-01-05 LAB
ANION GAP SERPL CALC-SCNC: 10 MMOL/L — SIGNIFICANT CHANGE UP (ref 5–17)
BUN SERPL-MCNC: 18 MG/DL — SIGNIFICANT CHANGE UP (ref 7–23)
CALCIUM SERPL-MCNC: 8.7 MG/DL — SIGNIFICANT CHANGE UP (ref 8.5–10.1)
CHLORIDE SERPL-SCNC: 97 MMOL/L — SIGNIFICANT CHANGE UP (ref 96–108)
CO2 SERPL-SCNC: 28 MMOL/L — SIGNIFICANT CHANGE UP (ref 22–31)
CREAT SERPL-MCNC: 0.57 MG/DL — SIGNIFICANT CHANGE UP (ref 0.5–1.3)
CULTURE RESULTS: SIGNIFICANT CHANGE UP
CULTURE RESULTS: SIGNIFICANT CHANGE UP
GLUCOSE SERPL-MCNC: 174 MG/DL — HIGH (ref 70–99)
POTASSIUM SERPL-MCNC: 4 MMOL/L — SIGNIFICANT CHANGE UP (ref 3.5–5.3)
POTASSIUM SERPL-SCNC: 4 MMOL/L — SIGNIFICANT CHANGE UP (ref 3.5–5.3)
SODIUM SERPL-SCNC: 135 MMOL/L — SIGNIFICANT CHANGE UP (ref 135–145)
SPECIMEN SOURCE: SIGNIFICANT CHANGE UP
SPECIMEN SOURCE: SIGNIFICANT CHANGE UP

## 2019-01-05 PROCEDURE — 71275 CT ANGIOGRAPHY CHEST: CPT | Mod: 26

## 2019-01-05 PROCEDURE — 99233 SBSQ HOSP IP/OBS HIGH 50: CPT

## 2019-01-05 RX ADMIN — HEPARIN SODIUM 5000 UNIT(S): 5000 INJECTION INTRAVENOUS; SUBCUTANEOUS at 05:15

## 2019-01-05 RX ADMIN — SODIUM CHLORIDE 1 GRAM(S): 9 INJECTION INTRAMUSCULAR; INTRAVENOUS; SUBCUTANEOUS at 05:16

## 2019-01-05 RX ADMIN — Medication 0.5 MILLIGRAM(S): at 08:00

## 2019-01-05 RX ADMIN — Medication 3 MILLILITER(S): at 13:54

## 2019-01-05 RX ADMIN — Medication 200 MILLIGRAM(S): at 05:16

## 2019-01-05 RX ADMIN — Medication 3 MILLILITER(S): at 20:24

## 2019-01-05 RX ADMIN — HEPARIN SODIUM 5000 UNIT(S): 5000 INJECTION INTRAVENOUS; SUBCUTANEOUS at 15:26

## 2019-01-05 RX ADMIN — Medication 40 MILLIGRAM(S): at 05:15

## 2019-01-05 RX ADMIN — Medication 200 MILLIGRAM(S): at 15:36

## 2019-01-05 RX ADMIN — ALBUTEROL 2 PUFF(S): 90 AEROSOL, METERED ORAL at 05:30

## 2019-01-05 RX ADMIN — Medication 40 MILLIGRAM(S): at 15:26

## 2019-01-05 RX ADMIN — HEPARIN SODIUM 5000 UNIT(S): 5000 INJECTION INTRAVENOUS; SUBCUTANEOUS at 22:13

## 2019-01-05 RX ADMIN — Medication 1 TABLET(S): at 17:54

## 2019-01-05 RX ADMIN — Medication 3 MILLILITER(S): at 02:25

## 2019-01-05 RX ADMIN — Medication 3 MILLILITER(S): at 08:00

## 2019-01-05 RX ADMIN — Medication 1 TABLET(S): at 12:19

## 2019-01-05 RX ADMIN — Medication 360 MILLIGRAM(S): at 05:16

## 2019-01-05 RX ADMIN — SODIUM CHLORIDE 1 GRAM(S): 9 INJECTION INTRAMUSCULAR; INTRAVENOUS; SUBCUTANEOUS at 22:13

## 2019-01-05 RX ADMIN — Medication 0.5 MILLIGRAM(S): at 20:25

## 2019-01-05 RX ADMIN — Medication 1 TABLET(S): at 08:54

## 2019-01-05 RX ADMIN — Medication 40 MILLIGRAM(S): at 22:13

## 2019-01-05 RX ADMIN — LORATADINE 10 MILLIGRAM(S): 10 TABLET ORAL at 12:20

## 2019-01-05 RX ADMIN — SODIUM CHLORIDE 83.33 MILLILITER(S): 9 INJECTION INTRAMUSCULAR; INTRAVENOUS; SUBCUTANEOUS at 00:15

## 2019-01-05 RX ADMIN — SODIUM CHLORIDE 1 GRAM(S): 9 INJECTION INTRAMUSCULAR; INTRAVENOUS; SUBCUTANEOUS at 15:24

## 2019-01-05 RX ADMIN — Medication 0.12 MILLIGRAM(S): at 05:17

## 2019-01-05 NOTE — PROGRESS NOTE ADULT - ASSESSMENT
58M pmhx afib diagnosed December 2018 (not on AC), COPD, HTN, cardiac tamponade s/p window 12/2018 discharged from [facility] ~2 weeks for hyponatremia and found to have mediastinal adenopathy and moderate sized pericardial effusion with tamponade physiology with hospital course further complicated by new onset afib with RVR, who presented with worsening SOB and cough and was found to be hyponatremic and have pna. Found to have a malignancy. Patient had bronchoscopy and s/p bronch, patient went into rapid a fib which resolved on own. remains in rapid af now, unclear why not responding appropriately to meds    Recommend:  - s/p bronchoscopy complicated by Afib with RVR.  Remains in Afib with bursts of rapid rate at 130's.  - stat echo does not reveal recurrent pericardial effusion of any signifiocance  - HR will be difficult to control due to ongoing pulmonary process  - Continue  Toprol  mg daily  -Pt received Cardizem  mg daily this AM at 0500 still uncontrolled HR  - Will add cardizem 30 mg po q 6hours for 3 doses then starting for 6 AM 1/6/19 start Cardizem 120 mg PO every 6 hours   - cont Digoxin   - Continue tele monitor     - B/l LE doppler negative of DVT      - Pulm f/u.  Continue bronchodilators.  Encourage incentive spirometer  - BP well controlled, monitor routine hemodynamics.  - Monitor and replete lytes, keep K>4, Mg>2.    -Other cardiovascular workup will depend on clinical course.  -All other workup per primary team.  -Will continue to follow.    Jermaine Dave [facility]  Cardiology 58M pmhx afib diagnosed December 2018 (not on AC), COPD, HTN, cardiac tamponade s/p window 12/2018 discharged from [facility] ~2 weeks for hyponatremia and found to have mediastinal adenopathy and moderate sized pericardial effusion with tamponade physiology with hospital course further complicated by new onset afib with RVR, who presented with worsening SOB and cough and was found to be hyponatremic and have pna. Found to have a malignancy. Patient had bronchoscopy and s/p bronch, patient went into rapid a fib which resolved on own. remains in rapid af now, unclear why not responding appropriately to meds    Recommend:  - s/p bronchoscopy complicated by Afib with RVR.  Remains in Afib with bursts of rapid rate at 130's.  - stat echo does not reveal recurrent pericardial effusion of any signifiocance  - HR may be difficult to control due to ongoing pulmonary process  - Continue  Toprol  mg daily  -Pt received Cardizem  mg daily this AM at 0500 still uncontrolled HR  - Will add cardizem 30 mg po q 6hours for 3 doses then starting for 6 AM 1/6/19 start Cardizem 120 mg PO every 6 hours   - cont Digoxin   - Continue tele monitor     - B/l LE doppler negative of DVT      - Pulm f/u.  Continue bronchodilators.  Encourage incentive spirometer  - BP well controlled, monitor routine hemodynamics.  - Monitor and replete lytes, keep K>4, Mg>2.    -Other cardiovascular workup will depend on clinical course.  -All other workup per primary team.  -Will continue to follow.    Jermaine Dave [facility]  Cardiology

## 2019-01-05 NOTE — PROGRESS NOTE ADULT - SUBJECTIVE AND OBJECTIVE BOX
Patient is a 58y old  Male who presents with a chief complaint of sob (05 Jan 2019 11:23)      INTERVAL HPI/OVERNIGHT EVENTS:    Shortness of breath is slightly improved. CAT scan of chest reveals significant left pleural effusion, not present on previous CAT scan of 3 weeks ago. There is marked narrowing of both main stem bronchi, with almost complete obstruction of left main stem bronchus    MEDICATIONS  (STANDING):  ALBUTerol/ipratropium for Nebulization 3 milliLiter(s) Nebulizer every 6 hours  buDESOnide   0.5 milliGRAM(s) Respule 0.5 milliGRAM(s) Inhalation two times a day  digoxin     Tablet 0.125 milliGRAM(s) Oral daily  diltiazem    Tablet 30 milliGRAM(s) Oral every 6 hours  heparin  Injectable 5000 Unit(s) SubCutaneous every 8 hours  influenza   Vaccine 0.5 milliLiter(s) IntraMuscular once  lactobacillus acidophilus 1 Tablet(s) Oral three times a day with meals  loratadine 10 milliGRAM(s) Oral daily  methylPREDNISolone sodium succinate Injectable 40 milliGRAM(s) IV Push three times a day  metoprolol succinate  milliGRAM(s) Oral daily  sodium chloride 1 Gram(s) Oral three times a day  tiotropium 18 MICROgram(s) Capsule 1 Capsule(s) Inhalation daily      MEDICATIONS  (PRN):  ALBUTerol    90 MICROgram(s) HFA Inhaler 2 Puff(s) Inhalation every 6 hours PRN Shortness of Breath  ALPRAZolam 0.25 milliGRAM(s) Oral three times a day PRN anxiety  guaiFENesin    Syrup 200 milliGRAM(s) Oral every 6 hours PRN Cough  promethazine 25 milliGRAM(s) Oral four times a day PRN breakthrough cough      Allergies    penicillin (Unknown)    Intolerances        PAST MEDICAL & SURGICAL HISTORY:  Dyspnea  Mediastinal adenopathy  Pericardial effusion  AF (atrial fibrillation)  Hyponatremia  COPD (chronic obstructive pulmonary disease)  HTN (hypertension)  S/P pericardial window creation      Vital Signs Last 24 Hrs  T(C): 37.3 (05 Jan 2019 08:00), Max: 37.3 (05 Jan 2019 08:00)  T(F): 99.1 (05 Jan 2019 08:00), Max: 99.1 (05 Jan 2019 08:00)  HR: 120 (05 Jan 2019 09:34) (94 - 134)  BP: 109/75 (05 Jan 2019 08:00) (109/75 - 123/75)  BP(mean): --  RR: 18 (05 Jan 2019 08:00) (18 - 20)  SpO2: 96% (05 Jan 2019 08:00) (96% - 98%)    PHYSICAL EXAMINATION:    GENERAL: The patient is awake and alert in no apparent distress.     HEENT: Head is normocephalic and atraumatic. Extraocular muscles are intact. Mucous membranes are moist.    NECK: Supple.    LUNGS: bilateral wheezes and rhonchi    HEART: Regular rate and rhythm without murmur.    ABDOMEN: Soft, nontender, and nondistended.      EXTREMITIES: Without any cyanosis, clubbing, rash, lesions or edema.    NEUROLOGIC: Grossly intact.    SKIN: No ulceration or induration present.      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

## 2019-01-05 NOTE — PROGRESS NOTE ADULT - SUBJECTIVE AND OBJECTIVE BOX
Patient is a 58y old  Male who presents with a chief complaint of sob (05 Jan 2019 13:59)      INTERVAL /OVERNIGHT EVENTS: feels anxious    MEDICATIONS  (STANDING):  ALBUTerol/ipratropium for Nebulization 3 milliLiter(s) Nebulizer every 6 hours  buDESOnide   0.5 milliGRAM(s) Respule 0.5 milliGRAM(s) Inhalation two times a day  digoxin     Tablet 0.125 milliGRAM(s) Oral daily  diltiazem    Tablet 30 milliGRAM(s) Oral every 6 hours  heparin  Injectable 5000 Unit(s) SubCutaneous every 8 hours  influenza   Vaccine 0.5 milliLiter(s) IntraMuscular once  lactobacillus acidophilus 1 Tablet(s) Oral three times a day with meals  loratadine 10 milliGRAM(s) Oral daily  methylPREDNISolone sodium succinate Injectable 40 milliGRAM(s) IV Push three times a day  metoprolol succinate  milliGRAM(s) Oral daily  sodium chloride 1 Gram(s) Oral three times a day  tiotropium 18 MICROgram(s) Capsule 1 Capsule(s) Inhalation daily    MEDICATIONS  (PRN):  ALBUTerol    90 MICROgram(s) HFA Inhaler 2 Puff(s) Inhalation every 6 hours PRN Shortness of Breath  ALPRAZolam 0.25 milliGRAM(s) Oral three times a day PRN anxiety  guaiFENesin    Syrup 200 milliGRAM(s) Oral every 6 hours PRN Cough  promethazine 25 milliGRAM(s) Oral four times a day PRN breakthrough cough      Allergies    penicillin (Unknown)    Intolerances        REVIEW OF SYSTEMS:  CONSTITUTIONAL: No fever, weight loss, or fatigue  EYES: No eye pain, visual disturbances, or discharge  ENMT:  No difficulty hearing, tinnitus, vertigo; No sinus or throat pain  NECK: No pain or stiffness  RESPIRATORY: No cough, wheezing, chills or hemoptysis; No shortness of breath  CARDIOVASCULAR: No chest pain, palpitations, dizziness, or leg swelling  GASTROINTESTINAL: No abdominal or epigastric pain. No nausea, vomiting, or hematemesis; No diarrhea or constipation. No melena or hematochezia.  GENITOURINARY: No dysuria, frequency, hematuria, or incontinence  NEUROLOGICAL: No headaches, memory loss, loss of strength, numbness, or tremors  SKIN: No itching, burning, rashes, or lesions   LYMPH NODES: No enlarged glands  ENDOCRINE: No heat or cold intolerance; No hair loss; No polydipsia or polyuria  MUSCULOSKELETAL: No joint pain or swelling; No muscle, back, or extremity pain  PSYCHIATRIC: No depression, +anxiety, mood swings, or difficulty sleeping  HEME/LYMPH: No easy bruising, or bleeding gums  ALLERGY AND IMMUNOLOGIC: No hives or eczema    Vital Signs Last 24 Hrs  T(C): 36.6 (05 Jan 2019 14:18), Max: 37.3 (05 Jan 2019 08:00)  T(F): 97.9 (05 Jan 2019 14:18), Max: 99.1 (05 Jan 2019 08:00)  HR: 131 (05 Jan 2019 17:59) (69 - 134)  BP: 121/75 (05 Jan 2019 17:59) (109/75 - 123/75)  BP(mean): --  RR: 18 (05 Jan 2019 14:18) (18 - 20)  SpO2: 97% (05 Jan 2019 14:18) (96% - 98%)    PHYSICAL EXAM:  GENERAL: NAD, well-groomed, well-developed  HEAD:  Atraumatic, Normocephalic  EYES: EOMI, PERRLA, conjunctiva and sclera clear  ENMT: No tonsillar erythema, exudates, or enlargement; Moist mucous membranes, Good dentition, No lesions  NECK: Supple, No JVD, Normal thyroid  NERVOUS SYSTEM:  Alert & Oriented X3, Good concentration; Motor Strength 5/5 B/L upper and lower extremities; DTRs 2+ intact and symmetric  CHEST/LUNG: Clear to auscultation bilaterally; No rales, rhonchi, wheezing, or rubs  HEART: Regular rate and rhythm; No murmurs, rubs, or gallops  ABDOMEN: Soft, Nontender, Nondistended; Bowel sounds present  EXTREMITIES:  2+ Peripheral Pulses, No clubbing, cyanosis, or edema  LYMPH: No lymphadenopathy noted  SKIN: No rashes or lesions    LABS:    05 Jan 2019 10:08    135    |  97     |  18     ----------------------------<  174    4.0     |  28     |  0.57     Ca    8.7        05 Jan 2019 10:08    TPro  6.3    /  Alb  2.5    /  TBili  0.5    /  DBili  x      /  AST  52     /  ALT  55     /  AlkPhos  100    04 Jan 2019 21:04        CAPILLARY BLOOD GLUCOSE          RADIOLOGY & ADDITIONAL TESTS:    Notes Reviewed:  [x ] YES  [ ] NO    Care Discussed with Consultants/Other Providers [x ] YES  [ ] NO

## 2019-01-05 NOTE — PROVIDER CONTACT NOTE (MEDICATION) - ACTION/TREATMENT ORDERED:
Provider Dr. Nicole changed medication to Diltiazem 120mg after Diltiazem CD 360mg was already given. Per MD cancel new order for Diltiazem 120mg until he change plan for medications.
Hold dose at this time. Will follow up with cardiology.

## 2019-01-05 NOTE — PROGRESS NOTE ADULT - SUBJECTIVE AND OBJECTIVE BOX
[facility] -- Gurpreet Bush, Emily, Corey, Sajan Abebe Savella  Office # [phone]      Follow Up:    af, s/p peric window  Subjective/Observations:   No events overnight resting comfortably in bed.  No complaints of chest pain, dyspnea, or palpitations reported. + orthopnea or PND.     REVIEW OF SYSTEMS: All other review of systems is negative unless indicated above    PAST MEDICAL & SURGICAL HISTORY:  Dyspnea  Mediastinal adenopathy  Pericardial effusion  AF (atrial fibrillation)  Hyponatremia  COPD (chronic obstructive pulmonary disease)  HTN (hypertension)  S/P pericardial window creation      MEDICATIONS  (STANDING):  ALBUTerol/ipratropium for Nebulization 3 milliLiter(s) Nebulizer every 6 hours  buDESOnide   0.5 milliGRAM(s) Respule 0.5 milliGRAM(s) Inhalation two times a day  digoxin     Tablet 0.125 milliGRAM(s) Oral daily  heparin  Injectable 5000 Unit(s) SubCutaneous every 8 hours  influenza   Vaccine 0.5 milliLiter(s) IntraMuscular once  lactobacillus acidophilus 1 Tablet(s) Oral three times a day with meals  loratadine 10 milliGRAM(s) Oral daily  methylPREDNISolone sodium succinate Injectable 40 milliGRAM(s) IV Push three times a day  metoprolol succinate  milliGRAM(s) Oral daily  sodium chloride 1 Gram(s) Oral three times a day  tiotropium 18 MICROgram(s) Capsule 1 Capsule(s) Inhalation daily    MEDICATIONS  (PRN):  ALBUTerol    90 MICROgram(s) HFA Inhaler 2 Puff(s) Inhalation every 6 hours PRN Shortness of Breath  ALPRAZolam 0.25 milliGRAM(s) Oral three times a day PRN anxiety  guaiFENesin    Syrup 200 milliGRAM(s) Oral every 6 hours PRN Cough  promethazine 25 milliGRAM(s) Oral four times a day PRN breakthrough cough      Allergies    penicillin (Unknown)    Intolerances        Vital Signs Last 24 Hrs  T(C): 37.3 (05 Jan 2019 08:00), Max: 37.3 (05 Jan 2019 08:00)  T(F): 99.1 (05 Jan 2019 08:00), Max: 99.1 (05 Jan 2019 08:00)  HR: 120 (05 Jan 2019 09:34) (94 - 134)  BP: 109/75 (05 Jan 2019 08:00) (109/75 - 150/83)  BP(mean): --  RR: 18 (05 Jan 2019 08:00) (17 - 20)  SpO2: 97% (05 Jan 2019 08:00) (97% - 98%)    I&O's Summary    04 Jan 2019 07:01  -  05 Jan 2019 07:00  --------------------------------------------------------  IN: 720 mL / OUT: 0 mL / NET: 720 mL          PHYSICAL EXAM:  TELE: Afib @ 138  Constitutional: NAD, awake and alert, well-developed  HEENT: Moist Mucous Membranes, Anicteric  Pulmonary:  Non-labored, breath sounds bilateral rhonchi throughout diminished at bases , No wheezing, crackles .   Cardiovascular: Irregular, S1 and S2 nl, No murmurs, rubs, gallops or clicks  Gastrointestinal: Bowel Sounds present, soft, nontender.   Lymph: No lymphadenopathy. + bilat LE peripheral edema.  Skin: No visible rashes or ulcers.  Psych:  Mood & affect appropriate    LABS: All Labs Reviewed:                        11.1   10.91 )-----------( 283      ( 04 Jan 2019 10:22 )             32.6                         11.3   12.74 )-----------( 292      ( 03 Jan 2019 08:33 )             33.7     05 Jan 2019 10:08    135    |  97     |  18     ----------------------------<  174    4.0     |  28     |  0.57   04 Jan 2019 21:04    133    |  95     |  20     ----------------------------<  173    4.1     |  29     |  0.61     Ca    8.7        05 Jan 2019 10:08  Ca    8.7        04 Jan 2019 21:04    TPro  6.3    /  Alb  2.5    /  TBili  0.5    /  DBili  x      /  AST  52     /  ALT  55     /  AlkPhos  100    04 Jan 2019 21:04             ECG:  < from: 12 Lead ECG (01.03.19 @ 18:24) >  Ventricular Rate 130 BPM    Atrial Rate 174 BPM    QRS Duration 84 ms    Q-T Interval 306 ms    QTC Calculation(Bezet) 450 ms    R Axis 60 degrees    T Axis 237 degrees    Diagnosis Line Atrial fibrillation  Nonspecific ST and T wave abnormality , probably digitalis effect  Abnormal ECG    Confirmed by Bassam Sherman MD ([id number]) on 1/4/2019 9:21:16 AM    < end of copied text >    Echo:  < from: TTE Echo Doppler w/o Cont (01.04.19 @ 09:32) >  EXAM:  ECHO TTE WO CON COMP W DOPPLR         PROCEDURE DATE:  01/04/2019        INTERPRETATION:  INDICATION: Atrial fibrillation, pericardial effusion    Blood Pressure 138/73    Height 185     Weight 111       BSA 2.3    Dimensions:    LA 4.4  Normal Values: 2.0 - 4.0 cm    Ao 4.0        Normal Values: 2.0 - 3.8 cm  SEPTUM           Normal Values: 0.6 - 1.2 cm  PWT           Normal Values: 0.6 - 1.1 cm  LVIDd             Normal Values: 3.0 - 5.6 cm  LVIDs             Normal Values: 1.8 -4.0 cm    Derived Variables:  LVMI     g/m2  RWT      Fractional Short      Ejection Fraction        Doppler Peak v. AoV=   (m/sec)    OBSERVATIONS:    This is a technically limited study with suboptimal endocardial   definition. Within these limitations, the left ventricle appears to be   normal in size with overall normal systolic function. The right ventricle   is not well seen but is probably normal in size with normal systolic   function. Biatrial enlargement. The aortic root is mildly dilated. The   mitral valve is not well seen. There is trace physiologic MR noted. Other   valves appear structurally normal as visualized. Inadequate TR jet to   estimate PA systolic pressure. No significant pericardial effusion. Left   pleural effusion. The patient was in rapid atrial fibrillation during   this examination.                  ISAC FLORES M.D., ATTENDING CARDIOLOGIST  This document has been electronically signed. Jan 4 2019  3:55PM                < end of copied text >    Radiology:  < from: CT Angio Chest w/ IV Cont (01.05.19 @ 00:27) >  EXAM:  CT ANGIO CHEST (W)AW IC                            PROCEDURE DATE:  01/05/2019          INTERPRETATION:  [facility] RADIOLOGIST PRELIMINARY REPORT    EXAM:    CT Angiography Chest Without And With Contrast     EXAM DATE/TIME:    1/4/2019 11:45 PM     CLINICAL HISTORY:    58 years old, male; Signs and symptoms; Shortness of breath; Prior   surgery;   Surgery date: 3-7 days post-operative. History of lung cancer.     TECHNIQUE:    Axial computed tomographic angiography images of the chest without and   with intravenous contrast using CT angiography protocol.    Coronal and sagittal reformatted images were created and reviewed.    MIP reconstructed images were created and reviewed.     CONTRAST:    95 ml of omnipaque administered intravenously.     COMPARISON:    CT CHEST 12/13/2018 8:13 AM     FINDINGS:    Pulmonary arteries:  No evidence of pulmonary embolus.    Aorta:  Aorta is unremarkable. No evidence of dissection. No evidence of   aneurysm.    Other veins:  There is extensive mass effect from mediastinal mass   lesions on   superior aspect of left atrium with complete nonvisualization of left   superior   pulmonary vein and moderate to severe stenosis of left inferior pulmonary   vein.    Lungs:  There is moderate narrowing of bilateral mainstem bronchi and   left   upper and lower lobe bronchi secondary to mass effect from mediastinal   and   hilar mass lesions.    Pleural space:  Moderate to large left pleural effusion. Small right   pleural   effusion. Left lower lobe and lingular infiltrates. Large dense left   upper lobe   infiltrate.    Heart: Normal. No cardiomegaly. No pericardial effusion.    Mediastinum:  There is complete collapse of mid esophagus secondary to   mass   effect from mediastinal metastatic disease.    Lymph nodes:  Multiple very enlarged mediastinal and hilar lymph nodes   consistent with metastatic disease.    Bones/joints: Unremarkable. No acute fracture.    Soft tissues: Unremarkable.     IMPRESSION:   1. Multiple very enlarged mediastinal and hilar lymph nodes consistent   with metastatic disease.   2. There is moderate narrowing of bilateral mainstem bronchi and left   upper and lower lobe bronchi secondary to mass effect from mediastinal   and hilar mass lesions.   3. Moderate to large left pleural effusion. Small right pleural effusion.   Left lower lobe and lingular infiltrates. Large dense left upper lobe   infiltrate. Superimposed pneumonia or pulmonary edema is considered.  4. There is complete collapse of mid esophagus secondary to mass effect   from mediastinal metastatic disease.   5. There is extensive mass effect from mediastinal mass lesions on   superior aspect of left atrium with complete nonvisualization of left   superior pulmonary vein and moderate to severestenosis of left inferior   pulmonary vein.   6. No evidence of pulmonary arterial embolus.    AGREE WITH ABOVE FINDINGS                JUAN GARCIA M.D., ATTENDING RADIOLOGIST  This document has been electronically signed. Jan 5 2019  9:07AM          < end of copied text >  < from: US Duplex Venous Lower Ext Complete, Bilateral (01.02.19 @ 18:01) >  EXAM:  US DPLX LWR EXT VEINS COMPL BI                            PROCEDURE DATE:  01/02/2019          INTERPRETATION:  CLINICAL INFORMATION: Lower extremity edema.    COMPARISON: Right lower extremity duplex of 12/14/2018    TECHNIQUE: Duplex sonography of the BILATERAL LOWER extremities with   color and spectral Doppler, with and without compression.      FINDINGS:    There is normal compressibility of the bilateral common femoral, femoral   and popliteal veins. No calf vein thrombosis is detected.    Doppler examination shows normal spontaneous and phasic flow.    IMPRESSION:     No evidence of bilateral lower extremity deep venous thrombosis.                    KIANA MATA M.D., ATTENDING RADIOLOGIST  This document has been electronically signed. Jan 2 2019  6:06PM    < end of copied text >           Jermaine Dave ANP   Cardiology

## 2019-01-05 NOTE — PROGRESS NOTE ADULT - ASSESSMENT
57 y/o man w SOB, bulky mediastinal and hilar LADs, left lung compromise, left effusion, post bronch w pathology sig for malignancy, favor small cell, IHC pending for confirmation  Hyponatremia /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 Mild hyponatremia today. BUN 2, Cr 0.6. No change in Cr.   A fib, mild hemoptysis  CT scan Head and MRI brain negative for metastasis.   Further staging pending.       CTA chest no PE, findings as above  await final pathology result for chemotherapy planning, small cell vs nonsmall cell type  will start Creatinine Clearance in prep for possible Carboplatinum based chemotherapy    discussed w pt

## 2019-01-05 NOTE — PROGRESS NOTE ADULT - ASSESSMENT
Hyponatremia  SIADH  PNA  Hemoptysis       - Known to our group from last hospitalization with SIADH. He was sent home with NaCl tabs. He claimed to be compliant with salt tablets but not fluid restriction. No hypertonic saline needed. S/p tolvaptan 15 mg po x 1   - Renal indices are stable; sodium levels at a stable range; will follow up BMP and adjust management as needed  - Continue NaCl tabs. Fluid restriction as ordered; will consider low dose lasix if sodium worsens  - S/p CTA showing no PE  - S/p Bronch on 1/2/18  - Abx   - Monitor chemistries    Thank you

## 2019-01-05 NOTE — PROVIDER CONTACT NOTE (CHANGE IN STATUS NOTIFICATION) - ACTION/TREATMENT ORDERED:
Dr. Nicole called. Awaiting further interventions. Dr. Nicole called. Awaiting further interventions.  Spoke to Dr. Nicole, he is aware of patient's heart in the 130's and 140's. awaitng further interventions.

## 2019-01-05 NOTE — PROGRESS NOTE ADULT - SUBJECTIVE AND OBJECTIVE BOX
All interim records and events noted.    asleep sitting up in chair, readily arousable, alert, awake, same SOB, "OK", not worsse      MEDICATIONS  (STANDING):  ALBUTerol/ipratropium for Nebulization 3 milliLiter(s) Nebulizer every 6 hours  buDESOnide   0.5 milliGRAM(s) Respule 0.5 milliGRAM(s) Inhalation two times a day  digoxin     Tablet 0.125 milliGRAM(s) Oral daily  heparin  Injectable 5000 Unit(s) SubCutaneous every 8 hours  influenza   Vaccine 0.5 milliLiter(s) IntraMuscular once  lactobacillus acidophilus 1 Tablet(s) Oral three times a day with meals  loratadine 10 milliGRAM(s) Oral daily  methylPREDNISolone sodium succinate Injectable 40 milliGRAM(s) IV Push three times a day  metoprolol succinate  milliGRAM(s) Oral daily  sodium chloride 1 Gram(s) Oral three times a day  tiotropium 18 MICROgram(s) Capsule 1 Capsule(s) Inhalation daily    MEDICATIONS  (PRN):  ALBUTerol    90 MICROgram(s) HFA Inhaler 2 Puff(s) Inhalation every 6 hours PRN Shortness of Breath  ALPRAZolam 0.25 milliGRAM(s) Oral three times a day PRN anxiety  guaiFENesin    Syrup 200 milliGRAM(s) Oral every 6 hours PRN Cough  promethazine 25 milliGRAM(s) Oral four times a day PRN breakthrough cough      Vital Signs Last 24 Hrs  T(C): 37.3 (05 Jan 2019 08:00), Max: 37.3 (05 Jan 2019 08:00)  T(F): 99.1 (05 Jan 2019 08:00), Max: 99.1 (05 Jan 2019 08:00)  HR: 120 (05 Jan 2019 09:34) (94 - 134)  BP: 109/75 (05 Jan 2019 08:00) (109/75 - 150/83)  BP(mean): --  RR: 18 (05 Jan 2019 08:00) (17 - 20)  SpO2: 97% (05 Jan 2019 08:00) (97% - 98%)    PHYSICAL EXAM  General: well developed  well nourished, in no acute distress  Head: atraumatic, normocephalic  ENT: sclera anicteric, buccal mucosa moist  Neck: supple, trachea midline  CV: S1 S2, regular rate and rhythm  Lungs: leona wheeze and rhonchi, breath sounds R>L  Abdomen: soft, nontender, bowel sounds present, pouchy  Extrem: 1-2+ leona legs edema to knees  Skin: no significant increased ecchymosis/petechiae  Neuro: alert and oriented X3,  no focal deficits      LABS:             11.1   10.91 )-----------( 283      ( 01-04 @ 10:22 )             32.6                11.3   12.74 )-----------( 292      ( 01-03 @ 08:33 )             33.7       01-05    135  |  97  |  18  ----------------------------<  174<H>  4.0   |  28  |  0.57    Ca    8.7      05 Jan 2019 10:08    TPro  6.3  /  Alb  2.5<L>  /  TBili  0.5  /  DBili  x   /  AST  52<H>  /  ALT  55  /  AlkPhos  100  01-04        RADIOLOGY & ADDITIONAL STUDIES:  < from: CT Angio Chest w/ IV Cont (01.05.19 @ 00:27) >    EXAM:  CT ANGIO CHEST (W)AW IC                            PROCEDURE DATE:  01/05/2019          INTERPRETATION:  [facility] RADIOLOGIST PRELIMINARY REPORT    EXAM:    CT Angiography Chest Without And With Contrast     EXAM DATE/TIME:    1/4/2019 11:45 PM     CLINICAL HISTORY:    58 years old, male; Signs and symptoms; Shortness of breath; Prior   surgery;   Surgery date: 3-7 days post-operative. History of lung cancer.     TECHNIQUE:    Axial computed tomographic angiography images of the chest without and   with intravenous contrast using CT angiography protocol.    Coronal and sagittal reformatted images were created and reviewed.    MIP reconstructed images were created and reviewed.     CONTRAST:    95 ml of omnipaque administered intravenously.     COMPARISON:    CT CHEST 12/13/2018 8:13 AM     FINDINGS:    Pulmonary arteries:  No evidence of pulmonary embolus.    Aorta:  Aorta is unremarkable. No evidence of dissection. No evidence of   aneurysm.    Other veins:  There is extensive mass effect from mediastinal mass   lesions on   superior aspect of left atrium with complete nonvisualization of left   superior   pulmonary vein and moderate to severe stenosis of left inferior pulmonary   vein.    Lungs:  There is moderate narrowing of bilateral mainstem bronchi and   left   upper and lower lobe bronchi secondary to mass effect from mediastinal   and   hilar mass lesions.    Pleural space:  Moderate to large left pleural effusion. Small right   pleural   effusion. Left lower lobe and lingular infiltrates. Large dense left   upper lobe   infiltrate.    Heart: Normal. No cardiomegaly. No pericardial effusion.    Mediastinum:  There is complete collapse of mid esophagus secondary to   mass   effect from mediastinal metastatic disease.    Lymph nodes:  Multiple very enlarged mediastinal and hilar lymph nodes   consistent with metastatic disease.    Bones/joints: Unremarkable. No acute fracture.    Soft tissues: Unremarkable.     IMPRESSION:   1. Multiple very enlarged mediastinal and hilar lymph nodes consistent   with metastatic disease.   2. There is moderate narrowing of bilateral mainstem bronchi and left   upper and lower lobe bronchi secondary to mass effect from mediastinal   and hilar mass lesions.   3. Moderate to large left pleural effusion. Small right pleural effusion.   Left lower lobe and lingular infiltrates. Large dense left upper lobe   infiltrate. Superimposed pneumonia or pulmonary edema is considered.  4. There is complete collapse of mid esophagus secondary to mass effect   from mediastinal metastatic disease.   5. There is extensive mass effect from mediastinal mass lesions on   superior aspect of left atrium with complete nonvisualization of left   superior pulmonary vein and moderate to severestenosis of left inferior   pulmonary vein.   6. No evidence of pulmonary arterial embolus.    AGREE WITH ABOVE FINDINGS    < end of copied text >      IMPRESSION/RECOMMENDATIONS:

## 2019-01-06 LAB
APTT BLD: 26.5 SEC — LOW (ref 27.5–36.3)
INR BLD: 1.34 RATIO — HIGH (ref 0.88–1.16)
PROTHROM AB SERPL-ACNC: 15.3 SEC — HIGH (ref 10–12.9)

## 2019-01-06 PROCEDURE — 99233 SBSQ HOSP IP/OBS HIGH 50: CPT

## 2019-01-06 RX ORDER — VERAPAMIL HCL 240 MG
40 CAPSULE, EXTENDED RELEASE PELLETS 24 HR ORAL EVERY 8 HOURS
Qty: 0 | Refills: 0 | Status: DISCONTINUED | OUTPATIENT
Start: 2019-01-06 | End: 2019-01-08

## 2019-01-06 RX ORDER — DIGOXIN 250 MCG
0.25 TABLET ORAL DAILY
Qty: 0 | Refills: 0 | Status: DISCONTINUED | OUTPATIENT
Start: 2019-01-06 | End: 2019-01-15

## 2019-01-06 RX ADMIN — Medication 0.25 MILLIGRAM(S): at 13:25

## 2019-01-06 RX ADMIN — Medication 200 MILLIGRAM(S): at 08:27

## 2019-01-06 RX ADMIN — SODIUM CHLORIDE 1 GRAM(S): 9 INJECTION INTRAMUSCULAR; INTRAVENOUS; SUBCUTANEOUS at 06:40

## 2019-01-06 RX ADMIN — Medication 25 MILLIGRAM(S): at 12:08

## 2019-01-06 RX ADMIN — Medication 40 MILLIGRAM(S): at 21:14

## 2019-01-06 RX ADMIN — Medication 40 MILLIGRAM(S): at 21:13

## 2019-01-06 RX ADMIN — Medication 0.25 MILLIGRAM(S): at 06:41

## 2019-01-06 RX ADMIN — LORATADINE 10 MILLIGRAM(S): 10 TABLET ORAL at 12:16

## 2019-01-06 RX ADMIN — SODIUM CHLORIDE 1 GRAM(S): 9 INJECTION INTRAMUSCULAR; INTRAVENOUS; SUBCUTANEOUS at 21:13

## 2019-01-06 RX ADMIN — HEPARIN SODIUM 5000 UNIT(S): 5000 INJECTION INTRAVENOUS; SUBCUTANEOUS at 13:25

## 2019-01-06 RX ADMIN — Medication 3 MILLILITER(S): at 19:55

## 2019-01-06 RX ADMIN — Medication 1 TABLET(S): at 12:08

## 2019-01-06 RX ADMIN — Medication 3 MILLILITER(S): at 07:23

## 2019-01-06 RX ADMIN — Medication 40 MILLIGRAM(S): at 13:25

## 2019-01-06 RX ADMIN — Medication 0.5 MILLIGRAM(S): at 19:56

## 2019-01-06 RX ADMIN — Medication 3 MILLILITER(S): at 01:44

## 2019-01-06 RX ADMIN — HEPARIN SODIUM 5000 UNIT(S): 5000 INJECTION INTRAVENOUS; SUBCUTANEOUS at 06:42

## 2019-01-06 RX ADMIN — SODIUM CHLORIDE 1 GRAM(S): 9 INJECTION INTRAMUSCULAR; INTRAVENOUS; SUBCUTANEOUS at 13:25

## 2019-01-06 RX ADMIN — Medication 1 TABLET(S): at 08:24

## 2019-01-06 RX ADMIN — Medication 200 MILLIGRAM(S): at 06:42

## 2019-01-06 RX ADMIN — Medication 40 MILLIGRAM(S): at 06:41

## 2019-01-06 RX ADMIN — Medication 3 MILLILITER(S): at 13:52

## 2019-01-06 RX ADMIN — Medication 1 TABLET(S): at 17:48

## 2019-01-06 RX ADMIN — Medication 0.5 MILLIGRAM(S): at 07:23

## 2019-01-06 NOTE — PROGRESS NOTE ADULT - ASSESSMENT
Hyponatremia  SIADH  PNA  Hemoptysis       - Known to our group from last hospitalization with SIADH. He was sent home with NaCl tabs. He claimed to be compliant with salt tablets but not fluid restriction. No hypertonic saline needed. S/p tolvaptan 15 mg po x 1   - Renal indices are stable; sodium levels are improving back  - Continue NaCl tabs as ordered. Fluid restriction as ordered; No need for oral lasix at this time  - S/p CTA showing no PE  - S/p Bronch on 1/2/18  - IV steroids  - Pulm and Cardio follow up noted  - Abx   - Monitor chemistries    Thank you

## 2019-01-06 NOTE — PROGRESS NOTE ADULT - SUBJECTIVE AND OBJECTIVE BOX
All interim records and events noted.    up in chair eating lunch, not in distress  reports feeling fine, PARKS no change      MEDICATIONS  (STANDING):  ALBUTerol/ipratropium for Nebulization 3 milliLiter(s) Nebulizer every 6 hours  buDESOnide   0.5 milliGRAM(s) Respule 0.5 milliGRAM(s) Inhalation two times a day  digoxin     Tablet 0.25 milliGRAM(s) Oral daily  diltiazem    Tablet 120 milliGRAM(s) Oral every 6 hours  heparin  Injectable 5000 Unit(s) SubCutaneous every 8 hours  influenza   Vaccine 0.5 milliLiter(s) IntraMuscular once  lactobacillus acidophilus 1 Tablet(s) Oral three times a day with meals  loratadine 10 milliGRAM(s) Oral daily  methylPREDNISolone sodium succinate Injectable 40 milliGRAM(s) IV Push three times a day  metoprolol succinate  milliGRAM(s) Oral daily  sodium chloride 1 Gram(s) Oral three times a day  tiotropium 18 MICROgram(s) Capsule 1 Capsule(s) Inhalation daily    MEDICATIONS  (PRN):  ALBUTerol    90 MICROgram(s) HFA Inhaler 2 Puff(s) Inhalation every 6 hours PRN Shortness of Breath  ALPRAZolam 0.25 milliGRAM(s) Oral three times a day PRN anxiety  guaiFENesin    Syrup 200 milliGRAM(s) Oral every 6 hours PRN Cough  promethazine 25 milliGRAM(s) Oral four times a day PRN breakthrough cough      Vital Signs Last 24 Hrs  T(C): 36.6 (06 Jan 2019 04:15), Max: 36.6 (05 Jan 2019 14:18)  T(F): 97.9 (06 Jan 2019 04:15), Max: 97.9 (05 Jan 2019 14:18)  HR: 123 (06 Jan 2019 12:18) (69 - 131)  BP: 120/72 (06 Jan 2019 12:18) (112/71 - 121/75)  BP(mean): --  RR: 18 (06 Jan 2019 04:15) (18 - 19)  SpO2: 98% (06 Jan 2019 07:26) (95% - 98%)    PHYSICAL EXAM  General: well developed  well nourished, in no acute distress  Head: atraumatic, normocephalic  ENT: sclera anicteric, buccal mucosa moist  Neck: supple, trachea midline  CV: S1 S2, regular rate and rhythm  Lungs: mild rhonchi bilaterally, decr breath sounds left lung field  Abdomen: soft, nontender, bowel sounds present, no palpable masses  Skin: no significant increased ecchymosis/petechiae  Neuro: alert and oriented X3,  no focal deficits      LABS:             11.1   10.91 )-----------( 283      ( 01-04 @ 10:22 )             32.6       01-05    135  |  97  |  18  ----------------------------<  174<H>  4.0   |  28  |  0.57    Ca    8.7      05 Jan 2019 10:08    TPro  6.3  /  Alb  2.5<L>  /  TBili  0.5  /  DBili  x   /  AST  52<H>  /  ALT  55  /  AlkPhos  100  01-04 01-06 @ 12:33  PT15.3 INR1.34  PTT26.5      RADIOLOGY & ADDITIONAL STUDIES:    IMPRESSION/RECOMMENDATIONS:

## 2019-01-06 NOTE — PROGRESS NOTE ADULT - SUBJECTIVE AND OBJECTIVE BOX
NEPHROLOGY INTERVAL HPI/OVERNIGHT EVENTS:  HPI:  pt is a 57yo male with pmhx of htn, copd, cardiac tamponade s/p window 2018 c/o cough x days. pt reports non-productive cough with associated dyspnea on exertion. pt reports "it feels like something is stuck in my throat". pt reports bl le swelling without pain. pt recently admitted for pericardial effusion s/p window. pt quit smoking aprox 2 weeks ago.  In ER patient was found to have PNA on CXR.  Patient is being admitted for further work up and treatment. (31 Dec 2018 21:51)    Follow up hyponatremia  No acute complaints    PAST MEDICAL & SURGICAL HISTORY:  Dyspnea  Mediastinal adenopathy  Pericardial effusion  AF (atrial fibrillation)  Hyponatremia  COPD (chronic obstructive pulmonary disease)  HTN (hypertension)  S/P pericardial window creation      MEDICATIONS  (STANDING):  ALBUTerol/ipratropium for Nebulization 3 milliLiter(s) Nebulizer every 6 hours  buDESOnide   0.5 milliGRAM(s) Respule 0.5 milliGRAM(s) Inhalation two times a day  digoxin     Tablet 0.25 milliGRAM(s) Oral daily  diltiazem    Tablet 120 milliGRAM(s) Oral every 6 hours  heparin  Injectable 5000 Unit(s) SubCutaneous every 8 hours  influenza   Vaccine 0.5 milliLiter(s) IntraMuscular once  lactobacillus acidophilus 1 Tablet(s) Oral three times a day with meals  loratadine 10 milliGRAM(s) Oral daily  methylPREDNISolone sodium succinate Injectable 40 milliGRAM(s) IV Push three times a day  metoprolol succinate  milliGRAM(s) Oral daily  sodium chloride 1 Gram(s) Oral three times a day  tiotropium 18 MICROgram(s) Capsule 1 Capsule(s) Inhalation daily    MEDICATIONS  (PRN):  ALBUTerol    90 MICROgram(s) HFA Inhaler 2 Puff(s) Inhalation every 6 hours PRN Shortness of Breath  ALPRAZolam 0.25 milliGRAM(s) Oral three times a day PRN anxiety  guaiFENesin    Syrup 200 milliGRAM(s) Oral every 6 hours PRN Cough  promethazine 25 milliGRAM(s) Oral four times a day PRN breakthrough cough      Allergies    penicillin (Unknown)    Intolerances        Vital Signs Last 24 Hrs  T(C): 36.6 (2019 04:15), Max: 37.3 (2019 08:00)  T(F): 97.9 (2019 04:15), Max: 99.1 (2019 08:00)  HR: 124 (2019 04:15) (69 - 134)  BP: 119/80 (2019 04:15) (109/75 - 121/75)  BP(mean): --  RR: 18 (2019 04:15) (18 - 19)  SpO2: 98% (2019 04:15) (95% - 98%)  Daily     Daily Weight in k (2019 04:15)    PHYSICAL EXAM:  GENERAL: no distress, sitting in chair; comfortable  HEAD:  Atraumatic, Normocephalic  EYES: Conjunctiva and sclera clear  ENMT: Moist mucous membranes, Good dentition, No lesions  NECK: Supple  NERVOUS SYSTEM:  Alert & Oriented X3, follows commands well  CHEST/LUNG: Few scattered rhonchi B/L  HEART: Regular rate and rhythm; No murmurs, rubs, or gallops  ABDOMEN: Soft, Nontender, Nondistended; Bowel sounds present  EXTREMITIES: trace Edema  LYMPH: No lymphadenopathy noted  SKIN: No rashes or lesions      LABS:                        11.1   10.91 )-----------( 283      ( 2019 10:22 )             32.6     01-05    135  |  97  |  18  ----------------------------<  174<H>  4.0   |  28  |  0.57    Ca    8.7      2019 10:08    TPro  6.3  /  Alb  2.5<L>  /  TBili  0.5  /  DBili  x   /  AST  52<H>  /  ALT  55  /  AlkPhos  100  01-04              RADIOLOGY & ADDITIONAL TESTS: English

## 2019-01-06 NOTE — PROGRESS NOTE ADULT - ASSESSMENT
57 y/o man w SOB, bulky mediastinal and hilar LADs, left lung compromise, left effusion, post bronch w pathology sig for malignancy, favor small cell, IHC pending for confirmation  Hyponatremia /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 Mild hyponatremia today. BUN 2, Cr 0.6. No change in Cr.   A fib, mild hemoptysis  CT scan Head and MRI brain negative for metastasis.   Further staging pending.     await final pathology result for chemotherapy planning, small cell vs nonsmall cell type  starting Creatinine Clearance in prep for possible Carboplatinum based chemotherapy on Tuesday, discussed w chemo nurse    discussed w pt

## 2019-01-06 NOTE — PROGRESS NOTE ADULT - SUBJECTIVE AND OBJECTIVE BOX
Patient is a 58y old  Male who presents with a chief complaint of sob (06 Jan 2019 14:54)      INTERVAL /OVERNIGHT EVENTS: denies palpitations    MEDICATIONS  (STANDING):  ALBUTerol/ipratropium for Nebulization 3 milliLiter(s) Nebulizer every 6 hours  buDESOnide   0.5 milliGRAM(s) Respule 0.5 milliGRAM(s) Inhalation two times a day  digoxin     Tablet 0.25 milliGRAM(s) Oral daily  diltiazem    Tablet 120 milliGRAM(s) Oral every 6 hours  heparin  Injectable 5000 Unit(s) SubCutaneous every 8 hours  influenza   Vaccine 0.5 milliLiter(s) IntraMuscular once  lactobacillus acidophilus 1 Tablet(s) Oral three times a day with meals  loratadine 10 milliGRAM(s) Oral daily  methylPREDNISolone sodium succinate Injectable 40 milliGRAM(s) IV Push three times a day  metoprolol succinate  milliGRAM(s) Oral daily  sodium chloride 1 Gram(s) Oral three times a day  tiotropium 18 MICROgram(s) Capsule 1 Capsule(s) Inhalation daily  verapamil 40 milliGRAM(s) Oral every 8 hours    MEDICATIONS  (PRN):  ALBUTerol    90 MICROgram(s) HFA Inhaler 2 Puff(s) Inhalation every 6 hours PRN Shortness of Breath  ALPRAZolam 0.25 milliGRAM(s) Oral three times a day PRN anxiety  guaiFENesin    Syrup 200 milliGRAM(s) Oral every 6 hours PRN Cough  promethazine 25 milliGRAM(s) Oral four times a day PRN breakthrough cough      Allergies    penicillin (Unknown)    Intolerances        REVIEW OF SYSTEMS:  CONSTITUTIONAL: No fever, weight loss, or fatigue  EYES: No eye pain, visual disturbances, or discharge  ENMT:  No difficulty hearing, tinnitus, vertigo; No sinus or throat pain  NECK: No pain or stiffness  RESPIRATORY: + cough, wheezing, chills or hemoptysis; + shortness of breath  CARDIOVASCULAR: No chest pain, palpitations, dizziness, or leg swelling  GASTROINTESTINAL: No abdominal or epigastric pain. No nausea, vomiting, or hematemesis; No diarrhea or constipation. No melena or hematochezia.  GENITOURINARY: No dysuria, frequency, hematuria, or incontinence  NEUROLOGICAL: No headaches, memory loss, loss of strength, numbness, or tremors  SKIN: No itching, burning, rashes, or lesions   LYMPH NODES: No enlarged glands  ENDOCRINE: No heat or cold intolerance; No hair loss; No polydipsia or polyuria  MUSCULOSKELETAL: No joint pain or swelling; No muscle, back, or extremity pain  PSYCHIATRIC: No depression, anxiety, mood swings, or difficulty sleeping  HEME/LYMPH: No easy bruising, or bleeding gums  ALLERGY AND IMMUNOLOGIC: No hives or eczema    Vital Signs Last 24 Hrs  T(C): 36.6 (06 Jan 2019 13:42), Max: 36.6 (06 Jan 2019 04:15)  T(F): 97.8 (06 Jan 2019 13:42), Max: 97.9 (06 Jan 2019 04:15)  HR: 134 (06 Jan 2019 17:45) (74 - 134)  BP: 124/72 (06 Jan 2019 17:45) (112/71 - 124/72)  BP(mean): --  RR: 20 (06 Jan 2019 13:42) (18 - 20)  SpO2: 97% (06 Jan 2019 13:52) (95% - 98%)    PHYSICAL EXAM:  GENERAL: NAD, well-groomed, well-developed  HEAD:  Atraumatic, Normocephalic  EYES: EOMI, PERRLA, conjunctiva and sclera clear  ENMT: No tonsillar erythema, exudates, or enlargement; Moist mucous membranes, Good dentition, No lesions  NECK: Supple, No JVD, Normal thyroid  NERVOUS SYSTEM:  Alert & Oriented X3, Good concentration; Motor Strength 5/5 B/L upper and lower extremities; DTRs 2+ intact and symmetric  CHEST/LUNG: Clear to auscultation bilaterally; No rales, rhonchi, wheezing, or rubs  HEART: Regular rate and rhythm; No murmurs, rubs, or gallops  ABDOMEN: Soft, Nontender, Nondistended; Bowel sounds present  EXTREMITIES:  2+ Peripheral Pulses, No clubbing, cyanosis, or edema  LYMPH: No lymphadenopathy noted  SKIN: No rashes or lesions    LABS:      Ca    8.7        05 Jan 2019 10:08      PT/INR - ( 06 Jan 2019 12:33 )   PT: 15.3 sec;   INR: 1.34 ratio         PTT - ( 06 Jan 2019 12:33 )  PTT:26.5 sec    CAPILLARY BLOOD GLUCOSE          RADIOLOGY & ADDITIONAL TESTS:    Notes Reviewed:  [x ] YES  [ ] NO    Care Discussed with Consultants/Other Providers [x ] YES  [ ] NO

## 2019-01-06 NOTE — PROGRESS NOTE ADULT - SUBJECTIVE AND OBJECTIVE BOX
[facility] -- Gurpreet Bush, Corey Diaz, Sajan Abebe Savella  Office # [phone]    Follow Up:      Subjective/Observations:       REVIEW OF SYSTEMS: All other review of systems is negative unless indicated above    PAST MEDICAL & SURGICAL HISTORY:  Dyspnea  Mediastinal adenopathy  Pericardial effusion  AF (atrial fibrillation)  Hyponatremia  COPD (chronic obstructive pulmonary disease)  HTN (hypertension)  S/P pericardial window creation      MEDICATIONS  (STANDING):  ALBUTerol/ipratropium for Nebulization 3 milliLiter(s) Nebulizer every 6 hours  buDESOnide   0.5 milliGRAM(s) Respule 0.5 milliGRAM(s) Inhalation two times a day  digoxin     Tablet 0.25 milliGRAM(s) Oral daily  diltiazem    Tablet 120 milliGRAM(s) Oral every 6 hours  heparin  Injectable 5000 Unit(s) SubCutaneous every 8 hours  influenza   Vaccine 0.5 milliLiter(s) IntraMuscular once  lactobacillus acidophilus 1 Tablet(s) Oral three times a day with meals  loratadine 10 milliGRAM(s) Oral daily  methylPREDNISolone sodium succinate Injectable 40 milliGRAM(s) IV Push three times a day  metoprolol succinate  milliGRAM(s) Oral daily  sodium chloride 1 Gram(s) Oral three times a day  tiotropium 18 MICROgram(s) Capsule 1 Capsule(s) Inhalation daily    MEDICATIONS  (PRN):  ALBUTerol    90 MICROgram(s) HFA Inhaler 2 Puff(s) Inhalation every 6 hours PRN Shortness of Breath  ALPRAZolam 0.25 milliGRAM(s) Oral three times a day PRN anxiety  guaiFENesin    Syrup 200 milliGRAM(s) Oral every 6 hours PRN Cough  promethazine 25 milliGRAM(s) Oral four times a day PRN breakthrough cough      Allergies    penicillin (Unknown)    Intolerances        Vital Signs Last 24 Hrs  T(C): 36.6 (06 Jan 2019 13:42), Max: 36.6 (06 Jan 2019 04:15)  T(F): 97.8 (06 Jan 2019 13:42), Max: 97.9 (06 Jan 2019 04:15)  HR: 75 (06 Jan 2019 13:52) (74 - 131)  BP: 121/73 (06 Jan 2019 13:42) (112/71 - 121/75)  BP(mean): --  RR: 20 (06 Jan 2019 13:42) (18 - 20)  SpO2: 97% (06 Jan 2019 13:52) (95% - 98%)    I&O's Summary    05 Jan 2019 07:01  -  06 Jan 2019 07:00  --------------------------------------------------------  IN: 0 mL / OUT: 350 mL / NET: -350 mL    PHYSICAL EXAM:  TELE: Afib  Constitutional: NAD, awake and alert, well-developed  HEENT: Moist Mucous Membranes, Anicteric  Pulmonary: Non-labored, breath sounds are clear bilaterally, No wheezing, rales.  coarse rhonchi  Cardiovascular: Irregularly irregular, S1 and S2, No murmurs, rubs, gallops or clicks  Gastrointestinal: Bowel Sounds present, soft, nontender.   Lymph: 2+ LE edema. No lymphadenopathy.  Skin: No visible rashes or ulcers.  Psych:  Mood & affect appropriate    LABS: All Labs Reviewed:                        11.1   10.91 )-----------( 283      ( 04 Jan 2019 10:22 )             32.6     05 Jan 2019 10:08    135    |  97     |  18     ----------------------------<  174    4.0     |  28     |  0.57   04 Jan 2019 21:04    133    |  95     |  20     ----------------------------<  173    4.1     |  29     |  0.61     Ca    8.7        05 Jan 2019 10:08  Ca    8.7        04 Jan 2019 21:04    TPro  6.3    /  Alb  2.5    /  TBili  0.5    /  DBili  x      /  AST  52     /  ALT  55     /  AlkPhos  100    04 Jan 2019 21:04    PT/INR - ( 06 Jan 2019 12:33 )   PT: 15.3 sec;   INR: 1.34 ratio      PTT - ( 06 Jan 2019 12:33 )  PTT:26.5 sec  < from: TTE Echo Doppler w/o Cont (01.04.19 @ 09:32) >     EXAM:  ECHO TTE WO CON COMP W DOPPLR        PROCEDURE DATE:  01/04/2019        INTERPRETATION:  INDICATION: Atrial fibrillation, pericardial effusion    Blood Pressure 138/73    Height 185     Weight 111       BSA 2.3    Dimensions:    LA 4.4  Normal Values: 2.0 - 4.0 cm    Ao 4.0        Normal Values: 2.0 - 3.8 cm  SEPTUM           Normal Values: 0.6 - 1.2 cm  PWT           Normal Values: 0.6 - 1.1 cm  LVIDd             Normal Values: 3.0 - 5.6 cm  LVIDs             Normal Values: 1.8 -4.0 cm    Derived Variables:  LVMI     g/m2  RWT      Fractional Short      Ejection Fraction        Doppler Peak v. AoV=   (m/sec)    OBSERVATIONS:    This is a technically limited study with suboptimal endocardial   definition. Within these limitations, the left ventricle appears to be   normal in size with overall normal systolic function. The right ventricle   is not well seen but is probably normal in size with normal systolic   function. Biatrial enlargement. The aortic root is mildly dilated. The   mitral valve is not well seen. There is trace physiologic MR noted. Other   valves appear structurally normal as visualized. Inadequate TR jet to   estimate PA systolic pressure. No significant pericardial effusion. Left   pleural effusion. The patient was in rapid atrial fibrillation during   this examination.      ISAC FLORES M.D., ATTENDING CARDIOLOGIST  This document has been electronically signed. Jan 4 2019  3:55PM     < end of copied text >    < from: CT Angio Chest w/ IV Cont (01.05.19 @ 00:27) >    EXAM:  CT ANGIO CHEST (W)AW IC                            PROCEDURE DATE:  01/05/2019      INTERPRETATION:  [facility] RADIOLOGIST PRELIMINARY REPORT    EXAM:    CT Angiography Chest Without And With Contrast     EXAM DATE/TIME:    1/4/2019 11:45 PM     CLINICAL HISTORY:    58 years old, male; Signs and symptoms; Shortness of breath; Prior   surgery;   Surgery date: 3-7 days post-operative. History of lung cancer.     TECHNIQUE:    Axial computed tomographic angiography images of the chest without and   with intravenous contrast using CT angiography protocol.    Coronal and sagittal reformatted images were created and reviewed.    MIP reconstructed images were created and reviewed.     CONTRAST:    95 ml of omnipaque administered intravenously.     COMPARISON:    CT CHEST 12/13/2018 8:13 AM     FINDINGS:    Pulmonary arteries:  No evidence of pulmonary embolus.    Aorta:  Aorta is unremarkable. No evidence of dissection. No evidence of   aneurysm.    Other veins:  There is extensive mass effect from mediastinal mass   lesions on   superior aspect of left atrium with complete nonvisualization of left   superior   pulmonary vein and moderate to severe stenosis of left inferior pulmonary   vein.    Lungs:  There is moderate narrowing of bilateral mainstem bronchi and   left   upper and lower lobe bronchi secondary to mass effect from mediastinal   and   hilar mass lesions.    Pleural space:  Moderate to large left pleural effusion. Small right   pleural   effusion. Left lower lobe and lingular infiltrates. Large dense left   upper lobe   infiltrate.    Heart: Normal. No cardiomegaly. No pericardial effusion.    Mediastinum:  There is complete collapse of mid esophagus secondary to   mass   effect from mediastinal metastatic disease.    Lymph nodes:  Multiple very enlarged mediastinal and hilar lymph nodes   consistent with metastatic disease.    Bones/joints: Unremarkable. No acute fracture.    Soft tissues: Unremarkable.     IMPRESSION:   1. Multiple very enlarged mediastinal and hilar lymph nodes consistent   with metastatic disease.   2. There is moderate narrowing of bilateral mainstem bronchi and left   upper and lower lobe bronchi secondary to mass effect from mediastinal   and hilar mass lesions.   3. Moderate to large left pleural effusion. Small right pleural effusion.   Left lower lobe and lingular infiltrates. Large dense left upper lobe   infiltrate. Superimposed pneumonia or pulmonary edema is considered.  4. There is complete collapse of mid esophagus secondary to mass effect   from mediastinal metastatic disease.   5. There is extensive mass effect from mediastinal mass lesions on   superior aspect of left atrium with complete nonvisualization of left   superior pulmonary vein and moderate to severestenosis of left inferior   pulmonary vein.   6. No evidence of pulmonary arterial embolus.    AGREE WITH ABOVE FINDINGS    JUAN GARCIA M.D., ATTENDING RADIOLOGIST  This document has been electronically signed. Jan 5 2019  9:07AM      < end of copied text >

## 2019-01-06 NOTE — PROGRESS NOTE ADULT - ASSESSMENT
58M pmhx afib diagnosed December 2018 (not on AC), COPD, HTN, cardiac tamponade s/p window 12/2018 discharged from [facility] ~2 weeks for hyponatremia and found to have mediastinal adenopathy and moderate sized pericardial effusion with tamponade physiology with hospital course further complicated by new onset afib with RVR, who presented with worsening SOB and cough and was found to be hyponatremic and have pna. Found to have a malignancy. Patient had bronchoscopy and s/p bronch, patient went into rapid a fib which resolved on own. remains in rapid af now, unclear why not responding appropriately to meds    Recommend:    - s/p bronchoscopy complicated by Afib with RVR.  Remains in Afib with bursts of rapid rate at 130's.  - TTE does not reveal recurrent pericardial effusion of any significance  - HR may be difficult to control due to ongoing pulmonary process.    - Continue  Toprol  mg daily  - Continue Cardizem 120 mg po q 6hours  - Cont Digoxin.  Please obtain Dig level on 12/8  - Continue tele monitor     - B/l LE doppler negative of DVT      - Pulm f/u.  Continue bronchodilators.  Encourage incentive spirometer  - BP well controlled, monitor routine hemodynamics.  - Monitor and replete lytes, keep K>4, Mg>2.    -Other cardiovascular workup will depend on clinical course.  -All other workup per primary team.  -Will continue to follow.    Steph Pradhan NP  Cardiology 58M pmhx afib diagnosed December 2018 (not on AC), COPD, HTN, cardiac tamponade s/p window 12/2018 discharged from [facility] ~2 weeks for hyponatremia and found to have mediastinal adenopathy and moderate sized pericardial effusion with tamponade physiology with hospital course further complicated by new onset afib with RVR, who presented with worsening SOB and cough and was found to be hyponatremic and have pna. Found to have a malignancy. Patient had bronchoscopy and s/p bronch, patient went into rapid a fib which resolved on own. remains in rapid af now, unclear why not responding appropriately to meds    Recommend:    - s/p bronchoscopy complicated by Afib with RVR.  Remains in Afib with bursts of rapid rate at 130's.  - TTE does not reveal recurrent pericardial effusion of any significance  - HR may be difficult to control due to ongoing pulmonary process, and it has been thus far resistant to all changes in meds.  he remains tachy in the 120s	  - Continue  Toprol  mg daily  - Continue Cardizem 120 mg po q 6hours, raised as of this am  - Cont Digoxin at higher dose, raised this morning.  Please obtain Dig level on 12/8  - although we do not typically use overlapping drugs, will add verapamil as will to see if it has a better effect  - Continue tele monitor     - B/l LE doppler negative of DVT      - Pulm f/u.  Continue bronchodilators.  Encourage incentive spirometer  - BP well controlled, monitor routine hemodynamics.  - Monitor and replete lytes, keep K>4, Mg>2.    -Other cardiovascular workup will depend on clinical course.  -All other workup per primary team.  -Will continue to follow.    Steph Pradhan NP  Cardiology

## 2019-01-07 LAB
ANION GAP SERPL CALC-SCNC: 7 MMOL/L — SIGNIFICANT CHANGE UP (ref 5–17)
BUN SERPL-MCNC: 19 MG/DL — SIGNIFICANT CHANGE UP (ref 7–23)
CALCIUM SERPL-MCNC: 8.6 MG/DL — SIGNIFICANT CHANGE UP (ref 8.5–10.1)
CHLORIDE SERPL-SCNC: 96 MMOL/L — SIGNIFICANT CHANGE UP (ref 96–108)
CO2 SERPL-SCNC: 32 MMOL/L — HIGH (ref 22–31)
CREAT SERPL-MCNC: 0.59 MG/DL — SIGNIFICANT CHANGE UP (ref 0.5–1.3)
GLUCOSE SERPL-MCNC: 157 MG/DL — HIGH (ref 70–99)
HCT VFR BLD CALC: 33.5 % — LOW (ref 39–50)
HGB BLD-MCNC: 10.9 G/DL — LOW (ref 13–17)
MCHC RBC-ENTMCNC: 27.1 PG — SIGNIFICANT CHANGE UP (ref 27–34)
MCHC RBC-ENTMCNC: 32.5 GM/DL — SIGNIFICANT CHANGE UP (ref 32–36)
MCV RBC AUTO: 83.3 FL — SIGNIFICANT CHANGE UP (ref 80–100)
NRBC # BLD: 0 /100 WBCS — SIGNIFICANT CHANGE UP (ref 0–0)
PLATELET # BLD AUTO: 314 K/UL — SIGNIFICANT CHANGE UP (ref 150–400)
POTASSIUM SERPL-MCNC: 4.5 MMOL/L — SIGNIFICANT CHANGE UP (ref 3.5–5.3)
POTASSIUM SERPL-SCNC: 4.5 MMOL/L — SIGNIFICANT CHANGE UP (ref 3.5–5.3)
RBC # BLD: 4.02 M/UL — LOW (ref 4.2–5.8)
RBC # FLD: 12.8 % — SIGNIFICANT CHANGE UP (ref 10.3–14.5)
SODIUM SERPL-SCNC: 135 MMOL/L — SIGNIFICANT CHANGE UP (ref 135–145)
WBC # BLD: 12.83 K/UL — HIGH (ref 3.8–10.5)
WBC # FLD AUTO: 12.83 K/UL — HIGH (ref 3.8–10.5)

## 2019-01-07 PROCEDURE — 99233 SBSQ HOSP IP/OBS HIGH 50: CPT

## 2019-01-07 RX ADMIN — Medication 3 MILLILITER(S): at 19:29

## 2019-01-07 RX ADMIN — Medication 40 MILLIGRAM(S): at 06:13

## 2019-01-07 RX ADMIN — Medication 1 TABLET(S): at 17:51

## 2019-01-07 RX ADMIN — SODIUM CHLORIDE 1 GRAM(S): 9 INJECTION INTRAMUSCULAR; INTRAVENOUS; SUBCUTANEOUS at 06:13

## 2019-01-07 RX ADMIN — Medication 40 MILLIGRAM(S): at 21:43

## 2019-01-07 RX ADMIN — Medication 3 MILLILITER(S): at 01:32

## 2019-01-07 RX ADMIN — SODIUM CHLORIDE 1 GRAM(S): 9 INJECTION INTRAMUSCULAR; INTRAVENOUS; SUBCUTANEOUS at 13:25

## 2019-01-07 RX ADMIN — HEPARIN SODIUM 5000 UNIT(S): 5000 INJECTION INTRAVENOUS; SUBCUTANEOUS at 21:42

## 2019-01-07 RX ADMIN — Medication 0.5 MILLIGRAM(S): at 07:57

## 2019-01-07 RX ADMIN — Medication 40 MILLIGRAM(S): at 13:25

## 2019-01-07 RX ADMIN — HEPARIN SODIUM 5000 UNIT(S): 5000 INJECTION INTRAVENOUS; SUBCUTANEOUS at 13:25

## 2019-01-07 RX ADMIN — Medication 0.5 MILLIGRAM(S): at 19:29

## 2019-01-07 RX ADMIN — Medication 1 TABLET(S): at 11:11

## 2019-01-07 RX ADMIN — Medication 0.25 MILLIGRAM(S): at 06:11

## 2019-01-07 RX ADMIN — Medication 3 MILLILITER(S): at 14:00

## 2019-01-07 RX ADMIN — Medication 40 MILLIGRAM(S): at 21:42

## 2019-01-07 RX ADMIN — Medication 3 MILLILITER(S): at 07:57

## 2019-01-07 RX ADMIN — Medication 200 MILLIGRAM(S): at 06:07

## 2019-01-07 RX ADMIN — LORATADINE 10 MILLIGRAM(S): 10 TABLET ORAL at 11:11

## 2019-01-07 RX ADMIN — Medication 40 MILLIGRAM(S): at 06:07

## 2019-01-07 RX ADMIN — SODIUM CHLORIDE 1 GRAM(S): 9 INJECTION INTRAMUSCULAR; INTRAVENOUS; SUBCUTANEOUS at 21:43

## 2019-01-07 NOTE — DIETITIAN INITIAL EVALUATION ADULT. - OTHER INFO
patient reports with good PO here in hospital and at home. states for procedure today patient is NPO. patient reports approximately 30# weight loss over last year . patient reports new Lung Cancer dx. quit smoking 2 weeks ago per admit notes. regular diet followed at home with history hyponatremia . no food allergies no problems chewing swallowing noted patient reports with good PO here in hospital and at home. states for procedure today patient is NPO. patient reports approximately 30# weight loss over last year . patient reports new Lung Cancer dx. quit smoking 2 weeks ago per admit notes. regular diet followed at home with history hyponatremia . no food allergies no problems chewing swallowing noted patient with elevated blood sugars noted on prednisone will follow for need for NCS style diet at discharge.

## 2019-01-07 NOTE — PROGRESS NOTE ADULT - SUBJECTIVE AND OBJECTIVE BOX
NEPHROLOGY INTERVAL HPI/OVERNIGHT EVENTS:  HPI:  pt is a 59yo male with pmhx of htn, copd, cardiac tamponade s/p window 12/2018 c/o cough x days. pt reports non-productive cough with associated dyspnea on exertion. pt reports "it feels like something is stuck in my throat". pt reports bl le swelling without pain. pt recently admitted for pericardial effusion s/p window. pt quit smoking aprox 2 weeks ago.  In ER patient was found to have PNA on CXR.  Patient is being admitted for further work up and treatment. (31 Dec 2018 21:51)    Follow up Hyponatremia/SIADH  No complaints, feels better    PAST MEDICAL & SURGICAL HISTORY:  Dyspnea  Mediastinal adenopathy  Pericardial effusion  AF (atrial fibrillation)  Hyponatremia  COPD (chronic obstructive pulmonary disease)  HTN (hypertension)  S/P pericardial window creation      MEDICATIONS  (STANDING):  ALBUTerol/ipratropium for Nebulization 3 milliLiter(s) Nebulizer every 6 hours  buDESOnide   0.5 milliGRAM(s) Respule 0.5 milliGRAM(s) Inhalation two times a day  digoxin     Tablet 0.25 milliGRAM(s) Oral daily  diltiazem    Tablet 120 milliGRAM(s) Oral every 6 hours  heparin  Injectable 5000 Unit(s) SubCutaneous every 8 hours  influenza   Vaccine 0.5 milliLiter(s) IntraMuscular once  lactobacillus acidophilus 1 Tablet(s) Oral three times a day with meals  loratadine 10 milliGRAM(s) Oral daily  methylPREDNISolone sodium succinate Injectable 40 milliGRAM(s) IV Push three times a day  metoprolol succinate  milliGRAM(s) Oral daily  sodium chloride 1 Gram(s) Oral three times a day  tiotropium 18 MICROgram(s) Capsule 1 Capsule(s) Inhalation daily  verapamil 40 milliGRAM(s) Oral every 8 hours    MEDICATIONS  (PRN):  ALBUTerol    90 MICROgram(s) HFA Inhaler 2 Puff(s) Inhalation every 6 hours PRN Shortness of Breath  ALPRAZolam 0.25 milliGRAM(s) Oral three times a day PRN anxiety  guaiFENesin    Syrup 200 milliGRAM(s) Oral every 6 hours PRN Cough  promethazine 25 milliGRAM(s) Oral four times a day PRN breakthrough cough      Allergies    penicillin (Unknown)    Intolerances        Vital Signs Last 24 Hrs  T(C): 36.6 (07 Jan 2019 04:30), Max: 36.9 (06 Jan 2019 20:59)  T(F): 97.9 (07 Jan 2019 04:30), Max: 98.4 (06 Jan 2019 20:59)  HR: 102 (07 Jan 2019 04:30) (72 - 134)  BP: 119/77 (07 Jan 2019 04:30) (110/70 - 127/70)  BP(mean): --  RR: 17 (07 Jan 2019 04:30) (17 - 20)  SpO2: 96% (07 Jan 2019 04:30) (94% - 97%)  Daily     Daily     PHYSICAL EXAM:  GENERAL: no distress, sitting in chair; comfortable  HEAD:  Atraumatic, Normocephalic  EYES: Conjunctiva and sclera clear  ENMT: Moist mucous membranes, Good dentition, No lesions  NECK: Supple  NERVOUS SYSTEM:  Alert & Oriented X3, follows commands well  CHEST/LUNG: Few scattered rhonchi B/L  HEART: Regular rate and rhythm; No murmurs, rubs, or gallops  ABDOMEN: Soft, Nontender, Nondistended; Bowel sounds present  EXTREMITIES: trace Edema  LYMPH: No lymphadenopathy noted  SKIN: No rashes or lesions    LABS:    01-05    135  |  97  |  18  ----------------------------<  174<H>  4.0   |  28  |  0.57    Ca    8.7      05 Jan 2019 10:08      PT/INR - ( 06 Jan 2019 12:33 )   PT: 15.3 sec;   INR: 1.34 ratio         PTT - ( 06 Jan 2019 12:33 )  PTT:26.5 sec          RADIOLOGY & ADDITIONAL TESTS:

## 2019-01-07 NOTE — PROGRESS NOTE ADULT - SUBJECTIVE AND OBJECTIVE BOX
[facility] -- Gurpreet Bush, Emily, Corey, Sajan Abebe Savella  Office # [phone]      Follow Up:    SOB  Subjective/Observations:   No events overnight resting comfortably in bed.  No complaints of chest pain, dyspnea, or palpitations reported. No signs of orthopnea or PND.     REVIEW OF SYSTEMS: All other review of systems is negative unless indicated above    PAST MEDICAL & SURGICAL HISTORY:  Dyspnea  Mediastinal adenopathy  Pericardial effusion  AF (atrial fibrillation)  Hyponatremia  COPD (chronic obstructive pulmonary disease)  HTN (hypertension)  S/P pericardial window creation      MEDICATIONS  (STANDING):  ALBUTerol/ipratropium for Nebulization 3 milliLiter(s) Nebulizer every 6 hours  buDESOnide   0.5 milliGRAM(s) Respule 0.5 milliGRAM(s) Inhalation two times a day  digoxin     Tablet 0.25 milliGRAM(s) Oral daily  diltiazem    Tablet 120 milliGRAM(s) Oral every 6 hours  heparin  Injectable 5000 Unit(s) SubCutaneous every 8 hours  influenza   Vaccine 0.5 milliLiter(s) IntraMuscular once  lactobacillus acidophilus 1 Tablet(s) Oral three times a day with meals  loratadine 10 milliGRAM(s) Oral daily  methylPREDNISolone sodium succinate Injectable 40 milliGRAM(s) IV Push three times a day  metoprolol succinate  milliGRAM(s) Oral daily  sodium chloride 1 Gram(s) Oral three times a day  tiotropium 18 MICROgram(s) Capsule 1 Capsule(s) Inhalation daily  verapamil 40 milliGRAM(s) Oral every 8 hours    MEDICATIONS  (PRN):  ALBUTerol    90 MICROgram(s) HFA Inhaler 2 Puff(s) Inhalation every 6 hours PRN Shortness of Breath  ALPRAZolam 0.25 milliGRAM(s) Oral three times a day PRN anxiety  guaiFENesin    Syrup 200 milliGRAM(s) Oral every 6 hours PRN Cough  promethazine 25 milliGRAM(s) Oral four times a day PRN breakthrough cough      Allergies    penicillin (Unknown)    Intolerances        Vital Signs Last 24 Hrs  T(C): 36.6 (07 Jan 2019 04:30), Max: 36.9 (06 Jan 2019 20:59)  T(F): 97.9 (07 Jan 2019 04:30), Max: 98.4 (06 Jan 2019 20:59)  HR: 119 (07 Jan 2019 11:04) (68 - 134)  BP: 116/69 (07 Jan 2019 11:04) (110/70 - 127/70)  BP(mean): --  RR: 17 (07 Jan 2019 04:30) (17 - 20)  SpO2: 95% (07 Jan 2019 07:57) (94% - 97%)    I&O's Summary        PHYSICAL EXAM:  TELE: Afib No events on tele overnight   Constitutional: NAD, awake and alert, well-developed  HEENT: Moist Mucous Membranes, Anicteric  Pulmonary: Non-labored, breath sounds with crackles bilaterally at bases , No wheezing, or rhonchi    Cardiovascular: Irregular, S1 and S2 nl, No murmurs, rubs, gallops or clicks  Gastrointestinal: Bowel Sounds present, soft, nontender.   Lymph: No lymphadenopathy. No peripheral edema.  Skin: No visible rashes or ulcers.  Psych:  Mood & affect appropriate    LABS: All Labs Reviewed:                        10.9   12.83 )-----------( 314      ( 07 Jan 2019 08:38 )             33.5     07 Jan 2019 08:38    135    |  96     |  19     ----------------------------<  157    4.5     |  32     |  0.59   05 Jan 2019 10:08    135    |  97     |  18     ----------------------------<  174    4.0     |  28     |  0.57   04 Jan 2019 21:04    133    |  95     |  20     ----------------------------<  173    4.1     |  29     |  0.61     Ca    8.6        07 Jan 2019 08:38  Ca    8.7        05 Jan 2019 10:08  Ca    8.7        04 Jan 2019 21:04    TPro  6.3    /  Alb  2.5    /  TBili  0.5    /  DBili  x      /  AST  52     /  ALT  55     /  AlkPhos  100    04 Jan 2019 21:04    PT/INR - ( 06 Jan 2019 12:33 )   PT: 15.3 sec;   INR: 1.34 ratio         PTT - ( 06 Jan 2019 12:33 )  PTT:26.5 sec         ECG:  < from: 12 Lead ECG (01.03.19 @ 18:24) >  Ventricular Rate 130 BPM    Atrial Rate 174 BPM    QRS Duration 84 ms    Q-T Interval 306 ms    QTC Calculation(Bezet) 450 ms    R Axis 60 degrees    T Axis 237 degrees    Diagnosis Line Atrial fibrillation  Nonspecific ST and T wave abnormality , probably digitalis effect  Abnormal ECG    Confirmed by Bassam Sherman MD ([id number]) on 1/4/2019 9:21:16 AM    < end of copied text >    Echo:  < from: TTE Echo Doppler w/o Cont (01.04.19 @ 09:32) >  EXAM:  ECHO TTE WO CON COMP W DOPPLR         PROCEDURE DATE:  01/04/2019        INTERPRETATION:  INDICATION: Atrial fibrillation, pericardial effusion    Blood Pressure 138/73    Height 185     Weight 111       BSA 2.3    Dimensions:    LA 4.4  Normal Values: 2.0 - 4.0 cm    Ao 4.0        Normal Values: 2.0 - 3.8 cm  SEPTUM           Normal Values: 0.6 - 1.2 cm  PWT           Normal Values: 0.6 - 1.1 cm  LVIDd             Normal Values: 3.0 - 5.6 cm  LVIDs             Normal Values: 1.8 -4.0 cm    Derived Variables:  LVMI     g/m2  RWT      Fractional Short      Ejection Fraction        Doppler Peak v. AoV=   (m/sec)    OBSERVATIONS:    This is a technically limited study with suboptimal endocardial   definition. Within these limitations, the left ventricle appears to be   normal in size with overall normal systolic function. The right ventricle   is not well seen but is probably normal in size with normal systolic   function. Biatrial enlargement. The aortic root is mildly dilated. The   mitral valve is not well seen. There is trace physiologic MR noted. Other   valves appear structurally normal as visualized. Inadequate TR jet to   estimate PA systolic pressure. No significant pericardial effusion. Left   pleural effusion. The patient was in rapid atrial fibrillation during   this examination.                  ISAC FLORES M.D., ATTENDING CARDIOLOGIST  This document has been electronically signed. Jan 4 2019  3:55PM                < end of copied text >    Radiology:  < from: CT Angio Chest w/ IV Cont (01.05.19 @ 00:27) >  EXAM:  CT ANGIO CHEST (W)AW IC                            PROCEDURE DATE:  01/05/2019          INTERPRETATION:  [facility] RADIOLOGIST PRELIMINARY REPORT    EXAM:    CT Angiography Chest Without And With Contrast     EXAM DATE/TIME:    1/4/2019 11:45 PM     CLINICAL HISTORY:    58 years old, male; Signs and symptoms; Shortness of breath; Prior   surgery;   Surgery date: 3-7 days post-operative. History of lung cancer.     TECHNIQUE:    Axial computed tomographic angiography images of the chest without and   with intravenous contrast using CT angiography protocol.    Coronal and sagittal reformatted images were created and reviewed.    MIP reconstructed images were created and reviewed.     CONTRAST:    95 ml of omnipaque administered intravenously.     COMPARISON:    CT CHEST 12/13/2018 8:13 AM     FINDINGS:    Pulmonary arteries:  No evidence of pulmonary embolus.    Aorta:  Aorta is unremarkable. No evidence of dissection. No evidence of   aneurysm.    Other veins:  There is extensive mass effect from mediastinal mass   lesions on   superior aspect of left atrium with complete nonvisualization of left   superior   pulmonary vein and moderate to severe stenosis of left inferior pulmonary   vein.    Lungs:  There is moderate narrowing of bilateral mainstem bronchi and   left   upper and lower lobe bronchi secondary to mass effect from mediastinal   and   hilar mass lesions.    Pleural space:  Moderate to large left pleural effusion. Small right   pleural   effusion. Left lower lobe and lingular infiltrates. Large dense left   upper lobe   infiltrate.    Heart: Normal. No cardiomegaly. No pericardial effusion.    Mediastinum:  There is complete collapse of mid esophagus secondary to   mass   effect from mediastinal metastatic disease.    Lymph nodes:  Multiple very enlarged mediastinal and hilar lymph nodes   consistent with metastatic disease.    Bones/joints: Unremarkable. No acute fracture.    Soft tissues: Unremarkable.     IMPRESSION:   1. Multiple very enlarged mediastinal and hilar lymph nodes consistent   with metastatic disease.   2. There is moderate narrowing of bilateral mainstem bronchi and left   upper and lower lobe bronchi secondary to mass effect from mediastinal   and hilar mass lesions.   3. Moderate to large left pleural effusion. Small right pleural effusion.   Left lower lobe and lingular infiltrates. Large dense left upper lobe   infiltrate. Superimposed pneumonia or pulmonary edema is considered.  4. There is complete collapse of mid esophagus secondary to mass effect   from mediastinal metastatic disease.   5. There is extensive mass effect from mediastinal mass lesions on   superior aspect of left atrium with complete nonvisualization of left   superior pulmonary vein and moderate to severestenosis of left inferior   pulmonary vein.   6. No evidence of pulmonary arterial embolus.    AGREE WITH ABOVE FINDINGS                JUAN GARCIA M.D., ATTENDING RADIOLOGIST  This document has been electronically signed. Jan 5 2019  9:07AM    < end of copied text >           Jermaine Dave [facility]   Cardiology

## 2019-01-07 NOTE — PROGRESS NOTE ADULT - SUBJECTIVE AND OBJECTIVE BOX
Patient is a 58y old  Male who presents with a chief complaint of sob (07 Jan 2019 13:15)      INTERVAL /OVERNIGHT EVENTS: port on hold    MEDICATIONS  (STANDING):  ALBUTerol/ipratropium for Nebulization 3 milliLiter(s) Nebulizer every 6 hours  buDESOnide   0.5 milliGRAM(s) Respule 0.5 milliGRAM(s) Inhalation two times a day  digoxin     Tablet 0.25 milliGRAM(s) Oral daily  diltiazem    Tablet 120 milliGRAM(s) Oral every 6 hours  heparin  Injectable 5000 Unit(s) SubCutaneous every 8 hours  influenza   Vaccine 0.5 milliLiter(s) IntraMuscular once  lactobacillus acidophilus 1 Tablet(s) Oral three times a day with meals  loratadine 10 milliGRAM(s) Oral daily  methylPREDNISolone sodium succinate Injectable 40 milliGRAM(s) IV Push three times a day  metoprolol succinate  milliGRAM(s) Oral daily  sodium chloride 1 Gram(s) Oral three times a day  tiotropium 18 MICROgram(s) Capsule 1 Capsule(s) Inhalation daily  verapamil 40 milliGRAM(s) Oral every 8 hours    MEDICATIONS  (PRN):  ALBUTerol    90 MICROgram(s) HFA Inhaler 2 Puff(s) Inhalation every 6 hours PRN Shortness of Breath  ALPRAZolam 0.25 milliGRAM(s) Oral three times a day PRN anxiety  guaiFENesin    Syrup 200 milliGRAM(s) Oral every 6 hours PRN Cough  promethazine 25 milliGRAM(s) Oral four times a day PRN breakthrough cough      Allergies    penicillin (Unknown)    Intolerances        REVIEW OF SYSTEMS:  CONSTITUTIONAL: No fever, weight loss, or fatigue  EYES: No eye pain, visual disturbances, or discharge  ENMT:  No difficulty hearing, tinnitus, vertigo; No sinus or throat pain  NECK: No pain or stiffness  RESPIRATORY: + cough, wheezing, chills or hemoptysis; +shortness of breath  CARDIOVASCULAR: No chest pain, palpitations, dizziness, or leg swelling  GASTROINTESTINAL: No abdominal or epigastric pain. No nausea, vomiting, or hematemesis; No diarrhea or constipation. No melena or hematochezia.  GENITOURINARY: No dysuria, frequency, hematuria, or incontinence  NEUROLOGICAL: No headaches, memory loss, loss of strength, numbness, or tremors  SKIN: No itching, burning, rashes, or lesions   LYMPH NODES: No enlarged glands  ENDOCRINE: No heat or cold intolerance; No hair loss; No polydipsia or polyuria  MUSCULOSKELETAL: No joint pain or swelling; No muscle, back, or extremity pain  PSYCHIATRIC: No depression, anxiety, mood swings, or difficulty sleeping  HEME/LYMPH: No easy bruising, or bleeding gums  ALLERGY AND IMMUNOLOGIC: No hives or eczema    Vital Signs Last 24 Hrs  T(C): 36.7 (07 Jan 2019 13:46), Max: 36.9 (06 Jan 2019 20:59)  T(F): 98.1 (07 Jan 2019 13:46), Max: 98.4 (06 Jan 2019 20:59)  HR: 112 (07 Jan 2019 14:02) (68 - 134)  BP: 116/73 (07 Jan 2019 13:46) (110/70 - 127/70)  BP(mean): --  RR: 20 (07 Jan 2019 13:46) (17 - 20)  SpO2: 97% (07 Jan 2019 14:02) (94% - 97%)    PHYSICAL EXAM:  GENERAL: NAD, well-groomed, well-developed  HEAD:  Atraumatic, Normocephalic  EYES: EOMI, PERRLA, conjunctiva and sclera clear  ENMT: No tonsillar erythema, exudates, or enlargement; Moist mucous membranes, Good dentition, No lesions  NECK: Supple, No JVD, Normal thyroid  NERVOUS SYSTEM:  Alert & Oriented X3, Good concentration; Motor Strength 5/5 B/L upper and lower extremities; DTRs 2+ intact and symmetric  CHEST/LUNG: Clear to auscultation bilaterally; No rales, rhonchi, wheezing, or rubs  HEART: Regular rate and rhythm; No murmurs, rubs, or gallops  ABDOMEN: Soft, Nontender, Nondistended; Bowel sounds present  EXTREMITIES:  2+ Peripheral Pulses, No clubbing, cyanosis, or edema  LYMPH: No lymphadenopathy noted  SKIN: No rashes or lesions    LABS:                        10.9   12.83 )-----------( 314      ( 07 Jan 2019 08:38 )             33.5     07 Jan 2019 08:38    135    |  96     |  19     ----------------------------<  157    4.5     |  32     |  0.59     Ca    8.6        07 Jan 2019 08:38      PT/INR - ( 06 Jan 2019 12:33 )   PT: 15.3 sec;   INR: 1.34 ratio         PTT - ( 06 Jan 2019 12:33 )  PTT:26.5 sec    CAPILLARY BLOOD GLUCOSE          RADIOLOGY & ADDITIONAL TESTS:    Notes Reviewed:  [x ] YES  [ ] NO    Care Discussed with Consultants/Other Providers [x ] YES  [ ] NO

## 2019-01-07 NOTE — PROGRESS NOTE ADULT - SUBJECTIVE AND OBJECTIVE BOX
Patient is a 58y old  Male who presents with a chief complaint of sob (07 Jan 2019 16:06)      INTERVAL HPI/OVERNIGHT EVENTS:    Complains of increasing shortness of breath    MEDICATIONS  (STANDING):  ALBUTerol/ipratropium for Nebulization 3 milliLiter(s) Nebulizer every 6 hours  buDESOnide   0.5 milliGRAM(s) Respule 0.5 milliGRAM(s) Inhalation two times a day  digoxin     Tablet 0.25 milliGRAM(s) Oral daily  diltiazem    Tablet 120 milliGRAM(s) Oral every 6 hours  heparin  Injectable 5000 Unit(s) SubCutaneous every 8 hours  influenza   Vaccine 0.5 milliLiter(s) IntraMuscular once  lactobacillus acidophilus 1 Tablet(s) Oral three times a day with meals  loratadine 10 milliGRAM(s) Oral daily  methylPREDNISolone sodium succinate Injectable 40 milliGRAM(s) IV Push three times a day  metoprolol succinate  milliGRAM(s) Oral daily  sodium chloride 1 Gram(s) Oral three times a day  tiotropium 18 MICROgram(s) Capsule 1 Capsule(s) Inhalation daily  verapamil 40 milliGRAM(s) Oral every 8 hours      MEDICATIONS  (PRN):  ALBUTerol    90 MICROgram(s) HFA Inhaler 2 Puff(s) Inhalation every 6 hours PRN Shortness of Breath  ALPRAZolam 0.25 milliGRAM(s) Oral three times a day PRN anxiety  guaiFENesin    Syrup 200 milliGRAM(s) Oral every 6 hours PRN Cough  promethazine 25 milliGRAM(s) Oral four times a day PRN breakthrough cough      Allergies    penicillin (Unknown)    Intolerances        PAST MEDICAL & SURGICAL HISTORY:  Dyspnea  Mediastinal adenopathy  Pericardial effusion  AF (atrial fibrillation)  Hyponatremia  COPD (chronic obstructive pulmonary disease)  HTN (hypertension)  S/P pericardial window creation      Vital Signs Last 24 Hrs  T(C): 36.3 (07 Jan 2019 21:07), Max: 36.7 (07 Jan 2019 13:46)  T(F): 97.3 (07 Jan 2019 21:07), Max: 98.1 (07 Jan 2019 13:46)  HR: 112 (07 Jan 2019 21:07) (68 - 119)  BP: 115/72 (07 Jan 2019 21:07) (110/70 - 126/68)  BP(mean): --  RR: 20 (07 Jan 2019 21:07) (17 - 20)  SpO2: 96% (07 Jan 2019 21:07) (95% - 97%)    PHYSICAL EXAMINATION:    GENERAL: The patient is awake and alert in no apparent distress.     HEENT: Head is normocephalic and atraumatic. Extraocular muscles are intact. Mucous membranes are moist.    NECK: Supple.    LUNGS: bilateral wheezes and rhonchi    HEART: Regular rate and rhythm without murmur.    ABDOMEN: Soft, nontender, and nondistended.      EXTREMITIES: Without any cyanosis, clubbing, rash, lesions or edema.    NEUROLOGIC: Grossly intact.    SKIN: No ulceration or induration present.      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

## 2019-01-07 NOTE — PROGRESS NOTE ADULT - ASSESSMENT
58M pmhx afib diagnosed December 2018 (not on AC), COPD, HTN, cardiac tamponade s/p window 12/2018 discharged from [facility] ~2 weeks for hyponatremia and found to have mediastinal adenopathy and moderate sized pericardial effusion with tamponade physiology with hospital course further complicated by new onset afib with RVR, who presented with worsening SOB and cough and was found to be hyponatremic and have pna. Found to have a malignancy. Patient had bronchoscopy and s/p bronch, patient went into rapid a fib which resolved on own. remains in rapid af now, unclear why not responding appropriately to meds    Recommend:    - s/p bronchoscopy complicated by Afib with RVR.  Remains in Afib with bursts of rapid rate at 130's.  - TTE does not reveal recurrent pericardial effusion of any significance  - HR may be difficult to control due to ongoing pulmonary process, and it has been thus far resistant to all changes in meds.  he remains tachy in the 120s	  - Continue  Toprol  mg daily  - Continue Cardizem 120 mg po q 6hours, raised as of this am  - Cont Digoxin at higher dose, raised this morning.  Please obtain Dig level on 12/8  - cw verapamil may have a slight improvement in rate overall although pt still w/ bursts in to 120 but seem to be occurring less frequently  - Continue tele monitor     - B/l LE doppler negative of DVT      - Pulm f/u.  Continue bronchodilators.  Encourage incentive spirometer  - BP well controlled, monitor routine hemodynamics.  - Monitor and replete lytes, keep K>4, Mg>2.    -Other cardiovascular workup will depend on clinical course.  -All other workup per primary team.  -Will continue to follow.    Jermaine Dave [facility]  Cardiology 58M pmhx afib diagnosed December 2018 (not on AC), COPD, HTN, cardiac tamponade s/p window 12/2018 discharged from [facility] ~2 weeks for hyponatremia and found to have mediastinal adenopathy and moderate sized pericardial effusion with tamponade physiology with hospital course further complicated by new onset afib with RVR, who presented with worsening SOB and cough and was found to be hyponatremic and have pna. Found to have a malignancy. Patient had bronchoscopy and s/p bronch, patient went into rapid a fib which resolved on own. remains in rapid af now, unclear why not responding appropriately to meds    Recommend:    - s/p bronchoscopy complicated by Afib with RVR.  Remains in Afib with bursts of rapid rate at 130's.  - TTE does not reveal recurrent pericardial effusion of any significance  - HR may be difficult to control due to ongoing pulmonary process, and it has been thus far resistant to all changes in meds.  he remains tachy in the 100-120  - Continue  Toprol  mg daily  - Continue Cardizem 120 mg po q 6hours  - Cont Digoxin at higher dose, 250 qd  - Please obtain Dig level on 1/8 to monitor for toxicity  - cw verapamil may have a slight improvement in rate overall although pt still w/ bursts in to 120 but seem to be occurring less frequently. This drug can be further uptitrated to HR control as toelrated by BP  - Continue tele monitor     - B/l LE doppler negative of DVT      - Pulm f/u.  Continue bronchodilators.  Encourage incentive spirometer  - BP well controlled, monitor routine hemodynamics.  - Monitor and replete lytes, keep K>4, Mg>2.    -Other cardiovascular workup will depend on clinical course.  -All other workup per primary team.  -Will continue to follow.    Jermaine Dave [facility]  Cardiology

## 2019-01-07 NOTE — PROGRESS NOTE ADULT - ASSESSMENT
57 y/o man w SOB, bulky mediastinal and hilar LADs, left lung compromise, left effusion, post bronch with biopsy at the [facility] sig for malignancy, favor small cell, IHC pending for confirmation    Hyponatremia /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 Mild hyponatremia today. BUN 2, Cr 0.6. No change in Cr.   A fib, mild hemoptysis  CT scan Head and MRI brain negative for metastasis.     RECOMMEND  - Further staging pending.   - await immunostains for final pathology result for chemotherapy planning, small cell vs nonsmall cell type  - starting Creatinine Clearance in prep for possible platinum based chemotherapy later this week  - plan for infusaport placement on 1/8/19 with IR and anesthesia at 3pm

## 2019-01-07 NOTE — PROGRESS NOTE ADULT - ASSESSMENT
Hyponatremia  SIADH  PNA  Hemoptysis       - Known to our group from last hospitalization with SIADH. He was sent home with NaCl tabs. He claimed to be compliant with salt tablets but not fluid restriction. No hypertonic saline needed. S/p tolvaptan 15 mg po x 1   - Renal indices are stable; sodium levels are within normal range; will monitor BMP to assess trends  - Continue NaCl tabs as ordered. Fluid restriction as ordered; No need for oral lasix at this time  - S/p CTA showing no PE  - S/p Bronch on 1/2/18  - IV steroids  - Pulm and Cardio follow up noted  - Abx   - Monitor chemistries    Thank you

## 2019-01-07 NOTE — PROGRESS NOTE ADULT - SUBJECTIVE AND OBJECTIVE BOX
Patient seen and examined;  Chart reviewed and events noted;   remains dyspneic; difficulty laying flat; increased gunjan in legs      MEDICATIONS  (STANDING):  ALBUTerol/ipratropium for Nebulization 3 milliLiter(s) Nebulizer every 6 hours  buDESOnide   0.5 milliGRAM(s) Respule 0.5 milliGRAM(s) Inhalation two times a day  digoxin     Tablet 0.25 milliGRAM(s) Oral daily  diltiazem    Tablet 120 milliGRAM(s) Oral every 6 hours  heparin  Injectable 5000 Unit(s) SubCutaneous every 8 hours  influenza   Vaccine 0.5 milliLiter(s) IntraMuscular once  lactobacillus acidophilus 1 Tablet(s) Oral three times a day with meals  loratadine 10 milliGRAM(s) Oral daily  methylPREDNISolone sodium succinate Injectable 40 milliGRAM(s) IV Push three times a day  metoprolol succinate  milliGRAM(s) Oral daily  sodium chloride 1 Gram(s) Oral three times a day  tiotropium 18 MICROgram(s) Capsule 1 Capsule(s) Inhalation daily  verapamil 40 milliGRAM(s) Oral every 8 hours    MEDICATIONS  (PRN):  ALBUTerol    90 MICROgram(s) HFA Inhaler 2 Puff(s) Inhalation every 6 hours PRN Shortness of Breath  ALPRAZolam 0.25 milliGRAM(s) Oral three times a day PRN anxiety  guaiFENesin    Syrup 200 milliGRAM(s) Oral every 6 hours PRN Cough  promethazine 25 milliGRAM(s) Oral four times a day PRN breakthrough cough      Vital Signs Last 24 Hrs  T(C): 36.6 (07 Jan 2019 04:30), Max: 36.9 (06 Jan 2019 20:59)  T(F): 97.9 (07 Jan 2019 04:30), Max: 98.4 (06 Jan 2019 20:59)  HR: 119 (07 Jan 2019 11:04) (68 - 134)  BP: 116/69 (07 Jan 2019 11:04) (110/70 - 127/70)  RR: 17 (07 Jan 2019 04:30) (17 - 20)  SpO2: 95% (07 Jan 2019 07:57) (94% - 97%)    PHYSICAL EXAM  General: adult in NAD  HEENT: clear oropharynx, anicteric sclera, pink conjunctivae  Neck: supple  CV: normal S1S2 with no murmur rubs or gallops  Lungs: reduced breath sounds at bases; bilateral rhonchi  Abdomen: soft non-tender non-distended, no hepato/splenomegaly  Ext: 2+ edema to mid-shins  Skin: no rashes and no petichiae  Neuro: alert and oriented X3 no focal deficits      LABS:                        10.9   12.83 )-----------( 314      ( 07 Jan 2019 08:38 )             33.5     01-07    135  |  96  |  19  ----------------------------<  157<H>  4.5   |  32<H>  |  0.59    Ca    8.6      07 Jan 2019 08:38      PT/INR - ( 06 Jan 2019 12:33 )   PT: 15.3 sec;   INR: 1.34 ratio    PTT - ( 06 Jan 2019 12:33 )  PTT:26.5 sec

## 2019-01-08 LAB
ANION GAP SERPL CALC-SCNC: 8 MMOL/L — SIGNIFICANT CHANGE UP (ref 5–17)
BODY SURFACE AREA CALCULATION: 1.73 M2 — SIGNIFICANT CHANGE UP
BUN SERPL-MCNC: 19 MG/DL — SIGNIFICANT CHANGE UP (ref 7–23)
CALCIUM SERPL-MCNC: 8.3 MG/DL — LOW (ref 8.5–10.1)
CHLORIDE SERPL-SCNC: 94 MMOL/L — LOW (ref 96–108)
CO2 SERPL-SCNC: 33 MMOL/L — HIGH (ref 22–31)
COLLECT DURATION TIME UR: 24 HR — SIGNIFICANT CHANGE UP
CREAT CL ?TM UR+SERPL-VRATE: 211 ML/MIN — HIGH (ref 85–125)
CREAT SERPL-MCNC: 0.56 MG/DL — SIGNIFICANT CHANGE UP (ref 0.5–1.3)
CREAT SERPL-MCNC: 0.57 MG/DL — SIGNIFICANT CHANGE UP (ref 0.5–1.3)
GLUCOSE SERPL-MCNC: 154 MG/DL — HIGH (ref 70–99)
POTASSIUM SERPL-MCNC: 4.2 MMOL/L — SIGNIFICANT CHANGE UP (ref 3.5–5.3)
POTASSIUM SERPL-SCNC: 4.2 MMOL/L — SIGNIFICANT CHANGE UP (ref 3.5–5.3)
SODIUM SERPL-SCNC: 135 MMOL/L — SIGNIFICANT CHANGE UP (ref 135–145)
TOTAL VOLUME - 24 HOUR: 1650 ML — SIGNIFICANT CHANGE UP
URINE CREATININE CALCULATION: 1.7 G/24 H — SIGNIFICANT CHANGE UP (ref 1–2)

## 2019-01-08 PROCEDURE — 77001 FLUOROGUIDE FOR VEIN DEVICE: CPT | Mod: 26

## 2019-01-08 PROCEDURE — 99233 SBSQ HOSP IP/OBS HIGH 50: CPT

## 2019-01-08 PROCEDURE — 76937 US GUIDE VASCULAR ACCESS: CPT | Mod: 26

## 2019-01-08 PROCEDURE — 36561 INSERT TUNNELED CV CATH: CPT

## 2019-01-08 RX ORDER — FUROSEMIDE 40 MG
40 TABLET ORAL DAILY
Qty: 0 | Refills: 0 | Status: DISCONTINUED | OUTPATIENT
Start: 2019-01-09 | End: 2019-01-11

## 2019-01-08 RX ORDER — FUROSEMIDE 40 MG
20 TABLET ORAL DAILY
Qty: 0 | Refills: 0 | Status: DISCONTINUED | OUTPATIENT
Start: 2019-01-08 | End: 2019-01-08

## 2019-01-08 RX ORDER — VERAPAMIL HCL 240 MG
80 CAPSULE, EXTENDED RELEASE PELLETS 24 HR ORAL EVERY 8 HOURS
Qty: 0 | Refills: 0 | Status: DISCONTINUED | OUTPATIENT
Start: 2019-01-08 | End: 2019-01-10

## 2019-01-08 RX ADMIN — HEPARIN SODIUM 5000 UNIT(S): 5000 INJECTION INTRAVENOUS; SUBCUTANEOUS at 21:22

## 2019-01-08 RX ADMIN — Medication 0.5 MILLIGRAM(S): at 20:03

## 2019-01-08 RX ADMIN — SODIUM CHLORIDE 1 GRAM(S): 9 INJECTION INTRAMUSCULAR; INTRAVENOUS; SUBCUTANEOUS at 21:25

## 2019-01-08 RX ADMIN — SODIUM CHLORIDE 1 GRAM(S): 9 INJECTION INTRAMUSCULAR; INTRAVENOUS; SUBCUTANEOUS at 05:15

## 2019-01-08 RX ADMIN — Medication 40 MILLIGRAM(S): at 05:14

## 2019-01-08 RX ADMIN — Medication 40 MILLIGRAM(S): at 05:15

## 2019-01-08 RX ADMIN — Medication 40 MILLIGRAM(S): at 21:22

## 2019-01-08 RX ADMIN — SODIUM CHLORIDE 1 GRAM(S): 9 INJECTION INTRAMUSCULAR; INTRAVENOUS; SUBCUTANEOUS at 13:29

## 2019-01-08 RX ADMIN — ALBUTEROL 2 PUFF(S): 90 AEROSOL, METERED ORAL at 06:46

## 2019-01-08 RX ADMIN — Medication 0.5 MILLIGRAM(S): at 07:36

## 2019-01-08 RX ADMIN — Medication 3 MILLILITER(S): at 07:36

## 2019-01-08 RX ADMIN — Medication 3 MILLILITER(S): at 20:03

## 2019-01-08 RX ADMIN — Medication 40 MILLIGRAM(S): at 13:30

## 2019-01-08 RX ADMIN — Medication 1 TABLET(S): at 18:06

## 2019-01-08 RX ADMIN — Medication 3 MILLILITER(S): at 02:09

## 2019-01-08 RX ADMIN — Medication 200 MILLIGRAM(S): at 05:15

## 2019-01-08 RX ADMIN — Medication 80 MILLIGRAM(S): at 21:25

## 2019-01-08 RX ADMIN — Medication 0.25 MILLIGRAM(S): at 05:15

## 2019-01-08 RX ADMIN — LORATADINE 10 MILLIGRAM(S): 10 TABLET ORAL at 13:30

## 2019-01-08 NOTE — PROGRESS NOTE ADULT - ASSESSMENT
Hyponatremia  SIADH  PNA  Hemoptysis   Pleural effusion  Lung CA      - Known to our group from last hospitalization with SIADH. He was sent home with NaCl tabs. He claimed to be compliant with salt tablets but not fluid restriction. No hypertonic saline needed. S/p tolvaptan 15 mg po x 1   - Renal indices are stable; sodium levels are within normal range  - Continue NaCl tabs as ordered. Fluid restriction as ordered; Add Lasix 20mg IVP daily  - S/p CTA showing no PE  - S/p Bronch on 1/2/18  - IV steroids  - Pulm and Cardio follow up noted  - Abx   - Monitor chemistries  - For chemoport today 1/8/19    Thank you

## 2019-01-08 NOTE — PROGRESS NOTE ADULT - ASSESSMENT
58M pmhx afib diagnosed December 2018 (not on AC), COPD, HTN, cardiac tamponade s/p window 12/2018 discharged from [facility] ~2 weeks for hyponatremia and found to have mediastinal adenopathy and moderate sized pericardial effusion with tamponade physiology with hospital course further complicated by new onset afib with RVR, who presented with worsening SOB and cough and was found to be hyponatremic and have pna. Found to have a malignancy. Patient had bronchoscopy and s/p bronch, patient went into rapid a fib which resolved on own. remains in rapid af now, unclear why not responding appropriately to meds    Recommend:  - s/p RIJ venous cath insertion.  Patient to start with Chemo in am  - Appears overloaded, start Lasix 40 mg IV daily  - Monitor strict I & O's  - Monitor daily standing weights  - Monitor electrolytes, replete to keep K>4 and Mag>2  - TTE does not reveal recurrent pericardial effusion of any significance    - s/p bronchoscopy complicated by Afib with RVR.  Remains in Afib, rates still not fully controlled  - HR may be difficult to control due to ongoing pulmonary process, and it has been thus far resistant to all changes in meds.  He remains tachy in the 100-120  - Continue  Toprol  mg daily  - Continue Cardizem 120 mg po q 6hours  - Cont Digoxin at higher dose, 250 qd.  Digoxin level in am, please  - Continue verapamil but increase to 80 mg q8H.  May have a slight improvement in rate overall, although pt still w/ bursts in to 120 but seem to be occurring less frequently.   - Continue tele monitor     - B/l LE doppler negative of DVT    - Pulm f/u.  Continue bronchodilators and steroids.  Encourage incentive spirometer  - BP well controlled, monitor routine hemodynamics.  - Monitor and replete lytes, keep K>4, Mg>2.    -Other cardiovascular workup will depend on clinical course.  -All other workup per primary team.  -Will continue to follow.    Steph Pradhan NP  Cardiology 58M pmhx afib diagnosed December 2018 (not on AC), COPD, HTN, cardiac tamponade s/p window 12/2018 discharged from [facility] ~2 weeks for hyponatremia and found to have mediastinal adenopathy and moderate sized pericardial effusion with tamponade physiology with hospital course further complicated by new onset afib with RVR, who presented with worsening SOB and cough and was found to be hyponatremic and have pna. Found to have a malignancy. Patient had bronchoscopy and s/p bronch, patient went into rapid a fib which resolved on own. remains in rapid af now, unclear why not responding appropriately to meds    Recommend:  - s/p RIJ venous cath insertion.  Patient to start with Chemo in am  - Appears overloaded with lower ext edema and c/o orthopnea....start Lasix 40 mg IV daily  - Please continue to maintain strict I/Os, monitor daily weights, Cr, and K.   - Monitor daily standing weights  - Monitor electrolytes, replete to keep K>4 and Mag>2  - TTE does not reveal recurrent pericardial effusion of any significance    - s/p bronchoscopy complicated by Afib with RVR.  Remains in Afib, rates still not fully controlled  - HR may be difficult to control due to ongoing pulmonary process, and it has been thus far resistant to all changes in meds.  He remains tachy in the 100-120  - Continue  Toprol  mg daily  - Continue Cardizem 120 mg po q 6hours  - Cont Digoxin at higher dose, 250 qd.  Digoxin level in am, please  - Continue verapamil but increase to 80 mg q8H.  May have a slight improvement in rate overall, although pt still w/ bursts in to 120 but seem to be occurring less frequently.   - Continue tele monitor     - B/l LE doppler negative of DVT    - Pulm f/u.  Continue bronchodilators and steroids.  Encourage incentive spirometer  - BP well controlled, monitor routine hemodynamics.  - Monitor and replete lytes, keep K>4, Mg>2.    -Other cardiovascular workup will depend on clinical course.  -All other workup per primary team.  -Will continue to follow.    Steph Pradhan NP  Cardiology

## 2019-01-08 NOTE — PROGRESS NOTE ADULT - SUBJECTIVE AND OBJECTIVE BOX
[facility] -- Gurpreet Bush, Emily, Corey, Sajan Abebe Savella  Office # [phone]    Follow Up:  Afib, Pleural effusion    Subjective/Observations: Claims SOB, PARKS are worse today.  Denies CP or palpitations.  S/P Right IJ venous cath insertion    REVIEW OF SYSTEMS: All other review of systems is negative unless indicated above    PAST MEDICAL & SURGICAL HISTORY:  Dyspnea  Mediastinal adenopathy  Pericardial effusion  AF (atrial fibrillation)  Hyponatremia  COPD (chronic obstructive pulmonary disease)  HTN (hypertension)  S/P pericardial window creation    MEDICATIONS  (STANDING):  ALBUTerol/ipratropium for Nebulization 3 milliLiter(s) Nebulizer every 6 hours  buDESOnide   0.5 milliGRAM(s) Respule 0.5 milliGRAM(s) Inhalation two times a day  digoxin     Tablet 0.25 milliGRAM(s) Oral daily  diltiazem    Tablet 120 milliGRAM(s) Oral every 6 hours  heparin  Injectable 5000 Unit(s) SubCutaneous every 8 hours  influenza   Vaccine 0.5 milliLiter(s) IntraMuscular once  lactobacillus acidophilus 1 Tablet(s) Oral three times a day with meals  loratadine 10 milliGRAM(s) Oral daily  methylPREDNISolone sodium succinate Injectable 40 milliGRAM(s) IV Push three times a day  metoprolol succinate  milliGRAM(s) Oral daily  sodium chloride 1 Gram(s) Oral three times a day  tiotropium 18 MICROgram(s) Capsule 1 Capsule(s) Inhalation daily  verapamil 40 milliGRAM(s) Oral every 8 hours    MEDICATIONS  (PRN):  ALBUTerol    90 MICROgram(s) HFA Inhaler 2 Puff(s) Inhalation every 6 hours PRN Shortness of Breath  ALPRAZolam 0.25 milliGRAM(s) Oral three times a day PRN anxiety  guaiFENesin    Syrup 200 milliGRAM(s) Oral every 6 hours PRN Cough  promethazine 25 milliGRAM(s) Oral four times a day PRN breakthrough cough    Allergies    penicillin (Unknown)    Intolerances    Vital Signs Last 24 Hrs  T(C): 36.7 (08 Jan 2019 16:08), Max: 37.6 (08 Jan 2019 13:44)  T(F): 98 (08 Jan 2019 16:08), Max: 99.7 (08 Jan 2019 13:44)  HR: 110 (08 Jan 2019 18:04) (105 - 117)  BP: 116/72 (08 Jan 2019 18:04) (113/77 - 148/86)  BP(mean): --  RR: 19 (08 Jan 2019 16:08) (16 - 20)  SpO2: 95% (08 Jan 2019 16:08) (94% - 98%)    I&O's Summary    07 Jan 2019 07:01  -  08 Jan 2019 07:00  --------------------------------------------------------  IN: 0 mL / OUT: 300 mL / NET: -300 mL    08 Jan 2019 07:01  -  08 Jan 2019 19:13  --------------------------------------------------------  IN: 0 mL / OUT: 800 mL / NET: -800 mL    PHYSICAL EXAM:  TELE: Afib  Constitutional: NAD, awake and alert, wobese  HEENT: Moist Mucous Membranes, Anicteric  Pulmonary: Non-labored, breath sounds are clear bilaterally, No wheezing.  + rales and rhonchi  Cardiovascular: Regular, S1 and S2, No murmurs, rubs, gallops or clicks  Gastrointestinal: Bowel Sounds present, soft, nontender.   Lymph: No peripheral edema. No lymphadenopathy.  Skin: No visible rashes or ulcers.  Psych:  Mood & affect appropriate    LABS: All Labs Reviewed:                        10.9   12.83 )-----------( 314      ( 07 Jan 2019 08:38 )             33.5     08 Jan 2019 10:00    135    |  94     |  19     ----------------------------<  154    4.2     |  33     |  0.56   07 Jan 2019 22:07    x      |  x      |  x      ----------------------------<  x      x       |  x      |  0.57   07 Jan 2019 08:38    135    |  96     |  19     ----------------------------<  157    4.5     |  32     |  0.59     Ca    8.3        08 Jan 2019 10:00  Ca    8.6        07 Jan 2019 08:38    < from: TTE Echo Doppler w/o Cont (01.04.19 @ 09:32) >     EXAM:  ECHO TTE WO CON COMP W DOPPLR         PROCEDURE DATE:  01/04/2019        INTERPRETATION:  INDICATION: Atrial fibrillation, pericardial effusion    Blood Pressure 138/73    Height 185     Weight 111       BSA 2.3    Dimensions:    LA 4.4  Normal Values: 2.0 - 4.0 cm    Ao 4.0        Normal Values: 2.0 - 3.8 cm  SEPTUM           Normal Values: 0.6 - 1.2 cm  PWT           Normal Values: 0.6 - 1.1 cm  LVIDd             Normal Values: 3.0 - 5.6 cm  LVIDs             Normal Values: 1.8 -4.0 cm    Derived Variables:  LVMI     g/m2  RWT      Fractional Short      Ejection Fraction        Doppler Peak v. AoV=   (m/sec)    OBSERVATIONS:    This is a technically limited study with suboptimal endocardial   definition. Within these limitations, the left ventricle appears to be   normal in size with overall normal systolic function. The right ventricle   is not well seen but is probably normal in size with normal systolic   function. Biatrial enlargement. The aortic root is mildly dilated. The   mitral valve is not well seen. There is trace physiologic MR noted. Other   valves appear structurally normal as visualized. Inadequate TR jet to   estimate PA systolic pressure. No significant pericardial effusion. Left   pleural effusion. The patient was in rapid atrial fibrillation during   this examination.    ISAC FLORES M.D., ATTENDING CARDIOLOGIST  This document has been electronically signed. Jan 4 2019  3:55PM      < end of copied text >    < from: CT Angio Chest w/ IV Cont (01.05.19 @ 00:27) >    EXAM:  CT ANGIO CHEST (W)AW IC                          PROCEDURE DATE:  01/05/2019        INTERPRETATION:  [facility] RADIOLOGIST PRELIMINARY REPORT    EXAM:    CT Angiography Chest Without And With Contrast     EXAM DATE/TIME:    1/4/2019 11:45 PM     CLINICAL HISTORY:    58 years old, male; Signs and symptoms; Shortness of breath; Prior   surgery;   Surgery date: 3-7 days post-operative. History of lung cancer.     TECHNIQUE:    Axial computed tomographic angiography images of the chest without and   with intravenous contrast using CT angiography protocol.    Coronal and sagittal reformatted images were created and reviewed.    MIP reconstructed images were created and reviewed.     CONTRAST:    95 ml of omnipaque administered intravenously.     COMPARISON:    CT CHEST 12/13/2018 8:13 AM     FINDINGS:    Pulmonary arteries:  No evidence of pulmonary embolus.    Aorta:  Aorta is unremarkable. No evidence of dissection. No evidence of   aneurysm.    Other veins:  There is extensive mass effect from mediastinal mass   lesions on   superior aspect of left atrium with complete nonvisualization of left   superior   pulmonary vein and moderate to severe stenosis of left inferior pulmonary   vein.    Lungs:  There is moderate narrowing of bilateral mainstem bronchi and   left   upper and lower lobe bronchi secondary to mass effect from mediastinal   and   hilar mass lesions.    Pleural space:  Moderate to large left pleural effusion. Small right   pleural   effusion. Left lower lobe and lingular infiltrates. Large dense left   upper lobe   infiltrate.    Heart: Normal. No cardiomegaly. No pericardial effusion.    Mediastinum:  There is complete collapse of mid esophagus secondary to   mass   effect from mediastinal metastatic disease.    Lymph nodes:  Multiple very enlarged mediastinal and hilar lymph nodes   consistent with metastatic disease.    Bones/joints: Unremarkable. No acute fracture.    Soft tissues: Unremarkable.     IMPRESSION:   1. Multiple very enlarged mediastinal and hilar lymph nodes consistent   with metastatic disease.   2. There is moderate narrowing of bilateral mainstem bronchi and left   upper and lower lobe bronchi secondary to mass effect from mediastinal   and hilar mass lesions.   3. Moderate to large left pleural effusion. Small right pleural effusion.   Left lower lobe and lingular infiltrates. Large dense left upper lobe   infiltrate. Superimposed pneumonia or pulmonary edema is considered.  4. There is complete collapse of mid esophagus secondary to mass effect   from mediastinal metastatic disease.   5. There is extensive mass effect from mediastinal mass lesions on   superior aspect of left atrium with complete nonvisualization of left   superior pulmonary vein and moderate to severestenosis of left inferior   pulmonary vein.   6. No evidence of pulmonary arterial embolus.    AGREE WITH ABOVE FINDINGS    JUAN GARCIA M.D., ATTENDING RADIOLOGIST  This document has been electronically signed. Jan 5 2019  9:07AM     < end of copied text >

## 2019-01-08 NOTE — BRIEF OPERATIVE NOTE - PRE-OP DX
Malignant neoplasm of lung, unspecified laterality, unspecified part of lung  01/08/2019    Active  Paul Brownlee

## 2019-01-08 NOTE — PROGRESS NOTE ADULT - SUBJECTIVE AND OBJECTIVE BOX
Patient is a 58y old  Male who presents with a chief complaint of sob (08 Jan 2019 13:27)      INTERVAL /OVERNIGHT EVENTS: feels better    MEDICATIONS  (STANDING):  ALBUTerol/ipratropium for Nebulization 3 milliLiter(s) Nebulizer every 6 hours  buDESOnide   0.5 milliGRAM(s) Respule 0.5 milliGRAM(s) Inhalation two times a day  digoxin     Tablet 0.25 milliGRAM(s) Oral daily  diltiazem    Tablet 120 milliGRAM(s) Oral every 6 hours  furosemide   Injectable 20 milliGRAM(s) IV Push daily  heparin  Injectable 5000 Unit(s) SubCutaneous every 8 hours  influenza   Vaccine 0.5 milliLiter(s) IntraMuscular once  lactobacillus acidophilus 1 Tablet(s) Oral three times a day with meals  loratadine 10 milliGRAM(s) Oral daily  methylPREDNISolone sodium succinate Injectable 40 milliGRAM(s) IV Push three times a day  metoprolol succinate  milliGRAM(s) Oral daily  sodium chloride 1 Gram(s) Oral three times a day  tiotropium 18 MICROgram(s) Capsule 1 Capsule(s) Inhalation daily  verapamil 40 milliGRAM(s) Oral every 8 hours    MEDICATIONS  (PRN):  ALBUTerol    90 MICROgram(s) HFA Inhaler 2 Puff(s) Inhalation every 6 hours PRN Shortness of Breath  ALPRAZolam 0.25 milliGRAM(s) Oral three times a day PRN anxiety  guaiFENesin    Syrup 200 milliGRAM(s) Oral every 6 hours PRN Cough  promethazine 25 milliGRAM(s) Oral four times a day PRN breakthrough cough      Allergies    penicillin (Unknown)    Intolerances        REVIEW OF SYSTEMS:  CONSTITUTIONAL: No fever, weight loss, or fatigue  EYES: No eye pain, visual disturbances, or discharge  ENMT:  No difficulty hearing, tinnitus, vertigo; No sinus or throat pain  NECK: No pain or stiffness  RESPIRATORY: No cough, wheezing, chills or hemoptysis; No shortness of breath  CARDIOVASCULAR: No chest pain, palpitations, dizziness, or leg swelling  GASTROINTESTINAL: No abdominal or epigastric pain. No nausea, vomiting, or hematemesis; No diarrhea or constipation. No melena or hematochezia.  GENITOURINARY: No dysuria, frequency, hematuria, or incontinence  NEUROLOGICAL: No headaches, memory loss, loss of strength, numbness, or tremors  SKIN: No itching, burning, rashes, or lesions   LYMPH NODES: No enlarged glands  ENDOCRINE: No heat or cold intolerance; No hair loss; No polydipsia or polyuria  MUSCULOSKELETAL: No joint pain or swelling; No muscle, back, or extremity pain  PSYCHIATRIC: No depression, anxiety, mood swings, or difficulty sleeping  HEME/LYMPH: No easy bruising, or bleeding gums  ALLERGY AND IMMUNOLOGIC: No hives or eczema    Vital Signs Last 24 Hrs  T(C): 36.7 (08 Jan 2019 16:08), Max: 37.6 (08 Jan 2019 13:44)  T(F): 98 (08 Jan 2019 16:08), Max: 99.7 (08 Jan 2019 13:44)  HR: 115 (08 Jan 2019 16:08) (105 - 119)  BP: 113/77 (08 Jan 2019 16:08) (113/77 - 148/86)  BP(mean): --  RR: 19 (08 Jan 2019 16:08) (16 - 20)  SpO2: 95% (08 Jan 2019 16:08) (94% - 98%)    PHYSICAL EXAM:  GENERAL: NAD, well-groomed, well-developed  HEAD:  Atraumatic, Normocephalic  EYES: EOMI, PERRLA, conjunctiva and sclera clear  ENMT: No tonsillar erythema, exudates, or enlargement; Moist mucous membranes, Good dentition, No lesions  NECK: Supple, No JVD, Normal thyroid  NERVOUS SYSTEM:  Alert & Oriented X3, Good concentration; Motor Strength 5/5 B/L upper and lower extremities; DTRs 2+ intact and symmetric  CHEST/LUNG: Clear to auscultation bilaterally; No rales, rhonchi, wheezing, or rubs  HEART: Regular rate and rhythm; No murmurs, rubs, or gallops  ABDOMEN: Soft, Nontender, Nondistended; Bowel sounds present  EXTREMITIES:  2+ Peripheral Pulses, No clubbing, cyanosis, or edema  LYMPH: No lymphadenopathy noted  SKIN: No rashes or lesions    LABS:    08 Jan 2019 10:00    135    |  94     |  19     ----------------------------<  154    4.2     |  33     |  0.56     Ca    8.3        08 Jan 2019 10:00          CAPILLARY BLOOD GLUCOSE          RADIOLOGY & ADDITIONAL TESTS:    Notes Reviewed:  [x ] YES  [ ] NO    Care Discussed with Consultants/Other Providers [x ] YES  [ ] NO

## 2019-01-08 NOTE — PROGRESS NOTE ADULT - SUBJECTIVE AND OBJECTIVE BOX
All interim records and events noted.    sitting up in chair, PARKS  for infusoport today      MEDICATIONS  (STANDING):  ALBUTerol/ipratropium for Nebulization 3 milliLiter(s) Nebulizer every 6 hours  buDESOnide   0.5 milliGRAM(s) Respule 0.5 milliGRAM(s) Inhalation two times a day  digoxin     Tablet 0.25 milliGRAM(s) Oral daily  diltiazem    Tablet 120 milliGRAM(s) Oral every 6 hours  furosemide   Injectable 20 milliGRAM(s) IV Push daily  heparin  Injectable 5000 Unit(s) SubCutaneous every 8 hours  influenza   Vaccine 0.5 milliLiter(s) IntraMuscular once  lactobacillus acidophilus 1 Tablet(s) Oral three times a day with meals  loratadine 10 milliGRAM(s) Oral daily  methylPREDNISolone sodium succinate Injectable 40 milliGRAM(s) IV Push three times a day  metoprolol succinate  milliGRAM(s) Oral daily  sodium chloride 1 Gram(s) Oral three times a day  tiotropium 18 MICROgram(s) Capsule 1 Capsule(s) Inhalation daily  verapamil 40 milliGRAM(s) Oral every 8 hours    MEDICATIONS  (PRN):  ALBUTerol    90 MICROgram(s) HFA Inhaler 2 Puff(s) Inhalation every 6 hours PRN Shortness of Breath  ALPRAZolam 0.25 milliGRAM(s) Oral three times a day PRN anxiety  guaiFENesin    Syrup 200 milliGRAM(s) Oral every 6 hours PRN Cough  promethazine 25 milliGRAM(s) Oral four times a day PRN breakthrough cough      Vital Signs Last 24 Hrs  T(C): 36.5 (08 Jan 2019 04:52), Max: 36.7 (07 Jan 2019 13:46)  T(F): 97.7 (08 Jan 2019 04:52), Max: 98.1 (07 Jan 2019 13:46)  HR: 110 (08 Jan 2019 04:52) (105 - 119)  BP: 128/78 (08 Jan 2019 04:52) (115/72 - 148/86)  BP(mean): --  RR: 17 (08 Jan 2019 04:52) (17 - 20)  SpO2: 97% (08 Jan 2019 04:52) (95% - 98%)    PHYSICAL EXAM  General: well developed  well nourished, in no acute distres  Head: atraumatic, normocephalic  ENT: sclera anicteric, buccal mucosa moist  Neck: supple, trachea midline  CV: S1 S2, regular rate and rhythm  Lungs: clear to auscultation, no wheezes/rhonchi  Abdomen: soft, nontender, bowel sounds present, pouchy  Extrem: no clubbing/cyanosis/edema  Skin: no significant increased ecchymosis/petechiae  Neuro: alert and oriented X3,  no focal deficits      LABS:             10.9   12.83 )-----------( 314      ( 01-07 @ 08:38 )             33.5       01-08    135  |  94<L>  |  19  ----------------------------<  154<H>  4.2   |  33<H>  |  0.56    Ca    8.3<L>      08 Jan 2019 10:00      01-06 @ 12:33  PT15.3 INR1.34  PTT26.5      RADIOLOGY & ADDITIONAL STUDIES:    IMPRESSION/RECOMMENDATIONS:

## 2019-01-08 NOTE — BRIEF OPERATIVE NOTE - PROCEDURE
<<-----Click on this checkbox to enter Procedure Venous access port placement  01/08/2019    Active  WFORMAN

## 2019-01-08 NOTE — PROGRESS NOTE ADULT - ASSESSMENT
59 y/o man w SOB, bulky mediastinal and hilar LADs, left lung compromise, left effusion, post bronch w pathology sig for malignancy, favor small cell, IHC pending for confirmation  Hyponatremia /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 Mild hyponatremia today. BUN 2, Cr 0.6. No change in Cr.   A fib, mild hemoptysis  CT scan Head and MRI brain negative for metastasis.   Further staging pending.     spoke w Pathology, special stain results likely available later today/tomorrow  pt for infusoport  24 urine creatinine clearance testing in progress  in view of sig symptoms, will start chemo w Carbo/VP16 tomorrow. discussing w Nursing for staffing arrangements.  discussed w pt, reviewed chemo regimen/GCSF support

## 2019-01-08 NOTE — PROGRESS NOTE ADULT - SUBJECTIVE AND OBJECTIVE BOX
NEPHROLOGY INTERVAL HPI/OVERNIGHT EVENTS:  HPI:  pt is a 59yo male with pmhx of htn, copd, cardiac tamponade s/p window 2018 c/o cough x days. pt reports non-productive cough with associated dyspnea on exertion. pt reports "it feels like something is stuck in my throat". pt reports bl le swelling without pain. pt recently admitted for pericardial effusion s/p window. pt quit smoking aprox 2 weeks ago.  In ER patient was found to have PNA on CXR.  Patient is being admitted for further work up and treatment. (31 Dec 2018 21:51)    Follow up SIADH  Complains of worsening shortness of breath    PAST MEDICAL & SURGICAL HISTORY:  Dyspnea  Mediastinal adenopathy  Pericardial effusion  AF (atrial fibrillation)  Hyponatremia  COPD (chronic obstructive pulmonary disease)  HTN (hypertension)  S/P pericardial window creation      MEDICATIONS  (STANDING):  ALBUTerol/ipratropium for Nebulization 3 milliLiter(s) Nebulizer every 6 hours  buDESOnide   0.5 milliGRAM(s) Respule 0.5 milliGRAM(s) Inhalation two times a day  digoxin     Tablet 0.25 milliGRAM(s) Oral daily  diltiazem    Tablet 120 milliGRAM(s) Oral every 6 hours  furosemide   Injectable 20 milliGRAM(s) IV Push daily  heparin  Injectable 5000 Unit(s) SubCutaneous every 8 hours  influenza   Vaccine 0.5 milliLiter(s) IntraMuscular once  lactobacillus acidophilus 1 Tablet(s) Oral three times a day with meals  loratadine 10 milliGRAM(s) Oral daily  methylPREDNISolone sodium succinate Injectable 40 milliGRAM(s) IV Push three times a day  metoprolol succinate  milliGRAM(s) Oral daily  sodium chloride 1 Gram(s) Oral three times a day  tiotropium 18 MICROgram(s) Capsule 1 Capsule(s) Inhalation daily  verapamil 40 milliGRAM(s) Oral every 8 hours    MEDICATIONS  (PRN):  ALBUTerol    90 MICROgram(s) HFA Inhaler 2 Puff(s) Inhalation every 6 hours PRN Shortness of Breath  ALPRAZolam 0.25 milliGRAM(s) Oral three times a day PRN anxiety  guaiFENesin    Syrup 200 milliGRAM(s) Oral every 6 hours PRN Cough  promethazine 25 milliGRAM(s) Oral four times a day PRN breakthrough cough      Allergies    penicillin (Unknown)    Intolerances        Vital Signs Last 24 Hrs  T(C): 36.5 (2019 04:52), Max: 36.7 (2019 13:46)  T(F): 97.7 (2019 04:52), Max: 98.1 (2019 13:46)  HR: 110 (2019 04:52) (68 - 119)  BP: 128/78 (2019 04:52) (115/72 - 148/86)  BP(mean): --  RR: 17 (2019 04:52) (17 - 20)  SpO2: 97% (2019 04:52) (95% - 98%)  Daily     Daily Weight in k.6 (2019 04:52)    PHYSICAL EXAM:  GENERAL: no distress, sitting in chair, unable to lay flat  HEAD:  Atraumatic, Normocephalic  EYES: Conjunctiva and sclera clear  ENMT: Moist mucous membranes, Good dentition, No lesions  NECK: Supple  NERVOUS SYSTEM:  Alert & Oriented X3, follows commands well  CHEST/LUNG: Scattered wheezing, reduced breath sounds overall  HEART: Regular rate and rhythm; No murmurs, rubs, or gallops  ABDOMEN: Soft, Nontender, Nondistended; Bowel sounds present  EXTREMITIES: trace Edema  LYMPH: No lymphadenopathy noted  SKIN: No rashes or lesions      LABS:                        10.9   12.83 )-----------( 314      ( 2019 08:38 )             33.5     -    135  |  96  |  19  ----------------------------<  157<H>  4.5   |  32<H>  |  0.59    Ca    8.6      2019 08:38      PT/INR - ( 2019 12:33 )   PT: 15.3 sec;   INR: 1.34 ratio         PTT - ( 2019 12:33 )  PTT:26.5 sec          RADIOLOGY & ADDITIONAL TESTS:

## 2019-01-09 PROBLEM — R59.0 MEDIASTINAL ADENOPATHY: Status: ACTIVE | Noted: 2019-01-09

## 2019-01-09 PROBLEM — Z87.891 RECENTLY QUIT USING TOBACCO: Status: ACTIVE | Noted: 2019-01-09

## 2019-01-09 PROBLEM — Z87.01 HISTORY OF PNEUMONIA: Status: RESOLVED | Noted: 2019-01-09 | Resolved: 2019-01-09

## 2019-01-09 PROBLEM — E87.1 HYPONATREMIA: Status: RESOLVED | Noted: 2019-01-09 | Resolved: 2019-01-09

## 2019-01-09 LAB
DIGOXIN SERPL-MCNC: 0.5 NG/ML — LOW (ref 0.8–2)
DIGOXIN SERPL-MCNC: 1.2 NG/ML — SIGNIFICANT CHANGE UP (ref 0.8–2)

## 2019-01-09 PROCEDURE — 99232 SBSQ HOSP IP/OBS MODERATE 35: CPT

## 2019-01-09 RX ORDER — ONDANSETRON 8 MG/1
10 TABLET, FILM COATED ORAL EVERY 8 HOURS
Qty: 0 | Refills: 0 | Status: DISCONTINUED | OUTPATIENT
Start: 2019-01-09 | End: 2019-01-15

## 2019-01-09 RX ORDER — DEXAMETHASONE 0.5 MG/5ML
10 ELIXIR ORAL ONCE
Qty: 0 | Refills: 0 | Status: COMPLETED | OUTPATIENT
Start: 2019-01-09 | End: 2019-01-09

## 2019-01-09 RX ORDER — ONDANSETRON 8 MG/1
10 TABLET, FILM COATED ORAL ONCE
Qty: 0 | Refills: 0 | Status: DISCONTINUED | OUTPATIENT
Start: 2019-01-09 | End: 2019-01-14

## 2019-01-09 RX ORDER — CARBOPLATIN 50 MG
750 VIAL (EA) INTRAVENOUS ONCE
Qty: 0 | Refills: 0 | Status: COMPLETED | OUTPATIENT
Start: 2019-01-09 | End: 2019-01-09

## 2019-01-09 RX ORDER — PALONOSETRON HYDROCHLORIDE 0.25 MG/5ML
0.25 INJECTION, SOLUTION INTRAVENOUS ONCE
Qty: 0 | Refills: 0 | Status: COMPLETED | OUTPATIENT
Start: 2019-01-09 | End: 2019-01-09

## 2019-01-09 RX ORDER — PROCHLORPERAZINE MALEATE 5 MG
10 TABLET ORAL EVERY 6 HOURS
Qty: 0 | Refills: 0 | Status: DISCONTINUED | OUTPATIENT
Start: 2019-01-09 | End: 2019-01-15

## 2019-01-09 RX ORDER — ETOPOSIDE 20 MG/ML
230 VIAL (ML) INTRAVENOUS ONCE
Qty: 0 | Refills: 0 | Status: COMPLETED | OUTPATIENT
Start: 2019-01-09 | End: 2019-01-09

## 2019-01-09 RX ADMIN — Medication 3 MILLILITER(S): at 07:56

## 2019-01-09 RX ADMIN — Medication 3 MILLILITER(S): at 20:08

## 2019-01-09 RX ADMIN — HEPARIN SODIUM 5000 UNIT(S): 5000 INJECTION INTRAVENOUS; SUBCUTANEOUS at 21:35

## 2019-01-09 RX ADMIN — Medication 1 TABLET(S): at 17:03

## 2019-01-09 RX ADMIN — Medication 0.5 MILLIGRAM(S): at 20:08

## 2019-01-09 RX ADMIN — Medication 1 TABLET(S): at 12:36

## 2019-01-09 RX ADMIN — Medication 80 MILLIGRAM(S): at 21:36

## 2019-01-09 RX ADMIN — PALONOSETRON HYDROCHLORIDE 0.25 MILLIGRAM(S): 0.25 INJECTION, SOLUTION INTRAVENOUS at 16:48

## 2019-01-09 RX ADMIN — Medication 200 MILLIGRAM(S): at 06:35

## 2019-01-09 RX ADMIN — Medication 3 MILLILITER(S): at 14:01

## 2019-01-09 RX ADMIN — Medication 40 MILLIGRAM(S): at 06:34

## 2019-01-09 RX ADMIN — SODIUM CHLORIDE 1 GRAM(S): 9 INJECTION INTRAMUSCULAR; INTRAVENOUS; SUBCUTANEOUS at 21:36

## 2019-01-09 RX ADMIN — Medication 40 MILLIGRAM(S): at 06:43

## 2019-01-09 RX ADMIN — Medication 511.5 MILLIGRAM(S): at 17:59

## 2019-01-09 RX ADMIN — SODIUM CHLORIDE 1 GRAM(S): 9 INJECTION INTRAMUSCULAR; INTRAVENOUS; SUBCUTANEOUS at 13:57

## 2019-01-09 RX ADMIN — HEPARIN SODIUM 5000 UNIT(S): 5000 INJECTION INTRAVENOUS; SUBCUTANEOUS at 13:57

## 2019-01-09 RX ADMIN — Medication 102 MILLIGRAM(S): at 16:05

## 2019-01-09 RX ADMIN — HEPARIN SODIUM 5000 UNIT(S): 5000 INJECTION INTRAVENOUS; SUBCUTANEOUS at 06:34

## 2019-01-09 RX ADMIN — Medication 80 MILLIGRAM(S): at 06:37

## 2019-01-09 RX ADMIN — Medication 433.33 MILLIGRAM(S): at 17:00

## 2019-01-09 RX ADMIN — LORATADINE 10 MILLIGRAM(S): 10 TABLET ORAL at 12:36

## 2019-01-09 RX ADMIN — SODIUM CHLORIDE 1 GRAM(S): 9 INJECTION INTRAMUSCULAR; INTRAVENOUS; SUBCUTANEOUS at 06:36

## 2019-01-09 RX ADMIN — Medication 80 MILLIGRAM(S): at 13:57

## 2019-01-09 RX ADMIN — Medication 0.5 MILLIGRAM(S): at 07:56

## 2019-01-09 RX ADMIN — Medication 1 TABLET(S): at 09:04

## 2019-01-09 RX ADMIN — Medication 0.25 MILLIGRAM(S): at 06:33

## 2019-01-09 RX ADMIN — Medication 3 MILLILITER(S): at 01:48

## 2019-01-09 NOTE — PROGRESS NOTE ADULT - SUBJECTIVE AND OBJECTIVE BOX
Patient is a 58y old  Male who presents with a chief complaint of sob (09 Jan 2019 13:51)      INTERVAL HPI/OVERNIGHT EVENTS:    Tolerated chemotherapy today. No change in shortness of breath. Denies hemoptysis    MEDICATIONS  (STANDING):  ALBUTerol/ipratropium for Nebulization 3 milliLiter(s) Nebulizer every 6 hours  buDESOnide   0.5 milliGRAM(s) Respule 0.5 milliGRAM(s) Inhalation two times a day  digoxin     Tablet 0.25 milliGRAM(s) Oral daily  diltiazem    Tablet 120 milliGRAM(s) Oral every 6 hours  furosemide   Injectable 40 milliGRAM(s) IV Push daily  heparin  Injectable 5000 Unit(s) SubCutaneous every 8 hours  influenza   Vaccine 0.5 milliLiter(s) IntraMuscular once  lactobacillus acidophilus 1 Tablet(s) Oral three times a day with meals  loratadine 10 milliGRAM(s) Oral daily  metoprolol succinate  milliGRAM(s) Oral daily  ondansetron  IVPB 10 milliGRAM(s) IV Intermittent once  sodium chloride 1 Gram(s) Oral three times a day  tiotropium 18 MICROgram(s) Capsule 1 Capsule(s) Inhalation daily  verapamil 80 milliGRAM(s) Oral every 8 hours      MEDICATIONS  (PRN):  ALBUTerol    90 MICROgram(s) HFA Inhaler 2 Puff(s) Inhalation every 6 hours PRN Shortness of Breath  ALPRAZolam 0.25 milliGRAM(s) Oral three times a day PRN anxiety  guaiFENesin    Syrup 200 milliGRAM(s) Oral every 6 hours PRN Cough  ondansetron  IVPB 10 milliGRAM(s) IV Intermittent every 8 hours PRN Nausea and/or Vomiting  prochlorperazine   Tablet 10 milliGRAM(s) Oral every 6 hours PRN nause and vomiting  promethazine 25 milliGRAM(s) Oral four times a day PRN breakthrough cough      Allergies    penicillin (Unknown)    Intolerances        PAST MEDICAL & SURGICAL HISTORY:  Dyspnea  Mediastinal adenopathy  Pericardial effusion  AF (atrial fibrillation)  Hyponatremia  COPD (chronic obstructive pulmonary disease)  HTN (hypertension)  S/P pericardial window creation      Vital Signs Last 24 Hrs  T(C): 36.6 (09 Jan 2019 20:21), Max: 37.1 (09 Jan 2019 05:05)  T(F): 97.9 (09 Jan 2019 20:21), Max: 98.7 (09 Jan 2019 05:05)  HR: 109 (09 Jan 2019 20:21) (85 - 130)  BP: 118/63 (09 Jan 2019 20:21) (114/75 - 125/71)  BP(mean): --  RR: 18 (09 Jan 2019 20:21) (18 - 20)  SpO2: 99% (09 Jan 2019 20:21) (94% - 99%)    PHYSICAL EXAMINATION:    GENERAL: The patient is awake and alert in no apparent distress.     HEENT: Head is normocephalic and atraumatic. Extraocular muscles are intact. Mucous membranes are moist.    NECK: Supple.    LUNGS: bilateral coarse rhonchi    HEART: Regular rate and rhythm without murmur.    ABDOMEN: Soft, nontender, and nondistended.      EXTREMITIES: Without any cyanosis, clubbing, rash, lesions or edema.    NEUROLOGIC: Grossly intact.    SKIN: No ulceration or induration present.      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

## 2019-01-09 NOTE — PROGRESS NOTE ADULT - SUBJECTIVE AND OBJECTIVE BOX
Patient is a 58y old  Male who presents with a chief complaint of sob (09 Jan 2019 10:29)      INTERVAL /OVERNIGHT EVENTS: eager for chemo    MEDICATIONS  (STANDING):  ALBUTerol/ipratropium for Nebulization 3 milliLiter(s) Nebulizer every 6 hours  buDESOnide   0.5 milliGRAM(s) Respule 0.5 milliGRAM(s) Inhalation two times a day  CARBOplatin IVPB (eMAR) 750 milliGRAM(s) IV Intermittent once  dexamethasone  IVPB 10 milliGRAM(s) IV Intermittent once  digoxin     Tablet 0.25 milliGRAM(s) Oral daily  diltiazem    Tablet 120 milliGRAM(s) Oral every 6 hours  etoposide IVPB (eMAR) 230 milliGRAM(s) IV Intermittent once  furosemide   Injectable 40 milliGRAM(s) IV Push daily  heparin  Injectable 5000 Unit(s) SubCutaneous every 8 hours  influenza   Vaccine 0.5 milliLiter(s) IntraMuscular once  lactobacillus acidophilus 1 Tablet(s) Oral three times a day with meals  loratadine 10 milliGRAM(s) Oral daily  methylPREDNISolone sodium succinate Injectable 40 milliGRAM(s) IV Push daily  metoprolol succinate  milliGRAM(s) Oral daily  ondansetron  IVPB 10 milliGRAM(s) IV Intermittent once  palonosetron Injectable 0.25 milliGRAM(s) IV Push once  sodium chloride 1 Gram(s) Oral three times a day  tiotropium 18 MICROgram(s) Capsule 1 Capsule(s) Inhalation daily  verapamil 80 milliGRAM(s) Oral every 8 hours    MEDICATIONS  (PRN):  ALBUTerol    90 MICROgram(s) HFA Inhaler 2 Puff(s) Inhalation every 6 hours PRN Shortness of Breath  ALPRAZolam 0.25 milliGRAM(s) Oral three times a day PRN anxiety  guaiFENesin    Syrup 200 milliGRAM(s) Oral every 6 hours PRN Cough  ondansetron  IVPB 10 milliGRAM(s) IV Intermittent every 8 hours PRN Nausea and/or Vomiting  prochlorperazine   Tablet 10 milliGRAM(s) Oral every 6 hours PRN nause and vomiting  promethazine 25 milliGRAM(s) Oral four times a day PRN breakthrough cough      Allergies    penicillin (Unknown)    Intolerances        REVIEW OF SYSTEMS:  CONSTITUTIONAL: No fever, weight loss, or fatigue  EYES: No eye pain, visual disturbances, or discharge  ENMT:  No difficulty hearing, tinnitus, vertigo; No sinus or throat pain  NECK: No pain or stiffness  RESPIRATORY: No cough, wheezing, chills or hemoptysis; No shortness of breath  CARDIOVASCULAR: No chest pain, palpitations, dizziness, or leg swelling  GASTROINTESTINAL: No abdominal or epigastric pain. No nausea, vomiting, or hematemesis; No diarrhea or constipation. No melena or hematochezia.  GENITOURINARY: No dysuria, frequency, hematuria, or incontinence  NEUROLOGICAL: No headaches, memory loss, loss of strength, numbness, or tremors  SKIN: No itching, burning, rashes, or lesions   LYMPH NODES: No enlarged glands  ENDOCRINE: No heat or cold intolerance; No hair loss; No polydipsia or polyuria  MUSCULOSKELETAL: No joint pain or swelling; No muscle, back, or extremity pain  PSYCHIATRIC: No depression, anxiety, mood swings, or difficulty sleeping  HEME/LYMPH: No easy bruising, or bleeding gums  ALLERGY AND IMMUNOLOGIC: No hives or eczema    Vital Signs Last 24 Hrs  T(C): 37.1 (09 Jan 2019 05:05), Max: 37.6 (08 Jan 2019 13:44)  T(F): 98.7 (09 Jan 2019 05:05), Max: 99.7 (08 Jan 2019 13:44)  HR: 86 (09 Jan 2019 08:00) (86 - 130)  BP: 119/75 (09 Jan 2019 05:05) (113/77 - 140/84)  BP(mean): --  RR: 20 (09 Jan 2019 05:05) (16 - 20)  SpO2: 96% (09 Jan 2019 08:00) (94% - 97%)    PHYSICAL EXAM:  GENERAL: NAD, well-groomed, well-developed  HEAD:  Atraumatic, Normocephalic  EYES: EOMI, PERRLA, conjunctiva and sclera clear  ENMT: No tonsillar erythema, exudates, or enlargement; Moist mucous membranes, Good dentition, No lesions  NECK: Supple, No JVD, Normal thyroid  NERVOUS SYSTEM:  Alert & Oriented X3, Good concentration; Motor Strength 5/5 B/L upper and lower extremities; DTRs 2+ intact and symmetric  CHEST/LUNG: Clear to auscultation bilaterally; No rales, rhonchi, wheezing, or rubs  HEART: Regular rate and rhythm; No murmurs, rubs, or gallops  ABDOMEN: Soft, Nontender, Nondistended; Bowel sounds present  EXTREMITIES:  2+ Peripheral Pulses, No clubbing, cyanosis, or edema  LYMPH: No lymphadenopathy noted  SKIN: No rashes or lesions    LABS:      Ca    8.3        08 Jan 2019 10:00          CAPILLARY BLOOD GLUCOSE          RADIOLOGY & ADDITIONAL TESTS:    Notes Reviewed:  [x ] YES  [ ] NO    Care Discussed with Consultants/Other Providers [x ] YES  [ ] NO

## 2019-01-09 NOTE — PROGRESS NOTE ADULT - ASSESSMENT
Hyponatremia  SIADH  PNA  Hemoptysis   Pleural effusion  Lung CA      - Known to our group from last hospitalization with SIADH. He was sent home with NaCl tabs. He claimed to be compliant with salt tablets but not fluid restriction. No hypertonic saline needed. S/p tolvaptan 15 mg po x 1   - Renal indices are stable; sodium levels are within normal range  - Continue NaCl tabs as ordered. Fluid restriction as ordered; Add Lasix IV daily by pulmonary   - S/p CTA showing no PE  - S/p Bronch on 1/2/18  - IV steroids  - Pulm and Cardio follow up noted  - Abx   - Monitor chemistries  - For chemoport today 1/8/19    Thank you

## 2019-01-09 NOTE — PROGRESS NOTE ADULT - SUBJECTIVE AND OBJECTIVE BOX
NEPHROLOGY INTERVAL HPI/OVERNIGHT EVENTS:  HPI:  pt is a 59yo male with pmhx of htn, copd, cardiac tamponade s/p window 2018 c/o cough x days. pt reports non-productive cough with associated dyspnea on exertion. pt reports "it feels like something is stuck in my throat". pt reports bl le swelling without pain. pt recently admitted for pericardial effusion s/p window. pt quit smoking aprox 2 weeks ago.      Follow up SIADH  No c/o     PAST MEDICAL & SURGICAL HISTORY:  Dyspnea  Mediastinal adenopathy  Pericardial effusion  AF (atrial fibrillation)  Hyponatremia  COPD (chronic obstructive pulmonary disease)  HTN (hypertension)  S/P pericardial window creation      MEDICATIONS  (STANDING):  ALBUTerol/ipratropium for Nebulization 3 milliLiter(s) Nebulizer every 6 hours  buDESOnide   0.5 milliGRAM(s) Respule 0.5 milliGRAM(s) Inhalation two times a day  digoxin     Tablet 0.25 milliGRAM(s) Oral daily  diltiazem    Tablet 120 milliGRAM(s) Oral every 6 hours  furosemide   Injectable 20 milliGRAM(s) IV Push daily  heparin  Injectable 5000 Unit(s) SubCutaneous every 8 hours  influenza   Vaccine 0.5 milliLiter(s) IntraMuscular once  lactobacillus acidophilus 1 Tablet(s) Oral three times a day with meals  loratadine 10 milliGRAM(s) Oral daily  methylPREDNISolone sodium succinate Injectable 40 milliGRAM(s) IV Push three times a day  metoprolol succinate  milliGRAM(s) Oral daily  sodium chloride 1 Gram(s) Oral three times a day  tiotropium 18 MICROgram(s) Capsule 1 Capsule(s) Inhalation daily  verapamil 40 milliGRAM(s) Oral every 8 hours    MEDICATIONS  (PRN):  ALBUTerol    90 MICROgram(s) HFA Inhaler 2 Puff(s) Inhalation every 6 hours PRN Shortness of Breath  ALPRAZolam 0.25 milliGRAM(s) Oral three times a day PRN anxiety  guaiFENesin    Syrup 200 milliGRAM(s) Oral every 6 hours PRN Cough  promethazine 25 milliGRAM(s) Oral four times a day PRN breakthrough cough      Allergies    penicillin (Unknown)    Intolerances        Vital Signs Last 24 Hrs  T(C): 36.5 (2019 04:52), Max: 36.7 (2019 13:46)  T(F): 97.7 (2019 04:52), Max: 98.1 (2019 13:46)  HR: 110 (2019 04:52) (68 - 119)  BP: 128/78 (2019 04:52) (115/72 - 148/86)  BP(mean): --  RR: 17 (2019 04:52) (17 - 20)  SpO2: 97% (2019 04:52) (95% - 98%)  Daily     Daily Weight in k.6 (2019 04:52)    PHYSICAL EXAM:  GENERAL: no distress, sitting in chair, unable to lay flat  HEAD:  Atraumatic, Normocephalic  EYES: Conjunctiva and sclera clear  ENMT: Moist mucous membranes, Good dentition, No lesions  NECK: Supple  NERVOUS SYSTEM:  Alert & Oriented X3, follows commands well  CHEST/LUNG: Scattered wheezing, reduced breath sounds overall  HEART: Regular rate and rhythm; No murmurs, rubs, or gallops  ABDOMEN: Soft, Nontender, Nondistended; Bowel sounds present  EXTREMITIES: trace Edema  LYMPH: No lymphadenopathy noted  SKIN: No rashes or lesions      LABS:  Pending

## 2019-01-09 NOTE — PROGRESS NOTE ADULT - ASSESSMENT
58M pmhx afib diagnosed December 2018 (not on AC), COPD, HTN, cardiac tamponade s/p window 12/2018 discharged from [facility] ~2 weeks for hyponatremia and found to have mediastinal adenopathy and moderate sized pericardial effusion with tamponade physiology with hospital course further complicated by new onset afib with RVR, who presented with worsening SOB and cough and was found to be hyponatremic and have pna. Found to have a malignancy. Patient had bronchoscopy and s/p bronch, patient went into rapid a fib which resolved on own. remains in rapid af now, unclear why not responding appropriately to meds    Recommend:  - s/p RIJ venous cath insertion.  Patient to start with Chemo in am  - cw 40 mg IV daily  - Please continue to maintain strict I/Os, monitor daily weights, Cr, and K.   - Monitor daily standing weights  - Monitor electrolytes, replete to keep K>4 and Mag>2  - TTE does not reveal recurrent pericardial effusion of any significance    - s/p bronchoscopy complicated by Afib with RVR.  Remains in Afib, rates still not fully controlled  - HR may be difficult to control due to ongoing pulmonary process, and it has been thus far resistant to all changes in meds.  He remains tachy in the 100-120  - Continue  Toprol  mg daily  - Continue Cardizem 120 mg po q 6hours  - Cont Digoxin at higher dose, 250 qd.    -FU dig level  - Continue verapamil but increase to 80 mg q8H.  May have a slight improvement in rate overall, although pt still w/ bursts in to 120 but seem to be occurring less frequently.   - Continue tele monitor     - B/l LE doppler negative of DVT    - Pulm f/u.  Continue bronchodilators and steroids.  Encourage incentive spirometer  - BP well controlled, monitor routine hemodynamics.  - Monitor and replete lytes, keep K>4, Mg>2.    -Other cardiovascular workup will depend on clinical course.  -All other workup per primary team.  -Will continue to follow.    Jermaine Dave [facility]  Cardiology 58M pmhx afib diagnosed December 2018 (not on AC), COPD, HTN, cardiac tamponade s/p window 12/2018 discharged from [facility] ~2 weeks for hyponatremia and found to have mediastinal adenopathy and moderate sized pericardial effusion with tamponade physiology with hospital course further complicated by new onset afib with RVR, who presented with worsening SOB and cough and was found to be hyponatremic and have pna. Found to have a malignancy. Patient had bronchoscopy and s/p bronch, patient went into rapid a fib which resolved on own. remains in rapid af now, unclear why not responding appropriately to meds    Recommend:  - s/p RIJ venous cath insertion.  Patient to start with Chemo in am  - cw Lasix 40 mg IV daily  - Please continue to maintain strict I/Os, monitor daily weights, Cr, and K.   - Monitor daily standing weights  - Monitor electrolytes, replete to keep K>4 and Mag>2  - TTE does not reveal recurrent pericardial effusion of any significance    - s/p bronchoscopy complicated by Afib with RVR.  Remains in Afib, rates still not fully controlled  - HR may be difficult to control due to ongoing pulmonary process, though better controlled overnight  - Continue  Toprol  mg daily  - Continue Cardizem 120 mg po q 6hours  - Cont Digoxin at higher dose, 250 qd.    - FU dig level  - Continue verapamil.  May have a slight improvement in rate overall, although pt still w/ bursts in to 120 but seem to be occurring less frequently.   - Continue tele monitor     - B/l LE doppler negative of DVT    - Pulm f/u.  Continue bronchodilators and steroids.  Encourage incentive spirometer  - BP well controlled, monitor routine hemodynamics.  - Monitor and replete lytes, keep K>4, Mg>2.    -Other cardiovascular workup will depend on clinical course.  -All other workup per primary team.  -Will continue to follow.    Jermaine Dave [facility]  Cardiology

## 2019-01-09 NOTE — PROGRESS NOTE ADULT - SUBJECTIVE AND OBJECTIVE BOX
All interim records and events noted.    sitting up in chair, PARKS  s/p infusoport       MEDICATIONS  reviewed       Vital Signs Last 24 Hrs  afebrile , BP is stable     PHYSICAL EXAM  General: well developed  well nourished, in no acute distress  Head: atraumatic, normocephalic  ENT: sclera anicteric, buccal mucosa moist  Neck: supple, trachea midline  CV: S1 S2, regular rate and rhythm  Lungs: b/l  wheezes/rhonchi  Abdomen: soft, nontender, bowel sounds present, pouchy  Extrem: no clubbing/cyanosis/edema  Skin: no significant increased ecchymosis/petechiae  Neuro: alert and oriented X3,  no focal deficits      LABS:             LABS:      01-08    135  |  94<L>  |  19  ----------------------------<  154<H>  4.2   |  33<H>  |  0.56    Ca    8.3<L>      08 Jan 2019 10:00  < from: CT Angio Chest w/ IV Cont (01.05.19 @ 00:27) >  EXAM:  CT ANGIO CHEST (W)AW IC                            PROCEDURE DATE:  01/05/2019          INTERPRETATION:  [facility] RADIOLOGIST PRELIMINARY REPORT    EXAM:    CT Angiography Chest Without And With Contrast     EXAM DATE/TIME:    1/4/2019 11:45 PM     CLINICAL HISTORY:    58 years old, male; Signs and symptoms; Shortness of breath; Prior   surgery;   Surgery date: 3-7 days post-operative. History of lung cancer.     TECHNIQUE:    Axial computed tomographic angiography images of the chest without and   with intravenous contrast using CT angiography protocol.    Coronal and sagittal reformatted images were created and reviewed.    MIP reconstructed images were created and reviewed.     CONTRAST:    95 ml of omnipaque administered intravenously.     COMPARISON:    CT CHEST 12/13/2018 8:13 AM     FINDINGS:    Pulmonary arteries:  No evidence of pulmonary embolus.    Aorta:  Aorta is unremarkable. No evidence of dissection. No evidence of   aneurysm.    Other veins:  There is extensive mass effect from mediastinal mass   lesions on   superior aspect of left atrium with complete nonvisualization of left   superior   pulmonary vein and moderate to severe stenosis of left inferior pulmonary   vein.    Lungs:  There is moderate narrowing of bilateral mainstem bronchi and   left   upper and lower lobe bronchi secondary to mass effect from mediastinal   and   hilar mass lesions.    Pleural space:  Moderate to large left pleural effusion. Small right   pleural   effusion. Left lower lobe and lingular infiltrates. Large dense left   upper lobe   infiltrate.    Heart: Normal. No cardiomegaly. No pericardial effusion.    Mediastinum:  There is complete collapse of mid esophagus secondary to   mass   effect from mediastinal metastatic disease.    Lymph nodes:  Multiple very enlarged mediastinal and hilar lymph nodes   consistent with metastatic disease.    Bones/joints: Unremarkable. No acute fracture.    Soft tissues: Unremarkable.     IMPRESSION:   1. Multiple very enlarged mediastinal and hilar lymph nodes consistent   with metastatic disease.   2. There is moderate narrowing of bilateral mainstem bronchi and left   upper and lower lobe bronchi secondary to mass effect from mediastinal   and hilar mass lesions.   3. Moderate to large left pleural effusion. Small right pleural effusion.   Left lower lobe and lingular infiltrates. Large dense left upper lobe   infiltrate. Superimposed pneumonia or pulmonary edema is considered.  4. There is complete collapse of mid esophagus secondary to mass effect   from mediastinal metastatic disease.   5. There is extensive mass effect from mediastinal mass lesions on   superior aspect of left atrium with complete nonvisualization of left   superior pulmonary vein and moderate to severestenosis of left inferior   pulmonary vein.   6. No evidence of pulmonary arterial embolus.    AGREE WITH ABOVE FINDINGS                JUAN GARCIA M.D., ATTENDING RADIOLOGIST  This document has been electronically signed. Jan 5 2019  9:07AM    < end of copied text > All interim records and events noted.    sitting up in chair, PARKS  s/p infusoport       MEDICATIONS  reviewed       Vital Signs Last 24 Hrs  afebrile , BP is stable     PHYSICAL EXAM  General: middle age man   in no acute distress  Head: atraumatic, normocephalic  ENT: sclera anicteric, buccal mucosa moist  Neck: supple, trachea midline  CV: S1 S2, regular rate and rhythm  Lungs: b/l  wheezes/rhonchi  Abdomen: soft, nontender, bowel sounds present, pouchy  Extrem: no clubbing/cyanosis/edema  Skin: no significant increased ecchymosis/petechiae  Neuro: alert and oriented X,  no focal deficits      LABS:             LABS:      01-08    135  |  94<L>  |  19  ----------------------------<  154<H>  4.2   |  33<H>  |  0.56    Ca    8.3<L>      08 Jan 2019 10:00  < from: CT Angio Chest w/ IV Cont (01.05.19 @ 00:27) >  EXAM:  CT ANGIO CHEST (W)AW IC                            PROCEDURE DATE:  01/05/2019          INTERPRETATION:  [facility] RADIOLOGIST PRELIMINARY REPORT    EXAM:    CT Angiography Chest Without And With Contrast     EXAM DATE/TIME:    1/4/2019 11:45 PM     CLINICAL HISTORY:    58 years old, male; Signs and symptoms; Shortness of breath; Prior   surgery;   Surgery date: 3-7 days post-operative. History of lung cancer.     TECHNIQUE:    Axial computed tomographic angiography images of the chest without and   with intravenous contrast using CT angiography protocol.    Coronal and sagittal reformatted images were created and reviewed.    MIP reconstructed images were created and reviewed.     CONTRAST:    95 ml of omnipaque administered intravenously.     COMPARISON:    CT CHEST 12/13/2018 8:13 AM     FINDINGS:    Pulmonary arteries:  No evidence of pulmonary embolus.    Aorta:  Aorta is unremarkable. No evidence of dissection. No evidence of   aneurysm.    Other veins:  There is extensive mass effect from mediastinal mass   lesions on   superior aspect of left atrium with complete nonvisualization of left   superior   pulmonary vein and moderate to severe stenosis of left inferior pulmonary   vein.    Lungs:  There is moderate narrowing of bilateral mainstem bronchi and   left   upper and lower lobe bronchi secondary to mass effect from mediastinal   and   hilar mass lesions.    Pleural space:  Moderate to large left pleural effusion. Small right   pleural   effusion. Left lower lobe and lingular infiltrates. Large dense left   upper lobe   infiltrate.    Heart: Normal. No cardiomegaly. No pericardial effusion.    Mediastinum:  There is complete collapse of mid esophagus secondary to   mass   effect from mediastinal metastatic disease.    Lymph nodes:  Multiple very enlarged mediastinal and hilar lymph nodes   consistent with metastatic disease.    Bones/joints: Unremarkable. No acute fracture.    Soft tissues: Unremarkable.     IMPRESSION:   1. Multiple very enlarged mediastinal and hilar lymph nodes consistent   with metastatic disease.   2. There is moderate narrowing of bilateral mainstem bronchi and left   upper and lower lobe bronchi secondary to mass effect from mediastinal   and hilar mass lesions.   3. Moderate to large left pleural effusion. Small right pleural effusion.   Left lower lobe and lingular infiltrates. Large dense left upper lobe   infiltrate. Superimposed pneumonia or pulmonary edema is considered.  4. There is complete collapse of mid esophagus secondary to mass effect   from mediastinal metastatic disease.   5. There is extensive mass effect from mediastinal mass lesions on   superior aspect of left atrium with complete nonvisualization of left   superior pulmonary vein and moderate to severestenosis of left inferior   pulmonary vein.   6. No evidence of pulmonary arterial embolus.    AGREE WITH ABOVE FINDINGS                JUAN GARCIA M.D., ATTENDING RADIOLOGIST  This document has been electronically signed. Jan 5 2019  9:07AM    < end of copied text >

## 2019-01-09 NOTE — PROGRESS NOTE ADULT - SUBJECTIVE AND OBJECTIVE BOX
[facility] -- Gurpreet Bush, Emily, Corey, Sajan Abebe Savella  Office # [phone]      Follow Up:    Afib, Pleural effusion  Subjective/Observations:   No events overnight resting comfortably in bed.  No complaints of chest pain, dyspnea, or palpitations reported. No signs of orthopnea or PND.     REVIEW OF SYSTEMS: All other review of systems is negative unless indicated above    PAST MEDICAL & SURGICAL HISTORY:  Dyspnea  Mediastinal adenopathy  Pericardial effusion  AF (atrial fibrillation)  Hyponatremia  COPD (chronic obstructive pulmonary disease)  HTN (hypertension)  S/P pericardial window creation      MEDICATIONS  (STANDING):  ALBUTerol/ipratropium for Nebulization 3 milliLiter(s) Nebulizer every 6 hours  buDESOnide   0.5 milliGRAM(s) Respule 0.5 milliGRAM(s) Inhalation two times a day  CARBOplatin IVPB (eMAR) 750 milliGRAM(s) IV Intermittent once  dexamethasone  IVPB 10 milliGRAM(s) IV Intermittent once  digoxin     Tablet 0.25 milliGRAM(s) Oral daily  diltiazem    Tablet 120 milliGRAM(s) Oral every 6 hours  etoposide IVPB (eMAR) 230 milliGRAM(s) IV Intermittent once  furosemide   Injectable 40 milliGRAM(s) IV Push daily  heparin  Injectable 5000 Unit(s) SubCutaneous every 8 hours  influenza   Vaccine 0.5 milliLiter(s) IntraMuscular once  lactobacillus acidophilus 1 Tablet(s) Oral three times a day with meals  loratadine 10 milliGRAM(s) Oral daily  metoprolol succinate  milliGRAM(s) Oral daily  ondansetron  IVPB 10 milliGRAM(s) IV Intermittent once  palonosetron Injectable 0.25 milliGRAM(s) IV Push once  sodium chloride 1 Gram(s) Oral three times a day  tiotropium 18 MICROgram(s) Capsule 1 Capsule(s) Inhalation daily  verapamil 80 milliGRAM(s) Oral every 8 hours    MEDICATIONS  (PRN):  ALBUTerol    90 MICROgram(s) HFA Inhaler 2 Puff(s) Inhalation every 6 hours PRN Shortness of Breath  ALPRAZolam 0.25 milliGRAM(s) Oral three times a day PRN anxiety  guaiFENesin    Syrup 200 milliGRAM(s) Oral every 6 hours PRN Cough  ondansetron  IVPB 10 milliGRAM(s) IV Intermittent every 8 hours PRN Nausea and/or Vomiting  prochlorperazine   Tablet 10 milliGRAM(s) Oral every 6 hours PRN nause and vomiting  promethazine 25 milliGRAM(s) Oral four times a day PRN breakthrough cough      Allergies    penicillin (Unknown)    Intolerances        Vital Signs Last 24 Hrs  T(C): 37.1 (09 Jan 2019 13:47), Max: 37.1 (09 Jan 2019 05:05)  T(F): 98.7 (09 Jan 2019 13:47), Max: 98.7 (09 Jan 2019 05:05)  HR: 115 (09 Jan 2019 13:47) (86 - 130)  BP: 118/72 (09 Jan 2019 13:47) (113/77 - 132/68)  BP(mean): --  RR: 18 (09 Jan 2019 13:47) (16 - 20)  SpO2: 94% (09 Jan 2019 13:47) (94% - 97%)    I&O's Summary    08 Jan 2019 07:01  -  09 Jan 2019 07:00  --------------------------------------------------------  IN: 0 mL / OUT: 800 mL / NET: -800 mL          PHYSICAL EXAM:  TELE: afib No events on tele overnight   Constitutional: NAD, awake and alert, well-developed  HEENT: Moist Mucous Membranes, Anicteric  Pulmonary: Non-labored, breath sounds coarse throughout lung fields , No wheezing, crackles or rhonchi   Cardiovascular: Regular, S1 and S2 nl, No murmurs, rubs, gallops or clicks  Gastrointestinal: Bowel Sounds present, soft, nontender.   Lymph: No lymphadenopathy. + Bilat LE peripheral edema.   Skin: No visible rashes or ulcers.  Psych:  Mood & affect appropriate    LABS: All Labs Reviewed:                        10.9   12.83 )-----------( 314      ( 07 Jan 2019 08:38 )             33.5     08 Jan 2019 10:00    135    |  94     |  19     ----------------------------<  154    4.2     |  33     |  0.56   07 Jan 2019 22:07    x      |  x      |  x      ----------------------------<  x      x       |  x      |  0.57   07 Jan 2019 08:38    135    |  96     |  19     ----------------------------<  157    4.5     |  32     |  0.59     Ca    8.3        08 Jan 2019 10:00  Ca    8.6        07 Jan 2019 08:38           from: TTE Echo Doppler w/o Cont (01.04.19 @ 09:32) >     EXAM:  ECHO TTE WO CON COMP W DOPPLR         PROCEDURE DATE:  01/04/2019        INTERPRETATION:  INDICATION: Atrial fibrillation, pericardial effusion    Blood Pressure 138/73    Height 185     Weight 111       BSA 2.3    Dimensions:    LA 4.4  Normal Values: 2.0 - 4.0 cm    Ao 4.0        Normal Values: 2.0 - 3.8 cm  SEPTUM           Normal Values: 0.6 - 1.2 cm  PWT           Normal Values: 0.6 - 1.1 cm  LVIDd             Normal Values: 3.0 - 5.6 cm  LVIDs             Normal Values: 1.8 -4.0 cm    Derived Variables:  LVMI     g/m2  RWT      Fractional Short      Ejection Fraction        Doppler Peak v. AoV=   (m/sec)    OBSERVATIONS:    This is a technically limited study with suboptimal endocardial   definition. Within these limitations, the left ventricle appears to be   normal in size with overall normal systolic function. The right ventricle   is not well seen but is probably normal in size with normal systolic   function. Biatrial enlargement. The aortic root is mildly dilated. The   mitral valve is not well seen. There is trace physiologic MR noted. Other   valves appear structurally normal as visualized. Inadequate TR jet to   estimate PA systolic pressure. No significant pericardial effusion. Left   pleural effusion. The patient was in rapid atrial fibrillation during   this examination.    ISAC FLORES M.D., ATTENDING CARDIOLOGIST  This document has been electronically signed. Jan 4 2019  3:55PM      < end of copied text >    < from: CT Angio Chest w/ IV Cont (01.05.19 @ 00:27) >    EXAM:  CT ANGIO CHEST (W)AW IC                          PROCEDURE DATE:  01/05/2019        INTERPRETATION:  [facility] RADIOLOGIST PRELIMINARY REPORT    EXAM:    CT Angiography Chest Without And With Contrast     EXAM DATE/TIME:    1/4/2019 11:45 PM     CLINICAL HISTORY:    58 years old, male; Signs and symptoms; Shortness of breath; Prior   surgery;   Surgery date: 3-7 days post-operative. History of lung cancer.     TECHNIQUE:    Axial computed tomographic angiography images of the chest without and   with intravenous contrast using CT angiography protocol.    Coronal and sagittal reformatted images were created and reviewed.    MIP reconstructed images were created and reviewed.     CONTRAST:    95 ml of omnipaque administered intravenously.     COMPARISON:    CT CHEST 12/13/2018 8:13 AM     FINDINGS:    Pulmonary arteries:  No evidence of pulmonary embolus.    Aorta:  Aorta is unremarkable. No evidence of dissection. No evidence of   aneurysm.    Other veins:  There is extensive mass effect from mediastinal mass   lesions on   superior aspect of left atrium with complete nonvisualization of left   superior   pulmonary vein and moderate to severe stenosis of left inferior pulmonary   vein.    Lungs:  There is moderate narrowing of bilateral mainstem bronchi and   left   upper and lower lobe bronchi secondary to mass effect from mediastinal   and   hilar mass lesions.    Pleural space:  Moderate to large left pleural effusion. Small right   pleural   effusion. Left lower lobe and lingular infiltrates. Large dense left   upper lobe   infiltrate.    Heart: Normal. No cardiomegaly. No pericardial effusion.    Mediastinum:  There is complete collapse of mid esophagus secondary to   mass   effect from mediastinal metastatic disease.    Lymph nodes:  Multiple very enlarged mediastinal and hilar lymph nodes   consistent with metastatic disease.    Bones/joints: Unremarkable. No acute fracture.    Soft tissues: Unremarkable.     IMPRESSION:   1. Multiple very enlarged mediastinal and hilar lymph nodes consistent   with metastatic disease.   2. There is moderate narrowing of bilateral mainstem bronchi and left   upper and lower lobe bronchi secondary to mass effect from mediastinal   and hilar mass lesions.   3. Moderate to large left pleural effusion. Small right pleural effusion.   Left lower lobe and lingular infiltrates. Large dense left upper lobe   infiltrate. Superimposed pneumonia or pulmonary edema is considered.  4. There is complete collapse of mid esophagus secondary to mass effect   from mediastinal metastatic disease.   5. There is extensive mass effect from mediastinal mass lesions on   superior aspect of left atrium with complete nonvisualization of left   superior pulmonary vein and moderate to severestenosis of left inferior   pulmonary vein.   6. No evidence of pulmonary arterial embolus.    AGREE WITH ABOVE FINDINGS    JUAN GARCIA M.D., ATTENDING RADIOLOGIST  This document has been electronically signed. Jan 5 2019  9:07AM     < end of copied text >             Jermaine Dave [facility]   Cardiology

## 2019-01-09 NOTE — PROGRESS NOTE ADULT - ASSESSMENT
59 y/o man w SOB, bulky mediastinal and hilar LADs, left lung compromise, left effusion, post bronch w pathology sig for small cell,    Hyponatremia /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 Mild hyponatremia today. BUN 2, Cr 0.6. No change in Cr.   A fib, mild hemoptysis  CT scan Head and MRI brain negative for metastasis.       most likely extensive stage ( left plera effusion)   considering aggressiveness of cancer, huge disease burden and airway compromise pt to start on chemo today   s/p infusoport  Carbo/VP16 , d/w Nursing for  arrangements( plnned 3.30pm)   discussed w pt, reviewed chemo regimen/GCSF support, consent obtained 57 y/o man w SOB, bulky mediastinal and hilar LADs, left lung compromise, left effusion, post bronch w pathology sig for small cell,    Hyponatremia /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 Mild hyponatremia today. BUN 2, Cr 0.6. No change in Cr.   A fib, mild hemoptysis  CT scan Head and MRI brain negative for metastasis.       most likely extensive stage ( left plera effusion)   considering aggressiveness of cancer, huge disease burden and airway compromise pt to start on chemo today   s/p infusoport  Carbo/VP16 , d/w Nursing for  arrangements( planned 3.30pm)   discussed w pt, reviewed chemo regimen/GCSF support, consent obtained

## 2019-01-10 LAB
ALBUMIN SERPL ELPH-MCNC: 2.6 G/DL — LOW (ref 3.3–5)
ALP SERPL-CCNC: 101 U/L — SIGNIFICANT CHANGE UP (ref 40–120)
ALT FLD-CCNC: 77 U/L — SIGNIFICANT CHANGE UP (ref 12–78)
ANION GAP SERPL CALC-SCNC: 6 MMOL/L — SIGNIFICANT CHANGE UP (ref 5–17)
AST SERPL-CCNC: 18 U/L — SIGNIFICANT CHANGE UP (ref 15–37)
BASOPHILS # BLD AUTO: 0.01 K/UL — SIGNIFICANT CHANGE UP (ref 0–0.2)
BASOPHILS NFR BLD AUTO: 0.1 % — SIGNIFICANT CHANGE UP (ref 0–2)
BILIRUB SERPL-MCNC: 0.7 MG/DL — SIGNIFICANT CHANGE UP (ref 0.2–1.2)
BUN SERPL-MCNC: 21 MG/DL — SIGNIFICANT CHANGE UP (ref 7–23)
CALCIUM SERPL-MCNC: 8 MG/DL — LOW (ref 8.5–10.1)
CHLORIDE SERPL-SCNC: 93 MMOL/L — LOW (ref 96–108)
CO2 SERPL-SCNC: 36 MMOL/L — HIGH (ref 22–31)
CREAT SERPL-MCNC: 0.6 MG/DL — SIGNIFICANT CHANGE UP (ref 0.5–1.3)
EOSINOPHIL # BLD AUTO: 0 K/UL — SIGNIFICANT CHANGE UP (ref 0–0.5)
EOSINOPHIL NFR BLD AUTO: 0 % — SIGNIFICANT CHANGE UP (ref 0–6)
GLUCOSE SERPL-MCNC: 159 MG/DL — HIGH (ref 70–99)
HCT VFR BLD CALC: 33.8 % — LOW (ref 39–50)
HGB BLD-MCNC: 11.1 G/DL — LOW (ref 13–17)
IMM GRANULOCYTES NFR BLD AUTO: 0.9 % — SIGNIFICANT CHANGE UP (ref 0–1.5)
LYMPHOCYTES # BLD AUTO: 0.91 K/UL — LOW (ref 1–3.3)
LYMPHOCYTES # BLD AUTO: 5.2 % — LOW (ref 13–44)
MAGNESIUM SERPL-MCNC: 2.2 MG/DL — SIGNIFICANT CHANGE UP (ref 1.6–2.6)
MCHC RBC-ENTMCNC: 27.5 PG — SIGNIFICANT CHANGE UP (ref 27–34)
MCHC RBC-ENTMCNC: 32.8 GM/DL — SIGNIFICANT CHANGE UP (ref 32–36)
MCV RBC AUTO: 83.7 FL — SIGNIFICANT CHANGE UP (ref 80–100)
MONOCYTES # BLD AUTO: 1.15 K/UL — HIGH (ref 0–0.9)
MONOCYTES NFR BLD AUTO: 6.6 % — SIGNIFICANT CHANGE UP (ref 2–14)
NEUTROPHILS # BLD AUTO: 15.18 K/UL — HIGH (ref 1.8–7.4)
NEUTROPHILS NFR BLD AUTO: 87.2 % — HIGH (ref 43–77)
PHOSPHATE SERPL-MCNC: 4.2 MG/DL — SIGNIFICANT CHANGE UP (ref 2.5–4.5)
PLATELET # BLD AUTO: 288 K/UL — SIGNIFICANT CHANGE UP (ref 150–400)
POTASSIUM SERPL-MCNC: 4.2 MMOL/L — SIGNIFICANT CHANGE UP (ref 3.5–5.3)
POTASSIUM SERPL-SCNC: 4.2 MMOL/L — SIGNIFICANT CHANGE UP (ref 3.5–5.3)
PROT SERPL-MCNC: 5.9 G/DL — LOW (ref 6–8.3)
RBC # BLD: 4.04 M/UL — LOW (ref 4.2–5.8)
RBC # FLD: 12.9 % — SIGNIFICANT CHANGE UP (ref 10.3–14.5)
SODIUM SERPL-SCNC: 135 MMOL/L — SIGNIFICANT CHANGE UP (ref 135–145)
WBC # BLD: 17.41 K/UL — HIGH (ref 3.8–10.5)
WBC # FLD AUTO: 17.41 K/UL — HIGH (ref 3.8–10.5)

## 2019-01-10 PROCEDURE — 99232 SBSQ HOSP IP/OBS MODERATE 35: CPT

## 2019-01-10 PROCEDURE — 93010 ELECTROCARDIOGRAM REPORT: CPT

## 2019-01-10 RX ORDER — ONDANSETRON 8 MG/1
10 TABLET, FILM COATED ORAL ONCE
Qty: 0 | Refills: 0 | Status: COMPLETED | OUTPATIENT
Start: 2019-01-10 | End: 2019-01-10

## 2019-01-10 RX ORDER — VERAPAMIL HCL 240 MG
120 CAPSULE, EXTENDED RELEASE PELLETS 24 HR ORAL EVERY 8 HOURS
Qty: 0 | Refills: 0 | Status: DISCONTINUED | OUTPATIENT
Start: 2019-01-10 | End: 2019-01-13

## 2019-01-10 RX ORDER — ETOPOSIDE 20 MG/ML
230 VIAL (ML) INTRAVENOUS ONCE
Qty: 0 | Refills: 0 | Status: COMPLETED | OUTPATIENT
Start: 2019-01-10 | End: 2019-01-10

## 2019-01-10 RX ORDER — DEXAMETHASONE 0.5 MG/5ML
10 ELIXIR ORAL ONCE
Qty: 0 | Refills: 0 | Status: COMPLETED | OUTPATIENT
Start: 2019-01-10 | End: 2019-01-10

## 2019-01-10 RX ORDER — CHLORHEXIDINE GLUCONATE 213 G/1000ML
1 SOLUTION TOPICAL DAILY
Qty: 0 | Refills: 0 | Status: DISCONTINUED | OUTPATIENT
Start: 2019-01-10 | End: 2019-01-15

## 2019-01-10 RX ADMIN — Medication 3 MILLILITER(S): at 20:57

## 2019-01-10 RX ADMIN — ONDANSETRON 110 MILLIGRAM(S): 8 TABLET, FILM COATED ORAL at 14:25

## 2019-01-10 RX ADMIN — Medication 511.5 MILLIGRAM(S): at 15:07

## 2019-01-10 RX ADMIN — Medication 3 MILLILITER(S): at 14:09

## 2019-01-10 RX ADMIN — Medication 102 MILLIGRAM(S): at 13:47

## 2019-01-10 RX ADMIN — Medication 0.5 MILLIGRAM(S): at 08:04

## 2019-01-10 RX ADMIN — Medication 80 MILLIGRAM(S): at 13:23

## 2019-01-10 RX ADMIN — SODIUM CHLORIDE 1 GRAM(S): 9 INJECTION INTRAMUSCULAR; INTRAVENOUS; SUBCUTANEOUS at 13:23

## 2019-01-10 RX ADMIN — HEPARIN SODIUM 5000 UNIT(S): 5000 INJECTION INTRAVENOUS; SUBCUTANEOUS at 05:44

## 2019-01-10 RX ADMIN — Medication 1 TABLET(S): at 11:31

## 2019-01-10 RX ADMIN — Medication 40 MILLIGRAM(S): at 05:45

## 2019-01-10 RX ADMIN — Medication 0.25 MILLIGRAM(S): at 05:44

## 2019-01-10 RX ADMIN — Medication 1 TABLET(S): at 08:31

## 2019-01-10 RX ADMIN — HEPARIN SODIUM 5000 UNIT(S): 5000 INJECTION INTRAVENOUS; SUBCUTANEOUS at 13:23

## 2019-01-10 RX ADMIN — Medication 0.5 MILLIGRAM(S): at 20:57

## 2019-01-10 RX ADMIN — Medication 1 TABLET(S): at 17:06

## 2019-01-10 RX ADMIN — Medication 200 MILLIGRAM(S): at 05:44

## 2019-01-10 RX ADMIN — Medication 3 MILLILITER(S): at 08:04

## 2019-01-10 RX ADMIN — Medication 80 MILLIGRAM(S): at 05:44

## 2019-01-10 RX ADMIN — LORATADINE 10 MILLIGRAM(S): 10 TABLET ORAL at 11:30

## 2019-01-10 RX ADMIN — SODIUM CHLORIDE 1 GRAM(S): 9 INJECTION INTRAMUSCULAR; INTRAVENOUS; SUBCUTANEOUS at 05:44

## 2019-01-10 NOTE — PROGRESS NOTE ADULT - SUBJECTIVE AND OBJECTIVE BOX
Patient is a 58y old  Male who presents with a chief complaint of sob (10 Lisandro 2019 15:39)      INTERVAL HPI/OVERNIGHT EVENTS:    Tolerating chemotherapy. Claims that shortness of breath is somewhat improved    MEDICATIONS  (STANDING):  ALBUTerol/ipratropium for Nebulization 3 milliLiter(s) Nebulizer every 6 hours  buDESOnide   0.5 milliGRAM(s) Respule 0.5 milliGRAM(s) Inhalation two times a day  digoxin     Tablet 0.25 milliGRAM(s) Oral daily  diltiazem    Tablet 120 milliGRAM(s) Oral every 6 hours  furosemide   Injectable 40 milliGRAM(s) IV Push daily  heparin  Injectable 5000 Unit(s) SubCutaneous every 8 hours  influenza   Vaccine 0.5 milliLiter(s) IntraMuscular once  lactobacillus acidophilus 1 Tablet(s) Oral three times a day with meals  loratadine 10 milliGRAM(s) Oral daily  metoprolol succinate  milliGRAM(s) Oral daily  ondansetron  IVPB 10 milliGRAM(s) IV Intermittent once  sodium chloride 1 Gram(s) Oral three times a day  tiotropium 18 MICROgram(s) Capsule 1 Capsule(s) Inhalation daily  verapamil 120 milliGRAM(s) Oral every 8 hours      MEDICATIONS  (PRN):  ALBUTerol    90 MICROgram(s) HFA Inhaler 2 Puff(s) Inhalation every 6 hours PRN Shortness of Breath  ALPRAZolam 0.25 milliGRAM(s) Oral three times a day PRN anxiety  guaiFENesin    Syrup 200 milliGRAM(s) Oral every 6 hours PRN Cough  ondansetron  IVPB 10 milliGRAM(s) IV Intermittent every 8 hours PRN Nausea and/or Vomiting  prochlorperazine   Tablet 10 milliGRAM(s) Oral every 6 hours PRN nause and vomiting  promethazine 25 milliGRAM(s) Oral four times a day PRN breakthrough cough      Allergies    penicillin (Unknown)    Intolerances        PAST MEDICAL & SURGICAL HISTORY:  Dyspnea  Mediastinal adenopathy  Pericardial effusion  AF (atrial fibrillation)  Hyponatremia  COPD (chronic obstructive pulmonary disease)  HTN (hypertension)  S/P pericardial window creation      Vital Signs Last 24 Hrs  T(C): 36.6 (10 Lisandro 2019 20:31), Max: 36.7 (10 Lisandro 2019 12:31)  T(F): 97.9 (10 Lisandro 2019 20:31), Max: 98 (10 Lisandro 2019 12:31)  HR: 104 (10 Lisandro 2019 20:57) (82 - 135)  BP: 117/65 (10 Lisandro 2019 20:31) (113/65 - 126/62)  BP(mean): --  RR: 20 (10 Lisandro 2019 20:31) (17 - 24)  SpO2: 98% (10 Lisandro 2019 20:57) (88% - 98%)    PHYSICAL EXAMINATION:    GENERAL: The patient is awake and alert in no apparent distress.     HEENT: Head is normocephalic and atraumatic. Extraocular muscles are intact. Mucous membranes are moist.    NECK: Supple.    LUNGS: scattered wheezes and rhonchi bilateral    HEART: Regular rate and rhythm without murmur.    ABDOMEN: Soft, nontender, and nondistended.      EXTREMITIES: Without any cyanosis, clubbing, rash, lesions or edema.    NEUROLOGIC: Grossly intact.    SKIN: No ulceration or induration present.      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

## 2019-01-10 NOTE — PROGRESS NOTE ADULT - ASSESSMENT
58M pmhx afib diagnosed December 2018 (not on AC), COPD, HTN, cardiac tamponade s/p window 12/2018 discharged from [facility] ~2 weeks for hyponatremia and found to have mediastinal adenopathy and moderate sized pericardial effusion with tamponade physiology with hospital course further complicated by new onset afib with RVR, who presented with worsening SOB and cough and was found to be hyponatremic and have pna. Found to have a malignancy. Patient had bronchoscopy and s/p bronch, patient went into rapid a fib which resolved on own. remains in rapid af now, unclear why not responding appropriately to meds    Recommend:  - s/p RIJ venous cath insertion.  Patient to start with Chemo in am  - cw Lasix 40 mg IV daily  - Please continue to maintain strict I/Os, monitor daily weights, Cr, and K.   - Monitor daily standing weights  - Monitor electrolytes, replete to keep K>4 and Mag>2  - TTE does not reveal recurrent pericardial effusion of any significance    - s/p bronchoscopy complicated by Afib with RVR.  Remains in Afib, rates still not fully controlled  - HR may be difficult to control due to ongoing pulmonary process, though better controlled overnight but still maintaining 100s  - Continue  Toprol  mg daily  - Continue Cardizem 120 mg po q 6hours  - Cont Digoxin at higher dose, 250 qd.    - FU dig level  - increasing verapamil to 120 mg q 8 hours for better rate control   - Continue tele monitor     - B/l LE doppler negative of DVT    - Pulm f/u.  Continue bronchodilators and steroids.  Encourage incentive spirometer  - BP well controlled, monitor routine hemodynamics.  - Monitor and replete lytes, keep K>4, Mg>2.    -Other cardiovascular workup will depend on clinical course.  -All other workup per primary team.  -Will continue to follow.    Jermaine Dave [facility]  Cardiology

## 2019-01-10 NOTE — PROGRESS NOTE ADULT - ASSESSMENT
59 y/o man w SOB, bulky mediastinal and hilar LADs, left lung compromise, left effusion, post bronch w pathology sig for small cell,    Hyponatremia /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 Mild hyponatremia today. BUN 2, Cr 0.6. No change in Cr.   A fib, mild hemoptysis  CT scan Head and MRI brain negative for metastasis.       most likely extensive stage ( left plera effusion)   considering aggressiveness of cancer, huge disease burden and airway compromise pt to start on chemo today   s/p infusoport  Carbo/VP16 , d/w Nursing for  arrangements( planned 3.30pm)   discussed w pt, reviewed chemo regimen/GCSF support, consent obtained   to receive day 2 -16

## 2019-01-10 NOTE — PROGRESS NOTE ADULT - SUBJECTIVE AND OBJECTIVE BOX
NEPHROLOGY INTERVAL HPI/OVERNIGHT EVENTS:  HPI:  pt is a 59yo male with pmhx of htn, copd, cardiac tamponade s/p window 12/2018 c/o cough x days. pt reports non-productive cough with associated dyspnea on exertion. pt reports "it feels like something is stuck in my throat". pt reports bl le swelling without pain. pt recently admitted for pericardial effusion s/p window. pt quit smoking aprox 2 weeks ago.  In ER patient was found to have PNA on CXR.  Patient is being admitted for further work up and treatment. (31 Dec 2018 21:51)    Follow up hyponatremia/SIADH  No complaints; breathing somewhat better    PAST MEDICAL & SURGICAL HISTORY:  Dyspnea  Mediastinal adenopathy  Pericardial effusion  AF (atrial fibrillation)  Hyponatremia  COPD (chronic obstructive pulmonary disease)  HTN (hypertension)  S/P pericardial window creation      MEDICATIONS  (STANDING):  ALBUTerol/ipratropium for Nebulization 3 milliLiter(s) Nebulizer every 6 hours  buDESOnide   0.5 milliGRAM(s) Respule 0.5 milliGRAM(s) Inhalation two times a day  digoxin     Tablet 0.25 milliGRAM(s) Oral daily  diltiazem    Tablet 120 milliGRAM(s) Oral every 6 hours  furosemide   Injectable 40 milliGRAM(s) IV Push daily  heparin  Injectable 5000 Unit(s) SubCutaneous every 8 hours  influenza   Vaccine 0.5 milliLiter(s) IntraMuscular once  lactobacillus acidophilus 1 Tablet(s) Oral three times a day with meals  loratadine 10 milliGRAM(s) Oral daily  metoprolol succinate  milliGRAM(s) Oral daily  ondansetron  IVPB 10 milliGRAM(s) IV Intermittent once  sodium chloride 1 Gram(s) Oral three times a day  tiotropium 18 MICROgram(s) Capsule 1 Capsule(s) Inhalation daily  verapamil 80 milliGRAM(s) Oral every 8 hours    MEDICATIONS  (PRN):  ALBUTerol    90 MICROgram(s) HFA Inhaler 2 Puff(s) Inhalation every 6 hours PRN Shortness of Breath  ALPRAZolam 0.25 milliGRAM(s) Oral three times a day PRN anxiety  guaiFENesin    Syrup 200 milliGRAM(s) Oral every 6 hours PRN Cough  ondansetron  IVPB 10 milliGRAM(s) IV Intermittent every 8 hours PRN Nausea and/or Vomiting  prochlorperazine   Tablet 10 milliGRAM(s) Oral every 6 hours PRN nause and vomiting  promethazine 25 milliGRAM(s) Oral four times a day PRN breakthrough cough      Allergies    penicillin (Unknown)    Intolerances        Vital Signs Last 24 Hrs  T(C): 36.6 (10 Lisandro 2019 05:39), Max: 37.1 (09 Jan 2019 13:47)  T(F): 97.9 (10 Lisandro 2019 05:39), Max: 98.7 (09 Jan 2019 13:47)  HR: 106 (10 Lisandro 2019 05:39) (85 - 118)  BP: 119/68 (10 Lisandro 2019 05:39) (113/65 - 125/71)  BP(mean): --  RR: 18 (10 Lisandro 2019 05:39) (18 - 18)  SpO2: 96% (10 Lisandro 2019 05:39) (94% - 99%)  Daily     Daily     PHYSICAL EXAM:  GENERAL: no distress, sitting in chair, appears comfortable  HEAD:  Atraumatic, Normocephalic  EYES: Conjunctiva and sclera clear  ENMT: Moist mucous membranes, Good dentition, No lesions  NECK: Supple  NERVOUS SYSTEM:  Alert & Oriented X3, follows commands well  CHEST/LUNG: Scattered wheezing, reduced breath sounds overall  HEART: Regular rate and rhythm; No murmurs, rubs, or gallops  ABDOMEN: Soft, Nontender, Nondistended; Bowel sounds present  EXTREMITIES: trace Edema  LYMPH: No lymphadenopathy noted  SKIN: No rashes or lesions      LABS:    01-08    135  |  94<L>  |  19  ----------------------------<  154<H>  4.2   |  33<H>  |  0.56    Ca    8.3<L>      08 Jan 2019 10:00                RADIOLOGY & ADDITIONAL TESTS:

## 2019-01-10 NOTE — PROGRESS NOTE ADULT - SUBJECTIVE AND OBJECTIVE BOX
[facility] -- Gurpreet Bush, Emily, Corey, Sajan Abebe Savella  Office # [phone]      Follow Up:    afib pleural eff  Subjective/Observations:   No events overnight resting comfortably in bed.  No complaints of chest pain, dyspnea, or palpitations reported. No signs of orthopnea or PND.     REVIEW OF SYSTEMS: All other review of systems is negative unless indicated above    PAST MEDICAL & SURGICAL HISTORY:  Dyspnea  Mediastinal adenopathy  Pericardial effusion  AF (atrial fibrillation)  Hyponatremia  COPD (chronic obstructive pulmonary disease)  HTN (hypertension)  S/P pericardial window creation      MEDICATIONS  (STANDING):  ALBUTerol/ipratropium for Nebulization 3 milliLiter(s) Nebulizer every 6 hours  buDESOnide   0.5 milliGRAM(s) Respule 0.5 milliGRAM(s) Inhalation two times a day  digoxin     Tablet 0.25 milliGRAM(s) Oral daily  diltiazem    Tablet 120 milliGRAM(s) Oral every 6 hours  furosemide   Injectable 40 milliGRAM(s) IV Push daily  heparin  Injectable 5000 Unit(s) SubCutaneous every 8 hours  influenza   Vaccine 0.5 milliLiter(s) IntraMuscular once  lactobacillus acidophilus 1 Tablet(s) Oral three times a day with meals  loratadine 10 milliGRAM(s) Oral daily  metoprolol succinate  milliGRAM(s) Oral daily  ondansetron  IVPB 10 milliGRAM(s) IV Intermittent once  sodium chloride 1 Gram(s) Oral three times a day  tiotropium 18 MICROgram(s) Capsule 1 Capsule(s) Inhalation daily  verapamil 80 milliGRAM(s) Oral every 8 hours    MEDICATIONS  (PRN):  ALBUTerol    90 MICROgram(s) HFA Inhaler 2 Puff(s) Inhalation every 6 hours PRN Shortness of Breath  ALPRAZolam 0.25 milliGRAM(s) Oral three times a day PRN anxiety  guaiFENesin    Syrup 200 milliGRAM(s) Oral every 6 hours PRN Cough  ondansetron  IVPB 10 milliGRAM(s) IV Intermittent every 8 hours PRN Nausea and/or Vomiting  prochlorperazine   Tablet 10 milliGRAM(s) Oral every 6 hours PRN nause and vomiting  promethazine 25 milliGRAM(s) Oral four times a day PRN breakthrough cough      Allergies    penicillin (Unknown)    Intolerances        Vital Signs Last 24 Hrs  T(C): 36.7 (10 Lisandro 2019 12:31), Max: 36.8 (09 Jan 2019 16:54)  T(F): 98 (10 Lisandro 2019 12:31), Max: 98.3 (09 Jan 2019 17:50)  HR: 84 (10 Lisandro 2019 14:10) (82 - 121)  BP: 113/77 (10 Lisandro 2019 12:31) (113/65 - 125/71)  BP(mean): --  RR: 20 (10 Lisandro 2019 13:43) (17 - 20)  SpO2: 89% (10 Lisandro 2019 13:43) (89% - 99%)    I&O's Summary    09 Jan 2019 07:01  -  10 Lisandro 2019 07:00  --------------------------------------------------------  IN: 1280 mL / OUT: 1750 mL / NET: -470 mL    10 Lisandro 2019 07:01  -  10 Lisandro 2019 15:39  --------------------------------------------------------  IN: 460 mL / OUT: 1150 mL / NET: -690 mL          PHYSICAL EXAM:  TELE: Afib @ 100 No events on tele overnight   Constitutional: NAD, awake and alert, well-developed  HEENT: Moist Mucous Membranes, Anicteric  Pulmonary: Non-labored, breath sounds are coarse bilaterally, No wheezing, crackles or rhonchi  Cardiovascular: Regular, S1 and S2 nl, No murmurs, rubs, gallops or clicks  Gastrointestinal: Bowel Sounds present, soft, nontender.   Lymph: No lymphadenopathy. + Bilat LE peripheral edema.   Skin: No visible rashes or ulcers.  Psych:  Mood & affect appropriate    LABS: All Labs Reviewed:                        11.1 17.41 )-----------( 288      ( 10 Lisandro 2019 08:39 )             33.8     10 Lisnadro 2019 08:39    135    |  93     |  21     ----------------------------<  159    4.2     |  36     |  0.60   08 Jan 2019 10:00    135    |  94     |  19     ----------------------------<  154    4.2     |  33     |  0.56   07 Jan 2019 22:07    x      |  x      |  x      ----------------------------<  x      x       |  x      |  0.57     Ca    8.0        10 Lisandro 2019 08:39  Ca    8.3        08 Jan 2019 10:00  Phos  4.2       10 Jan 2019 08:39    TPro  5.9    /  Alb  2.6    /  TBili  0.7    /  DBili  x      /  AST  18     /  ALT  77     /  AlkPhos  101    10 Lisandro 2019 08:39             ECG:    Echo:    Radiology:           Jermaine Dave [facility]   Cardiology

## 2019-01-10 NOTE — PROGRESS NOTE ADULT - SUBJECTIVE AND OBJECTIVE BOX
Patient is a 58y old  Male who presents with a chief complaint of sob (10 Lisandro 2019 15:39)      INTERVAL /OVERNIGHT EVENTS: ocasional sob    MEDICATIONS  (STANDING):  ALBUTerol/ipratropium for Nebulization 3 milliLiter(s) Nebulizer every 6 hours  buDESOnide   0.5 milliGRAM(s) Respule 0.5 milliGRAM(s) Inhalation two times a day  digoxin     Tablet 0.25 milliGRAM(s) Oral daily  diltiazem    Tablet 120 milliGRAM(s) Oral every 6 hours  furosemide   Injectable 40 milliGRAM(s) IV Push daily  heparin  Injectable 5000 Unit(s) SubCutaneous every 8 hours  influenza   Vaccine 0.5 milliLiter(s) IntraMuscular once  lactobacillus acidophilus 1 Tablet(s) Oral three times a day with meals  loratadine 10 milliGRAM(s) Oral daily  metoprolol succinate  milliGRAM(s) Oral daily  ondansetron  IVPB 10 milliGRAM(s) IV Intermittent once  sodium chloride 1 Gram(s) Oral three times a day  tiotropium 18 MICROgram(s) Capsule 1 Capsule(s) Inhalation daily  verapamil 120 milliGRAM(s) Oral every 8 hours    MEDICATIONS  (PRN):  ALBUTerol    90 MICROgram(s) HFA Inhaler 2 Puff(s) Inhalation every 6 hours PRN Shortness of Breath  ALPRAZolam 0.25 milliGRAM(s) Oral three times a day PRN anxiety  guaiFENesin    Syrup 200 milliGRAM(s) Oral every 6 hours PRN Cough  ondansetron  IVPB 10 milliGRAM(s) IV Intermittent every 8 hours PRN Nausea and/or Vomiting  prochlorperazine   Tablet 10 milliGRAM(s) Oral every 6 hours PRN nause and vomiting  promethazine 25 milliGRAM(s) Oral four times a day PRN breakthrough cough      Allergies    penicillin (Unknown)    Intolerances        REVIEW OF SYSTEMS:  CONSTITUTIONAL: No fever, weight loss, or fatigue  EYES: No eye pain, visual disturbances, or discharge  ENMT:  No difficulty hearing, tinnitus, vertigo; No sinus or throat pain  NECK: No pain or stiffness  RESPIRATORY: No cough, wheezing, chills or hemoptysis; + shortness of breath  CARDIOVASCULAR: No chest pain, palpitations, dizziness, or leg swelling  GASTROINTESTINAL: No abdominal or epigastric pain. No nausea, vomiting, or hematemesis; No diarrhea or constipation. No melena or hematochezia.  GENITOURINARY: No dysuria, frequency, hematuria, or incontinence  NEUROLOGICAL: No headaches, memory loss, loss of strength, numbness, or tremors  SKIN: No itching, burning, rashes, or lesions   LYMPH NODES: No enlarged glands  ENDOCRINE: No heat or cold intolerance; No hair loss; No polydipsia or polyuria  MUSCULOSKELETAL: No joint pain or swelling; No muscle, back, or extremity pain  PSYCHIATRIC: No depression, anxiety, mood swings, or difficulty sleeping  HEME/LYMPH: No easy bruising, or bleeding gums  ALLERGY AND IMMUNOLOGIC: No hives or eczema    Vital Signs Last 24 Hrs  T(C): 36.5 (10 Lisandro 2019 16:17), Max: 36.7 (10 Lisandro 2019 12:31)  T(F): 97.7 (10 Lisandro 2019 16:17), Max: 98 (10 Lisandro 2019 12:31)  HR: 135 (10 Lisandro 2019 16:41) (82 - 135)  BP: 126/62 (10 Lisandro 2019 16:17) (113/65 - 126/62)  BP(mean): --  RR: 24 (10 Lisandro 2019 16:41) (17 - 24)  SpO2: 88% (10 Lisandro 2019 16:41) (88% - 99%)    PHYSICAL EXAM:  GENERAL: NAD, well-groomed, well-developed  HEAD:  Atraumatic, Normocephalic  EYES: EOMI, PERRLA, conjunctiva and sclera clear  ENMT: No tonsillar erythema, exudates, or enlargement; Moist mucous membranes, Good dentition, No lesions  NECK: Supple, No JVD, Normal thyroid  NERVOUS SYSTEM:  Alert & Oriented X3, Good concentration; Motor Strength 5/5 B/L upper and lower extremities; DTRs 2+ intact and symmetric  CHEST/LUNG: Clear to auscultation bilaterally; No rales, rhonchi, wheezing, or rubs  HEART: Regular rate and rhythm; No murmurs, rubs, or gallops  ABDOMEN: Soft, Nontender, Nondistended; Bowel sounds present  EXTREMITIES:  2+ Peripheral Pulses, No clubbing, cyanosis, or edema  LYMPH: No lymphadenopathy noted  SKIN: No rashes or lesions    LABS:                        11.1   17.41 )-----------( 288      ( 10 Lisandro 2019 08:39 )             33.8     10 Lisandro 2019 08:39    135    |  93     |  21     ----------------------------<  159    4.2     |  36     |  0.60     Ca    8.0        10 Lisandro 2019 08:39  Phos  4.2       10 Lisandro 2019 08:39    TPro  5.9    /  Alb  2.6    /  TBili  0.7    /  DBili  x      /  AST  18     /  ALT  77     /  AlkPhos  101    10 Lisandro 2019 08:39        CAPILLARY BLOOD GLUCOSE          RADIOLOGY & ADDITIONAL TESTS:    Notes Reviewed:  [x ] YES  [ ] NO    Care Discussed with Consultants/Other Providers [x ] YES  [ ] NO

## 2019-01-10 NOTE — CHART NOTE - NSCHARTNOTEFT_GEN_A_CORE
Called by RN for pt having 3 beats of vtach on tele monitor. Per nurse, called Dr. Gusman, was told to ordered EKG and then to call house doc.  Pt asymptomatic, EKG reviewed, no acute ischemic changes, in afib unchanged compared to piror. Will get mag and phos for the am.

## 2019-01-11 LAB
ANION GAP SERPL CALC-SCNC: 7 MMOL/L — SIGNIFICANT CHANGE UP (ref 5–17)
BUN SERPL-MCNC: 23 MG/DL — SIGNIFICANT CHANGE UP (ref 7–23)
CALCIUM SERPL-MCNC: 8.1 MG/DL — LOW (ref 8.5–10.1)
CHLORIDE SERPL-SCNC: 93 MMOL/L — LOW (ref 96–108)
CO2 SERPL-SCNC: 35 MMOL/L — HIGH (ref 22–31)
CREAT SERPL-MCNC: 0.68 MG/DL — SIGNIFICANT CHANGE UP (ref 0.5–1.3)
GLUCOSE SERPL-MCNC: 167 MG/DL — HIGH (ref 70–99)
HCT VFR BLD CALC: 34.7 % — LOW (ref 39–50)
HGB BLD-MCNC: 11.3 G/DL — LOW (ref 13–17)
MAGNESIUM SERPL-MCNC: 2.3 MG/DL — SIGNIFICANT CHANGE UP (ref 1.6–2.6)
MCHC RBC-ENTMCNC: 27.4 PG — SIGNIFICANT CHANGE UP (ref 27–34)
MCHC RBC-ENTMCNC: 32.6 GM/DL — SIGNIFICANT CHANGE UP (ref 32–36)
MCV RBC AUTO: 84.2 FL — SIGNIFICANT CHANGE UP (ref 80–100)
NRBC # BLD: 0 /100 WBCS — SIGNIFICANT CHANGE UP (ref 0–0)
PHOSPHATE SERPL-MCNC: 4 MG/DL — SIGNIFICANT CHANGE UP (ref 2.5–4.5)
PLATELET # BLD AUTO: 249 K/UL — SIGNIFICANT CHANGE UP (ref 150–400)
POTASSIUM SERPL-MCNC: 4.4 MMOL/L — SIGNIFICANT CHANGE UP (ref 3.5–5.3)
POTASSIUM SERPL-SCNC: 4.4 MMOL/L — SIGNIFICANT CHANGE UP (ref 3.5–5.3)
RBC # BLD: 4.12 M/UL — LOW (ref 4.2–5.8)
RBC # FLD: 13.3 % — SIGNIFICANT CHANGE UP (ref 10.3–14.5)
SODIUM SERPL-SCNC: 135 MMOL/L — SIGNIFICANT CHANGE UP (ref 135–145)
WBC # BLD: 17.75 K/UL — HIGH (ref 3.8–10.5)
WBC # FLD AUTO: 17.75 K/UL — HIGH (ref 3.8–10.5)

## 2019-01-11 PROCEDURE — 99232 SBSQ HOSP IP/OBS MODERATE 35: CPT

## 2019-01-11 RX ORDER — DILTIAZEM HCL 120 MG
1 CAPSULE, EXT RELEASE 24 HR ORAL
Qty: 30 | Refills: 0
Start: 2019-01-11 | End: 2019-02-09

## 2019-01-11 RX ORDER — FILGRASTIM 480MCG/1.6
480 VIAL (ML) INJECTION EVERY 24 HOURS
Qty: 0 | Refills: 0 | Status: DISCONTINUED | OUTPATIENT
Start: 2019-01-12 | End: 2019-01-15

## 2019-01-11 RX ORDER — DEXAMETHASONE 0.5 MG/5ML
10 ELIXIR ORAL ONCE
Qty: 0 | Refills: 0 | Status: COMPLETED | OUTPATIENT
Start: 2019-01-11 | End: 2019-01-11

## 2019-01-11 RX ORDER — IPRATROPIUM/ALBUTEROL SULFATE 18-103MCG
3 AEROSOL WITH ADAPTER (GRAM) INHALATION
Qty: 120 | Refills: 0
Start: 2019-01-11 | End: 2019-02-09

## 2019-01-11 RX ORDER — DIGOXIN 250 MCG
1 TABLET ORAL
Qty: 30 | Refills: 0
Start: 2019-01-11 | End: 2019-02-09

## 2019-01-11 RX ORDER — ETOPOSIDE 20 MG/ML
230 VIAL (ML) INTRAVENOUS ONCE
Qty: 0 | Refills: 0 | Status: COMPLETED | OUTPATIENT
Start: 2019-01-11 | End: 2019-01-11

## 2019-01-11 RX ORDER — FUROSEMIDE 40 MG
40 TABLET ORAL EVERY 12 HOURS
Qty: 0 | Refills: 0 | Status: DISCONTINUED | OUTPATIENT
Start: 2019-01-11 | End: 2019-01-14

## 2019-01-11 RX ORDER — VERAPAMIL HCL 240 MG
1 CAPSULE, EXTENDED RELEASE PELLETS 24 HR ORAL
Qty: 90 | Refills: 0 | OUTPATIENT
Start: 2019-01-11 | End: 2019-02-09

## 2019-01-11 RX ORDER — ONDANSETRON 8 MG/1
10 TABLET, FILM COATED ORAL ONCE
Qty: 0 | Refills: 0 | Status: COMPLETED | OUTPATIENT
Start: 2019-01-11 | End: 2019-01-11

## 2019-01-11 RX ADMIN — CHLORHEXIDINE GLUCONATE 1 APPLICATION(S): 213 SOLUTION TOPICAL at 12:06

## 2019-01-11 RX ADMIN — Medication 120 MILLIGRAM(S): at 05:19

## 2019-01-11 RX ADMIN — Medication 40 MILLIGRAM(S): at 05:19

## 2019-01-11 RX ADMIN — Medication 200 MILLIGRAM(S): at 05:20

## 2019-01-11 RX ADMIN — LORATADINE 10 MILLIGRAM(S): 10 TABLET ORAL at 12:07

## 2019-01-11 RX ADMIN — HEPARIN SODIUM 5000 UNIT(S): 5000 INJECTION INTRAVENOUS; SUBCUTANEOUS at 21:29

## 2019-01-11 RX ADMIN — Medication 1 TABLET(S): at 18:52

## 2019-01-11 RX ADMIN — ONDANSETRON 110 MILLIGRAM(S): 8 TABLET, FILM COATED ORAL at 14:53

## 2019-01-11 RX ADMIN — Medication 3 MILLILITER(S): at 07:56

## 2019-01-11 RX ADMIN — Medication 120 MILLIGRAM(S): at 21:29

## 2019-01-11 RX ADMIN — Medication 40 MILLIGRAM(S): at 18:52

## 2019-01-11 RX ADMIN — HEPARIN SODIUM 5000 UNIT(S): 5000 INJECTION INTRAVENOUS; SUBCUTANEOUS at 10:19

## 2019-01-11 RX ADMIN — Medication 0.5 MILLIGRAM(S): at 07:56

## 2019-01-11 RX ADMIN — Medication 0.25 MILLIGRAM(S): at 05:19

## 2019-01-11 RX ADMIN — Medication 102 MILLIGRAM(S): at 14:11

## 2019-01-11 RX ADMIN — SODIUM CHLORIDE 1 GRAM(S): 9 INJECTION INTRAMUSCULAR; INTRAVENOUS; SUBCUTANEOUS at 21:29

## 2019-01-11 RX ADMIN — HEPARIN SODIUM 5000 UNIT(S): 5000 INJECTION INTRAVENOUS; SUBCUTANEOUS at 14:24

## 2019-01-11 RX ADMIN — Medication 3 MILLILITER(S): at 19:38

## 2019-01-11 RX ADMIN — Medication 3 MILLILITER(S): at 13:34

## 2019-01-11 RX ADMIN — Medication 0.5 MILLIGRAM(S): at 19:38

## 2019-01-11 RX ADMIN — Medication 3 MILLILITER(S): at 00:56

## 2019-01-11 RX ADMIN — SODIUM CHLORIDE 1 GRAM(S): 9 INJECTION INTRAMUSCULAR; INTRAVENOUS; SUBCUTANEOUS at 00:20

## 2019-01-11 RX ADMIN — SODIUM CHLORIDE 1 GRAM(S): 9 INJECTION INTRAMUSCULAR; INTRAVENOUS; SUBCUTANEOUS at 05:19

## 2019-01-11 RX ADMIN — Medication 511.5 MILLIGRAM(S): at 15:33

## 2019-01-11 RX ADMIN — Medication 1 TABLET(S): at 12:07

## 2019-01-11 RX ADMIN — Medication 120 MILLIGRAM(S): at 14:24

## 2019-01-11 RX ADMIN — HEPARIN SODIUM 5000 UNIT(S): 5000 INJECTION INTRAVENOUS; SUBCUTANEOUS at 05:20

## 2019-01-11 RX ADMIN — Medication 1 TABLET(S): at 09:21

## 2019-01-11 RX ADMIN — Medication 120 MILLIGRAM(S): at 00:19

## 2019-01-11 RX ADMIN — SODIUM CHLORIDE 1 GRAM(S): 9 INJECTION INTRAMUSCULAR; INTRAVENOUS; SUBCUTANEOUS at 14:24

## 2019-01-11 NOTE — PROVIDER CONTACT NOTE (OTHER) - SITUATION
pt. had 3 beat v tach as per remote tele, Dr. Gusman made aware , but the house doc to notified for any other events after hours.

## 2019-01-11 NOTE — PROGRESS NOTE ADULT - ASSESSMENT
59 y/o man w SOB, bulky mediastinal and hilar LADs, left lung compromise, left effusion, post bronch w pathology sig for small cell, pericardial tamponade, s/p pericardial window but negative cytology and pericardial bx.     Hyponatremia /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 Mild hyponatremia now. BUN 2, Cr 0.6. No change in Cr.   A fib, mild hemoptysis  CT scan Head and MRI brain negative for metastasis.     most likely extensive stage ( left pleura effusion) and pericardial tamponade/effusion  considering aggressiveness of cancer, significant disease burden and impending airway compromise without chemo, pt started on chemotherapy inpt.     s/p infusaport    LE edema on Lasix.   Last duplex 01/02/19, negative  On SQ Heparin q8h.     RECOMMEND  Continue D3 Carbo/VP16   Plan for Zarxio-GCSF support post chemo due to expected decline.   DVT prophylaxis.   rehab eval for subacute rehab  Will need O2.   Elevated legs PRN.

## 2019-01-11 NOTE — PHYSICAL THERAPY INITIAL EVALUATION ADULT - ADDITIONAL COMMENTS
Patient lives at home with his wife on the second floor, 13 steps to negotiate with 1 railing.  Patients mother and aide live on the first floor.  Patient looking to buy shower bench.

## 2019-01-11 NOTE — PROGRESS NOTE ADULT - SUBJECTIVE AND OBJECTIVE BOX
[facility] -- Gurpreet Bush, Corey Diaz Pannella, Patel, Savella  Office # [phone]    Follow Up:  Afib (new onset)    Subjective/Observations: Seen sittingon the chair, off NC.  Took it off only 5 mins ago and tolerating it well, thus, far.  States he feels much better.  Denies CP or palpitation    REVIEW OF SYSTEMS: All other review of systems is negative unless indicated above    PAST MEDICAL & SURGICAL HISTORY:  Dyspnea  Mediastinal adenopathy  Pericardial effusion  AF (atrial fibrillation)  Hyponatremia  COPD (chronic obstructive pulmonary disease)  HTN (hypertension)  S/P pericardial window creation    MEDICATIONS  (STANDING):  ALBUTerol/ipratropium for Nebulization 3 milliLiter(s) Nebulizer every 6 hours  buDESOnide   0.5 milliGRAM(s) Respule 0.5 milliGRAM(s) Inhalation two times a day  chlorhexidine 2% Cloths 1 Application(s) Topical daily  digoxin     Tablet 0.25 milliGRAM(s) Oral daily  diltiazem    Tablet 120 milliGRAM(s) Oral every 6 hours  furosemide   Injectable 40 milliGRAM(s) IV Push every 12 hours  heparin  Injectable 5000 Unit(s) SubCutaneous every 8 hours  influenza   Vaccine 0.5 milliLiter(s) IntraMuscular once  lactobacillus acidophilus 1 Tablet(s) Oral three times a day with meals  loratadine 10 milliGRAM(s) Oral daily  metoprolol succinate  milliGRAM(s) Oral daily  ondansetron  IVPB 10 milliGRAM(s) IV Intermittent once  sodium chloride 1 Gram(s) Oral three times a day  tiotropium 18 MICROgram(s) Capsule 1 Capsule(s) Inhalation daily  verapamil 120 milliGRAM(s) Oral every 8 hours    MEDICATIONS  (PRN):  ALBUTerol    90 MICROgram(s) HFA Inhaler 2 Puff(s) Inhalation every 6 hours PRN Shortness of Breath  guaiFENesin    Syrup 200 milliGRAM(s) Oral every 6 hours PRN Cough  ondansetron  IVPB 10 milliGRAM(s) IV Intermittent every 8 hours PRN Nausea and/or Vomiting  prochlorperazine   Tablet 10 milliGRAM(s) Oral every 6 hours PRN nause and vomiting  promethazine 25 milliGRAM(s) Oral four times a day PRN breakthrough cough    Allergies    penicillin (Unknown)    Intolerances    Vital Signs Last 24 Hrs  T(C): 36.7 (11 Jan 2019 13:42), Max: 36.7 (11 Jan 2019 05:02)  T(F): 98 (11 Jan 2019 13:42), Max: 98.1 (11 Jan 2019 05:02)  HR: 107 (11 Jan 2019 13:58) (78 - 135)  BP: 112/60 (11 Jan 2019 13:42) (105/58 - 126/62)  BP(mean): --  RR: 20 (11 Jan 2019 13:42) (20 - 24)  SpO2: 96% (11 Jan 2019 13:58) (88% - 98%)    I&O's Summary    10 Lisandro 2019 07:01  -  11 Jan 2019 07:00  --------------------------------------------------------  IN: 1080 mL / OUT: 1400 mL / NET: -320 mL    11 Jan 2019 07:01  -  11 Jan 2019 15:44  --------------------------------------------------------  IN: 0 mL / OUT: 1000 mL / NET: -1000 mL    PHYSICAL EXAM:  TELE: Afib  Constitutional: NAD, awake and alert, obese  HEENT: Moist Mucous Membranes, Anicteric  Pulmonary: Non-labored, breath sounds are clear bilaterally, No wheezing, + rales and rhonchi  Cardiovascular: Regular, S1 and S2, No murmurs, rubs, gallops or clicks  Gastrointestinal: Bowel Sounds present, soft, nontender.   Lymph: 2-3+ LE edema. No lymphadenopathy.  Skin: No visible rashes or ulcers.  Psych:  Mood & affect appropriate    LABS: All Labs Reviewed:                        11.3   17.75 )-----------( 249      ( 11 Jan 2019 09:08 )             34.7                         11.1   17.41 )-----------( 288      ( 10 Lisandro 2019 08:39 )             33.8     11 Jan 2019 09:08    135    |  93     |  23     ----------------------------<  167    4.4     |  35     |  0.68   10 Lisandro 2019 08:39    135    |  93     |  21     ----------------------------<  159    4.2     |  36     |  0.60     Ca    8.1        11 Jan 2019 09:08  Ca    8.0        10 Lisandro 2019 08:39  Phos  4.0       11 Jan 2019 09:08  Phos  4.2       10 Lisandro 2019 08:39  Mg     2.3       11 Jan 2019 09:08  Mg     2.2       10 Lisandro 2019 22:22    TPro  5.9    /  Alb  2.6    /  TBili  0.7    /  DBili  x      /  AST  18     /  ALT  77     /  AlkPhos  101    10 Lisandro 2019 08:39      < from: TTE Echo Doppler w/o Cont (01.04.19 @ 09:32) >     EXAM:  ECHO TTE WO CON COMP W DOPPLR         PROCEDURE DATE:  01/04/2019        INTERPRETATION:  INDICATION: Atrial fibrillation, pericardial effusion    Blood Pressure 138/73    Height 185     Weight 111       BSA 2.3    Dimensions:    LA 4.4  Normal Values: 2.0 - 4.0 cm    Ao 4.0        Normal Values: 2.0 - 3.8 cm  SEPTUM           Normal Values: 0.6 - 1.2 cm  PWT           Normal Values: 0.6 - 1.1 cm  LVIDd             Normal Values: 3.0 - 5.6 cm  LVIDs             Normal Values: 1.8 -4.0 cm    Derived Variables:  LVMI     g/m2  RWT      Fractional Short      Ejection Fraction        Doppler Peak v. AoV=   (m/sec)    OBSERVATIONS:    This is a technically limited study with suboptimal endocardial   definition. Within these limitations, the left ventricle appears to be   normal in size with overall normal systolic function. The right ventricle   is not well seen but is probably normal in size with normal systolic   function. Biatrial enlargement. The aortic root is mildly dilated. The   mitral valve is not well seen. There is trace physiologic MR noted. Other   valves appear structurally normal as visualized. Inadequate TR jet to   estimate PA systolic pressure. No significant pericardial effusion. Left   pleural effusion. The patient was in rapid atrial fibrillation during   this examination.     ISAC FLORES M.D., ATTENDING CARDIOLOGIST  This document has been electronically signed. Jan 4 2019  3:55PM     < end of copied text >    < from: CT Angio Chest w/ IV Cont (01.05.19 @ 00:27) >    EXAM:  CT ANGIO CHEST (W)AW IC                            PROCEDURE DATE:  01/05/2019          INTERPRETATION:  [facility] RADIOLOGIST PRELIMINARY REPORT    EXAM:    CT Angiography Chest Without And With Contrast     EXAM DATE/TIME:    1/4/2019 11:45 PM     CLINICAL HISTORY:    58 years old, male; Signs and symptoms; Shortness of breath; Prior   surgery;   Surgery date: 3-7 days post-operative. History of lung cancer.     TECHNIQUE:    Axial computed tomographic angiography images of the chest without and   with intravenous contrast using CT angiography protocol.    Coronal and sagittal reformatted images were created and reviewed.    MIP reconstructed images were created and reviewed.     CONTRAST:    95 ml of omnipaque administered intravenously.     COMPARISON:    CT CHEST 12/13/2018 8:13 AM     FINDINGS:    Pulmonary arteries:  No evidence of pulmonary embolus.    Aorta:  Aorta is unremarkable. No evidence of dissection. No evidence of   aneurysm.    Other veins:  There is extensive mass effect from mediastinal mass   lesions on   superior aspect of left atrium with complete nonvisualization of left   superior   pulmonary vein and moderate to severe stenosis of left inferior pulmonary   vein.    Lungs:  There is moderate narrowing of bilateral mainstem bronchi and   left   upper and lower lobe bronchi secondary to mass effect from mediastinal   and   hilar mass lesions.    Pleural space:  Moderate to large left pleural effusion. Small right   pleural   effusion. Left lower lobe and lingular infiltrates. Large dense left   upper lobe   infiltrate.    Heart: Normal. No cardiomegaly. No pericardial effusion.    Mediastinum:  There is complete collapse of mid esophagus secondary to   mass   effect from mediastinal metastatic disease.    Lymph nodes:  Multiple very enlarged mediastinal and hilar lymph nodes   consistent with metastatic disease.    Bones/joints: Unremarkable. No acute fracture.    Soft tissues: Unremarkable.     IMPRESSION:   1. Multiple very enlarged mediastinal and hilar lymph nodes consistent   with metastatic disease.   2. There is moderate narrowing of bilateral mainstem bronchi and left   upper and lower lobe bronchi secondary to mass effect from mediastinal   and hilar mass lesions.   3. Moderate to large left pleural effusion. Small right pleural effusion.   Left lower lobe and lingular infiltrates. Large dense left upper lobe   infiltrate. Superimposed pneumonia or pulmonary edema is considered.  4. There is complete collapse of mid esophagus secondary to mass effect   from mediastinal metastatic disease.   5. There is extensive mass effect from mediastinal mass lesions on   superior aspect of left atrium with complete nonvisualization of left   superior pulmonary vein and moderate to severestenosis of left inferior   pulmonary vein.   6. No evidence of pulmonary arterial embolus.    AGREE WITH ABOVE FINDINGS    JUAN GARCIA M.D., ATTENDING RADIOLOGIST  This document has been electronically signed. Jan 5 2019  9:07AM      < end of copied text >

## 2019-01-11 NOTE — PROGRESS NOTE ADULT - ASSESSMENT
Hyponatremia  SIADH  PNA  Hemoptysis   Pleural effusion  Lung CA      - Known to our group from last hospitalization with SIADH. He was sent home with NaCl tabs. He claimed to be compliant with salt tablets but not fluid restriction. No hypertonic saline needed. S/p tolvaptan 15 mg po x 1   - Renal indices are stable; sodium levels are within normal range  - Continue NaCl tabs as ordered. Fluid restriction as ordered; IV Lasix as ordered  - S/p CTA showing no PE  - S/p Bronch on 1/2/18  - IV steroids  - Pulm and Cardio follow up noted  - Abx   - Monitor chemistries  - S/p chemoport; started chemo on 1/9/19    Thank you

## 2019-01-11 NOTE — PHARMACOTHERAPY INTERVENTION NOTE - COMMENTS
Patient is currently receiving neupogen.  Labs ordered for this morning included a CBC but did not include differential.  Reached out to Dr. Gusman and requested the addition of a differential to the morning labs for today.  He agreed and the lab was alerted.

## 2019-01-11 NOTE — PROGRESS NOTE ADULT - SUBJECTIVE AND OBJECTIVE BOX
NEPHROLOGY INTERVAL HPI/OVERNIGHT EVENTS:  HPI:  pt is a 59yo male with pmhx of htn, copd, cardiac tamponade s/p window 12/2018 c/o cough x days. pt reports non-productive cough with associated dyspnea on exertion. pt reports "it feels like something is stuck in my throat". pt reports bl le swelling without pain. pt recently admitted for pericardial effusion s/p window. pt quit smoking aprox 2 weeks ago.  In ER patient was found to have PNA on CXR.    Follow up hyponatremia/SIADH  No complaints; breathing somewhat better    PAST MEDICAL & SURGICAL HISTORY:  Dyspnea  Mediastinal adenopathy  Pericardial effusion  AF (atrial fibrillation)  Hyponatremia  COPD (chronic obstructive pulmonary disease)  HTN (hypertension)  S/P pericardial window creation      MEDICATIONS  (STANDING):  ALBUTerol/ipratropium for Nebulization 3 milliLiter(s) Nebulizer every 6 hours  buDESOnide   0.5 milliGRAM(s) Respule 0.5 milliGRAM(s) Inhalation two times a day  digoxin     Tablet 0.25 milliGRAM(s) Oral daily  diltiazem    Tablet 120 milliGRAM(s) Oral every 6 hours  furosemide   Injectable 40 milliGRAM(s) IV Push daily  heparin  Injectable 5000 Unit(s) SubCutaneous every 8 hours  influenza   Vaccine 0.5 milliLiter(s) IntraMuscular once  lactobacillus acidophilus 1 Tablet(s) Oral three times a day with meals  loratadine 10 milliGRAM(s) Oral daily  metoprolol succinate  milliGRAM(s) Oral daily  ondansetron  IVPB 10 milliGRAM(s) IV Intermittent once  sodium chloride 1 Gram(s) Oral three times a day  tiotropium 18 MICROgram(s) Capsule 1 Capsule(s) Inhalation daily  verapamil 80 milliGRAM(s) Oral every 8 hours    MEDICATIONS  (PRN):  ALBUTerol    90 MICROgram(s) HFA Inhaler 2 Puff(s) Inhalation every 6 hours PRN Shortness of Breath  ALPRAZolam 0.25 milliGRAM(s) Oral three times a day PRN anxiety  guaiFENesin    Syrup 200 milliGRAM(s) Oral every 6 hours PRN Cough  ondansetron  IVPB 10 milliGRAM(s) IV Intermittent every 8 hours PRN Nausea and/or Vomiting  prochlorperazine   Tablet 10 milliGRAM(s) Oral every 6 hours PRN nause and vomiting  promethazine 25 milliGRAM(s) Oral four times a day PRN breakthrough cough      Allergies    penicillin (Unknown)    Intolerances        Vital Signs Last 24 Hrs  T(C): 36.6 (10 Lisandro 2019 05:39), Max: 37.1 (09 Jan 2019 13:47)  T(F): 97.9 (10 Lisandro 2019 05:39), Max: 98.7 (09 Jan 2019 13:47)  HR: 106 (10 Lisandro 2019 05:39) (85 - 118)  BP: 119/68 (10 Lisandro 2019 05:39) (113/65 - 125/71)  BP(mean): --  RR: 18 (10 Lisandro 2019 05:39) (18 - 18)  SpO2: 96% (10 Lisandro 2019 05:39) (94% - 99%)  Daily     Daily     PHYSICAL EXAM:  GENERAL: no distress, sitting in chair, appears comfortable  HEAD:  Atraumatic, Normocephalic  EYES: Conjunctiva and sclera clear  ENMT: Moist mucous membranes, Good dentition, No lesions  NECK: Supple  NERVOUS SYSTEM:  Alert & Oriented X3, follows commands well  CHEST/LUNG: Scattered wheezing, reduced breath sounds overall  HEART: Regular rate and rhythm; No murmurs, rubs, or gallops  ABDOMEN: Soft, Nontender, Nondistended; Bowel sounds present  EXTREMITIES: trace Edema  LYMPH: No lymphadenopathy noted  SKIN: No rashes or lesions      LABS:  Reviewed

## 2019-01-11 NOTE — PROGRESS NOTE ADULT - ASSESSMENT
58M pmhx afib diagnosed December 2018 (not on AC), COPD, HTN, cardiac tamponade s/p window 12/2018 discharged from [facility] ~2 weeks for hyponatremia and found to have mediastinal adenopathy and moderate sized pericardial effusion with tamponade physiology with hospital course further complicated by new onset afib with RVR, who presented with worsening SOB and cough and was found to be hyponatremic and have pna. Found to have a malignancy. Patient had bronchoscopy and s/p bronch, patient went into rapid a fib which resolved on own. remains in rapid af now, unclear why not responding appropriately to meds    Recommend:  - s/p RIJ venous cath insertion.  Chemo ongoing, tolerating it well  - will increase Lasix 40 mg IV to q12H.  Now net negative 1L  - Continue strict I & O's  - Monitor daily standing weights  - Monitor electrolytes, replete to keep K>4 and Mag>2  - TTE does not reveal recurrent pericardial effusion of any significance  - Afib better controlled.  - HR may be difficult to control due to ongoing pulmonary process, though better controlled overnight but still maintaining 100s  - Continue  Toprol  mg daily  - Continue Cardizem 120 mg po q 6hours  - Continue verapamil to 120 mg q 8 hours for better rate control   - Cont Digoxin 250 qd.  dig level 1/9 = 0.5  - Continue tele monitor   - Pulm f/u.  Continue bronchodilators and steroids.  Encourage incentive spirometer  - BP well controlled, monitor routine hemodynamics.  - Monitor and replete lytes, keep K>4, Mg>2.  -Other cardiovascular workup will depend on clinical course.  -All other workup per primary team.  -Will continue to follow.    Steph Pradhan NP  Cardiology

## 2019-01-11 NOTE — PHYSICAL THERAPY INITIAL EVALUATION ADULT - CRITERIA FOR SKILLED THERAPEUTIC INTERVENTIONS
predicted duration of therapy intervention/risk reduction/prevention/rehab potential/therapy frequency/impairments found/functional limitations in following categories/anticipated discharge recommendation/anticipated equipment needs at discharge

## 2019-01-11 NOTE — PROVIDER CONTACT NOTE (OTHER) - ACTION/TREATMENT ORDERED:
CARDIZEM IVP 10 MG INE STAT DOSE
EKG and have intern read EKG and intern to call Dr. Abebe.
MD aware, no new orders at this time
MD ordered pt's 0600 Digoxin, Toprol and Cardizem be administered now.
MD to assess pt bedside
doctor aware will assess the pt.

## 2019-01-11 NOTE — PROGRESS NOTE ADULT - SUBJECTIVE AND OBJECTIVE BOX
Patient is a 58y old  Male who presents with a chief complaint of sob (11 Jan 2019 15:44)      INTERVAL HPI/OVERNIGHT EVENTS:    Tolerated chemo today. Has shortness of breath when laying flat.    MEDICATIONS  (STANDING):  ALBUTerol/ipratropium for Nebulization 3 milliLiter(s) Nebulizer every 6 hours  buDESOnide   0.5 milliGRAM(s) Respule 0.5 milliGRAM(s) Inhalation two times a day  chlorhexidine 2% Cloths 1 Application(s) Topical daily  digoxin     Tablet 0.25 milliGRAM(s) Oral daily  diltiazem    Tablet 120 milliGRAM(s) Oral every 6 hours  furosemide   Injectable 40 milliGRAM(s) IV Push every 12 hours  heparin  Injectable 5000 Unit(s) SubCutaneous every 8 hours  influenza   Vaccine 0.5 milliLiter(s) IntraMuscular once  lactobacillus acidophilus 1 Tablet(s) Oral three times a day with meals  loratadine 10 milliGRAM(s) Oral daily  metoprolol succinate  milliGRAM(s) Oral daily  ondansetron  IVPB 10 milliGRAM(s) IV Intermittent once  sodium chloride 1 Gram(s) Oral three times a day  tiotropium 18 MICROgram(s) Capsule 1 Capsule(s) Inhalation daily  verapamil 120 milliGRAM(s) Oral every 8 hours      MEDICATIONS  (PRN):  ALBUTerol    90 MICROgram(s) HFA Inhaler 2 Puff(s) Inhalation every 6 hours PRN Shortness of Breath  guaiFENesin    Syrup 200 milliGRAM(s) Oral every 6 hours PRN Cough  ondansetron  IVPB 10 milliGRAM(s) IV Intermittent every 8 hours PRN Nausea and/or Vomiting  prochlorperazine   Tablet 10 milliGRAM(s) Oral every 6 hours PRN nause and vomiting  promethazine 25 milliGRAM(s) Oral four times a day PRN breakthrough cough      Allergies    penicillin (Unknown)    Intolerances        PAST MEDICAL & SURGICAL HISTORY:  Dyspnea  Mediastinal adenopathy  Pericardial effusion  AF (atrial fibrillation)  Hyponatremia  COPD (chronic obstructive pulmonary disease)  HTN (hypertension)  S/P pericardial window creation      Vital Signs Last 24 Hrs  T(C): 36.4 (11 Jan 2019 16:49), Max: 36.7 (11 Jan 2019 05:02)  T(F): 97.6 (11 Jan 2019 16:49), Max: 98.1 (11 Jan 2019 05:02)  HR: 91 (11 Jan 2019 16:49) (78 - 122)  BP: 102/63 (11 Jan 2019 16:49) (102/63 - 121/76)  BP(mean): --  RR: 20 (11 Jan 2019 16:49) (20 - 20)  SpO2: 91% (11 Jan 2019 16:49) (91% - 98%)    PHYSICAL EXAMINATION:    GENERAL: The patient is awake and alert in no apparent distress.     HEENT: Head is normocephalic and atraumatic. Extraocular muscles are intact. Mucous membranes are moist.    NECK: Supple.    LUNGS: mild bilateral expiratory wheezes    HEART: Regular rate and rhythm without murmur.    ABDOMEN: Soft, nontender, and nondistended.      EXTREMITIES: Without any cyanosis, clubbing, rash, lesions or edema.    NEUROLOGIC: Grossly intact.    SKIN: No ulceration or induration present.      LABS:                        11.3   17.75 )-----------( 249      ( 11 Jan 2019 09:08 )             34.7     01-11    135  |  93<L>  |  23  ----------------------------<  167<H>  4.4   |  35<H>  |  0.68    Ca    8.1<L>      11 Jan 2019 09:08  Phos  4.0     01-11  Mg     2.3     01-11    TPro  5.9<L>  /  Alb  2.6<L>  /  TBili  0.7  /  DBili  x   /  AST  18  /  ALT  77  /  AlkPhos  101  01-10                        MICROBIOLOGY:      RADIOLOGY & ADDITIONAL STUDIES:    Assessment:    Small cell carcinoma of lung   SIADH  Probable COPD  Atrial Fibrillation    Plan:    Discharge planning  Will check oxygen saturation with ambulation on room air  Will arrange home nebulizer

## 2019-01-11 NOTE — PHYSICAL THERAPY INITIAL EVALUATION ADULT - PERTINENT HX OF CURRENT PROBLEM, REHAB EVAL
Patient is a 58y old  Male who presents with a chief complaint of shortness of breath.  Diagnosed with small cell carcinoma of lung with tracheal, pleural, pericardial, mediastinal, and hilar involvement

## 2019-01-11 NOTE — PROVIDER CONTACT NOTE (CHANGE IN STATUS NOTIFICATION) - ACTION/TREATMENT ORDERED:
TO > STAT EKG, mag levels and inform house doctor after abnormal EKG results , if pt symptomatic call RR

## 2019-01-11 NOTE — PROGRESS NOTE ADULT - SUBJECTIVE AND OBJECTIVE BOX
Patient is a 58y old  Male who presents with a chief complaint of sob (11 Jan 2019 14:58)      INTERVAL /OVERNIGHT EVENTS: feels ok    MEDICATIONS  (STANDING):  ALBUTerol/ipratropium for Nebulization 3 milliLiter(s) Nebulizer every 6 hours  buDESOnide   0.5 milliGRAM(s) Respule 0.5 milliGRAM(s) Inhalation two times a day  chlorhexidine 2% Cloths 1 Application(s) Topical daily  digoxin     Tablet 0.25 milliGRAM(s) Oral daily  diltiazem    Tablet 120 milliGRAM(s) Oral every 6 hours  etoposide IVPB (eMAR) 230 milliGRAM(s) IV Intermittent once  furosemide   Injectable 40 milliGRAM(s) IV Push daily  heparin  Injectable 5000 Unit(s) SubCutaneous every 8 hours  influenza   Vaccine 0.5 milliLiter(s) IntraMuscular once  lactobacillus acidophilus 1 Tablet(s) Oral three times a day with meals  loratadine 10 milliGRAM(s) Oral daily  metoprolol succinate  milliGRAM(s) Oral daily  ondansetron  IVPB 10 milliGRAM(s) IV Intermittent once  sodium chloride 1 Gram(s) Oral three times a day  tiotropium 18 MICROgram(s) Capsule 1 Capsule(s) Inhalation daily  verapamil 120 milliGRAM(s) Oral every 8 hours    MEDICATIONS  (PRN):  ALBUTerol    90 MICROgram(s) HFA Inhaler 2 Puff(s) Inhalation every 6 hours PRN Shortness of Breath  guaiFENesin    Syrup 200 milliGRAM(s) Oral every 6 hours PRN Cough  ondansetron  IVPB 10 milliGRAM(s) IV Intermittent every 8 hours PRN Nausea and/or Vomiting  prochlorperazine   Tablet 10 milliGRAM(s) Oral every 6 hours PRN nause and vomiting  promethazine 25 milliGRAM(s) Oral four times a day PRN breakthrough cough      Allergies    penicillin (Unknown)    Intolerances        REVIEW OF SYSTEMS:  CONSTITUTIONAL: No fever, weight loss, or fatigue  EYES: No eye pain, visual disturbances, or discharge  ENMT:  No difficulty hearing, tinnitus, vertigo; No sinus or throat pain  NECK: No pain or stiffness  RESPIRATORY: No cough, wheezing, chills or hemoptysis; No shortness of breath  CARDIOVASCULAR: No chest pain, palpitations, dizziness, or leg swelling  GASTROINTESTINAL: No abdominal or epigastric pain. No nausea, vomiting, or hematemesis; No diarrhea or constipation. No melena or hematochezia.  GENITOURINARY: No dysuria, frequency, hematuria, or incontinence  NEUROLOGICAL: No headaches, memory loss, loss of strength, numbness, or tremors  SKIN: No itching, burning, rashes, or lesions   LYMPH NODES: No enlarged glands  ENDOCRINE: No heat or cold intolerance; No hair loss; No polydipsia or polyuria  MUSCULOSKELETAL: No joint pain or swelling; No muscle, back, or extremity pain  PSYCHIATRIC: No depression, anxiety, mood swings, or difficulty sleeping  HEME/LYMPH: No easy bruising, or bleeding gums  ALLERGY AND IMMUNOLOGIC: No hives or eczema    Vital Signs Last 24 Hrs  T(C): 36.7 (11 Jan 2019 13:42), Max: 36.7 (11 Jan 2019 05:02)  T(F): 98 (11 Jan 2019 13:42), Max: 98.1 (11 Jan 2019 05:02)  HR: 107 (11 Jan 2019 13:58) (78 - 135)  BP: 112/60 (11 Jan 2019 13:42) (105/58 - 126/62)  BP(mean): --  RR: 20 (11 Jan 2019 13:42) (20 - 24)  SpO2: 96% (11 Jan 2019 13:58) (88% - 98%)    PHYSICAL EXAM:  GENERAL: NAD, well-groomed, well-developed  HEAD:  Atraumatic, Normocephalic  EYES: EOMI, PERRLA, conjunctiva and sclera clear  ENMT: No tonsillar erythema, exudates, or enlargement; Moist mucous membranes, Good dentition, No lesions  NECK: Supple, No JVD, Normal thyroid  NERVOUS SYSTEM:  Alert & Oriented X3, Good concentration; Motor Strength 5/5 B/L upper and lower extremities; DTRs 2+ intact and symmetric  CHEST/LUNG: Clear to auscultation bilaterally; No rales, rhonchi, wheezing, or rubs  HEART: Regular rate and rhythm; No murmurs, rubs, or gallops  ABDOMEN: Soft, Nontender, Nondistended; Bowel sounds present  EXTREMITIES:  2+ Peripheral Pulses, No clubbing, cyanosis, or edema  LYMPH: No lymphadenopathy noted  SKIN: No rashes or lesions    LABS:                        11.3   17.75 )-----------( 249      ( 11 Jan 2019 09:08 )             34.7     11 Jan 2019 09:08    135    |  93     |  23     ----------------------------<  167    4.4     |  35     |  0.68     Ca    8.1        11 Jan 2019 09:08  Phos  4.0       11 Jan 2019 09:08  Mg     2.3       11 Jan 2019 09:08          CAPILLARY BLOOD GLUCOSE          RADIOLOGY & ADDITIONAL TESTS:    Notes Reviewed:  [x ] YES  [ ] NO    Care Discussed with Consultants/Other Providers [ x] YES  [ ] NO

## 2019-01-11 NOTE — PROGRESS NOTE ADULT - SUBJECTIVE AND OBJECTIVE BOX
[INTERVAL HX: ]  Patient seen and examined;  Chart reviewed and events noted;   No Cp, no SOB at rest, sitting up.   Mood OK.     MEDICATIONS  (STANDING):  ALBUTerol/ipratropium for Nebulization 3 milliLiter(s) Nebulizer every 6 hours  buDESOnide   0.5 milliGRAM(s) Respule 0.5 milliGRAM(s) Inhalation two times a day  chlorhexidine 2% Cloths 1 Application(s) Topical daily  dexamethasone  IVPB 10 milliGRAM(s) IV Intermittent once  digoxin     Tablet 0.25 milliGRAM(s) Oral daily  diltiazem    Tablet 120 milliGRAM(s) Oral every 6 hours  etoposide IVPB (eMAR) 230 milliGRAM(s) IV Intermittent once  furosemide   Injectable 40 milliGRAM(s) IV Push daily  heparin  Injectable 5000 Unit(s) SubCutaneous every 8 hours  influenza   Vaccine 0.5 milliLiter(s) IntraMuscular once  lactobacillus acidophilus 1 Tablet(s) Oral three times a day with meals  loratadine 10 milliGRAM(s) Oral daily  metoprolol succinate  milliGRAM(s) Oral daily  ondansetron  IVPB 10 milliGRAM(s) IV Intermittent once  ondansetron  IVPB 10 milliGRAM(s) IV Intermittent once  sodium chloride 1 Gram(s) Oral three times a day  tiotropium 18 MICROgram(s) Capsule 1 Capsule(s) Inhalation daily  verapamil 120 milliGRAM(s) Oral every 8 hours    MEDICATIONS  (PRN):  ALBUTerol    90 MICROgram(s) HFA Inhaler 2 Puff(s) Inhalation every 6 hours PRN Shortness of Breath  guaiFENesin    Syrup 200 milliGRAM(s) Oral every 6 hours PRN Cough  ondansetron  IVPB 10 milliGRAM(s) IV Intermittent every 8 hours PRN Nausea and/or Vomiting  prochlorperazine   Tablet 10 milliGRAM(s) Oral every 6 hours PRN nause and vomiting  promethazine 25 milliGRAM(s) Oral four times a day PRN breakthrough cough      Vital Signs Last 24 Hrs  T(C): 36.7 (11 Jan 2019 05:02), Max: 36.7 (10 Lisandro 2019 12:31)  T(F): 98.1 (11 Jan 2019 05:02), Max: 98.1 (11 Jan 2019 05:02)  HR: 78 (11 Jan 2019 07:57) (78 - 135)  BP: 107/57 (11 Jan 2019 05:02) (107/57 - 126/62)  BP(mean): --  RR: 20 (11 Jan 2019 05:02) (17 - 24)  SpO2: 92% (11 Jan 2019 05:02) (88% - 98%)    [PHYSICAL EXAM]  General: adult in NAD,  WN,  WD.  HEENT: clear oropharynx, anicteric sclera, pink conjunctivae.  Neck: supple, no masses.  CV: normal S1S2, no murmur, no rubs, no gallops. mild decreased BS L abse  Lungs: clear to auscultation, no wheezes, no rales, no rhonchi.  Abdomen: soft, non-tender, non-distended, no hepatosplenomegaly, normal BS, no guarding.  Ext: no clubbing, no cyanosis, +edema.  Skin: no rashes,  no petechiae, no venous stasis changes.  Neuro: alert and oriented X3, no focal motor deficits.  LN: no SC STEVE.      [LABS:]                        11.3   17.75 )-----------( 249      ( 11 Jan 2019 09:08 )             34.7     01-11    135  |  93<L>  |  23  ----------------------------<  167<H>  4.4   |  35<H>  |  0.68    Ca    8.1<L>      11 Jan 2019 09:08  Phos  4.0     01-11  Mg     2.3     01-11    TPro  5.9<L>  /  Alb  2.6<L>  /  TBili  0.7  /  DBili  x   /  AST  18  /  ALT  77  /  AlkPhos  101  01-10          [RADIOLOGY STUDIES:]

## 2019-01-12 ENCOUNTER — TRANSCRIPTION ENCOUNTER (OUTPATIENT)
Age: 59
End: 2019-01-12

## 2019-01-12 DIAGNOSIS — C34.90 MALIGNANT NEOPLASM OF UNSPECIFIED PART OF UNSPECIFIED BRONCHUS OR LUNG: ICD-10-CM

## 2019-01-12 LAB
BASOPHILS # BLD AUTO: 0 K/UL — SIGNIFICANT CHANGE UP (ref 0–0.2)
BASOPHILS NFR BLD AUTO: 0 % — SIGNIFICANT CHANGE UP (ref 0–2)
CULTURE RESULTS: SIGNIFICANT CHANGE UP
EOSINOPHIL # BLD AUTO: 0.01 K/UL — SIGNIFICANT CHANGE UP (ref 0–0.5)
EOSINOPHIL NFR BLD AUTO: 0.1 % — SIGNIFICANT CHANGE UP (ref 0–6)
HCT VFR BLD CALC: 31.4 % — LOW (ref 39–50)
HGB BLD-MCNC: 10.4 G/DL — LOW (ref 13–17)
IMM GRANULOCYTES NFR BLD AUTO: 0.6 % — SIGNIFICANT CHANGE UP (ref 0–1.5)
LYMPHOCYTES # BLD AUTO: 0.52 K/UL — LOW (ref 1–3.3)
LYMPHOCYTES # BLD AUTO: 3.8 % — LOW (ref 13–44)
MCHC RBC-ENTMCNC: 27.2 PG — SIGNIFICANT CHANGE UP (ref 27–34)
MCHC RBC-ENTMCNC: 33.1 GM/DL — SIGNIFICANT CHANGE UP (ref 32–36)
MCV RBC AUTO: 82.2 FL — SIGNIFICANT CHANGE UP (ref 80–100)
MONOCYTES # BLD AUTO: 0.22 K/UL — SIGNIFICANT CHANGE UP (ref 0–0.9)
MONOCYTES NFR BLD AUTO: 1.6 % — LOW (ref 2–14)
NEUTROPHILS # BLD AUTO: 13.02 K/UL — HIGH (ref 1.8–7.4)
NEUTROPHILS NFR BLD AUTO: 93.9 % — HIGH (ref 43–77)
NRBC # BLD: 0 /100 WBCS — SIGNIFICANT CHANGE UP (ref 0–0)
PLATELET # BLD AUTO: 183 K/UL — SIGNIFICANT CHANGE UP (ref 150–400)
RBC # BLD: 3.82 M/UL — LOW (ref 4.2–5.8)
RBC # FLD: 13.2 % — SIGNIFICANT CHANGE UP (ref 10.3–14.5)
SPECIMEN SOURCE: SIGNIFICANT CHANGE UP
WBC # BLD: 13.86 K/UL — HIGH (ref 3.8–10.5)
WBC # FLD AUTO: 13.86 K/UL — HIGH (ref 3.8–10.5)

## 2019-01-12 PROCEDURE — 71045 X-RAY EXAM CHEST 1 VIEW: CPT | Mod: 26

## 2019-01-12 PROCEDURE — 99232 SBSQ HOSP IP/OBS MODERATE 35: CPT

## 2019-01-12 RX ORDER — FUROSEMIDE 40 MG
1 TABLET ORAL
Qty: 30 | Refills: 0 | OUTPATIENT
Start: 2019-01-12 | End: 2019-02-10

## 2019-01-12 RX ORDER — SODIUM CHLORIDE 9 MG/ML
1 INJECTION INTRAMUSCULAR; INTRAVENOUS; SUBCUTANEOUS
Qty: 0 | Refills: 0 | Status: DISCONTINUED | OUTPATIENT
Start: 2019-01-12 | End: 2019-01-15

## 2019-01-12 RX ADMIN — Medication 0.25 MILLIGRAM(S): at 06:10

## 2019-01-12 RX ADMIN — Medication 3 MILLILITER(S): at 19:35

## 2019-01-12 RX ADMIN — Medication 120 MILLIGRAM(S): at 05:50

## 2019-01-12 RX ADMIN — Medication 3 MILLILITER(S): at 13:05

## 2019-01-12 RX ADMIN — HEPARIN SODIUM 5000 UNIT(S): 5000 INJECTION INTRAVENOUS; SUBCUTANEOUS at 15:44

## 2019-01-12 RX ADMIN — Medication 120 MILLIGRAM(S): at 21:37

## 2019-01-12 RX ADMIN — Medication 0.5 MILLIGRAM(S): at 07:44

## 2019-01-12 RX ADMIN — Medication 3 MILLILITER(S): at 07:44

## 2019-01-12 RX ADMIN — Medication 1 TABLET(S): at 10:28

## 2019-01-12 RX ADMIN — SODIUM CHLORIDE 1 GRAM(S): 9 INJECTION INTRAMUSCULAR; INTRAVENOUS; SUBCUTANEOUS at 05:47

## 2019-01-12 RX ADMIN — Medication 3 MILLILITER(S): at 00:16

## 2019-01-12 RX ADMIN — SODIUM CHLORIDE 1 GRAM(S): 9 INJECTION INTRAMUSCULAR; INTRAVENOUS; SUBCUTANEOUS at 17:31

## 2019-01-12 RX ADMIN — Medication 0.5 MILLIGRAM(S): at 19:35

## 2019-01-12 RX ADMIN — Medication 1 TABLET(S): at 12:32

## 2019-01-12 RX ADMIN — HEPARIN SODIUM 5000 UNIT(S): 5000 INJECTION INTRAVENOUS; SUBCUTANEOUS at 21:37

## 2019-01-12 RX ADMIN — Medication 40 MILLIGRAM(S): at 05:47

## 2019-01-12 RX ADMIN — Medication 1 TABLET(S): at 17:32

## 2019-01-12 RX ADMIN — Medication 200 MILLIGRAM(S): at 05:48

## 2019-01-12 RX ADMIN — Medication 40 MILLIGRAM(S): at 17:31

## 2019-01-12 RX ADMIN — Medication 120 MILLIGRAM(S): at 15:46

## 2019-01-12 RX ADMIN — CHLORHEXIDINE GLUCONATE 1 APPLICATION(S): 213 SOLUTION TOPICAL at 11:26

## 2019-01-12 RX ADMIN — HEPARIN SODIUM 5000 UNIT(S): 5000 INJECTION INTRAVENOUS; SUBCUTANEOUS at 05:48

## 2019-01-12 RX ADMIN — Medication 480 MICROGRAM(S): at 17:35

## 2019-01-12 RX ADMIN — LORATADINE 10 MILLIGRAM(S): 10 TABLET ORAL at 11:22

## 2019-01-12 NOTE — CHART NOTE - NSCHARTNOTEFT_GEN_A_CORE
Assessment: 59yo male with pmhx of htn, copd, cardiac tamponade s/p window 12/2018. c/o cough and PARKS.  pt recently admitted for pericardial effusion s/p window.  In ER patient was found to have PNA on CXR.  Pt had bronch, found to have small cell lung Ca with tracheal, pleural, pericardial, mediastinal, and hilar involvement  Infusaport placed 1/8, chemo started 1/9.  Pt tolerated chemo well, denies any negative side effects from chemo on appetite, food tolerance at this time.  BM noted 1/8.      Factors impacting intake: [x] none [ ] nausea  [ ] vomiting [ ] diarrhea [ ] constipation  [ ]chewing problems [ ] swallowing issues  [ ] other:     Diet Presciption: Diet, Regular:   Supplement Feeding Modality:  Oral  Ensure Enlive Cans or Servings Per Day:  1       Frequency:  Daily (01-08-19 @ 16:44)    Intake: good; per pt, reports meals have been good and is drinking the Ensure daily.    Current Weight: 1/9 254.1#      Pertinent Medications: MEDICATIONS  (STANDING):  ALBUTerol/ipratropium for Nebulization 3 milliLiter(s) Nebulizer every 6 hours  buDESOnide   0.5 milliGRAM(s) Respule 0.5 milliGRAM(s) Inhalation two times a day  chlorhexidine 2% Cloths 1 Application(s) Topical daily  digoxin     Tablet 0.25 milliGRAM(s) Oral daily  diltiazem    Tablet 120 milliGRAM(s) Oral every 6 hours  [id number] Injectable 480 MICROGram(s) SubCutaneous every 24 hours  furosemide   Injectable 40 milliGRAM(s) IV Push every 12 hours  heparin  Injectable 5000 Unit(s) SubCutaneous every 8 hours  influenza   Vaccine 0.5 milliLiter(s) IntraMuscular once  lactobacillus acidophilus 1 Tablet(s) Oral three times a day with meals  loratadine 10 milliGRAM(s) Oral daily  metoprolol succinate  milliGRAM(s) Oral daily  ondansetron  IVPB 10 milliGRAM(s) IV Intermittent once  sodium chloride 1 Gram(s) Oral two times a day  tiotropium 18 MICROgram(s) Capsule 1 Capsule(s) Inhalation daily  verapamil 120 milliGRAM(s) Oral every 8 hours    MEDICATIONS  (PRN):  ALBUTerol    90 MICROgram(s) HFA Inhaler 2 Puff(s) Inhalation every 6 hours PRN Shortness of Breath  guaiFENesin    Syrup 200 milliGRAM(s) Oral every 6 hours PRN Cough  ondansetron  IVPB 10 milliGRAM(s) IV Intermittent every 8 hours PRN Nausea and/or Vomiting  prochlorperazine   Tablet 10 milliGRAM(s) Oral every 6 hours PRN nause and vomiting  promethazine 25 milliGRAM(s) Oral four times a day PRN breakthrough cough    Pertinent Labs: 01-11 Na135 mmol/L Glu 167 mg/dL<H> K+ 4.4 mmol/L Cr  0.68 mg/dL BUN 23 mg/dL 01-11 Phos 4.0 mg/dL 01-10 Alb 2.6 g/dL<L>    Skin: no pressure injuries  Edema: 2+ LE    Estimated Needs:   [x] no change since previous assessment  [ ] recalculated:     Previous Nutrition Diagnosis:    [x] Unintended Weight Loss     Nutrition Diagnosis is [x] ongoing  [ ] resolved [ ] not applicable     New Nutrition Diagnosis: [x] not applicable       Interventions:   Recommend continue regular diet with Ensure daily.   [ ] Change Diet To:  [ ] Nutrition Supplement  [ ] Nutrition Support  [ ] Other:     Monitoring and Evaluation:   [x] PO intake [ x ] Tolerance to diet prescription [ x ] weights [ x ] labs[ x ] follow up per protocol  [x] other: bowel function

## 2019-01-12 NOTE — PROGRESS NOTE ADULT - ASSESSMENT
58M pmhx afib diagnosed December 2018 (not on AC), COPD, HTN, cardiac tamponade s/p window 12/2018 discharged from [facility] ~2 weeks for hyponatremia and found to have mediastinal adenopathy and moderate sized pericardial effusion with tamponade physiology with hospital course further complicated by new onset afib with RVR, who presented with worsening SOB and cough and was found to be hyponatremic and have pna. Found to have a malignancy. Patient had bronchoscopy and s/p bronch, patient went into rapid a fib which resolved on own. remains in rapid af now, unclear why not responding appropriately to meds    Recommend:  - s/p RIJ venous cath insertion.  Completed chemo yesterday  - Continue Lasix 40 mg IV q12H.  Patient is diuresing well.  Today, net negative 2.8L  - Continue strict I & O's  - Monitor daily standing weights  - Monitor electrolytes, replete to keep K>4 and Mag>2  - TTE does not reveal recurrent pericardial effusion of any significance  - CxR yesterday Pna with superimposed intersitial edema with small B/L pleural effusion  - Afib is rate controlled.  Will start reducing dose of Verapamil.  Goal is to get him off of it and maintain him on BB, CCB, and Dig  - HR may be difficult to control due to ongoing pulmonary process, though better controlled overnight but still maintaining 100s  - Continue  Toprol  mg daily  - Continue Cardizem 120 mg po q 6hours  - Continue verapamil to 120 mg q 8 hours for better rate control   - Cont Digoxin 250 qd.  dig level 1/9 = 0.5  - Continue tele monitor   - Pulm f/u.  Continue bronchodilators and steroids.  Encourage incentive spirometer  - BP well controlled, monitor routine hemodynamics.  - Monitor and replete lytes, keep K>4, Mg>2.  -Other cardiovascular workup will depend on clinical course.  -All other workup per primary team.  -Will continue to follow.    Steph Pradhan NP  Cardiology 58M pmhx afib diagnosed December 2018 (not on AC), COPD, HTN, cardiac tamponade s/p window 12/2018 discharged from [facility] ~2 weeks for hyponatremia and found to have mediastinal adenopathy and moderate sized pericardial effusion with tamponade physiology with hospital course further complicated by new onset afib with RVR, who presented with worsening SOB and cough and was found to be hyponatremic and have pna. Found to have a malignancy. Patient had bronchoscopy and s/p bronch, patient went into rapid a fib which resolved on own. remains in rapid af now, unclear why not responding appropriately to meds    Recommend:  - s/p RIJ venous cath insertion.  Completed chemo yesterday  - Continue Lasix 40 mg IV q12H.  Patient is diuresing well.  Today, net negative 2.8L  - Continue strict I & O's  - Monitor daily standing weights  - Monitor electrolytes, replete to keep K>4 and Mag>2  - TTE does not reveal recurrent pericardial effusion of any significance  - CxR yesterday Pna with superimposed intersitial edema with small B/L pleural effusion  - Afib is rate controlled.  Will start reducing dose of Verapamil.  Goal is to get him off of it and maintain him on BB, CCB, and Dig and eventually get him off of Dig if we are able  - Continue  Toprol  mg daily  - Continue Cardizem 120 mg po q 6hours  - Continue verapamil to 80 mg q 8 hours   - Cont Digoxin 250 qd.  dig level 1/9 = 0.5.  Please repeat in 3 days  - Continue tele monitor   - Pulm f/u.  Continue bronchodilators and steroids.  Encourage incentive spirometer  - BP well controlled, monitor routine hemodynamics.  - Monitor and replete lytes, keep K>4, Mg>2.  - Other cardiovascular workup will depend on clinical course.  - All other workup per primary team.  - Will continue to follow.    Steph Pradhan NP  Cardiology

## 2019-01-12 NOTE — PROGRESS NOTE ADULT - SUBJECTIVE AND OBJECTIVE BOX
[facility] -- Gurpreet Bush, Corey Diaz Pannella, Patel, Savella  Office # [phone]    Follow Up:  New onset Afib    Subjective/Observations: Sitting on the chair, with nasal cannula on.  Been sleeping on the recliner.  Denies palpitation or CP.  However, still c/o cough that is difficult to expectorate.    REVIEW OF SYSTEMS: All other review of systems is negative unless indicated above    PAST MEDICAL & SURGICAL HISTORY:  Dyspnea  Mediastinal adenopathy  Pericardial effusion  AF (atrial fibrillation)  Hyponatremia  COPD (chronic obstructive pulmonary disease)  HTN (hypertension)  S/P pericardial window creation    MEDICATIONS  (STANDING):  ALBUTerol/ipratropium for Nebulization 3 milliLiter(s) Nebulizer every 6 hours  buDESOnide   0.5 milliGRAM(s) Respule 0.5 milliGRAM(s) Inhalation two times a day  chlorhexidine 2% Cloths 1 Application(s) Topical daily  digoxin     Tablet 0.25 milliGRAM(s) Oral daily  diltiazem    Tablet 120 milliGRAM(s) Oral every 6 hours  filgrastim-sndz Injectable 480 MICROGram(s) SubCutaneous every 24 hours  furosemide   Injectable 40 milliGRAM(s) IV Push every 12 hours  heparin  Injectable 5000 Unit(s) SubCutaneous every 8 hours  influenza   Vaccine 0.5 milliLiter(s) IntraMuscular once  lactobacillus acidophilus 1 Tablet(s) Oral three times a day with meals  loratadine 10 milliGRAM(s) Oral daily  metoprolol succinate  milliGRAM(s) Oral daily  ondansetron  IVPB 10 milliGRAM(s) IV Intermittent once  sodium chloride 1 Gram(s) Oral two times a day  tiotropium 18 MICROgram(s) Capsule 1 Capsule(s) Inhalation daily  verapamil 120 milliGRAM(s) Oral every 8 hours    MEDICATIONS  (PRN):  ALBUTerol    90 MICROgram(s) HFA Inhaler 2 Puff(s) Inhalation every 6 hours PRN Shortness of Breath  guaiFENesin    Syrup 200 milliGRAM(s) Oral every 6 hours PRN Cough  ondansetron  IVPB 10 milliGRAM(s) IV Intermittent every 8 hours PRN Nausea and/or Vomiting  prochlorperazine   Tablet 10 milliGRAM(s) Oral every 6 hours PRN nause and vomiting  promethazine 25 milliGRAM(s) Oral four times a day PRN breakthrough cough    Allergies    penicillin (Unknown)    Intolerances    Vital Signs Last 24 Hrs  T(C): 36.6 (12 Jan 2019 04:45), Max: 36.7 (11 Jan 2019 13:42)  T(F): 97.9 (12 Jan 2019 04:45), Max: 98 (11 Jan 2019 13:42)  HR: 76 (12 Jan 2019 07:45) (76 - 118)  BP: 116/63 (12 Jan 2019 04:45) (102/63 - 116/63)  BP(mean): --  RR: 20 (12 Jan 2019 04:45) (20 - 20)  SpO2: 92% (12 Jan 2019 04:45) (91% - 97%)    I&O's Summary    11 Jan 2019 07:01  -  12 Jan 2019 07:00  --------------------------------------------------------  IN: 500 mL / OUT: 3350 mL / NET: -2850 mL    PHYSICAL EXAM:  TELE: Afib  Constitutional: NAD, awake and alert, obese  HEENT: Moist Mucous Membranes, Anicteric  Pulmonary: Non-labored, breath sounds are diminished bilaterally, No wheezing. +fine rales and scattered rhonchi  Cardiovascular: Irregularly irregular, S1 and S2, No murmurs, rubs, gallops or clicks  Gastrointestinal: Bowel Sounds present, soft, nontender.   Lymph: 3+ BLE edema. No lymphadenopathy.  Skin: No visible rashes or ulcers.  Psych:  Mood & affect appropriate    LABS: All Labs Reviewed:                        11.3   17.75 )-----------( 249      ( 11 Jan 2019 09:08 )             34.7                         11.1   17.41 )-----------( 288      ( 10 Lisandro 2019 08:39 )             33.8     11 Jan 2019 09:08    135    |  93     |  23     ----------------------------<  167    4.4     |  35     |  0.68   10 Lisandro 2019 08:39    135    |  93     |  21     ----------------------------<  159    4.2     |  36     |  0.60     Ca    8.1        11 Jan 2019 09:08  Ca    8.0        10 Lisandro 2019 08:39  Phos  4.0       11 Jan 2019 09:08  Phos  4.2       10 Lisandro 2019 08:39  Mg     2.3       11 Jan 2019 09:08  Mg     2.2       10 Lisandro 2019 22:22    TPro  5.9    /  Alb  2.6    /  TBili  0.7    /  DBili  x      /  AST  18     /  ALT  77     /  AlkPhos  101    10 Lisandro 2019 08:39    < from: TTE Echo Doppler w/o Cont (01.04.19 @ 09:32) >     EXAM:  ECHO TTE WO CON COMP W DOPPLR      PROCEDURE DATE:  01/04/2019      INTERPRETATION:  INDICATION: Atrial fibrillation, pericardial effusion    Blood Pressure 138/73    Height 185     Weight 111       BSA 2.3    Dimensions:    LA 4.4  Normal Values: 2.0 - 4.0 cm    Ao 4.0        Normal Values: 2.0 - 3.8 cm  SEPTUM           Normal Values: 0.6 - 1.2 cm  PWT           Normal Values: 0.6 - 1.1 cm  LVIDd             Normal Values: 3.0 - 5.6 cm  LVIDs             Normal Values: 1.8 -4.0 cm    Derived Variables:  LVMI     g/m2  RWT      Fractional Short      Ejection Fraction        Doppler Peak v. AoV=   (m/sec)    OBSERVATIONS:    This is a technically limited study with suboptimal endocardial   definition. Within these limitations, the left ventricle appears to be   normal in size with overall normal systolic function. The right ventricle   is not well seen but is probably normal in size with normal systolic   function. Biatrial enlargement. The aortic root is mildly dilated. The   mitral valve is not well seen. There is trace physiologic MR noted. Other   valves appear structurally normal as visualized. Inadequate TR jet to   estimate PA systolic pressure. No significant pericardial effusion. Left   pleural effusion. The patient was in rapid atrial fibrillation during   this examination.     ISAC FLORES M.D., ATTENDING CARDIOLOGIST  This document has been electronically signed. Jan 4 2019  3:55PM      < end of copied text >    EXAM:  XR CHEST PORTABLE ROUTINE 1V                          PROCEDURE DATE:  01/12/2019      INTERPRETATION:      INDICATION: Shortness of breath CHF follow-up    Single frontal view of the chest compared to prior study of 12/31/2018        FINDINGS/  IMPRESSION:       The lungs are hyperaerated with emphysematous changes. Airspace   consolidation left lower lobe and inferior lingula likely pneumonia   superimposed on interstitial edema with small bibasilar effusion and   compressive atelectasis thickening of right paratracheal stripe.   Port-A-Cath tip in SVC, no pneumothorax. Cardiomegaly.  There is no acute   osseous abnormality.    NINA ROMERO M.D., ATTENDING RADIOLOGIST  This document has been electronicallysigned. Jan 12 2019 10:17AM      < end of copied text >

## 2019-01-12 NOTE — DISCHARGE NOTE ADULT - CARE PLAN
Principal Discharge DX:	PNA (pneumonia)  Goal:	free from infection  Assessment and plan of treatment:	follow up with LUNG Dr. YI  Secondary Diagnosis:	AF (atrial fibrillation)  Secondary Diagnosis:	COPD (chronic obstructive pulmonary disease)  Secondary Diagnosis:	Cough  Secondary Diagnosis:	Dyspnea  Secondary Diagnosis:	HTN (hypertension)  Secondary Diagnosis:	Hyponatremia

## 2019-01-12 NOTE — DISCHARGE NOTE ADULT - HOSPITAL COURSE
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

## 2019-01-12 NOTE — PROGRESS NOTE ADULT - SUBJECTIVE AND OBJECTIVE BOX
Interval History:  feels ok. breathing is stable  Chart reviewed and events noted;   Overnight events:    MEDICATIONS  (STANDING):  ALBUTerol/ipratropium for Nebulization 3 milliLiter(s) Nebulizer every 6 hours  buDESOnide   0.5 milliGRAM(s) Respule 0.5 milliGRAM(s) Inhalation two times a day  chlorhexidine 2% Cloths 1 Application(s) Topical daily  digoxin     Tablet 0.25 milliGRAM(s) Oral daily  diltiazem    Tablet 120 milliGRAM(s) Oral every 6 hours  filgrastim-sndz Injectable 480 MICROGram(s) SubCutaneous every 24 hours  furosemide   Injectable 40 milliGRAM(s) IV Push every 12 hours  heparin  Injectable 5000 Unit(s) SubCutaneous every 8 hours  influenza   Vaccine 0.5 milliLiter(s) IntraMuscular once  lactobacillus acidophilus 1 Tablet(s) Oral three times a day with meals  loratadine 10 milliGRAM(s) Oral daily  metoprolol succinate  milliGRAM(s) Oral daily  ondansetron  IVPB 10 milliGRAM(s) IV Intermittent once  sodium chloride 1 Gram(s) Oral two times a day  tiotropium 18 MICROgram(s) Capsule 1 Capsule(s) Inhalation daily  verapamil 40 milliGRAM(s) Oral every 8 hours    MEDICATIONS  (PRN):  ALBUTerol    90 MICROgram(s) HFA Inhaler 2 Puff(s) Inhalation every 6 hours PRN Shortness of Breath  guaiFENesin    Syrup 200 milliGRAM(s) Oral every 6 hours PRN Cough  ondansetron  IVPB 10 milliGRAM(s) IV Intermittent every 8 hours PRN Nausea and/or Vomiting  prochlorperazine   Tablet 10 milliGRAM(s) Oral every 6 hours PRN nause and vomiting  promethazine 25 milliGRAM(s) Oral four times a day PRN breakthrough cough      Vital Signs Last 24 Hrs  T(C): 37 (13 Jan 2019 04:59), Max: 37 (13 Jan 2019 04:59)  T(F): 98.6 (13 Jan 2019 04:59), Max: 98.6 (13 Jan 2019 04:59)  HR: 68 (13 Jan 2019 07:37) (68 - 107)  BP: 104/70 (13 Jan 2019 04:59) (103/66 - 118/67)  BP(mean): --  RR: 17 (13 Jan 2019 04:59) (17 - 18)  SpO2: 99% (13 Jan 2019 04:59) (94% - 99%)    PHYSICAL EXAM  General: adult in NAD O2 in place  HEENT: clear oropharynx, anicteric sclera, pink conjunctivae  Neck: supple  CV: normal S1S2 with no murmur rubs or gallops  Lungs: clear to auscultation, no wheezes, no rhales  Abdomen: soft non-tender non-distended, no hepato/splenomegaly  Ext: no clubbing cyanosis or edema  Skin: no rashes and no petichiae  Neuro: alert and oriented X3 no focal deficits      LABS:  CBC Full  -  ( 13 Jan 2019 10:52 )  WBC Count : 52.38 K/uL  Hemoglobin : 11.7 g/dL  Hematocrit : 35.8 %  Platelet Count - Automated : 215 K/uL  Mean Cell Volume : 85.0 fl  Mean Cell Hemoglobin : 27.8 pg  Mean Cell Hemoglobin Concentration : 32.7 gm/dL  Auto Neutrophil # : x  Auto Lymphocyte # : x  Auto Monocyte # : x  Auto Eosinophil # : x  Auto Basophil # : x  Auto Neutrophil % : x  Auto Lymphocyte % : x  Auto Monocyte % : x  Auto Eosinophil % : x  Auto Basophil % : x    01-13    135  |  89<L>  |  24<H>  ----------------------------<  93  3.7   |  37<H>  |  0.64    Ca    8.1<L>      13 Jan 2019 10:52  Mg     2.2     01-13          fe studies      WBC trend  52.38 K/uL (01-13-19 @ 10:52)  13.86 K/uL (01-12-19 @ 13:44)  17.75 K/uL (01-11-19 @ 09:08)      Hgb trend  11.7 g/dL (01-13-19 @ 10:52)  10.4 g/dL (01-12-19 @ 13:44)  11.3 g/dL (01-11-19 @ 09:08)      plt trend  215 K/uL (01-13-19 @ 10:52)  183 K/uL (01-12-19 @ 13:44)  249 K/uL (01-11-19 @ 09:08)        RADIOLOGY & ADDITIONAL STUDIES:

## 2019-01-12 NOTE — PHARMACOTHERAPY INTERVENTION NOTE - COMMENTS
s/w Dr. Prescott to request a CBC with differential for the patient before administering the neupogen at 1700. MD accepted

## 2019-01-12 NOTE — PROGRESS NOTE ADULT - ASSESSMENT
Hyponatremia  SIADH  PNA  Hemoptysis   Pleural effusion  Lung CA      - Known to our group from last hospitalization with SIADH. He was sent home with NaCl tabs. He claimed to be compliant with salt tablets but not fluid restriction. No hypertonic saline needed. S/p tolvaptan 15 mg po x 1   - Renal indices are stable; sodium levels are within normal range  - Continue NaCl tabs as ordered. Reduce to 1 gram po BID. Fluid restriction as ordered; Lasix as per pulmonary   - S/p CTA showing no PE  - S/p Bronch on 1/2/18  - IV steroids  - Pulm and Cardio follow up noted  - Abx   - Monitor chemistries  - S/p chemoport; started chemo on 1/9/19    Thank you

## 2019-01-12 NOTE — DISCHARGE NOTE ADULT - CARE PROVIDERS DIRECT ADDRESSES
,DirectAddress_Unknown,DirectAddress_Unknown,[email or web address],DirectAddress_Unknown

## 2019-01-12 NOTE — PROGRESS NOTE ADULT - SUBJECTIVE AND OBJECTIVE BOX
Patient is a 58y old  Male who presents with a chief complaint of sob (12 Jan 2019 10:47)      INTERVAL HPI/OVERNIGHT EVENTS: Patient seen and examined. NAD. No complaints.    Vital Signs Last 24 Hrs  T(C): 36.6 (12 Jan 2019 13:54), Max: 36.6 (11 Jan 2019 20:49)  T(F): 97.8 (12 Jan 2019 13:54), Max: 97.9 (12 Jan 2019 04:45)  HR: 102 (12 Jan 2019 13:54) (76 - 108)  BP: 109/60 (12 Jan 2019 13:54) (102/63 - 124/72)  BP(mean): --  RR: 18 (12 Jan 2019 13:54) (18 - 20)  SpO2: 94% (12 Jan 2019 13:54) (91% - 97%)    01-11    135  |  93<L>  |  23  ----------------------------<  167<H>  4.4   |  35<H>  |  0.68    Ca    8.1<L>      11 Jan 2019 09:08  Phos  4.0     01-11  Mg     2.3     01-11                            10.4   13.86 )-----------( 183      ( 12 Jan 2019 13:44 )             31.4       CAPILLARY BLOOD GLUCOSE                  ALBUTerol    90 MICROgram(s) HFA Inhaler 2 Puff(s) Inhalation every 6 hours PRN  ALBUTerol/ipratropium for Nebulization 3 milliLiter(s) Nebulizer every 6 hours  buDESOnide   0.5 milliGRAM(s) Respule 0.5 milliGRAM(s) Inhalation two times a day  chlorhexidine 2% Cloths 1 Application(s) Topical daily  digoxin     Tablet 0.25 milliGRAM(s) Oral daily  diltiazem    Tablet 120 milliGRAM(s) Oral every 6 hours  filgrastim-sndz Injectable 480 MICROGram(s) SubCutaneous every 24 hours  furosemide   Injectable 40 milliGRAM(s) IV Push every 12 hours  guaiFENesin    Syrup 200 milliGRAM(s) Oral every 6 hours PRN  heparin  Injectable 5000 Unit(s) SubCutaneous every 8 hours  influenza   Vaccine 0.5 milliLiter(s) IntraMuscular once  lactobacillus acidophilus 1 Tablet(s) Oral three times a day with meals  loratadine 10 milliGRAM(s) Oral daily  metoprolol succinate  milliGRAM(s) Oral daily  ondansetron  IVPB 10 milliGRAM(s) IV Intermittent once  ondansetron  IVPB 10 milliGRAM(s) IV Intermittent every 8 hours PRN  prochlorperazine   Tablet 10 milliGRAM(s) Oral every 6 hours PRN  promethazine 25 milliGRAM(s) Oral four times a day PRN  sodium chloride 1 Gram(s) Oral two times a day  tiotropium 18 MICROgram(s) Capsule 1 Capsule(s) Inhalation daily  verapamil 120 milliGRAM(s) Oral every 8 hours              REVIEW OF SYSTEMS:  CONSTITUTIONAL: No fever, no weight loss, or no fatigue  NECK: No pain, no stiffness  RESPIRATORY: No cough, no wheezing, no chills, no hemoptysis, sometimes shortness of breath  CARDIOVASCULAR: No chest pain, no palpitations, no dizziness, no leg swelling  GASTROINTESTINAL: No abdominal pain. No nausea, no vomiting, no hematemesis; No diarrhea, no constipation. No melena, no hematochezia.  GENITOURINARY: No dysuria, no frequency, no hematuria, no incontinence  NEUROLOGICAL: No headaches, no loss of strength, no numbness, no tremors  SKIN: No itching, no burning  MUSCULOSKELETAL: No joint pain, no swelling; No muscle, no back, no extremity pain  PSYCHIATRIC: No depression, no mood swings,   HEME/LYMPH: No easy bruising, no bleeding gums  ALLERY AND IMMUNOLOGIC: No hives       Consultant(s) Notes Reviewed:  [X] YES  [ ] NO    PHYSICAL EXAM:  GENERAL: NAD  HEAD:  Atraumatic, Normocephalic  EYES: EOMI, PERRLA, conjunctiva and sclera clear  ENMT: No tonsillar erythema, exudates, or enlargement; Moist mucous membranes  NECK: Supple, No JVD  NERVOUS SYSTEM:  Awake & alert  CHEST/LUNG: Clear to auscultation bilaterally; No rales, rhonchi, wheezing,  HEART: Regular rate and rhythm  ABDOMEN: Soft, Nontender, Nondistended; Bowel sounds present  EXTREMITIES:  No clubbing, cyanosis, or edema  LYMPH: No lymphadenopathy noted  SKIN: No rashes      Advanced care planning discussed with patient/family [X] YES   [ ] NO    Advanced care planning discussed with patient/family. Advanced care planning forms reviewed/discussed/completed. 20 minutes spent.

## 2019-01-12 NOTE — DISCHARGE NOTE ADULT - MEDICATION SUMMARY - MEDICATIONS TO TAKE
I will START or STAY ON the medications listed below when I get home from the hospital:    NEBULIZER machine  -- Dx. COPD  -- Indication: For COPD (chronic obstructive pulmonary disease)    aspirin 81 mg oral tablet, chewable  -- 1 tab(s) by mouth once a day  -- Indication: For Prevent cad    dilTIAZem 420 mg/24 hours oral tablet, extended release  -- 1 tab(s) by mouth once a day  -- Indication: For AF (atrial fibrillation)    verapamil 120 mg oral tablet  -- 1 tab(s) by mouth every 8 hours  -- Indication: For AF (atrial fibrillation)    digoxin 250 mcg (0.25 mg) oral tablet  -- 1 tab(s) by mouth once a day  -- Indication: For AF (atrial fibrillation)    loratadine 10 mg oral tablet  -- 1 tab(s) by mouth once a day  -- Indication: For Allergies    Toprol- mg oral tablet, extended release  -- 1 tab(s) by mouth once a day  -- Indication: For AF (atrial fibrillation)    tiotropium 18 mcg inhalation capsule  -- 1 cap(s) inhaled once a day  -- Indication: For COPD (chronic obstructive pulmonary disease)    ipratropium-albuterol 0.5 mg-2.5 mg/3 mLinhalation solution  -- 3 milliliter(s) inhaled every 6 hours  -- Indication: For COPD (chronic obstructive pulmonary disease)    Advair Diskus 250 mcg-50 mcg inhalation powder  -- 1 puff(s) inhaled 2 times a day   -- Check with your doctor before becoming pregnant.  For inhalation only.  Obtain medical advice before taking any non-prescription drugs as some may affect the action of this medication.  Rinse mouth thoroughly after use.    -- Indication: For COPD (chronic obstructive pulmonary disease)    furosemide 40 mg oral tablet  -- 1 tab(s) by mouth once a day  -- Indication: For Hyponatremia    guaiFENesin 100 mg/5 mL oral liquid  -- 5 milliliter(s) by mouth every 6 hours, As needed, Cough  -- Indication: For Cough    magnesium oxide 400 mg (241.3 mg elemental magnesium) oral tablet  -- 1 tab(s) by mouth once a day  -- Indication: For Suplement    Sodium Chloride 1 g oral tablet  -- 2 tab(s) by mouth 3 times a day   -- Indication: For Hyponatremia I will START or STAY ON the medications listed below when I get home from the hospital:    NEBULIZER machine  -- Dx. COPD  -- Indication: For COPD (chronic obstructive pulmonary disease)    aspirin 81 mg oral tablet, chewable  -- 1 tab(s) by mouth once a day  -- Indication: For Prevent cad    dilTIAZem 420 mg/24 hours oral tablet, extended release  -- 1 tab(s) by mouth once a day  -- Indication: For AF (atrial fibrillation)    digoxin 250 mcg (0.25 mg) oral tablet  -- 1 tab(s) by mouth once a day  -- Indication: For AF (atrial fibrillation)    verapamil 120 mg oral tablet  -- 1 tab(s) by mouth once a day   -- Indication: For AF (atrial fibrillation)    loratadine 10 mg oral tablet  -- 1 tab(s) by mouth once a day  -- Indication: For Allergies    Toprol- mg oral tablet, extended release  -- 1 tab(s) by mouth once a day  -- Indication: For AF (atrial fibrillation)    tiotropium 18 mcg inhalation capsule  -- 1 cap(s) inhaled once a day  -- Indication: For COPD (chronic obstructive pulmonary disease)    Advair Diskus 250 mcg-50 mcg inhalation powder  -- 1 puff(s) inhaled 2 times a day   -- Check with your doctor before becoming pregnant.  For inhalation only.  Obtain medical advice before taking any non-prescription drugs as some may affect the action of this medication.  Rinse mouth thoroughly after use.    -- Indication: For COPD (chronic obstructive pulmonary disease)    ipratropium-albuterol 0.5 mg-2.5 mg/3 mLinhalation solution  -- 3 milliliter(s) inhaled every 6 hours  -- Indication: For COPD (chronic obstructive pulmonary disease)    furosemide 40 mg oral tablet  -- 1 tab(s) by mouth 3 times a day   -- Indication: For Leg swelling    guaiFENesin 100 mg/5 mL oral liquid  -- 5 milliliter(s) by mouth every 6 hours, As needed, Cough  -- Indication: For Cough    magnesium oxide 400 mg (241.3 mg elemental magnesium) oral tablet  -- 1 tab(s) by mouth once a day  -- Indication: For Suplement    Sodium Chloride 1 g oral tablet  -- 1 tab(s) by mouth 3 times a day   -- Indication: For Hyponatremia

## 2019-01-12 NOTE — DISCHARGE NOTE ADULT - PATIENT PORTAL LINK FT
You can access the [facility] Patient Portal, offered by [facility], by registering with the following website: [email or web address]

## 2019-01-12 NOTE — DISCHARGE NOTE ADULT - CARE PROVIDER_API CALL
Shahab Soto), Internal Medicine; Pulmonary Disease  [address]  [address]  [place] [address]  Phone: [phone]  Fax: [phone]    Estiven Prescott (MD), Hematology; Internal Medicine; Medical Oncology  [address]  [place] [address]  Phone: [phone]  Fax: [phone]    Paul Bush), Cardiovascular Disease; Internal Medicine  [address] [place] [address]  Phone: [phone]  Fax: [phone]    Nicholas Spencer), Nephrology  [address]  [address]  [place] [address]  Phone: [phone]  Fax: [phone]

## 2019-01-12 NOTE — DISCHARGE NOTE ADULT - MEDICATION SUMMARY - MEDICATIONS TO CHANGE
I will SWITCH the dose or number of times a day I take the medications listed below when I get home from the hospital:    Cardizem  mg/24 hours oral tablet, extended release  -- 1 tab(s) by mouth once a day I will SWITCH the dose or number of times a day I take the medications listed below when I get home from the hospital:    Cardizem  mg/24 hours oral tablet, extended release  -- 1 tab(s) by mouth once a day    Sodium Chloride 1 g oral tablet  -- 2 tab(s) by mouth 3 times a day

## 2019-01-12 NOTE — DISCHARGE NOTE ADULT - SECONDARY DIAGNOSIS.
AF (atrial fibrillation) COPD (chronic obstructive pulmonary disease) Cough Dyspnea HTN (hypertension) Hyponatremia

## 2019-01-12 NOTE — PROGRESS NOTE ADULT - ASSESSMENT
57 y/o man w SOB, bulky mediastinal and hilar LADs, left lung compromise, left effusion, post bronch w pathology sig for small cell, pericardial tamponade, s/p pericardial window but negative cytology and pericardial bx.     Hyponatremia /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 Mild hyponatremia now. BUN 2, Cr 0.6. No change in Cr.   A fib, mild hemoptysis  CT scan Head and MRI brain negative for metastasis.     most likely extensive stage ( left pleura effusion) and pericardial tamponade/effusion  considering aggressiveness of cancer, significant disease burden and impending airway compromise without chemo, pt started on chemotherapy inpt.     s/p infusaport    LE edema on Lasix.   Last duplex 01/02/19, negative  On SQ Heparin q8h.     RECOMMEND  Continue D3 Carbo/VP16   Plan for Zarxio-GCSF support post chemo due to expected decline.   DVT prophylaxis.   rehab eval for subacute rehab  Will need O2.   Elevated legs PRN.   to receive zarxio  continue pulmonary treatment

## 2019-01-13 LAB
ANION GAP SERPL CALC-SCNC: 9 MMOL/L — SIGNIFICANT CHANGE UP (ref 5–17)
BASOPHILS # BLD AUTO: 0 K/UL — SIGNIFICANT CHANGE UP (ref 0–0.2)
BASOPHILS NFR BLD AUTO: 0 % — SIGNIFICANT CHANGE UP (ref 0–2)
BUN SERPL-MCNC: 24 MG/DL — HIGH (ref 7–23)
CALCIUM SERPL-MCNC: 8.1 MG/DL — LOW (ref 8.5–10.1)
CHLORIDE SERPL-SCNC: 89 MMOL/L — LOW (ref 96–108)
CO2 SERPL-SCNC: 37 MMOL/L — HIGH (ref 22–31)
CREAT SERPL-MCNC: 0.64 MG/DL — SIGNIFICANT CHANGE UP (ref 0.5–1.3)
EOSINOPHIL # BLD AUTO: 0 K/UL — SIGNIFICANT CHANGE UP (ref 0–0.5)
EOSINOPHIL NFR BLD AUTO: 0 % — SIGNIFICANT CHANGE UP (ref 0–6)
GLUCOSE SERPL-MCNC: 93 MG/DL — SIGNIFICANT CHANGE UP (ref 70–99)
HCT VFR BLD CALC: 35.8 % — LOW (ref 39–50)
HGB BLD-MCNC: 11.7 G/DL — LOW (ref 13–17)
LYMPHOCYTES # BLD AUTO: 1.05 K/UL — SIGNIFICANT CHANGE UP (ref 1–3.3)
LYMPHOCYTES # BLD AUTO: 2 % — LOW (ref 13–44)
MAGNESIUM SERPL-MCNC: 2.2 MG/DL — SIGNIFICANT CHANGE UP (ref 1.6–2.6)
MANUAL SMEAR VERIFICATION: SIGNIFICANT CHANGE UP
MCHC RBC-ENTMCNC: 27.8 PG — SIGNIFICANT CHANGE UP (ref 27–34)
MCHC RBC-ENTMCNC: 32.7 GM/DL — SIGNIFICANT CHANGE UP (ref 32–36)
MCV RBC AUTO: 85 FL — SIGNIFICANT CHANGE UP (ref 80–100)
MONOCYTES # BLD AUTO: 0.52 K/UL — SIGNIFICANT CHANGE UP (ref 0–0.9)
MONOCYTES NFR BLD AUTO: 1 % — LOW (ref 2–14)
NEUTROPHILS # BLD AUTO: 50.81 K/UL — HIGH (ref 1.8–7.4)
NEUTROPHILS NFR BLD AUTO: 86 % — HIGH (ref 43–77)
NEUTS BAND # BLD: 11 % — HIGH (ref 0–8)
NRBC # BLD: 0 — SIGNIFICANT CHANGE UP
NRBC # BLD: SIGNIFICANT CHANGE UP /100 WBCS (ref 0–0)
PLAT MORPH BLD: NORMAL — SIGNIFICANT CHANGE UP
PLATELET # BLD AUTO: 215 K/UL — SIGNIFICANT CHANGE UP (ref 150–400)
POTASSIUM SERPL-MCNC: 3.7 MMOL/L — SIGNIFICANT CHANGE UP (ref 3.5–5.3)
POTASSIUM SERPL-SCNC: 3.7 MMOL/L — SIGNIFICANT CHANGE UP (ref 3.5–5.3)
RBC # BLD: 4.21 M/UL — SIGNIFICANT CHANGE UP (ref 4.2–5.8)
RBC # FLD: 13.3 % — SIGNIFICANT CHANGE UP (ref 10.3–14.5)
RBC BLD AUTO: SIGNIFICANT CHANGE UP
SODIUM SERPL-SCNC: 135 MMOL/L — SIGNIFICANT CHANGE UP (ref 135–145)
WBC # BLD: 52.38 K/UL — CRITICAL HIGH (ref 3.8–10.5)
WBC # FLD AUTO: 52.38 K/UL — CRITICAL HIGH (ref 3.8–10.5)

## 2019-01-13 PROCEDURE — 99232 SBSQ HOSP IP/OBS MODERATE 35: CPT

## 2019-01-13 RX ORDER — VERAPAMIL HCL 240 MG
80 CAPSULE, EXTENDED RELEASE PELLETS 24 HR ORAL EVERY 8 HOURS
Qty: 0 | Refills: 0 | Status: DISCONTINUED | OUTPATIENT
Start: 2019-01-13 | End: 2019-01-13

## 2019-01-13 RX ORDER — VERAPAMIL HCL 240 MG
40 CAPSULE, EXTENDED RELEASE PELLETS 24 HR ORAL EVERY 8 HOURS
Qty: 0 | Refills: 0 | Status: DISCONTINUED | OUTPATIENT
Start: 2019-01-13 | End: 2019-01-15

## 2019-01-13 RX ADMIN — Medication 40 MILLIGRAM(S): at 18:00

## 2019-01-13 RX ADMIN — Medication 0.5 MILLIGRAM(S): at 07:36

## 2019-01-13 RX ADMIN — HEPARIN SODIUM 5000 UNIT(S): 5000 INJECTION INTRAVENOUS; SUBCUTANEOUS at 05:13

## 2019-01-13 RX ADMIN — Medication 40 MILLIGRAM(S): at 05:12

## 2019-01-13 RX ADMIN — Medication 3 MILLILITER(S): at 20:09

## 2019-01-13 RX ADMIN — ALBUTEROL 2 PUFF(S): 90 AEROSOL, METERED ORAL at 19:13

## 2019-01-13 RX ADMIN — HEPARIN SODIUM 5000 UNIT(S): 5000 INJECTION INTRAVENOUS; SUBCUTANEOUS at 22:55

## 2019-01-13 RX ADMIN — HEPARIN SODIUM 5000 UNIT(S): 5000 INJECTION INTRAVENOUS; SUBCUTANEOUS at 14:00

## 2019-01-13 RX ADMIN — Medication 3 MILLILITER(S): at 07:36

## 2019-01-13 RX ADMIN — SODIUM CHLORIDE 1 GRAM(S): 9 INJECTION INTRAMUSCULAR; INTRAVENOUS; SUBCUTANEOUS at 19:14

## 2019-01-13 RX ADMIN — Medication 3 MILLILITER(S): at 13:16

## 2019-01-13 RX ADMIN — Medication 1 TABLET(S): at 10:18

## 2019-01-13 RX ADMIN — Medication 40 MILLIGRAM(S): at 14:28

## 2019-01-13 RX ADMIN — Medication 200 MILLIGRAM(S): at 05:12

## 2019-01-13 RX ADMIN — Medication 0.25 MILLIGRAM(S): at 05:12

## 2019-01-13 RX ADMIN — Medication 3 MILLILITER(S): at 00:20

## 2019-01-13 RX ADMIN — SODIUM CHLORIDE 1 GRAM(S): 9 INJECTION INTRAMUSCULAR; INTRAVENOUS; SUBCUTANEOUS at 05:12

## 2019-01-13 RX ADMIN — Medication 0.5 MILLIGRAM(S): at 20:08

## 2019-01-13 RX ADMIN — Medication 120 MILLIGRAM(S): at 05:10

## 2019-01-13 RX ADMIN — LORATADINE 10 MILLIGRAM(S): 10 TABLET ORAL at 12:49

## 2019-01-13 RX ADMIN — CHLORHEXIDINE GLUCONATE 1 APPLICATION(S): 213 SOLUTION TOPICAL at 12:49

## 2019-01-13 RX ADMIN — Medication 1 TABLET(S): at 12:49

## 2019-01-13 RX ADMIN — Medication 1 TABLET(S): at 19:14

## 2019-01-13 NOTE — PROGRESS NOTE ADULT - SUBJECTIVE AND OBJECTIVE BOX
Patient is a 58y old  Male who presents with a chief complaint of sob (13 Jan 2019 13:38)      INTERVAL HPI/OVERNIGHT EVENTS:    Admits to difficulty clearing secretions. Shortness of breath has improved    MEDICATIONS  (STANDING):  ALBUTerol/ipratropium for Nebulization 3 milliLiter(s) Nebulizer every 6 hours  buDESOnide   0.5 milliGRAM(s) Respule 0.5 milliGRAM(s) Inhalation two times a day  chlorhexidine 2% Cloths 1 Application(s) Topical daily  digoxin     Tablet 0.25 milliGRAM(s) Oral daily  diltiazem    Tablet 120 milliGRAM(s) Oral every 6 hours  filgrastim-sndz Injectable 480 MICROGram(s) SubCutaneous every 24 hours  furosemide   Injectable 40 milliGRAM(s) IV Push every 12 hours  heparin  Injectable 5000 Unit(s) SubCutaneous every 8 hours  influenza   Vaccine 0.5 milliLiter(s) IntraMuscular once  lactobacillus acidophilus 1 Tablet(s) Oral three times a day with meals  loratadine 10 milliGRAM(s) Oral daily  metoprolol succinate  milliGRAM(s) Oral daily  ondansetron  IVPB 10 milliGRAM(s) IV Intermittent once  sodium chloride 1 Gram(s) Oral two times a day  tiotropium 18 MICROgram(s) Capsule 1 Capsule(s) Inhalation daily  verapamil 40 milliGRAM(s) Oral every 8 hours      MEDICATIONS  (PRN):  ALBUTerol    90 MICROgram(s) HFA Inhaler 2 Puff(s) Inhalation every 6 hours PRN Shortness of Breath  guaiFENesin    Syrup 200 milliGRAM(s) Oral every 6 hours PRN Cough  ondansetron  IVPB 10 milliGRAM(s) IV Intermittent every 8 hours PRN Nausea and/or Vomiting  prochlorperazine   Tablet 10 milliGRAM(s) Oral every 6 hours PRN nause and vomiting  promethazine 25 milliGRAM(s) Oral four times a day PRN breakthrough cough      Allergies    penicillin (Unknown)    Intolerances        PAST MEDICAL & SURGICAL HISTORY:  Dyspnea  Mediastinal adenopathy  Pericardial effusion  AF (atrial fibrillation)  Hyponatremia  COPD (chronic obstructive pulmonary disease)  HTN (hypertension)  S/P pericardial window creation      Vital Signs Last 24 Hrs  T(C): 36.9 (13 Jan 2019 17:24), Max: 37.2 (13 Jan 2019 14:17)  T(F): 98.5 (13 Jan 2019 17:24), Max: 98.9 (13 Jan 2019 14:17)  HR: 80 (13 Jan 2019 17:24) (68 - 103)  BP: 109/55 (13 Jan 2019 17:24) (103/66 - 116/71)  BP(mean): --  RR: 17 (13 Jan 2019 17:24) (17 - 17)  SpO2: 98% (13 Jan 2019 17:24) (96% - 99%)    PHYSICAL EXAMINATION:    GENERAL: The patient is awake and alert in no apparent distress.     HEENT: Head is normocephalic and atraumatic. Extraocular muscles are intact. Mucous membranes are moist.    NECK: Supple.    LUNGS: Few expiratory wheezes    HEART: Regular rate and rhythm without murmur.    ABDOMEN: Soft, nontender, and nondistended.      EXTREMITIES: Without any cyanosis, clubbing, rash, lesions or edema.    NEUROLOGIC: Grossly intact.    SKIN: No ulceration or induration present.      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

## 2019-01-13 NOTE — PROGRESS NOTE ADULT - SUBJECTIVE AND OBJECTIVE BOX
Patient is a 58y old  Male who presents with a chief complaint of sob (13 Jan 2019 11:58)      INTERVAL HPI/OVERNIGHT EVENTS: Patient seen and examined. NAD. Still SOB              REVIEW OF SYSTEMS:  CONSTITUTIONAL: No fever, no weight loss, or no fatigue  NECK: No pain, no stiffness  RESPIRATORY: No cough, no wheezing, no chills, no hemoptysis, + shortness of breath  CARDIOVASCULAR: No chest pain, no palpitations, no dizziness, no leg swelling  GASTROINTESTINAL: No abdominal pain. No nausea, no vomiting, no hematemesis; No diarrhea, no constipation. No melena, no hematochezia.  GENITOURINARY: No dysuria, no frequency, no hematuria, no incontinence  NEUROLOGICAL: No headaches, no loss of strength, no numbness, no tremors  SKIN: No itching, no burning  MUSCULOSKELETAL: No joint pain, + LE swelling; No muscle, no back, no extremity pain  PSYCHIATRIC: No depression, no mood swings,   HEME/LYMPH: No easy bruising, no bleeding gums  ALLERY AND IMMUNOLOGIC: No hives       Consultant(s) Notes Reviewed:  [X] YES  [ ] NO    PHYSICAL EXAM:  GENERAL: NAD  HEAD:  Atraumatic, Normocephalic  EYES: EOMI, PERRLA, conjunctiva and sclera clear  ENMT: No tonsillar erythema, exudates, or enlargement; Moist mucous membranes  NECK: Supple, No JVD  NERVOUS SYSTEM:  Awake & alert  CHEST/LUNG: Clear to auscultation bilaterally; No rales, rhonchi, wheezing,  HEART: Regular rate and rhythm  ABDOMEN: Soft, Nontender, Nondistended; Bowel sounds present  EXTREMITIES:  No clubbing, cyanosis, or edema  LYMPH: No lymphadenopathy noted  SKIN: No rashes      Advanced care planning discussed with patient/family [X] YES   [ ] NO    Advanced care planning discussed with patient/family. Advanced care planning forms reviewed/discussed/completed. 20 minutes spent.

## 2019-01-13 NOTE — PROGRESS NOTE ADULT - ASSESSMENT
58M pmhx afib diagnosed December 2018 (not on AC), COPD, HTN, cardiac tamponade s/p window 12/2018 discharged from [facility] ~2 weeks for hyponatremia and found to have mediastinal adenopathy and moderate sized pericardial effusion with tamponade physiology with hospital course further complicated by new onset afib with RVR, who presented with worsening SOB and cough and was found to be hyponatremic and have pna. Found to have a malignancy. Patient had bronchoscopy and s/p bronch, patient went into rapid a fib which resolved on own. remains in rapid af now, unclear why not responding appropriately to meds    Recommend:  - s/p RIJ venous cath insertion.  s/p chemo, tolerated it well  - Continue Lasix 40 mg IV q12H for now.  Patient is still diuresing well.  Today, net negative 3.5L  - Continue strict I & O's  - Monitor daily standing weights  - Monitor electrolytes, replete to keep K>4 and Mag>2  - TTE does not reveal recurrent pericardial effusion of any significance  - CxR showed Pna with superimposed intersitial edema with small B/L pleural effusion  - Afib remains rate controlled.  Reducing dose of Verapamil.  Goal is to get him off of it and maintain him on BB, CCB, and Dig and eventually get him off of Dig if we are able  - Continue  Toprol  mg daily  - Continue Cardizem 120 mg po q 6hours  - Continue verapamil to 80 mg q 8 hours   - Cont Digoxin 250 qd.  dig level 1/9 = 0.5.  Please repeat in 3 days  - Continue tele monitor   - Pulm f/u.  Continue bronchodilators and steroids.  Encourage incentive spirometer  - BP well controlled, monitor routine hemodynamics.  - Monitor and replete lytes, keep K>4, Mg>2.  - Other cardiovascular workup will depend on clinical course.  - All other workup per primary team.  - Will continue to follow.    Steph Pradhan NP  Cardiology 58M pmhx afib diagnosed December 2018 (not on AC), COPD, HTN, cardiac tamponade s/p window 12/2018 discharged from [facility] ~2 weeks for hyponatremia and found to have mediastinal adenopathy and moderate sized pericardial effusion with tamponade physiology with hospital course further complicated by new onset afib with RVR, who presented with worsening SOB and cough and was found to be hyponatremic and have pna. Found to have a malignancy. Patient had bronchoscopy and s/p bronch, patient went into rapid a fib which resolved on own. remains in rapid af now, unclear why not responding appropriately to meds    Recommend:  - s/p RIJ venous cath insertion.  s/p chemo, tolerated it well  - Continue Lasix 40 mg IV q12H for now.  Patient is still diuresing well.  Today, net negative 3.5L  - Continue strict I & O's  - Monitor daily standing weights  - Monitor electrolytes, replete to keep K>4 and Mag>2  - TTE does not reveal recurrent pericardial effusion of any significance  - CxR showed Pna with superimposed intersitial edema with small B/L pleural effusion  - Afib remains rate controlled.  Reducing dose of Verapamil.  Goal is to get him off of it and maintain him on BB, CCB, and Dig and eventually get him off of Dig if we are able  - Continue  Toprol  mg daily  - Continue Cardizem 120 mg po q 6hours  - Continue verapamil to 40 mg q 8 hours   - Cont Digoxin 250 qd.  dig level 1/9 = 0.5.  Please repeat in 3 days  - Continue tele monitor   - Pulm f/u.  Continue bronchodilators and steroids.  Encourage incentive spirometer  - BP well controlled, monitor routine hemodynamics.  - Monitor and replete lytes, keep K>4, Mg>2.  - Other cardiovascular workup will depend on clinical course.  - All other workup per primary team.  - Will continue to follow.    Steph Pradhan NP  Cardiology

## 2019-01-13 NOTE — PROGRESS NOTE ADULT - SUBJECTIVE AND OBJECTIVE BOX
Interval History:    feels better. breathing is stable  wife at bedside  Chart reviewed and events noted;   Overnight events:    MEDICATIONS  (STANDING):  ALBUTerol/ipratropium for Nebulization 3 milliLiter(s) Nebulizer every 6 hours  buDESOnide   0.5 milliGRAM(s) Respule 0.5 milliGRAM(s) Inhalation two times a day  chlorhexidine 2% Cloths 1 Application(s) Topical daily  digoxin     Tablet 0.25 milliGRAM(s) Oral daily  diltiazem    Tablet 120 milliGRAM(s) Oral every 6 hours  filgrastim-sndz Injectable 480 MICROGram(s) SubCutaneous every 24 hours  furosemide   Injectable 40 milliGRAM(s) IV Push every 12 hours  heparin  Injectable 5000 Unit(s) SubCutaneous every 8 hours  influenza   Vaccine 0.5 milliLiter(s) IntraMuscular once  lactobacillus acidophilus 1 Tablet(s) Oral three times a day with meals  loratadine 10 milliGRAM(s) Oral daily  metoprolol succinate  milliGRAM(s) Oral daily  ondansetron  IVPB 10 milliGRAM(s) IV Intermittent once  sodium chloride 1 Gram(s) Oral two times a day  tiotropium 18 MICROgram(s) Capsule 1 Capsule(s) Inhalation daily  verapamil 40 milliGRAM(s) Oral every 8 hours    MEDICATIONS  (PRN):  ALBUTerol    90 MICROgram(s) HFA Inhaler 2 Puff(s) Inhalation every 6 hours PRN Shortness of Breath  guaiFENesin    Syrup 200 milliGRAM(s) Oral every 6 hours PRN Cough  ondansetron  IVPB 10 milliGRAM(s) IV Intermittent every 8 hours PRN Nausea and/or Vomiting  prochlorperazine   Tablet 10 milliGRAM(s) Oral every 6 hours PRN nause and vomiting  promethazine 25 milliGRAM(s) Oral four times a day PRN breakthrough cough      Vital Signs Last 24 Hrs  T(C): 37 (13 Jan 2019 04:59), Max: 37 (13 Jan 2019 04:59)  T(F): 98.6 (13 Jan 2019 04:59), Max: 98.6 (13 Jan 2019 04:59)  HR: 68 (13 Jan 2019 07:37) (68 - 107)  BP: 104/70 (13 Jan 2019 04:59) (103/66 - 118/67)  BP(mean): --  RR: 17 (13 Jan 2019 04:59) (17 - 18)  SpO2: 99% (13 Jan 2019 04:59) (94% - 99%)    PHYSICAL EXAM  General: adult in NAD O2 in place  HEENT: clear oropharynx, anicteric sclera, pink conjunctivae  Neck: supple  CV: normal S1S2 with no murmur rubs or gallops  Lungs: clear to auscultation, no wheezes, no rhales  Abdomen: soft non-tender non-distended, no hepato/splenomegaly  Ext: no clubbing cyanosis or edema  Skin: no rashes and no petichiae  Neuro: alert and oriented X3 no focal deficits      LABS:  CBC Full  -  ( 13 Jan 2019 10:52 )  WBC Count : 52.38 K/uL  Hemoglobin : 11.7 g/dL  Hematocrit : 35.8 %  Platelet Count - Automated : 215 K/uL  Mean Cell Volume : 85.0 fl  Mean Cell Hemoglobin : 27.8 pg  Mean Cell Hemoglobin Concentration : 32.7 gm/dL  Auto Neutrophil # : x  Auto Lymphocyte # : x  Auto Monocyte # : x  Auto Eosinophil # : x  Auto Basophil # : x  Auto Neutrophil % : x  Auto Lymphocyte % : x  Auto Monocyte % : x  Auto Eosinophil % : x  Auto Basophil % : x    01-13    135  |  89<L>  |  24<H>  ----------------------------<  93  3.7   |  37<H>  |  0.64    Ca    8.1<L>      13 Jan 2019 10:52  Mg     2.2     01-13          fe studies      WBC trend  52.38 K/uL (01-13-19 @ 10:52)  13.86 K/uL (01-12-19 @ 13:44)  17.75 K/uL (01-11-19 @ 09:08)      Hgb trend  11.7 g/dL (01-13-19 @ 10:52)  10.4 g/dL (01-12-19 @ 13:44)  11.3 g/dL (01-11-19 @ 09:08)      plt trend  215 K/uL (01-13-19 @ 10:52)  183 K/uL (01-12-19 @ 13:44)  249 K/uL (01-11-19 @ 09:08)        RADIOLOGY & ADDITIONAL STUDIES:

## 2019-01-13 NOTE — PROGRESS NOTE ADULT - SUBJECTIVE AND OBJECTIVE BOX
[facility] -- Gurpreet Bush, Emily, Andrae Nicole Patel, Savella  Office # [phone]    Follow Up:  New onset Afib    Subjective/Observations: Sitting on the chair with nasal cannula.  States he is feeling better everyday.  Still coughing with difficulty expectorating.  Still urinating profusely    REVIEW OF SYSTEMS: All other review of systems is negative unless indicated above    PAST MEDICAL & SURGICAL HISTORY:  Dyspnea  Mediastinal adenopathy  Pericardial effusion  AF (atrial fibrillation)  Hyponatremia  COPD (chronic obstructive pulmonary disease)  HTN (hypertension)  S/P pericardial window creation    MEDICATIONS  (STANDING):  ALBUTerol/ipratropium for Nebulization 3 milliLiter(s) Nebulizer every 6 hours  buDESOnide   0.5 milliGRAM(s) Respule 0.5 milliGRAM(s) Inhalation two times a day  chlorhexidine 2% Cloths 1 Application(s) Topical daily  digoxin     Tablet 0.25 milliGRAM(s) Oral daily  diltiazem    Tablet 120 milliGRAM(s) Oral every 6 hours  filgrastim-sndz Injectable 480 MICROGram(s) SubCutaneous every 24 hours  furosemide   Injectable 40 milliGRAM(s) IV Push every 12 hours  heparin  Injectable 5000 Unit(s) SubCutaneous every 8 hours  influenza   Vaccine 0.5 milliLiter(s) IntraMuscular once  lactobacillus acidophilus 1 Tablet(s) Oral three times a day with meals  loratadine 10 milliGRAM(s) Oral daily  metoprolol succinate  milliGRAM(s) Oral daily  ondansetron  IVPB 10 milliGRAM(s) IV Intermittent once  sodium chloride 1 Gram(s) Oral two times a day  tiotropium 18 MICROgram(s) Capsule 1 Capsule(s) Inhalation daily  verapamil 80 milliGRAM(s) Oral every 8 hours    MEDICATIONS  (PRN):  ALBUTerol    90 MICROgram(s) HFA Inhaler 2 Puff(s) Inhalation every 6 hours PRN Shortness of Breath  guaiFENesin    Syrup 200 milliGRAM(s) Oral every 6 hours PRN Cough  ondansetron  IVPB 10 milliGRAM(s) IV Intermittent every 8 hours PRN Nausea and/or Vomiting  prochlorperazine   Tablet 10 milliGRAM(s) Oral every 6 hours PRN nause and vomiting  promethazine 25 milliGRAM(s) Oral four times a day PRN breakthrough cough    Allergies    penicillin (Unknown)    Intolerances    Vital Signs Last 24 Hrs  T(C): 37 (13 Jan 2019 04:59), Max: 37 (13 Jan 2019 04:59)  T(F): 98.6 (13 Jan 2019 04:59), Max: 98.6 (13 Jan 2019 04:59)  HR: 68 (13 Jan 2019 07:37) (68 - 107)  BP: 104/70 (13 Jan 2019 04:59) (103/66 - 124/72)  BP(mean): --  RR: 17 (13 Jan 2019 04:59) (17 - 18)  SpO2: 99% (13 Jan 2019 04:59) (94% - 99%)    I&O's Summary    12 Jan 2019 07:01  -  13 Jan 2019 07:00  --------------------------------------------------------  IN: 340 mL / OUT: 3900 mL / NET: -3560 mL    13 Jan 2019 07:01  -  13 Jan 2019 10:54  --------------------------------------------------------  IN: 0 mL / OUT: 600 mL / NET: -600 mL    PHYSICAL EXAM:  TELE: Afib  Constitutional: NAD, awake and alert, obese  HEENT: Moist Mucous Membranes, Anicteric  Pulmonary: Non-labored, breath sounds are equal bilaterally, No wheezing, rales. + rhonchi  Cardiovascular: Regular, S1 and S2, No murmurs, rubs, gallops or clicks  Gastrointestinal: Bowel Sounds present, soft, nontender.   Lymph: 3+ BLE edema. No lymphadenopathy.  Skin: No visible rashes or ulcers.  Psych:  Mood & affect appropriate    LABS: All Labs Reviewed:                        10.4   13.86 )-----------( 183      ( 12 Jan 2019 13:44 )             31.4                         11.3   17.75 )-----------( 249      ( 11 Jan 2019 09:08 )             34.7     11 Jan 2019 09:08    135    |  93     |  23     ----------------------------<  167    4.4     |  35     |  0.68     Ca    8.1        11 Jan 2019 09:08  Phos  4.0       11 Jan 2019 09:08  Mg     2.3       11 Jan 2019 09:08  Mg     2.2       10 Lisandro 2019 22:22    < from: TTE Echo Doppler w/o Cont (01.04.19 @ 09:32) >     EXAM:  ECHO TTE WO CON COMP W DOPPLR         PROCEDURE DATE:  01/04/2019        INTERPRETATION:  INDICATION: Atrial fibrillation, pericardial effusion    Blood Pressure 138/73    Height 185     Weight 111       BSA 2.3    Dimensions:    LA 4.4  Normal Values: 2.0 - 4.0 cm    Ao 4.0        Normal Values: 2.0 - 3.8 cm  SEPTUM           Normal Values: 0.6 - 1.2 cm  PWT           Normal Values: 0.6 - 1.1 cm  LVIDd             Normal Values: 3.0 - 5.6 cm  LVIDs             Normal Values: 1.8 -4.0 cm    Derived Variables:  LVMI     g/m2  RWT      Fractional Short      Ejection Fraction        Doppler Peak v. AoV=   (m/sec)    OBSERVATIONS:    This is a technically limited study with suboptimal endocardial   definition. Within these limitations, the left ventricle appears to be   normal in size with overall normal systolic function. The right ventricle   is not well seen but is probably normal in size with normal systolic   function. Biatrial enlargement. The aortic root is mildly dilated. The   mitral valve is not well seen. There is trace physiologic MR noted. Other   valves appear structurally normal as visualized. Inadequate TR jet to   estimate PA systolic pressure. No significant pericardial effusion. Left   pleural effusion. The patient was in rapid atrial fibrillation during   this examination.    ISAC NICOLE M.D., ATTENDING CARDIOLOGIST  This document has been electronically signed. Jan 4 2019  3:55PM     < end of copied text >    < from: Xray Chest 1 View- PORTABLE-Routine (01.12.19 @ 08:32) >    EXAM:  XR CHEST PORTABLE ROUTINE 1V                          PROCEDURE DATE:  01/12/2019      INTERPRETATION:      INDICATION: Shortness of breath CHF follow-up    Single frontal view of the chest compared to prior study of 12/31/2018        FINDINGS/  IMPRESSION:       The lungs are hyperaerated with emphysematous changes. Airspace   consolidation left lower lobe and inferior lingula likely pneumonia   superimposed on interstitial edema with small bibasilar effusion and   compressive atelectasis thickening of right paratracheal stripe.   Port-A-Cath tip in SVC, no pneumothorax. Cardiomegaly.  There is no acute   osseous abnormality.    NINA ROMERO M.D., ATTENDING RADIOLOGIST  This document has been electronicallysigned. Jan 12 2019 10:17AM      < end of copied text >

## 2019-01-13 NOTE — PROGRESS NOTE ADULT - ASSESSMENT
59 y/o man w SOB, bulky mediastinal and hilar LADs, left lung compromise, left effusion, post bronch w pathology sig for small cell, pericardial tamponade, s/p pericardial window but negative cytology and pericardial bx.     Hyponatremia /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 Mild hyponatremia now. BUN 2, Cr 0.6. No change in Cr.   A fib, mild hemoptysis  CT scan Head and MRI brain negative for metastasis.     most likely extensive stage ( left pleura effusion) and pericardial tamponade/effusion  considering aggressiveness of cancer, significant disease burden and impending airway compromise without chemo, pt started on chemotherapy inpt.     s/p infusaport    LE edema on Lasix.   Last duplex 01/02/19, negative  On SQ Heparin q8h.   WBC now >50K on G-CSF    RECOMMEND  Continue D3 Carbo/VP16   Plan for Zarxio-GCSF support post chemo due to expected decline.   DVT prophylaxis.   rehab eval for subacute rehab  Will need O2.   Elevated legs PRN.   hold zarxio today and check CBC in am

## 2019-01-13 NOTE — PROGRESS NOTE ADULT - ASSESSMENT
Hyponatremia  SIADH  PNA  Hemoptysis   Pleural effusion  Lung CA      - Known to our group from last hospitalization with SIADH. He was sent home with NaCl tabs. He claimed to be compliant with salt tablets but not fluid restriction. No hypertonic saline needed. S/p tolvaptan 15 mg po x 1   - Renal indices are stable; sodium levels are within normal range  - Continue NaCl tabs as ordered. Reduced to 1 gram po BID. Fluid restriction as ordered; Lasix as per pulmonary   - S/p CTA showing no PE  - S/p Bronch on 1/2/18  - Pulm and Cardio follow up noted  - Abx   - Monitor chemistries  - S/p chemoport; started chemo on 1/9/19    Thank you

## 2019-01-14 ENCOUNTER — APPOINTMENT (OUTPATIENT)
Dept: THORACIC SURGERY | Facility: CLINIC | Age: 59
End: 2019-01-14

## 2019-01-14 DIAGNOSIS — E87.1 HYPO-OSMOLALITY AND HYPONATREMIA: ICD-10-CM

## 2019-01-14 DIAGNOSIS — R59.0 LOCALIZED ENLARGED LYMPH NODES: ICD-10-CM

## 2019-01-14 DIAGNOSIS — Z87.01 PERSONAL HISTORY OF PNEUMONIA (RECURRENT): ICD-10-CM

## 2019-01-14 DIAGNOSIS — Z87.891 PERSONAL HISTORY OF NICOTINE DEPENDENCE: ICD-10-CM

## 2019-01-14 LAB
ANION GAP SERPL CALC-SCNC: 10 MMOL/L — SIGNIFICANT CHANGE UP (ref 5–17)
BASOPHILS # BLD AUTO: 0 K/UL — SIGNIFICANT CHANGE UP (ref 0–0.2)
BASOPHILS NFR BLD AUTO: 0 % — SIGNIFICANT CHANGE UP (ref 0–2)
BUN SERPL-MCNC: 21 MG/DL — SIGNIFICANT CHANGE UP (ref 7–23)
CALCIUM SERPL-MCNC: 7.8 MG/DL — LOW (ref 8.5–10.1)
CHLORIDE SERPL-SCNC: 87 MMOL/L — LOW (ref 96–108)
CO2 SERPL-SCNC: 35 MMOL/L — HIGH (ref 22–31)
CREAT SERPL-MCNC: 0.59 MG/DL — SIGNIFICANT CHANGE UP (ref 0.5–1.3)
DIGOXIN SERPL-MCNC: 0.5 NG/ML — LOW (ref 0.8–2)
EOSINOPHIL # BLD AUTO: 0 K/UL — SIGNIFICANT CHANGE UP (ref 0–0.5)
EOSINOPHIL NFR BLD AUTO: 0 % — SIGNIFICANT CHANGE UP (ref 0–6)
GLUCOSE SERPL-MCNC: 128 MG/DL — HIGH (ref 70–99)
HCT VFR BLD CALC: 38.2 % — LOW (ref 39–50)
HGB BLD-MCNC: 12.3 G/DL — LOW (ref 13–17)
LYMPHOCYTES # BLD AUTO: 1.92 K/UL — SIGNIFICANT CHANGE UP (ref 1–3.3)
LYMPHOCYTES # BLD AUTO: 7 % — LOW (ref 13–44)
MAGNESIUM SERPL-MCNC: 1.8 MG/DL — SIGNIFICANT CHANGE UP (ref 1.6–2.6)
MANUAL SMEAR VERIFICATION: SIGNIFICANT CHANGE UP
MCHC RBC-ENTMCNC: 27.3 PG — SIGNIFICANT CHANGE UP (ref 27–34)
MCHC RBC-ENTMCNC: 32.2 GM/DL — SIGNIFICANT CHANGE UP (ref 32–36)
MCV RBC AUTO: 84.7 FL — SIGNIFICANT CHANGE UP (ref 80–100)
MONOCYTES # BLD AUTO: 0.27 K/UL — SIGNIFICANT CHANGE UP (ref 0–0.9)
MONOCYTES NFR BLD AUTO: 1 % — LOW (ref 2–14)
NEUTROPHILS # BLD AUTO: 25.18 K/UL — HIGH (ref 1.8–7.4)
NEUTROPHILS NFR BLD AUTO: 87 % — HIGH (ref 43–77)
NEUTS BAND # BLD: 5 % — SIGNIFICANT CHANGE UP (ref 0–8)
NRBC # BLD: 0 — SIGNIFICANT CHANGE UP
NRBC # BLD: SIGNIFICANT CHANGE UP /100 WBCS (ref 0–0)
PLAT MORPH BLD: NORMAL — SIGNIFICANT CHANGE UP
PLATELET # BLD AUTO: 194 K/UL — SIGNIFICANT CHANGE UP (ref 150–400)
PLATELET CLUMP BLD QL SMEAR: ABNORMAL
POTASSIUM SERPL-MCNC: 4.1 MMOL/L — SIGNIFICANT CHANGE UP (ref 3.5–5.3)
POTASSIUM SERPL-SCNC: 4.1 MMOL/L — SIGNIFICANT CHANGE UP (ref 3.5–5.3)
RBC # BLD: 4.51 M/UL — SIGNIFICANT CHANGE UP (ref 4.2–5.8)
RBC # FLD: 13.2 % — SIGNIFICANT CHANGE UP (ref 10.3–14.5)
RBC BLD AUTO: SIGNIFICANT CHANGE UP
SODIUM SERPL-SCNC: 132 MMOL/L — LOW (ref 135–145)
WBC # BLD: 27.37 K/UL — HIGH (ref 3.8–10.5)
WBC # FLD AUTO: 27.37 K/UL — HIGH (ref 3.8–10.5)

## 2019-01-14 PROCEDURE — 99232 SBSQ HOSP IP/OBS MODERATE 35: CPT

## 2019-01-14 PROCEDURE — 71250 CT THORAX DX C-: CPT | Mod: 26

## 2019-01-14 RX ORDER — FUROSEMIDE 40 MG
40 TABLET ORAL EVERY 8 HOURS
Qty: 0 | Refills: 0 | Status: DISCONTINUED | OUTPATIENT
Start: 2019-01-14 | End: 2019-01-15

## 2019-01-14 RX ORDER — FUROSEMIDE 40 MG
40 TABLET ORAL EVERY 12 HOURS
Qty: 0 | Refills: 0 | Status: DISCONTINUED | OUTPATIENT
Start: 2019-01-14 | End: 2019-01-14

## 2019-01-14 RX ADMIN — Medication 3 MILLILITER(S): at 20:52

## 2019-01-14 RX ADMIN — Medication 3 MILLILITER(S): at 13:40

## 2019-01-14 RX ADMIN — Medication 40 MILLIGRAM(S): at 22:28

## 2019-01-14 RX ADMIN — Medication 1 TABLET(S): at 17:08

## 2019-01-14 RX ADMIN — Medication 1 TABLET(S): at 07:43

## 2019-01-14 RX ADMIN — Medication 40 MILLIGRAM(S): at 13:11

## 2019-01-14 RX ADMIN — CHLORHEXIDINE GLUCONATE 1 APPLICATION(S): 213 SOLUTION TOPICAL at 11:11

## 2019-01-14 RX ADMIN — Medication 200 MILLIGRAM(S): at 06:05

## 2019-01-14 RX ADMIN — Medication 3 MILLILITER(S): at 08:14

## 2019-01-14 RX ADMIN — HEPARIN SODIUM 5000 UNIT(S): 5000 INJECTION INTRAVENOUS; SUBCUTANEOUS at 06:06

## 2019-01-14 RX ADMIN — SODIUM CHLORIDE 1 GRAM(S): 9 INJECTION INTRAMUSCULAR; INTRAVENOUS; SUBCUTANEOUS at 06:06

## 2019-01-14 RX ADMIN — Medication 0.5 MILLIGRAM(S): at 08:13

## 2019-01-14 RX ADMIN — Medication 3 MILLILITER(S): at 01:25

## 2019-01-14 RX ADMIN — HEPARIN SODIUM 5000 UNIT(S): 5000 INJECTION INTRAVENOUS; SUBCUTANEOUS at 22:28

## 2019-01-14 RX ADMIN — Medication 0.5 MILLIGRAM(S): at 20:52

## 2019-01-14 RX ADMIN — HEPARIN SODIUM 5000 UNIT(S): 5000 INJECTION INTRAVENOUS; SUBCUTANEOUS at 13:08

## 2019-01-14 RX ADMIN — Medication 1 TABLET(S): at 11:09

## 2019-01-14 RX ADMIN — Medication 480 MICROGRAM(S): at 17:16

## 2019-01-14 RX ADMIN — Medication 0.25 MILLIGRAM(S): at 06:06

## 2019-01-14 RX ADMIN — LORATADINE 10 MILLIGRAM(S): 10 TABLET ORAL at 11:09

## 2019-01-14 RX ADMIN — SODIUM CHLORIDE 1 GRAM(S): 9 INJECTION INTRAMUSCULAR; INTRAVENOUS; SUBCUTANEOUS at 17:08

## 2019-01-14 RX ADMIN — Medication 40 MILLIGRAM(S): at 06:06

## 2019-01-14 NOTE — PROGRESS NOTE ADULT - PROBLEM SELECTOR PLAN 1
Await bronch  Cytology/Bx
Await bronch results  Cytology/Bx
IV ABX per ID  PULM EVAL with Dr. KD dumont
IV ABX per ID  PULM EVAL with Dr. KD dumont
IV ABX per ID  PULM EVAL with Dr. YI
IV ABX per ID Dr. HO [place] with Dr. KD dumont
S/P chemo  Now on neupogen  Onc f/u
S/P chemo  Now on neupogen -- on hold today  Onc f/u  Continue NRT
S/P chemo  Now on neupogen -- on hold today  Onc f/u  Continue NRT
off  ABX per ID Dr. Aponte al  PULM EVAL with Dr. KD dumont
F/U final immunohistochemistry
IV ABX per ID  PULM EVAL with Dr. KD dumont

## 2019-01-14 NOTE — PROGRESS NOTE ADULT - PROBLEM SELECTOR PROBLEM 3
AF (atrial fibrillation)
Cough
Hyponatremia
Cough

## 2019-01-14 NOTE — PROGRESS NOTE ADULT - SUBJECTIVE AND OBJECTIVE BOX
Patient is a 58y old  Male who presents with a chief complaint of sob (14 Jan 2019 16:56)      INTERVAL HPI/OVERNIGHT EVENTS:    Shortness of breath is slightly improved. Patient still has difficulty laying flat due to respiratory issues    MEDICATIONS  (STANDING):  ALBUTerol/ipratropium for Nebulization 3 milliLiter(s) Nebulizer every 6 hours  buDESOnide   0.5 milliGRAM(s) Respule 0.5 milliGRAM(s) Inhalation two times a day  chlorhexidine 2% Cloths 1 Application(s) Topical daily  digoxin     Tablet 0.25 milliGRAM(s) Oral daily  diltiazem    Tablet 120 milliGRAM(s) Oral every 6 hours  filgrastim-sndz Injectable 480 MICROGram(s) SubCutaneous every 24 hours  furosemide   Injectable 40 milliGRAM(s) IV Push every 8 hours  heparin  Injectable 5000 Unit(s) SubCutaneous every 8 hours  influenza   Vaccine 0.5 milliLiter(s) IntraMuscular once  lactobacillus acidophilus 1 Tablet(s) Oral three times a day with meals  loratadine 10 milliGRAM(s) Oral daily  metoprolol succinate  milliGRAM(s) Oral daily  sodium chloride 1 Gram(s) Oral two times a day  tiotropium 18 MICROgram(s) Capsule 1 Capsule(s) Inhalation daily  verapamil 40 milliGRAM(s) Oral every 8 hours      MEDICATIONS  (PRN):  ALBUTerol    90 MICROgram(s) HFA Inhaler 2 Puff(s) Inhalation every 6 hours PRN Shortness of Breath  guaiFENesin    Syrup 200 milliGRAM(s) Oral every 6 hours PRN Cough  ondansetron  IVPB 10 milliGRAM(s) IV Intermittent every 8 hours PRN Nausea and/or Vomiting  prochlorperazine   Tablet 10 milliGRAM(s) Oral every 6 hours PRN nause and vomiting  promethazine 25 milliGRAM(s) Oral four times a day PRN breakthrough cough      Allergies    penicillin (Unknown)    Intolerances        PAST MEDICAL & SURGICAL HISTORY:  Dyspnea  Mediastinal adenopathy  Pericardial effusion  AF (atrial fibrillation)  Hyponatremia  COPD (chronic obstructive pulmonary disease)  HTN (hypertension)  S/P pericardial window creation      Vital Signs Last 24 Hrs  T(C): 36.8 (14 Jan 2019 13:12), Max: 36.8 (14 Jan 2019 04:50)  T(F): 98.3 (14 Jan 2019 13:12), Max: 98.3 (14 Jan 2019 13:12)  HR: 71 (14 Jan 2019 17:10) (71 - 104)  BP: 105/60 (14 Jan 2019 17:10) (102/59 - 114/65)  BP(mean): --  RR: 16 (14 Jan 2019 13:12) (16 - 22)  SpO2: 96% (14 Jan 2019 13:12) (92% - 98%)    PHYSICAL EXAMINATION:    GENERAL: The patient is awake and alert in no apparent distress.     HEENT: Head is normocephalic and atraumatic. Extraocular muscles are intact. Mucous membranes are moist.    NECK: Supple.    LUNGS: Bilateral coarse rhonchi    HEART: Regular rate and rhythm without murmur.    ABDOMEN: Soft, nontender, and nondistended.      EXTREMITIES: positive edema bilateral    NEUROLOGIC: Grossly intact.    SKIN: No ulceration or induration present.      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 If fluid increases, pleur-x catheter may be necessary

## 2019-01-14 NOTE — PROGRESS NOTE ADULT - ASSESSMENT
57 y/o man w SOB, bulky mediastinal and hilar LADs, left lung compromise, left effusion, post bronch w pathology sig for small cell, pericardial tamponade, s/p pericardial window but negative cytology and pericardial bx.     Hyponatremia /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 Mild hyponatremia now. BUN 2, Cr 0.6. No change in Cr.   A fib  CT scan Head and MRI brain negative for metastasis.     - Final pathology c/w Small Cell lung cancer; completed cycle 1 of Carbo/Etoposide 1/9/19-1/11/19 followed by growth factor support (Zarxio 480 mcg - today is day #3)  - tolerated treatment well;   - stable for discharge home from oncology standpoint  - to follow up in office this week; also to follow up with Pulmonary   - case discussed with Dr. Gusman in detail

## 2019-01-14 NOTE — PROGRESS NOTE ADULT - PROBLEM SELECTOR PROBLEM 2
Hyponatremia
R/O Pneumonia, unspecified organism
Hyponatremia

## 2019-01-14 NOTE — PROGRESS NOTE ADULT - PROBLEM SELECTOR PROBLEM 4
Pleural effusion
Preventive measure

## 2019-01-14 NOTE — PROGRESS NOTE ADULT - SUBJECTIVE AND OBJECTIVE BOX
CHIEF COMPLAINT: Patient is a 58y old  Male who presents with a chief complaint of sob (14 Jan 2019 10:39)      HPI:  pt is a 59yo male with pmhx of htn, copd, cardiac tamponade s/p window 12/2018 c/o cough x days. pt reports non-productive cough with associated dyspnea on exertion. pt reports "it feels like something is stuck in my throat". pt reports bl le swelling without pain. pt recently admitted for pericardial effusion s/p window. pt quit smoking aprox 2 weeks ago.  In ER patient was found to have PNA on CXR.  Patient is being admitted for further work up and treatment. (31 Dec 2018 21:51)      Follow Up: SOB, New onset Afib    Interval History:  Patient seen and examined, denies CP, dyspnea, palpitations, orthopnea, PND. In chair comfortably with NC.     EKG:  < from: 12 Lead ECG (01.10.19 @ 22:26) >    Ventricular Rate 115 BPM    Atrial Rate 326 BPM    QRS Duration 80 ms    Q-T Interval 306 ms    QTC Calculation(Bezet) 423 ms    R Axis 65 degrees    T Axis 270 degrees    Diagnosis Line Atrial fibrillation  Baseline artifact  Abnormal ECG      REVIEW OF SYSTEMS:    CONSTITUTIONAL: + weakness, fevers or chills  EYES/ENT: No visual changes;  No vertigo or throat pain   NECK: No pain or stiffness  RESPIRATORY: No cough, wheezing, hemoptysis; +shortness of breath  CARDIOVASCULAR: No chest pain or palpitations  GASTROINTESTINAL: No abdominal or epigastric pain. No nausea, vomiting, or hematemesis; No diarrhea or constipation. No melena or hematochezia.  GENITOURINARY: No dysuria, frequency or hematuria  NEUROLOGICAL: No numbness or weakness  SKIN: No itching, rashes      PAST MEDICAL & SURGICAL HISTORY:  Dyspnea  Mediastinal adenopathy  Pericardial effusion  AF (atrial fibrillation)  Hyponatremia  COPD (chronic obstructive pulmonary disease)  HTN (hypertension)  S/P pericardial window creation      SOCIAL HISTORY:  No tobacco, ethanol, or drug abuse.    FAMILY HISTORY:  No pertinent family history in first degree relatives    No family history of acute MI or sudden cardiac death.    MEDICATIONS  (STANDING):  ALBUTerol/ipratropium for Nebulization 3 milliLiter(s) Nebulizer every 6 hours  buDESOnide   0.5 milliGRAM(s) Respule 0.5 milliGRAM(s) Inhalation two times a day  chlorhexidine 2% Cloths 1 Application(s) Topical daily  digoxin     Tablet 0.25 milliGRAM(s) Oral daily  diltiazem    Tablet 120 milliGRAM(s) Oral every 6 hours  filgrastim-sndz Injectable 480 MICROGram(s) SubCutaneous every 24 hours  furosemide   Injectable 40 milliGRAM(s) IV Push every 12 hours  heparin  Injectable 5000 Unit(s) SubCutaneous every 8 hours  influenza   Vaccine 0.5 milliLiter(s) IntraMuscular once  lactobacillus acidophilus 1 Tablet(s) Oral three times a day with meals  loratadine 10 milliGRAM(s) Oral daily  metoprolol succinate  milliGRAM(s) Oral daily  sodium chloride 1 Gram(s) Oral two times a day  tiotropium 18 MICROgram(s) Capsule 1 Capsule(s) Inhalation daily  verapamil 40 milliGRAM(s) Oral every 8 hours    MEDICATIONS  (PRN):  ALBUTerol    90 MICROgram(s) HFA Inhaler 2 Puff(s) Inhalation every 6 hours PRN Shortness of Breath  guaiFENesin    Syrup 200 milliGRAM(s) Oral every 6 hours PRN Cough  ondansetron  IVPB 10 milliGRAM(s) IV Intermittent every 8 hours PRN Nausea and/or Vomiting  prochlorperazine   Tablet 10 milliGRAM(s) Oral every 6 hours PRN nause and vomiting  promethazine 25 milliGRAM(s) Oral four times a day PRN breakthrough cough      Allergies    penicillin (Unknown)    Intolerances        Home meds:  Home Medications:  aspirin 81 mg oral tablet, chewable: 1 tab(s) orally once a day (31 Dec 2018 18:45)  guaiFENesin 100 mg/5 mL oral liquid: 5 milliliter(s) orally every 6 hours, As needed, Cough (31 Dec 2018 18:45)  loratadine 10 mg oral tablet: 1 tab(s) orally once a day (31 Dec 2018 18:45)  magnesium oxide 400 mg (241.3 mg elemental magnesium) oral tablet: 1 tab(s) orally once a day (31 Dec 2018 18:45)        VITAL SIGNS:   Vital Signs Last 24 Hrs  T(C): 36.8 (14 Jan 2019 04:50), Max: 37.2 (13 Jan 2019 14:17)  T(F): 98.2 (14 Jan 2019 04:50), Max: 98.9 (13 Jan 2019 14:17)  HR: 91 (14 Jan 2019 11:12) (73 - 101)  BP: 106/62 (14 Jan 2019 11:12) (102/59 - 110/69)  BP(mean): --  RR: 16 (14 Jan 2019 11:12) (16 - 22)  SpO2: 96% (14 Jan 2019 11:12) (92% - 98%)    I&O's Summary    13 Jan 2019 07:01  -  14 Jan 2019 07:00  --------------------------------------------------------  IN: 0 mL / OUT: 3600 mL / NET: -3600 mL        On Exam:  TELE:   Constitutional: NAD, awake and alert, well-developed  HEENT: Moist Mucous Membranes, Anicteric  Pulmonary: Non-labored, breath sounds are clear bilaterally, No wheezing, rales or rhonchi  Cardiovascular: Regular, S1 and S2, No murmurs, rubs, gallops or clicks  Gastrointestinal: Bowel Sounds present, soft, nontender.   Lymph: No peripheral edema. No lymphadenopathy.  Skin: No visible rashes or ulcers.  Psych:  Mood & affect appropriate    LABS: All Labs Reviewed:                        12.3   27.37 )-----------( 194      ( 14 Jan 2019 07:07 )             38.2                         11.7   52.38 )-----------( 215      ( 13 Jan 2019 10:52 )             35.8                         10.4   13.86 )-----------( 183      ( 12 Jan 2019 13:44 )             31.4     14 Jan 2019 07:07    132    |  87     |  21     ----------------------------<  128    4.1     |  35     |  0.59   13 Jan 2019 10:52    135    |  89     |  24     ----------------------------<  93     3.7     |  37     |  0.64     Ca    7.8        14 Jan 2019 07:07  Ca    8.1        13 Jan 2019 10:52  Mg     1.8       14 Jan 2019 07:07  Mg     2.2       13 Jan 2019 10:52      Blood Culture:       RADIOLOGY:    < from: TTE Echo Doppler w/o Cont (01.04.19 @ 09:32) >  OBSERVATIONS:    This is a technically limited study with suboptimal endocardial   definition. Within these limitations, the left ventricle appears to be   normal in size with overall normal systolic function. The right ventricle   is not well seen but is probably normal in size with normal systolic   function. Biatrial enlargement. The aortic root is mildly dilated. The   mitral valve is not well seen. There is trace physiologic MR noted. Other   valves appear structurally normal as visualized. Inadequate TR jet to   estimate PA systolic pressure. No significant pericardial effusion. Left   pleural effusion. The patient was in rapid atrial fibrillation during   this examination.     < from: Xray Chest 1 View- PORTABLE-Routine (01.12.19 @ 08:32) >  FINDINGS/  IMPRESSION:       The lungs are hyperaerated with emphysematous changes. Airspace   consolidation left lower lobe and inferior lingula likely pneumonia   superimposed on interstitial edema with small bibasilar effusion and   compressive atelectasis thickening of right paratracheal stripe.   Port-A-Cath tip in SVC, no pneumothorax. Cardiomegaly.  There is no acute   osseous abnormality.

## 2019-01-14 NOTE — PROGRESS NOTE ADULT - PROBLEM SELECTOR PLAN 4
Continue iv lasix -- diuresing well -- in negative balance  Still feels SOB  likely malignant
Heparin for DVT prevention

## 2019-01-14 NOTE — PROGRESS NOTE ADULT - PROBLEM SELECTOR PROBLEM 1
PNA (pneumonia)
R/O Lung cancer
R/O Lung cancer
Small cell lung cancer
R/O Lung cancer
PNA (pneumonia)

## 2019-01-14 NOTE — PROGRESS NOTE ADULT - SUBJECTIVE AND OBJECTIVE BOX
Patient is a 58y old  Male who presents with a chief complaint of sob (14 Jan 2019 12:07)      INTERVAL /OVERNIGHT EVENTS: still orthopnec    MEDICATIONS  (STANDING):  ALBUTerol/ipratropium for Nebulization 3 milliLiter(s) Nebulizer every 6 hours  buDESOnide   0.5 milliGRAM(s) Respule 0.5 milliGRAM(s) Inhalation two times a day  chlorhexidine 2% Cloths 1 Application(s) Topical daily  digoxin     Tablet 0.25 milliGRAM(s) Oral daily  diltiazem    Tablet 120 milliGRAM(s) Oral every 6 hours  filgrastim-sndz Injectable 480 MICROGram(s) SubCutaneous every 24 hours  furosemide   Injectable 40 milliGRAM(s) IV Push every 8 hours  heparin  Injectable 5000 Unit(s) SubCutaneous every 8 hours  influenza   Vaccine 0.5 milliLiter(s) IntraMuscular once  lactobacillus acidophilus 1 Tablet(s) Oral three times a day with meals  loratadine 10 milliGRAM(s) Oral daily  metoprolol succinate  milliGRAM(s) Oral daily  sodium chloride 1 Gram(s) Oral two times a day  tiotropium 18 MICROgram(s) Capsule 1 Capsule(s) Inhalation daily  verapamil 40 milliGRAM(s) Oral every 8 hours    MEDICATIONS  (PRN):  ALBUTerol    90 MICROgram(s) HFA Inhaler 2 Puff(s) Inhalation every 6 hours PRN Shortness of Breath  guaiFENesin    Syrup 200 milliGRAM(s) Oral every 6 hours PRN Cough  ondansetron  IVPB 10 milliGRAM(s) IV Intermittent every 8 hours PRN Nausea and/or Vomiting  prochlorperazine   Tablet 10 milliGRAM(s) Oral every 6 hours PRN nause and vomiting  promethazine 25 milliGRAM(s) Oral four times a day PRN breakthrough cough      Allergies    penicillin (Unknown)    Intolerances        REVIEW OF SYSTEMS:  CONSTITUTIONAL: No fever, weight loss, or fatigue  EYES: No eye pain, visual disturbances, or discharge  ENMT:  No difficulty hearing, tinnitus, vertigo; No sinus or throat pain  NECK: No pain or stiffness  RESPIRATORY: No cough, wheezing, chills or hemoptysis; + shortness of breath  CARDIOVASCULAR: No chest pain, palpitations, dizziness, or leg swelling  GASTROINTESTINAL: No abdominal or epigastric pain. No nausea, vomiting, or hematemesis; No diarrhea or constipation. No melena or hematochezia.  GENITOURINARY: No dysuria, frequency, hematuria, or incontinence  NEUROLOGICAL: No headaches, memory loss, loss of strength, numbness, or tremors  SKIN: No itching, burning, rashes, or lesions   LYMPH NODES: No enlarged glands  ENDOCRINE: No heat or cold intolerance; No hair loss; No polydipsia or polyuria  MUSCULOSKELETAL: No joint pain or swelling; No muscle, back, or extremity pain  PSYCHIATRIC: No depression, anxiety, mood swings, or difficulty sleeping  HEME/LYMPH: No easy bruising, or bleeding gums  ALLERGY AND IMMUNOLOGIC: No hives or eczema    Vital Signs Last 24 Hrs  T(C): 36.8 (14 Jan 2019 13:12), Max: 36.9 (13 Jan 2019 17:24)  T(F): 98.3 (14 Jan 2019 13:12), Max: 98.5 (13 Jan 2019 17:24)  HR: 84 (14 Jan 2019 13:45) (73 - 104)  BP: 114/65 (14 Jan 2019 13:12) (102/59 - 114/65)  BP(mean): --  RR: 16 (14 Jan 2019 13:12) (16 - 22)  SpO2: 96% (14 Jan 2019 13:12) (92% - 98%)    PHYSICAL EXAM:  GENERAL: NAD, well-groomed, well-developed  HEAD:  Atraumatic, Normocephalic  EYES: EOMI, PERRLA, conjunctiva and sclera clear  ENMT: No tonsillar erythema, exudates, or enlargement; Moist mucous membranes, Good dentition, No lesions  NECK: Supple, No JVD, Normal thyroid  NERVOUS SYSTEM:  Alert & Oriented X3, Good concentration; Motor Strength 5/5 B/L upper and lower extremities; DTRs 2+ intact and symmetric  CHEST/LUNG: Clear to auscultation bilaterally; No rales, rhonchi, wheezing, or rubs  HEART: Regular rate and rhythm; No murmurs, rubs, or gallops  ABDOMEN: Soft, Nontender, Nondistended; Bowel sounds present  EXTREMITIES:  2+ Peripheral Pulses, No clubbing, cyanosis, or edema  LYMPH: No lymphadenopathy noted  SKIN: No rashes or lesions    LABS:                        12.3   27.37 )-----------( 194      ( 14 Jan 2019 07:07 )             38.2     14 Jan 2019 07:07    132    |  87     |  21     ----------------------------<  128    4.1     |  35     |  0.59     Ca    7.8        14 Jan 2019 07:07  Mg     1.8       14 Jan 2019 07:07          CAPILLARY BLOOD GLUCOSE          RADIOLOGY & ADDITIONAL TESTS:    Notes Reviewed:  [x ] YES  [ ] NO    Care Discussed with Consultants/Other Providers [ x] YES  [ ] NO

## 2019-01-14 NOTE — PROGRESS NOTE ADULT - PROBLEM SELECTOR PROBLEM 6
Mediastinal adenopathy
Cough
Mediastinal adenopathy

## 2019-01-14 NOTE — PROGRESS NOTE ADULT - PROBLEM SELECTOR PLAN 2
Await bronch  Send cx data please
likely SIADH  [facility] [facility] [place] [facility]  renal eval with Dr. KEARNEY apprecaited
likely SIADH  [facility] [place] [facility]  renal eval with Dr. KEARNEY
likely SIADH  [facility]  renal eval with Dr. KEARNEY apprecaited
likely SIADH  [facility]  Na normal now  renal eval with Dr. KEARNEY et al appreciated
likely SIADH  [facility]  renal eval
likely SIADH  [facility]  renal eval with Dr. KEARNEY appreciated
likely SIADH  [facility]  renal eval with Dr. Meehan appreciated
[facility] d/c Shruthi
No clinical concern  Will stop antibiotics
likely SIADH  [facility]  renal eval with Dr. KEARNEY apprecaited

## 2019-01-14 NOTE — PROGRESS NOTE ADULT - PROBLEM SELECTOR PLAN 3
Resolved
Resolved
add robitussin  increase dose
add robitussin and phenergan
add robitussin and phenergan  improved
rate control with beta blocker and cardizem+verapamil and digoxin  cardio eval with Dr. ALVAREZ et al appreciated
Resolved
add robitussin and phenergan

## 2019-01-14 NOTE — PROGRESS NOTE ADULT - NSHPATTENDINGPLANDISCUSS_GEN_ALL_CORE
patient and RN
pt , primary team , RN
patient and RN
endo RN and patient
patient and RN

## 2019-01-14 NOTE — PROGRESS NOTE ADULT - PROBLEM SELECTOR PROBLEM 5
AF (atrial fibrillation)
PNA (pneumonia)
AF (atrial fibrillation)

## 2019-01-14 NOTE — PROGRESS NOTE ADULT - SUBJECTIVE AND OBJECTIVE BOX
Patient seen and examined;  Chart reviewed and events noted;   breathing better; wants to go home    MEDICATIONS  (STANDING):  ALBUTerol/ipratropium for Nebulization 3 milliLiter(s) Nebulizer every 6 hours  buDESOnide   0.5 milliGRAM(s) Respule 0.5 milliGRAM(s) Inhalation two times a day  chlorhexidine 2% Cloths 1 Application(s) Topical daily  digoxin     Tablet 0.25 milliGRAM(s) Oral daily  diltiazem    Tablet 120 milliGRAM(s) Oral every 6 hours  filgrastim-sndz Injectable 480 MICROGram(s) SubCutaneous every 24 hours  furosemide   Injectable 40 milliGRAM(s) IV Push every 12 hours  heparin  Injectable 5000 Unit(s) SubCutaneous every 8 hours  influenza   Vaccine 0.5 milliLiter(s) IntraMuscular once  lactobacillus acidophilus 1 Tablet(s) Oral three times a day with meals  loratadine 10 milliGRAM(s) Oral daily  metoprolol succinate  milliGRAM(s) Oral daily  ondansetron  IVPB 10 milliGRAM(s) IV Intermittent once  sodium chloride 1 Gram(s) Oral two times a day  tiotropium 18 MICROgram(s) Capsule 1 Capsule(s) Inhalation daily  verapamil 40 milliGRAM(s) Oral every 8 hours    MEDICATIONS  (PRN):  ALBUTerol    90 MICROgram(s) HFA Inhaler 2 Puff(s) Inhalation every 6 hours PRN Shortness of Breath  guaiFENesin    Syrup 200 milliGRAM(s) Oral every 6 hours PRN Cough  ondansetron  IVPB 10 milliGRAM(s) IV Intermittent every 8 hours PRN Nausea and/or Vomiting  prochlorperazine   Tablet 10 milliGRAM(s) Oral every 6 hours PRN nause and vomiting  promethazine 25 milliGRAM(s) Oral four times a day PRN breakthrough cough      Vital Signs Last 24 Hrs  T(C): 36.8 (14 Jan 2019 04:50), Max: 37.2 (13 Jan 2019 14:17)  T(F): 98.2 (14 Jan 2019 04:50), Max: 98.9 (13 Jan 2019 14:17)  HR: 82 (14 Jan 2019 08:15) (73 - 101)  BP: 102/59 (14 Jan 2019 04:50) (102/59 - 110/69)  RR: 17 (14 Jan 2019 07:41) (17 - 22)  SpO2: 93% (14 Jan 2019 08:15) (92% - 98%)    PHYSICAL EXAM  General: adult in NAD  HEENT: clear oropharynx, anicteric sclera, pink conjunctivae  Neck: supple  CV: normal S1S2 with no murmur rubs or gallops  Lungs:  improved aeration of left lung  Abdomen: soft non-tender non-distended, no hepato/splenomegaly  Ext:  + bilateral LE edema  Skin: no rashes and no petichiae  Neuro: alert and oriented X3 no focal deficits      LABS:                        12.3   27.37 )-----------( 194      ( 14 Jan 2019 07:07 )             38.2     WBC Count: 27.37 K/uL (01-14 @ 07:07)  WBC Count: 52.38 K/uL (01-13 @ 10:52)  WBC Count: 13.86 K/uL (01-12 @ 13:44)  WBC Count: 17.75 K/uL (01-11 @ 09:08)  WBC Count: 17.41 K/uL (01-10 @ 08:39)    01-14    132<L>  |  87<L>  |  21  ----------------------------<  128<H>  4.1   |  35<H>  |  0.59    Ca    7.8<L>      14 Jan 2019 07:07  Mg     1.8     01-14

## 2019-01-14 NOTE — PROGRESS NOTE ADULT - SUBJECTIVE AND OBJECTIVE BOX
NEPHROLOGY INTERVAL HPI/OVERNIGHT EVENTS:  HPI:  pt is a 59yo male with pmhx of htn, copd, cardiac tamponade s/p window 12/2018 c/o cough x days. pt reports non-productive cough with associated dyspnea on exertion. pt reports "it feels like something is stuck in my throat". pt reports bl le swelling without pain. pt recently admitted for pericardial effusion s/p window. pt quit smoking aprox 2 weeks ago.  In ER patient was found to have PNA on CXR.    Follow up hyponatremia/SIADH  No complaints    PAST MEDICAL & SURGICAL HISTORY:  Dyspnea  Mediastinal adenopathy  Pericardial effusion  AF (atrial fibrillation)  Hyponatremia  COPD (chronic obstructive pulmonary disease)  HTN (hypertension)  S/P pericardial window creation      MEDICATIONS  (STANDING):  ALBUTerol/ipratropium for Nebulization 3 milliLiter(s) Nebulizer every 6 hours  buDESOnide   0.5 milliGRAM(s) Respule 0.5 milliGRAM(s) Inhalation two times a day  digoxin     Tablet 0.25 milliGRAM(s) Oral daily  diltiazem    Tablet 120 milliGRAM(s) Oral every 6 hours  furosemide   Injectable 40 milliGRAM(s) IV Push daily  heparin  Injectable 5000 Unit(s) SubCutaneous every 8 hours  influenza   Vaccine 0.5 milliLiter(s) IntraMuscular once  lactobacillus acidophilus 1 Tablet(s) Oral three times a day with meals  loratadine 10 milliGRAM(s) Oral daily  metoprolol succinate  milliGRAM(s) Oral daily  ondansetron  IVPB 10 milliGRAM(s) IV Intermittent once  sodium chloride 1 Gram(s) Oral three times a day  tiotropium 18 MICROgram(s) Capsule 1 Capsule(s) Inhalation daily  verapamil 80 milliGRAM(s) Oral every 8 hours    MEDICATIONS  (PRN):  ALBUTerol    90 MICROgram(s) HFA Inhaler 2 Puff(s) Inhalation every 6 hours PRN Shortness of Breath  ALPRAZolam 0.25 milliGRAM(s) Oral three times a day PRN anxiety  guaiFENesin    Syrup 200 milliGRAM(s) Oral every 6 hours PRN Cough  ondansetron  IVPB 10 milliGRAM(s) IV Intermittent every 8 hours PRN Nausea and/or Vomiting  prochlorperazine   Tablet 10 milliGRAM(s) Oral every 6 hours PRN nause and vomiting  promethazine 25 milliGRAM(s) Oral four times a day PRN breakthrough cough      Allergies    penicillin (Unknown)    Intolerances        Vital Signs Last 24 Hrs  T(C): 36.6 (10 Lisandro 2019 05:39), Max: 37.1 (09 Jan 2019 13:47)  T(F): 97.9 (10 Lisandro 2019 05:39), Max: 98.7 (09 Jan 2019 13:47)  HR: 106 (10 Lisandro 2019 05:39) (85 - 118)  BP: 119/68 (10 Lisandro 2019 05:39) (113/65 - 125/71)  BP(mean): --  RR: 18 (10 Lisandro 2019 05:39) (18 - 18)  SpO2: 96% (10 Lisandro 2019 05:39) (94% - 99%)  Daily     Daily     PHYSICAL EXAM:  GENERAL: no distress, sitting in chair, appears comfortable  HEAD:  Atraumatic, Normocephalic  EYES: Conjunctiva and sclera clear  ENMT: Moist mucous membranes, Good dentition, No lesions  NECK: Supple  NERVOUS SYSTEM:  Alert & Oriented X3, follows commands well  CHEST/LUNG: Scattered wheezing, reduced breath sounds overall  HEART: Regular rate and rhythm; No murmurs, rubs, or gallops  ABDOMEN: Soft, Nontender, Nondistended; Bowel sounds present  EXTREMITIES: trace Edema  LYMPH: No lymphadenopathy noted  SKIN: No rashes or lesions      LABS:  Reviewed

## 2019-01-15 VITALS — OXYGEN SATURATION: 93 %

## 2019-01-15 PROCEDURE — 87486 CHLMYD PNEUM DNA AMP PROBE: CPT

## 2019-01-15 PROCEDURE — 97161 PT EVAL LOW COMPLEX 20 MIN: CPT

## 2019-01-15 PROCEDURE — C1894: CPT

## 2019-01-15 PROCEDURE — 84100 ASSAY OF PHOSPHORUS: CPT

## 2019-01-15 PROCEDURE — 93970 EXTREMITY STUDY: CPT

## 2019-01-15 PROCEDURE — 85027 COMPLETE CBC AUTOMATED: CPT

## 2019-01-15 PROCEDURE — 85730 THROMBOPLASTIN TIME PARTIAL: CPT

## 2019-01-15 PROCEDURE — 88341 IMHCHEM/IMCYTCHM EA ADD ANTB: CPT

## 2019-01-15 PROCEDURE — 87116 MYCOBACTERIA CULTURE: CPT

## 2019-01-15 PROCEDURE — 87070 CULTURE OTHR SPECIMN AEROBIC: CPT

## 2019-01-15 PROCEDURE — 97116 GAIT TRAINING THERAPY: CPT

## 2019-01-15 PROCEDURE — 71046 X-RAY EXAM CHEST 2 VIEWS: CPT

## 2019-01-15 PROCEDURE — 97530 THERAPEUTIC ACTIVITIES: CPT

## 2019-01-15 PROCEDURE — 84550 ASSAY OF BLOOD/URIC ACID: CPT

## 2019-01-15 PROCEDURE — 80048 BASIC METABOLIC PNL TOTAL CA: CPT

## 2019-01-15 PROCEDURE — 82575 CREATININE CLEARANCE TEST: CPT

## 2019-01-15 PROCEDURE — 80053 COMPREHEN METABOLIC PANEL: CPT

## 2019-01-15 PROCEDURE — 99221 1ST HOSP IP/OBS SF/LOW 40: CPT

## 2019-01-15 PROCEDURE — 71045 X-RAY EXAM CHEST 1 VIEW: CPT

## 2019-01-15 PROCEDURE — C1788: CPT

## 2019-01-15 PROCEDURE — 83735 ASSAY OF MAGNESIUM: CPT

## 2019-01-15 PROCEDURE — 99231 SBSQ HOSP IP/OBS SF/LOW 25: CPT

## 2019-01-15 PROCEDURE — 94760 N-INVAS EAR/PLS OXIMETRY 1: CPT

## 2019-01-15 PROCEDURE — 93005 ELECTROCARDIOGRAM TRACING: CPT

## 2019-01-15 PROCEDURE — 80162 ASSAY OF DIGOXIN TOTAL: CPT

## 2019-01-15 PROCEDURE — 90686 IIV4 VACC NO PRSV 0.5 ML IM: CPT

## 2019-01-15 PROCEDURE — 93306 TTE W/DOPPLER COMPLETE: CPT

## 2019-01-15 PROCEDURE — 87040 BLOOD CULTURE FOR BACTERIA: CPT

## 2019-01-15 PROCEDURE — 99285 EMERGENCY DEPT VISIT HI MDM: CPT | Mod: 25

## 2019-01-15 PROCEDURE — 84145 PROCALCITONIN (PCT): CPT

## 2019-01-15 PROCEDURE — 36561 INSERT TUNNELED CV CATH: CPT

## 2019-01-15 PROCEDURE — 83605 ASSAY OF LACTIC ACID: CPT

## 2019-01-15 PROCEDURE — 36415 COLL VENOUS BLD VENIPUNCTURE: CPT

## 2019-01-15 PROCEDURE — 88342 IMHCHEM/IMCYTCHM 1ST ANTB: CPT

## 2019-01-15 PROCEDURE — 88305 TISSUE EXAM BY PATHOLOGIST: CPT

## 2019-01-15 PROCEDURE — 87581 M.PNEUMON DNA AMP PROBE: CPT

## 2019-01-15 PROCEDURE — 99232 SBSQ HOSP IP/OBS MODERATE 35: CPT

## 2019-01-15 PROCEDURE — 85610 PROTHROMBIN TIME: CPT

## 2019-01-15 PROCEDURE — 71275 CT ANGIOGRAPHY CHEST: CPT

## 2019-01-15 PROCEDURE — 77001 FLUOROGUIDE FOR VEIN DEVICE: CPT

## 2019-01-15 PROCEDURE — 87206 SMEAR FLUORESCENT/ACID STAI: CPT

## 2019-01-15 PROCEDURE — 87102 FUNGUS ISOLATION CULTURE: CPT

## 2019-01-15 PROCEDURE — 83880 ASSAY OF NATRIURETIC PEPTIDE: CPT

## 2019-01-15 PROCEDURE — 76937 US GUIDE VASCULAR ACCESS: CPT

## 2019-01-15 PROCEDURE — 82803 BLOOD GASES ANY COMBINATION: CPT

## 2019-01-15 PROCEDURE — 71250 CT THORAX DX C-: CPT

## 2019-01-15 PROCEDURE — 87015 SPECIMEN INFECT AGNT CONCNTJ: CPT

## 2019-01-15 PROCEDURE — 88108 CYTOPATH CONCENTRATE TECH: CPT

## 2019-01-15 PROCEDURE — 94640 AIRWAY INHALATION TREATMENT: CPT

## 2019-01-15 PROCEDURE — 80202 ASSAY OF VANCOMYCIN: CPT

## 2019-01-15 PROCEDURE — 87633 RESP VIRUS 12-25 TARGETS: CPT

## 2019-01-15 RX ORDER — SODIUM CHLORIDE 9 MG/ML
1 INJECTION INTRAMUSCULAR; INTRAVENOUS; SUBCUTANEOUS
Qty: 90 | Refills: 0
Start: 2019-01-15 | End: 2019-02-13

## 2019-01-15 RX ORDER — FUROSEMIDE 40 MG
1 TABLET ORAL
Qty: 90 | Refills: 0
Start: 2019-01-15 | End: 2019-02-13

## 2019-01-15 RX ORDER — VERAPAMIL HCL 240 MG
1 CAPSULE, EXTENDED RELEASE PELLETS 24 HR ORAL
Qty: 30 | Refills: 0 | OUTPATIENT
Start: 2019-01-15 | End: 2019-02-13

## 2019-01-15 RX ORDER — VERAPAMIL HCL 240 MG
40 CAPSULE, EXTENDED RELEASE PELLETS 24 HR ORAL
Qty: 0 | Refills: 0 | Status: DISCONTINUED | OUTPATIENT
Start: 2019-01-15 | End: 2019-01-15

## 2019-01-15 RX ORDER — FUROSEMIDE 40 MG
1 TABLET ORAL
Qty: 0 | Refills: 0 | DISCHARGE
Start: 2019-01-15 | End: 2019-02-13

## 2019-01-15 RX ADMIN — Medication 40 MILLIGRAM(S): at 14:22

## 2019-01-15 RX ADMIN — Medication 3 MILLILITER(S): at 08:04

## 2019-01-15 RX ADMIN — Medication 40 MILLIGRAM(S): at 14:21

## 2019-01-15 RX ADMIN — Medication 40 MILLIGRAM(S): at 05:29

## 2019-01-15 RX ADMIN — SODIUM CHLORIDE 1 GRAM(S): 9 INJECTION INTRAMUSCULAR; INTRAVENOUS; SUBCUTANEOUS at 06:38

## 2019-01-15 RX ADMIN — Medication 3 MILLILITER(S): at 01:26

## 2019-01-15 RX ADMIN — HEPARIN SODIUM 5000 UNIT(S): 5000 INJECTION INTRAVENOUS; SUBCUTANEOUS at 05:28

## 2019-01-15 RX ADMIN — Medication 0.25 MILLIGRAM(S): at 05:29

## 2019-01-15 RX ADMIN — HEPARIN SODIUM 5000 UNIT(S): 5000 INJECTION INTRAVENOUS; SUBCUTANEOUS at 14:22

## 2019-01-15 RX ADMIN — CHLORHEXIDINE GLUCONATE 1 APPLICATION(S): 213 SOLUTION TOPICAL at 11:27

## 2019-01-15 RX ADMIN — Medication 1 TABLET(S): at 11:37

## 2019-01-15 RX ADMIN — Medication 0.5 MILLIGRAM(S): at 08:04

## 2019-01-15 RX ADMIN — LORATADINE 10 MILLIGRAM(S): 10 TABLET ORAL at 11:37

## 2019-01-15 RX ADMIN — INFLUENZA VIRUS VACCINE 0.5 MILLILITER(S): 15; 15; 15; 15 SUSPENSION INTRAMUSCULAR at 14:20

## 2019-01-15 RX ADMIN — Medication 1 TABLET(S): at 08:59

## 2019-01-15 RX ADMIN — Medication 200 MILLIGRAM(S): at 05:28

## 2019-01-15 RX ADMIN — Medication 3 MILLILITER(S): at 13:41

## 2019-01-15 NOTE — PROGRESS NOTE ADULT - PROVIDER SPECIALTY LIST ADULT
Anesthesia
Cardiology
Family Medicine
Heme/Onc
Infectious Disease
Internal Medicine
Internal Medicine
Nephrology
Pulmonology
Cardiology
Cardiology
Pulmonology
Cardiology
Heme/Onc
Heme/Onc
Nephrology
Family Medicine
Infectious Disease
Infectious Disease

## 2019-01-15 NOTE — PROGRESS NOTE ADULT - REASON FOR ADMISSION
sob

## 2019-01-15 NOTE — PROGRESS NOTE ADULT - SUBJECTIVE AND OBJECTIVE BOX
[INTERVAL HX: ]  Patient seen and examined;  Chart reviewed and events noted;   states breathing better  no CP. c/o LE edema    MEDICATIONS  (STANDING):  ALBUTerol/ipratropium for Nebulization 3 milliLiter(s) Nebulizer every 6 hours  buDESOnide   0.5 milliGRAM(s) Respule 0.5 milliGRAM(s) Inhalation two times a day  chlorhexidine 2% Cloths 1 Application(s) Topical daily  digoxin     Tablet 0.25 milliGRAM(s) Oral daily  diltiazem    Tablet 120 milliGRAM(s) Oral every 6 hours  filgrastim-sndz Injectable 480 MICROGram(s) SubCutaneous every 24 hours  furosemide   Injectable 40 milliGRAM(s) IV Push every 8 hours  heparin  Injectable 5000 Unit(s) SubCutaneous every 8 hours  influenza   Vaccine 0.5 milliLiter(s) IntraMuscular once  lactobacillus acidophilus 1 Tablet(s) Oral three times a day with meals  loratadine 10 milliGRAM(s) Oral daily  metoprolol succinate  milliGRAM(s) Oral daily  sodium chloride 1 Gram(s) Oral two times a day  tiotropium 18 MICROgram(s) Capsule 1 Capsule(s) Inhalation daily  verapamil 40 milliGRAM(s) Oral every 8 hours    MEDICATIONS  (PRN):  ALBUTerol    90 MICROgram(s) HFA Inhaler 2 Puff(s) Inhalation every 6 hours PRN Shortness of Breath  guaiFENesin    Syrup 200 milliGRAM(s) Oral every 6 hours PRN Cough  ondansetron  IVPB 10 milliGRAM(s) IV Intermittent every 8 hours PRN Nausea and/or Vomiting  prochlorperazine   Tablet 10 milliGRAM(s) Oral every 6 hours PRN nause and vomiting  promethazine 25 milliGRAM(s) Oral four times a day PRN breakthrough cough      Vital Signs Last 24 Hrs  T(C): 37 (15 Lisandro 2019 12:51), Max: 37 (15 Lisandro 2019 12:51)  T(F): 98.6 (15 Lisandro 2019 12:51), Max: 98.6 (15 Lisandro 2019 12:51)  HR: 89 (15 Lisandro 2019 12:51) (71 - 104)  BP: 112/60 (15 Lisandro 2019 12:51) (105/60 - 114/65)  BP(mean): --  RR: 18 (15 Lisandro 2019 12:51) (16 - 18)  SpO2: 94% (15 Lisandro 2019 12:51) (93% - 99%)    [PHYSICAL EXAM]  General: adult in NAD,  WN,  WD.  HEENT: clear oropharynx, anicteric sclera, pink conjunctivae.  Neck: supple, no masses.  CV: normal S1S2, no murmur, no rubs, no gallops.  Lungs: clear to auscultation, no wheezes, no rales, BL rhonchi.  Abdomen: soft, non-tender, non-distended, no hepatosplenomegaly, normal BS, no guarding.  Ext: no clubbing, no cyanosis, no edema.  Skin: no rashes,  no petechiae, no venous stasis changes.  Neuro: alert and oriented X3, no focal motor deficits.  LN: no STEVE.      [LABS:]                        12.3   27.37 )-----------( 194      ( 14 Jan 2019 07:07 )             38.2     01-14    132<L>  |  87<L>  |  21  ----------------------------<  128<H>  4.1   |  35<H>  |  0.59    Ca    7.8<L>      14 Jan 2019 07:07  Mg     1.8     01-14            [RADIOLOGY STUDIES:]

## 2019-01-15 NOTE — PROGRESS NOTE ADULT - SUBJECTIVE AND OBJECTIVE BOX
[facility] -- Gurpreet Bush, Emily, Corey, Sajan Abebe Savella  Office # [phone]      Follow Up:    SOB, New onset Afib    Subjective/Observations:     No events overnight resting comfortably in bed.  No complaints of chest pain, dyspnea, or palpitations reported. No signs of orthopnea or PND.   REVIEW OF SYSTEMS: All other review of systems is negative unless indicated above    PAST MEDICAL & SURGICAL HISTORY:  Dyspnea  Mediastinal adenopathy  Pericardial effusion  AF (atrial fibrillation)  Hyponatremia  COPD (chronic obstructive pulmonary disease)  HTN (hypertension)  S/P pericardial window creation      MEDICATIONS  (STANDING):  ALBUTerol/ipratropium for Nebulization 3 milliLiter(s) Nebulizer every 6 hours  buDESOnide   0.5 milliGRAM(s) Respule 0.5 milliGRAM(s) Inhalation two times a day  chlorhexidine 2% Cloths 1 Application(s) Topical daily  digoxin     Tablet 0.25 milliGRAM(s) Oral daily  diltiazem    Tablet 120 milliGRAM(s) Oral every 6 hours  filgrastim-sndz Injectable 480 MICROGram(s) SubCutaneous every 24 hours  furosemide   Injectable 40 milliGRAM(s) IV Push every 8 hours  heparin  Injectable 5000 Unit(s) SubCutaneous every 8 hours  influenza   Vaccine 0.5 milliLiter(s) IntraMuscular once  lactobacillus acidophilus 1 Tablet(s) Oral three times a day with meals  loratadine 10 milliGRAM(s) Oral daily  metoprolol succinate  milliGRAM(s) Oral daily  sodium chloride 1 Gram(s) Oral two times a day  tiotropium 18 MICROgram(s) Capsule 1 Capsule(s) Inhalation daily  verapamil 40 milliGRAM(s) Oral every 8 hours    MEDICATIONS  (PRN):  ALBUTerol    90 MICROgram(s) HFA Inhaler 2 Puff(s) Inhalation every 6 hours PRN Shortness of Breath  guaiFENesin    Syrup 200 milliGRAM(s) Oral every 6 hours PRN Cough  ondansetron  IVPB 10 milliGRAM(s) IV Intermittent every 8 hours PRN Nausea and/or Vomiting  prochlorperazine   Tablet 10 milliGRAM(s) Oral every 6 hours PRN nause and vomiting  promethazine 25 milliGRAM(s) Oral four times a day PRN breakthrough cough      Allergies    penicillin (Unknown)    Intolerances        Vital Signs Last 24 Hrs  T(C): 37 (15 Lisandro 2019 12:51), Max: 37 (15 Lisandro 2019 12:51)  T(F): 98.6 (15 Lisandro 2019 12:51), Max: 98.6 (15 Lisandro 2019 12:51)  HR: 71 (15 Lisandro 2019 13:41) (67 - 91)  BP: 112/60 (15 Lisandro 2019 12:51) (105/60 - 112/60)  BP(mean): --  RR: 18 (15 Lisandro 2019 12:51) (16 - 18)  SpO2: 93% (15 Lisandro 2019 13:41) (93% - 99%)    I&O's Summary    14 Jan 2019 07:01  -  15 Lisandro 2019 07:00  --------------------------------------------------------  IN: 200 mL / OUT: 650 mL / NET: -450 mL          PHYSICAL EXAM:  TELE: Off tele   Constitutional: NAD, awake and alert, well-developed  HEENT: Moist Mucous Membranes, Anicteric  Pulmonary: Non-labored, breath sounds coarse w/ wheezes throughout lung fields , No crackles or rhonchi   Cardiovascular: Regular, S1 and S2 nl, No murmurs, rubs, gallops or clicks  Gastrointestinal: Bowel Sounds present, soft, nontender.   Lymph: No lymphadenopathy. + Bilat LE peripheral edema.   Skin: No visible rashes or ulcers.  Psych:  Mood & affect appropriate    LABS: All Labs Reviewed:                        12.3   27.37 )-----------( 194      ( 14 Jan 2019 07:07 )             38.2                         11.7   52.38 )-----------( 215      ( 13 Jan 2019 10:52 )             35.8     14 Jan 2019 07:07    132    |  87     |  21     ----------------------------<  128    4.1     |  35     |  0.59   13 Jan 2019 10:52    135    |  89     |  24     ----------------------------<  93     3.7     |  37     |  0.64     Ca    7.8        14 Jan 2019 07:07  Ca    8.1        13 Jan 2019 10:52  Mg     1.8       14 Jan 2019 07:07  Mg     2.2       13 Jan 2019 10:52               ECG:    Echo:    Radiology:           Jermaine Dave [facility]   Cardiology [facility] -- Gurpreet Bush, Emily, Corey, Sajan Abebe Savella  Office # [phone]      Follow Up:    SOB, New onset Afib    Subjective/Observations:   No events overnight resting comfortably in bed.  No complaints of chest pain, dyspnea, or palpitations reported. No signs of orthopnea or PND.     REVIEW OF SYSTEMS: All other review of systems is negative unless indicated above    PAST MEDICAL & SURGICAL HISTORY:  Dyspnea  Mediastinal adenopathy  Pericardial effusion  AF (atrial fibrillation)  Hyponatremia  COPD (chronic obstructive pulmonary disease)  HTN (hypertension)  S/P pericardial window creation      MEDICATIONS  (STANDING):  ALBUTerol/ipratropium for Nebulization 3 milliLiter(s) Nebulizer every 6 hours  buDESOnide   0.5 milliGRAM(s) Respule 0.5 milliGRAM(s) Inhalation two times a day  chlorhexidine 2% Cloths 1 Application(s) Topical daily  digoxin     Tablet 0.25 milliGRAM(s) Oral daily  diltiazem    Tablet 120 milliGRAM(s) Oral every 6 hours  filgrastim-sndz Injectable 480 MICROGram(s) SubCutaneous every 24 hours  furosemide   Injectable 40 milliGRAM(s) IV Push every 8 hours  heparin  Injectable 5000 Unit(s) SubCutaneous every 8 hours  influenza   Vaccine 0.5 milliLiter(s) IntraMuscular once  lactobacillus acidophilus 1 Tablet(s) Oral three times a day with meals  loratadine 10 milliGRAM(s) Oral daily  metoprolol succinate  milliGRAM(s) Oral daily  sodium chloride 1 Gram(s) Oral two times a day  tiotropium 18 MICROgram(s) Capsule 1 Capsule(s) Inhalation daily  verapamil 40 milliGRAM(s) Oral every 8 hours    MEDICATIONS  (PRN):  ALBUTerol    90 MICROgram(s) HFA Inhaler 2 Puff(s) Inhalation every 6 hours PRN Shortness of Breath  guaiFENesin    Syrup 200 milliGRAM(s) Oral every 6 hours PRN Cough  ondansetron  IVPB 10 milliGRAM(s) IV Intermittent every 8 hours PRN Nausea and/or Vomiting  prochlorperazine   Tablet 10 milliGRAM(s) Oral every 6 hours PRN nause and vomiting  promethazine 25 milliGRAM(s) Oral four times a day PRN breakthrough cough      Allergies    penicillin (Unknown)    Intolerances        Vital Signs Last 24 Hrs  T(C): 37 (15 Lisandro 2019 12:51), Max: 37 (15 Lisandro 2019 12:51)  T(F): 98.6 (15 Lisandro 2019 12:51), Max: 98.6 (15 Lisandro 2019 12:51)  HR: 71 (15 Lisandro 2019 13:41) (67 - 91)  BP: 112/60 (15 Lisandro 2019 12:51) (105/60 - 112/60)  BP(mean): --  RR: 18 (15 Lisandro 2019 12:51) (16 - 18)  SpO2: 93% (15 Lisandro 2019 13:41) (93% - 99%)    I&O's Summary    14 Jan 2019 07:01  -  15 Lisandro 2019 07:00  --------------------------------------------------------  IN: 200 mL / OUT: 650 mL / NET: -450 mL          PHYSICAL EXAM:  TELE: Off tele   Constitutional: NAD, awake and alert, well-developed  HEENT: Moist Mucous Membranes, Anicteric  Pulmonary: Decreased breath sounds b/l. No rales, crackles or wheeze appreciated.   Cardiovascular: Regular, S1 and S2 nl, No murmurs, rubs, gallops or clicks  Gastrointestinal: Bowel Sounds present, soft, nontender.   Lymph: No lymphadenopathy. + Bilat LE peripheral edema.   Skin: No visible rashes or ulcers.  Psych:  Mood & affect appropriate    LABS: All Labs Reviewed:                        12.3   27.37 )-----------( 194      ( 14 Jan 2019 07:07 )             38.2                         11.7   52.38 )-----------( 215      ( 13 Jan 2019 10:52 )             35.8     14 Jan 2019 07:07    132    |  87     |  21     ----------------------------<  128    4.1     |  35     |  0.59   13 Jan 2019 10:52    135    |  89     |  24     ----------------------------<  93     3.7     |  37     |  0.64     Ca    7.8        14 Jan 2019 07:07  Ca    8.1        13 Jan 2019 10:52  Mg     1.8       14 Jan 2019 07:07  Mg     2.2       13 Jan 2019 10:52               ECG:    Echo:    Radiology:           Jermaine Dave [facility]   Cardiology

## 2019-01-15 NOTE — PROGRESS NOTE ADULT - ASSESSMENT
Hyponatremia  SIADH  PNA  Hemoptysis   Pleural effusion  Lung CA      - Known to our group from last hospitalization with SIADH. He was sent home with NaCl tabs. He claimed to be compliant with salt tablets but not fluid restriction. No hypertonic saline needed. S/p tolvaptan 15 mg po x 1   - Renal indices are stable; sodium levels are within a safer range of > 130  - Continue NaCl tabs as ordered. Fluid restriction as ordered; Lasix as per pulmonary   - S/p CTA showing no PE  - S/p Bronch on 1/2/18  - Pulm and Cardio follow up noted  - Abx   - Monitor chemistries  - S/p chemoport; started chemo on 1/9/19    Thank you    Renally stable for DC

## 2019-01-15 NOTE — PROGRESS NOTE ADULT - SUBJECTIVE AND OBJECTIVE BOX
NEPHROLOGY INTERVAL HPI/OVERNIGHT EVENTS:  HPI:  pt is a 59yo male with pmhx of htn, copd, cardiac tamponade s/p window 12/2018 c/o cough x days. pt reports non-productive cough with associated dyspnea on exertion. pt reports "it feels like something is stuck in my throat". pt reports bl le swelling without pain. pt recently admitted for pericardial effusion s/p window. pt quit smoking aprox 2 weeks ago.  In ER patient was found to have PNA on CXR.  Patient is being admitted for further work up and treatment. (31 Dec 2018 21:51)    Followup SIADH  No new complaints; still had mild dyspnea on ambulation    PAST MEDICAL & SURGICAL HISTORY:  Dyspnea  Mediastinal adenopathy  Pericardial effusion  AF (atrial fibrillation)  Hyponatremia  COPD (chronic obstructive pulmonary disease)  HTN (hypertension)  S/P pericardial window creation      MEDICATIONS  (STANDING):  ALBUTerol/ipratropium for Nebulization 3 milliLiter(s) Nebulizer every 6 hours  buDESOnide   0.5 milliGRAM(s) Respule 0.5 milliGRAM(s) Inhalation two times a day  chlorhexidine 2% Cloths 1 Application(s) Topical daily  digoxin     Tablet 0.25 milliGRAM(s) Oral daily  diltiazem    Tablet 120 milliGRAM(s) Oral every 6 hours  filgrastim-sndz Injectable 480 MICROGram(s) SubCutaneous every 24 hours  furosemide   Injectable 40 milliGRAM(s) IV Push every 8 hours  heparin  Injectable 5000 Unit(s) SubCutaneous every 8 hours  influenza   Vaccine 0.5 milliLiter(s) IntraMuscular once  lactobacillus acidophilus 1 Tablet(s) Oral three times a day with meals  loratadine 10 milliGRAM(s) Oral daily  metoprolol succinate  milliGRAM(s) Oral daily  sodium chloride 1 Gram(s) Oral two times a day  tiotropium 18 MICROgram(s) Capsule 1 Capsule(s) Inhalation daily  verapamil 40 milliGRAM(s) Oral every 8 hours    MEDICATIONS  (PRN):  ALBUTerol    90 MICROgram(s) HFA Inhaler 2 Puff(s) Inhalation every 6 hours PRN Shortness of Breath  guaiFENesin    Syrup 200 milliGRAM(s) Oral every 6 hours PRN Cough  ondansetron  IVPB 10 milliGRAM(s) IV Intermittent every 8 hours PRN Nausea and/or Vomiting  prochlorperazine   Tablet 10 milliGRAM(s) Oral every 6 hours PRN nause and vomiting  promethazine 25 milliGRAM(s) Oral four times a day PRN breakthrough cough      Allergies    penicillin (Unknown)    Intolerances        Vital Signs Last 24 Hrs  T(C): 36.7 (15 Lisandro 2019 05:08), Max: 36.8 (14 Jan 2019 13:12)  T(F): 98.1 (15 Lisandro 2019 05:08), Max: 98.3 (14 Jan 2019 13:12)  HR: 76 (15 Lisandro 2019 05:08) (71 - 104)  BP: 110/73 (15 Lisandro 2019 05:08) (105/60 - 114/65)  BP(mean): --  RR: 18 (15 Lisandro 2019 05:08) (16 - 18)  SpO2: 99% (15 Lisandro 2019 05:08) (93% - 99%)  Daily     Daily     PHYSICAL EXAM:  GENERAL: No distress, sitting in chair  HEAD:  Atraumatic, Normocephalic  EYES: Conjunctiva and sclera clear  ENMT: Moist mucous membranes  NECK: Supple  NERVOUS SYSTEM:  Alert & Oriented X3, Good concentration; Motor Strength 5/5 B/L upper and lower extremities; DTRs 2+ intact and symmetric  CHEST/LUNG: Reduced breath sounds B/L  HEART: Regular rate and rhythm; No murmurs, rubs, or gallops  ABDOMEN: Soft, Nontender, Nondistended; Bowel sounds present  EXTREMITIES:  trace edema B/L  SKIN: No rashes or lesions    LABS:                        12.3   27.37 )-----------( 194      ( 14 Jan 2019 07:07 )             38.2     01-14    132<L>  |  87<L>  |  21  ----------------------------<  128<H>  4.1   |  35<H>  |  0.59    Ca    7.8<L>      14 Jan 2019 07:07  Mg     1.8     01-14                RADIOLOGY & ADDITIONAL TESTS:

## 2019-01-15 NOTE — PROGRESS NOTE ADULT - ASSESSMENT
58M pmhx afib diagnosed December 2018 (not on AC), COPD, HTN, cardiac tamponade s/p window 12/2018 discharged from [facility] ~2 weeks for hyponatremia and found to have mediastinal adenopathy and moderate sized pericardial effusion with tamponade physiology with hospital course further complicated by new onset afib with RVR, who presented with worsening SOB and cough and was found to be hyponatremic and have pna. Found to have a malignancy. Patient had bronchoscopy and s/p bronch, patient went into rapid a fib which resolved on own. remains in rapid af now, unclear why not responding appropriately to meds    Recommend:  - s/p RIJ venous cath insertion.  s/p chemo, tolerated it well  - Continue Lasix 40 mg IV q12H for now.  Patient is still diuresing well.   - Continue strict I & O's  - Monitor daily standing weights  - Monitor electrolytes, replete to keep K>4 and Mag>2  - TTE does not reveal recurrent pericardial effusion of any significance  - CxR showed Pna with superimposed intersitial edema with small B/L pleural effusion  - Afib remains rate controlled.  Reducing dose of Verapamil.  Goal is to get him off of it and maintain him on BB, CCB, and Dig and eventually get him off of Dig if we are able  - Continue  Toprol  mg daily  - Continue Cardizem 120 mg po q 6hours  - Continue verapamil to 40 mg q 8 hours   - Cont Digoxin 250 qd.  dig level 1/14 = 0.5.    - Continue tele monitor   - Pulm f/u.  Continue bronchodilators and steroids.  Encourage incentive spirometer  - BP well controlled, monitor routine hemodynamics.  - Monitor and replete lytes, keep K>4, Mg>2.  - Other cardiovascular workup will depend on clinical course.  - All other workup per primary team.  - Will continue to follow.    Jermaine Dave [facility]  Cardiology 58M pmhx afib diagnosed December 2018 (not on AC), COPD, HTN, cardiac tamponade s/p window 12/2018 discharged from [facility] ~2 weeks for hyponatremia and found to have mediastinal adenopathy and moderate sized pericardial effusion with tamponade physiology with hospital course further complicated by new onset afib with RVR, who presented with worsening SOB and cough and was found to be hyponatremic and have pna. Found to have a malignancy. Patient had bronchoscopy and s/p bronch, patient went into rapid a fib which resolved on own. remains in rapid af now, unclear why not responding appropriately to meds    Recommend:    - s/p RIJ venous cath insertion.  s/p chemo, tolerated it well    - Still vol ol.   - Continue Lasix 40 mg IV q8  for now.  Patient is still diuresing well.   - Continue strict I & O's  - Monitor daily standing weights    - Monitor electrolytes, replete to keep K>4 and Mag>2    - TTE does not reveal recurrent pericardial effusion of any significance  - CxR showed Pna with superimposed intersitial edema with small B/L pleural effusion    - Afib remains rate controlled.  Reducing dose of Verapamil.  Goal is to get him off of it and maintain him on BB, CCB, and Dig and eventually get him off of Dig if we are able  - Continue  Toprol  mg daily  - Continue Cardizem 120 mg po q 6hours  - Continue verapamil to 40 mg q 8 hours   - Cont Digoxin 250 qd.  dig level 1/14 = 0.5.    - Continue tele monitor   - Pulm f/u.  Continue bronchodilators and steroids.  Encourage incentive spirometer  - BP well controlled, monitor routine hemodynamics.  - Monitor and replete lytes, keep K>4, Mg>2.  - Other cardiovascular workup will depend on clinical course.  - All other workup per primary team.  - Will continue to follow.    Jermaine Dave [facility]  Cardiology 58M pmhx afib diagnosed December 2018 (not on AC), COPD, HTN, cardiac tamponade s/p window 12/2018 discharged from [facility] ~2 weeks for hyponatremia and found to have mediastinal adenopathy and moderate sized pericardial effusion with tamponade physiology with hospital course further complicated by new onset afib with RVR, who presented with worsening SOB and cough and was found to be hyponatremic and have pna. Found to have a malignancy. Patient had bronchoscopy and s/p bronch, patient went into rapid a fib which resolved on own. remains in rapid af now, unclear why not responding appropriately to meds    Recommend:    - s/p RIJ venous cath insertion.  s/p chemo, tolerated it well    - Still vol ol.   - Continue Lasix 40 mg IV q8  for now.  Patient is still diuresing well. Would consider transitioning to oral lasix 80 mg twice daily at d/c  - Continue strict I & O's  - Monitor daily standing weights    - Monitor electrolytes, replete to keep K>4 and Mag>2    - TTE does not reveal recurrent pericardial effusion of any significance  - CxR showed Pna with superimposed intersitial edema with small B/L pleural effusion    - Afib remains rate controlled.  Reducing dose of Verapamil.  Goal is to get him off of it and maintain him on BB, CCB, and Dig and eventually get him off of Dig if we are able  - Continue  Toprol  mg daily  - Continue Cardizem 120 mg po q 6hours  - decrease  verapamil to 40 mg q 12 hours   - Cont Digoxin 250 qd.  dig level 1/14 = 0.5.    - Continue tele monitor   - Pulm f/u.  Continue bronchodilators and steroids.  Encourage incentive spirometer  - BP well controlled, monitor routine hemodynamics.  - Monitor and replete lytes, keep K>4, Mg>2.  - Other cardiovascular workup will depend on clinical course.  - All other workup per primary team.  - Will continue to follow.    Jermaine Dave [facility]  Cardiology

## 2019-01-15 NOTE — PROGRESS NOTE ADULT - ASSESSMENT
59 y/o man w SOB, bulky mediastinal and hilar LADs, left lung compromise, left effusion, post bronch w pathology sig for small cell, pericardial tamponade, s/p pericardial window but negative cytology and pericardial bx.     Hyponatremia /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 Mild hyponatremia now. BUN 2, Cr 0.6. No change in Cr.   A fib  CT scan Head and MRI brain negative for metastasis.     - Final pathology c/w Small Cell lung cancer; completed cycle 1 of Carbo/Etoposide 01/9/19-01/11/19 followed by growth factor support (Zarxio 480 mcg - today is day #3)  - tolerated treatment well;   - stable for discharge home from oncology standpoint  - to follow up in office this week; also to follow up with Pulmonary   -nebs, steroids per pulm.   - case discussed with Dr. Gusman in detail

## 2019-01-15 NOTE — PROGRESS NOTE ADULT - ATTENDING COMMENTS
Chart reviewed    Patient seen and examined    Agree with plan as outlined above
The patient was personally seen and examined, in addition to being examined and evaluated by NP.  All elements of the note were edited where appropriate.
The patient was personally seen and examined, in addition to being examined and evaluated by NP.  All elements of the note were edited where appropriate.
Chart reviewed    Patient seen and examined    Agree with plan as outlined above
Chart reviewed    Patient seen and examined    Agree with plan as outlined above
I personally saw and examined the patient in detail.  I have spoken to the above provider regarding the assessment and plan of care.  I reviewed the above assessment and plan of care, and agree.  I have made changes in the body of the note where appropriate.
I saw and examined the patient personally. Spoke with above provider regarding this case. I reviewed the above findings completely.  I agree with the above history, physical, and plan which I have edited where appropriate.
Seen/examined. agree with above. HR slightly better controlled on current regimen.
The patient was personally seen and examined, in addition to being examined and evaluated by NP.  All elements of the note were edited where appropriate.
The patient was personally seen and examined, in addition to being examined and evaluated by NP.  All elements of the note were edited where appropriate.
I personally saw and examined the patient in detail.  I have spoken to the above provider regarding the assessment and plan of care.  I reviewed the above assessment and plan of care, and agree.  I have made changes in the body of the note where appropriate.
I saw and examined the patient personally. Spoke with above provider regarding this case. I reviewed the above findings completely.  I agree with the above history, physical, and plan which I have edited where appropriate.
Jorge Hussein MD  [phone]
D/W patient in detail. All Q's answered to the best of my ability.    Thank you for the courtesy of this referral.    I'll sign off at this time.     Jorge Hussein MD  [phone]
DC plan after ONC clearance

## 2019-01-29 PROBLEM — J44.9 CHRONIC OBSTRUCTIVE PULMONARY DISEASE, UNSPECIFIED: Chronic | Status: ACTIVE | Noted: 2018-12-31

## 2019-01-29 PROBLEM — R59.0 LOCALIZED ENLARGED LYMPH NODES: Chronic | Status: ACTIVE | Noted: 2019-01-01

## 2019-01-29 PROBLEM — E87.1 HYPO-OSMOLALITY AND HYPONATREMIA: Chronic | Status: ACTIVE | Noted: 2019-01-01

## 2019-01-29 PROBLEM — I31.3 PERICARDIAL EFFUSION (NONINFLAMMATORY): Chronic | Status: ACTIVE | Noted: 2019-01-01

## 2019-01-29 PROBLEM — I48.91 UNSPECIFIED ATRIAL FIBRILLATION: Chronic | Status: ACTIVE | Noted: 2019-01-01

## 2019-01-29 PROBLEM — R06.00 DYSPNEA, UNSPECIFIED: Chronic | Status: ACTIVE | Noted: 2019-01-01

## 2019-02-02 LAB
CULTURE RESULTS: SIGNIFICANT CHANGE UP
CULTURE RESULTS: SIGNIFICANT CHANGE UP
SPECIMEN SOURCE: SIGNIFICANT CHANGE UP
SPECIMEN SOURCE: SIGNIFICANT CHANGE UP

## 2019-02-04 ENCOUNTER — INPATIENT (INPATIENT)
Facility: HOSPITAL | Age: 59
LOS: 5 days | Discharge: HOME | DRG: 181 | End: 2019-02-10
Attending: FAMILY MEDICINE | Admitting: FAMILY MEDICINE
Payer: COMMERCIAL

## 2019-02-04 VITALS
WEIGHT: 244.93 LBS | RESPIRATION RATE: 20 BRPM | TEMPERATURE: 98 F | DIASTOLIC BLOOD PRESSURE: 66 MMHG | SYSTOLIC BLOOD PRESSURE: 113 MMHG | HEART RATE: 90 BPM | OXYGEN SATURATION: 94 % | HEIGHT: 73 IN

## 2019-02-04 DIAGNOSIS — Z98.890 OTHER SPECIFIED POSTPROCEDURAL STATES: Chronic | ICD-10-CM

## 2019-02-04 LAB
ALBUMIN SERPL ELPH-MCNC: 2.7 G/DL — LOW (ref 3.3–5)
ALP SERPL-CCNC: 97 U/L — SIGNIFICANT CHANGE UP (ref 40–120)
ALT FLD-CCNC: 22 U/L — SIGNIFICANT CHANGE UP (ref 12–78)
ANION GAP SERPL CALC-SCNC: 10 MMOL/L — SIGNIFICANT CHANGE UP (ref 5–17)
AST SERPL-CCNC: 19 U/L — SIGNIFICANT CHANGE UP (ref 15–37)
BILIRUB SERPL-MCNC: 0.9 MG/DL — SIGNIFICANT CHANGE UP (ref 0.2–1.2)
BUN SERPL-MCNC: 11 MG/DL — SIGNIFICANT CHANGE UP (ref 7–23)
CALCIUM SERPL-MCNC: 8 MG/DL — LOW (ref 8.5–10.1)
CHLORIDE SERPL-SCNC: 95 MMOL/L — LOW (ref 96–108)
CO2 SERPL-SCNC: 28 MMOL/L — SIGNIFICANT CHANGE UP (ref 22–31)
CREAT SERPL-MCNC: 0.69 MG/DL — SIGNIFICANT CHANGE UP (ref 0.5–1.3)
D DIMER BLD IA.RAPID-MCNC: 456 NG/ML DDU — HIGH
GLUCOSE SERPL-MCNC: 132 MG/DL — HIGH (ref 70–99)
HCT VFR BLD CALC: 31.9 % — LOW (ref 39–50)
HGB BLD-MCNC: 10.4 G/DL — LOW (ref 13–17)
INR BLD: 1.39 RATIO — HIGH (ref 0.88–1.16)
MCHC RBC-ENTMCNC: 26 PG — LOW (ref 27–34)
MCHC RBC-ENTMCNC: 32.6 GM/DL — SIGNIFICANT CHANGE UP (ref 32–36)
MCV RBC AUTO: 79.8 FL — LOW (ref 80–100)
NRBC # BLD: 0 /100 WBCS — SIGNIFICANT CHANGE UP (ref 0–0)
NT-PROBNP SERPL-SCNC: 909 PG/ML — HIGH (ref 0–125)
PLATELET # BLD AUTO: 291 K/UL — SIGNIFICANT CHANGE UP (ref 150–400)
POTASSIUM SERPL-MCNC: 3.1 MMOL/L — LOW (ref 3.5–5.3)
POTASSIUM SERPL-SCNC: 3.1 MMOL/L — LOW (ref 3.5–5.3)
PROT SERPL-MCNC: 6.2 G/DL — SIGNIFICANT CHANGE UP (ref 6–8.3)
PROTHROM AB SERPL-ACNC: 15.9 SEC — HIGH (ref 10–12.9)
RBC # BLD: 4 M/UL — LOW (ref 4.2–5.8)
RBC # FLD: 14.8 % — HIGH (ref 10.3–14.5)
SODIUM SERPL-SCNC: 133 MMOL/L — LOW (ref 135–145)
TROPONIN I SERPL-MCNC: <.015 NG/ML — SIGNIFICANT CHANGE UP (ref 0.01–0.04)
WBC # BLD: 14.04 K/UL — HIGH (ref 3.8–10.5)
WBC # FLD AUTO: 14.04 K/UL — HIGH (ref 3.8–10.5)

## 2019-02-04 PROCEDURE — 71045 X-RAY EXAM CHEST 1 VIEW: CPT | Mod: 26

## 2019-02-04 PROCEDURE — 93010 ELECTROCARDIOGRAM REPORT: CPT

## 2019-02-04 RX ORDER — POTASSIUM CHLORIDE 20 MEQ
40 PACKET (EA) ORAL ONCE
Qty: 0 | Refills: 0 | Status: COMPLETED | OUTPATIENT
Start: 2019-02-04 | End: 2019-02-04

## 2019-02-04 RX ORDER — IPRATROPIUM/ALBUTEROL SULFATE 18-103MCG
3 AEROSOL WITH ADAPTER (GRAM) INHALATION ONCE
Qty: 0 | Refills: 0 | Status: COMPLETED | OUTPATIENT
Start: 2019-02-04 | End: 2019-02-04

## 2019-02-04 RX ADMIN — Medication 40 MILLIEQUIVALENT(S): at 23:35

## 2019-02-04 NOTE — ED PROVIDER NOTE - MEDICAL DECISION MAKING DETAILS
pt with lung ca, hx copd, pt pw 4 days progressive sob, orthopnea, no cp, pt with recent hx pericardial effusion and pericardial window, labs with wbc 14, cxr indeterminate, probnp low, check ct chest ro pn, increased masses, pe,  admit tele, abx if infiltrate pt with lung ca, hx copd, pt pw 4 days progressive sob, orthopnea, no cp, pt with recent hx pericardial effusion and pericardial window, labs with wbc 14, cxr indeterminate, probnp low, check ct chest ro pn, increased masses, pe,  admit tele, abx if infiltrate--+ ground glass, tx levoflox

## 2019-02-04 NOTE — ED ADULT NURSE NOTE - OBJECTIVE STATEMENT
Pt complaining of shortness of breath, for a few days, states that he is being treated for lung cancer. Also complains of leg swelling, and orthopnea. No pain at this time.

## 2019-02-04 NOTE — ED ADULT NURSE NOTE - NSIMPLEMENTINTERV_GEN_ALL_ED
Implemented All Universal Safety Interventions:  [place] to call system. Call bell, personal items and telephone within reach. Instruct patient to call for assistance. Room bathroom lighting operational. Non-slip footwear when patient is off stretcher. Physically safe environment: no spills, clutter or unnecessary equipment. Stretcher in lowest position, wheels locked, appropriate side rails in place.

## 2019-02-04 NOTE — ED PROVIDER NOTE - CONSTITUTIONAL, MLM
normal... chronicall ill, well nourished, awake, alert, oriented to person, place, time/situation and in mild distress

## 2019-02-04 NOTE — ED PROVIDER NOTE - OBJECTIVE STATEMENT
Pt is a 57 yo male hx small cell lung ca, sp chemo on 1/14--due for chemo in am. pt pw 4 days of  progressive sob, cough, no cp, n/v, +b/l edema sp 2 dopplers neg over past month. pt also with hx pericardial window in dec for effusion.  pt states + orthopnea, no f/c, sputum, or dizziness.    pmd: stone  pulm; fabi  onc: lisa  cards: megan  nephro; viviana

## 2019-02-04 NOTE — ED ADULT NURSE NOTE - PMH
AF (atrial fibrillation)    COPD (chronic obstructive pulmonary disease)    Dyspnea    HTN (hypertension)    Hyponatremia    Mediastinal adenopathy    Pericardial effusion

## 2019-02-04 NOTE — ED PROVIDER NOTE - CARE PLAN
Principal Discharge DX:	Shortness of breath at rest Principal Discharge DX:	Shortness of breath at rest  Secondary Diagnosis:	Pleural effusion  Secondary Diagnosis:	Lung cancer  Secondary Diagnosis:	Pneumonia

## 2019-02-05 DIAGNOSIS — R09.89 OTHER SPECIFIED SYMPTOMS AND SIGNS INVOLVING THE CIRCULATORY AND RESPIRATORY SYSTEMS: ICD-10-CM

## 2019-02-05 DIAGNOSIS — Z29.9 ENCOUNTER FOR PROPHYLACTIC MEASURES, UNSPECIFIED: ICD-10-CM

## 2019-02-05 DIAGNOSIS — C34.90 MALIGNANT NEOPLASM OF UNSPECIFIED PART OF UNSPECIFIED BRONCHUS OR LUNG: ICD-10-CM

## 2019-02-05 DIAGNOSIS — J90 PLEURAL EFFUSION, NOT ELSEWHERE CLASSIFIED: ICD-10-CM

## 2019-02-05 DIAGNOSIS — R06.02 SHORTNESS OF BREATH: ICD-10-CM

## 2019-02-05 LAB
ANION GAP SERPL CALC-SCNC: 8 MMOL/L — SIGNIFICANT CHANGE UP (ref 5–17)
BUN SERPL-MCNC: 10 MG/DL — SIGNIFICANT CHANGE UP (ref 7–23)
CALCIUM SERPL-MCNC: 8.4 MG/DL — LOW (ref 8.5–10.1)
CHLORIDE SERPL-SCNC: 95 MMOL/L — LOW (ref 96–108)
CO2 SERPL-SCNC: 32 MMOL/L — HIGH (ref 22–31)
CREAT SERPL-MCNC: 0.56 MG/DL — SIGNIFICANT CHANGE UP (ref 0.5–1.3)
DIGOXIN SERPL-MCNC: 1 NG/ML — SIGNIFICANT CHANGE UP (ref 0.8–2)
FERRITIN SERPL-MCNC: 610 NG/ML — HIGH (ref 30–400)
GLUCOSE SERPL-MCNC: 110 MG/DL — HIGH (ref 70–99)
HBA1C BLD-MCNC: 6.4 % — HIGH (ref 4–5.6)
HCT VFR BLD CALC: 34.4 % — LOW (ref 39–50)
HGB BLD-MCNC: 11.2 G/DL — LOW (ref 13–17)
IRON SATN MFR SERPL: 13 % — LOW (ref 16–55)
IRON SATN MFR SERPL: 24 UG/DL — LOW (ref 45–165)
MAGNESIUM SERPL-MCNC: 1.9 MG/DL — SIGNIFICANT CHANGE UP (ref 1.6–2.6)
MCHC RBC-ENTMCNC: 25.7 PG — LOW (ref 27–34)
MCHC RBC-ENTMCNC: 32.6 GM/DL — SIGNIFICANT CHANGE UP (ref 32–36)
MCV RBC AUTO: 78.9 FL — LOW (ref 80–100)
NRBC # BLD: 0 /100 WBCS — SIGNIFICANT CHANGE UP (ref 0–0)
PLATELET # BLD AUTO: 325 K/UL — SIGNIFICANT CHANGE UP (ref 150–400)
POTASSIUM SERPL-MCNC: 3.1 MMOL/L — LOW (ref 3.5–5.3)
POTASSIUM SERPL-SCNC: 3.1 MMOL/L — LOW (ref 3.5–5.3)
PROCALCITONIN SERPL-MCNC: 0.16 NG/ML — HIGH (ref 0–0.04)
RBC # BLD: 4.36 M/UL — SIGNIFICANT CHANGE UP (ref 4.2–5.8)
RBC # FLD: 14.9 % — HIGH (ref 10.3–14.5)
SODIUM SERPL-SCNC: 135 MMOL/L — SIGNIFICANT CHANGE UP (ref 135–145)
T3 SERPL-MCNC: 111 NG/DL — SIGNIFICANT CHANGE UP (ref 80–200)
T4 AB SER-ACNC: 7.2 UG/DL — SIGNIFICANT CHANGE UP (ref 4.6–12)
TIBC SERPL-MCNC: 180 UG/DL — LOW (ref 220–430)
TSH SERPL-MCNC: 0.88 UIU/ML — SIGNIFICANT CHANGE UP (ref 0.36–3.74)
UIBC SERPL-MCNC: 156 UG/DL — SIGNIFICANT CHANGE UP (ref 110–370)
WBC # BLD: 13.39 K/UL — HIGH (ref 3.8–10.5)
WBC # FLD AUTO: 13.39 K/UL — HIGH (ref 3.8–10.5)

## 2019-02-05 PROCEDURE — 93970 EXTREMITY STUDY: CPT | Mod: 26

## 2019-02-05 PROCEDURE — 71275 CT ANGIOGRAPHY CHEST: CPT | Mod: 26

## 2019-02-05 PROCEDURE — 99285 EMERGENCY DEPT VISIT HI MDM: CPT

## 2019-02-05 RX ORDER — ASPIRIN/CALCIUM CARB/MAGNESIUM 324 MG
81 TABLET ORAL DAILY
Qty: 0 | Refills: 0 | Status: DISCONTINUED | OUTPATIENT
Start: 2019-02-05 | End: 2019-02-10

## 2019-02-05 RX ORDER — BUDESONIDE, MICRONIZED 100 %
0.5 POWDER (GRAM) MISCELLANEOUS
Qty: 0 | Refills: 0 | Status: DISCONTINUED | OUTPATIENT
Start: 2019-02-05 | End: 2019-02-10

## 2019-02-05 RX ORDER — FUROSEMIDE 40 MG
40 TABLET ORAL THREE TIMES A DAY
Qty: 0 | Refills: 0 | Status: DISCONTINUED | OUTPATIENT
Start: 2019-02-05 | End: 2019-02-05

## 2019-02-05 RX ORDER — DIGOXIN 250 MCG
0.25 TABLET ORAL DAILY
Qty: 0 | Refills: 0 | Status: DISCONTINUED | OUTPATIENT
Start: 2019-02-05 | End: 2019-02-10

## 2019-02-05 RX ORDER — HEPARIN SODIUM 5000 [USP'U]/ML
5000 INJECTION INTRAVENOUS; SUBCUTANEOUS EVERY 12 HOURS
Qty: 0 | Refills: 0 | Status: DISCONTINUED | OUTPATIENT
Start: 2019-02-05 | End: 2019-02-07

## 2019-02-05 RX ORDER — MAGNESIUM OXIDE 400 MG ORAL TABLET 241.3 MG
400 TABLET ORAL
Qty: 0 | Refills: 0 | Status: DISCONTINUED | OUTPATIENT
Start: 2019-02-05 | End: 2019-02-10

## 2019-02-05 RX ORDER — VERAPAMIL HCL 240 MG
120 CAPSULE, EXTENDED RELEASE PELLETS 24 HR ORAL DAILY
Qty: 0 | Refills: 0 | Status: DISCONTINUED | OUTPATIENT
Start: 2019-02-05 | End: 2019-02-06

## 2019-02-05 RX ORDER — FUROSEMIDE 40 MG
40 TABLET ORAL EVERY 8 HOURS
Qty: 0 | Refills: 0 | Status: DISCONTINUED | OUTPATIENT
Start: 2019-02-05 | End: 2019-02-06

## 2019-02-05 RX ORDER — LORATADINE 10 MG/1
10 TABLET ORAL DAILY
Qty: 0 | Refills: 0 | Status: DISCONTINUED | OUTPATIENT
Start: 2019-02-05 | End: 2019-02-10

## 2019-02-05 RX ORDER — DILTIAZEM HCL 120 MG
300 CAPSULE, EXT RELEASE 24 HR ORAL DAILY
Qty: 0 | Refills: 0 | Status: DISCONTINUED | OUTPATIENT
Start: 2019-02-05 | End: 2019-02-10

## 2019-02-05 RX ORDER — SODIUM CHLORIDE 9 MG/ML
1 INJECTION INTRAMUSCULAR; INTRAVENOUS; SUBCUTANEOUS DAILY
Qty: 0 | Refills: 0 | Status: DISCONTINUED | OUTPATIENT
Start: 2019-02-05 | End: 2019-02-07

## 2019-02-05 RX ORDER — TIOTROPIUM BROMIDE 18 UG/1
1 CAPSULE ORAL; RESPIRATORY (INHALATION) DAILY
Qty: 0 | Refills: 0 | Status: DISCONTINUED | OUTPATIENT
Start: 2019-02-05 | End: 2019-02-10

## 2019-02-05 RX ORDER — POTASSIUM CHLORIDE 20 MEQ
40 PACKET (EA) ORAL EVERY 4 HOURS
Qty: 0 | Refills: 0 | Status: COMPLETED | OUTPATIENT
Start: 2019-02-05 | End: 2019-02-05

## 2019-02-05 RX ORDER — IPRATROPIUM/ALBUTEROL SULFATE 18-103MCG
3 AEROSOL WITH ADAPTER (GRAM) INHALATION EVERY 6 HOURS
Qty: 0 | Refills: 0 | Status: DISCONTINUED | OUTPATIENT
Start: 2019-02-05 | End: 2019-02-10

## 2019-02-05 RX ORDER — POTASSIUM CHLORIDE 20 MEQ
20 PACKET (EA) ORAL DAILY
Qty: 0 | Refills: 0 | Status: DISCONTINUED | OUTPATIENT
Start: 2019-02-05 | End: 2019-02-05

## 2019-02-05 RX ORDER — METOPROLOL TARTRATE 50 MG
200 TABLET ORAL DAILY
Qty: 0 | Refills: 0 | Status: DISCONTINUED | OUTPATIENT
Start: 2019-02-05 | End: 2019-02-08

## 2019-02-05 RX ADMIN — Medication 3 MILLILITER(S): at 21:17

## 2019-02-05 RX ADMIN — MAGNESIUM OXIDE 400 MG ORAL TABLET 400 MILLIGRAM(S): 241.3 TABLET ORAL at 17:56

## 2019-02-05 RX ADMIN — Medication 81 MILLIGRAM(S): at 12:37

## 2019-02-05 RX ADMIN — Medication 300 MILLIGRAM(S): at 08:59

## 2019-02-05 RX ADMIN — Medication 0.5 MILLIGRAM(S): at 21:17

## 2019-02-05 RX ADMIN — SODIUM CHLORIDE 1 GRAM(S): 9 INJECTION INTRAMUSCULAR; INTRAVENOUS; SUBCUTANEOUS at 12:37

## 2019-02-05 RX ADMIN — Medication 3 MILLILITER(S): at 10:32

## 2019-02-05 RX ADMIN — Medication 0.25 MILLIGRAM(S): at 08:59

## 2019-02-05 RX ADMIN — Medication 0.5 MILLIGRAM(S): at 10:33

## 2019-02-05 RX ADMIN — Medication 200 MILLIGRAM(S): at 21:37

## 2019-02-05 RX ADMIN — Medication 40 MILLIGRAM(S): at 21:38

## 2019-02-05 RX ADMIN — Medication 1 TABLET(S): at 12:37

## 2019-02-05 RX ADMIN — HEPARIN SODIUM 5000 UNIT(S): 5000 INJECTION INTRAVENOUS; SUBCUTANEOUS at 17:56

## 2019-02-05 RX ADMIN — Medication 3 MILLILITER(S): at 15:33

## 2019-02-05 RX ADMIN — LORATADINE 10 MILLIGRAM(S): 10 TABLET ORAL at 12:37

## 2019-02-05 RX ADMIN — Medication 40 MILLIEQUIVALENT(S): at 09:56

## 2019-02-05 RX ADMIN — Medication 3 MILLILITER(S): at 00:07

## 2019-02-05 RX ADMIN — Medication 40 MILLIEQUIVALENT(S): at 12:38

## 2019-02-05 RX ADMIN — Medication 40 MILLIGRAM(S): at 13:23

## 2019-02-05 RX ADMIN — MAGNESIUM OXIDE 400 MG ORAL TABLET 400 MILLIGRAM(S): 241.3 TABLET ORAL at 12:37

## 2019-02-05 NOTE — CONSULT NOTE ADULT - SUBJECTIVE AND OBJECTIVE BOX
All records reviewed.    HPI:  57 y/o man w hx HTN, COPD, A fib, cardiac tamponade post window 12/2018, progressive PARKS/orthopnea/cough led to bronch 1/2/19 sig for small cell ca of lung, CT chest leona effusions, sig mediastinal/hilar LADs w compromisse of leona mainstem bronchi/left bronchi/PETER infiltrates. Started on chemo w Carbo/VP16 1/9/19 during hospitalization.  MRI head negative, but unable to have PETCt or CT a/p for full staging due to respiratory status.  Respiratory sx improved after first cycle chemo but over weekend again w incr PARKS, came to ER.  CT angio here no PE, increased leona effusions, less left lung infiltrate, persistence of the lymphadnopathies      PAST MEDICAL & SURGICAL HISTORY:  Dyspnea  Mediastinal adenopathy  Pericardial effusion  AF (atrial fibrillation)  Hyponatremia  COPD (chronic obstructive pulmonary disease)  HTN (hypertension)  S/P pericardial window creation      Review of System:  see aboe  also stable leona legs edema and hoarseness    MEDICATIONS  (STANDING):  ALBUTerol/ipratropium for Nebulization 3 milliLiter(s) Nebulizer every 6 hours  aspirin  chewable 81 milliGRAM(s) Oral daily  buDESOnide   0.5 milliGRAM(s) Respule 0.5 milliGRAM(s) Inhalation two times a day  digoxin     Tablet 0.25 milliGRAM(s) Oral daily  diltiazem    milliGRAM(s) Oral daily  furosemide   Injectable 40 milliGRAM(s) IV Push every 8 hours  heparin  Injectable 5000 Unit(s) SubCutaneous every 12 hours  loratadine 10 milliGRAM(s) Oral daily  magnesium oxide 400 milliGRAM(s) Oral three times a day with meals  metoprolol succinate  milliGRAM(s) Oral daily  multivitamin 1 Tablet(s) Oral daily  sodium chloride 1 Gram(s) Oral daily  tiotropium 18 MICROgram(s) Capsule 1 Capsule(s) Inhalation daily  verapamil 120 milliGRAM(s) Oral daily    MEDICATIONS  (PRN):  guaiFENesin    Syrup 100 milliGRAM(s) Oral every 6 hours PRN Cough      Allergies    penicillin (Unknown)    Intolerances        SOCIAL HISTORY:  former smoker stopped 12/2018, 40pack-year    FAMILY HISTORY:  brother pituitary tumor      Vital Signs Last 24 Hrs  T(C): 36.8 (05 Feb 2019 12:42), Max: 37.1 (05 Feb 2019 06:40)  T(F): 98.3 (05 Feb 2019 12:42), Max: 98.7 (05 Feb 2019 06:40)  HR: 103 (05 Feb 2019 12:42) (80 - 110)  BP: 107/62 (05 Feb 2019 12:42) (107/62 - 125/66)  BP(mean): --  RR: 18 (05 Feb 2019 12:42) (18 - 20)  SpO2: 98% (05 Feb 2019 12:42) (94% - 99%)    PHYSICAL EXAM:      General:well developed well nourished, in no acute distress, sitting up in ER stretcher  Eyes:sclera anicteric, pupils equal and reactive to light  ENMT:buccal mucosa moist, pharynx not injected  Neck:supple, trachea midline  Lungs:leona wheezes but good air movements  Cardiovascular:regular rate and rhythm, S1 S2  Abdomen:soft, nontender, no organomegaly present, bowel sounds normal  Neurological:alert and oriented x3, Cranial Nerves II-XII grossly intact  Skin:no increased ecchymosis/petechiae/purpura  Lymph Nodes:no palpable cervical/supraclavicular lymph nodes enlargements  Extremities:1+ edema to 1/2 up leona lower legs        LABS:                        11.2   13.39 )-----------( 325      ( 05 Feb 2019 08:03 )             34.4     02-05 @ 08:03  WBC13.39  RBC4.36 Hgb11.2 Hct34.4  MCV78.9  [id number]  Auto Neutro-- Band-- Auto Lymph-- Atypical Lymph-- Reactive Lymph-- Auto Mono-- Auto Eos-- Auto Baso--        02-04 @ 22:21  WBC14.04  RBC4.00 Hgb10.4 Hct31.9  MCV79.8  [id number]  Auto Neutro-- Band-- Auto Lymph-- Atypical Lymph-- Reactive Lymph-- Auto Mono-- Auto Eos-- Auto Baso--          02-05    135  |  95<L>  |  10  ----------------------------<  110<H>  3.1<L>   |  32<H>  |  0.56    Ca    8.4<L>      05 Feb 2019 07:56  Mg     1.9     02-05    TPro  6.2  /  Alb  2.7<L>  /  TBili  0.9  /  DBili  x   /  AST  19  /  ALT  22  /  AlkPhos  97  02-04    02-04 @ 22:21  PT15.9 INR1.39  PTT--        PERIPHERAL BLOOD SMEAR REVIEW:      RADIOLOGY & ADDITIONAL STUDIES:  < from: CT Angio Chest w/ IV Cont (02.05.19 @ 01:37) >  EXAM:  CT ANGIO CHEST (W)AW IC                            PROCEDURE DATE:  02/05/2019          INTERPRETATION:  CLINICAL INFORMATION: Lung cancer, increased shortness   of breath and elevated d-dimer, assess PE.     PROCEDURE: CT angiography of thechest was performed with intravenous   contrast utilizing dedicated PE protocol. 95 mls of Omnipaque-350   administered without complication. 5 ml discarded. Coronal and sagittal   reconstruction images were obtained. Axial MIP images were obtained from   a separate workstation.      COMPARISON: 1/14/2019.    FINDINGS: The patient's respiratory motion degrades images.    LUNGS AND AIRWAYS: Again noted, narrow the central airways by   mediastinal/hilar lymphadenopathy. Centrilobular and paraseptal  emphysema. Decreased patchy/groundglass opacities in the left upper   lobe/lingula since prior study.   PLEURA: No pneumothorax. Small to moderate bilateral pleural effusion,   increased since prior study.  HEART: Normal size. Small pericardial effusion. Left atrium surrounded by   the mediastinal/hilar lymphadenopathy.  VESSELS: Suboptimal contrast opacification of the peripheral pulmonary   arteries. No obvious central pulmonary embolus. No secondary signs of   right heart strain. Atherosclerotic change of the thoracic aorta, great   vessel origin and coronary arteries. Major intrathoracic vessels encased   by the mediastinal/hilar lymphadenopathy.  CHEST WALL AND LOWER NECK: Again noted, heterogeneous thyroid gland.   Right jugular approach Mediport terminating at the cavoatrial junction.  MEDIASTINUM AND NELLA: Again noted, mediastinal/hilar lymphadenopathy.   UPPER ABDOMEN: Nonspecific bilateral perinephric stranding.   BONES: Again noted, heterogeneous pattern of bone mineralization.   Degenerative changes of the spine. Stable anterior wedging/decreased   vertebral body height in the thoracic spine.    IMPRESSION:    Suboptimal evaluation of the peripheral pulmonary arteries. No central   pulmonary embolus or secondary signs ofright heart strain.    Known mediastinal/hilar lymphadenopathy encasing the major intrathoracic   vessels/left atrium and narrowing the central airways.    Decreased patchy/groundglass opacities in the left upper lobe/lingula   since 1/4/2019.  Increased, bilateral pleural effusion with compressive   atelectasis. Recommend clinical correlation to assess underlying   pneumonia.    Heterogeneous thyroid gland, which can be further characterized on a   nonemergent ultrasound.    Heterogeneous pattern of bone mineralization, which may be due to   osteopenia although marrow infiltrative/replacing process cannot be   excluded. Recommend clinical correlation additional imaging (MRI and/or   bone scan) as clinically indicated.    < end of copied text >

## 2019-02-05 NOTE — CONSULT NOTE ADULT - PROBLEM SELECTOR RECOMMENDATION 9
Most concerning for viral in terms of possible infectious etiologies driving this presentation. With no significant new airspace disease and other features discussed with Dr Gusman my recommendation send off proper RVP and observe off abx

## 2019-02-05 NOTE — H&P ADULT - NSHPPHYSICALEXAM_GEN_ALL_CORE
· Physical Examination: no jvd  · CONSTITUTIONAL: chronicall ill, well nourished, awake, alert, oriented to person, place, time/situation and in mild distress  · ENMT: Airway patent, Nasal mucosa clear. Mouth with normal mucosa. Throat has no vesicles, no oropharyngeal exudates and uvula is midline.  · EYES: Clear bilaterally, pupils equal, round and reactive to light.  · CARDIAC: Normal rate, regular rhythm.  Heart sounds S1, S2.  No murmurs, rubs or gallops.  · RESPIRATORY: lungs with b/l rhonchi, no wheeze no rales  · GASTROINTESTINAL: Abdomen soft, non-tender, no guarding.  · MUSCULOSKELETAL: Spine appears normal, range of motion is not limited, no muscle or joint tenderness  · NEUROLOGICAL: Alert and oriented, no focal deficits, no motor or sensory deficits.  · SKIN: Skin normal color for race, warm, dry and intact. No evidence of rash.  · PSYCHIATRIC: Alert and oriented to person, place, time/situation. normal mood and affect. no apparent risk to self or others.  · HEME LYMPH: No adenopathy or splenomegaly. No cervical or inguinal lymphadenopathy.

## 2019-02-05 NOTE — H&P ADULT - HISTORY OF PRESENT ILLNESS
Pt is a 59 yo male hx small cell lung ca, sp chemo on 1/14--due for chemo in am. pt pw 4 days of  progressive sob, cough, no cp, n/v, +b/l edema sp 2 dopplers neg over past month. pt also with hx pericardial window in dec for effusion.  pt states + orthopnea, no f/c, sputum, or dizziness.  In ER patient was found to have pleural effusion.  Patient is being admitted for further work up and treatment including possible chemotherapy.

## 2019-02-05 NOTE — H&P ADULT - NSHPREVIEWOFSYSTEMS_GEN_ALL_CORE
· CONSTITUTIONAL: no fever and no chills.  · CARDIOVASCULAR: - - -  · Cardiovascular [+]: PERIPHERAL EDEMA  · RESPIRATORY: - - -  · Respiratory [+]: COUGH, SHORTNESS OF BREATH  · ROS STATEMENT: all other ROS negative except as per HPI

## 2019-02-05 NOTE — H&P ADULT - PMH
AF (atrial fibrillation)    COPD (chronic obstructive pulmonary disease)    Dyspnea    HTN (hypertension)    Hyponatremia    Mediastinal adenopathy    Pericardial effusion AF (atrial fibrillation)    COPD (chronic obstructive pulmonary disease)    Depression    Dyspnea    HTN (hypertension)    Hyponatremia    Lung cancer    Mediastinal adenopathy    Pericardial effusion

## 2019-02-05 NOTE — CONSULT NOTE ADULT - SUBJECTIVE AND OBJECTIVE BOX
HPI:57 yo male hx small cell lung ca, sp chemo on 1/14--due for chemo in am. Comes to [facility] with 4 days of  progressive sob, cough, no cp, n/v, +b/l edema sp 2 dopplers neg over past month. pt also with hx pericardial window in dec for effusion.  pt states + orthopnea, no f/c, sputum, or dizziness.    Bala does report that this is an abrupt change from prior and was not a gradual decline.      PAST MEDICAL & SURGICAL HISTORY:  Dyspnea  Mediastinal adenopathy  Pericardial effusion  AF (atrial fibrillation)  Hyponatremia  COPD (chronic obstructive pulmonary disease)  HTN (hypertension)  S/P pericardial window creation      Antimicrobials      Immunological      Other  ALBUTerol/ipratropium for Nebulization 3 milliLiter(s) Nebulizer every 6 hours  aspirin  chewable 81 milliGRAM(s) Oral daily  buDESOnide   0.5 milliGRAM(s) Respule 0.5 milliGRAM(s) Inhalation two times a day  digoxin     Tablet 0.25 milliGRAM(s) Oral daily  diltiazem    milliGRAM(s) Oral daily  furosemide   Injectable 40 milliGRAM(s) IV Push every 8 hours  guaiFENesin    Syrup 100 milliGRAM(s) Oral every 6 hours PRN  heparin  Injectable 5000 Unit(s) SubCutaneous every 12 hours  loratadine 10 milliGRAM(s) Oral daily  magnesium oxide 400 milliGRAM(s) Oral three times a day with meals  metoprolol succinate  milliGRAM(s) Oral daily  multivitamin 1 Tablet(s) Oral daily  sodium chloride 1 Gram(s) Oral daily  tiotropium 18 MICROgram(s) Capsule 1 Capsule(s) Inhalation daily  verapamil 120 milliGRAM(s) Oral daily      Allergies    penicillin (Unknown)    Intolerances      SOCIAL HISTORY: no toxic habits reported    FAMILY HISTORY:  No pertinent family history in first degree relatives      ROS:    EYES:  Negative  blurry vision or double vision  GASTROINTESTINAL:  Negative for nausea, vomiting, diarrhea  -otherwise negative except for subjective    Vital Signs Last 24 Hrs  T(C): 36.8 (05 Feb 2019 12:42), Max: 37.1 (05 Feb 2019 06:40)  T(F): 98.3 (05 Feb 2019 12:42), Max: 98.7 (05 Feb 2019 06:40)  HR: 103 (05 Feb 2019 12:42) (80 - 110)  BP: 107/62 (05 Feb 2019 12:42) (107/62 - 125/66)  BP(mean): --  RR: 18 (05 Feb 2019 12:42) (18 - 20)  SpO2: 98% (05 Feb 2019 12:42) (94% - 99%)    PE:  WDWN in no distress  HEENT:  NC, PERRL, sclerae anicteric, conjunctivae clear, EOMI.  Sinuses nontender, no nasal exudate.  No buccal or pharyngeal lesions, erythema or exudate  Neck:  Supple, no adenopathy  Lungs:  No accessory muscle use, bilaterally scattered ronchi and crackles  Cor:  RRR, S1, S2, no murmur appreciated  Abd:  Symmetric, normoactive BS.  Soft, nontender, no masses, guarding or rebound.  Liver and spleen not enlarged  Extrem:  No cyanosis or edema  Skin:  No rashes.  Neuro: grossly intact  Musc: moving all limbs freely, no focal deficits    LABS:                        11.2   13.39 )-----------( 325      ( 05 Feb 2019 08:03 )             34.4       WBC Count: 13.39 K/uL (02-05-19 @ 08:03)  WBC Count: 14.04 K/uL (02-04-19 @ 22:21)      02-05    135  |  95<L>  |  10  ----------------------------<  110<H>  3.1<L>   |  32<H>  |  0.56    Ca    8.4<L>      05 Feb 2019 07:56  Mg     1.9     02-05    TPro  6.2  /  Alb  2.7<L>  /  TBili  0.9  /  DBili  x   /  AST  19  /  ALT  22  /  AlkPhos  97  02-04      Creatinine, Serum: 0.56 mg/dL (02-05-19 @ 07:56)  Creatinine, Serum: 0.69 mg/dL (02-04-19 @ 22:21)        MICROBIOLOGY:      RADIOLOGY & ADDITIONAL STUDIES:    --< from: CT Angio Chest w/ IV Cont (02.05.19 @ 01:37) >    EXAM:  CT ANGIO CHEST (W)AW IC                            PROCEDURE DATE:  02/05/2019          INTERPRETATION:  CLINICAL INFORMATION: Lung cancer, increased shortness   of breath and elevated d-dimer, assess PE.     PROCEDURE: CT angiography of thechest was performed with intravenous   contrast utilizing dedicated PE protocol. 95 mls of Omnipaque-350   administered without complication. 5 ml discarded. Coronal and sagittal   reconstruction images were obtained. Axial MIP images were obtained from   a separate workstation.      COMPARISON: 1/14/2019.    FINDINGS: The patient's respiratory motion degrades images.    LUNGS AND AIRWAYS: Again noted, narrow the central airways by   mediastinal/hilar lymphadenopathy. Centrilobular and paraseptal  emphysema. Decreased patchy/groundglass opacities in the left upper   lobe/lingula since prior study.   PLEURA: No pneumothorax. Small to moderate bilateral pleural effusion,   increased since prior study.  HEART: Normal size. Small pericardial effusion. Left atrium surrounded by   the mediastinal/hilar lymphadenopathy.  VESSELS: Suboptimal contrast opacification of the peripheral pulmonary   arteries. No obvious central pulmonary embolus. No secondary signs of   right heart strain. Atherosclerotic change of the thoracic aorta, great   vessel origin and coronary arteries. Major intrathoracic vessels encased   by the mediastinal/hilar lymphadenopathy.  CHEST WALL AND LOWER NECK: Again noted, heterogeneous thyroid gland.   Right jugular approach Mediport terminating at the cavoatrial junction.  MEDIASTINUM AND NELLA: Again noted, mediastinal/hilar lymphadenopathy.   UPPER ABDOMEN: Nonspecific bilateral perinephric stranding.   BONES: Again noted, heterogeneous pattern of bone mineralization.   Degenerative changes of the spine. Stable anterior wedging/decreased   vertebral body height in the thoracic spine.    IMPRESSION:    Suboptimal evaluation of the peripheral pulmonary arteries. No central   pulmonary embolus or secondary signs ofright heart strain.    Known mediastinal/hilar lymphadenopathy encasing the major intrathoracic   vessels/left atrium and narrowing the central airways.    Decreased patchy/groundglass opacities in the left upper lobe/lingula   since 1/4/2019.  Increased, bilateral pleural effusion with compressive   atelectasis. Recommend clinical correlation to assess underlying   pneumonia.    Heterogeneous thyroid gland, which can be further characterized on a   nonemergent ultrasound.    Heterogeneous pattern of bone mineralization, which may be due to   osteopenia although marrow infiltrative/replacing process cannot be   excluded. Recommend clinical correlation additional imaging (MRI and/or   bone scan) as clinically indicated.

## 2019-02-05 NOTE — PATIENT PROFILE ADULT - NSALCOHOLFREQ_GEN_A_NUR
General Surgery Discharge Instructions:    1. Apply cold compresses as needed for swelling for the first 48 hours, thereafter may apply warm compresses as needed for swelling. Do not lay heat directly on incision, lay heat over cloth.     2. No yoga neck like extensions that would put tension on the incision for the next 6-8 weeks.     3. Apply topical antibiotic ointment daily to incision, cover with Telfa dressing, then Tegaderm until seen in follow up clinic.    4. May shower tomorrow.     5. Keep the incision out of the sun for 6 months to reduce scarring.     6. Home on oral Percocet and continue stool softener while taking narcotic for pain to decrease constipation side effects of narcotic pain medication.     7. No driving while taking narcotics. No lifting >10 lbs until seen in follow up clinic.    8. Call our office with any questions or concerns at [phone].      HYPOCALCEMIA (Adult)  Hypocalcemia is too little calcium in the blood. Calcium is a mineral. It helps the heart and other muscles function properly. It’s also needed to develop and maintain strong bones and teeth. Hypocalcemia may be caused by lack of calcium or vitamin D in your diet. Or it may be due to digestive problems, gland problems, kidney or pancreas disease, or low magnesium levels. Too much phosphate in the blood and certain medications can also cause hypocalcemia.  Hypocalcemia can cause the muscles of the face, hands, and feet to spasm (twitch involuntarily). It can also cause numbness or tingling around the mouth or in the hands and feet. Other problems, such as depression and memory loss, can result as well.  A sample of your blood will be taken to check the level of calcium in your body. The test also helps determine if a problem with your parathyroid gland (gland that controls your calcium level) or kidneys is causing hypocalcemia. Depending on the cause, you may be given an oral calcium supplement. In severe cases, an  injection of calcium gluconate may be required. You may also receive a vitamin D supplement or injection. If the cause of your problem is low magnesium, you will have treatment to raise your body’s level of this mineral.    HOME CARE  Medications: The doctor may prescribe calcium and vitamin D supplements or other medications or minerals. Follow the doctor’s instructions for taking these supplements.    General Care:  Take any medications or supplements as instructed.  Increase diet slowly as tolerated.   Make diet changes as instructed by your doctor. You may be asked to eat more dairy products, such as milk, cheese, and yogurt.  Avoid soft drinks (soda pop). These contain phosphates, which can interfere with your ability to absorb calcium.   Avoid salty foods. Salt makes you lose calcium.  Try to get out in the sun for at least 20 minutes each day. Exposure to the sun helps your body make vitamin D, which in turn helps you absorb calcium.    FOLLOW UP as advised by the doctor or our staff.    GET PROMPT MEDICAL ATTENTION if any of the following occurs:  Extreme fatigue  Irregular heartbeat  Depression  Hallucinations (seeing or hearing things that aren’t there)  Muscle cramps, spasms, or twitching  Numbness and tingling in the arms, legs, hands, or feet  Seizures    THYROID INSTRUCTIONS:    1. Take levothyroxine 112 mcg daily first thing in the morning with water only. Wait at least 1 hour before eating, taking other medications, or drinking other liquids. Take calcium and other vitamins later in the day.    2. Take calcium carbonate (Tums) 500 mg by mouth at lunch, dinner. If you have numbness or tingling around the lips or in the fingers or cramping, double this dose and call us immediately.    3. Take vitamin D 2,000 IU once daily at lunch.    4. Have labs done in one week to check your calcium levels.    5. Have labs done in 4-6 weeks to check your thyroid levels.    6. See IVONE Hernandez in about 6  weeks.    MEDICATIONS:  · See Medication List (bring to your doctor appointments)    VACCINES:  Most Recent Immunizations   Administered Date(s) Administered   • Influenza, injectable, quadrivalent 11/04/2016   • Influenza, seasonal, injectable, trivalent 11/04/2016   • Pneumococcal polysaccharide, adult, 23 valent 01/03/2009   • Tdap 07/12/2016   Pended Date(s) Pended   • Influenza, injectable, quadrivalent, preservative-free 10/19/2017       ACTIVITY:  · Continue activity as you were in the hospital, slowly increase to what you were doing previously and as directed by your MD      SMOKING:  · Avoid all tobacco products and secondhand smoke.  · Smoking Cessation Counseling offered.  · [place] Toll Free Quit Line: [phone]    DIET:  · No Restrictions    · Trouble breathing or chest pain - CALL 911    CONTACT YOUR DOCTOR IF:  · You have symptoms that are not \"normal\" for you.  · You have new or worse symptoms or pain, not relieved by medicine or rest.  · Temperature greater than 101 degrees F, chills or flu like symptoms.  · Increased swelling, redness or drainage.       monthly or less

## 2019-02-05 NOTE — CHART NOTE - NSCHARTNOTEFT_GEN_A_CORE
consult dictated    Impression:    Small cell carcinoma of lung with hilar, mediastinal, pleural and pericardial involvement  Former smoker    Plan:    For left thoracentesis by interventional radiology in AM  Chemotherapy per oncology  Oxygen  Bronchodilators

## 2019-02-05 NOTE — CONSULT NOTE ADULT - ASSESSMENT
57 y/o man w HTN/COPD, dx w small cell lung ca late 12/2018 early 1/2019 w sig respiratory compromise, unable to have full staging due to inability to lay down flat long enough for definitive imaging studies, post c#1 chemo w carbo/VP16 1/9/19 w some respiratory improvement but over weekend w incr PARKS.    -CT angio chest no PE, decr left infiltrate but incr leona effusion  -agree w thoracentesis which may improve symptom  -once respiratory sx improves may be able to have CT a/p to continue staging  -cycle #2 chemotherapy pending above.     discussed w pt    thank you, will follow w you

## 2019-02-05 NOTE — CONSULT NOTE ADULT - PROBLEM SELECTOR RECOMMENDATION 3
per primary    Thank you for consulting us and involving us in the management of this most interesting and challenging case.     We will follow along in the care of this patient.

## 2019-02-06 ENCOUNTER — RESULT REVIEW (OUTPATIENT)
Age: 59
End: 2019-02-06

## 2019-02-06 LAB
ANION GAP SERPL CALC-SCNC: 6 MMOL/L — SIGNIFICANT CHANGE UP (ref 5–17)
B PERT IGG+IGM PNL SER: ABNORMAL
BUN SERPL-MCNC: 13 MG/DL — SIGNIFICANT CHANGE UP (ref 7–23)
CALCIUM SERPL-MCNC: 8 MG/DL — LOW (ref 8.5–10.1)
CHLORIDE SERPL-SCNC: 94 MMOL/L — LOW (ref 96–108)
CO2 SERPL-SCNC: 33 MMOL/L — HIGH (ref 22–31)
COLOR FLD: SIGNIFICANT CHANGE UP
CREAT SERPL-MCNC: 0.45 MG/DL — LOW (ref 0.5–1.3)
FLUID INTAKE SUBSTANCE CLASS: SIGNIFICANT CHANGE UP
FLUID SEGMENTED GRANULOCYTES: 32 % — SIGNIFICANT CHANGE UP
GLUCOSE SERPL-MCNC: 110 MG/DL — HIGH (ref 70–99)
HCT VFR BLD CALC: 33 % — LOW (ref 39–50)
HGB BLD-MCNC: 10.6 G/DL — LOW (ref 13–17)
LYMPHOCYTES # FLD: 30 % — SIGNIFICANT CHANGE UP
MCHC RBC-ENTMCNC: 25.9 PG — LOW (ref 27–34)
MCHC RBC-ENTMCNC: 32.1 GM/DL — SIGNIFICANT CHANGE UP (ref 32–36)
MCV RBC AUTO: 80.5 FL — SIGNIFICANT CHANGE UP (ref 80–100)
MESOTHL CELL # FLD: 12 % — SIGNIFICANT CHANGE UP
MONOS+MACROS # FLD: 16 % — SIGNIFICANT CHANGE UP
MRSA PCR RESULT.: SIGNIFICANT CHANGE UP
NRBC # BLD: 0 /100 WBCS — SIGNIFICANT CHANGE UP (ref 0–0)
OTHER CELLS FLD MANUAL: 10 % — SIGNIFICANT CHANGE UP
PH FLD: 7.47 — SIGNIFICANT CHANGE UP
PLATELET # BLD AUTO: 307 K/UL — SIGNIFICANT CHANGE UP (ref 150–400)
POTASSIUM SERPL-MCNC: 3.2 MMOL/L — LOW (ref 3.5–5.3)
POTASSIUM SERPL-SCNC: 3.2 MMOL/L — LOW (ref 3.5–5.3)
RAPID RVP RESULT: SIGNIFICANT CHANGE UP
RBC # BLD: 4.1 M/UL — LOW (ref 4.2–5.8)
RBC # FLD: 14.9 % — HIGH (ref 10.3–14.5)
RCV VOL RI: HIGH /UL (ref 0–5)
S AUREUS DNA NOSE QL NAA+PROBE: SIGNIFICANT CHANGE UP
SODIUM SERPL-SCNC: 133 MMOL/L — LOW (ref 135–145)
TOTAL NUCLEATED CELL COUNT, BODY FLUID: 3327 /UL — HIGH (ref 0–5)
TUBE TYPE: SIGNIFICANT CHANGE UP
WBC # BLD: 13.47 K/UL — HIGH (ref 3.8–10.5)
WBC # FLD AUTO: 13.47 K/UL — HIGH (ref 3.8–10.5)

## 2019-02-06 PROCEDURE — 32555 ASPIRATE PLEURA W/ IMAGING: CPT | Mod: LT

## 2019-02-06 PROCEDURE — 88108 CYTOPATH CONCENTRATE TECH: CPT | Mod: 26

## 2019-02-06 PROCEDURE — 88305 TISSUE EXAM BY PATHOLOGIST: CPT | Mod: 26

## 2019-02-06 PROCEDURE — 71045 X-RAY EXAM CHEST 1 VIEW: CPT | Mod: 26

## 2019-02-06 RX ORDER — FUROSEMIDE 40 MG
40 TABLET ORAL EVERY 8 HOURS
Qty: 0 | Refills: 0 | Status: DISCONTINUED | OUTPATIENT
Start: 2019-02-06 | End: 2019-02-07

## 2019-02-06 RX ORDER — ALPRAZOLAM 0.25 MG
0.25 TABLET ORAL THREE TIMES A DAY
Qty: 0 | Refills: 0 | Status: DISCONTINUED | OUTPATIENT
Start: 2019-02-06 | End: 2019-02-10

## 2019-02-06 RX ORDER — POTASSIUM CHLORIDE 20 MEQ
40 PACKET (EA) ORAL EVERY 4 HOURS
Qty: 0 | Refills: 0 | Status: COMPLETED | OUTPATIENT
Start: 2019-02-06 | End: 2019-02-06

## 2019-02-06 RX ADMIN — Medication 300 MILLIGRAM(S): at 06:11

## 2019-02-06 RX ADMIN — Medication 0.5 MILLIGRAM(S): at 08:02

## 2019-02-06 RX ADMIN — Medication 3 MILLILITER(S): at 20:43

## 2019-02-06 RX ADMIN — Medication 1 TABLET(S): at 13:08

## 2019-02-06 RX ADMIN — MAGNESIUM OXIDE 400 MG ORAL TABLET 400 MILLIGRAM(S): 241.3 TABLET ORAL at 13:08

## 2019-02-06 RX ADMIN — MAGNESIUM OXIDE 400 MG ORAL TABLET 400 MILLIGRAM(S): 241.3 TABLET ORAL at 07:37

## 2019-02-06 RX ADMIN — Medication 200 MILLIGRAM(S): at 06:07

## 2019-02-06 RX ADMIN — Medication 120 MILLIGRAM(S): at 06:08

## 2019-02-06 RX ADMIN — Medication 40 MILLIGRAM(S): at 21:07

## 2019-02-06 RX ADMIN — Medication 3 MILLILITER(S): at 13:53

## 2019-02-06 RX ADMIN — MAGNESIUM OXIDE 400 MG ORAL TABLET 400 MILLIGRAM(S): 241.3 TABLET ORAL at 17:45

## 2019-02-06 RX ADMIN — Medication 40 MILLIGRAM(S): at 06:10

## 2019-02-06 RX ADMIN — SODIUM CHLORIDE 1 GRAM(S): 9 INJECTION INTRAMUSCULAR; INTRAVENOUS; SUBCUTANEOUS at 13:07

## 2019-02-06 RX ADMIN — Medication 40 MILLIGRAM(S): at 13:17

## 2019-02-06 RX ADMIN — HEPARIN SODIUM 5000 UNIT(S): 5000 INJECTION INTRAVENOUS; SUBCUTANEOUS at 17:45

## 2019-02-06 RX ADMIN — Medication 0.25 MILLIGRAM(S): at 06:08

## 2019-02-06 RX ADMIN — Medication 40 MILLIEQUIVALENT(S): at 13:17

## 2019-02-06 RX ADMIN — Medication 3 MILLILITER(S): at 08:01

## 2019-02-06 RX ADMIN — Medication 81 MILLIGRAM(S): at 13:08

## 2019-02-06 RX ADMIN — LORATADINE 10 MILLIGRAM(S): 10 TABLET ORAL at 13:07

## 2019-02-06 RX ADMIN — Medication 0.5 MILLIGRAM(S): at 20:43

## 2019-02-06 RX ADMIN — Medication 3 MILLILITER(S): at 02:13

## 2019-02-06 RX ADMIN — Medication 40 MILLIEQUIVALENT(S): at 17:45

## 2019-02-06 NOTE — PROGRESS NOTE ADULT - SUBJECTIVE AND OBJECTIVE BOX
Patient is a 58y old  Male who presents with a chief complaint of sob (06 Feb 2019 11:43)      INTERVAL HPI/OVERNIGHT EVENTS:    Underwent left thoracentesis today by interventional radiology. Feels better today    MEDICATIONS  (STANDING):  ALBUTerol/ipratropium for Nebulization 3 milliLiter(s) Nebulizer every 6 hours  aspirin  chewable 81 milliGRAM(s) Oral daily  buDESOnide   0.5 milliGRAM(s) Respule 0.5 milliGRAM(s) Inhalation two times a day  digoxin     Tablet 0.25 milliGRAM(s) Oral daily  diltiazem    milliGRAM(s) Oral daily  furosemide   Injectable 40 milliGRAM(s) IV Push every 8 hours  heparin  Injectable 5000 Unit(s) SubCutaneous every 12 hours  loratadine 10 milliGRAM(s) Oral daily  magnesium oxide 400 milliGRAM(s) Oral three times a day with meals  metoprolol succinate  milliGRAM(s) Oral daily  multivitamin 1 Tablet(s) Oral daily  potassium chloride    Tablet ER 40 milliEquivalent(s) Oral every 4 hours  sodium chloride 1 Gram(s) Oral daily  tiotropium 18 MICROgram(s) Capsule 1 Capsule(s) Inhalation daily      MEDICATIONS  (PRN):  ALPRAZolam 0.25 milliGRAM(s) Oral three times a day PRN anxiety  guaiFENesin    Syrup 100 milliGRAM(s) Oral every 6 hours PRN Cough      Allergies    penicillin (Unknown)    Intolerances        PAST MEDICAL & SURGICAL HISTORY:  Lung cancer  Depression  Dyspnea  Mediastinal adenopathy  Pericardial effusion  AF (atrial fibrillation)  Hyponatremia  COPD (chronic obstructive pulmonary disease)  HTN (hypertension)  S/P pericardial window creation      Vital Signs Last 24 Hrs  T(C): 37.3 (06 Feb 2019 12:11), Max: 37.3 (06 Feb 2019 12:11)  T(F): 99.2 (06 Feb 2019 12:11), Max: 99.2 (06 Feb 2019 12:11)  HR: 87 (06 Feb 2019 13:54) (74 - 108)  BP: 100/60 (06 Feb 2019 13:14) (99/62 - 123/73)  BP(mean): --  RR: 18 (06 Feb 2019 12:11) (17 - 20)  SpO2: 98% (06 Feb 2019 12:11) (96% - 98%)    PHYSICAL EXAMINATION:    GENERAL: The patient is awake and alert in no apparent distress.     HEENT: Head is normocephalic and atraumatic. Extraocular muscles are intact. Mucous membranes are moist.    NECK: Supple.    LUNGS: Clear to auscultation without wheezing, rales or rhonchi; respirations unlabored    HEART: Regular rate and rhythm without murmur.    ABDOMEN: Soft, nontender, and nondistended.      EXTREMITIES: Without any cyanosis, clubbing, rash, lesions or edema.    NEUROLOGIC: Grossly intact.    SKIN: No ulceration or induration present.      LABS:                        10.6   13.47 )-----------( 307      ( 06 Feb 2019 06:32 )             33.0     02-06    133<L>  |  94<L>  |  13  ----------------------------<  110<H>  3.2<L>   |  33<H>  |  0.45<L>    Ca    8.0<L>      06 Feb 2019 06:32  Mg     1.9     02-05    TPro  6.2  /  Alb  2.7<L>  /  TBili  0.9  /  DBili  x   /  AST  19  /  ALT  22  /  AlkPhos  97  02-04    PT/INR - ( 04 Feb 2019 22:21 )   PT: 15.9 sec;   INR: 1.39 ratio               CARDIAC MARKERS ( 04 Feb 2019 22:21 )  <.015 ng/mL / x     / x     / x     / x            Serum Pro-Brain Natriuretic Peptide: 909 pg/mL (02-04-19 @ 22:21)      Procalcitonin, Serum: 0.16 ng/mL (02-05-19 @ 07:56)      MICROBIOLOGY:  Culture Results:   No growth to date. (02-05 @ 09:09)  Culture Results:   No growth to date. (02-05 @ 09:09)      RADIOLOGY & ADDITIONAL STUDIES:    Assessment:    Small cell carcinoma with pulmonary, pleural, tracheal, pericardial, and pleural involvement    Plan:    Check results of thoracentesis  Chemotherapy as per oncology

## 2019-02-06 NOTE — SWALLOW BEDSIDE ASSESSMENT ADULT - SWALLOW EVAL: RECOMMENDED FEEDING/EATING TECHNIQUES
maintain upright posture during/after eating for 30 mins/alternate food with liquid/crush medication (when feasible)

## 2019-02-06 NOTE — DIETITIAN INITIAL EVALUATION ADULT. - ADHERENCE
n/a/Pt reports that he does not follow any therapeutic diets PTA.  Denies any vitamin/mineral supplementation. NKFA.

## 2019-02-06 NOTE — PROGRESS NOTE ADULT - ASSESSMENT
59 yo male hx small cell lung ca, sp chemo admitted with abrupt onset of dyspnea  Doubt infectious etiology  Would defer antibiotics at this time

## 2019-02-06 NOTE — SWALLOW BEDSIDE ASSESSMENT ADULT - COMMENTS
59 yo admitted c SOB/cough , PNA c hx small cell lung ca, sp chemo on 1/14--due for chemo  Pt A and 0 x4 Pt c cough baseline. Pt c discoordinated breathe/ swallow pattern c delayed throat cler/cough reflex response indicative of aspiration c thin liquids. aspiration and feeding precautions discussed, pt conveyed understanding, Introductory speech letter left bedside

## 2019-02-06 NOTE — SWALLOW BEDSIDE ASSESSMENT ADULT - PHARYNGEAL PHASE
Within functional limits Throat clear post oral intake/Wet vocal quality post oral intake/Delayed pharyngeal swallow/Multiple swallows

## 2019-02-06 NOTE — DIETITIAN INITIAL EVALUATION ADULT. - ENERGY NEEDS
Wt: 244.15lbs (admission), Ht: 73in, BMI: 32.2, IBW: 184lbs (+/-10%), %IBW: 122 Wt: 244.15lbs (admission), Ht: 73in, BMI: 32.2, IBW: 184lbs (+/-10%), %IBW: 122  +1 generalized edema  No pressure injuries noted at this time

## 2019-02-06 NOTE — PROGRESS NOTE ADULT - ASSESSMENT
57 y/o man w HTN/COPD, dx w small cell lung ca late 12/2018 early 1/2019 w sig respiratory compromise,   post c#1 chemo w carbo/VP16 1/9/19 w some respiratory improvement but over weekend w incr PARKS.    -CT angio chest no PE, decr left infiltrate but incr leona effusion  -pedning thoracentesis which may improve symptom  -once respiratory sx improves may be able to have CT a/p to continue staging  -cycle #2 chemotherapy tentatively scheduled to be started on 2/7/19  - pt preferably should be transferred to [facility]

## 2019-02-06 NOTE — PROGRESS NOTE ADULT - SUBJECTIVE AND OBJECTIVE BOX
All records reviewed.    sitting in bed , mild SOB upon talking   pending thoracocentesis today     MEDICATIONS  (STANDING):  ALBUTerol/ipratropium for Nebulization 3 milliLiter(s) Nebulizer every 6 hours  aspirin  chewable 81 milliGRAM(s) Oral daily  buDESOnide   0.5 milliGRAM(s) Respule 0.5 milliGRAM(s) Inhalation two times a day  digoxin     Tablet 0.25 milliGRAM(s) Oral daily  diltiazem    milliGRAM(s) Oral daily  furosemide   Injectable 40 milliGRAM(s) IV Push every 8 hours  heparin  Injectable 5000 Unit(s) SubCutaneous every 12 hours  loratadine 10 milliGRAM(s) Oral daily  magnesium oxide 400 milliGRAM(s) Oral three times a day with meals  metoprolol succinate  milliGRAM(s) Oral daily  multivitamin 1 Tablet(s) Oral daily  sodium chloride 1 Gram(s) Oral daily  tiotropium 18 MICROgram(s) Capsule 1 Capsule(s) Inhalation daily  verapamil 120 milliGRAM(s) Oral daily    MEDICATIONS  (PRN):  guaiFENesin    Syrup 100 milliGRAM(s) Oral every 6 hours PRN Cough      Allergies    penicillin (Unknown)    Intolerances        SOCIAL HISTORY:  former smoker stopped 12/2018, 40pack-year    FAMILY HISTORY:  brother pituitary tumor      Vital Signs Last 24 Hrs      PHYSICAL EXAM:      General:well developed well nourished, in no acute distress, sitting up in ER stretcher  Eyes:sclera anicteric, pupils equal and reactive to light  ENMT:buccal mucosa moist, pharynx not injected  Neck:supple, trachea midline  Lungs: mild leona wheezes, mild decrease BS lower regions  Cardiovascular:regular rate and rhythm, S1 S2  Abdomen:soft, nontender, no organomegaly present, bowel sounds normal  Neurological:alert and oriented x3, Cranial Nerves II-XII grossly intact  Skin:no increased ecchymosis/petechiae/purpura  Lymph Nodes:no palpable cervical/supraclavicular lymph nodes enlargements  Extremities:1+ edema to 1/2 up leona lower legs        LABS:               LABS:    CBC Full  -  ( 06 Feb 2019 06:32 )  WBC Count : 13.47 K/uL  Hemoglobin : 10.6 g/dL  Hematocrit : 33.0 %  Platelet Count - Automated : 307 K/uL  Mean Cell Volume : 80.5 fl  Mean Cell Hemoglobin : 25.9 pg  Mean Cell Hemoglobin Concentration : 32.1 gm/dL  Auto Neutrophil # : x  Auto Lymphocyte # : x  Auto Monocyte # : x  Auto Eosinophil # : x  Auto Basophil # : x  Auto Neutrophil % : x  Auto Lymphocyte % : x  Auto Monocyte % : x  Auto Eosinophil % : x  Auto Basophil % : x    02-06    133<L>  |  94<L>  |  13  ----------------------------<  110<H>  3.2<L>   |  33<H>  |  0.45<L>    Ca    8.0<L>      06 Feb 2019 06:32  Mg     1.9     02-05    TPro  6.2  /  Alb  2.7<L>  /  TBili  0.9  /  DBili  x   /  AST  19  /  ALT  22  /  AlkPhos  97  02-04    PT/INR - ( 04 Feb 2019 22:21 )   PT: 15.9 sec;   INR: 1.39 ratio                             RADIOLOGY & ADDITIONAL STUDIES:  < from: CT Angio Chest w/ IV Cont (02.05.19 @ 01:37) >  EXAM:  CT ANGIO CHEST (W)AW IC                            PROCEDURE DATE:  02/05/2019          INTERPRETATION:  CLINICAL INFORMATION: Lung cancer, increased shortness   of breath and elevated d-dimer, assess PE.     PROCEDURE: CT angiography of thechest was performed with intravenous   contrast utilizing dedicated PE protocol. 95 mls of Omnipaque-350   administered without complication. 5 ml discarded. Coronal and sagittal   reconstruction images were obtained. Axial MIP images were obtained from   a separate workstation.      COMPARISON: 1/14/2019.    FINDINGS: The patient's respiratory motion degrades images.    LUNGS AND AIRWAYS: Again noted, narrow the central airways by   mediastinal/hilar lymphadenopathy. Centrilobular and paraseptal  emphysema. Decreased patchy/groundglass opacities in the left upper   lobe/lingula since prior study.   PLEURA: No pneumothorax. Small to moderate bilateral pleural effusion,   increased since prior study.  HEART: Normal size. Small pericardial effusion. Left atrium surrounded by   the mediastinal/hilar lymphadenopathy.  VESSELS: Suboptimal contrast opacification of the peripheral pulmonary   arteries. No obvious central pulmonary embolus. No secondary signs of   right heart strain. Atherosclerotic change of the thoracic aorta, great   vessel origin and coronary arteries. Major intrathoracic vessels encased   by the mediastinal/hilar lymphadenopathy.  CHEST WALL AND LOWER NECK: Again noted, heterogeneous thyroid gland.   Right jugular approach Mediport terminating at the cavoatrial junction.  MEDIASTINUM AND NELLA: Again noted, mediastinal/hilar lymphadenopathy.   UPPER ABDOMEN: Nonspecific bilateral perinephric stranding.   BONES: Again noted, heterogeneous pattern of bone mineralization.   Degenerative changes of the spine. Stable anterior wedging/decreased   vertebral body height in the thoracic spine.    IMPRESSION:    Suboptimal evaluation of the peripheral pulmonary arteries. No central   pulmonary embolus or secondary signs ofright heart strain.    Known mediastinal/hilar lymphadenopathy encasing the major intrathoracic   vessels/left atrium and narrowing the central airways.    Decreased patchy/groundglass opacities in the left upper lobe/lingula   since 1/4/2019.  Increased, bilateral pleural effusion with compressive   atelectasis. Recommend clinical correlation to assess underlying   pneumonia.    Heterogeneous thyroid gland, which can be further characterized on a   nonemergent ultrasound.    Heterogeneous pattern of bone mineralization, which may be due to   osteopenia although marrow infiltrative/replacing process cannot be   excluded. Recommend clinical correlation additional imaging (MRI and/or   bone scan) as clinically indicated.    < end of copied text >

## 2019-02-06 NOTE — DIETITIAN INITIAL EVALUATION ADULT. - OTHER INFO
Pt seen for nutrition consult for nutrition assessment. As per chart pt is a 58 year old male with a PMH of small cell lung cancer, last chemo 1/4 is due for next chemo, afib, COPD, HTN, hyponatremia. Pt admitted with SOB, pleural effusion, possible thoracentesis. Pt reports currently good appetite and PO intake. Pt's admission weight per chart 244.15lbs. Of note pt is currently on Lasix. Pt reports current weight of 245lbs, reports UBW of 275lbs, states that he lost about 30lbs over the past year, however states that he has been recently stable at 245lbs. Denies any chewing/swallowing difficulties, denies any GI distress, +BM 2/5.

## 2019-02-06 NOTE — DIETITIAN INITIAL EVALUATION ADULT. - PERTINENT LABORATORY DATA
2/6-Hgb 10.6, Hct 33.0, Sodium 133, Potassium 3.2, Chloride 9.4, Creatinine 0.45, Glucose 110, Calcium 8.0

## 2019-02-06 NOTE — BRIEF OPERATIVE NOTE - PROCEDURE
<<-----Click on this checkbox to enter Procedure Thoracentesis of left pleural cavity using catheter with ultrasound guidance  02/06/2019    Active  YAIRAN

## 2019-02-06 NOTE — PROGRESS NOTE ADULT - SUBJECTIVE AND OBJECTIVE BOX
[facility], Division of Infectious Diseases  ANH Valderrama A. Lee  [phone]    Name: RAFA SHAFER  Age: 58y  Gender: Male  MRN: [id number]    Interval History--  Notes reviewed. No new complaints. Awaiting thoracocentesis. No fevers, chills, or rigors.. "If it wasn't for the shortness of breath I would not be here... I feel fine otherwise."    Past Medical History--  Lung cancer  Depression  Dyspnea  Mediastinal adenopathy  Pericardial effusion  AF (atrial fibrillation)  Hyponatremia  COPD (chronic obstructive pulmonary disease)  HTN (hypertension)  S/P pericardial window creation  No significant past surgical history      For details regarding the patient's social history, family history, and other miscellaneous elements, please refer the initial infectious diseases consultation and/or the admitting history and physical examination for this admission.    Allergies    penicillin (Unknown)    Intolerances        Medications--  Antibiotics:    Immunologic:    Other:  ALBUTerol/ipratropium for Nebulization  ALPRAZolam PRN  aspirin  chewable  buDESOnide   0.5 milliGRAM(s) Respule  digoxin     Tablet  diltiazem   CD  furosemide   Injectable  guaiFENesin    Syrup PRN  heparin  Injectable  loratadine  magnesium oxide  metoprolol succinate ER  multivitamin  potassium chloride    Tablet ER  sodium chloride  tiotropium 18 MICROgram(s) Capsule      Review of Systems--  A 10-point review of systems was obtained.   Review of systems otherwise negative except as previously noted.    Physical Examination--  Vital Signs: T(F): 99.2 (02-06-19 @ 12:11), Max: 99.2 (02-06-19 @ 12:11)  HR: 74 (02-06-19 @ 12:11)  BP: 101/63 (02-06-19 @ 12:11)  RR: 18 (02-06-19 @ 12:11)  SpO2: 98% (02-06-19 @ 12:11)  Wt(kg): --  General: Nontoxic-appearing Male in no acute distress.  HEENT: AT/NC. Anicteric. Conjunctiva pink and moist. Oropharynx clear.   Neck: Not rigid. No sense of mass.  Nodes: None palpable.  Lungs: Diminished breath sounds bilaterally without rales, + rhonchi.  Heart: Regular rate and rhythm. No Murmur. No rub. No gallop. No palpable thrill.  Abdomen: Bowel sounds present and normoactive. Soft. Nondistended. Nontender. No sense of mass. No organomegaly.  Extremities: No cyanosis. +/- clubbing. 2+  LE edema.   Skin: Warm. Dry. Good turgor. No rash. No vasculitic stigmata.  Psychiatric: Appropriate affect and mood for situation.       Laboratory Studies--  CBC                        10.6   13.47 )-----------( 307      ( 06 Feb 2019 06:32 )             33.0       Chemistries  02-06    133<L>  |  94<L>  |  13  ----------------------------<  110<H>  3.2<L>   |  33<H>  |  0.45<L>    Ca    8.0<L>      06 Feb 2019 06:32  Mg     1.9     02-05    TPro  6.2  /  Alb  2.7<L>  /  TBili  0.9  /  DBili  x   /  AST  19  /  ALT  22  /  AlkPhos  97  02-04      Culture Data    Culture - Blood (collected 05 Feb 2019 09:09)  Source: .Blood Blood-Venous  Preliminary Report (06 Feb 2019 10:01):    No growth to date.    Culture - Blood (collected 05 Feb 2019 09:09)  Source: .Blood Blood-Venous  Preliminary Report (06 Feb 2019 10:01):    No growth to date.      Ct (personally reviewed) < from: CT Angio Chest w/ IV Cont (02.05.19 @ 01:37) >  IMPRESSION:    Suboptimal evaluation of the peripheral pulmonary arteries. No central   pulmonary embolus or secondary signs ofright heart strain.    Known mediastinal/hilar lymphadenopathy encasing the major intrathoracic   vessels/left atrium and narrowing the central airways.    Decreased patchy/groundglass opacities in the left upper lobe/lingula   since 1/4/2019.  Increased, bilateral pleural effusion with compressive   atelectasis. Recommend clinical correlation to assess underlying   pneumonia.    Heterogeneous thyroid gland, which can be further characterized on a   nonemergent ultrasound.    Heterogeneous pattern of bone mineralization, which may be due to   osteopenia although marrow infiltrative/replacing process cannot be   excluded. Recommend clinical correlation additional imaging (MRI and/or   bone scan) as clinically indicated.    < end of copied text >

## 2019-02-06 NOTE — PROGRESS NOTE ADULT - SUBJECTIVE AND OBJECTIVE BOX
Patient is a 58y old  Male who presents with a chief complaint of sob (06 Feb 2019 14:37)      INTERVAL /OVERNIGHT EVENTS: feels better    MEDICATIONS  (STANDING):  ALBUTerol/ipratropium for Nebulization 3 milliLiter(s) Nebulizer every 6 hours  aspirin  chewable 81 milliGRAM(s) Oral daily  buDESOnide   0.5 milliGRAM(s) Respule 0.5 milliGRAM(s) Inhalation two times a day  digoxin     Tablet 0.25 milliGRAM(s) Oral daily  diltiazem    milliGRAM(s) Oral daily  furosemide   Injectable 40 milliGRAM(s) IV Push every 8 hours  heparin  Injectable 5000 Unit(s) SubCutaneous every 12 hours  loratadine 10 milliGRAM(s) Oral daily  magnesium oxide 400 milliGRAM(s) Oral three times a day with meals  metoprolol succinate  milliGRAM(s) Oral daily  multivitamin 1 Tablet(s) Oral daily  potassium chloride    Tablet ER 40 milliEquivalent(s) Oral every 4 hours  sodium chloride 1 Gram(s) Oral daily  tiotropium 18 MICROgram(s) Capsule 1 Capsule(s) Inhalation daily    MEDICATIONS  (PRN):  ALPRAZolam 0.25 milliGRAM(s) Oral three times a day PRN anxiety  guaiFENesin    Syrup 100 milliGRAM(s) Oral every 6 hours PRN Cough      Allergies    penicillin (Unknown)    Intolerances        REVIEW OF SYSTEMS:  CONSTITUTIONAL: No fever, weight loss, or fatigue  EYES: No eye pain, visual disturbances, or discharge  ENMT:  No difficulty hearing, tinnitus, vertigo; No sinus or throat pain  NECK: No pain or stiffness  RESPIRATORY: No cough, wheezing, chills or hemoptysis; No shortness of breath  CARDIOVASCULAR: No chest pain, palpitations, dizziness, or leg swelling  GASTROINTESTINAL: No abdominal or epigastric pain. No nausea, vomiting, or hematemesis; No diarrhea or constipation. No melena or hematochezia.  GENITOURINARY: No dysuria, frequency, hematuria, or incontinence  NEUROLOGICAL: No headaches, memory loss, loss of strength, numbness, or tremors  SKIN: No itching, burning, rashes, or lesions   LYMPH NODES: No enlarged glands  ENDOCRINE: No heat or cold intolerance; No hair loss; No polydipsia or polyuria  MUSCULOSKELETAL: No joint pain or swelling; No muscle, back, or extremity pain  PSYCHIATRIC: No depression, anxiety, mood swings, or difficulty sleeping  HEME/LYMPH: No easy bruising, or bleeding gums  ALLERGY AND IMMUNOLOGIC: No hives or eczema    Vital Signs Last 24 Hrs  T(C): 37.3 (06 Feb 2019 12:11), Max: 37.3 (06 Feb 2019 12:11)  T(F): 99.2 (06 Feb 2019 12:11), Max: 99.2 (06 Feb 2019 12:11)  HR: 87 (06 Feb 2019 13:54) (74 - 108)  BP: 100/60 (06 Feb 2019 13:14) (99/62 - 123/73)  BP(mean): --  RR: 18 (06 Feb 2019 12:11) (17 - 20)  SpO2: 98% (06 Feb 2019 12:11) (96% - 98%)    PHYSICAL EXAM:  GENERAL: NAD, well-groomed, well-developed  HEAD:  Atraumatic, Normocephalic  EYES: EOMI, PERRLA, conjunctiva and sclera clear  ENMT: No tonsillar erythema, exudates, or enlargement; Moist mucous membranes, Good dentition, No lesions  NECK: Supple, No JVD, Normal thyroid  NERVOUS SYSTEM:  Alert & Oriented X3, Good concentration; Motor Strength 5/5 B/L upper and lower extremities; DTRs 2+ intact and symmetric  CHEST/LUNG: Clear to auscultation bilaterally; No rales, rhonchi, wheezing, or rubs  HEART: Regular rate and rhythm; No murmurs, rubs, or gallops  ABDOMEN: Soft, Nontender, Nondistended; Bowel sounds present  EXTREMITIES:  2+ Peripheral Pulses, No clubbing, cyanosis, or edema  LYMPH: No lymphadenopathy noted  SKIN: No rashes or lesions    LABS:                        10.6   13.47 )-----------( 307      ( 06 Feb 2019 06:32 )             33.0     06 Feb 2019 06:32    133    |  94     |  13     ----------------------------<  110    3.2     |  33     |  0.45     Ca    8.0        06 Feb 2019 06:32      PT/INR - ( 04 Feb 2019 22:21 )   PT: 15.9 sec;   INR: 1.39 ratio             CAPILLARY BLOOD GLUCOSE          RADIOLOGY & ADDITIONAL TESTS:    Notes Reviewed:  [x ] YES  [ ] NO    Care Discussed with Consultants/Other Providers [x ] YES  [ ] NO

## 2019-02-07 DIAGNOSIS — M79.89 OTHER SPECIFIED SOFT TISSUE DISORDERS: ICD-10-CM

## 2019-02-07 LAB
ALBUMIN FLD-MCNC: 1.7 G/DL — SIGNIFICANT CHANGE UP
ANION GAP SERPL CALC-SCNC: 8 MMOL/L — SIGNIFICANT CHANGE UP (ref 5–17)
BUN SERPL-MCNC: 13 MG/DL — SIGNIFICANT CHANGE UP (ref 7–23)
CALCIUM SERPL-MCNC: 8.1 MG/DL — LOW (ref 8.5–10.1)
CHLORIDE SERPL-SCNC: 91 MMOL/L — LOW (ref 96–108)
CO2 SERPL-SCNC: 34 MMOL/L — HIGH (ref 22–31)
CREAT SERPL-MCNC: 0.63 MG/DL — SIGNIFICANT CHANGE UP (ref 0.5–1.3)
GLUCOSE FLD-MCNC: 127 MG/DL — SIGNIFICANT CHANGE UP
GLUCOSE SERPL-MCNC: 119 MG/DL — HIGH (ref 70–99)
GRAM STN FLD: SIGNIFICANT CHANGE UP
HCT VFR BLD CALC: 32 % — LOW (ref 39–50)
HGB BLD-MCNC: 10.4 G/DL — LOW (ref 13–17)
LDH SERPL L TO P-CCNC: 161 U/L — SIGNIFICANT CHANGE UP
MCHC RBC-ENTMCNC: 26 PG — LOW (ref 27–34)
MCHC RBC-ENTMCNC: 32.5 GM/DL — SIGNIFICANT CHANGE UP (ref 32–36)
MCV RBC AUTO: 80 FL — SIGNIFICANT CHANGE UP (ref 80–100)
NIGHT BLUE STAIN TISS: SIGNIFICANT CHANGE UP
NON-GYNECOLOGICAL CYTOLOGY STUDY: SIGNIFICANT CHANGE UP
NRBC # BLD: 0 /100 WBCS — SIGNIFICANT CHANGE UP (ref 0–0)
PLATELET # BLD AUTO: 274 K/UL — SIGNIFICANT CHANGE UP (ref 150–400)
POTASSIUM SERPL-MCNC: 3.4 MMOL/L — LOW (ref 3.5–5.3)
POTASSIUM SERPL-SCNC: 3.4 MMOL/L — LOW (ref 3.5–5.3)
PROT FLD-MCNC: 3 G/DL — SIGNIFICANT CHANGE UP
RBC # BLD: 4 M/UL — LOW (ref 4.2–5.8)
RBC # FLD: 14.6 % — HIGH (ref 10.3–14.5)
SODIUM SERPL-SCNC: 133 MMOL/L — LOW (ref 135–145)
SPECIMEN SOURCE: SIGNIFICANT CHANGE UP
SPECIMEN SOURCE: SIGNIFICANT CHANGE UP
WBC # BLD: 13.34 K/UL — HIGH (ref 3.8–10.5)
WBC # FLD AUTO: 13.34 K/UL — HIGH (ref 3.8–10.5)

## 2019-02-07 RX ORDER — CARBOPLATIN 50 MG
750 VIAL (EA) INTRAVENOUS ONCE
Qty: 0 | Refills: 0 | Status: COMPLETED | OUTPATIENT
Start: 2019-02-07 | End: 2019-02-07

## 2019-02-07 RX ORDER — ETOPOSIDE 20 MG/ML
230 VIAL (ML) INTRAVENOUS EVERY 24 HOURS
Qty: 0 | Refills: 0 | Status: DISCONTINUED | OUTPATIENT
Start: 2019-02-07 | End: 2019-02-07

## 2019-02-07 RX ORDER — SODIUM CHLORIDE 9 MG/ML
1 INJECTION INTRAMUSCULAR; INTRAVENOUS; SUBCUTANEOUS
Qty: 0 | Refills: 0 | Status: DISCONTINUED | OUTPATIENT
Start: 2019-02-07 | End: 2019-02-08

## 2019-02-07 RX ORDER — ETOPOSIDE 20 MG/ML
230 VIAL (ML) INTRAVENOUS ONCE
Qty: 0 | Refills: 0 | Status: DISCONTINUED | OUTPATIENT
Start: 2019-02-07 | End: 2019-02-07

## 2019-02-07 RX ORDER — ETOPOSIDE 20 MG/ML
185 VIAL (ML) INTRAVENOUS EVERY 24 HOURS
Qty: 0 | Refills: 0 | Status: COMPLETED | OUTPATIENT
Start: 2019-02-07 | End: 2019-02-09

## 2019-02-07 RX ORDER — DEXAMETHASONE 0.5 MG/5ML
10 ELIXIR ORAL EVERY 24 HOURS
Qty: 0 | Refills: 0 | Status: COMPLETED | OUTPATIENT
Start: 2019-02-07 | End: 2019-02-09

## 2019-02-07 RX ORDER — HEPARIN SODIUM 5000 [USP'U]/ML
5000 INJECTION INTRAVENOUS; SUBCUTANEOUS EVERY 8 HOURS
Qty: 0 | Refills: 0 | Status: DISCONTINUED | OUTPATIENT
Start: 2019-02-07 | End: 2019-02-10

## 2019-02-07 RX ORDER — CHLORHEXIDINE GLUCONATE 213 G/1000ML
1 SOLUTION TOPICAL DAILY
Qty: 0 | Refills: 0 | Status: DISCONTINUED | OUTPATIENT
Start: 2019-02-07 | End: 2019-02-10

## 2019-02-07 RX ORDER — ONDANSETRON 8 MG/1
8 TABLET, FILM COATED ORAL EVERY 8 HOURS
Qty: 0 | Refills: 0 | Status: DISCONTINUED | OUTPATIENT
Start: 2019-02-07 | End: 2019-02-10

## 2019-02-07 RX ORDER — PROCHLORPERAZINE MALEATE 5 MG
10 TABLET ORAL EVERY 6 HOURS
Qty: 0 | Refills: 0 | Status: DISCONTINUED | OUTPATIENT
Start: 2019-02-07 | End: 2019-02-10

## 2019-02-07 RX ORDER — PALONOSETRON HYDROCHLORIDE 0.25 MG/5ML
0.25 INJECTION, SOLUTION INTRAVENOUS ONCE
Qty: 0 | Refills: 0 | Status: COMPLETED | OUTPATIENT
Start: 2019-02-07 | End: 2019-02-07

## 2019-02-07 RX ORDER — ONDANSETRON 8 MG/1
10 TABLET, FILM COATED ORAL EVERY 24 HOURS
Qty: 0 | Refills: 0 | Status: COMPLETED | OUTPATIENT
Start: 2019-02-07 | End: 2019-02-09

## 2019-02-07 RX ORDER — FUROSEMIDE 40 MG
40 TABLET ORAL EVERY 6 HOURS
Qty: 0 | Refills: 0 | Status: DISCONTINUED | OUTPATIENT
Start: 2019-02-07 | End: 2019-02-10

## 2019-02-07 RX ORDER — POTASSIUM CHLORIDE 20 MEQ
10 PACKET (EA) ORAL THREE TIMES A DAY
Qty: 0 | Refills: 0 | Status: DISCONTINUED | OUTPATIENT
Start: 2019-02-07 | End: 2019-02-08

## 2019-02-07 RX ADMIN — Medication 40 MILLIGRAM(S): at 06:07

## 2019-02-07 RX ADMIN — Medication 3 MILLILITER(S): at 08:27

## 2019-02-07 RX ADMIN — Medication 10 MILLIEQUIVALENT(S): at 22:01

## 2019-02-07 RX ADMIN — HEPARIN SODIUM 5000 UNIT(S): 5000 INJECTION INTRAVENOUS; SUBCUTANEOUS at 14:10

## 2019-02-07 RX ADMIN — Medication 3 MILLILITER(S): at 13:29

## 2019-02-07 RX ADMIN — HEPARIN SODIUM 5000 UNIT(S): 5000 INJECTION INTRAVENOUS; SUBCUTANEOUS at 06:07

## 2019-02-07 RX ADMIN — Medication 102 MILLIGRAM(S): at 11:31

## 2019-02-07 RX ADMIN — Medication 40 MILLIGRAM(S): at 22:01

## 2019-02-07 RX ADMIN — Medication 3 MILLILITER(S): at 20:12

## 2019-02-07 RX ADMIN — Medication 0.5 MILLIGRAM(S): at 20:14

## 2019-02-07 RX ADMIN — MAGNESIUM OXIDE 400 MG ORAL TABLET 400 MILLIGRAM(S): 241.3 TABLET ORAL at 08:49

## 2019-02-07 RX ADMIN — MAGNESIUM OXIDE 400 MG ORAL TABLET 400 MILLIGRAM(S): 241.3 TABLET ORAL at 18:53

## 2019-02-07 RX ADMIN — Medication 1 TABLET(S): at 12:22

## 2019-02-07 RX ADMIN — SODIUM CHLORIDE 1 GRAM(S): 9 INJECTION INTRAMUSCULAR; INTRAVENOUS; SUBCUTANEOUS at 18:53

## 2019-02-07 RX ADMIN — Medication 81 MILLIGRAM(S): at 12:22

## 2019-02-07 RX ADMIN — Medication 0.25 MILLIGRAM(S): at 06:07

## 2019-02-07 RX ADMIN — Medication 200 MILLIGRAM(S): at 06:07

## 2019-02-07 RX ADMIN — ONDANSETRON 110 MILLIGRAM(S): 8 TABLET, FILM COATED ORAL at 12:20

## 2019-02-07 RX ADMIN — Medication 300 MILLIGRAM(S): at 06:06

## 2019-02-07 RX ADMIN — Medication 100 MILLIGRAM(S): at 06:15

## 2019-02-07 RX ADMIN — Medication 433.33 MILLIGRAM(S): at 13:18

## 2019-02-07 RX ADMIN — Medication 40 MILLIGRAM(S): at 14:11

## 2019-02-07 RX ADMIN — MAGNESIUM OXIDE 400 MG ORAL TABLET 400 MILLIGRAM(S): 241.3 TABLET ORAL at 12:22

## 2019-02-07 RX ADMIN — Medication 10 MILLIEQUIVALENT(S): at 14:11

## 2019-02-07 RX ADMIN — CHLORHEXIDINE GLUCONATE 1 APPLICATION(S): 213 SOLUTION TOPICAL at 22:02

## 2019-02-07 RX ADMIN — PALONOSETRON HYDROCHLORIDE 0.25 MILLIGRAM(S): 0.25 INJECTION, SOLUTION INTRAVENOUS at 13:11

## 2019-02-07 RX ADMIN — LORATADINE 10 MILLIGRAM(S): 10 TABLET ORAL at 12:22

## 2019-02-07 RX ADMIN — Medication 3 MILLILITER(S): at 02:18

## 2019-02-07 RX ADMIN — Medication 509.25 MILLIGRAM(S): at 14:12

## 2019-02-07 RX ADMIN — Medication 0.5 MILLIGRAM(S): at 08:27

## 2019-02-07 RX ADMIN — HEPARIN SODIUM 5000 UNIT(S): 5000 INJECTION INTRAVENOUS; SUBCUTANEOUS at 22:01

## 2019-02-07 NOTE — PHYSICAL THERAPY INITIAL EVALUATION ADULT - PERTINENT HX OF CURRENT PROBLEM, REHAB EVAL
57 y/o male adm 2/5 with SOB. CTA: no PE. Dopplers: negative DVT BLE. Rapid RVP negative. CXR: + interstitial edema. 2/6- s/p L thoracentesis

## 2019-02-07 NOTE — PROGRESS NOTE ADULT - SUBJECTIVE AND OBJECTIVE BOX
All records reviewed.    sitting in bed , having breakfast ,  s/p thoracocentesis with improved SOB as per pt     MEDICATIONS  (STANDING):  ALBUTerol/ipratropium for Nebulization 3 milliLiter(s) Nebulizer every 6 hours  aspirin  chewable 81 milliGRAM(s) Oral daily  buDESOnide   0.5 milliGRAM(s) Respule 0.5 milliGRAM(s) Inhalation two times a day  digoxin     Tablet 0.25 milliGRAM(s) Oral daily  diltiazem    milliGRAM(s) Oral daily  furosemide   Injectable 40 milliGRAM(s) IV Push every 8 hours  heparin  Injectable 5000 Unit(s) SubCutaneous every 12 hours  loratadine 10 milliGRAM(s) Oral daily  magnesium oxide 400 milliGRAM(s) Oral three times a day with meals  metoprolol succinate  milliGRAM(s) Oral daily  multivitamin 1 Tablet(s) Oral daily  sodium chloride 1 Gram(s) Oral daily  tiotropium 18 MICROgram(s) Capsule 1 Capsule(s) Inhalation daily  verapamil 120 milliGRAM(s) Oral daily    MEDICATIONS  (PRN):  guaiFENesin    Syrup 100 milliGRAM(s) Oral every 6 hours PRN Cough      Allergies          PHYSICAL EXAM:      General: well developed well nourished, in no acute distress, sitting up in ER stretcher  Eyes:sclera anicteric, pupils equal and reactive to light  ENMT:buccal mucosa moist, pharynx not injected  Neck:supple, trachea midline  Lungs: mild leona wheezes, coarse BS   Cardiovascular :regular rate and rhythm, S1 S2  Abdomen:soft, nontender, no organomegaly present, bowel sounds normal  Neurological:alert and oriented x3, Cranial Nerves II-XII grossly intact  Skin:no increased ecchymosis/petechiae/purpura  Lymph Nodes:no palpable cervical/supraclavicular lymph nodes enlargements  Extremities:1+ edema to 1/2 up leona lower legs            LABS:    CBC Full  -  ( 07 Feb 2019 06:57 )  WBC Count : 13.34 K/uL  Hemoglobin : 10.4 g/dL  Hematocrit : 32.0 %  Platelet Count - Automated : 274 K/uL  Mean Cell Volume : 80.0 fl  Mean Cell Hemoglobin : 26.0 pg  Mean Cell Hemoglobin Concentration : 32.5 gm/dL  Auto Neutrophil # : x  Auto Lymphocyte # : x  Auto Monocyte # : x  Auto Eosinophil # : x  Auto Basophil # : x  Auto Neutrophil % : x  Auto Lymphocyte % : x  Auto Monocyte % : x  Auto Eosinophil % : x  Auto Basophil % : x    02-07    133<L>  |  91<L>  |  13  ----------------------------<  119<H>  3.4<L>   |  34<H>  |  0.63    Ca    8.1<L>      07 Feb 2019 06:57              RADIOLOGY & ADDITIONAL STUDIES:  < from: CT Angio Chest w/ IV Cont (02.05.19 @ 01:37) >  EXAM:  CT ANGIO CHEST (W)AW IC                            PROCEDURE DATE:  02/05/2019          INTERPRETATION:  CLINICAL INFORMATION: Lung cancer, increased shortness   of breath and elevated d-dimer, assess PE.     PROCEDURE: CT angiography of thechest was performed with intravenous   contrast utilizing dedicated PE protocol. 95 mls of Omnipaque-350   administered without complication. 5 ml discarded. Coronal and sagittal   reconstruction images were obtained. Axial MIP images were obtained from   a separate workstation.      COMPARISON: 1/14/2019.    FINDINGS: The patient's respiratory motion degrades images.    LUNGS AND AIRWAYS: Again noted, narrow the central airways by   mediastinal/hilar lymphadenopathy. Centrilobular and paraseptal  emphysema. Decreased patchy/groundglass opacities in the left upper   lobe/lingula since prior study.   PLEURA: No pneumothorax. Small to moderate bilateral pleural effusion,   increased since prior study.  HEART: Normal size. Small pericardial effusion. Left atrium surrounded by   the mediastinal/hilar lymphadenopathy.  VESSELS: Suboptimal contrast opacification of the peripheral pulmonary   arteries. No obvious central pulmonary embolus. No secondary signs of   right heart strain. Atherosclerotic change of the thoracic aorta, great   vessel origin and coronary arteries. Major intrathoracic vessels encased   by the mediastinal/hilar lymphadenopathy.  CHEST WALL AND LOWER NECK: Again noted, heterogeneous thyroid gland.   Right jugular approach Mediport terminating at the cavoatrial junction.  MEDIASTINUM AND NELLA: Again noted, mediastinal/hilar lymphadenopathy.   UPPER ABDOMEN: Nonspecific bilateral perinephric stranding.   BONES: Again noted, heterogeneous pattern of bone mineralization.   Degenerative changes of the spine. Stable anterior wedging/decreased   vertebral body height in the thoracic spine.    IMPRESSION:    Suboptimal evaluation of the peripheral pulmonary arteries. No central   pulmonary embolus or secondary signs ofright heart strain.    Known mediastinal/hilar lymphadenopathy encasing the major intrathoracic   vessels/left atrium and narrowing the central airways.    Decreased patchy/groundglass opacities in the left upper lobe/lingula   since 1/4/2019.  Increased, bilateral pleural effusion with compressive   atelectasis. Recommend clinical correlation to assess underlying   pneumonia.    Heterogeneous thyroid gland, which can be further characterized on a   nonemergent ultrasound.    Heterogeneous pattern of bone mineralization, which may be due to   osteopenia although marrow infiltrative/replacing process cannot be   excluded. Recommend clinical correlation additional imaging (MRI and/or   bone scan) as clinically indicated.    < end of copied text >

## 2019-02-07 NOTE — PROGRESS NOTE ADULT - ASSESSMENT
57 y/o man w HTN/COPD, dx w small cell lung ca late 12/2018 early 1/2019 w sig respiratory compromise   post c#1 chemo w carbo/VP16 1/9/19 w some respiratory improvement      presented with SOB ,CT angio chest no PE, decr left infiltrate but incr leona effusion  underwent therapeutic / diagnostic thoracentesis    proceed with cycle #2 chemotherapy: day 1  2/7/19  needs GCF support regardless of WBC , starting 24 hours after last chemo   f/u cytology results   at some point  will obtain CT a/p to complete  staging 57 y/o man w HTN/COPD, dx w small cell lung ca late 12/2018 early 1/2019 w sig respiratory compromise   post c#1 chemo w carbo/VP16 1/9/19 w some respiratory improvement      presented with SOB ,CT angio chest no PE, decr left infiltrate but incr leona effusion  underwent therapeutic / diagnostic thoracentesis    proceed with cycle #2 chemotherapy: day 1  2/7/19  etoposide dose reduced by 20% for leukopenia as per primary onc Dr Harris   needs GCF support regardless of WBC , starting 24 hours after last chemo   f/u cytology results   at some point  will obtain CT a/p to complete  staging

## 2019-02-07 NOTE — PROGRESS NOTE ADULT - SUBJECTIVE AND OBJECTIVE BOX
Patient is a 58y old  Male who presents with a chief complaint of sob (07 Feb 2019 14:48)      INTERVAL HPI/OVERNIGHT EVENTS:    Underwent chemotherapy earlier today    MEDICATIONS  (STANDING):  ALBUTerol/ipratropium for Nebulization 3 milliLiter(s) Nebulizer every 6 hours  aspirin  chewable 81 milliGRAM(s) Oral daily  buDESOnide   0.5 milliGRAM(s) Respule 0.5 milliGRAM(s) Inhalation two times a day  chlorhexidine 2% Cloths 1 Application(s) Topical daily  dexamethasone  IVPB 10 milliGRAM(s) IV Intermittent every 24 hours  digoxin     Tablet 0.25 milliGRAM(s) Oral daily  diltiazem    milliGRAM(s) Oral daily  etoposide IVPB (eMAR) 185 milliGRAM(s) IV Intermittent every 24 hours  furosemide   Injectable 40 milliGRAM(s) IV Push every 6 hours  heparin  Injectable 5000 Unit(s) SubCutaneous every 8 hours  loratadine 10 milliGRAM(s) Oral daily  magnesium oxide 400 milliGRAM(s) Oral three times a day with meals  metoprolol succinate  milliGRAM(s) Oral daily  multivitamin 1 Tablet(s) Oral daily  ondansetron  IVPB 10 milliGRAM(s) IV Intermittent every 24 hours  potassium chloride    Tablet ER 10 milliEquivalent(s) Oral three times a day  sodium chloride 1 Gram(s) Oral two times a day  tiotropium 18 MICROgram(s) Capsule 1 Capsule(s) Inhalation daily      MEDICATIONS  (PRN):  ALPRAZolam 0.25 milliGRAM(s) Oral three times a day PRN anxiety  guaiFENesin    Syrup 100 milliGRAM(s) Oral every 6 hours PRN Cough  ondansetron Injectable 8 milliGRAM(s) IV Push every 8 hours PRN Nausea and/or Vomiting  prochlorperazine   Tablet 10 milliGRAM(s) Oral every 6 hours PRN nausea and vomitting      Allergies    penicillin (Unknown)    Intolerances        PAST MEDICAL & SURGICAL HISTORY:  Lung cancer  Depression  Dyspnea  Mediastinal adenopathy  Pericardial effusion  AF (atrial fibrillation)  Hyponatremia  COPD (chronic obstructive pulmonary disease)  HTN (hypertension)  S/P pericardial window creation      Vital Signs Last 24 Hrs  T(C): 36.6 (07 Feb 2019 21:28), Max: 36.9 (07 Feb 2019 05:19)  T(F): 97.8 (07 Feb 2019 21:28), Max: 98.5 (07 Feb 2019 05:19)  HR: 103 (07 Feb 2019 21:28) (90 - 107)  BP: 102/52 (07 Feb 2019 21:28) (93/63 - 122/62)  BP(mean): --  RR: 19 (07 Feb 2019 21:28) (18 - 19)  SpO2: 99% (07 Feb 2019 21:28) (92% - 99%)    PHYSICAL EXAMINATION:    GENERAL: The patient is awake and alert in no apparent distress.     HEENT: Head is normocephalic and atraumatic. Extraocular muscles are intact. Mucous membranes are moist.    NECK: Supple.    LUNGS: mild bilateral wheezes and rhonchi    HEART: Regular rate and rhythm without murmur.    ABDOMEN: Soft, nontender, and nondistended.      EXTREMITIES: positive edema    NEUROLOGIC: Grossly intact.        LABS:                        10.4   13.34 )-----------( 274      ( 07 Feb 2019 06:57 )             32.0     02-07    133<L>  |  91<L>  |  13  ----------------------------<  119<H>  3.4<L>   |  34<H>  |  0.63    Ca    8.1<L>      07 Feb 2019 06:57                  Serum Pro-Brain Natriuretic Peptide: 909 pg/mL (02-04-19 @ 22:21)      Procalcitonin, Serum: 0.16 ng/mL (02-05-19 @ 07:56)      MICROBIOLOGY:  Culture Results:   No growth (02-06 @ 21:59)  Culture Results:   Testing in progress (02-06 @ 21:59)  Culture Results:   No growth to date. (02-05 @ 09:09)  Culture Results:   No growth to date. (02-05 @ 09:09)      RADIOLOGY & ADDITIONAL STUDIES:    Assessment:    Small cell carcinoma of lung  S/P left thoracentesis for malignant effusion    Plan:    Chemotherapy as per oncology  Nebulizer treatments  Oxygen PRN Patient is a 58y old  Male who presents with a chief complaint of sob (07 Feb 2019 14:48)      INTERVAL HPI/OVERNIGHT EVENTS:    Underwent chemotherapy earlier today    MEDICATIONS  (STANDING):  ALBUTerol/ipratropium for Nebulization 3 milliLiter(s) Nebulizer every 6 hours  aspirin  chewable 81 milliGRAM(s) Oral daily  buDESOnide   0.5 milliGRAM(s) Respule 0.5 milliGRAM(s) Inhalation two times a day  chlorhexidine 2% Cloths 1 Application(s) Topical daily  dexamethasone  IVPB 10 milliGRAM(s) IV Intermittent every 24 hours  digoxin     Tablet 0.25 milliGRAM(s) Oral daily  diltiazem    milliGRAM(s) Oral daily  etoposide IVPB (eMAR) 185 milliGRAM(s) IV Intermittent every 24 hours  furosemide   Injectable 40 milliGRAM(s) IV Push every 6 hours  heparin  Injectable 5000 Unit(s) SubCutaneous every 8 hours  loratadine 10 milliGRAM(s) Oral daily  magnesium oxide 400 milliGRAM(s) Oral three times a day with meals  metoprolol succinate  milliGRAM(s) Oral daily  multivitamin 1 Tablet(s) Oral daily  ondansetron  IVPB 10 milliGRAM(s) IV Intermittent every 24 hours  potassium chloride    Tablet ER 10 milliEquivalent(s) Oral three times a day  sodium chloride 1 Gram(s) Oral two times a day  tiotropium 18 MICROgram(s) Capsule 1 Capsule(s) Inhalation daily      MEDICATIONS  (PRN):  ALPRAZolam 0.25 milliGRAM(s) Oral three times a day PRN anxiety  guaiFENesin    Syrup 100 milliGRAM(s) Oral every 6 hours PRN Cough  ondansetron Injectable 8 milliGRAM(s) IV Push every 8 hours PRN Nausea and/or Vomiting  prochlorperazine   Tablet 10 milliGRAM(s) Oral every 6 hours PRN nausea and vomitting      Allergies    penicillin (Unknown)    Intolerances        PAST MEDICAL & SURGICAL HISTORY:  Lung cancer  Depression  Dyspnea  Mediastinal adenopathy  Pericardial effusion  AF (atrial fibrillation)  Hyponatremia  COPD (chronic obstructive pulmonary disease)  HTN (hypertension)  S/P pericardial window creation      Vital Signs Last 24 Hrs  T(C): 36.6 (07 Feb 2019 21:28), Max: 36.9 (07 Feb 2019 05:19)  T(F): 97.8 (07 Feb 2019 21:28), Max: 98.5 (07 Feb 2019 05:19)  HR: 103 (07 Feb 2019 21:28) (90 - 107)  BP: 102/52 (07 Feb 2019 21:28) (93/63 - 122/62)  BP(mean): --  RR: 19 (07 Feb 2019 21:28) (18 - 19)  SpO2: 99% (07 Feb 2019 21:28) (92% - 99%)    PHYSICAL EXAMINATION:    GENERAL: The patient is awake and alert in no apparent distress.     HEENT: Head is normocephalic and atraumatic. Extraocular muscles are intact. Mucous membranes are moist.    NECK: Supple.    LUNGS: mild bilateral wheezes and rhonchi    HEART: Regular rate and rhythm without murmur.    ABDOMEN: Soft, nontender, and nondistended.      EXTREMITIES: positive edema    NEUROLOGIC: Grossly intact.        LABS:                        10.4   13.34 )-----------( 274      ( 07 Feb 2019 06:57 )             32.0     02-07    133<L>  |  91<L>  |  13  ----------------------------<  119<H>  3.4<L>   |  34<H>  |  0.63    Ca    8.1<L>      07 Feb 2019 06:57      Pleural fluid cytology positive for small cell carcinoma of lung            Serum Pro-Brain Natriuretic Peptide: 909 pg/mL (02-04-19 @ 22:21)      Procalcitonin, Serum: 0.16 ng/mL (02-05-19 @ 07:56)      MICROBIOLOGY:  Culture Results:   No growth (02-06 @ 21:59)  Culture Results:   Testing in progress (02-06 @ 21:59)  Culture Results:   No growth to date. (02-05 @ 09:09)  Culture Results:   No growth to date. (02-05 @ 09:09)      RADIOLOGY & ADDITIONAL STUDIES:    Assessment:    Small cell carcinoma of lung  S/P left thoracentesis for malignant effusion    Plan:    Chemotherapy as per oncology  Nebulizer treatments  Oxygen PRN

## 2019-02-07 NOTE — PROGRESS NOTE ADULT - SUBJECTIVE AND OBJECTIVE BOX
Patient is a 58y old  Male who presents with a chief complaint of sob (07 Feb 2019 12:12)      INTERVAL /OVERNIGHT EVENTS: feels much better, chemo underway    MEDICATIONS  (STANDING):  ALBUTerol/ipratropium for Nebulization 3 milliLiter(s) Nebulizer every 6 hours  aspirin  chewable 81 milliGRAM(s) Oral daily  buDESOnide   0.5 milliGRAM(s) Respule 0.5 milliGRAM(s) Inhalation two times a day  dexamethasone  IVPB 10 milliGRAM(s) IV Intermittent every 24 hours  digoxin     Tablet 0.25 milliGRAM(s) Oral daily  diltiazem    milliGRAM(s) Oral daily  etoposide IVPB (eMAR) 185 milliGRAM(s) IV Intermittent every 24 hours  furosemide   Injectable 40 milliGRAM(s) IV Push every 8 hours  heparin  Injectable 5000 Unit(s) SubCutaneous every 8 hours  loratadine 10 milliGRAM(s) Oral daily  magnesium oxide 400 milliGRAM(s) Oral three times a day with meals  metoprolol succinate  milliGRAM(s) Oral daily  multivitamin 1 Tablet(s) Oral daily  ondansetron  IVPB 10 milliGRAM(s) IV Intermittent every 24 hours  potassium chloride    Tablet ER 10 milliEquivalent(s) Oral three times a day  sodium chloride 1 Gram(s) Oral two times a day  tiotropium 18 MICROgram(s) Capsule 1 Capsule(s) Inhalation daily    MEDICATIONS  (PRN):  ALPRAZolam 0.25 milliGRAM(s) Oral three times a day PRN anxiety  guaiFENesin    Syrup 100 milliGRAM(s) Oral every 6 hours PRN Cough  ondansetron Injectable 8 milliGRAM(s) IV Push every 8 hours PRN Nausea and/or Vomiting  prochlorperazine   Tablet 10 milliGRAM(s) Oral every 6 hours PRN nausea and vomitting      Allergies    penicillin (Unknown)    Intolerances        REVIEW OF SYSTEMS:  CONSTITUTIONAL: No fever, weight loss, or fatigue  EYES: No eye pain, visual disturbances, or discharge  ENMT:  No difficulty hearing, tinnitus, vertigo; No sinus or throat pain  NECK: No pain or stiffness  RESPIRATORY: + cough, wheezing, chills or hemoptysis; No shortness of breath  CARDIOVASCULAR: No chest pain, palpitations, dizziness, or leg swelling  GASTROINTESTINAL: No abdominal or epigastric pain. No nausea, vomiting, or hematemesis; No diarrhea or constipation. No melena or hematochezia.  GENITOURINARY: No dysuria, frequency, hematuria, or incontinence  NEUROLOGICAL: No headaches, memory loss, loss of strength, numbness, or tremors  SKIN: No itching, burning, rashes, or lesions   LYMPH NODES: No enlarged glands  ENDOCRINE: No heat or cold intolerance; No hair loss; No polydipsia or polyuria  MUSCULOSKELETAL: No joint pain or swelling; No muscle, back, or extremity pain  PSYCHIATRIC: No depression, anxiety, mood swings, or difficulty sleeping  HEME/LYMPH: No easy bruising, or bleeding gums  ALLERGY AND IMMUNOLOGIC: No hives or eczema    Vital Signs Last 24 Hrs  T(C): 36.9 (07 Feb 2019 05:19), Max: 36.9 (07 Feb 2019 05:19)  T(F): 98.5 (07 Feb 2019 05:19), Max: 98.5 (07 Feb 2019 05:19)  HR: 100 (07 Feb 2019 13:32) (85 - 100)  BP: 122/62 (07 Feb 2019 13:32) (93/63 - 122/62)  BP(mean): --  RR: 18 (07 Feb 2019 07:00) (18 - 20)  SpO2: 92% (07 Feb 2019 08:27) (92% - 99%)    PHYSICAL EXAM:  GENERAL: NAD, well-groomed, well-developed  HEAD:  Atraumatic, Normocephalic  EYES: EOMI, PERRLA, conjunctiva and sclera clear  ENMT: No tonsillar erythema, exudates, or enlargement; Moist mucous membranes, Good dentition, No lesions  NECK: Supple, No JVD, Normal thyroid  NERVOUS SYSTEM:  Alert & Oriented X3, Good concentration; Motor Strength 5/5 B/L upper and lower extremities; DTRs 2+ intact and symmetric  CHEST/LUNG: Clear to auscultation bilaterally; No rales, rhonchi, wheezing, or rubs  HEART: Regular rate and rhythm; No murmurs, rubs, or gallops  ABDOMEN: Soft, Nontender, Nondistended; Bowel sounds present  EXTREMITIES:  2+ Peripheral Pulses, No clubbing, cyanosis, or edema  LYMPH: No lymphadenopathy noted  SKIN: No rashes or lesions    LABS:                        10.4   13.34 )-----------( 274      ( 07 Feb 2019 06:57 )             32.0     07 Feb 2019 06:57    133    |  91     |  13     ----------------------------<  119    3.4     |  34     |  0.63     Ca    8.1        07 Feb 2019 06:57          CAPILLARY BLOOD GLUCOSE          RADIOLOGY & ADDITIONAL TESTS:    Notes Reviewed:  [x ] YES  [ ] NO    Care Discussed with Consultants/Other Providers [x ] YES  [ ] NO

## 2019-02-07 NOTE — PHYSICAL THERAPY INITIAL EVALUATION ADULT - ADDITIONAL COMMENTS
Pt lives with his spouse in a 2 family house- pt lives on second floor ( + steps to enter/exit). Pt ambulates independently without device and is independent with ADLs. + driving

## 2019-02-07 NOTE — PROGRESS NOTE ADULT - SUBJECTIVE AND OBJECTIVE BOX
infectious diseases progress note:    RAFA SHAFER is a 58y y. o. Male patient    Patient reports: "breathing is much better since they removed all that fluid."    ROS:    EYES:  Negative  blurry vision or double vision  GASTROINTESTINAL:  Negative for nausea, vomiting, diarrhea  -otherwise negative except for subjective    Allergies    penicillin (Unknown)    Intolerances        ANTIBIOTICS/RELEVANT:  antimicrobials    immunologic:    OTHER:  ALBUTerol/ipratropium for Nebulization 3 milliLiter(s) Nebulizer every 6 hours  ALPRAZolam 0.25 milliGRAM(s) Oral three times a day PRN  aspirin  chewable 81 milliGRAM(s) Oral daily  buDESOnide   0.5 milliGRAM(s) Respule 0.5 milliGRAM(s) Inhalation two times a day  CARBOplatin IVPB (eMAR) 750 milliGRAM(s) IV Intermittent once  dexamethasone  IVPB 10 milliGRAM(s) IV Intermittent every 24 hours  digoxin     Tablet 0.25 milliGRAM(s) Oral daily  diltiazem    milliGRAM(s) Oral daily  etoposide IVPB (eMAR) 185 milliGRAM(s) IV Intermittent every 24 hours  furosemide   Injectable 40 milliGRAM(s) IV Push every 8 hours  guaiFENesin    Syrup 100 milliGRAM(s) Oral every 6 hours PRN  heparin  Injectable 5000 Unit(s) SubCutaneous every 8 hours  loratadine 10 milliGRAM(s) Oral daily  magnesium oxide 400 milliGRAM(s) Oral three times a day with meals  metoprolol succinate  milliGRAM(s) Oral daily  multivitamin 1 Tablet(s) Oral daily  ondansetron  IVPB 10 milliGRAM(s) IV Intermittent every 24 hours  ondansetron Injectable 8 milliGRAM(s) IV Push every 8 hours PRN  palonosetron Injectable 0.25 milliGRAM(s) IV Push once  potassium chloride    Tablet ER 10 milliEquivalent(s) Oral three times a day  prochlorperazine   Tablet 10 milliGRAM(s) Oral every 6 hours PRN  sodium chloride 1 Gram(s) Oral two times a day  tiotropium 18 MICROgram(s) Capsule 1 Capsule(s) Inhalation daily      Objective:  Vital Signs Last 24 Hrs  T(C): 36.9 (07 Feb 2019 05:19), Max: 36.9 (07 Feb 2019 05:19)  T(F): 98.5 (07 Feb 2019 05:19), Max: 98.5 (07 Feb 2019 05:19)  HR: 94 (07 Feb 2019 08:27) (85 - 96)  BP: 104/60 (07 Feb 2019 05:41) (93/63 - 117/67)  BP(mean): --  RR: 18 (07 Feb 2019 07:00) (18 - 20)  SpO2: 92% (07 Feb 2019 08:27) (92% - 99%)    T(C): 36.9 (02-07-19 @ 05:19), Max: 37.3 (02-06-19 @ 12:11)  T(C): 36.9 (02-07-19 @ 05:19), Max: 37.3 (02-06-19 @ 12:11)  T(C): 36.9 (02-07-19 @ 05:19), Max: 37.3 (02-06-19 @ 12:11)    PHYSICAL EXAM:  Constitutional: Well-developed, well nourished  Eyes: PERRLA, EOMI  Ear/Nose/Throat: oropharynx normal	  Neck: no JVD, no lymphadenopathy, supple  Respiratory: no accessory muscle use  Cardiovascular: RRR,   Gastrointestinal: soft, NT  Extremities: no clubbing, no cyanosis, edema absent      LABS:                        10.4   13.34 )-----------( 274      ( 07 Feb 2019 06:57 )             32.0       13.34 02-07 @ 06:57  13.47 02-06 @ 06:32  13.39 02-05 @ 08:03  14.04 02-04 @ 22:21      02-07    133<L>  |  91<L>  |  13  ----------------------------<  119<H>  3.4<L>   |  34<H>  |  0.63    Ca    8.1<L>      07 Feb 2019 06:57        Creatinine, Serum: 0.63 mg/dL (02-07-19 @ 06:57)  Creatinine, Serum: 0.45 mg/dL (02-06-19 @ 06:32)  Creatinine, Serum: 0.56 mg/dL (02-05-19 @ 07:56)  Creatinine, Serum: 0.69 mg/dL (02-04-19 @ 22:21)                MICROBIOLOGY:  Culture Results:   Testing in progress (02-06 @ 21:59)              RADIOLOGY & ADDITIONAL STUDIES:

## 2019-02-08 DIAGNOSIS — I95.9 HYPOTENSION, UNSPECIFIED: ICD-10-CM

## 2019-02-08 LAB
ANION GAP SERPL CALC-SCNC: 7 MMOL/L — SIGNIFICANT CHANGE UP (ref 5–17)
BASOPHILS # BLD AUTO: 0.01 K/UL — SIGNIFICANT CHANGE UP (ref 0–0.2)
BASOPHILS NFR BLD AUTO: 0.1 % — SIGNIFICANT CHANGE UP (ref 0–2)
BUN SERPL-MCNC: 14 MG/DL — SIGNIFICANT CHANGE UP (ref 7–23)
CALCIUM SERPL-MCNC: 8.1 MG/DL — LOW (ref 8.5–10.1)
CHLORIDE SERPL-SCNC: 88 MMOL/L — LOW (ref 96–108)
CO2 SERPL-SCNC: 34 MMOL/L — HIGH (ref 22–31)
CREAT SERPL-MCNC: 0.59 MG/DL — SIGNIFICANT CHANGE UP (ref 0.5–1.3)
EOSINOPHIL # BLD AUTO: 0 K/UL — SIGNIFICANT CHANGE UP (ref 0–0.5)
EOSINOPHIL NFR BLD AUTO: 0 % — SIGNIFICANT CHANGE UP (ref 0–6)
GLUCOSE SERPL-MCNC: 143 MG/DL — HIGH (ref 70–99)
HCT VFR BLD CALC: 29.9 % — LOW (ref 39–50)
HGB BLD-MCNC: 9.8 G/DL — LOW (ref 13–17)
IMM GRANULOCYTES NFR BLD AUTO: 0.8 % — SIGNIFICANT CHANGE UP (ref 0–1.5)
LYMPHOCYTES # BLD AUTO: 0.48 K/UL — LOW (ref 1–3.3)
LYMPHOCYTES # BLD AUTO: 4.4 % — LOW (ref 13–44)
MCHC RBC-ENTMCNC: 25.8 PG — LOW (ref 27–34)
MCHC RBC-ENTMCNC: 32.8 GM/DL — SIGNIFICANT CHANGE UP (ref 32–36)
MCV RBC AUTO: 78.7 FL — LOW (ref 80–100)
MONOCYTES # BLD AUTO: 0.75 K/UL — SIGNIFICANT CHANGE UP (ref 0–0.9)
MONOCYTES NFR BLD AUTO: 6.9 % — SIGNIFICANT CHANGE UP (ref 2–14)
NEUTROPHILS # BLD AUTO: 9.58 K/UL — HIGH (ref 1.8–7.4)
NEUTROPHILS NFR BLD AUTO: 87.8 % — HIGH (ref 43–77)
NRBC # BLD: 0 /100 WBCS — SIGNIFICANT CHANGE UP (ref 0–0)
PLATELET # BLD AUTO: 280 K/UL — SIGNIFICANT CHANGE UP (ref 150–400)
POTASSIUM SERPL-MCNC: 3.5 MMOL/L — SIGNIFICANT CHANGE UP (ref 3.5–5.3)
POTASSIUM SERPL-SCNC: 3.5 MMOL/L — SIGNIFICANT CHANGE UP (ref 3.5–5.3)
RBC # BLD: 3.8 M/UL — LOW (ref 4.2–5.8)
RBC # FLD: 14.2 % — SIGNIFICANT CHANGE UP (ref 10.3–14.5)
SODIUM SERPL-SCNC: 129 MMOL/L — LOW (ref 135–145)
WBC # BLD: 10.91 K/UL — HIGH (ref 3.8–10.5)
WBC # FLD AUTO: 10.91 K/UL — HIGH (ref 3.8–10.5)

## 2019-02-08 PROCEDURE — 99222 1ST HOSP IP/OBS MODERATE 55: CPT

## 2019-02-08 PROCEDURE — 74177 CT ABD & PELVIS W/CONTRAST: CPT | Mod: 26

## 2019-02-08 RX ORDER — SODIUM CHLORIDE 9 MG/ML
1 INJECTION INTRAMUSCULAR; INTRAVENOUS; SUBCUTANEOUS THREE TIMES A DAY
Qty: 0 | Refills: 0 | Status: DISCONTINUED | OUTPATIENT
Start: 2019-02-08 | End: 2019-02-10

## 2019-02-08 RX ORDER — POTASSIUM CHLORIDE 20 MEQ
20 PACKET (EA) ORAL
Qty: 0 | Refills: 0 | Status: DISCONTINUED | OUTPATIENT
Start: 2019-02-08 | End: 2019-02-10

## 2019-02-08 RX ORDER — METOPROLOL TARTRATE 50 MG
200 TABLET ORAL DAILY
Qty: 0 | Refills: 0 | Status: DISCONTINUED | OUTPATIENT
Start: 2019-02-08 | End: 2019-02-10

## 2019-02-08 RX ORDER — IOHEXOL 300 MG/ML
500 INJECTION, SOLUTION INTRAVENOUS
Qty: 0 | Refills: 0 | Status: COMPLETED | OUTPATIENT
Start: 2019-02-08 | End: 2019-02-08

## 2019-02-08 RX ADMIN — HEPARIN SODIUM 5000 UNIT(S): 5000 INJECTION INTRAVENOUS; SUBCUTANEOUS at 22:04

## 2019-02-08 RX ADMIN — Medication 0.5 MILLIGRAM(S): at 08:55

## 2019-02-08 RX ADMIN — LORATADINE 10 MILLIGRAM(S): 10 TABLET ORAL at 11:28

## 2019-02-08 RX ADMIN — IOHEXOL 500 MILLILITER(S): 300 INJECTION, SOLUTION INTRAVENOUS at 15:21

## 2019-02-08 RX ADMIN — Medication 3 MILLILITER(S): at 08:55

## 2019-02-08 RX ADMIN — Medication 20 MILLIEQUIVALENT(S): at 17:07

## 2019-02-08 RX ADMIN — MAGNESIUM OXIDE 400 MG ORAL TABLET 400 MILLIGRAM(S): 241.3 TABLET ORAL at 17:07

## 2019-02-08 RX ADMIN — Medication 509.25 MILLIGRAM(S): at 14:22

## 2019-02-08 RX ADMIN — MAGNESIUM OXIDE 400 MG ORAL TABLET 400 MILLIGRAM(S): 241.3 TABLET ORAL at 08:03

## 2019-02-08 RX ADMIN — SODIUM CHLORIDE 1 GRAM(S): 9 INJECTION INTRAMUSCULAR; INTRAVENOUS; SUBCUTANEOUS at 22:04

## 2019-02-08 RX ADMIN — Medication 200 MILLIGRAM(S): at 06:36

## 2019-02-08 RX ADMIN — Medication 3 MILLILITER(S): at 13:46

## 2019-02-08 RX ADMIN — Medication 100 MILLIGRAM(S): at 11:29

## 2019-02-08 RX ADMIN — ONDANSETRON 110 MILLIGRAM(S): 8 TABLET, FILM COATED ORAL at 13:37

## 2019-02-08 RX ADMIN — Medication 10 MILLIEQUIVALENT(S): at 06:36

## 2019-02-08 RX ADMIN — Medication 1 TABLET(S): at 11:28

## 2019-02-08 RX ADMIN — SODIUM CHLORIDE 1 GRAM(S): 9 INJECTION INTRAMUSCULAR; INTRAVENOUS; SUBCUTANEOUS at 13:19

## 2019-02-08 RX ADMIN — Medication 0.25 MILLIGRAM(S): at 06:36

## 2019-02-08 RX ADMIN — HEPARIN SODIUM 5000 UNIT(S): 5000 INJECTION INTRAVENOUS; SUBCUTANEOUS at 13:19

## 2019-02-08 RX ADMIN — CHLORHEXIDINE GLUCONATE 1 APPLICATION(S): 213 SOLUTION TOPICAL at 11:33

## 2019-02-08 RX ADMIN — Medication 102 MILLIGRAM(S): at 13:06

## 2019-02-08 RX ADMIN — HEPARIN SODIUM 5000 UNIT(S): 5000 INJECTION INTRAVENOUS; SUBCUTANEOUS at 06:36

## 2019-02-08 RX ADMIN — SODIUM CHLORIDE 1 GRAM(S): 9 INJECTION INTRAMUSCULAR; INTRAVENOUS; SUBCUTANEOUS at 06:36

## 2019-02-08 RX ADMIN — Medication 3 MILLILITER(S): at 21:05

## 2019-02-08 RX ADMIN — Medication 3 MILLILITER(S): at 01:50

## 2019-02-08 RX ADMIN — Medication 300 MILLIGRAM(S): at 06:36

## 2019-02-08 RX ADMIN — Medication 40 MILLIGRAM(S): at 14:08

## 2019-02-08 RX ADMIN — Medication 81 MILLIGRAM(S): at 11:28

## 2019-02-08 RX ADMIN — Medication 40 MILLIGRAM(S): at 08:25

## 2019-02-08 RX ADMIN — MAGNESIUM OXIDE 400 MG ORAL TABLET 400 MILLIGRAM(S): 241.3 TABLET ORAL at 13:19

## 2019-02-08 RX ADMIN — Medication 40 MILLIGRAM(S): at 22:04

## 2019-02-08 RX ADMIN — Medication 40 MILLIGRAM(S): at 02:31

## 2019-02-08 RX ADMIN — Medication 0.5 MILLIGRAM(S): at 21:05

## 2019-02-08 RX ADMIN — IOHEXOL 500 MILLILITER(S): 300 INJECTION, SOLUTION INTRAVENOUS at 15:48

## 2019-02-08 NOTE — PROGRESS NOTE ADULT - SUBJECTIVE AND OBJECTIVE BOX
Patient is a 58y old  Male who presents with a chief complaint of sob (08 Feb 2019 16:33)      INTERVAL HPI/OVERNIGHT EVENTS:    Less shortness of breath today. Denies chest pain. Admits to cough    MEDICATIONS  (STANDING):  ALBUTerol/ipratropium for Nebulization 3 milliLiter(s) Nebulizer every 6 hours  aspirin  chewable 81 milliGRAM(s) Oral daily  buDESOnide   0.5 milliGRAM(s) Respule 0.5 milliGRAM(s) Inhalation two times a day  chlorhexidine 2% Cloths 1 Application(s) Topical daily  dexamethasone  IVPB 10 milliGRAM(s) IV Intermittent every 24 hours  digoxin     Tablet 0.25 milliGRAM(s) Oral daily  diltiazem    milliGRAM(s) Oral daily  etoposide IVPB (eMAR) 185 milliGRAM(s) IV Intermittent every 24 hours  furosemide   Injectable 40 milliGRAM(s) IV Push every 6 hours  heparin  Injectable 5000 Unit(s) SubCutaneous every 8 hours  loratadine 10 milliGRAM(s) Oral daily  magnesium oxide 400 milliGRAM(s) Oral three times a day with meals  metoprolol succinate  milliGRAM(s) Oral daily  multivitamin 1 Tablet(s) Oral daily  ondansetron  IVPB 10 milliGRAM(s) IV Intermittent every 24 hours  potassium chloride    Tablet ER 20 milliEquivalent(s) Oral two times a day  sodium chloride 1 Gram(s) Oral three times a day  tiotropium 18 MICROgram(s) Capsule 1 Capsule(s) Inhalation daily      MEDICATIONS  (PRN):  ALPRAZolam 0.25 milliGRAM(s) Oral three times a day PRN anxiety  guaiFENesin    Syrup 100 milliGRAM(s) Oral every 6 hours PRN Cough  ondansetron Injectable 8 milliGRAM(s) IV Push every 8 hours PRN Nausea and/or Vomiting  prochlorperazine   Tablet 10 milliGRAM(s) Oral every 6 hours PRN nausea and vomitting      Allergies    penicillin (Unknown)    Intolerances        PAST MEDICAL & SURGICAL HISTORY:  Lung cancer  Depression  Dyspnea  Mediastinal adenopathy  Pericardial effusion  AF (atrial fibrillation)  Hyponatremia  COPD (chronic obstructive pulmonary disease)  HTN (hypertension)  S/P pericardial window creation      Vital Signs Last 24 Hrs  T(C): 36.6 (08 Feb 2019 14:12), Max: 36.7 (08 Feb 2019 05:11)  T(F): 97.9 (08 Feb 2019 14:12), Max: 98 (08 Feb 2019 05:11)  HR: 86 (08 Feb 2019 21:06) (81 - 110)  BP: 100/52 (08 Feb 2019 14:06) (100/52 - 115/62)  BP(mean): --  RR: 16 (08 Feb 2019 14:12) (16 - 19)  SpO2: 96% (08 Feb 2019 21:06) (91% - 99%)    PHYSICAL EXAMINATION:    GENERAL: The patient is awake and alert in no apparent distress.     HEENT: Head is normocephalic and atraumatic. Extraocular muscles are intact. Mucous membranes are moist.    NECK: Supple.    LUNGS: mild bilateral expiratory wheezes    HEART: Regular rate and rhythm without murmur.    ABDOMEN: Soft, nontender, and nondistended.      EXTREMITIES: Without any cyanosis, clubbing, rash, lesions or edema.    NEUROLOGIC: Grossly intact.    SKIN: No ulceration or induration present.      LABS:                        9.8    10.91 )-----------( 280      ( 08 Feb 2019 07:26 )             29.9     02-08    129<L>  |  88<L>  |  14  ----------------------------<  143<H>  3.5   |  34<H>  |  0.59    Ca    8.1<L>      08 Feb 2019 07:26                          MICROBIOLOGY:  Culture Results:   No growth (02-06 @ 21:59)  Culture Results:   Testing in progress (02-06 @ 21:59)  Culture Results:   No growth to date. (02-05 @ 09:09)  Culture Results:   No growth to date. 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

## 2019-02-08 NOTE — CONSULT NOTE ADULT - ASSESSMENT
Pt is a 57 yo male hx small cell lung ca, sp chemo on 1/14--due for chemo in am, Afib w RVR, COPD, HTN, S/P 12/18 pericardial window . pt pw 4 days of  progressive sob, cough,  +b/l edema sp 2 dopplers neg over past month.   pt states + orthopnea, no f/c, sputum, or dizziness.  In ER patient was found to have pleural effusion.  Patient is being admitted for further work up and treatment including possible chemotherapy. (05 Feb 2019 10:40)  PT S/P L thoracentesis for malignant effusion this admission  12/18 TTE shows grossly nl LVSF and size EF 55-65%    Pt has been asymptomatic   -there is no evidence of acute ischemia.  TTE remains unchanged from 12/18  -Afib is rate controlled  - c/w digoxin, diltiazem, metoprolol at current doses prior admission in december Afib was extremely hard to rate control   -pt is significantly volume overloaded  -CW IV lasix q 6  -Pt is hemodynamically stable w/o any symptoms of hypotension continue to monitor   -Monitor and replete lytes, keep K>4 and Mg >2  Thank you for the consult  Will continue to follow  Jermaine LOVETT Pt is a 59 yo male hx small cell lung ca, sp chemo on 1/14--due for chemo in am, Afib w RVR, COPD, HTN, S/P 12/18 pericardial window . pt pw 4 days of  progressive sob, cough,  +b/l edema sp 2 dopplers neg over past month.   pt states + orthopnea, no f/c, sputum, or dizziness.  In ER patient was found to have pleural effusion.  Patient is being admitted for further work up and treatment including possible chemotherapy. (05 Feb 2019 10:40)  PT S/P L thoracentesis for malignant effusion this admission  12/18 TTE shows grossly nl LVSF and size EF 55-65%    Pt has been asymptomatic   -there is no evidence of acute ischemia.  TTE remains unchanged from 12/18  -Afib is rate controlled  - c/w digoxin, diltiazem, metoprolol at current doses prior admission in december Afib was extremely hard to rate control   -pt is significantly volume overloaded  -CW IV lasix q 6  -Pt is hemodynamically stable w/o any symptoms of hypotension continue to monitor   -If hypotension becomes an issue consider midodrine  -Monitor and replete lytes, keep K>4 and Mg >2  Thank you for the consult  Will continue to follow  Jermaine LOVETT Pt is a 57 yo male hx small cell lung ca, sp chemo on 1/14--due for chemo in am, Afib w RVR, COPD, HTN, S/P 12/18 pericardial window . pt pw 4 days of  progressive sob, cough,  +b/l edema sp 2 dopplers neg over past month.   pt states + orthopnea, no f/c, sputum, or dizziness.  In ER patient was found to have pleural effusion.  Patient is being admitted for further work up and treatment including possible chemotherapy. (05 Feb 2019 10:40)  PT S/P L thoracentesis for malignant effusion this admission  12/18 TTE shows grossly nl LVSF and size EF 55-65%    -there is no evidence of acute ischemia.  - TTE remains unchanged from 12/18    -Afib is rate controlled  - c/w digoxin, diltiazem, metoprolol at current doses prior admission in december Afib was extremely hard to rate control     -He is still peripherally vol ol.   -CW IV lasix 40mg IV q 6  - Please continue to maintain strict I/Os, monitor daily weights, Cr, and K.   - Encourage elevation of legs. may benefit from wrap therapy.     -Pt is hemodynamically stable w/o any symptoms of hypotension continue to monitor   -If hypotension becomes an issue consider midodrine  -Monitor and replete lytes, keep K>4 and Mg >2  Thank you for the consult  Will continue to follow  Jermaine LOVETT

## 2019-02-08 NOTE — PROGRESS NOTE ADULT - SUBJECTIVE AND OBJECTIVE BOX
[INTERVAL HX: ]  Patient seen and examined;  Chart reviewed and events noted;   noted breathing better s/p thoracentesis. 1.4L removed from L lung.   c/o dry cough      MEDICATIONS  (STANDING):  ALBUTerol/ipratropium for Nebulization 3 milliLiter(s) Nebulizer every 6 hours  aspirin  chewable 81 milliGRAM(s) Oral daily  buDESOnide   0.5 milliGRAM(s) Respule 0.5 milliGRAM(s) Inhalation two times a day  chlorhexidine 2% Cloths 1 Application(s) Topical daily  dexamethasone  IVPB 10 milliGRAM(s) IV Intermittent every 24 hours  digoxin     Tablet 0.25 milliGRAM(s) Oral daily  diltiazem    milliGRAM(s) Oral daily  etoposide IVPB (eMAR) 185 milliGRAM(s) IV Intermittent every 24 hours  furosemide   Injectable 40 milliGRAM(s) IV Push every 6 hours  heparin  Injectable 5000 Unit(s) SubCutaneous every 8 hours  iohexol 300 mG (iodine)/mL Oral Solution 30 milliLiter(s) Oral once  loratadine 10 milliGRAM(s) Oral daily  magnesium oxide 400 milliGRAM(s) Oral three times a day with meals  metoprolol succinate  milliGRAM(s) Oral daily  multivitamin 1 Tablet(s) Oral daily  ondansetron  IVPB 10 milliGRAM(s) IV Intermittent every 24 hours  potassium chloride    Tablet ER 20 milliEquivalent(s) Oral two times a day  sodium chloride 1 Gram(s) Oral three times a day  tiotropium 18 MICROgram(s) Capsule 1 Capsule(s) Inhalation daily    MEDICATIONS  (PRN):  ALPRAZolam 0.25 milliGRAM(s) Oral three times a day PRN anxiety  guaiFENesin    Syrup 100 milliGRAM(s) Oral every 6 hours PRN Cough  ondansetron Injectable 8 milliGRAM(s) IV Push every 8 hours PRN Nausea and/or Vomiting  prochlorperazine   Tablet 10 milliGRAM(s) Oral every 6 hours PRN nausea and vomitting      Vital Signs Last 24 Hrs  T(C): 36.6 (08 Feb 2019 14:12), Max: 36.7 (08 Feb 2019 05:11)  T(F): 97.9 (08 Feb 2019 14:12), Max: 98 (08 Feb 2019 05:11)  HR: 91 (08 Feb 2019 14:12) (91 - 110)  BP: 100/52 (08 Feb 2019 14:06) (100/52 - 115/62)  BP(mean): --  RR: 16 (08 Feb 2019 14:12) (16 - 19)  SpO2: 95% (08 Feb 2019 14:12) (91% - 99%)    [PHYSICAL EXAM]  General: adult in NAD,  WN,  WD.  HEENT: clear oropharynx, anicteric sclera, pink conjunctivae.  Neck: supple, no masses.  CV: normal S1S2, no murmur, no rubs, no gallops.  Lungs: clear to auscultation, no wheezes, no rales, no rhonchi.  Abdomen: soft, non-tender, non-distended, no hepatosplenomegaly, normal BS, no guarding.  Ext: no clubbing, no cyanosis, +BL edema to knees  Skin: no rashes,  no petechiae, no venous stasis changes.  Neuro: alert and oriented X3, no focal motor deficits.  LN: no STEVE.      [LABS:]                        9.8    10.91 )-----------( 280      ( 08 Feb 2019 07:26 )             29.9     02-08    129<L>  |  88<L>  |  14  ----------------------------<  143<H>  3.5   |  34<H>  |  0.59    Ca    8.1<L>      08 Feb 2019 07:26            [RADIOLOGY STUDIES:]

## 2019-02-08 NOTE — CONSULT NOTE ADULT - SUBJECTIVE AND OBJECTIVE BOX
CHIEF COMPLAINT: Patient is a 58y old  Male who presents with a chief complaint of sob (08 Feb 2019 15:27)      HPI:  Pt is a 57 yo male hx small cell lung ca, sp chemo on 1/14--due for chemo in am. pt pw 4 days of  progressive sob, cough, no cp, n/v, +b/l edema sp 2 dopplers neg over past month. pt also with hx pericardial window in dec for effusion.  pt states + orthopnea, no f/c, sputum, or dizziness.  In ER patient was found to have pleural effusion.  Patient is being admitted for further work up and treatment including possible chemotherapy. (05 Feb 2019 10:40)  PT S/P thoracentesis       PAST MEDICAL & SURGICAL HISTORY:  Lung cancer  Depression  Dyspnea  Mediastinal adenopathy  Pericardial effusion  AF (atrial fibrillation)  Hyponatremia  COPD (chronic obstructive pulmonary disease)  HTN (hypertension)  S/P pericardial window creation      SOCIAL HISTORY:  No tobacco, ethanol, or drug abuse.    FAMILY HISTORY:  No pertinent family history in first degree relatives    No family history of acute MI or sudden cardiac death.    MEDICATIONS  (STANDING):  ALBUTerol/ipratropium for Nebulization 3 milliLiter(s) Nebulizer every 6 hours  aspirin  chewable 81 milliGRAM(s) Oral daily  buDESOnide   0.5 milliGRAM(s) Respule 0.5 milliGRAM(s) Inhalation two times a day  chlorhexidine 2% Cloths 1 Application(s) Topical daily  dexamethasone  IVPB 10 milliGRAM(s) IV Intermittent every 24 hours  digoxin     Tablet 0.25 milliGRAM(s) Oral daily  diltiazem    milliGRAM(s) Oral daily  etoposide IVPB (eMAR) 185 milliGRAM(s) IV Intermittent every 24 hours  furosemide   Injectable 40 milliGRAM(s) IV Push every 6 hours  heparin  Injectable 5000 Unit(s) SubCutaneous every 8 hours  loratadine 10 milliGRAM(s) Oral daily  magnesium oxide 400 milliGRAM(s) Oral three times a day with meals  metoprolol succinate  milliGRAM(s) Oral daily  multivitamin 1 Tablet(s) Oral daily  ondansetron  IVPB 10 milliGRAM(s) IV Intermittent every 24 hours  potassium chloride    Tablet ER 20 milliEquivalent(s) Oral two times a day  sodium chloride 1 Gram(s) Oral three times a day  tiotropium 18 MICROgram(s) Capsule 1 Capsule(s) Inhalation daily    MEDICATIONS  (PRN):  ALPRAZolam 0.25 milliGRAM(s) Oral three times a day PRN anxiety  guaiFENesin    Syrup 100 milliGRAM(s) Oral every 6 hours PRN Cough  ondansetron Injectable 8 milliGRAM(s) IV Push every 8 hours PRN Nausea and/or Vomiting  prochlorperazine   Tablet 10 milliGRAM(s) Oral every 6 hours PRN nausea and vomitting      Allergies    penicillin (Unknown)    Intolerances        REVIEW OF SYSTEMS:    CONSTITUTIONAL: No weakness, fevers or chills  EYES/ENT: No visual changes;  No vertigo or throat pain   NECK: No pain or stiffness  RESPIRATORY: No , wheezing, hemoptysis; No shortness of breath +cough  CARDIOVASCULAR: No chest pain or palpitations  GASTROINTESTINAL: No abdominal pain. No nausea, vomiting, or hematemesis; No diarrhea or constipation. No melena or hematochezia.  GENITOURINARY: No dysuria, frequency or hematuria  NEUROLOGICAL: No numbness or weakness  SKIN: No itching or rash  All other review of systems is negative unless indicated above    VITAL SIGNS:   Vital Signs Last 24 Hrs  T(C): 36.6 (08 Feb 2019 14:12), Max: 36.7 (08 Feb 2019 05:11)  T(F): 97.9 (08 Feb 2019 14:12), Max: 98 (08 Feb 2019 05:11)  HR: 91 (08 Feb 2019 14:12) (81 - 110)  BP: 100/52 (08 Feb 2019 14:06) (100/52 - 115/62)  BP(mean): --  RR: 16 (08 Feb 2019 14:12) (16 - 19)  SpO2: 95% (08 Feb 2019 14:12) (91% - 99%)    I&O's Summary    07 Feb 2019 07:01  -  08 Feb 2019 07:00  --------------------------------------------------------  IN: 1491 mL / OUT: 3525 mL / NET: -2034 mL    08 Feb 2019 07:01  -  08 Feb 2019 16:34  --------------------------------------------------------  IN: 611 mL / OUT: 1800 mL / NET: -1189 mL        On Exam:    Constitutional: NAD, alert and oriented x 3  Lungs:  Non-labored, breath sounds coarse throughout lung fields , No wheezing, crackles or rhonchi   Cardiovascular: IRR.  S1 and S2 positive.  No murmurs, rubs, gallops or clicks  Gastrointestinal: Bowel Sounds present, soft, nontender.   Lymph: +3 Bilat LE peripheral edema knee high .  No cervical lymphadenopathy.  Neurological: Alert, no focal deficits  Skin: No rashes or ulcers   Psych:  Mood & affect appropriate.    LABS: All Labs Reviewed:                        9.8    10.91 )-----------( 280      ( 08 Feb 2019 07:26 )             29.9                         10.4   13.34 )-----------( 274      ( 07 Feb 2019 06:57 )             32.0                         10.6   13.47 )-----------( 307      ( 06 Feb 2019 06:32 )             33.0     08 Feb 2019 07:26    129    |  88     |  14     ----------------------------<  143    3.5     |  34     |  0.59   07 Feb 2019 06:57    133    |  91     |  13     ----------------------------<  119    3.4     |  34     |  0.63   06 Feb 2019 06:32    133    |  94     |  13     ----------------------------<  110    3.2     |  33     |  0.45     Ca    8.1        08 Feb 2019 07:26  Ca    8.1        07 Feb 2019 06:57  Ca    8.0        06 Feb 2019 06:32            Blood Culture: Organism --  Gram Stain Blood -- Gram Stain --  Specimen Source .Body Fluid Pleural Fluid  Culture-Blood --    Organism --  Gram Stain Blood -- Gram Stain   polymorphonuclear leukocytes seen per low power field  No organisms seen per oil power field  by cytocentrifuge  Specimen Source .Body Fluid Pleural Fluid  Culture-Blood --    Organism --  Gram Stain Blood -- Gram Stain --  Specimen Source .Blood Blood-Venous  Culture-Blood --            RADIOLOGY:  < from: Xray Chest 1 View- PORTABLE-Urgent (02.06.19 @ 12:49) >  EXAM:  XR CHEST PORTABLE URGENT 1V                            PROCEDURE DATE:  02/06/2019          INTERPRETATION:  Chest one view    HISTORY: Post thoracentesis    COMPARISON STUDY: Left effusion    Frontal expiratory view of the chest shows the heart to be similar in   size. The lungs show similar left lung mass with reduction of left   effusion and there is no evidence of pneumothorax. Right chest port is   again noted.    IMPRESSION:  No pneumothorax.    Thank you for the courtesy of this referral.                BEV JORDAN M.D., ATTENDING RADIOLOGIST  This document has been electronically signed. Feb 6 2019  1:50PM        < end of copied text >  < from: US Duplex Venous Lower Ext Complete, Bilateral (02.05.19 @ 12:07) >  EXAM:  US DPLX LWR EXT VEINS COMPL BI                            PROCEDURE DATE:  02/05/2019          INTERPRETATION:  CLINICAL INFORMATION: Leg swelling    COMPARISON: 1/2/2019.    TECHNIQUE: Duplex sonography of the BILATERAL LOWER extremities with   color and spectral Doppler, with and without compression.      FINDINGS:    There is normal compressibility of the bilateral common femoral, femoral   and popliteal veins. No calf vein thrombosis is detected.    Doppler examination shows normal spontaneous and phasic flow.    IMPRESSION:     No evidence of bilateral lower extremity deep venous thrombosis.                    JERICA FOSS M.D., ATTENDING RADIOLOGIST  This document has been electronically signed. Feb 5 2019 12:14PM        < end of copied text >  < from: CT Angio Chest w/ IV Cont (02.05.19 @ 01:37) >  EXAM:  CT ANGIO CHEST (W)AW IC                            PROCEDURE DATE:  02/05/2019          INTERPRETATION:  CLINICAL INFORMATION: Lung cancer, increased shortness   of breath and elevated d-dimer, assess PE.     PROCEDURE: CT angiography of thechest was performed with intravenous   contrast utilizing dedicated PE protocol. 95 mls of Omnipaque-350   administered without complication. 5 ml discarded. Coronal and sagittal   reconstruction images were obtained. Axial MIP images were obtained from   a separate workstation.      COMPARISON: 1/14/2019.    FINDINGS: The patient's respiratory motion degrades images.    LUNGS AND AIRWAYS: Again noted, narrow the central airways by   mediastinal/hilar lymphadenopathy. Centrilobular and paraseptal  emphysema. Decreased patchy/groundglass opacities in the left upper   lobe/lingula since prior study.   PLEURA: No pneumothorax. Small to moderate bilateral pleural effusion,   increased since prior study.  HEART: Normal size. Small pericardial effusion. Left atrium surrounded by   the mediastinal/hilar lymphadenopathy.  VESSELS: Suboptimal contrast opacification of the peripheral pulmonary   arteries. No obvious central pulmonary embolus. No secondary signs of   right heart strain. Atherosclerotic change of the thoracic aorta, great   vessel origin and coronary arteries. Major intrathoracic vessels encased   by the mediastinal/hilar lymphadenopathy.  CHEST WALL AND LOWER NECK: Again noted, heterogeneous thyroid gland.   Right jugular approach Mediport terminating at the cavoatrial junction.  MEDIASTINUM AND NELLA: Again noted, mediastinal/hilar lymphadenopathy.   UPPER ABDOMEN: Nonspecific bilateral perinephric stranding.   BONES: Again noted, heterogeneous pattern of bone mineralization.   Degenerative changes of the spine. Stable anterior wedging/decreased   vertebral body height in the thoracic spine.    IMPRESSION:    Suboptimal evaluation of the peripheral pulmonary arteries. No central   pulmonary embolus or secondary signs ofright heart strain.    Known mediastinal/hilar lymphadenopathy encasing the major intrathoracic   vessels/left atrium and narrowing the central airways.    Decreased patchy/groundglass opacities in the left upper lobe/lingula   since 1/4/2019.  Increased, bilateral pleural effusion with compressive   atelectasis. Recommend clinical correlation to assess underlying   pneumonia.    Heterogeneous thyroid gland, which can be further characterized on a   nonemergent ultrasound.    Heterogeneous pattern of bone mineralization, which may be due to   osteopenia although marrow infiltrative/replacing process cannot be   excluded. Recommend clinical correlation additional imaging (MRI and/or   bone scan) as clinically indicated.                DERICK VELA, ATTENDING RADIOLOGIST  This document has been electronically signed. Feb 5 2019  2:59AM                < end of copied text >  < from: TTE Echo Doppler w/o Cont (01.04.19 @ 09:32) >     EXAM:  ECHO TTE WO CON COMP W DOPPLR         PROCEDURE DATE:  01/04/2019        INTERPRETATION:  INDICATION: Atrial fibrillation, pericardial effusion    Blood Pressure 138/73    Height 185     Weight 111       BSA 2.3    Dimensions:    LA 4.4  Normal Values: 2.0 - 4.0 cm    Ao 4.0        Normal Values: 2.0 - 3.8 cm  SEPTUM           Normal Values: 0.6 - 1.2 cm  PWT           Normal Values: 0.6 - 1.1 cm  LVIDd             Normal Values: 3.0 - 5.6 cm  LVIDs             Normal Values: 1.8 -4.0 cm    Derived Variables:  LVMI     g/m2  RWT      Fractional Short      Ejection Fraction        Doppler Peak v. AoV=   (m/sec)    OBSERVATIONS:    This is a technically limited study with suboptimal endocardial   definition. Within these limitations, the left ventricle appears to be   normal in size with overall normal systolic function. The right ventricle   is not well seen but is probably normal in size with normal systolic   function. Biatrial enlargement. The aortic root is mildly dilated. The   mitral valve is not well seen. There is trace physiologic MR noted. Other   valves appear structurally normal as visualized. Inadequate TR jet to   estimate PA systolic pressure. No significant pericardial effusion. Left   pleural effusion. The patient was in rapid atrial fibrillation during   this examination.                  ISAC FLORES M.D., ATTENDING CARDIOLOGIST  This document has been electronically signed. Jan 4 2019  3:55PM              < end of copied text >  < end of copied text >    EKG:  < from: 12 Lead ECG (02.04.19 @ 22:56) >  Ventricular Rate 83 BPM    Atrial Rate 69 BPM    QRS Duration 92 ms    Q-T Interval 374 ms    QTC Calculation(Bezet) 439 ms    R Axis 62 degrees    T Axis 247 degrees    Diagnosis Line Atrial fibrillation  Incomplete right bundle branch block  ST &T wave abnormality, consider inferior ischemia or digitalis effect  ST & T wave abnormality, consider anterolateral ischemia or digitalis effect  Confirmed by QAMAR PICKARD ([id number]) on 2/5/2019 1:06:43 PM    < end of copied text >    Assessment/Plan:  58y Male CHIEF COMPLAINT: Patient is a 58y old  Male who presents with a chief complaint of sob (08 Feb 2019 15:27)      HPI:  Pt is a 59 yo male hx small cell lung ca, sp chemo on 1/14--due for chemo in am. pt pw 4 days of  progressive sob, cough, no cp, n/v, +b/l edema sp 2 dopplers neg over past month. pt also with hx pericardial window in dec for effusion.  pt states + orthopnea, no f/c, sputum, or dizziness.  In ER patient was found to have pleural effusion.  Patient is being admitted for further work up and treatment including possible chemotherapy.  PT S/P thoracentesis   His dyspnea has improved. still with lower extremity edema.     PAST MEDICAL & SURGICAL HISTORY:  Lung cancer  Depression  Dyspnea  Mediastinal adenopathy  Pericardial effusion  AF (atrial fibrillation)  Hyponatremia  COPD (chronic obstructive pulmonary disease)  HTN (hypertension)  S/P pericardial window creation      SOCIAL HISTORY:  No tobacco, ethanol, or drug abuse.    FAMILY HISTORY:  No pertinent family history in first degree relatives    No family history of acute MI or sudden cardiac death.    MEDICATIONS  (STANDING):  ALBUTerol/ipratropium for Nebulization 3 milliLiter(s) Nebulizer every 6 hours  aspirin  chewable 81 milliGRAM(s) Oral daily  buDESOnide   0.5 milliGRAM(s) Respule 0.5 milliGRAM(s) Inhalation two times a day  chlorhexidine 2% Cloths 1 Application(s) Topical daily  dexamethasone  IVPB 10 milliGRAM(s) IV Intermittent every 24 hours  digoxin     Tablet 0.25 milliGRAM(s) Oral daily  diltiazem    milliGRAM(s) Oral daily  etoposide IVPB (eMAR) 185 milliGRAM(s) IV Intermittent every 24 hours  furosemide   Injectable 40 milliGRAM(s) IV Push every 6 hours  heparin  Injectable 5000 Unit(s) SubCutaneous every 8 hours  loratadine 10 milliGRAM(s) Oral daily  magnesium oxide 400 milliGRAM(s) Oral three times a day with meals  metoprolol succinate  milliGRAM(s) Oral daily  multivitamin 1 Tablet(s) Oral daily  ondansetron  IVPB 10 milliGRAM(s) IV Intermittent every 24 hours  potassium chloride    Tablet ER 20 milliEquivalent(s) Oral two times a day  sodium chloride 1 Gram(s) Oral three times a day  tiotropium 18 MICROgram(s) Capsule 1 Capsule(s) Inhalation daily    MEDICATIONS  (PRN):  ALPRAZolam 0.25 milliGRAM(s) Oral three times a day PRN anxiety  guaiFENesin    Syrup 100 milliGRAM(s) Oral every 6 hours PRN Cough  ondansetron Injectable 8 milliGRAM(s) IV Push every 8 hours PRN Nausea and/or Vomiting  prochlorperazine   Tablet 10 milliGRAM(s) Oral every 6 hours PRN nausea and vomitting      Allergies    penicillin (Unknown)    Intolerances        REVIEW OF SYSTEMS:    CONSTITUTIONAL: No weakness, fevers or chills  EYES/ENT: No visual changes;  No vertigo or throat pain   NECK: No pain or stiffness  RESPIRATORY: No , wheezing, hemoptysis; No shortness of breath +cough  CARDIOVASCULAR: No chest pain or palpitations  GASTROINTESTINAL: No abdominal pain. No nausea, vomiting, or hematemesis; No diarrhea or constipation. No melena or hematochezia.  GENITOURINARY: No dysuria, frequency or hematuria  NEUROLOGICAL: No numbness or weakness  SKIN: No itching or rash  All other review of systems is negative unless indicated above    VITAL SIGNS:   Vital Signs Last 24 Hrs  T(C): 36.6 (08 Feb 2019 14:12), Max: 36.7 (08 Feb 2019 05:11)  T(F): 97.9 (08 Feb 2019 14:12), Max: 98 (08 Feb 2019 05:11)  HR: 91 (08 Feb 2019 14:12) (81 - 110)  BP: 100/52 (08 Feb 2019 14:06) (100/52 - 115/62)  BP(mean): --  RR: 16 (08 Feb 2019 14:12) (16 - 19)  SpO2: 95% (08 Feb 2019 14:12) (91% - 99%)    I&O's Summary    07 Feb 2019 07:01  -  08 Feb 2019 07:00  --------------------------------------------------------  IN: 1491 mL / OUT: 3525 mL / NET: -2034 mL    08 Feb 2019 07:01  -  08 Feb 2019 16:34  --------------------------------------------------------  IN: 611 mL / OUT: 1800 mL / NET: -1189 mL        On Exam:    Constitutional: NAD, alert and oriented x 3  HEENT: MMM, anicteric  Lungs:  Decreased breath sounds b/l. No rales, crackles or wheeze appreciated.   Cardiovascular: IRR.  S1 and S2 positive.  No murmurs, rubs, gallops or clicks  Gastrointestinal: Bowel Sounds present, soft, nontender.   Lymph: +3 Bilat LE peripheral edema knee high .  No cervical lymphadenopathy.  Neurological: Alert, no focal deficits  Skin: No rashes or ulcers   Psych:  Mood & affect appropriate.    LABS: All Labs Reviewed:                        9.8    10.91 )-----------( 280      ( 08 Feb 2019 07:26 )             29.9                         10.4   13.34 )-----------( 274      ( 07 Feb 2019 06:57 )             32.0                         10.6   13.47 )-----------( 307      ( 06 Feb 2019 06:32 )             33.0     08 Feb 2019 07:26    129    |  88     |  14     ----------------------------<  143    3.5     |  34     |  0.59   07 Feb 2019 06:57    133    |  91     |  13     ----------------------------<  119    3.4     |  34     |  0.63   06 Feb 2019 06:32    133    |  94     |  13     ----------------------------<  110    3.2     |  33     |  0.45     Ca    8.1        08 Feb 2019 07:26  Ca    8.1        07 Feb 2019 06:57  Ca    8.0        06 Feb 2019 06:32            Blood Culture: Organism --  Gram Stain Blood -- Gram Stain --  Specimen Source .Body Fluid Pleural Fluid  Culture-Blood --    Organism --  Gram Stain Blood -- Gram Stain   polymorphonuclear leukocytes seen per low power field  No organisms seen per oil power field  by cytocentrifuge  Specimen Source .Body Fluid Pleural Fluid  Culture-Blood --    Organism --  Gram Stain Blood -- Gram Stain --  Specimen Source .Blood Blood-Venous  Culture-Blood --            RADIOLOGY:  < from: Xray Chest 1 View- PORTABLE-Urgent (02.06.19 @ 12:49) >  EXAM:  XR CHEST PORTABLE URGENT 1V                            PROCEDURE DATE:  02/06/2019          INTERPRETATION:  Chest one view    HISTORY: Post thoracentesis    COMPARISON STUDY: Left effusion    Frontal expiratory view of the chest shows the heart to be similar in   size. The lungs show similar left lung mass with reduction of left   effusion and there is no evidence of pneumothorax. Right chest port is   again noted.    IMPRESSION:  No pneumothorax.    Thank you for the courtesy of this referral.                BEV JORDAN M.D., ATTENDING RADIOLOGIST  This document has been electronically signed. Feb 6 2019  1:50PM        < end of copied text >  < from: US Duplex Venous Lower Ext Complete, Bilateral (02.05.19 @ 12:07) >  EXAM:  US DPLX LWR EXT VEINS COMPL BI                            PROCEDURE DATE:  02/05/2019          INTERPRETATION:  CLINICAL INFORMATION: Leg swelling    COMPARISON: 1/2/2019.    TECHNIQUE: Duplex sonography of the BILATERAL LOWER extremities with   color and spectral Doppler, with and without compression.      FINDINGS:    There is normal compressibility of the bilateral common femoral, femoral   and popliteal veins. No calf vein thrombosis is detected.    Doppler examination shows normal spontaneous and phasic flow.    IMPRESSION:     No evidence of bilateral lower extremity deep venous thrombosis.                    JERICA FOSS M.D., ATTENDING RADIOLOGIST  This document has been electronically signed. Feb 5 2019 12:14PM        < end of copied text >  < from: CT Angio Chest w/ IV Cont (02.05.19 @ 01:37) >  EXAM:  CT ANGIO CHEST (W)AW IC                            PROCEDURE DATE:  02/05/2019          INTERPRETATION:  CLINICAL INFORMATION: Lung cancer, increased shortness   of breath and elevated d-dimer, assess PE.     PROCEDURE: CT angiography of thechest was performed with intravenous   contrast utilizing dedicated PE protocol. 95 mls of Omnipaque-350   administered without complication. 5 ml discarded. Coronal and sagittal   reconstruction images were obtained. Axial MIP images were obtained from   a separate workstation.      COMPARISON: 1/14/2019.    FINDINGS: The patient's respiratory motion degrades images.    LUNGS AND AIRWAYS: Again noted, narrow the central airways by   mediastinal/hilar lymphadenopathy. Centrilobular and paraseptal  emphysema. Decreased patchy/groundglass opacities in the left upper   lobe/lingula since prior study.   PLEURA: No pneumothorax. Small to moderate bilateral pleural effusion,   increased since prior study.  HEART: Normal size. Small pericardial effusion. Left atrium surrounded by   the mediastinal/hilar lymphadenopathy.  VESSELS: Suboptimal contrast opacification of the peripheral pulmonary   arteries. No obvious central pulmonary embolus. No secondary signs of   right heart strain. Atherosclerotic change of the thoracic aorta, great   vessel origin and coronary arteries. Major intrathoracic vessels encased   by the mediastinal/hilar lymphadenopathy.  CHEST WALL AND LOWER NECK: Again noted, heterogeneous thyroid gland.   Right jugular approach Mediport terminating at the cavoatrial junction.  MEDIASTINUM AND NELLA: Again noted, mediastinal/hilar lymphadenopathy.   UPPER ABDOMEN: Nonspecific bilateral perinephric stranding.   BONES: Again noted, heterogeneous pattern of bone mineralization.   Degenerative changes of the spine. Stable anterior wedging/decreased   vertebral body height in the thoracic spine.    IMPRESSION:    Suboptimal evaluation of the peripheral pulmonary arteries. No central   pulmonary embolus or secondary signs ofright heart strain.    Known mediastinal/hilar lymphadenopathy encasing the major intrathoracic   vessels/left atrium and narrowing the central airways.    Decreased patchy/groundglass opacities in the left upper lobe/lingula   since 1/4/2019.  Increased, bilateral pleural effusion with compressive   atelectasis. Recommend clinical correlation to assess underlying   pneumonia.    Heterogeneous thyroid gland, which can be further characterized on a   nonemergent ultrasound.    Heterogeneous pattern of bone mineralization, which may be due to   osteopenia although marrow infiltrative/replacing process cannot be   excluded. Recommend clinical correlation additional imaging (MRI and/or   bone scan) as clinically indicated.                DERICK VELA, ATTENDING RADIOLOGIST  This document has been electronically signed. Feb 5 2019  2:59AM                < end of copied text >  < from: TTE Echo Doppler w/o Cont (01.04.19 @ 09:32) >     EXAM:  ECHO TTE WO CON COMP W DOPPLR         PROCEDURE DATE:  01/04/2019        INTERPRETATION:  INDICATION: Atrial fibrillation, pericardial effusion    Blood Pressure 138/73    Height 185     Weight 111       BSA 2.3    Dimensions:    LA 4.4  Normal Values: 2.0 - 4.0 cm    Ao 4.0        Normal Values: 2.0 - 3.8 cm  SEPTUM           Normal Values: 0.6 - 1.2 cm  PWT           Normal Values: 0.6 - 1.1 cm  LVIDd             Normal Values: 3.0 - 5.6 cm  LVIDs             Normal Values: 1.8 -4.0 cm    Derived Variables:  LVMI     g/m2  RWT      Fractional Short      Ejection Fraction        Doppler Peak v. AoV=   (m/sec)    OBSERVATIONS:    This is a technically limited study with suboptimal endocardial   definition. Within these limitations, the left ventricle appears to be   normal in size with overall normal systolic function. The right ventricle   is not well seen but is probably normal in size with normal systolic   function. Biatrial enlargement. The aortic root is mildly dilated. The   mitral valve is not well seen. There is trace physiologic MR noted. Other   valves appear structurally normal as visualized. Inadequate TR jet to   estimate PA systolic pressure. No significant pericardial effusion. Left   pleural effusion. The patient was in rapid atrial fibrillation during   this examination.                  ISAC FLORES M.D., ATTENDING CARDIOLOGIST  This document has been electronically signed. Jan 4 2019  3:55PM              < end of copied text >  < end of copied text >    EKG:  < from: 12 Lead ECG (02.04.19 @ 22:56) >  Ventricular Rate 83 BPM    Atrial Rate 69 BPM    QRS Duration 92 ms    Q-T Interval 374 ms    QTC Calculation(Bezet) 439 ms    R Axis 62 degrees    T Axis 247 degrees    Diagnosis Line Atrial fibrillation  Incomplete right bundle branch block  ST &T wave abnormality, consider inferior ischemia or digitalis effect  ST & T wave abnormality, consider anterolateral ischemia or digitalis effect  Confirmed by QAMAR PICKARD ([id number]) on 2/5/2019 1:06:43 PM    < end of copied text >    Assessment/Plan:  58y Male

## 2019-02-08 NOTE — CONSULT NOTE ADULT - ASSESSMENT
Hyponatremia  LE edema   Lung cancer    - Patient is well known to me. We will not repeat extensive workup  - IV Lasix + NaCl. Fluid restriction 1 liter per day   - We will give one dose of Tolvaptan if sodium continues to worsen  - Oncology follow up     Thank you

## 2019-02-08 NOTE — PROGRESS NOTE ADULT - ASSESSMENT
59 y/o man w HTN/COPD, dx w small cell lung ca late 12/2018 early 1/2019 w sig respiratory compromise   SCLC with significant mediastinal STEVE, pericardial effusion. Suspected pericardial mets.  Suspect extensive stage SCLC.  Needed to start Tx ASAP while hospitalized.  CT scan AP not performed due to need to minimize contrast use prior to chemo, and poor PO intake and tenuous respiratory status preventing significant IVF hydration.  w assoc pulm sx, post first cycle Carbo/VP16 in [place] and G-CSF  post c#1 chemo w carbo/VP16 01/09/19 w some respiratory improvement    Followed weekly post DC. Was stable when last seen 01/31/18 but developed increased SOB over weekend.   Presented with SOB ,CT angio chest no PE, decr left infiltrate but incr leona effusion. Noted luiza in L.    underwent therapeutic / diagnostic thoracentesis with 1.4L removed    Started cycle #2 chemotherapy: day 1 on  02/07/19  Etoposide dose reduced by 20% for leukopenia as per primary onc Dr Harris   Plan for GCF support regardless of WBC , starting 24 hours after last chemo   f/u cytology results   Plan for  CT AP to complete  staging    RECOMMEND:   Continue chemo.  Anti-emetics PRN  CT Scan AP tomorrow.

## 2019-02-08 NOTE — PROGRESS NOTE ADULT - SUBJECTIVE AND OBJECTIVE BOX
Patient is a 58y old  Male who presents with a chief complaint of sob (08 Feb 2019 14:54)      INTERVAL /OVERNIGHT EVENTS: denies dizziness    MEDICATIONS  (STANDING):  ALBUTerol/ipratropium for Nebulization 3 milliLiter(s) Nebulizer every 6 hours  aspirin  chewable 81 milliGRAM(s) Oral daily  buDESOnide   0.5 milliGRAM(s) Respule 0.5 milliGRAM(s) Inhalation two times a day  chlorhexidine 2% Cloths 1 Application(s) Topical daily  dexamethasone  IVPB 10 milliGRAM(s) IV Intermittent every 24 hours  digoxin     Tablet 0.25 milliGRAM(s) Oral daily  diltiazem    milliGRAM(s) Oral daily  etoposide IVPB (eMAR) 185 milliGRAM(s) IV Intermittent every 24 hours  furosemide   Injectable 40 milliGRAM(s) IV Push every 6 hours  heparin  Injectable 5000 Unit(s) SubCutaneous every 8 hours  iohexol 300 mG (iodine)/mL Oral Solution 500 milliLiter(s) Oral every 1 hour  loratadine 10 milliGRAM(s) Oral daily  magnesium oxide 400 milliGRAM(s) Oral three times a day with meals  metoprolol succinate  milliGRAM(s) Oral daily  multivitamin 1 Tablet(s) Oral daily  ondansetron  IVPB 10 milliGRAM(s) IV Intermittent every 24 hours  potassium chloride    Tablet ER 20 milliEquivalent(s) Oral two times a day  sodium chloride 1 Gram(s) Oral three times a day  tiotropium 18 MICROgram(s) Capsule 1 Capsule(s) Inhalation daily    MEDICATIONS  (PRN):  ALPRAZolam 0.25 milliGRAM(s) Oral three times a day PRN anxiety  guaiFENesin    Syrup 100 milliGRAM(s) Oral every 6 hours PRN Cough  ondansetron Injectable 8 milliGRAM(s) IV Push every 8 hours PRN Nausea and/or Vomiting  prochlorperazine   Tablet 10 milliGRAM(s) Oral every 6 hours PRN nausea and vomitting      Allergies    penicillin (Unknown)    Intolerances        REVIEW OF SYSTEMS:  CONSTITUTIONAL: No fever, weight loss, or fatigue  EYES: No eye pain, visual disturbances, or discharge  ENMT:  No difficulty hearing, tinnitus, vertigo; No sinus or throat pain  NECK: No pain or stiffness  RESPIRATORY: No cough, wheezing, chills or hemoptysis; No shortness of breath  CARDIOVASCULAR: No chest pain, palpitations, dizziness, or leg swelling  GASTROINTESTINAL: No abdominal or epigastric pain. No nausea, vomiting, or hematemesis; No diarrhea or constipation. No melena or hematochezia.  GENITOURINARY: No dysuria, frequency, hematuria, or incontinence  NEUROLOGICAL: No headaches, memory loss, loss of strength, numbness, or tremors  SKIN: No itching, burning, rashes, or lesions   LYMPH NODES: No enlarged glands  ENDOCRINE: No heat or cold intolerance; No hair loss; No polydipsia or polyuria  MUSCULOSKELETAL: No joint pain or swelling; No muscle, back, or extremity pain  PSYCHIATRIC: No depression, anxiety, mood swings, or difficulty sleeping  HEME/LYMPH: No easy bruising, or bleeding gums  ALLERGY AND IMMUNOLOGIC: No hives or eczema    Vital Signs Last 24 Hrs  T(C): 36.6 (08 Feb 2019 14:12), Max: 36.7 (08 Feb 2019 05:11)  T(F): 97.9 (08 Feb 2019 14:12), Max: 98 (08 Feb 2019 05:11)  HR: 91 (08 Feb 2019 14:12) (91 - 110)  BP: 100/52 (08 Feb 2019 14:06) (100/52 - 115/62)  BP(mean): --  RR: 16 (08 Feb 2019 14:12) (16 - 19)  SpO2: 95% (08 Feb 2019 14:12) (91% - 99%)    PHYSICAL EXAM:  GENERAL: NAD, well-groomed, well-developed  HEAD:  Atraumatic, Normocephalic  EYES: EOMI, PERRLA, conjunctiva and sclera clear  ENMT: No tonsillar erythema, exudates, or enlargement; Moist mucous membranes, Good dentition, No lesions  NECK: Supple, No JVD, Normal thyroid  NERVOUS SYSTEM:  Alert & Oriented X3, Good concentration; Motor Strength 5/5 B/L upper and lower extremities; DTRs 2+ intact and symmetric  CHEST/LUNG: Clear to auscultation bilaterally; No rales, rhonchi, wheezing, or rubs  HEART: Regular rate and rhythm; No murmurs, rubs, or gallops  ABDOMEN: Soft, Nontender, Nondistended; Bowel sounds present  EXTREMITIES:  2+ Peripheral Pulses, No clubbing, cyanosis, + edema  LYMPH: No lymphadenopathy noted  SKIN: No rashes or lesions    LABS:                        9.8    10.91 )-----------( 280      ( 08 Feb 2019 07:26 )             29.9     08 Feb 2019 07:26    129    |  88     |  14     ----------------------------<  143    3.5     |  34     |  0.59     Ca    8.1        08 Feb 2019 07:26          CAPILLARY BLOOD GLUCOSE          RADIOLOGY & ADDITIONAL TESTS:    Notes Reviewed:  [x ] YES  [ ] NO    Care Discussed with Consultants/Other Providers [x ] YES  [ ] NO

## 2019-02-09 ENCOUNTER — TRANSCRIPTION ENCOUNTER (OUTPATIENT)
Age: 59
End: 2019-02-09

## 2019-02-09 LAB
ANION GAP SERPL CALC-SCNC: 7 MMOL/L — SIGNIFICANT CHANGE UP (ref 5–17)
ANION GAP SERPL CALC-SCNC: 7 MMOL/L — SIGNIFICANT CHANGE UP (ref 5–17)
BASOPHILS # BLD AUTO: 0 K/UL — SIGNIFICANT CHANGE UP (ref 0–0.2)
BASOPHILS # BLD AUTO: 0.01 K/UL — SIGNIFICANT CHANGE UP (ref 0–0.2)
BASOPHILS NFR BLD AUTO: 0 % — SIGNIFICANT CHANGE UP (ref 0–2)
BASOPHILS NFR BLD AUTO: 0.1 % — SIGNIFICANT CHANGE UP (ref 0–2)
BUN SERPL-MCNC: 15 MG/DL — SIGNIFICANT CHANGE UP (ref 7–23)
BUN SERPL-MCNC: 15 MG/DL — SIGNIFICANT CHANGE UP (ref 7–23)
CALCIUM SERPL-MCNC: 8.1 MG/DL — LOW (ref 8.5–10.1)
CALCIUM SERPL-MCNC: 8.2 MG/DL — LOW (ref 8.5–10.1)
CHLORIDE SERPL-SCNC: 85 MMOL/L — LOW (ref 96–108)
CHLORIDE SERPL-SCNC: 87 MMOL/L — LOW (ref 96–108)
CO2 SERPL-SCNC: 34 MMOL/L — HIGH (ref 22–31)
CO2 SERPL-SCNC: 35 MMOL/L — HIGH (ref 22–31)
CREAT SERPL-MCNC: 0.62 MG/DL — SIGNIFICANT CHANGE UP (ref 0.5–1.3)
CREAT SERPL-MCNC: 0.64 MG/DL — SIGNIFICANT CHANGE UP (ref 0.5–1.3)
EOSINOPHIL # BLD AUTO: 0 K/UL — SIGNIFICANT CHANGE UP (ref 0–0.5)
EOSINOPHIL # BLD AUTO: 0 K/UL — SIGNIFICANT CHANGE UP (ref 0–0.5)
EOSINOPHIL NFR BLD AUTO: 0 % — SIGNIFICANT CHANGE UP (ref 0–6)
EOSINOPHIL NFR BLD AUTO: 0 % — SIGNIFICANT CHANGE UP (ref 0–6)
GLUCOSE SERPL-MCNC: 134 MG/DL — HIGH (ref 70–99)
GLUCOSE SERPL-MCNC: 173 MG/DL — HIGH (ref 70–99)
HCT VFR BLD CALC: 28.8 % — LOW (ref 39–50)
HCT VFR BLD CALC: 32.9 % — LOW (ref 39–50)
HGB BLD-MCNC: 10.7 G/DL — LOW (ref 13–17)
HGB BLD-MCNC: 9.5 G/DL — LOW (ref 13–17)
IMM GRANULOCYTES NFR BLD AUTO: 0.5 % — SIGNIFICANT CHANGE UP (ref 0–1.5)
IMM GRANULOCYTES NFR BLD AUTO: 0.6 % — SIGNIFICANT CHANGE UP (ref 0–1.5)
LYMPHOCYTES # BLD AUTO: 0.4 K/UL — LOW (ref 1–3.3)
LYMPHOCYTES # BLD AUTO: 0.53 K/UL — LOW (ref 1–3.3)
LYMPHOCYTES # BLD AUTO: 4.2 % — LOW (ref 13–44)
LYMPHOCYTES # BLD AUTO: 5.1 % — LOW (ref 13–44)
MAGNESIUM SERPL-MCNC: 2.2 MG/DL — SIGNIFICANT CHANGE UP (ref 1.6–2.6)
MCHC RBC-ENTMCNC: 25.7 PG — LOW (ref 27–34)
MCHC RBC-ENTMCNC: 25.8 PG — LOW (ref 27–34)
MCHC RBC-ENTMCNC: 32.5 GM/DL — SIGNIFICANT CHANGE UP (ref 32–36)
MCHC RBC-ENTMCNC: 33 GM/DL — SIGNIFICANT CHANGE UP (ref 32–36)
MCV RBC AUTO: 78.3 FL — LOW (ref 80–100)
MCV RBC AUTO: 78.9 FL — LOW (ref 80–100)
MONOCYTES # BLD AUTO: 0.5 K/UL — SIGNIFICANT CHANGE UP (ref 0–0.9)
MONOCYTES # BLD AUTO: 0.62 K/UL — SIGNIFICANT CHANGE UP (ref 0–0.9)
MONOCYTES NFR BLD AUTO: 5.2 % — SIGNIFICANT CHANGE UP (ref 2–14)
MONOCYTES NFR BLD AUTO: 5.9 % — SIGNIFICANT CHANGE UP (ref 2–14)
NEUTROPHILS # BLD AUTO: 8.64 K/UL — HIGH (ref 1.8–7.4)
NEUTROPHILS # BLD AUTO: 9.22 K/UL — HIGH (ref 1.8–7.4)
NEUTROPHILS NFR BLD AUTO: 88.4 % — HIGH (ref 43–77)
NEUTROPHILS NFR BLD AUTO: 90 % — HIGH (ref 43–77)
NRBC # BLD: 0 /100 WBCS — SIGNIFICANT CHANGE UP (ref 0–0)
NRBC # BLD: 0 /100 WBCS — SIGNIFICANT CHANGE UP (ref 0–0)
OB PNL STL: NEGATIVE — SIGNIFICANT CHANGE UP
PHOSPHATE SERPL-MCNC: 3.3 MG/DL — SIGNIFICANT CHANGE UP (ref 2.5–4.5)
PLATELET # BLD AUTO: 297 K/UL — SIGNIFICANT CHANGE UP (ref 150–400)
PLATELET # BLD AUTO: 366 K/UL — SIGNIFICANT CHANGE UP (ref 150–400)
POTASSIUM SERPL-MCNC: 3.7 MMOL/L — SIGNIFICANT CHANGE UP (ref 3.5–5.3)
POTASSIUM SERPL-MCNC: 3.8 MMOL/L — SIGNIFICANT CHANGE UP (ref 3.5–5.3)
POTASSIUM SERPL-SCNC: 3.7 MMOL/L — SIGNIFICANT CHANGE UP (ref 3.5–5.3)
POTASSIUM SERPL-SCNC: 3.8 MMOL/L — SIGNIFICANT CHANGE UP (ref 3.5–5.3)
RBC # BLD: 3.68 M/UL — LOW (ref 4.2–5.8)
RBC # BLD: 4.17 M/UL — LOW (ref 4.2–5.8)
RBC # FLD: 14.3 % — SIGNIFICANT CHANGE UP (ref 10.3–14.5)
RBC # FLD: 14.3 % — SIGNIFICANT CHANGE UP (ref 10.3–14.5)
SODIUM SERPL-SCNC: 126 MMOL/L — LOW (ref 135–145)
SODIUM SERPL-SCNC: 129 MMOL/L — LOW (ref 135–145)
WBC # BLD: 10.43 K/UL — SIGNIFICANT CHANGE UP (ref 3.8–10.5)
WBC # BLD: 9.6 K/UL — SIGNIFICANT CHANGE UP (ref 3.8–10.5)
WBC # FLD AUTO: 10.43 K/UL — SIGNIFICANT CHANGE UP (ref 3.8–10.5)
WBC # FLD AUTO: 9.6 K/UL — SIGNIFICANT CHANGE UP (ref 3.8–10.5)

## 2019-02-09 PROCEDURE — 99232 SBSQ HOSP IP/OBS MODERATE 35: CPT

## 2019-02-09 RX ORDER — POTASSIUM CHLORIDE 20 MEQ
1 PACKET (EA) ORAL
Qty: 60 | Refills: 0
Start: 2019-02-09 | End: 2019-03-10

## 2019-02-09 RX ADMIN — Medication 1 TABLET(S): at 11:34

## 2019-02-09 RX ADMIN — Medication 200 MILLIGRAM(S): at 05:35

## 2019-02-09 RX ADMIN — CHLORHEXIDINE GLUCONATE 1 APPLICATION(S): 213 SOLUTION TOPICAL at 11:34

## 2019-02-09 RX ADMIN — Medication 0.5 MILLIGRAM(S): at 08:20

## 2019-02-09 RX ADMIN — SODIUM CHLORIDE 1 GRAM(S): 9 INJECTION INTRAMUSCULAR; INTRAVENOUS; SUBCUTANEOUS at 22:05

## 2019-02-09 RX ADMIN — Medication 3 MILLILITER(S): at 14:06

## 2019-02-09 RX ADMIN — Medication 3 MILLILITER(S): at 01:30

## 2019-02-09 RX ADMIN — Medication 3 MILLILITER(S): at 08:19

## 2019-02-09 RX ADMIN — MAGNESIUM OXIDE 400 MG ORAL TABLET 400 MILLIGRAM(S): 241.3 TABLET ORAL at 17:48

## 2019-02-09 RX ADMIN — Medication 20 MILLIEQUIVALENT(S): at 17:49

## 2019-02-09 RX ADMIN — MAGNESIUM OXIDE 400 MG ORAL TABLET 400 MILLIGRAM(S): 241.3 TABLET ORAL at 11:34

## 2019-02-09 RX ADMIN — HEPARIN SODIUM 5000 UNIT(S): 5000 INJECTION INTRAVENOUS; SUBCUTANEOUS at 05:35

## 2019-02-09 RX ADMIN — HEPARIN SODIUM 5000 UNIT(S): 5000 INJECTION INTRAVENOUS; SUBCUTANEOUS at 22:05

## 2019-02-09 RX ADMIN — Medication 81 MILLIGRAM(S): at 11:33

## 2019-02-09 RX ADMIN — MAGNESIUM OXIDE 400 MG ORAL TABLET 400 MILLIGRAM(S): 241.3 TABLET ORAL at 08:45

## 2019-02-09 RX ADMIN — Medication 40 MILLIGRAM(S): at 22:05

## 2019-02-09 RX ADMIN — Medication 0.5 MILLIGRAM(S): at 20:27

## 2019-02-09 RX ADMIN — HEPARIN SODIUM 5000 UNIT(S): 5000 INJECTION INTRAVENOUS; SUBCUTANEOUS at 13:25

## 2019-02-09 RX ADMIN — Medication 40 MILLIGRAM(S): at 11:33

## 2019-02-09 RX ADMIN — Medication 0.25 MILLIGRAM(S): at 05:34

## 2019-02-09 RX ADMIN — Medication 102 MILLIGRAM(S): at 12:53

## 2019-02-09 RX ADMIN — Medication 20 MILLIEQUIVALENT(S): at 05:34

## 2019-02-09 RX ADMIN — Medication 509.25 MILLIGRAM(S): at 14:32

## 2019-02-09 RX ADMIN — SODIUM CHLORIDE 1 GRAM(S): 9 INJECTION INTRAMUSCULAR; INTRAVENOUS; SUBCUTANEOUS at 13:21

## 2019-02-09 RX ADMIN — SODIUM CHLORIDE 1 GRAM(S): 9 INJECTION INTRAMUSCULAR; INTRAVENOUS; SUBCUTANEOUS at 05:34

## 2019-02-09 RX ADMIN — Medication 3 MILLILITER(S): at 20:27

## 2019-02-09 RX ADMIN — Medication 40 MILLIGRAM(S): at 17:49

## 2019-02-09 RX ADMIN — Medication 300 MILLIGRAM(S): at 05:34

## 2019-02-09 RX ADMIN — Medication 40 MILLIGRAM(S): at 05:35

## 2019-02-09 RX ADMIN — ONDANSETRON 110 MILLIGRAM(S): 8 TABLET, FILM COATED ORAL at 13:25

## 2019-02-09 RX ADMIN — LORATADINE 10 MILLIGRAM(S): 10 TABLET ORAL at 11:35

## 2019-02-09 NOTE — DISCHARGE NOTE ADULT - CARE PROVIDERS DIRECT ADDRESSES
,DirectAddress_Unknown,DirectAddress_Unknown,[email or web address],DirectAddress_Unknown

## 2019-02-09 NOTE — PROGRESS NOTE ADULT - ASSESSMENT
Hyponatremia  SIADH  LE edema   Lung cancer    - Patient is well known to our group  - No extensive renal work up is needed  - IV Lasix + NaCl. Fluid restriction 1 liter per day   - We will give one dose of Tolvaptan if sodium continues to worsen  - Oncology follow up   - Monitor chemistries  - Renal function is otherwise stable    Thank you

## 2019-02-09 NOTE — PROGRESS NOTE ADULT - ASSESSMENT
Pt is a 59 yo male hx small cell lung ca, sp chemo on 1/14--due for chemo in am, Afib w RVR, COPD, HTN, S/P 12/18 pericardial window . pt pw 4 days of  progressive sob, cough,  +b/l edema sp 2 dopplers neg over past month.   pt states + orthopnea, no f/c, sputum, or dizziness.  In ER patient was found to have pleural effusion.  Patient is being admitted for further work up and treatment including possible chemotherapy. (05 Feb 2019 10:40)  PT S/P L thoracentesis for malignant effusion this admission  12/18 TTE shows grossly nl LVSF and size EF 55-65%    - Respiratory symptoms improved after thoracentesis with drainage of >1.5L  - No evidence of volume overload apart from LE edema  - there is no evidence of acute ischemia.  - TTE remains unchanged from 12/18    - Afib is rate controlled  - c/w digoxin, diltiazem, metoprolol at current doses prior admission in december Afib was extremely hard to rate control   - Please obtain Digoxin level in am    - CW IV lasix 40mg IV q 6  - Please continue to maintain strict I/Os, monitor daily weights, Cr, and K.  Replete electrolytes aggressively   - Encourage elevation of legs.      - Pt is hemodynamically stable w/o any symptoms of hypotension continue to monitor   - Monitor and replete lytes, keep K>4 and Mg >2  - Will continue to follow    Steph Pradhan NP  Cardiology Pt is a 57 yo male hx small cell lung ca, sp chemo on 1/14--due for chemo in am, Afib w RVR, COPD, HTN, S/P 12/18 pericardial window . pt pw 4 days of  progressive sob, cough,  +b/l edema sp 2 dopplers neg over past month.   pt states + orthopnea, no f/c, sputum, or dizziness.  In ER patient was found to have pleural effusion.  Patient is being admitted for further work up and treatment including possible chemotherapy. (05 Feb 2019 10:40)  PT S/P L thoracentesis for malignant effusion this admission  12/18 TTE shows grossly nl LVSF and size EF 55-65%    - Respiratory symptoms improved after thoracentesis with drainage of >1.5L  - No evidence of volume overload apart from LE edema  - there is no evidence of acute ischemia.  - TTE remains unchanged from 12/18    - Afib is rate controlled  - c/w digoxin, diltiazem, metoprolol at current doses prior admission in december Afib was extremely hard to rate control   - Please obtain Digoxin level in am    - CW IV lasix 40mg IV q 6  - Will need to transition him to Lasix 80mg PO q12. Will likely benefit from Metolazone 2.5mg Q12 as well.   - Please continue to maintain strict I/Os, monitor daily weights, Cr, and K.  Replete electrolytes aggressively   - Encourage elevation of legs.      - Pt is hemodynamically stable w/o any symptoms of hypotension continue to monitor   - Monitor and replete lytes, keep K>4 and Mg >2  - Will continue to follow    Steph Pradhan NP  Cardiology

## 2019-02-09 NOTE — PROGRESS NOTE ADULT - PROBLEM SELECTOR PLAN 6
cardio eval  will adjust anti hypertensives
cardio eval with Dr. AGUAYO  will adjust anti hypertensives

## 2019-02-09 NOTE — PROGRESS NOTE ADULT - SUBJECTIVE AND OBJECTIVE BOX
[facility] -- Gurpreet Bush, Corey Diaz Pannella, Patel, Savella  Office # [phone]    Follow Up:  SOB    Subjective/Observations: Sitting on the chair, with nasal cannula.  Admits to be breathing better especially since after thoracentesis.  Still unable to sleep in bed.  Denies CP or palpitations.  Denies any form of bleeding    REVIEW OF SYSTEMS: All other review of systems is negative unless indicated above    PAST MEDICAL & SURGICAL HISTORY:  Lung cancer  Depression  Dyspnea  Mediastinal adenopathy  Pericardial effusion  AF (atrial fibrillation)  Hyponatremia  COPD (chronic obstructive pulmonary disease)  HTN (hypertension)  S/P pericardial window creation    MEDICATIONS  (STANDING):  ALBUTerol/ipratropium for Nebulization 3 milliLiter(s) Nebulizer every 6 hours  aspirin  chewable 81 milliGRAM(s) Oral daily  buDESOnide   0.5 milliGRAM(s) Respule 0.5 milliGRAM(s) Inhalation two times a day  chlorhexidine 2% Cloths 1 Application(s) Topical daily  digoxin     Tablet 0.25 milliGRAM(s) Oral daily  diltiazem    milliGRAM(s) Oral daily  etoposide IVPB (eMAR) 185 milliGRAM(s) IV Intermittent every 24 hours  furosemide   Injectable 40 milliGRAM(s) IV Push every 6 hours  heparin  Injectable 5000 Unit(s) SubCutaneous every 8 hours  loratadine 10 milliGRAM(s) Oral daily  magnesium oxide 400 milliGRAM(s) Oral three times a day with meals  metoprolol succinate  milliGRAM(s) Oral daily  multivitamin 1 Tablet(s) Oral daily  potassium chloride    Tablet ER 20 milliEquivalent(s) Oral two times a day  sodium chloride 1 Gram(s) Oral three times a day  tiotropium 18 MICROgram(s) Capsule 1 Capsule(s) Inhalation daily    MEDICATIONS  (PRN):  ALPRAZolam 0.25 milliGRAM(s) Oral three times a day PRN anxiety  guaiFENesin    Syrup 100 milliGRAM(s) Oral every 6 hours PRN Cough  ondansetron Injectable 8 milliGRAM(s) IV Push every 8 hours PRN Nausea and/or Vomiting  prochlorperazine   Tablet 10 milliGRAM(s) Oral every 6 hours PRN nausea and vomitting  Allergies    penicillin (Unknown)    Intolerances    Vital Signs Last 24 Hrs  T(C): 36.5 (09 Feb 2019 05:27), Max: 36.6 (08 Feb 2019 14:12)  T(F): 97.7 (09 Feb 2019 05:27), Max: 97.9 (08 Feb 2019 14:12)  HR: 85 (09 Feb 2019 08:20) (81 - 106)  BP: 109/64 (09 Feb 2019 05:27) (100/52 - 121/71)  BP(mean): --  RR: 18 (09 Feb 2019 05:27) (16 - 18)  SpO2: 97% (09 Feb 2019 08:20) (93% - 99%)    I&O's Summary    08 Feb 2019 07:01  -  09 Feb 2019 07:00  --------------------------------------------------------  IN: 3531 mL / OUT: 4800 mL / NET: -1269 mL    09 Feb 2019 07:01  -  09 Feb 2019 13:35  --------------------------------------------------------  IN: 465 mL / OUT: 700 mL / NET: -235 mL    PHYSICAL EXAM:  TELE: Not on tele  Constitutional: NAD, awake and alert, obese  HEENT: Moist Mucous Membranes, Anicteric  Pulmonary: Non-labored, breath sounds are diminished bilaterally, No wheezing, rales.  + scattered rhonchi  Cardiovascular: Regular, S1 and S2, No murmurs, rubs, gallops or clicks  Gastrointestinal: Bowel Sounds present, soft, nontender.   Lymph: 2+ BLE edema. No lymphadenopathy.  Skin: No visible rashes or ulcers.  [facility] port  Psych:  Mood & affect appropriate    LABS: All Labs Reviewed:                        9.5    9.60  )-----------( 297      ( 09 Feb 2019 10:12 )             28.8                         10.7   10.43 )-----------( 366      ( 09 Feb 2019 08:23 )             32.9                         9.8    10.91 )-----------( 280      ( 08 Feb 2019 07:26 )             29.9     09 Feb 2019 10:12    126    |  85     |  15     ----------------------------<  173    3.7     |  34     |  0.64   09 Feb 2019 08:23    129    |  87     |  15     ----------------------------<  134    3.8     |  35     |  0.62   08 Feb 2019 07:26    129    |  88     |  14     ----------------------------<  143    3.5     |  34     |  0.59     Ca    8.1        09 Feb 2019 10:12  Ca    8.2        09 Feb 2019 08:23  Ca    8.1        08 Feb 2019 07:26  Phos  3.3       09 Feb 2019 08:23  Mg     2.2       09 Feb 2019 08:23      < from: TTE Echo Doppler w/o Cont (01.04.19 @ 09:32) >     EXAM:  ECHO TTE WO CON COMP W DOPPLR         PROCEDURE DATE:  01/04/2019        INTERPRETATION:  INDICATION: Atrial fibrillation, pericardial effusion    Blood Pressure 138/73    Height 185     Weight 111       BSA 2.3    Dimensions:    LA 4.4  Normal Values: 2.0 - 4.0 cm    Ao 4.0        Normal Values: 2.0 - 3.8 cm  SEPTUM           Normal Values: 0.6 - 1.2 cm  PWT           Normal Values: 0.6 - 1.1 cm  LVIDd             Normal Values: 3.0 - 5.6 cm  LVIDs             Normal Values: 1.8 -4.0 cm    Derived Variables:  LVMI     g/m2  RWT      Fractional Short      Ejection Fraction        Doppler Peak v. AoV=   (m/sec)    OBSERVATIONS:    This is a technically limited study with suboptimal endocardial   definition. Within these limitations, the left ventricle appears to be   normal in size with overall normal systolic function. The right ventricle   is not well seen but is probably normal in size with normal systolic   function. Biatrial enlargement. The aortic root is mildly dilated. The   mitral valve is not well seen. There is trace physiologic MR noted. Other   valves appear structurally normal as visualized. Inadequate TR jet to   estimate PA systolic pressure. No significant pericardial effusion. Left   pleural effusion. The patient was in rapid atrial fibrillation during   this examination.      ISAC FLORES M.D., ATTENDING CARDIOLOGIST  This document has been electronically signed. Jan 4 2019  3:55PM      < end of copied text >    < from: CT Angio Chest w/ IV Cont (02.05.19 @ 01:37) >    EXAM:  CT ANGIO CHEST (W)AW IC                            PROCEDURE DATE:  02/05/2019          INTERPRETATION:  CLINICAL INFORMATION: Lung cancer, increased shortness   of breath and elevated d-dimer, assess PE.     PROCEDURE: CT angiography of thechest was performed with intravenous   contrast utilizing dedicated PE protocol. 95 mls of Omnipaque-350   administered without complication. 5 ml discarded. Coronal and sagittal   reconstruction images were obtained. Axial MIP images were obtained from   a separate workstation.      COMPARISON: 1/14/2019.    FINDINGS: The patient's respiratory motion degrades images.    LUNGS AND AIRWAYS: Again noted, narrow the central airways by   mediastinal/hilar lymphadenopathy. Centrilobular and paraseptal  emphysema. Decreased patchy/groundglass opacities in the left upper   lobe/lingula since prior study.   PLEURA: No pneumothorax. Small to moderate bilateral pleural effusion,   increased since prior study.  HEART: Normal size. Small pericardial effusion. Left atrium surrounded by   the mediastinal/hilar lymphadenopathy.  VESSELS: Suboptimal contrast opacification of the peripheral pulmonary   arteries. No obvious central pulmonary embolus. No secondary signs of   right heart strain. Atherosclerotic change of the thoracic aorta, great   vessel origin and coronary arteries. Major intrathoracic vessels encased   by the mediastinal/hilar lymphadenopathy.  CHEST WALL AND LOWER NECK: Again noted, heterogeneous thyroid gland.   Right jugular approach Mediport terminating at the cavoatrial junction.  MEDIASTINUM AND NELLA: Again noted, mediastinal/hilar lymphadenopathy.   UPPER ABDOMEN: Nonspecific bilateral perinephric stranding.   BONES: Again noted, heterogeneous pattern of bone mineralization.   Degenerative changes of the spine. Stable anterior wedging/decreased   vertebral body height in the thoracic spine.    IMPRESSION:    Suboptimal evaluation of the peripheral pulmonary arteries. No central   pulmonary embolus or secondary signs ofright heart strain.    Known mediastinal/hilar lymphadenopathy encasing the major intrathoracic   vessels/left atrium and narrowing the central airways.    Decreased patchy/groundglass opacities in the left upper lobe/lingula   since 1/4/2019.  Increased, bilateral pleural effusion with compressive   atelectasis. Recommend clinical correlation to assess underlying   pneumonia.    Heterogeneous thyroid gland, which can be further characterized on a   nonemergent ultrasound.    Heterogeneous pattern of bone mineralization, which may be due to   osteopenia although marrow infiltrative/replacing process cannot be   excluded. Recommend clinical correlation additional imaging (MRI and/or   bone scan) as clinically indicated.    DERICK VELA, ATTENDING RADIOLOGIST  This document has been electronically signed. Feb 5 2019  2:59AM     < end of copied text >    < from: Xray Chest 1 View- PORTABLE-Urgent (02.06.19 @ 12:49) >    EXAM:  XR CHEST PORTABLE URGENT 1V                          PROCEDURE DATE:  02/06/2019      INTERPRETATION:  Chest one view    HISTORY: Post thoracentesis    COMPARISON STUDY: Left effusion    Frontal expiratory view of the chest shows the heart to be similar in   size. The lungs show similar left lung mass with reduction of left   effusion and there is no evidence of pneumothorax. Right chest port is   again noted.    IMPRESSION:  No pneumothorax.    Thank you for the courtesy of this referral.    BEV JORDAN M.D., ATTENDING RADIOLOGIST  This document has been electronically signed. Feb 6 2019  1:50PM      < end of copied text >

## 2019-02-09 NOTE — PROGRESS NOTE ADULT - ASSESSMENT
59 y/o man w HTN/COPD, dx w small cell lung ca late 12/2018 early 1/2019 w sig respiratory compromise   SCLC with significant mediastinal STEVE, pericardial effusion. Suspected pericardial mets.  Suspect extensive stage SCLC.  Needed to start Tx ASAP while hospitalized.  CT scan AP not performed due to need to minimize contrast use prior to chemo, and poor PO intake and tenuous respiratory status preventing significant IVF hydration.  w assoc pulm sx, post first cycle Carbo/VP16 in [place] and G-CSF  post c#1 chemo w carbo/VP16 01/09/19 w some respiratory improvement    Followed weekly post DC. Was stable when last seen 01/31/18 but developed increased SOB over weekend.   Presented with SOB ,CT angio chest no PE, decr left infiltrate but incr leona effusion. Noted luiza in L.    underwent therapeutic / diagnostic thoracentesis with 1.4L removed    Started cycle #2 chemotherapy: day 1 on  02/07/19 Thurs  Etoposide dose reduced by 20% for leukopenia as per primary onc Dr Harris   Plan for GCF support regardless of WBC , starting 24 hours after last chemo   f/u cytology results   Plan for  CT AP to complete  staging    RECOMMEND:   Continue chemo. D3  Anti-emetics PRN  CT Scan AP done. Await results   DVT prophyalxis.   OOB as tolerated  DC planning

## 2019-02-09 NOTE — PROGRESS NOTE ADULT - PROBLEM SELECTOR PLAN 4
Heparin for DVT prevention

## 2019-02-09 NOTE — DISCHARGE NOTE ADULT - PROVIDER TOKENS
PROVIDER:[TOKEN:[[id number]]],PROVIDER:[TOKEN:[[id number]]],PROVIDER:[TOKEN:[[id number]]],PROVIDER:[TOKEN:[[id number]]]

## 2019-02-09 NOTE — DISCHARGE NOTE ADULT - CARE PROVIDER_API CALL
Tom Harris)  Infectious Disease; Internal Medicine  [address] [facility], [address]  [place] [address]  Phone: [phone]  Fax: [phone]  Follow Up Time:     Shahab Soto)  Internal Medicine; Pulmonary Disease  [address]  [place] [address]  Phone: [phone]  Fax: [phone]  Follow Up Time:     Paul Bush)  Cardiovascular Disease; Internal Medicine  [address] [place] [address]  Phone: [phone]  Fax: [phone]  Follow Up Time:     Nicholas Spencer)  Nephrology  [address]  [place] [address]  Phone: [phone]  Fax: [phone]  Follow Up Time:

## 2019-02-09 NOTE — PROGRESS NOTE ADULT - SUBJECTIVE AND OBJECTIVE BOX
Patient is a 58y old  Male who presents with a chief complaint of sob (09 Feb 2019 18:48)      INTERVAL /OVERNIGHT EVENTS: feels ok,  denies body aches    MEDICATIONS  (STANDING):  ALBUTerol/ipratropium for Nebulization 3 milliLiter(s) Nebulizer every 6 hours  aspirin  chewable 81 milliGRAM(s) Oral daily  buDESOnide   0.5 milliGRAM(s) Respule 0.5 milliGRAM(s) Inhalation two times a day  chlorhexidine 2% Cloths 1 Application(s) Topical daily  digoxin     Tablet 0.25 milliGRAM(s) Oral daily  diltiazem    milliGRAM(s) Oral daily  furosemide   Injectable 40 milliGRAM(s) IV Push every 6 hours  heparin  Injectable 5000 Unit(s) SubCutaneous every 8 hours  loratadine 10 milliGRAM(s) Oral daily  magnesium oxide 400 milliGRAM(s) Oral three times a day with meals  metoprolol succinate  milliGRAM(s) Oral daily  multivitamin 1 Tablet(s) Oral daily  potassium chloride    Tablet ER 20 milliEquivalent(s) Oral two times a day  sodium chloride 1 Gram(s) Oral three times a day  tiotropium 18 MICROgram(s) Capsule 1 Capsule(s) Inhalation daily    MEDICATIONS  (PRN):  ALPRAZolam 0.25 milliGRAM(s) Oral three times a day PRN anxiety  guaiFENesin    Syrup 100 milliGRAM(s) Oral every 6 hours PRN Cough  ondansetron Injectable 8 milliGRAM(s) IV Push every 8 hours PRN Nausea and/or Vomiting  prochlorperazine   Tablet 10 milliGRAM(s) Oral every 6 hours PRN nausea and vomitting      Allergies    penicillin (Unknown)    Intolerances        REVIEW OF SYSTEMS:  CONSTITUTIONAL: No fever, weight loss, or fatigue  EYES: No eye pain, visual disturbances, or discharge  ENMT:  No difficulty hearing, tinnitus, vertigo; No sinus or throat pain  NECK: No pain or stiffness  BREASTS: No pain, masses, or nipple discharge  RESPIRATORY: + cough, wheezing, chills or hemoptysis; No shortness of breath  CARDIOVASCULAR: No chest pain, palpitations, dizziness, or leg swelling  GASTROINTESTINAL: No abdominal or epigastric pain. No nausea, vomiting, or hematemesis; No diarrhea or constipation. No melena or hematochezia.  GENITOURINARY: No dysuria, frequency, hematuria, or incontinence  NEUROLOGICAL: No headaches, memory loss, loss of strength, numbness, or tremors  SKIN: No itching, burning, rashes, or lesions   LYMPH NODES: No enlarged glands  ENDOCRINE: No heat or cold intolerance; No hair loss; No polydipsia or polyuria  MUSCULOSKELETAL: No joint pain or swelling; No muscle, back, or extremity pain  PSYCHIATRIC: No depression, anxiety, mood swings, or difficulty sleeping  HEME/LYMPH: No easy bruising, or bleeding gums  ALLERGY AND IMMUNOLOGIC: No hives or eczema    Vital Signs Last 24 Hrs  T(C): 36.6 (10 Feb 2019 05:13), Max: 36.6 (09 Feb 2019 15:45)  T(F): 97.9 (10 Feb 2019 05:13), Max: 97.9 (09 Feb 2019 15:45)  HR: 106 (10 Feb 2019 05:13) (79 - 108)  BP: 122/58 (10 Feb 2019 05:29) (107/63 - 122/58)  BP(mean): --  RR: 18 (10 Feb 2019 05:13) (16 - 18)  SpO2: 99% (10 Feb 2019 05:13) (97% - 100%)    PHYSICAL EXAM:  GENERAL: NAD, well-groomed, well-developed  HEAD:  Atraumatic, Normocephalic  EYES: EOMI, PERRLA, conjunctiva and sclera clear  ENMT: No tonsillar erythema, exudates, or enlargement; Moist mucous membranes, Good dentition, No lesions  NECK: Supple, No JVD, Normal thyroid  NERVOUS SYSTEM:  Alert & Oriented X3, Good concentration; Motor Strength 5/5 B/L upper and lower extremities; DTRs 2+ intact and symmetric  CHEST/LUNG: Clear to auscultation bilaterally; No rales, rhonchi, wheezing, or rubs  HEART: Regular rate and rhythm; No murmurs, rubs, or gallops  ABDOMEN: Soft, Nontender, Nondistended; Bowel sounds present  EXTREMITIES:  2+ Peripheral Pulses, No clubbing, cyanosis, or edema  LYMPH: No lymphadenopathy noted  SKIN: No rashes or lesions    LABS:                        9.5    9.60  )-----------( 297      ( 09 Feb 2019 10:12 )             28.8     09 Feb 2019 10:12    126    |  85     |  15     ----------------------------<  173    3.7     |  34     |  0.64     Ca    8.1        09 Feb 2019 10:12  Phos  3.3       09 Feb 2019 08:23  Mg     2.2       09 Feb 2019 08:23          CAPILLARY BLOOD GLUCOSE          RADIOLOGY & ADDITIONAL TESTS:    Notes Reviewed:  [x ] YES  [ ] NO    Care Discussed with Consultants/Other Providers [x ] YES  [ ] NO

## 2019-02-09 NOTE — PROGRESS NOTE ADULT - SUBJECTIVE AND OBJECTIVE BOX
[INTERVAL HX: ]  Patient seen and examined;  Chart reviewed and events noted;   no CP, no SOB, no PARKS.   No nausea.   D3 chemo    MEDICATIONS  (STANDING):  ALBUTerol/ipratropium for Nebulization 3 milliLiter(s) Nebulizer every 6 hours  aspirin  chewable 81 milliGRAM(s) Oral daily  buDESOnide   0.5 milliGRAM(s) Respule 0.5 milliGRAM(s) Inhalation two times a day  chlorhexidine 2% Cloths 1 Application(s) Topical daily  dexamethasone  IVPB 10 milliGRAM(s) IV Intermittent every 24 hours  digoxin     Tablet 0.25 milliGRAM(s) Oral daily  diltiazem    milliGRAM(s) Oral daily  etoposide IVPB (eMAR) 185 milliGRAM(s) IV Intermittent every 24 hours  furosemide   Injectable 40 milliGRAM(s) IV Push every 6 hours  heparin  Injectable 5000 Unit(s) SubCutaneous every 8 hours  loratadine 10 milliGRAM(s) Oral daily  magnesium oxide 400 milliGRAM(s) Oral three times a day with meals  metoprolol succinate  milliGRAM(s) Oral daily  multivitamin 1 Tablet(s) Oral daily  ondansetron  IVPB 10 milliGRAM(s) IV Intermittent every 24 hours  potassium chloride    Tablet ER 20 milliEquivalent(s) Oral two times a day  sodium chloride 1 Gram(s) Oral three times a day  tiotropium 18 MICROgram(s) Capsule 1 Capsule(s) Inhalation daily    MEDICATIONS  (PRN):  ALPRAZolam 0.25 milliGRAM(s) Oral three times a day PRN anxiety  guaiFENesin    Syrup 100 milliGRAM(s) Oral every 6 hours PRN Cough  ondansetron Injectable 8 milliGRAM(s) IV Push every 8 hours PRN Nausea and/or Vomiting  prochlorperazine   Tablet 10 milliGRAM(s) Oral every 6 hours PRN nausea and vomitting      Vital Signs Last 24 Hrs  T(C): 36.5 (09 Feb 2019 05:27), Max: 36.6 (08 Feb 2019 14:12)  T(F): 97.7 (09 Feb 2019 05:27), Max: 97.9 (08 Feb 2019 14:12)  HR: 85 (09 Feb 2019 08:20) (81 - 106)  BP: 109/64 (09 Feb 2019 05:27) (100/52 - 121/71)  BP(mean): --  RR: 18 (09 Feb 2019 05:27) (16 - 18)  SpO2: 97% (09 Feb 2019 08:20) (93% - 99%)    [PHYSICAL EXAM]  General: adult in NAD,  WN,  WD.  HEENT: clear oropharynx, anicteric sclera, pink conjunctivae.  Neck: supple, no masses.  CV: normal S1S2, no murmur, no rubs, no gallops.  Lungs: clear to auscultation, no wheezes, no rales, no rhonchi.  Abdomen: soft, non-tender, non-distended, no hepatosplenomegaly, normal BS, no guarding.  Ext: no clubbing, no cyanosis, +BL LE edema.  Skin: no rashes,  no petechiae, no venous stasis changes.  Neuro: alert and oriented X3, no focal motor deficits.  LN: no STEVE.      [LABS:]                        9.5    9.60  )-----------( 297      ( 09 Feb 2019 10:12 )             28.8     02-09    126<L>  |  85<L>  |  15  ----------------------------<  173<H>  3.7   |  34<H>  |  0.64    Ca    8.1<L>      09 Feb 2019 10:12  Phos  3.3     02-09  Mg     2.2     02-09            [RADIOLOGY STUDIES:]  < from: CT Abdomen and Pelvis w/ Oral Cont and w/ IV Cont (02.08.19 @ 17:32) >  EXAM:  CT ABDOMEN AND PELVIS OC IC                        PROCEDURE DATE:  02/08/2019    INTERPRETATION:  [facility] RADIOLOGIST PRELIMINARY REPORT  EXAM:  CT Abdomen and Pelvis With Contrast   EXAM DATE/TIME:  2/8/2019 5:16 PM   CLINICAL HISTORY:  58 years old, male; Condition or disease; Cancer; Other: Lung; Follow-up oncological assessment; No known metastasis; Current or recent treatment:   Chemotherapy; Prior surgery; Surgery date: 6+ months; Surgery type: Hernia x2; Patient HX: R/O mets. Has lung CA     TECHNIQUE:  Axial computed tomography images of the abdomen and pelvis with intravenous contrast.    All CT scans at this facility use at least one of these dose optimization techniques: automated exposure control; mA and/or kV adjustment per patient size (includes targeted exams where dose is matched to clinical indication); or iterative reconstruction.    Coronal and sagittal reformatted images were created and reviewed.   CONTRAST:  100 ml of omnipaque administered intravenously.   COMPARISON:  CT ANGIO CHEST WITHOUT AND OR WITH IV CONTRAST 2/5/2019 12:51:52 AM     FINDINGS:  Lower thorax:  There is involvement of the visualized portions of the mediastinum with tumor, better evaluated on the recent comparison chest CT.   Mediastinal and hilar lymphadenopathy are present, better evaluated on recent comparison chest CT. There are moderate bilateral pleural effusions.    There is dependent and compressive atelectasis within the right lower lung.     ABDOMEN:    Liver: Normal. No mass.    Gallbladder and bile ducts: Normal. No calcified stones. No ductal dilation.    Pancreas: Normal. No ductal dilation.    Spleen: Normal. No splenomegaly.    Adrenals: Normal.No mass.    Kidneys and ureters: Normal. No hydronephrosis.    Stomach and bowel: Normal. No obstruction. No mucosal thickening.    Appendix: No evidence of appendicitis.     PELVIS:    Bladder: Unremarkable as visualized.    Reproductive: Unremarkable as visualized.   There is a small left-sided hydrocele.    ABDOMEN and PELVIS:    Intraperitoneal space: Normal. No free air. No significant fluid collection.    Bones/joints:  There is a subacute healing fracture of the right seventh rib.   Chronic pars defects at L5 with grade 1 spondylolisthesis L4-5 and L5-S1.   Mild compression fracture of the T9 vertebral body, most likely chronic.    Soft tissues: Unremarkable.    Vasculature: Normal. No abdominal aortic aneurysm.    Lymph nodes:  There a 1.3 cm lymph node within the gastrohepatic ligament.   There are clusters of bilateral inguinal lymph nodes, the largest on the right measuring up to 18 mm.  There are small bilateral external iliac chain lymph nodes, measuring up to 2 cm in long axis.    IMPRESSION:   1. Gastric hepatic and pelvic and inguinal lymph nodes as discussed, nonspecific may be reactive, cannot exclude neoplastic involvement.  2. There is involvement of the visualized portions of the mediastinum with tumor, better evaluated on the recent comparison chest CT. Mediastinal and hilar lymphadenopathy are present, better evaluated on recent comparison chest CT.   3. Moderate bilateral pleural effusions.  See above final report, preliminary reported by the ad radiologist.  < end of copied text >

## 2019-02-09 NOTE — DISCHARGE NOTE ADULT - PATIENT PORTAL LINK FT
You can access the [facility] Patient Portal, offered by [facility], by registering with the following website: [email or web address]

## 2019-02-09 NOTE — PROGRESS NOTE ADULT - PROBLEM SELECTOR PLAN 5
? etiology  increase lasix for diuresis

## 2019-02-09 NOTE — DISCHARGE NOTE ADULT - SECONDARY DIAGNOSIS.
COPD (chronic obstructive pulmonary disease) AF (atrial fibrillation) HTN (hypertension) Hyponatremia Swollen limb

## 2019-02-09 NOTE — DISCHARGE NOTE ADULT - MEDICATION SUMMARY - MEDICATIONS TO TAKE
I will START or STAY ON the medications listed below when I get home from the hospital:    NEBULIZER machine  -- Dx. COPD  -- Indication: For copd    aspirin 81 mg oral tablet, chewable  -- 1 tab(s) by mouth once a day  -- Indication: For Prevent heart disease    dilTIAZem 420 mg/24 hours oral tablet, extended release  -- 1 tab(s) by mouth once a day  -- Indication: For tachycardia    digoxin 250 mcg (0.25 mg) oral tablet  -- 1 tab(s) by mouth once a day  -- Indication: For tachycardia    loratadine 10 mg oral tablet  -- 1 tab(s) by mouth once a day  -- Indication: For allergies    Toprol- mg oral tablet, extended release  -- 1 tab(s) by mouth once a day  -- Indication: For tachycardia    tiotropium 18 mcg inhalation capsule  -- 1 cap(s) inhaled once a day  -- Indication: For copd    Advair Diskus 250 mcg-50 mcg inhalation powder  -- 1 puff(s) inhaled 2 times a day   -- Check with your doctor before becoming pregnant.  For inhalation only.  Obtain medical advice before taking any non-prescription drugs as some may affect the action of this medication.  Rinse mouth thoroughly after use.    -- Indication: For copd    ipratropium-albuterol 0.5 mg-2.5 mg/3 mLinhalation solution  -- 3 milliliter(s) inhaled every 6 hours  -- Indication: For copd    furosemide 40 mg oral tablet  -- 1 tab(s) by mouth 3 times a day   -- Indication: For Leg swelling    guaiFENesin 100 mg/5 mL oral liquid  -- 5 milliliter(s) by mouth every 6 hours, As needed, Cough  -- Indication: For cough    magnesium oxide 400 mg (241.3 mg elemental magnesium) oral tablet  -- 1 tab(s) by mouth once a day  -- Indication: For Suplement    Sodium Chloride 1 g oral tablet  -- 1 tab(s) by mouth 3 times a day   -- Indication: For Hyponatremia    potassium chloride 10 mEq oral tablet, extended release  -- 1 tab(s) by mouth 2 times a day  -- Indication: For Suplement    Multiple Vitamins oral tablet  -- 1 tab(s) by mouth once a day  -- Indication: For Suplement I will START or STAY ON the medications listed below when I get home from the hospital:    NEBULIZER machine  -- Dx. COPD  -- Indication: For copd    aspirin 81 mg oral tablet, chewable  -- 1 tab(s) by mouth once a day  -- Indication: For Prevent heart disease    dilTIAZem 420 mg/24 hours oral tablet, extended release  -- 1 tab(s) by mouth once a day  -- Indication: For tachycardia    digoxin 250 mcg (0.25 mg) oral tablet  -- 1 tab(s) by mouth once a day  -- Indication: For tachycardia    loratadine 10 mg oral tablet  -- 1 tab(s) by mouth once a day  -- Indication: For allergies    oseltamivir 75 mg oral capsule  -- 1 cap(s) by mouth every 24 hours  -- Indication: For Prophylaxis    Toprol- mg oral tablet, extended release  -- 1 tab(s) by mouth once a day  -- Indication: For tachycardia    tiotropium 18 mcg inhalation capsule  -- 1 cap(s) inhaled once a day  -- Indication: For copd    Advair Diskus 250 mcg-50 mcg inhalation powder  -- 1 puff(s) inhaled 2 times a day   -- Check with your doctor before becoming pregnant.  For inhalation only.  Obtain medical advice before taking any non-prescription drugs as some may affect the action of this medication.  Rinse mouth thoroughly after use.    -- Indication: For copd    ipratropium-albuterol 0.5 mg-2.5 mg/3 mLinhalation solution  -- 3 milliliter(s) inhaled every 6 hours  -- Indication: For copd    furosemide 40 mg oral tablet  -- 1 tab(s) by mouth 3 times a day   -- Indication: For Leg swelling    guaiFENesin 100 mg/5 mL oral liquid  -- 5 milliliter(s) by mouth every 6 hours, As needed, Cough  -- Indication: For cough    magnesium oxide 400 mg (241.3 mg elemental magnesium) oral tablet  -- 1 tab(s) by mouth once a day  -- Indication: For Suplement    Sodium Chloride 1 g oral tablet  -- 1 tab(s) by mouth 3 times a day   -- Indication: For Hyponatremia    potassium chloride 10 mEq oral tablet, extended release  -- 1 tab(s) by mouth 2 times a day  -- Indication: For Suplement    Multiple Vitamins oral tablet  -- 1 tab(s) by mouth once a day  -- Indication: For Suplement

## 2019-02-09 NOTE — DISCHARGE NOTE ADULT - CARE PLAN
Principal Discharge DX:	Pleural effusion  Goal:	breath better  Assessment and plan of treatment:	follow up with lung Dr. YI  Secondary Diagnosis:	COPD (chronic obstructive pulmonary disease)  Secondary Diagnosis:	AF (atrial fibrillation)  Secondary Diagnosis:	HTN (hypertension)  Secondary Diagnosis:	Hyponatremia  Secondary Diagnosis:	Swollen limb

## 2019-02-09 NOTE — PROGRESS NOTE ADULT - SUBJECTIVE AND OBJECTIVE BOX
NEPHROLOGY INTERVAL HPI/OVERNIGHT EVENTS:  HPI:  Pt is a 59 yo male hx small cell lung ca, sp chemo on 1/14--due for chemo in am. pt pw 4 days of  progressive sob, cough, no cp, n/v, +b/l edema sp 2 dopplers neg over past month. pt also with hx pericardial window in dec for effusion.  pt states + orthopnea, no f/c, sputum, or dizziness.  In ER patient was found to have pleural effusion.  Patient is being admitted for further work up and treatment including possible chemotherapy. (05 Feb 2019 10:40)    Follow up hyponatremia/SIADH  No new complaints; breathing better    PAST MEDICAL & SURGICAL HISTORY:  Lung cancer  Depression  Dyspnea  Mediastinal adenopathy  Pericardial effusion  AF (atrial fibrillation)  Hyponatremia  COPD (chronic obstructive pulmonary disease)  HTN (hypertension)  S/P pericardial window creation    REVIEW OF SYSTEMS:    CONSTITUTIONAL:  No weight loss   EYES: No eye pain, visual disturbances, or discharge  ENMT:  No difficulty hearing, tinnitus, vertigo; No sinus or throat pain  NECK: No pain or stiffness  BREASTS: No pain, masses, or nipple discharge  RESPIRATORY: No SOB. No wheeze. No PARKS  CARDIOVASCULAR: No chest pain, palpitations, dizziness,   GASTROINTESTINAL: No abdominal or epigastric pain. No nausea, vomiting, or hematemesis; No diarrhea or constipation. No melena or hematochezia  GENITOURINARY: No dysuria, frequency, hematuria, or incontinence  NEUROLOGICAL: No headaches, memory loss, loss of strength, numbness, or tremors  SKIN: No Diffuse erythema, no blisters  LYMPH NODES: No enlarged glands  ENDOCRINE: No heat or cold intolerance; No hair loss  MUSCULOSKELETAL: No joint pain or swelling   PSYCHIATRIC: No depression, anxiety, mood swings, or difficulty sleeping  HEME/LYMPH: No easy bruising, or bleeding gums  ALLERGY AND IMMUNOLOGIC: No hives or eczema      MEDICATIONS  (STANDING):  ALBUTerol/ipratropium for Nebulization 3 milliLiter(s) Nebulizer every 6 hours  aspirin  chewable 81 milliGRAM(s) Oral daily  buDESOnide   0.5 milliGRAM(s) Respule 0.5 milliGRAM(s) Inhalation two times a day  chlorhexidine 2% Cloths 1 Application(s) Topical daily  dexamethasone  IVPB 10 milliGRAM(s) IV Intermittent every 24 hours  digoxin     Tablet 0.25 milliGRAM(s) Oral daily  diltiazem    milliGRAM(s) Oral daily  etoposide IVPB (eMAR) 185 milliGRAM(s) IV Intermittent every 24 hours  furosemide   Injectable 40 milliGRAM(s) IV Push every 6 hours  heparin  Injectable 5000 Unit(s) SubCutaneous every 8 hours  loratadine 10 milliGRAM(s) Oral daily  magnesium oxide 400 milliGRAM(s) Oral three times a day with meals  metoprolol succinate  milliGRAM(s) Oral daily  multivitamin 1 Tablet(s) Oral daily  ondansetron  IVPB 10 milliGRAM(s) IV Intermittent every 24 hours  potassium chloride    Tablet ER 20 milliEquivalent(s) Oral two times a day  sodium chloride 1 Gram(s) Oral three times a day  tiotropium 18 MICROgram(s) Capsule 1 Capsule(s) Inhalation daily    MEDICATIONS  (PRN):  ALPRAZolam 0.25 milliGRAM(s) Oral three times a day PRN anxiety  guaiFENesin    Syrup 100 milliGRAM(s) Oral every 6 hours PRN Cough  ondansetron Injectable 8 milliGRAM(s) IV Push every 8 hours PRN Nausea and/or Vomiting  prochlorperazine   Tablet 10 milliGRAM(s) Oral every 6 hours PRN nausea and vomitting      Allergies    penicillin (Unknown)    Intolerances        Vital Signs Last 24 Hrs  T(C): 36.5 (09 Feb 2019 05:27), Max: 36.6 (08 Feb 2019 14:12)  T(F): 97.7 (09 Feb 2019 05:27), Max: 97.9 (08 Feb 2019 14:12)  HR: 100 (09 Feb 2019 05:27) (81 - 110)  BP: 109/64 (09 Feb 2019 05:27) (100/52 - 121/71)  BP(mean): --  RR: 18 (09 Feb 2019 05:27) (16 - 18)  SpO2: 98% (09 Feb 2019 05:27) (91% - 99%)  Daily     Daily     PHYSICAL EXAM:    GENERAL: NAD  HEAD:  Atraumatic, Normocephalic  EYES: EOMI, PERRLA, conjunctiva and sclera clear  ENMT: No tonsillar erythema, exudates, or enlargement; Moist mucous membranes, Good dentition, No lesions  NECK: Supple, neck  veins full  NERVOUS SYSTEM:  Alert & Oriented X3, Good concentration; Motor Strength wnl upper and lower extremities  CHEST/LUNG: Clear to percussion bilaterally; No rales, rhonchi, wheezing, or rubs  HEART: Regular rate and rhythm; No murmurs, rubs, or gallops  ABDOMEN: Soft, Nontender, Nondistended; Bowel sounds present, body wall and flank edema  EXTREMITIES:  Edema +++  LYMPH: No lymphadenopathy noted  SKIN: No rashes or lesions, pale    LABS:                        9.8    10.91 )-----------( 280      ( 08 Feb 2019 07:26 )             29.9     02-08    129<L>  |  88<L>  |  14  ----------------------------<  143<H>  3.5   |  34<H>  |  0.59    Ca    8.1<L>      08 Feb 2019 07:26                  RADIOLOGY & ADDITIONAL TESTS:

## 2019-02-09 NOTE — PROGRESS NOTE ADULT - SUBJECTIVE AND OBJECTIVE BOX
Patient is a 58y old  Male who presents with a chief complaint of sob (09 Feb 2019 13:35)      INTERVAL HPI/OVERNIGHT EVENTS:    tolerated chemotherapy today. Admits to cough but no sputum production    MEDICATIONS  (STANDING):  ALBUTerol/ipratropium for Nebulization 3 milliLiter(s) Nebulizer every 6 hours  aspirin  chewable 81 milliGRAM(s) Oral daily  buDESOnide   0.5 milliGRAM(s) Respule 0.5 milliGRAM(s) Inhalation two times a day  chlorhexidine 2% Cloths 1 Application(s) Topical daily  digoxin     Tablet 0.25 milliGRAM(s) Oral daily  diltiazem    milliGRAM(s) Oral daily  furosemide   Injectable 40 milliGRAM(s) IV Push every 6 hours  heparin  Injectable 5000 Unit(s) SubCutaneous every 8 hours  loratadine 10 milliGRAM(s) Oral daily  magnesium oxide 400 milliGRAM(s) Oral three times a day with meals  metoprolol succinate  milliGRAM(s) Oral daily  multivitamin 1 Tablet(s) Oral daily  potassium chloride    Tablet ER 20 milliEquivalent(s) Oral two times a day  sodium chloride 1 Gram(s) Oral three times a day  tiotropium 18 MICROgram(s) Capsule 1 Capsule(s) Inhalation daily      MEDICATIONS  (PRN):  ALPRAZolam 0.25 milliGRAM(s) Oral three times a day PRN anxiety  guaiFENesin    Syrup 100 milliGRAM(s) Oral every 6 hours PRN Cough  ondansetron Injectable 8 milliGRAM(s) IV Push every 8 hours PRN Nausea and/or Vomiting  prochlorperazine   Tablet 10 milliGRAM(s) Oral every 6 hours PRN nausea and vomitting      Allergies    penicillin (Unknown)    Intolerances        PAST MEDICAL & SURGICAL HISTORY:  Lung cancer  Depression  Dyspnea  Mediastinal adenopathy  Pericardial effusion  AF (atrial fibrillation)  Hyponatremia  COPD (chronic obstructive pulmonary disease)  HTN (hypertension)  S/P pericardial window creation      Vital Signs Last 24 Hrs  T(C): 36.6 (09 Feb 2019 15:45), Max: 36.6 (09 Feb 2019 15:45)  T(F): 97.9 (09 Feb 2019 15:45), Max: 97.9 (09 Feb 2019 15:45)  HR: 108 (09 Feb 2019 15:45) (84 - 108)  BP: 110/70 (09 Feb 2019 15:45) (109/64 - 121/71)  BP(mean): --  RR: 16 (09 Feb 2019 15:45) (16 - 18)  SpO2: 98% (09 Feb 2019 15:45) (96% - 99%)    PHYSICAL EXAMINATION:    GENERAL: The patient is awake and alert in no apparent distress.     HEENT: Head is normocephalic and atraumatic. Extraocular muscles are intact. Mucous membranes are moist.    NECK: Supple.    LUNGS: scattered mild rhonchi    HEART: Regular rate and rhythm without murmur.    ABDOMEN: Soft, nontender, and nondistended.      EXTREMITIES: positive edema    NEUROLOGIC: Grossly intact.    SKIN: No ulceration or induration present.      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

## 2019-02-09 NOTE — DISCHARGE NOTE ADULT - MEDICATION SUMMARY - MEDICATIONS TO STOP TAKING
I will STOP taking the medications listed below when I get home from the hospital:    verapamil 120 mg oral tablet  -- 1 tab(s) by mouth once a day

## 2019-02-10 VITALS — OXYGEN SATURATION: 98 % | RESPIRATION RATE: 16 BRPM

## 2019-02-10 LAB
ANION GAP SERPL CALC-SCNC: 9 MMOL/L — SIGNIFICANT CHANGE UP (ref 5–17)
BASOPHILS # BLD AUTO: 0 K/UL — SIGNIFICANT CHANGE UP (ref 0–0.2)
BASOPHILS NFR BLD AUTO: 0 % — SIGNIFICANT CHANGE UP (ref 0–2)
BUN SERPL-MCNC: 16 MG/DL — SIGNIFICANT CHANGE UP (ref 7–23)
CALCIUM SERPL-MCNC: 8 MG/DL — LOW (ref 8.5–10.1)
CHLORIDE SERPL-SCNC: 84 MMOL/L — LOW (ref 96–108)
CO2 SERPL-SCNC: 33 MMOL/L — HIGH (ref 22–31)
CREAT SERPL-MCNC: 0.6 MG/DL — SIGNIFICANT CHANGE UP (ref 0.5–1.3)
CULTURE RESULTS: SIGNIFICANT CHANGE UP
CULTURE RESULTS: SIGNIFICANT CHANGE UP
DIGOXIN SERPL-MCNC: 1 NG/ML — SIGNIFICANT CHANGE UP (ref 0.8–2)
EOSINOPHIL # BLD AUTO: 0.01 K/UL — SIGNIFICANT CHANGE UP (ref 0–0.5)
EOSINOPHIL NFR BLD AUTO: 0.1 % — SIGNIFICANT CHANGE UP (ref 0–6)
GLUCOSE SERPL-MCNC: 123 MG/DL — HIGH (ref 70–99)
HCT VFR BLD CALC: 30.1 % — LOW (ref 39–50)
HGB BLD-MCNC: 10.2 G/DL — LOW (ref 13–17)
IMM GRANULOCYTES NFR BLD AUTO: 0.6 % — SIGNIFICANT CHANGE UP (ref 0–1.5)
LYMPHOCYTES # BLD AUTO: 0.39 K/UL — LOW (ref 1–3.3)
LYMPHOCYTES # BLD AUTO: 5.5 % — LOW (ref 13–44)
MCHC RBC-ENTMCNC: 26.3 PG — LOW (ref 27–34)
MCHC RBC-ENTMCNC: 33.9 GM/DL — SIGNIFICANT CHANGE UP (ref 32–36)
MCV RBC AUTO: 77.6 FL — LOW (ref 80–100)
MONOCYTES # BLD AUTO: 0.26 K/UL — SIGNIFICANT CHANGE UP (ref 0–0.9)
MONOCYTES NFR BLD AUTO: 3.7 % — SIGNIFICANT CHANGE UP (ref 2–14)
NEUTROPHILS # BLD AUTO: 6.36 K/UL — SIGNIFICANT CHANGE UP (ref 1.8–7.4)
NEUTROPHILS NFR BLD AUTO: 90.1 % — HIGH (ref 43–77)
NRBC # BLD: 0 /100 WBCS — SIGNIFICANT CHANGE UP (ref 0–0)
PLATELET # BLD AUTO: 365 K/UL — SIGNIFICANT CHANGE UP (ref 150–400)
POTASSIUM SERPL-MCNC: 3.6 MMOL/L — SIGNIFICANT CHANGE UP (ref 3.5–5.3)
POTASSIUM SERPL-SCNC: 3.6 MMOL/L — SIGNIFICANT CHANGE UP (ref 3.5–5.3)
RBC # BLD: 3.88 M/UL — LOW (ref 4.2–5.8)
RBC # FLD: 14.3 % — SIGNIFICANT CHANGE UP (ref 10.3–14.5)
SODIUM SERPL-SCNC: 126 MMOL/L — LOW (ref 135–145)
SPECIMEN SOURCE: SIGNIFICANT CHANGE UP
SPECIMEN SOURCE: SIGNIFICANT CHANGE UP
WBC # BLD: 7.06 K/UL — SIGNIFICANT CHANGE UP (ref 3.8–10.5)
WBC # FLD AUTO: 7.06 K/UL — SIGNIFICANT CHANGE UP (ref 3.8–10.5)

## 2019-02-10 PROCEDURE — 83986 ASSAY PH BODY FLUID NOS: CPT

## 2019-02-10 PROCEDURE — 87102 FUNGUS ISOLATION CULTURE: CPT

## 2019-02-10 PROCEDURE — 82728 ASSAY OF FERRITIN: CPT

## 2019-02-10 PROCEDURE — 87641 MR-STAPH DNA AMP PROBE: CPT

## 2019-02-10 PROCEDURE — 32555 ASPIRATE PLEURA W/ IMAGING: CPT

## 2019-02-10 PROCEDURE — 84145 PROCALCITONIN (PCT): CPT

## 2019-02-10 PROCEDURE — 84484 ASSAY OF TROPONIN QUANT: CPT

## 2019-02-10 PROCEDURE — 83615 LACTATE (LD) (LDH) ENZYME: CPT

## 2019-02-10 PROCEDURE — 84480 ASSAY TRIIODOTHYRONINE (T3): CPT

## 2019-02-10 PROCEDURE — 85610 PROTHROMBIN TIME: CPT

## 2019-02-10 PROCEDURE — 80048 BASIC METABOLIC PNL TOTAL CA: CPT

## 2019-02-10 PROCEDURE — 87206 SMEAR FLUORESCENT/ACID STAI: CPT

## 2019-02-10 PROCEDURE — 82042 OTHER SOURCE ALBUMIN QUAN EA: CPT

## 2019-02-10 PROCEDURE — 83880 ASSAY OF NATRIURETIC PEPTIDE: CPT

## 2019-02-10 PROCEDURE — 83735 ASSAY OF MAGNESIUM: CPT

## 2019-02-10 PROCEDURE — 87116 MYCOBACTERIA CULTURE: CPT

## 2019-02-10 PROCEDURE — 96374 THER/PROPH/DIAG INJ IV PUSH: CPT | Mod: XU

## 2019-02-10 PROCEDURE — 80162 ASSAY OF DIGOXIN TOTAL: CPT

## 2019-02-10 PROCEDURE — 87040 BLOOD CULTURE FOR BACTERIA: CPT

## 2019-02-10 PROCEDURE — 87075 CULTR BACTERIA EXCEPT BLOOD: CPT

## 2019-02-10 PROCEDURE — 87640 STAPH A DNA AMP PROBE: CPT

## 2019-02-10 PROCEDURE — 82270 OCCULT BLOOD FECES: CPT

## 2019-02-10 PROCEDURE — 71275 CT ANGIOGRAPHY CHEST: CPT

## 2019-02-10 PROCEDURE — 89051 BODY FLUID CELL COUNT: CPT

## 2019-02-10 PROCEDURE — 99285 EMERGENCY DEPT VISIT HI MDM: CPT | Mod: 25

## 2019-02-10 PROCEDURE — 71045 X-RAY EXAM CHEST 1 VIEW: CPT

## 2019-02-10 PROCEDURE — 93005 ELECTROCARDIOGRAM TRACING: CPT

## 2019-02-10 PROCEDURE — 87070 CULTURE OTHR SPECIMN AEROBIC: CPT

## 2019-02-10 PROCEDURE — 87015 SPECIMEN INFECT AGNT CONCNTJ: CPT

## 2019-02-10 PROCEDURE — 87205 SMEAR GRAM STAIN: CPT

## 2019-02-10 PROCEDURE — 36415 COLL VENOUS BLD VENIPUNCTURE: CPT

## 2019-02-10 PROCEDURE — 84436 ASSAY OF TOTAL THYROXINE: CPT

## 2019-02-10 PROCEDURE — 74177 CT ABD & PELVIS W/CONTRAST: CPT

## 2019-02-10 PROCEDURE — 87486 CHLMYD PNEUM DNA AMP PROBE: CPT

## 2019-02-10 PROCEDURE — 93970 EXTREMITY STUDY: CPT

## 2019-02-10 PROCEDURE — 84157 ASSAY OF PROTEIN OTHER: CPT

## 2019-02-10 PROCEDURE — 94640 AIRWAY INHALATION TREATMENT: CPT

## 2019-02-10 PROCEDURE — 83550 IRON BINDING TEST: CPT

## 2019-02-10 PROCEDURE — 85379 FIBRIN DEGRADATION QUANT: CPT

## 2019-02-10 PROCEDURE — 85027 COMPLETE CBC AUTOMATED: CPT

## 2019-02-10 PROCEDURE — 94760 N-INVAS EAR/PLS OXIMETRY 1: CPT

## 2019-02-10 PROCEDURE — 86803 HEPATITIS C AB TEST: CPT

## 2019-02-10 PROCEDURE — 83540 ASSAY OF IRON: CPT

## 2019-02-10 PROCEDURE — 99232 SBSQ HOSP IP/OBS MODERATE 35: CPT

## 2019-02-10 PROCEDURE — 83036 HEMOGLOBIN GLYCOSYLATED A1C: CPT

## 2019-02-10 PROCEDURE — 97161 PT EVAL LOW COMPLEX 20 MIN: CPT

## 2019-02-10 PROCEDURE — 82945 GLUCOSE OTHER FLUID: CPT

## 2019-02-10 PROCEDURE — 84443 ASSAY THYROID STIM HORMONE: CPT

## 2019-02-10 PROCEDURE — 88305 TISSUE EXAM BY PATHOLOGIST: CPT

## 2019-02-10 PROCEDURE — 88108 CYTOPATH CONCENTRATE TECH: CPT

## 2019-02-10 PROCEDURE — 87633 RESP VIRUS 12-25 TARGETS: CPT

## 2019-02-10 PROCEDURE — 87581 M.PNEUMON DNA AMP PROBE: CPT

## 2019-02-10 PROCEDURE — 80053 COMPREHEN METABOLIC PANEL: CPT

## 2019-02-10 PROCEDURE — 84100 ASSAY OF PHOSPHORUS: CPT

## 2019-02-10 RX ORDER — TOLVAPTAN 15 MG/1
15 TABLET ORAL ONCE
Qty: 0 | Refills: 0 | Status: COMPLETED | OUTPATIENT
Start: 2019-02-10 | End: 2019-02-10

## 2019-02-10 RX ORDER — FUROSEMIDE 40 MG
40 TABLET ORAL EVERY 12 HOURS
Qty: 0 | Refills: 0 | Status: DISCONTINUED | OUTPATIENT
Start: 2019-02-10 | End: 2019-02-10

## 2019-02-10 RX ADMIN — Medication 3 MILLILITER(S): at 00:57

## 2019-02-10 RX ADMIN — Medication 300 MILLIGRAM(S): at 05:36

## 2019-02-10 RX ADMIN — Medication 200 MILLIGRAM(S): at 05:36

## 2019-02-10 RX ADMIN — HEPARIN SODIUM 5000 UNIT(S): 5000 INJECTION INTRAVENOUS; SUBCUTANEOUS at 05:33

## 2019-02-10 RX ADMIN — SODIUM CHLORIDE 1 GRAM(S): 9 INJECTION INTRAMUSCULAR; INTRAVENOUS; SUBCUTANEOUS at 05:37

## 2019-02-10 RX ADMIN — Medication 3 MILLILITER(S): at 13:19

## 2019-02-10 RX ADMIN — MAGNESIUM OXIDE 400 MG ORAL TABLET 400 MILLIGRAM(S): 241.3 TABLET ORAL at 17:18

## 2019-02-10 RX ADMIN — Medication 40 MILLIGRAM(S): at 17:18

## 2019-02-10 RX ADMIN — Medication 40 MILLIGRAM(S): at 05:33

## 2019-02-10 RX ADMIN — Medication 81 MILLIGRAM(S): at 11:55

## 2019-02-10 RX ADMIN — Medication 20 MILLIEQUIVALENT(S): at 05:36

## 2019-02-10 RX ADMIN — SODIUM CHLORIDE 1 GRAM(S): 9 INJECTION INTRAMUSCULAR; INTRAVENOUS; SUBCUTANEOUS at 14:12

## 2019-02-10 RX ADMIN — LORATADINE 10 MILLIGRAM(S): 10 TABLET ORAL at 11:55

## 2019-02-10 RX ADMIN — Medication 0.5 MILLIGRAM(S): at 08:24

## 2019-02-10 RX ADMIN — TOLVAPTAN 15 MILLIGRAM(S): 15 TABLET ORAL at 11:56

## 2019-02-10 RX ADMIN — Medication 75 MILLIGRAM(S): at 14:13

## 2019-02-10 RX ADMIN — Medication 3 MILLILITER(S): at 08:24

## 2019-02-10 RX ADMIN — MAGNESIUM OXIDE 400 MG ORAL TABLET 400 MILLIGRAM(S): 241.3 TABLET ORAL at 11:55

## 2019-02-10 RX ADMIN — HEPARIN SODIUM 5000 UNIT(S): 5000 INJECTION INTRAVENOUS; SUBCUTANEOUS at 14:12

## 2019-02-10 RX ADMIN — CHLORHEXIDINE GLUCONATE 1 APPLICATION(S): 213 SOLUTION TOPICAL at 11:55

## 2019-02-10 RX ADMIN — Medication 20 MILLIEQUIVALENT(S): at 17:18

## 2019-02-10 RX ADMIN — Medication 1 TABLET(S): at 11:55

## 2019-02-10 RX ADMIN — MAGNESIUM OXIDE 400 MG ORAL TABLET 400 MILLIGRAM(S): 241.3 TABLET ORAL at 07:41

## 2019-02-10 RX ADMIN — Medication 0.25 MILLIGRAM(S): at 05:37

## 2019-02-10 RX ADMIN — Medication 300 UNIT(S): at 19:06

## 2019-02-10 NOTE — CHART NOTE - NSCHARTNOTEFT_GEN_A_CORE
Do you have Advance Directives (HCP / LV / Organ donation / Documentation of oral advance Directive):   ( x   )  yes    (      )    NO                                                                            Do you have LV - Living will :                                                                                                                                             (    )  yes    (  x    )   No    Do you have HCP - Health Care Proxy:                                                                                                                            ( x    )  yes   (       ) N0    Do you have DNR- Do Not Resuscitate :                                                                                                                           (      )  yes  (    x    )  No    Do you have DNI- Do Not intubate  :                                                                                                                               (      )  yes   (   x    ) No    Do you have MOLST - Medical orders for Life sustaining treatment  :                                                                    (      ) yes    (   x    ) No    Decision Maker :  (  x   ) Patient     (      )  HCA   (     ) Public Health Law Surrogate     (      ) Surrogate  (       ) Guardian    Goals of Care :  (   x   )   Complete Care     (       ) No Limitations                              (       )   Comfort Care       (       )  Hospice                               (      )   Limited medical Intervention / s    Medical Interventions :   (  x      )   CPR       (        )  DNR                                               (     x   )  Intubation with MV - Mechanical Ventilation  (   x   ) BIPAP/CPAP    (         )   DNI                                               (    x     )  Artificial Nutrition -  IVF, TPN / PPN, Tube Feeds             (         )   No Feeding Tube                                                (    x    ) Use Antibiotics                         (          ) No Antibiotics                                                (   x      ) Blood and Blood Products     (         )   No Blood or Blood products                                                (    x      )  Dialysis                                    (         )  No Dialysis                                                (          )  Medical Management only  (         )  No Invasive Interventions or Surgery  Time spent :                        (    x   ) up to 30 minutes                       (           )   more than 30 minutes  Goals of care by palliative care reviewed and discussed

## 2019-02-10 NOTE — PROGRESS NOTE ADULT - PROBLEM SELECTOR PLAN 2
chemo per ONCOLOGY Dr. LEVIN etal appreciated  2nd cycle, day 2
chemo per ONCOLOGY Dr. LEVIN etal appreciated  2nd cycle, day 3
chemo per ONCOLOGY Dr. LEVIN etal  2nd cycle
Await planned thoracocentesis
Await planned thoracocentesis
s/p thoracocentesis
chemo per ONCOLOGY Dr. LEVIN etal

## 2019-02-10 NOTE — PROGRESS NOTE ADULT - ATTENDING COMMENTS
I saw and examined the patient personally. Spoke with above provider regarding this case. I reviewed the above findings completely.  I agree with the above history, physical, and plan which I have edited where appropriate.
I saw and examined the patient personally. Spoke with above provider regarding this case. I reviewed the above findings completely.  I agree with the above history, physical, and plan which I have edited where appropriate.
Jorge Hussein MD  [phone]
? potential exposure to FLU, waiting for ID eval

## 2019-02-10 NOTE — PROGRESS NOTE ADULT - ASSESSMENT
57 yo male hx small cell lung ca, sp chemo admitted with abrupt onset of dyspnea  resolved  asked to follow up as exposure to flu

## 2019-02-10 NOTE — PROGRESS NOTE ADULT - PROBLEM SELECTOR PROBLEM 1
Shortness of breath at rest

## 2019-02-10 NOTE — PROGRESS NOTE ADULT - SUBJECTIVE AND OBJECTIVE BOX
NEPHROLOGY INTERVAL HPI/OVERNIGHT EVENTS:  HPI:  Pt is a 59 yo male hx small cell lung ca, sp chemo on 1/14--due for chemo in am. pt pw 4 days of  progressive sob, cough, no cp, n/v, +b/l edema sp 2 dopplers neg over past month. pt also with hx pericardial window in dec for effusion.  pt states + orthopnea, no f/c, sputum, or dizziness.  In ER patient was found to have pleural effusion.  Patient is being admitted for further work up and treatment including possible chemotherapy. (05 Feb 2019 10:40)    Follow up hyponatremia/SIADH  No new complaints; breathing better    PAST MEDICAL & SURGICAL HISTORY:  Lung cancer  Depression  Dyspnea  Mediastinal adenopathy  Pericardial effusion  AF (atrial fibrillation)  Hyponatremia  COPD (chronic obstructive pulmonary disease)  HTN (hypertension)  S/P pericardial window creation    REVIEW OF SYSTEMS:    CONSTITUTIONAL:  No weight loss   EYES: No eye pain, visual disturbances, or discharge  ENMT:  No difficulty hearing, tinnitus, vertigo; No sinus or throat pain  NECK: No pain or stiffness  BREASTS: No pain, masses, or nipple discharge  RESPIRATORY: No SOB. No wheeze. No PARKS  CARDIOVASCULAR: No chest pain, palpitations, dizziness,   GASTROINTESTINAL: No abdominal or epigastric pain. No nausea, vomiting, or hematemesis; No diarrhea or constipation. No melena or hematochezia  GENITOURINARY: No dysuria, frequency, hematuria, or incontinence  NEUROLOGICAL: No headaches, memory loss, loss of strength, numbness, or tremors  SKIN: No Diffuse erythema, no blisters  LYMPH NODES: No enlarged glands  ENDOCRINE: No heat or cold intolerance; No hair loss  MUSCULOSKELETAL: No joint pain or swelling   PSYCHIATRIC: No depression, anxiety, mood swings, or difficulty sleeping  HEME/LYMPH: No easy bruising, or bleeding gums  ALLERGY AND IMMUNOLOGIC: No hives or eczema      MEDICATIONS  (STANDING):  ALBUTerol/ipratropium for Nebulization 3 milliLiter(s) Nebulizer every 6 hours  aspirin  chewable 81 milliGRAM(s) Oral daily  buDESOnide   0.5 milliGRAM(s) Respule 0.5 milliGRAM(s) Inhalation two times a day  chlorhexidine 2% Cloths 1 Application(s) Topical daily  dexamethasone  IVPB 10 milliGRAM(s) IV Intermittent every 24 hours  digoxin     Tablet 0.25 milliGRAM(s) Oral daily  diltiazem    milliGRAM(s) Oral daily  etoposide IVPB (eMAR) 185 milliGRAM(s) IV Intermittent every 24 hours  furosemide   Injectable 40 milliGRAM(s) IV Push every 6 hours  heparin  Injectable 5000 Unit(s) SubCutaneous every 8 hours  loratadine 10 milliGRAM(s) Oral daily  magnesium oxide 400 milliGRAM(s) Oral three times a day with meals  metoprolol succinate  milliGRAM(s) Oral daily  multivitamin 1 Tablet(s) Oral daily  ondansetron  IVPB 10 milliGRAM(s) IV Intermittent every 24 hours  potassium chloride    Tablet ER 20 milliEquivalent(s) Oral two times a day  sodium chloride 1 Gram(s) Oral three times a day  tiotropium 18 MICROgram(s) Capsule 1 Capsule(s) Inhalation daily    MEDICATIONS  (PRN):  ALPRAZolam 0.25 milliGRAM(s) Oral three times a day PRN anxiety  guaiFENesin    Syrup 100 milliGRAM(s) Oral every 6 hours PRN Cough  ondansetron Injectable 8 milliGRAM(s) IV Push every 8 hours PRN Nausea and/or Vomiting  prochlorperazine   Tablet 10 milliGRAM(s) Oral every 6 hours PRN nausea and vomitting      Allergies    penicillin (Unknown)    Intolerances        Vital Signs Last 24 Hrs  T(C): 36.5 (09 Feb 2019 05:27), Max: 36.6 (08 Feb 2019 14:12)  T(F): 97.7 (09 Feb 2019 05:27), Max: 97.9 (08 Feb 2019 14:12)  HR: 100 (09 Feb 2019 05:27) (81 - 110)  BP: 109/64 (09 Feb 2019 05:27) (100/52 - 121/71)  BP(mean): --  RR: 18 (09 Feb 2019 05:27) (16 - 18)  SpO2: 98% (09 Feb 2019 05:27) (91% - 99%)  Daily     Daily     PHYSICAL EXAM:    GENERAL: NAD  HEAD:  Atraumatic, Normocephalic  EYES: EOMI, PERRLA, conjunctiva and sclera clear  ENMT: No tonsillar erythema, exudates, or enlargement; Moist mucous membranes, Good dentition, No lesions  NECK: Supple, neck  veins full  NERVOUS SYSTEM:  Alert & Oriented X3, Good concentration; Motor Strength wnl upper and lower extremities  CHEST/LUNG: Clear to percussion bilaterally; No rales, rhonchi, wheezing, or rubs  HEART: Regular rate and rhythm; No murmurs, rubs, or gallops  ABDOMEN: Soft, Nontender, Nondistended; Bowel sounds present, body wall and flank edema  EXTREMITIES:  Edema +++  LYMPH: No lymphadenopathy noted  SKIN: No rashes or lesions, pale    LABS:  Reviewed

## 2019-02-10 NOTE — PROGRESS NOTE ADULT - NSHPATTENDINGPLANDISCUSS_GEN_ALL_CORE
pt , primary, IR, chemo nurse
patient and RN
pt , primary team , chemo RN
Dr Harris
patient and RN

## 2019-02-10 NOTE — PROGRESS NOTE ADULT - PROVIDER SPECIALTY LIST ADULT
Cardiology
Cardiology
Family Medicine
Heme/Onc
Infectious Disease
Nephrology
Nephrology
Pulmonology
Heme/Onc
Pulmonology
Heme/Onc
Family Medicine

## 2019-02-10 NOTE — PROGRESS NOTE ADULT - SUBJECTIVE AND OBJECTIVE BOX
Patient is a 58y old  Male who presents with a chief complaint of sob (10 Feb 2019 11:51)      INTERVAL HPI/OVERNIGHT EVENTS:    Feels better. Denies shortness of breath or chest pain    MEDICATIONS  (STANDING):  ALBUTerol/ipratropium for Nebulization 3 milliLiter(s) Nebulizer every 6 hours  aspirin  chewable 81 milliGRAM(s) Oral daily  buDESOnide   0.5 milliGRAM(s) Respule 0.5 milliGRAM(s) Inhalation two times a day  chlorhexidine 2% Cloths 1 Application(s) Topical daily  digoxin     Tablet 0.25 milliGRAM(s) Oral daily  diltiazem    milliGRAM(s) Oral daily  furosemide   Injectable 40 milliGRAM(s) IV Push every 12 hours  heparin  flush 100 Units/mL Injectable 300 Unit(s) IV Push once  heparin  Injectable 5000 Unit(s) SubCutaneous every 8 hours  loratadine 10 milliGRAM(s) Oral daily  magnesium oxide 400 milliGRAM(s) Oral three times a day with meals  metoprolol succinate  milliGRAM(s) Oral daily  multivitamin 1 Tablet(s) Oral daily  oseltamivir 75 milliGRAM(s) Oral every 24 hours  potassium chloride    Tablet ER 20 milliEquivalent(s) Oral two times a day  sodium chloride 1 Gram(s) Oral three times a day  tiotropium 18 MICROgram(s) Capsule 1 Capsule(s) Inhalation daily      MEDICATIONS  (PRN):  ALPRAZolam 0.25 milliGRAM(s) Oral three times a day PRN anxiety  guaiFENesin    Syrup 100 milliGRAM(s) Oral every 6 hours PRN Cough  ondansetron Injectable 8 milliGRAM(s) IV Push every 8 hours PRN Nausea and/or Vomiting  prochlorperazine   Tablet 10 milliGRAM(s) Oral every 6 hours PRN nausea and vomitting      Allergies    penicillin (Unknown)    Intolerances        PAST MEDICAL & SURGICAL HISTORY:  Lung cancer  Depression  Dyspnea  Mediastinal adenopathy  Pericardial effusion  AF (atrial fibrillation)  Hyponatremia  COPD (chronic obstructive pulmonary disease)  HTN (hypertension)  S/P pericardial window creation      Vital Signs Last 24 Hrs  T(C): 36.4 (10 Feb 2019 13:32), Max: 36.6 (10 Feb 2019 05:13)  T(F): 97.5 (10 Feb 2019 13:32), Max: 97.9 (10 Feb 2019 05:13)  HR: 87 (10 Feb 2019 13:32) (79 - 113)  BP: 99/59 (10 Feb 2019 13:32) (99/59 - 122/58)  BP(mean): --  RR: 16 (10 Feb 2019 14:22) (16 - 18)  SpO2: 98% (10 Feb 2019 14:22) (93% - 100%)    PHYSICAL EXAMINATION:    GENERAL: The patient is awake and alert in no apparent distress.     HEENT: Head is normocephalic and atraumatic. Extraocular muscles are intact. Mucous membranes are moist.    NECK: Supple.    LUNGS: Clear to auscultation without wheezing, rales or rhonchi; respirations unlabored    HEART: Regular rate and rhythm without murmur.    ABDOMEN: Soft, nontender, and nondistended.      EXTREMITIES: Without any cyanosis, clubbing, rash, lesions or edema.    NEUROLOGIC: Grossly intact.    SKIN: No ulceration or induration present.      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

## 2019-02-10 NOTE — PROGRESS NOTE ADULT - ASSESSMENT
Pt is a 59 yo male hx small cell lung ca, sp chemo on 1/14--due for chemo in am, Afib w RVR, COPD, HTN, S/P 12/18 pericardial window . pt pw 4 days of  progressive sob, cough,  +b/l edema sp 2 dopplers neg over past month.   pt states + orthopnea, no f/c, sputum, or dizziness.  In ER patient was found to have pleural effusion.  Patient is being admitted for further work up and treatment including possible chemotherapy. (05 Feb 2019 10:40)  PT S/P L thoracentesis for malignant effusion this admission  12/18 TTE shows grossly nl LVSF and size EF 55-65%    - Respiratory symptoms improved after thoracentesis with drainage of >1.5L  - No evidence of volume overload apart from LE edema  - there is no evidence of acute ischemia.  - TTE remains unchanged from 12/18    - Afib is rate controlled  - Digoxin level 1.0 c/w digoxin  -Continue Dilitiazem  -Continue Metoprolol     - CW IV lasix 40mg IV q 6  - Will need to transition him to Lasix 80mg PO q12. Will likely benefit from Metolazone 2.5mg Q12 as well.   - Please continue to maintain strict I/Os, monitor daily weights, Cr, and K.  Replete electrolytes aggressively   - Encourage elevation of legs.      - Pt is hemodynamically stable w/o any symptoms of hypotension continue to monitor   - Monitor and replete lytes, keep K>4 and Mg >2  - Will continue to follow    Jolene Johnson DNP, ANP-c   Cardiology 57 yo male hx small cell lung ca, sp chemo on 1/14--due for chemo in am, Afib w RVR, COPD, HTN, S/P 12/18 pericardial window . pt pw 4 days of  progressive sob, cough,  +b/l edema sp 2 dopplers neg over past month.   pt states + orthopnea, no f/c, sputum, or dizziness.  In ER patient was found to have pleural effusion.  Patient is being admitted for further work up and treatment including possible chemotherapy. (05 Feb 2019 10:40)  PT S/P L thoracentesis for malignant effusion this admission  12/18 TTE shows grossly nl LVSF and size EF 55-65%    - Respiratory symptoms improved after thoracentesis with drainage of >1.5L  - No evidence of volume overload apart from LE edema  - there is no evidence of acute ischemia.  - TTE remains unchanged from 12/18    - Afib is rate controlled  - Digoxin level 1.0 c/w digoxin  -Continue Dilitiazem  -Continue Metoprolol     - CW IV lasix 40mg IV q 6  - Will need to transition him to Lasix 80mg PO q12. Will likely benefit from Metolazone 2.5mg Q12 as well.   - Please continue to maintain strict I/Os, monitor daily weights, Cr, and K.  Replete electrolytes aggressively   - Encourage elevation of legs.      - Pt is hemodynamically stable w/o any symptoms of hypotension continue to monitor   - Monitor and replete lytes, keep K>4 and Mg >2  - Will continue to follow    Jolene Johnson DNP, ANP-c   Cardiology

## 2019-02-10 NOTE — PROGRESS NOTE ADULT - PROBLEM SELECTOR PLAN 1
multifactorial  PULM eval with Dr. KD dumont
Ct reviewed, compared with prior, if anything looks better than prior save for pleural effusion.
Doubt infectious etiology  Would defer antibiotics at this time
resolved  no active acute infection  can give prophylaxis tamiflu 75 mg daily times 1days
multifactorial  PULM eval with Dr. YI

## 2019-02-10 NOTE — PROGRESS NOTE ADULT - ASSESSMENT
57 y/o man w HTN/COPD, dx w small cell lung ca late 12/2018 early 1/2019 w sig respiratory compromise   SCLC with significant mediastinal STEVE, pericardial effusion. Suspected pericardial mets.  Suspect extensive stage SCLC.  Needed to start Tx ASAP while hospitalized.  CT scan AP not performed due to need to minimize contrast use prior to chemo, and poor PO intake and tenuous respiratory status preventing significant IVF hydration.  w assoc pulm sx, post first cycle Carbo/VP16 in [place] and G-CSF  post c#1 chemo w carbo/VP16 01/09/19 w some respiratory improvement    Followed weekly post DC. Was stable when last seen 01/31/18 but developed increased SOB over weekend.   Presented with SOB ,CT angio chest no PE, decr left infiltrate but incr leona effusion. Noted luiza in L.    underwent therapeutic / diagnostic thoracentesis with 1.4L removed    Started cycle #2 chemotherapy: day 1 on  02/07/19 Thurs  Etoposide dose reduced by 20% for leukopenia as per primary onc Dr Harris   Plan for GCF support regardless of WBC , starting 24 hours after last chemo   f/u cytology results     s/p CT Scan AP, no liver nor bone mets.       RECOMMEND:   Completed chemo.   Anti-emetics PRN  CT Scan AP done.   DVT prophyalxis.   OOB as tolerated  Tamiflu 75mg daily x10d, per ID.  DC planning  Will follow in office tomorrow afternoon, has appt ~3PM.

## 2019-02-10 NOTE — PROGRESS NOTE ADULT - SUBJECTIVE AND OBJECTIVE BOX
[INTERVAL HX: ]  Patient seen and examined;  Chart reviewed and events noted;   pt feels well, no CP, no SOB, no fever, no nasues.   I spoke with pt yesterday towards evening, after learned py may have exposure to someone with influneza-A.   Contact time low, but given on tx for cancer, may be at increased risk.   Contacted ID, to discuss. Prelim recommended start Tamiflu 75mg daily x10d, pending re-eval.   Discussed with pt exposure, agreed to tx. Questions answered.   Discussed with Dr Soto yesterday and Dr Gusman.       MEDICATIONS  (STANDING):  ALBUTerol/ipratropium for Nebulization 3 milliLiter(s) Nebulizer every 6 hours  aspirin  chewable 81 milliGRAM(s) Oral daily  buDESOnide   0.5 milliGRAM(s) Respule 0.5 milliGRAM(s) Inhalation two times a day  chlorhexidine 2% Cloths 1 Application(s) Topical daily  digoxin     Tablet 0.25 milliGRAM(s) Oral daily  diltiazem    milliGRAM(s) Oral daily  furosemide   Injectable 40 milliGRAM(s) IV Push every 12 hours  heparin  Injectable 5000 Unit(s) SubCutaneous every 8 hours  loratadine 10 milliGRAM(s) Oral daily  magnesium oxide 400 milliGRAM(s) Oral three times a day with meals  metoprolol succinate  milliGRAM(s) Oral daily  multivitamin 1 Tablet(s) Oral daily  potassium chloride    Tablet ER 20 milliEquivalent(s) Oral two times a day  sodium chloride 1 Gram(s) Oral three times a day  tiotropium 18 MICROgram(s) Capsule 1 Capsule(s) Inhalation daily  tolvaptan 15 milliGRAM(s) Oral once    MEDICATIONS  (PRN):  ALPRAZolam 0.25 milliGRAM(s) Oral three times a day PRN anxiety  guaiFENesin    Syrup 100 milliGRAM(s) Oral every 6 hours PRN Cough  ondansetron Injectable 8 milliGRAM(s) IV Push every 8 hours PRN Nausea and/or Vomiting  prochlorperazine   Tablet 10 milliGRAM(s) Oral every 6 hours PRN nausea and vomitting      Vital Signs Last 24 Hrs  T(C): 36.6 (10 Feb 2019 05:13), Max: 36.6 (09 Feb 2019 15:45)  T(F): 97.9 (10 Feb 2019 05:13), Max: 97.9 (09 Feb 2019 15:45)  HR: 80 (10 Feb 2019 08:25) (79 - 108)  BP: 122/58 (10 Feb 2019 05:29) (107/63 - 122/58)  BP(mean): --  RR: 18 (10 Feb 2019 05:13) (16 - 18)  SpO2: 94% (10 Feb 2019 08:25) (94% - 100%)    [PHYSICAL EXAM]  General: adult in NAD,  WN,  WD.  HEENT: clear oropharynx, anicteric sclera, pink conjunctivae.  Neck: supple, no masses.  CV: normal S1S2, no murmur, no rubs, no gallops.  Lungs: clear to auscultation, no wheezes, no rales, no rhonchi.  Abdomen: soft, non-tender, non-distended, no hepatosplenomegaly, normal BS, no guarding.  Ext: no clubbing, no cyanosis, no edema.  Skin: no rashes,  no petechiae, no venous stasis changes.  Neuro: alert and oriented X3, no focal motor deficits.  LN: no STEVE.      [LABS:]                        10.2   7.06  )-----------( 365      ( 10 Feb 2019 09:59 )             30.1     02-10    126<L>  |  84<L>  |  16  ----------------------------<  123<H>  3.6   |  33<H>  |  0.60    Ca    8.0<L>      10 Feb 2019 09:59  Phos  3.3     02-09  Mg     2.2     02-09            [RADIOLOGY STUDIES:]

## 2019-02-10 NOTE — PROGRESS NOTE ADULT - SUBJECTIVE AND OBJECTIVE BOX
[facility], Division of Infectious Diseases  ANH Valderrama A. Lee  [phone]  -Name: RAFA SHAFER  Age: 58y  Gender: Male  MRN: [id number]    Interval History--  Notes reviewed--aske to f/u by   no cough, no rhinorrhea, no diarrhea, no myalgia    Past Medical History--  Lung cancer  Depression  Dyspnea  Mediastinal adenopathy  Pericardial effusion  AF (atrial fibrillation)  Hyponatremia  COPD (chronic obstructive pulmonary disease)  HTN (hypertension)  S/P pericardial window creation  No significant past surgical history      For details regarding the patient's social history, family history, and other miscellaneous elements, please refer the initial infectious diseases consultation and/or the admitting history and physical examination for this admission.    Allergies    penicillin (Unknown)    Intolerances        Medications--  Antibiotics:    Immunologic:    Other:  ALBUTerol/ipratropium for Nebulization  ALPRAZolam PRN  aspirin  chewable  buDESOnide   0.5 milliGRAM(s) Respule  chlorhexidine 2% Cloths  digoxin     Tablet  diltiazem   CD  furosemide   Injectable  guaiFENesin    Syrup PRN  heparin  Injectable  loratadine  magnesium oxide  metoprolol succinate ER  multivitamin  ondansetron Injectable PRN  potassium chloride    Tablet ER  prochlorperazine   Tablet PRN  sodium chloride  tiotropium 18 MICROgram(s) Capsule  tolvaptan      Review of Systems--  A 10-point review of systems was obtained.     Pertinent positives and negatives--  Constitutional: No fevers. No Chills. No Rigors.   Cardiovascular: No chest pain. No palpitations.  Respiratory: No shortness of breath. No cough.  Gastrointestinal: No nausea or vomiting. No diarrhea or constipation.   Psychiatric: no anxiety  Review of systems otherwise negative except as previously noted.    Physical Examination--  Vital Signs: T(F): 97.9 (02-10-19 @ 05:13), Max: 97.9 (02-09-19 @ 15:45)  HR: 80 (02-10-19 @ 08:25)  BP: 122/58 (02-10-19 @ 05:29)  RR: 18 (02-10-19 @ 05:13)  SpO2: 94% (02-10-19 @ 08:25)  Wt(kg): --  General: Nontoxic-appearing Male in no acute distress.  HEENT: AT/NC. P. Anicteric. Conjunctiva pink and moist.   Neck: Not rigid. No sense of mass.  Nodes: None palpable.  Lungs: Clear bilaterally without rales, wheezing or rhonchi  Heart: Regular rate and rhythm. No Murmur. No rub. No gallop. No palpable thrill.  Abdomen: Bowel sounds present and normoactive. Soft. Nondistended. Nontender.   Back: No spinal tenderness. No costovertebral angle tenderness.   Extremities: No cyanosis or clubbing. No edema.   Skin: Warm. Dry. Good turgor. No rash. No vasculitic stigmata.  Psychiatric: Appropriate affect and mood for situation.         Laboratory Studies--  CBC                        10.2   7.06  )-----------( 365      ( 10 Feb 2019 09:59 )             30.1       Chemistries  02-10    126<L>  |  84<L>  |  16  ----------------------------<  123<H>  3.6   |  33<H>  |  0.60    Ca    8.0<L>      10 Feb 2019 09:59  Phos  3.3     02-09  Mg     2.2     02-09        Culture Data    Culture - Acid Fast - Body Fluid w/Smear (collected 07 Feb 2019 09:17)  Source: .Body Fluid Pleural Fluid    Culture - Fungal, Body Fluid (collected 06 Feb 2019 21:59)  Source: .Body Fluid Pleural Fluid  Preliminary Report (07 Feb 2019 11:54):    Testing in progress    Culture - Body Fluid with Gram Stain (collected 06 Feb 2019 21:59)  Source: .Body Fluid Pleural Fluid  Gram Stain (07 Feb 2019 03:02):    polymorphonuclear leukocytes seen per low power field    No organisms seen per oil power field    by cytocentrifuge  Preliminary Report (07 Feb 2019 18:59):    No growth    Culture - Blood (collected 05 Feb 2019 09:09)  Source: .Blood Blood-Venous  Final Report (10 Feb 2019 10:01):    No growth at 5 days.    Culture - Blood (collected 05 Feb 2019 09:09)  Source: .Blood Blood-Venous  Final Report (10 Feb 2019 10:01):    No growth at 5 days.

## 2019-02-10 NOTE — PROGRESS NOTE ADULT - SUBJECTIVE AND OBJECTIVE BOX
[facility] -- Gurpreet Bush, Emily, Corey, Sajan Abebe Savella  Office # [phone]    Follow Up: SOB/Hypotension    HPI:  Pt is a 57 yo male hx small cell lung ca, sp chemo on 1/14--due for chemo in am. pt pw 4 days of  progressive sob, cough, no cp, n/v, +b/l edema sp 2 dopplers neg over past month. pt also with hx pericardial window in dec for effusion.  pt states + orthopnea, no f/c, sputum, or dizziness.  In ER patient was found to have pleural effusion.  Patient is being admitted for further work up and treatment including possible chemotherapy.  PT S/P thoracentesis   His dyspnea has improved still with lower extremity edema.     Subjective/Observations: Pt. seen and examined and evaluated. Pt. resting comfortably in bed in NAD, with no respiratory distress, no chest pain, dyspnea, palpitations, PND, or orthopnea.      REVIEW OF SYSTEMS: All other review of systems is negative unless indicated above    PAST MEDICAL & SURGICAL HISTORY:  Lung cancer  Depression  Dyspnea  Mediastinal adenopathy  Pericardial effusion  AF (atrial fibrillation)  Hyponatremia  COPD (chronic obstructive pulmonary disease)  HTN (hypertension)  S/P pericardial window creation      MEDICATIONS  (STANDING):  ALBUTerol/ipratropium for Nebulization 3 milliLiter(s) Nebulizer every 6 hours  aspirin  chewable 81 milliGRAM(s) Oral daily  buDESOnide   0.5 milliGRAM(s) Respule 0.5 milliGRAM(s) Inhalation two times a day  chlorhexidine 2% Cloths 1 Application(s) Topical daily  digoxin     Tablet 0.25 milliGRAM(s) Oral daily  diltiazem    milliGRAM(s) Oral daily  furosemide   Injectable 40 milliGRAM(s) IV Push every 12 hours  heparin  Injectable 5000 Unit(s) SubCutaneous every 8 hours  loratadine 10 milliGRAM(s) Oral daily  magnesium oxide 400 milliGRAM(s) Oral three times a day with meals  metoprolol succinate  milliGRAM(s) Oral daily  multivitamin 1 Tablet(s) Oral daily  potassium chloride    Tablet ER 20 milliEquivalent(s) Oral two times a day  sodium chloride 1 Gram(s) Oral three times a day  tiotropium 18 MICROgram(s) Capsule 1 Capsule(s) Inhalation daily  tolvaptan 15 milliGRAM(s) Oral once    MEDICATIONS  (PRN):  ALPRAZolam 0.25 milliGRAM(s) Oral three times a day PRN anxiety  guaiFENesin    Syrup 100 milliGRAM(s) Oral every 6 hours PRN Cough  ondansetron Injectable 8 milliGRAM(s) IV Push every 8 hours PRN Nausea and/or Vomiting  prochlorperazine   Tablet 10 milliGRAM(s) Oral every 6 hours PRN nausea and vomitting      Allergies    penicillin (Unknown)    Intolerances        Vital Signs Last 24 Hrs  T(C): 36.6 (10 Feb 2019 05:13), Max: 36.6 (09 Feb 2019 15:45)  T(F): 97.9 (10 Feb 2019 05:13), Max: 97.9 (09 Feb 2019 15:45)  HR: 80 (10 Feb 2019 08:25) (79 - 108)  BP: 122/58 (10 Feb 2019 05:29) (107/63 - 122/58)  BP(mean): --  RR: 18 (10 Feb 2019 05:13) (16 - 18)  SpO2: 94% (10 Feb 2019 08:25) (94% - 100%)    I&O's Summary    09 Feb 2019 07:01  -  10 Feb 2019 07:00  --------------------------------------------------------  IN: 1305 mL / OUT: 4650 mL / NET: -3345 mL    ECHO:     < from: TTE Echo Doppler w/o Cont (01.04.19 @ 09:32) >    This is a technically limited study with suboptimal endocardial   definition. Within these limitations, the left ventricle appears to be   normal in size with overall normal systolic function. The right ventricle   is not well seen but is probably normal in size with normal systolic   function. Biatrial enlargement. The aortic root is mildly dilated. The   mitral valve is not well seen. There is trace physiologic MR noted. Other   valves appear structurally normal as visualized. Inadequate TR jet to   estimate PA systolic pressure. No significant pericardial effusion. Left   pleural effusion. The patient was in rapid atrial fibrillation during   this examination        TELEMETRY: Overnight on telemetry       PHYSICAL EXAM:    Constitutional: NAD, awake and alert, well-developed  HEENT: Moist Mucous Membranes, Anicteric  Pulmonary: Non-labored, breath sounds are clear bilaterally, No wheezing, rales or rhonchi  Cardiovascular: Regular, S1 and S2, No murmurs, rubs, gallops or clicks  Gastrointestinal: Bowel Sounds present, soft, nontender.   Lymph: No peripheral edema. No lymphadenopathy.  Skin: No visible rashes or ulcers.  Psych:  Mood & affect appropriate    LABS: All Labs Reviewed:                        10.2   7.06  )-----------( 365      ( 10 Feb 2019 09:59 )             30.1         10 Feb 2019 09:59    126    |  84     |  16     ----------------------------<  123    3.6     |  33     |  0.60   Ca    8.0        10 Feb 2019 09:59           Jolene Johnson DNP, ANP-c  Cardiology [facility] -- Gurpreet Bush, Corey Diaz, Sajan Abebe Savella  Office # [phone]    Follow Up: SOB/Hypotension      Subjective/Observations: Pt. seen and examined and evaluated. Pt. resting comfortably in bed in NAD, with no respiratory distress, no chest pain, dyspnea, palpitations, PND, or orthopnea.      REVIEW OF SYSTEMS: All other review of systems is negative unless indicated above    PAST MEDICAL & SURGICAL HISTORY:  Lung cancer  Depression  Dyspnea  Mediastinal adenopathy  Pericardial effusion  AF (atrial fibrillation)  Hyponatremia  COPD (chronic obstructive pulmonary disease)  HTN (hypertension)  S/P pericardial window creation      MEDICATIONS  (STANDING):  ALBUTerol/ipratropium for Nebulization 3 milliLiter(s) Nebulizer every 6 hours  aspirin  chewable 81 milliGRAM(s) Oral daily  buDESOnide   0.5 milliGRAM(s) Respule 0.5 milliGRAM(s) Inhalation two times a day  chlorhexidine 2% Cloths 1 Application(s) Topical daily  digoxin     Tablet 0.25 milliGRAM(s) Oral daily  diltiazem    milliGRAM(s) Oral daily  furosemide   Injectable 40 milliGRAM(s) IV Push every 12 hours  heparin  Injectable 5000 Unit(s) SubCutaneous every 8 hours  loratadine 10 milliGRAM(s) Oral daily  magnesium oxide 400 milliGRAM(s) Oral three times a day with meals  metoprolol succinate  milliGRAM(s) Oral daily  multivitamin 1 Tablet(s) Oral daily  potassium chloride    Tablet ER 20 milliEquivalent(s) Oral two times a day  sodium chloride 1 Gram(s) Oral three times a day  tiotropium 18 MICROgram(s) Capsule 1 Capsule(s) Inhalation daily  tolvaptan 15 milliGRAM(s) Oral once    MEDICATIONS  (PRN):  ALPRAZolam 0.25 milliGRAM(s) Oral three times a day PRN anxiety  guaiFENesin    Syrup 100 milliGRAM(s) Oral every 6 hours PRN Cough  ondansetron Injectable 8 milliGRAM(s) IV Push every 8 hours PRN Nausea and/or Vomiting  prochlorperazine   Tablet 10 milliGRAM(s) Oral every 6 hours PRN nausea and vomitting      Allergies    penicillin (Unknown)    Intolerances        Vital Signs Last 24 Hrs  T(C): 36.6 (10 Feb 2019 05:13), Max: 36.6 (09 Feb 2019 15:45)  T(F): 97.9 (10 Feb 2019 05:13), Max: 97.9 (09 Feb 2019 15:45)  HR: 80 (10 Feb 2019 08:25) (79 - 108)  BP: 122/58 (10 Feb 2019 05:29) (107/63 - 122/58)  BP(mean): --  RR: 18 (10 Feb 2019 05:13) (16 - 18)  SpO2: 94% (10 Feb 2019 08:25) (94% - 100%)    I&O's Summary    09 Feb 2019 07:01  -  10 Feb 2019 07:00  --------------------------------------------------------  IN: 1305 mL / OUT: 4650 mL / NET: -3345 mL    ECHO:     < from: TTE Echo Doppler w/o Cont (01.04.19 @ 09:32) >    This is a technically limited study with suboptimal endocardial   definition. Within these limitations, the left ventricle appears to be   normal in size with overall normal systolic function. The right ventricle   is not well seen but is probably normal in size with normal systolic   function. Biatrial enlargement. The aortic root is mildly dilated. The   mitral valve is not well seen. There is trace physiologic MR noted. Other   valves appear structurally normal as visualized. Inadequate TR jet to   estimate PA systolic pressure. No significant pericardial effusion. Left   pleural effusion. The patient was in rapid atrial fibrillation during   this examination              PHYSICAL EXAM:    Constitutional: NAD, awake and alert, well-developed  HEENT: Moist Mucous Membranes, Anicteric  Pulmonary: Decreased breath sounds b/l. No rales, crackles or wheeze appreciated.   Cardiovascular: Regular, S1 and S2, No murmurs, rubs, gallops or clicks  Gastrointestinal: Bowel Sounds present, soft, nontender.   Lymph: 2-3 + peripheral edema. No lymphadenopathy.  Skin: No visible rashes or ulcers.  Psych:  Mood & affect appropriate    LABS: All Labs Reviewed:                        10.2 7.06  )-----------( 365      ( 10 Feb 2019 09:59 )             30.1         10 Feb 2019 09:59    126    |  84     |  16     ----------------------------<  123    3.6     |  33     |  0.60   Ca    8.0        10 Feb 2019 09:59           Jolene Johnson DNP, Darnell  Cardiology [facility] -- Gurpreet Bush, Corey Diaz, Sajan Abebe Savella  Office # [phone]    Follow Up: SOB/Hypotension      Subjective/Observations: Pt. seen and examined and evaluated. Pt. resting comfortably in bed in NAD, with no respiratory distress, no chest pain, dyspnea, palpitations, PND, or orthopnea.      REVIEW OF SYSTEMS: All other review of systems is negative unless indicated above    PAST MEDICAL & SURGICAL HISTORY:  Lung cancer  Depression  Dyspnea  Mediastinal adenopathy  Pericardial effusion  AF (atrial fibrillation)  Hyponatremia  COPD (chronic obstructive pulmonary disease)  HTN (hypertension)  S/P pericardial window creation      MEDICATIONS  (STANDING):  ALBUTerol/ipratropium for Nebulization 3 milliLiter(s) Nebulizer every 6 hours  aspirin  chewable 81 milliGRAM(s) Oral daily  buDESOnide   0.5 milliGRAM(s) Respule 0.5 milliGRAM(s) Inhalation two times a day  chlorhexidine 2% Cloths 1 Application(s) Topical daily  digoxin     Tablet 0.25 milliGRAM(s) Oral daily  diltiazem    milliGRAM(s) Oral daily  furosemide   Injectable 40 milliGRAM(s) IV Push every 12 hours  heparin  Injectable 5000 Unit(s) SubCutaneous every 8 hours  loratadine 10 milliGRAM(s) Oral daily  magnesium oxide 400 milliGRAM(s) Oral three times a day with meals  metoprolol succinate  milliGRAM(s) Oral daily  multivitamin 1 Tablet(s) Oral daily  potassium chloride    Tablet ER 20 milliEquivalent(s) Oral two times a day  sodium chloride 1 Gram(s) Oral three times a day  tiotropium 18 MICROgram(s) Capsule 1 Capsule(s) Inhalation daily  tolvaptan 15 milliGRAM(s) Oral once    MEDICATIONS  (PRN):  ALPRAZolam 0.25 milliGRAM(s) Oral three times a day PRN anxiety  guaiFENesin    Syrup 100 milliGRAM(s) Oral every 6 hours PRN Cough  ondansetron Injectable 8 milliGRAM(s) IV Push every 8 hours PRN Nausea and/or Vomiting  prochlorperazine   Tablet 10 milliGRAM(s) Oral every 6 hours PRN nausea and vomitting      Allergies    penicillin (Unknown)    Intolerances        Vital Signs Last 24 Hrs  T(C): 36.6 (10 Feb 2019 05:13), Max: 36.6 (09 Feb 2019 15:45)  T(F): 97.9 (10 Feb 2019 05:13), Max: 97.9 (09 Feb 2019 15:45)  HR: 80 (10 Feb 2019 08:25) (79 - 108)  BP: 122/58 (10 Feb 2019 05:29) (107/63 - 122/58)  BP(mean): --  RR: 18 (10 Feb 2019 05:13) (16 - 18)  SpO2: 94% (10 Feb 2019 08:25) (94% - 100%)    I&O's Summary    09 Feb 2019 07:01  -  10 Feb 2019 07:00  --------------------------------------------------------  IN: 1305 mL / OUT: 4650 mL / NET: -3345 mL    ECHO:     < from: TTE Echo Doppler w/o Cont (01.04.19 @ 09:32) >    This is a technically limited study with suboptimal endocardial   definition. Within these limitations, the left ventricle appears to be   normal in size with overall normal systolic function. The right ventricle   is not well seen but is probably normal in size with normal systolic   function. Biatrial enlargement. The aortic root is mildly dilated. The   mitral valve is not well seen. There is trace physiologic MR noted. Other   valves appear structurally normal as visualized. Inadequate TR jet to   estimate PA systolic pressure. No significant pericardial effusion. Left   pleural effusion. The patient was in rapid atrial fibrillation during   this examination      PHYSICAL EXAM:    Constitutional: NAD, awake and alert, well-developed  HEENT: Moist Mucous Membranes, Anicteric  Pulmonary: Decreased breath sounds b/l. No rales, crackles or wheeze appreciated.   Cardiovascular: Regular, S1 and S2, No murmurs, rubs, gallops or clicks  Gastrointestinal: Bowel Sounds present, soft, nontender.   Lymph: 2-3 + peripheral edema. No lymphadenopathy.  Skin: No visible rashes or ulcers.  Psych:  Mood & affect appropriate    LABS: All Labs Reviewed:                        10.2 7.06  )-----------( 365      ( 10 Feb 2019 09:59 )             30.1         10 Feb 2019 09:59    126    |  84     |  16     ----------------------------<  123    3.6     |  33     |  0.60   Ca    8.0        10 Feb 2019 09:59           Jolene Johnson DNP, Darnell  Cardiology

## 2019-02-10 NOTE — PROGRESS NOTE ADULT - ASSESSMENT
Hyponatremia  SIADH  LE edema   Lung cancer    - Patient is well known to our group  - No extensive renal work up is needed  - IV Lasix + NaCl.   - Tolvaptan 15 mg po x 1. No fluid restriction on the day of tolvaptan. D/w pharmacy   - Oncology follow up   - Monitor chemistries  - Renal function is otherwise stable    Thank you     D/w pharmacy  D/w RN

## 2019-02-10 NOTE — PROGRESS NOTE ADULT - PROBLEM SELECTOR PLAN 3
S/P thoracentesis   PULM eval with Dr. KD dumont
S/P thoracentesis   PULM eval with Dr. KD dumont
S/P thoracentesis yesterday  PULM eval with Dr. KD dumont
as per onc and primary team
as per primary team
as per primary team    Thank you for consulting us and involving us in the management of this most interesting and challenging case.     Please Call with any further questions
S/P thoracentesis  PULM eval with Dr. YI

## 2019-02-13 ENCOUNTER — APPOINTMENT (OUTPATIENT)
Dept: CARDIOLOGY | Facility: CLINIC | Age: 59
End: 2019-02-13

## 2019-02-20 ENCOUNTER — MEDICATION RENEWAL (OUTPATIENT)
Age: 59
End: 2019-02-20

## 2019-02-20 PROBLEM — C34.90 MALIGNANT NEOPLASM OF UNSPECIFIED PART OF UNSPECIFIED BRONCHUS OR LUNG: Chronic | Status: ACTIVE | Noted: 2019-02-05

## 2019-02-20 PROBLEM — F32.9 MAJOR DEPRESSIVE DISORDER, SINGLE EPISODE, UNSPECIFIED: Chronic | Status: ACTIVE | Noted: 2019-02-05

## 2019-02-23 LAB
CULTURE RESULTS: SIGNIFICANT CHANGE UP
SPECIMEN SOURCE: SIGNIFICANT CHANGE UP

## 2019-02-27 ENCOUNTER — NON-APPOINTMENT (OUTPATIENT)
Age: 59
End: 2019-02-27

## 2019-02-27 ENCOUNTER — APPOINTMENT (OUTPATIENT)
Dept: CARDIOLOGY | Facility: CLINIC | Age: 59
End: 2019-02-27
Payer: COMMERCIAL

## 2019-02-27 VITALS
OXYGEN SATURATION: 90 % | HEART RATE: 107 BPM | WEIGHT: 236 LBS | BODY MASS INDEX: 31.28 KG/M2 | DIASTOLIC BLOOD PRESSURE: 70 MMHG | SYSTOLIC BLOOD PRESSURE: 131 MMHG | HEIGHT: 73 IN

## 2019-02-27 DIAGNOSIS — J44.9 CHRONIC OBSTRUCTIVE PULMONARY DISEASE, UNSPECIFIED: ICD-10-CM

## 2019-02-27 DIAGNOSIS — Z87.891 PERSONAL HISTORY OF NICOTINE DEPENDENCE: ICD-10-CM

## 2019-02-27 PROCEDURE — 99215 OFFICE O/P EST HI 40 MIN: CPT

## 2019-02-27 PROCEDURE — 93000 ELECTROCARDIOGRAM COMPLETE: CPT

## 2019-02-28 RX ORDER — DIGOXIN 250 UG/1
250 TABLET ORAL DAILY
Qty: 90 | Refills: 3 | Status: ACTIVE | COMMUNITY
Start: 2019-02-20 | End: 1900-01-01

## 2019-02-28 RX ORDER — CHLORHEXIDINE GLUCONATE 4 %
400 (240 MG) LIQUID (ML) TOPICAL
Qty: 90 | Refills: 3 | Status: ACTIVE | COMMUNITY
Start: 1900-01-01 | End: 1900-01-01

## 2019-02-28 RX ORDER — IPRATROPIUM BROMIDE AND ALBUTEROL SULFATE 2.5; .5 MG/3ML; MG/3ML
0.5-2.5 (3) SOLUTION RESPIRATORY (INHALATION) 4 TIMES DAILY
Qty: 5 | Refills: 3 | Status: ACTIVE | COMMUNITY
Start: 1900-01-01 | End: 1900-01-01

## 2019-02-28 RX ORDER — POTASSIUM CHLORIDE 750 MG/1
10 TABLET, FILM COATED, EXTENDED RELEASE ORAL TWICE DAILY
Qty: 180 | Refills: 3 | Status: ACTIVE | COMMUNITY
Start: 1900-01-01 | End: 1900-01-01

## 2019-02-28 RX ORDER — NORMAL SALT TABLETS 1 G/G
1 TABLET ORAL
Qty: 270 | Refills: 3 | Status: ACTIVE | COMMUNITY
Start: 1900-01-01 | End: 1900-01-01

## 2019-02-28 NOTE — DISCUSSION/SUMMARY
[FreeTextEntry1] : This is a 58-year-old white male who is a smoker, recently diagnosed with metastatic lung cancer involving the pericardial fluid and pleural fluid. He is status post a pericardial window and thoracentesis and is receiving chemotherapy. He does have atrial fibrillation with rate control.He has SIADH with fluid retention and is on salt pills, potassium, magnesium, and Lasix\par \par Most of his problems come from the cancer. Hopefully with aggressive treatment the other problems will subside.\par \par He will stay on his present medication. He'll go for blood work to include electrolytes to check on his SIADH.I would appreciate your sending a copy of the blood test to my office for my review.\par \par There is no clinical evidence of recurrence of pleural or pericardial fluid at this time.\par \par If he has any problems or questions he will call us. Otherwise I would see him in one month. Reviewed the above with the patient and his wife and answered their questions. Went over the recent hospitalizations

## 2019-02-28 NOTE — REASON FOR VISIT
[Atrial Fibrillation] : atrial fibrillation [Dyspnea] : dyspnea [Hypertension] : hypertension [Initial Evaluation] : an initial evaluation of [FreeTextEntry1] : Lung CA, COPD, SIADH, Pericardial Window

## 2019-02-28 NOTE — HISTORY OF PRESENT ILLNESS
[FreeTextEntry1] : I saw Bala San in the office today for cardiac followup. He is a [age]-year-old white male who smoked most of his life. He presented to [facility] 12/18 with shortness of breath. He was in atrial fibrillation initially diagnosed with pneumonia. He came back in January and was found to have a pericardial effusion, pleural effusion, and lung cancer by bronchoscopy. He was treated with a pericardial window, and thoracentesis. He also had SIADH, and was put on diuretics and salt pills for low sodium. He is now getting chemotherapy. He does have a cough and does have some shortness of breath and cannot lay flat but otherwise is doing well. He is on low dose aspirin not anticoagulation because of his pleural and pericardial effusions.\par \par His past medical history significant for smoking and hypertension. He denies any history of diabetes, high cholesterol, or family history of heart disease.\par \par Echocardiogram in January was a poor study but demonstrated normal LV systolic function without significant valvular disease. There was a left effusion but no pericardial effusion.

## 2019-02-28 NOTE — PHYSICAL EXAM
[General Appearance - Well Developed] : well developed [Normal Appearance] : normal appearance [Well Groomed] : well groomed [General Appearance - Well Nourished] : well nourished [No Deformities] : no deformities [General Appearance - In No Acute Distress] : no acute distress [Normal Conjunctiva] : the conjunctiva exhibited no abnormalities [Normal Oral Mucosa] : normal oral mucosa [Normal Jugular Venous A Waves Present] : normal jugular venous A waves present [Normal Jugular Venous V Waves Present] : normal jugular venous V waves present [No Jugular Venous Sheth A Waves] : no jugular venous sheth A waves [Respiration, Rhythm And Depth] : normal respiratory rhythm and effort [Exaggerated Use Of Accessory Muscles For Inspiration] : no accessory muscle use [Not Palpable] : not palpable [Tachycardia] : tachycardic [Irregularly Irregular] : irregularly irregular [Normal S1] : normal S1 [Normal S2] : normal S2 [No Murmur] : no murmurs heard [2+] : left 2+ [1+] : left 1+ [No Abnormalities] : the abdominal aorta was not enlarged and no bruit was heard [___ +] : bilateral [unfilled]U+ pretibial pitting edema [Bowel Sounds] : normal bowel sounds [Abdomen Soft] : soft [Abdomen Tenderness] : non-tender [Abnormal Walk] : normal gait [Nail Clubbing] : no clubbing of the fingernails [Cyanosis, Localized] : no localized cyanosis [Skin Color & Pigmentation] : normal skin color and pigmentation [Skin Turgor] : normal skin turgor [] : no rash [Oriented To Time, Place, And Person] : oriented to person, place, and time [Impaired Insight] : insight and judgment were intact [No Anxiety] : not feeling anxious [FreeTextEntry1] : Minimal wheezing [S3] : no S3 [S4] : no S4 [Right Carotid Bruit] : no bruit heard over the right carotid [Left Carotid Bruit] : no bruit heard over the left carotid [Right Femoral Bruit] : no bruit heard over the right femoral artery [Left Femoral Bruit] : no bruit heard over the left femoral artery

## 2019-02-28 NOTE — REVIEW OF SYSTEMS
[Feeling Fatigued] : feeling fatigued [Shortness Of Breath] : shortness of breath [Cough] : cough [Negative] : Genitourinary [Fever] : no fever [Headache] : no headache [Chills] : no chills [Blurry Vision] : no blurred vision [Seeing Double (Diplopia)] : no diplopia [Chest Pain] : no chest pain [Palpitations] : no palpitations [Wheezing] : no wheezing [Coughing Up Blood] : no hemoptysis [Joint Pain] : no joint pain [Skin: A Rash] : no rash: [Dizziness] : no dizziness [Depression] : no depression [Anxiety] : no anxiety [Excessive Thirst] : no polydipsia [Easy Bleeding] : no tendency for easy bleeding [Easy Bruising] : no tendency for easy bruising

## 2019-03-05 ENCOUNTER — OUTPATIENT (OUTPATIENT)
Dept: OUTPATIENT SERVICES | Facility: HOSPITAL | Age: 59
LOS: 1 days | End: 2019-03-05
Payer: COMMERCIAL

## 2019-03-05 VITALS
HEART RATE: 97 BPM | TEMPERATURE: 99 F | SYSTOLIC BLOOD PRESSURE: 117 MMHG | OXYGEN SATURATION: 96 % | DIASTOLIC BLOOD PRESSURE: 65 MMHG | RESPIRATION RATE: 18 BRPM

## 2019-03-05 VITALS
TEMPERATURE: 98 F | DIASTOLIC BLOOD PRESSURE: 71 MMHG | OXYGEN SATURATION: 98 % | SYSTOLIC BLOOD PRESSURE: 123 MMHG | HEIGHT: 72 IN | RESPIRATION RATE: 20 BRPM | HEART RATE: 123 BPM | WEIGHT: 233.91 LBS

## 2019-03-05 DIAGNOSIS — Z98.890 OTHER SPECIFIED POSTPROCEDURAL STATES: Chronic | ICD-10-CM

## 2019-03-05 DIAGNOSIS — C34.81 MALIGNANT NEOPLASM OF OVERLAPPING SITES OF RIGHT BRONCHUS AND LUNG: ICD-10-CM

## 2019-03-05 PROCEDURE — 96413 CHEMO IV INFUSION 1 HR: CPT

## 2019-03-05 PROCEDURE — 96415 CHEMO IV INFUSION ADDL HR: CPT

## 2019-03-05 RX ORDER — ONDANSETRON 8 MG/1
10 TABLET, FILM COATED ORAL DAILY
Qty: 0 | Refills: 0 | Status: DISCONTINUED | OUTPATIENT
Start: 2019-03-05 | End: 2019-03-06

## 2019-03-05 RX ORDER — CARBOPLATIN 50 MG
750 VIAL (EA) INTRAVENOUS ONCE
Qty: 0 | Refills: 0 | Status: COMPLETED | OUTPATIENT
Start: 2019-03-05 | End: 2019-03-05

## 2019-03-05 RX ORDER — ETOPOSIDE 20 MG/ML
195 VIAL (ML) INTRAVENOUS EVERY 24 HOURS
Qty: 0 | Refills: 0 | Status: DISCONTINUED | OUTPATIENT
Start: 2019-03-05 | End: 2019-03-06

## 2019-03-05 RX ORDER — DEXAMETHASONE 0.5 MG/5ML
10 ELIXIR ORAL ONCE
Qty: 0 | Refills: 0 | Status: COMPLETED | OUTPATIENT
Start: 2019-03-05 | End: 2019-03-05

## 2019-03-05 RX ADMIN — Medication 509.75 MILLIGRAM(S): at 12:55

## 2019-03-05 RX ADMIN — Medication 433.33 MILLIGRAM(S): at 11:46

## 2019-03-05 RX ADMIN — Medication 750 MILLIGRAM(S): at 12:40

## 2019-03-05 RX ADMIN — Medication 102 MILLIGRAM(S): at 10:26

## 2019-03-05 RX ADMIN — Medication 195 MILLIGRAM(S): at 14:05

## 2019-03-05 RX ADMIN — Medication 10 MILLIGRAM(S): at 10:58

## 2019-03-05 RX ADMIN — ONDANSETRON 10 MILLIGRAM(S): 8 TABLET, FILM COATED ORAL at 11:37

## 2019-03-05 RX ADMIN — Medication 300 UNIT(S): at 15:10

## 2019-03-05 RX ADMIN — ONDANSETRON 110 MILLIGRAM(S): 8 TABLET, FILM COATED ORAL at 11:06

## 2019-03-05 NOTE — PROVIDER CONTACT NOTE (MEDICATION) - SITUATION
pt is her for three days chemo . day 1 carbo dose to be based on AUC not specified on paper order. pt tachy as per pt. pt saw cardiologist and medication were change that he took this AM, pt. doesn't know the name of the med

## 2019-03-05 NOTE — PROVIDER CONTACT NOTE (MEDICATION) - ACTION/TREATMENT ORDERED:
Dr. LEVIN made aware. TO carbo dose is per AUC. monitor pt for tachy cardia if HR maintained high do an EKG and report Dr. LEVIN made aware. TO carbo dose is per AUC. monitor pt for tachycardia if HR maintained high do an EKG and report.    assessed the pt on site. NAD . no new interventions as per .

## 2019-03-06 ENCOUNTER — MEDICATION RENEWAL (OUTPATIENT)
Age: 59
End: 2019-03-06

## 2019-03-06 ENCOUNTER — OUTPATIENT (OUTPATIENT)
Dept: OUTPATIENT SERVICES | Facility: HOSPITAL | Age: 59
LOS: 1 days | End: 2019-03-06
Payer: COMMERCIAL

## 2019-03-06 VITALS
WEIGHT: 237 LBS | SYSTOLIC BLOOD PRESSURE: 123 MMHG | TEMPERATURE: 98 F | DIASTOLIC BLOOD PRESSURE: 58 MMHG | HEART RATE: 109 BPM | HEIGHT: 73 IN | RESPIRATION RATE: 20 BRPM | OXYGEN SATURATION: 97 %

## 2019-03-06 VITALS
SYSTOLIC BLOOD PRESSURE: 121 MMHG | OXYGEN SATURATION: 97 % | RESPIRATION RATE: 20 BRPM | TEMPERATURE: 98 F | HEART RATE: 101 BPM | DIASTOLIC BLOOD PRESSURE: 57 MMHG

## 2019-03-06 DIAGNOSIS — C34.81 MALIGNANT NEOPLASM OF OVERLAPPING SITES OF RIGHT BRONCHUS AND LUNG: ICD-10-CM

## 2019-03-06 DIAGNOSIS — Z98.890 OTHER SPECIFIED POSTPROCEDURAL STATES: Chronic | ICD-10-CM

## 2019-03-06 PROCEDURE — 96413 CHEMO IV INFUSION 1 HR: CPT

## 2019-03-06 PROCEDURE — 96367 TX/PROPH/DG ADDL SEQ IV INF: CPT

## 2019-03-06 RX ORDER — FLUTICASONE PROPIONATE AND SALMETEROL 50; 250 UG/1; UG/1
250-50 POWDER RESPIRATORY (INHALATION)
Qty: 5 | Refills: 3 | Status: ACTIVE | COMMUNITY
Start: 1900-01-01 | End: 1900-01-01

## 2019-03-06 RX ORDER — ETOPOSIDE 20 MG/ML
195 VIAL (ML) INTRAVENOUS ONCE
Qty: 0 | Refills: 0 | Status: COMPLETED | OUTPATIENT
Start: 2019-03-06 | End: 2019-03-06

## 2019-03-06 RX ORDER — ONDANSETRON 8 MG/1
10 TABLET, FILM COATED ORAL ONCE
Qty: 0 | Refills: 0 | Status: COMPLETED | OUTPATIENT
Start: 2019-03-06 | End: 2019-03-06

## 2019-03-06 RX ADMIN — ONDANSETRON 10 MILLIGRAM(S): 8 TABLET, FILM COATED ORAL at 10:35

## 2019-03-06 RX ADMIN — ONDANSETRON 110 MILLIGRAM(S): 8 TABLET, FILM COATED ORAL at 10:02

## 2019-03-06 RX ADMIN — Medication 195 MILLIGRAM(S): at 12:00

## 2019-03-06 RX ADMIN — Medication 509.75 MILLIGRAM(S): at 10:54

## 2019-03-07 ENCOUNTER — OUTPATIENT (OUTPATIENT)
Dept: OUTPATIENT SERVICES | Facility: HOSPITAL | Age: 59
LOS: 1 days | End: 2019-03-07
Payer: COMMERCIAL

## 2019-03-07 VITALS
DIASTOLIC BLOOD PRESSURE: 69 MMHG | TEMPERATURE: 98 F | RESPIRATION RATE: 16 BRPM | HEART RATE: 128 BPM | HEIGHT: 72.1 IN | WEIGHT: 235.89 LBS | OXYGEN SATURATION: 96 % | SYSTOLIC BLOOD PRESSURE: 119 MMHG

## 2019-03-07 VITALS
DIASTOLIC BLOOD PRESSURE: 63 MMHG | RESPIRATION RATE: 16 BRPM | TEMPERATURE: 98 F | HEART RATE: 100 BPM | OXYGEN SATURATION: 95 % | SYSTOLIC BLOOD PRESSURE: 113 MMHG

## 2019-03-07 DIAGNOSIS — Z98.890 OTHER SPECIFIED POSTPROCEDURAL STATES: Chronic | ICD-10-CM

## 2019-03-07 DIAGNOSIS — C34.81 MALIGNANT NEOPLASM OF OVERLAPPING SITES OF RIGHT BRONCHUS AND LUNG: ICD-10-CM

## 2019-03-07 DIAGNOSIS — C34.82 MALIGNANT NEOPLASM OF OVERLAPPING SITES OF LEFT BRONCHUS AND LUNG: ICD-10-CM

## 2019-03-07 PROCEDURE — 96413 CHEMO IV INFUSION 1 HR: CPT

## 2019-03-07 PROCEDURE — 96375 TX/PRO/DX INJ NEW DRUG ADDON: CPT

## 2019-03-07 PROCEDURE — 96417 CHEMO IV INFUS EACH ADDL SEQ: CPT

## 2019-03-07 PROCEDURE — 96367 TX/PROPH/DG ADDL SEQ IV INF: CPT

## 2019-03-07 RX ORDER — ATEZOLIZUMAB 840 MG/14ML
1200 INJECTION, SOLUTION INTRAVENOUS ONCE
Qty: 0 | Refills: 0 | Status: COMPLETED | OUTPATIENT
Start: 2019-03-07 | End: 2019-03-07

## 2019-03-07 RX ORDER — ETOPOSIDE 20 MG/ML
195 VIAL (ML) INTRAVENOUS ONCE
Qty: 0 | Refills: 0 | Status: COMPLETED | OUTPATIENT
Start: 2019-03-07 | End: 2019-03-07

## 2019-03-07 RX ORDER — DIPHENHYDRAMINE HCL 50 MG
25 CAPSULE ORAL ONCE
Qty: 0 | Refills: 0 | Status: COMPLETED | OUTPATIENT
Start: 2019-03-07 | End: 2019-03-07

## 2019-03-07 RX ORDER — ONDANSETRON 8 MG/1
10 TABLET, FILM COATED ORAL ONCE
Qty: 0 | Refills: 0 | Status: COMPLETED | OUTPATIENT
Start: 2019-03-07 | End: 2019-03-07

## 2019-03-07 RX ADMIN — ATEZOLIZUMAB 270 MILLIGRAM(S): 840 INJECTION, SOLUTION INTRAVENOUS at 13:01

## 2019-03-07 RX ADMIN — Medication 509.75 MILLIGRAM(S): at 11:29

## 2019-03-07 RX ADMIN — Medication 195 MILLIGRAM(S): at 12:35

## 2019-03-07 RX ADMIN — Medication 25 MILLIGRAM(S): at 12:47

## 2019-03-07 RX ADMIN — ONDANSETRON 10 MILLIGRAM(S): 8 TABLET, FILM COATED ORAL at 11:15

## 2019-03-07 RX ADMIN — ATEZOLIZUMAB 1200 MILLIGRAM(S): 840 INJECTION, SOLUTION INTRAVENOUS at 14:00

## 2019-03-07 RX ADMIN — ONDANSETRON 110 MILLIGRAM(S): 8 TABLET, FILM COATED ORAL at 10:38

## 2019-03-07 RX ADMIN — Medication 300 UNIT(S): at 14:32

## 2019-03-09 LAB
CULTURE RESULTS: SIGNIFICANT CHANGE UP
SPECIMEN SOURCE: SIGNIFICANT CHANGE UP

## 2019-03-11 ENCOUNTER — RX RENEWAL (OUTPATIENT)
Age: 59
End: 2019-03-11

## 2019-03-26 ENCOUNTER — OUTPATIENT (OUTPATIENT)
Dept: OUTPATIENT SERVICES | Facility: HOSPITAL | Age: 59
LOS: 1 days | End: 2019-03-26
Payer: COMMERCIAL

## 2019-03-26 VITALS
RESPIRATION RATE: 14 BRPM | HEIGHT: 73 IN | DIASTOLIC BLOOD PRESSURE: 72 MMHG | SYSTOLIC BLOOD PRESSURE: 130 MMHG | OXYGEN SATURATION: 97 % | TEMPERATURE: 98 F | HEART RATE: 105 BPM | WEIGHT: 240.97 LBS

## 2019-03-26 VITALS
DIASTOLIC BLOOD PRESSURE: 64 MMHG | OXYGEN SATURATION: 95 % | HEART RATE: 63 BPM | RESPIRATION RATE: 12 BRPM | SYSTOLIC BLOOD PRESSURE: 111 MMHG | TEMPERATURE: 98 F

## 2019-03-26 DIAGNOSIS — C34.82 MALIGNANT NEOPLASM OF OVERLAPPING SITES OF LEFT BRONCHUS AND LUNG: ICD-10-CM

## 2019-03-26 DIAGNOSIS — C34.81 MALIGNANT NEOPLASM OF OVERLAPPING SITES OF RIGHT BRONCHUS AND LUNG: ICD-10-CM

## 2019-03-26 DIAGNOSIS — Z98.890 OTHER SPECIFIED POSTPROCEDURAL STATES: Chronic | ICD-10-CM

## 2019-03-26 PROCEDURE — 96413 CHEMO IV INFUSION 1 HR: CPT

## 2019-03-26 PROCEDURE — 96367 TX/PROPH/DG ADDL SEQ IV INF: CPT

## 2019-03-26 PROCEDURE — 96417 CHEMO IV INFUS EACH ADDL SEQ: CPT

## 2019-03-26 RX ORDER — CARBOPLATIN 50 MG
750 VIAL (EA) INTRAVENOUS ONCE
Qty: 0 | Refills: 0 | Status: COMPLETED | OUTPATIENT
Start: 2019-03-26 | End: 2019-03-26

## 2019-03-26 RX ORDER — ETOPOSIDE 20 MG/ML
210 VIAL (ML) INTRAVENOUS ONCE
Qty: 0 | Refills: 0 | Status: COMPLETED | OUTPATIENT
Start: 2019-03-26 | End: 2019-03-26

## 2019-03-26 RX ORDER — ONDANSETRON 8 MG/1
10 TABLET, FILM COATED ORAL ONCE
Qty: 0 | Refills: 0 | Status: COMPLETED | OUTPATIENT
Start: 2019-03-26 | End: 2019-03-26

## 2019-03-26 RX ORDER — DEXAMETHASONE 0.5 MG/5ML
10 ELIXIR ORAL ONCE
Qty: 0 | Refills: 0 | Status: COMPLETED | OUTPATIENT
Start: 2019-03-26 | End: 2019-03-26

## 2019-03-26 RX ORDER — ETOPOSIDE 20 MG/ML
210 VIAL (ML) INTRAVENOUS ONCE
Qty: 0 | Refills: 0 | Status: DISCONTINUED | OUTPATIENT
Start: 2019-03-26 | End: 2019-03-26

## 2019-03-26 RX ADMIN — Medication 750 MILLIGRAM(S): at 13:16

## 2019-03-26 RX ADMIN — ONDANSETRON 110 MILLIGRAM(S): 8 TABLET, FILM COATED ORAL at 11:21

## 2019-03-26 RX ADMIN — ONDANSETRON 10 MILLIGRAM(S): 8 TABLET, FILM COATED ORAL at 12:07

## 2019-03-26 RX ADMIN — Medication 102 MILLIGRAM(S): at 10:43

## 2019-03-26 RX ADMIN — Medication 210 MILLIGRAM(S): at 14:52

## 2019-03-26 RX ADMIN — Medication 510.5 MILLIGRAM(S): at 13:37

## 2019-03-26 RX ADMIN — Medication 433.33 MILLIGRAM(S): at 12:26

## 2019-03-26 RX ADMIN — Medication 10 MILLIGRAM(S): at 11:21

## 2019-03-26 RX ADMIN — Medication 300 UNIT(S): at 15:31

## 2019-03-27 ENCOUNTER — OUTPATIENT (OUTPATIENT)
Dept: OUTPATIENT SERVICES | Facility: HOSPITAL | Age: 59
LOS: 1 days | End: 2019-03-27
Payer: COMMERCIAL

## 2019-03-27 VITALS
OXYGEN SATURATION: 97 % | SYSTOLIC BLOOD PRESSURE: 112 MMHG | TEMPERATURE: 99 F | HEART RATE: 64 BPM | DIASTOLIC BLOOD PRESSURE: 55 MMHG | RESPIRATION RATE: 14 BRPM

## 2019-03-27 VITALS
TEMPERATURE: 99 F | HEART RATE: 96 BPM | RESPIRATION RATE: 18 BRPM | DIASTOLIC BLOOD PRESSURE: 65 MMHG | OXYGEN SATURATION: 96 % | HEIGHT: 73 IN | SYSTOLIC BLOOD PRESSURE: 130 MMHG | WEIGHT: 240.97 LBS

## 2019-03-27 DIAGNOSIS — C34.81 MALIGNANT NEOPLASM OF OVERLAPPING SITES OF RIGHT BRONCHUS AND LUNG: ICD-10-CM

## 2019-03-27 DIAGNOSIS — Z98.890 OTHER SPECIFIED POSTPROCEDURAL STATES: Chronic | ICD-10-CM

## 2019-03-27 DIAGNOSIS — C34.82 MALIGNANT NEOPLASM OF OVERLAPPING SITES OF LEFT BRONCHUS AND LUNG: ICD-10-CM

## 2019-03-27 PROCEDURE — 96375 TX/PRO/DX INJ NEW DRUG ADDON: CPT

## 2019-03-27 PROCEDURE — 96413 CHEMO IV INFUSION 1 HR: CPT

## 2019-03-27 RX ORDER — ONDANSETRON 8 MG/1
10 TABLET, FILM COATED ORAL ONCE
Qty: 0 | Refills: 0 | Status: COMPLETED | OUTPATIENT
Start: 2019-03-27 | End: 2019-03-27

## 2019-03-27 RX ORDER — ETOPOSIDE 20 MG/ML
210 VIAL (ML) INTRAVENOUS ONCE
Qty: 0 | Refills: 0 | Status: COMPLETED | OUTPATIENT
Start: 2019-03-27 | End: 2019-03-27

## 2019-03-27 RX ADMIN — ONDANSETRON 10 MILLIGRAM(S): 8 TABLET, FILM COATED ORAL at 10:20

## 2019-03-27 RX ADMIN — ONDANSETRON 110 MILLIGRAM(S): 8 TABLET, FILM COATED ORAL at 09:53

## 2019-03-27 RX ADMIN — Medication 510.5 MILLIGRAM(S): at 10:38

## 2019-03-27 RX ADMIN — Medication 210 MILLIGRAM(S): at 11:50

## 2019-03-27 RX ADMIN — Medication 300 UNIT(S): at 12:16

## 2019-03-28 ENCOUNTER — OUTPATIENT (OUTPATIENT)
Dept: OUTPATIENT SERVICES | Facility: HOSPITAL | Age: 59
LOS: 1 days | End: 2019-03-28
Payer: COMMERCIAL

## 2019-03-28 VITALS
RESPIRATION RATE: 16 BRPM | SYSTOLIC BLOOD PRESSURE: 109 MMHG | HEART RATE: 74 BPM | TEMPERATURE: 98 F | OXYGEN SATURATION: 96 % | DIASTOLIC BLOOD PRESSURE: 68 MMHG

## 2019-03-28 VITALS
HEART RATE: 96 BPM | TEMPERATURE: 98 F | SYSTOLIC BLOOD PRESSURE: 113 MMHG | WEIGHT: 240.08 LBS | HEIGHT: 73 IN | RESPIRATION RATE: 18 BRPM | OXYGEN SATURATION: 99 % | DIASTOLIC BLOOD PRESSURE: 60 MMHG

## 2019-03-28 DIAGNOSIS — C34.82 MALIGNANT NEOPLASM OF OVERLAPPING SITES OF LEFT BRONCHUS AND LUNG: ICD-10-CM

## 2019-03-28 DIAGNOSIS — Z98.890 OTHER SPECIFIED POSTPROCEDURAL STATES: Chronic | ICD-10-CM

## 2019-03-28 DIAGNOSIS — C34.81 MALIGNANT NEOPLASM OF OVERLAPPING SITES OF RIGHT BRONCHUS AND LUNG: ICD-10-CM

## 2019-03-28 PROCEDURE — 96367 TX/PROPH/DG ADDL SEQ IV INF: CPT

## 2019-03-28 PROCEDURE — 96375 TX/PRO/DX INJ NEW DRUG ADDON: CPT

## 2019-03-28 PROCEDURE — 96417 CHEMO IV INFUS EACH ADDL SEQ: CPT

## 2019-03-28 PROCEDURE — 96413 CHEMO IV INFUSION 1 HR: CPT

## 2019-03-28 RX ORDER — DIPHENHYDRAMINE HCL 50 MG
25 CAPSULE ORAL ONCE
Qty: 0 | Refills: 0 | Status: COMPLETED | OUTPATIENT
Start: 2019-03-28 | End: 2019-03-28

## 2019-03-28 RX ORDER — ONDANSETRON 8 MG/1
10 TABLET, FILM COATED ORAL ONCE
Qty: 0 | Refills: 0 | Status: COMPLETED | OUTPATIENT
Start: 2019-03-28 | End: 2019-03-28

## 2019-03-28 RX ORDER — ATEZOLIZUMAB 840 MG/14ML
1200 INJECTION, SOLUTION INTRAVENOUS ONCE
Qty: 0 | Refills: 0 | Status: COMPLETED | OUTPATIENT
Start: 2019-03-28 | End: 2019-03-28

## 2019-03-28 RX ORDER — ETOPOSIDE 20 MG/ML
210 VIAL (ML) INTRAVENOUS ONCE
Qty: 0 | Refills: 0 | Status: COMPLETED | OUTPATIENT
Start: 2019-03-28 | End: 2019-03-28

## 2019-03-28 RX ADMIN — ONDANSETRON 10 MILLIGRAM(S): 8 TABLET, FILM COATED ORAL at 10:15

## 2019-03-28 RX ADMIN — ATEZOLIZUMAB 270 MILLIGRAM(S): 840 INJECTION, SOLUTION INTRAVENOUS at 12:09

## 2019-03-28 RX ADMIN — Medication 210 MILLIGRAM(S): at 11:50

## 2019-03-28 RX ADMIN — Medication 510.5 MILLIGRAM(S): at 10:40

## 2019-03-28 RX ADMIN — ATEZOLIZUMAB 1200 MILLIGRAM(S): 840 INJECTION, SOLUTION INTRAVENOUS at 13:15

## 2019-03-28 RX ADMIN — ONDANSETRON 110 MILLIGRAM(S): 8 TABLET, FILM COATED ORAL at 09:42

## 2019-03-28 RX ADMIN — Medication 25 MILLIGRAM(S): at 09:38

## 2019-03-30 LAB
CULTURE RESULTS: SIGNIFICANT CHANGE UP
SPECIMEN SOURCE: SIGNIFICANT CHANGE UP

## 2019-04-08 ENCOUNTER — NON-APPOINTMENT (OUTPATIENT)
Age: 59
End: 2019-04-08

## 2019-04-08 ENCOUNTER — APPOINTMENT (OUTPATIENT)
Dept: CARDIOLOGY | Facility: CLINIC | Age: 59
End: 2019-04-08
Payer: COMMERCIAL

## 2019-04-08 VITALS
BODY MASS INDEX: 30.75 KG/M2 | HEIGHT: 73 IN | OXYGEN SATURATION: 96 % | DIASTOLIC BLOOD PRESSURE: 71 MMHG | WEIGHT: 232 LBS | HEART RATE: 80 BPM | SYSTOLIC BLOOD PRESSURE: 131 MMHG

## 2019-04-08 DIAGNOSIS — I95.9 HYPOTENSION, UNSPECIFIED: ICD-10-CM

## 2019-04-08 DIAGNOSIS — I31.3 PERICARDIAL EFFUSION (NONINFLAMMATORY): ICD-10-CM

## 2019-04-08 PROCEDURE — 99214 OFFICE O/P EST MOD 30 MIN: CPT

## 2019-04-08 PROCEDURE — 93000 ELECTROCARDIOGRAM COMPLETE: CPT

## 2019-04-08 NOTE — DISCUSSION/SUMMARY
[FreeTextEntry1] : The patient's cardiac status is stable. His rate controlled atrial fibrillation with stable blood pressure and stable volume status.\par \par He will stay on his present medication. He has an appointment to see the nephrologist next week he'll do blood work. This needs to be forwarded to our office for our review.\par \par The patient come back for an echocardiogram. If all is well I will see the patient 2 months. He knows to call the office if any problems arise.

## 2019-04-08 NOTE — PHYSICAL EXAM
[General Appearance - Well Developed] : well developed [Normal Appearance] : normal appearance [Well Groomed] : well groomed [General Appearance - Well Nourished] : well nourished [No Deformities] : no deformities [General Appearance - In No Acute Distress] : no acute distress [Normal Conjunctiva] : the conjunctiva exhibited no abnormalities [Normal Oral Mucosa] : normal oral mucosa [Normal Jugular Venous A Waves Present] : normal jugular venous A waves present [Normal Jugular Venous V Waves Present] : normal jugular venous V waves present [No Jugular Venous Sheth A Waves] : no jugular venous sheth A waves [Respiration, Rhythm And Depth] : normal respiratory rhythm and effort [Exaggerated Use Of Accessory Muscles For Inspiration] : no accessory muscle use [Bowel Sounds] : normal bowel sounds [Abdomen Soft] : soft [Abdomen Tenderness] : non-tender [Abnormal Walk] : normal gait [Nail Clubbing] : no clubbing of the fingernails [Cyanosis, Localized] : no localized cyanosis [Skin Color & Pigmentation] : normal skin color and pigmentation [Skin Turgor] : normal skin turgor [] : no rash [Oriented To Time, Place, And Person] : oriented to person, place, and time [Impaired Insight] : insight and judgment were intact [No Anxiety] : not feeling anxious [Not Palpable] : not palpable [Tachycardia] : tachycardic [Irregularly Irregular] : irregularly irregular [Normal S1] : normal S1 [Normal S2] : normal S2 [No Murmur] : no murmurs heard [2+] : left 2+ [1+] : left 1+ [No Abnormalities] : the abdominal aorta was not enlarged and no bruit was heard [___ +] : bilateral [unfilled]U+ pretibial pitting edema [FreeTextEntry1] : Minimal wheezing [S3] : no S3 [S4] : no S4 [Right Carotid Bruit] : no bruit heard over the right carotid [Left Carotid Bruit] : no bruit heard over the left carotid [Right Femoral Bruit] : no bruit heard over the right femoral artery [Left Femoral Bruit] : no bruit heard over the left femoral artery

## 2019-04-08 NOTE — REASON FOR VISIT
[Initial Evaluation] : an initial evaluation of [Atrial Fibrillation] : atrial fibrillation [Dyspnea] : dyspnea [Hypertension] : hypertension [FreeTextEntry1] : Lung CA, COPD, SIADH, Pericardial Window

## 2019-04-08 NOTE — HISTORY OF PRESENT ILLNESS
[FreeTextEntry1] : I saw Bala San in the office today for cardiac followup. He is a [age]-year-old white male who smoked most of his life. He presented to [facility] 12/18 with shortness of breath. He was in atrial fibrillation initially diagnosed with pneumonia. He came back in January and was found to have a pericardial effusion, pleural effusion, and lung cancer by bronchoscopy. He was treated with a pericardial window, and thoracentesis. He also had SIADH, and was put on diuretics and salt pills for low sodium. He is now getting chemotherapy. He does have a cough and does have some shortness of breath and cannot lay flat but otherwise is doing well. He is on low dose aspirin not anticoagulation because of his pleural and pericardial effusions.\par \par His past medical history significant for smoking and hypertension. He denies any history of diabetes, high cholesterol, or family history of heart disease.\par \par Echocardiogram in January was a poor study but demonstrated normal LV systolic function without significant valvular disease. There was a left effusion but no pericardial effusion.\par \par The patient has been feeling better. Is less short of breath. He is tolerating chemotherapy very well. His edema is under good control.\par \par A resting electrocardiogram demonstrates atrial fibrillation with a controlled ventricular rate. There are no acute changes.\par \par Blood pressure laying down was 130/80 and fell to 116/70 sitting and maintain 116/70 standing without symptoms.

## 2019-04-18 ENCOUNTER — OUTPATIENT (OUTPATIENT)
Dept: OUTPATIENT SERVICES | Facility: HOSPITAL | Age: 59
LOS: 1 days | End: 2019-04-18
Payer: COMMERCIAL

## 2019-04-18 VITALS
SYSTOLIC BLOOD PRESSURE: 110 MMHG | TEMPERATURE: 99 F | DIASTOLIC BLOOD PRESSURE: 66 MMHG | RESPIRATION RATE: 18 BRPM | HEART RATE: 76 BPM | OXYGEN SATURATION: 97 %

## 2019-04-18 VITALS
HEART RATE: 88 BPM | DIASTOLIC BLOOD PRESSURE: 72 MMHG | RESPIRATION RATE: 18 BRPM | WEIGHT: 233.03 LBS | SYSTOLIC BLOOD PRESSURE: 125 MMHG | HEIGHT: 73 IN | TEMPERATURE: 98 F | OXYGEN SATURATION: 98 %

## 2019-04-18 DIAGNOSIS — Z98.890 OTHER SPECIFIED POSTPROCEDURAL STATES: Chronic | ICD-10-CM

## 2019-04-18 DIAGNOSIS — Z51.11 ENCOUNTER FOR ANTINEOPLASTIC CHEMOTHERAPY: ICD-10-CM

## 2019-04-18 PROCEDURE — 96375 TX/PRO/DX INJ NEW DRUG ADDON: CPT

## 2019-04-18 PROCEDURE — 96413 CHEMO IV INFUSION 1 HR: CPT

## 2019-04-18 PROCEDURE — 71046 X-RAY EXAM CHEST 2 VIEWS: CPT | Mod: 26

## 2019-04-18 PROCEDURE — 71046 X-RAY EXAM CHEST 2 VIEWS: CPT

## 2019-04-18 PROCEDURE — 96417 CHEMO IV INFUS EACH ADDL SEQ: CPT

## 2019-04-18 PROCEDURE — 96367 TX/PROPH/DG ADDL SEQ IV INF: CPT

## 2019-04-18 RX ORDER — ETOPOSIDE 20 MG/ML
234 VIAL (ML) INTRAVENOUS ONCE
Qty: 0 | Refills: 0 | Status: COMPLETED | OUTPATIENT
Start: 2019-04-18 | End: 2019-04-18

## 2019-04-18 RX ORDER — DIPHENHYDRAMINE HCL 50 MG
25 CAPSULE ORAL ONCE
Qty: 0 | Refills: 0 | Status: COMPLETED | OUTPATIENT
Start: 2019-04-18 | End: 2019-04-18

## 2019-04-18 RX ORDER — ATEZOLIZUMAB 840 MG/14ML
1200 INJECTION, SOLUTION INTRAVENOUS ONCE
Qty: 0 | Refills: 0 | Status: COMPLETED | OUTPATIENT
Start: 2019-04-18 | End: 2019-04-18

## 2019-04-18 RX ORDER — ONDANSETRON 8 MG/1
10 TABLET, FILM COATED ORAL ONCE
Qty: 0 | Refills: 0 | Status: COMPLETED | OUTPATIENT
Start: 2019-04-18 | End: 2019-04-18

## 2019-04-18 RX ADMIN — ATEZOLIZUMAB 270 MILLIGRAM(S): 840 INJECTION, SOLUTION INTRAVENOUS at 13:16

## 2019-04-18 RX ADMIN — Medication 234 MILLIGRAM(S): at 12:50

## 2019-04-18 RX ADMIN — ONDANSETRON 10 MILLIGRAM(S): 8 TABLET, FILM COATED ORAL at 11:36

## 2019-04-18 RX ADMIN — Medication 300 UNIT(S): at 14:47

## 2019-04-18 RX ADMIN — Medication 25 MILLIGRAM(S): at 11:06

## 2019-04-18 RX ADMIN — Medication 511.7 MILLIGRAM(S): at 11:42

## 2019-04-18 RX ADMIN — ATEZOLIZUMAB 1200 MILLIGRAM(S): 840 INJECTION, SOLUTION INTRAVENOUS at 14:20

## 2019-04-18 RX ADMIN — ONDANSETRON 110 MILLIGRAM(S): 8 TABLET, FILM COATED ORAL at 11:06

## 2019-04-23 ENCOUNTER — APPOINTMENT (OUTPATIENT)
Dept: CARDIOLOGY | Facility: CLINIC | Age: 59
End: 2019-04-23
Payer: COMMERCIAL

## 2019-04-23 PROCEDURE — 93306 TTE W/DOPPLER COMPLETE: CPT

## 2019-04-29 NOTE — DISCHARGE NOTE ADULT - HOSPITAL COURSE
Spoke with Ton, he accepted an appt at [facility] 5/1, 4pm   admitted for sob /dyspnea  found to have persistant pleural effusion  underwent thoracentesis by IR  marked improvement with PARKS  chemo per oncology for small cell ca  DC after PULM and ONCOLOGY

## 2019-05-03 ENCOUNTER — OUTPATIENT (OUTPATIENT)
Dept: OUTPATIENT SERVICES | Facility: HOSPITAL | Age: 59
LOS: 1 days | End: 2019-05-03
Payer: COMMERCIAL

## 2019-05-03 DIAGNOSIS — C34.81 MALIGNANT NEOPLASM OF OVERLAPPING SITES OF RIGHT BRONCHUS AND LUNG: ICD-10-CM

## 2019-05-03 DIAGNOSIS — E87.6 HYPOKALEMIA: ICD-10-CM

## 2019-05-03 DIAGNOSIS — Z98.890 OTHER SPECIFIED POSTPROCEDURAL STATES: Chronic | ICD-10-CM

## 2019-05-03 PROCEDURE — A9579: CPT

## 2019-05-03 PROCEDURE — 70553 MRI BRAIN STEM W/O & W/DYE: CPT | Mod: 26

## 2019-05-03 PROCEDURE — 70553 MRI BRAIN STEM W/O & W/DYE: CPT

## 2019-05-21 ENCOUNTER — OUTPATIENT (OUTPATIENT)
Dept: OUTPATIENT SERVICES | Facility: HOSPITAL | Age: 59
LOS: 1 days | End: 2019-05-21
Payer: COMMERCIAL

## 2019-05-21 DIAGNOSIS — E87.6 HYPOKALEMIA: ICD-10-CM

## 2019-05-21 DIAGNOSIS — Z98.890 OTHER SPECIFIED POSTPROCEDURAL STATES: Chronic | ICD-10-CM

## 2019-05-21 DIAGNOSIS — C34.81 MALIGNANT NEOPLASM OF OVERLAPPING SITES OF RIGHT BRONCHUS AND LUNG: ICD-10-CM

## 2019-05-21 PROCEDURE — 71552 MRI CHEST W/O & W/DYE: CPT

## 2019-05-21 PROCEDURE — 73223 MRI JOINT UPR EXTR W/O&W/DYE: CPT

## 2019-05-21 PROCEDURE — A9579: CPT

## 2019-05-21 PROCEDURE — 71552 MRI CHEST W/O & W/DYE: CPT | Mod: 26,RT

## 2019-06-11 ENCOUNTER — OUTPATIENT (OUTPATIENT)
Dept: OUTPATIENT SERVICES | Facility: HOSPITAL | Age: 59
LOS: 1 days | End: 2019-06-11
Payer: COMMERCIAL

## 2019-06-11 DIAGNOSIS — Z98.890 OTHER SPECIFIED POSTPROCEDURAL STATES: Chronic | ICD-10-CM

## 2019-06-11 DIAGNOSIS — C34.82 MALIGNANT NEOPLASM OF OVERLAPPING SITES OF LEFT BRONCHUS AND LUNG: ICD-10-CM

## 2019-06-11 PROCEDURE — 71046 X-RAY EXAM CHEST 2 VIEWS: CPT | Mod: 26

## 2019-06-11 PROCEDURE — 71046 X-RAY EXAM CHEST 2 VIEWS: CPT

## 2019-06-14 ENCOUNTER — OUTPATIENT (OUTPATIENT)
Dept: OUTPATIENT SERVICES | Facility: HOSPITAL | Age: 59
LOS: 1 days | End: 2019-06-14
Payer: COMMERCIAL

## 2019-06-14 ENCOUNTER — RESULT REVIEW (OUTPATIENT)
Age: 59
End: 2019-06-14

## 2019-06-14 DIAGNOSIS — Z98.890 OTHER SPECIFIED POSTPROCEDURAL STATES: Chronic | ICD-10-CM

## 2019-06-14 DIAGNOSIS — C34.82 MALIGNANT NEOPLASM OF OVERLAPPING SITES OF LEFT BRONCHUS AND LUNG: ICD-10-CM

## 2019-06-14 LAB
B PERT IGG+IGM PNL SER: ABNORMAL
COLOR FLD: YELLOW — SIGNIFICANT CHANGE UP
EOSINOPHIL # FLD: 0 % — SIGNIFICANT CHANGE UP
FLUID INTAKE SUBSTANCE CLASS: SIGNIFICANT CHANGE UP
FLUID SEGMENTED GRANULOCYTES: 29 % — SIGNIFICANT CHANGE UP
FOLATE+VIT B12 SERBLD-IMP: 0 % — SIGNIFICANT CHANGE UP
LDH SERPL L TO P-CCNC: 129 U/L — SIGNIFICANT CHANGE UP
LYMPHOCYTES # FLD: 39 % — SIGNIFICANT CHANGE UP
MESOTHL CELL # FLD: 6 % — SIGNIFICANT CHANGE UP
MONOS+MACROS # FLD: 26 % — SIGNIFICANT CHANGE UP
NRBC # FLD: 0 — SIGNIFICANT CHANGE UP
OTHER CELLS FLD MANUAL: 0 % — SIGNIFICANT CHANGE UP
PH FLD: 7.46 — SIGNIFICANT CHANGE UP
PROT FLD-MCNC: 3.5 G/DL — SIGNIFICANT CHANGE UP
RCV VOL RI: 1000 /UL — HIGH (ref 0–0)
TOTAL NUCLEATED CELL COUNT, BODY FLUID: 1280 /UL — SIGNIFICANT CHANGE UP
TUBE TYPE: SIGNIFICANT CHANGE UP

## 2019-06-14 PROCEDURE — 83615 LACTATE (LD) (LDH) ENZYME: CPT

## 2019-06-14 PROCEDURE — 71045 X-RAY EXAM CHEST 1 VIEW: CPT | Mod: 26

## 2019-06-14 PROCEDURE — 32555 ASPIRATE PLEURA W/ IMAGING: CPT | Mod: LT

## 2019-06-14 PROCEDURE — 83986 ASSAY PH BODY FLUID NOS: CPT

## 2019-06-14 PROCEDURE — 84157 ASSAY OF PROTEIN OTHER: CPT

## 2019-06-14 PROCEDURE — 88305 TISSUE EXAM BY PATHOLOGIST: CPT | Mod: 26

## 2019-06-14 PROCEDURE — 89051 BODY FLUID CELL COUNT: CPT

## 2019-06-14 PROCEDURE — 32555 ASPIRATE PLEURA W/ IMAGING: CPT

## 2019-06-14 PROCEDURE — 88108 CYTOPATH CONCENTRATE TECH: CPT | Mod: 26

## 2019-06-14 PROCEDURE — 71045 X-RAY EXAM CHEST 1 VIEW: CPT

## 2019-06-14 PROCEDURE — 88108 CYTOPATH CONCENTRATE TECH: CPT

## 2019-06-14 PROCEDURE — 88305 TISSUE EXAM BY PATHOLOGIST: CPT

## 2019-06-17 LAB — NON-GYNECOLOGICAL CYTOLOGY STUDY: SIGNIFICANT CHANGE UP

## 2019-08-14 ENCOUNTER — NON-APPOINTMENT (OUTPATIENT)
Age: 59
End: 2019-08-14

## 2019-08-14 ENCOUNTER — APPOINTMENT (OUTPATIENT)
Dept: CARDIOLOGY | Facility: CLINIC | Age: 59
End: 2019-08-14
Payer: COMMERCIAL

## 2019-08-14 VITALS
BODY MASS INDEX: 26.37 KG/M2 | DIASTOLIC BLOOD PRESSURE: 60 MMHG | WEIGHT: 199 LBS | SYSTOLIC BLOOD PRESSURE: 93 MMHG | HEART RATE: 80 BPM | OXYGEN SATURATION: 97 % | HEIGHT: 73 IN

## 2019-08-14 DIAGNOSIS — I10 ESSENTIAL (PRIMARY) HYPERTENSION: ICD-10-CM

## 2019-08-14 DIAGNOSIS — C34.90 MALIGNANT NEOPLASM OF UNSPECIFIED PART OF UNSPECIFIED BRONCHUS OR LUNG: ICD-10-CM

## 2019-08-14 DIAGNOSIS — I48.91 UNSPECIFIED ATRIAL FIBRILLATION: ICD-10-CM

## 2019-08-14 DIAGNOSIS — E87.70 FLUID OVERLOAD, UNSPECIFIED: ICD-10-CM

## 2019-08-14 DIAGNOSIS — E22.2 SYNDROME OF INAPPROPRIATE SECRETION OF ANTIDIURETIC HORMONE: ICD-10-CM

## 2019-08-14 PROCEDURE — 99214 OFFICE O/P EST MOD 30 MIN: CPT

## 2019-08-14 PROCEDURE — 93000 ELECTROCARDIOGRAM COMPLETE: CPT

## 2019-08-14 NOTE — DISCUSSION/SUMMARY
[FreeTextEntry1] : The patient is stable. He has no evidence of fluid retention. His blood pressure is on the low side, and he is in a very malnourished state. Hopefully with 80 pound weight loss and radiation he does not require 120 mg of Lasix a day. We're going to try cutting it down to 80 mg a day and he will monitor his weight daily. If his weight starts to go up to let me know.  Otherwise he'll call me in 2 weeks to let me know how he is doing.\par \par I will get blood work from Dr. Harris. We may also start reducing his salt pills if his sodium is stable.\par \par The other medication will remain the same to control his heart rate.\par \par Pending the above I would see him in one month

## 2019-08-14 NOTE — PHYSICAL EXAM
[General Appearance - Well Developed] : well developed [Normal Appearance] : normal appearance [Well Groomed] : well groomed [General Appearance - Well Nourished] : well nourished [General Appearance - In No Acute Distress] : no acute distress [No Deformities] : no deformities [Normal Conjunctiva] : the conjunctiva exhibited no abnormalities [Normal Jugular Venous A Waves Present] : normal jugular venous A waves present [Normal Oral Mucosa] : normal oral mucosa [Normal Jugular Venous V Waves Present] : normal jugular venous V waves present [No Jugular Venous Sheth A Waves] : no jugular venous sheth A waves [Respiration, Rhythm And Depth] : normal respiratory rhythm and effort [Exaggerated Use Of Accessory Muscles For Inspiration] : no accessory muscle use [Abdomen Soft] : soft [Bowel Sounds] : normal bowel sounds [Abdomen Tenderness] : non-tender [Cyanosis, Localized] : no localized cyanosis [Abnormal Walk] : normal gait [Nail Clubbing] : no clubbing of the fingernails [Skin Color & Pigmentation] : normal skin color and pigmentation [Skin Turgor] : normal skin turgor [] : no rash [Oriented To Time, Place, And Person] : oriented to person, place, and time [Impaired Insight] : insight and judgment were intact [No Anxiety] : not feeling anxious [Tachycardia] : tachycardic [Not Palpable] : not palpable [Irregularly Irregular] : irregularly irregular [Normal S1] : normal S1 [Normal S2] : normal S2 [No Murmur] : no murmurs heard [2+] : left 2+ [1+] : left 1+ [No Abnormalities] : the abdominal aorta was not enlarged and no bruit was heard [___ +] : bilateral [unfilled]U+ pretibial pitting edema [FreeTextEntry1] : Minimal wheezing [S3] : no S3 [S4] : no S4 [Left Carotid Bruit] : no bruit heard over the left carotid [Right Carotid Bruit] : no bruit heard over the right carotid [Right Femoral Bruit] : no bruit heard over the right femoral artery [Left Femoral Bruit] : no bruit heard over the left femoral artery

## 2019-08-14 NOTE — HISTORY OF PRESENT ILLNESS
[FreeTextEntry1] : I saw Bala San in the office today for cardiac followup. He is a 59-year-old white male who smoked most of his life. He presented to [facility] 12/18 with shortness of breath. He was in atrial fibrillation initially diagnosed with pneumonia. He came back in January and was found to have a pericardial effusion, pleural effusion, and lung cancer by bronchoscopy. He was treated with a pericardial window, and thoracentesis. He also had SIADH, and was put on diuretics and salt pills for low sodium. He is now getting chemotherapy. He does have a cough and does have some shortness of breath and cannot lay flat but otherwise is doing well. He is on low dose aspirin not anticoagulation because of his pleural and pericardial effusions.\par \par His past medical history significant for smoking and hypertension. He denies any history of diabetes, high cholesterol, or family history of heart disease.\par \par Echocardiogram in January was a poor study but demonstrated normal LV systolic function without significant valvular disease. There was a left effusion but no pericardial effusion.Repeat echo 4/19 demonstrated an ejection fraction of 57%. There is minimal MR, with mild to moderate TR. PA pressure 40. There was moderate left atrial enlargement.\par \par The patient has been feeling better. Is less short of breath. He is tolerating chemotherapy very well. His edema is under good control.\par \par The patient finally finished his radiation treatments. Since initial diagnosis until now he has lost about 80 pounds. He is feeling tremulous and shaky but otherwise has no shortness of breath. He has no peripheral edema\par \par Blood pressure was 100/60 lying down without any orthostatic change. Resting heart is 80 beats per minute and irregular and greg to 100 beats per minute after walking up and down the hallway at once.\par \par ECG shows atrial fibrillation with a controlled ventricular rate. There is nonspecific repolarization.

## 2019-08-14 NOTE — REVIEW OF SYSTEMS
[Feeling Fatigued] : feeling fatigued [Shortness Of Breath] : shortness of breath [Cough] : cough [Negative] : Genitourinary [Fever] : no fever [Headache] : no headache [Chills] : no chills [Blurry Vision] : no blurred vision [Seeing Double (Diplopia)] : no diplopia [Palpitations] : no palpitations [Chest Pain] : no chest pain [Wheezing] : no wheezing [Coughing Up Blood] : no hemoptysis [Joint Pain] : no joint pain [Skin: A Rash] : no rash: [Depression] : no depression [Dizziness] : no dizziness [Anxiety] : no anxiety [Excessive Thirst] : no polydipsia [Easy Bleeding] : no tendency for easy bleeding [Easy Bruising] : no tendency for easy bruising

## 2019-09-07 NOTE — DIETITIAN INITIAL EVALUATION ADULT. - NS AS NUTRI INTERV MEALS SNACK
You can access the FollowMyHealth Patient Portal offered by [facility] by registering at the following website: [email or web address]. By joining [facility] FollowMyHealth portal, you will also be able to view your health information using other applications (apps) compatible with our system. Recommend to continue with current liberalized regular diet. Pt encouraged good energy and protein intake. Offered to obtain pt's food preferences, pt declined at this time, pt made aware of alternative menu use, Continue to monitor pt's PO intake. RD to remain available./General/healthful diet

## 2019-11-22 ENCOUNTER — INPATIENT (INPATIENT)
Facility: HOSPITAL | Age: 59
LOS: 14 days | DRG: 180 | End: 2019-12-07
Attending: INTERNAL MEDICINE | Admitting: INTERNAL MEDICINE
Payer: COMMERCIAL

## 2019-11-22 VITALS
DIASTOLIC BLOOD PRESSURE: 58 MMHG | SYSTOLIC BLOOD PRESSURE: 86 MMHG | HEIGHT: 72 IN | TEMPERATURE: 97 F | OXYGEN SATURATION: 92 % | RESPIRATION RATE: 22 BRPM | HEART RATE: 98 BPM | WEIGHT: 179.9 LBS

## 2019-11-22 DIAGNOSIS — L89.90 PRESSURE ULCER OF UNSPECIFIED SITE, UNSPECIFIED STAGE: ICD-10-CM

## 2019-11-22 DIAGNOSIS — J18.9 PNEUMONIA, UNSPECIFIED ORGANISM: ICD-10-CM

## 2019-11-22 DIAGNOSIS — R60.0 LOCALIZED EDEMA: ICD-10-CM

## 2019-11-22 DIAGNOSIS — J44.9 CHRONIC OBSTRUCTIVE PULMONARY DISEASE, UNSPECIFIED: ICD-10-CM

## 2019-11-22 DIAGNOSIS — I80.9 PHLEBITIS AND THROMBOPHLEBITIS OF UNSPECIFIED SITE: ICD-10-CM

## 2019-11-22 DIAGNOSIS — Z66 DO NOT RESUSCITATE: ICD-10-CM

## 2019-11-22 DIAGNOSIS — I10 ESSENTIAL (PRIMARY) HYPERTENSION: ICD-10-CM

## 2019-11-22 DIAGNOSIS — R06.02 SHORTNESS OF BREATH: ICD-10-CM

## 2019-11-22 DIAGNOSIS — C34.90 MALIGNANT NEOPLASM OF UNSPECIFIED PART OF UNSPECIFIED BRONCHUS OR LUNG: ICD-10-CM

## 2019-11-22 DIAGNOSIS — Z29.9 ENCOUNTER FOR PROPHYLACTIC MEASURES, UNSPECIFIED: ICD-10-CM

## 2019-11-22 DIAGNOSIS — Z98.890 OTHER SPECIFIED POSTPROCEDURAL STATES: Chronic | ICD-10-CM

## 2019-11-22 LAB
ALBUMIN SERPL ELPH-MCNC: 2.8 G/DL — LOW (ref 3.3–5)
ALP SERPL-CCNC: 86 U/L — SIGNIFICANT CHANGE UP (ref 40–120)
ALT FLD-CCNC: 21 U/L — SIGNIFICANT CHANGE UP (ref 12–78)
ANION GAP SERPL CALC-SCNC: 12 MMOL/L — SIGNIFICANT CHANGE UP (ref 5–17)
ANISOCYTOSIS BLD QL: SLIGHT — SIGNIFICANT CHANGE UP
APPEARANCE UR: CLEAR — SIGNIFICANT CHANGE UP
APTT BLD: 27.1 SEC — LOW (ref 28.5–37)
AST SERPL-CCNC: 30 U/L — SIGNIFICANT CHANGE UP (ref 15–37)
BASOPHILS # BLD AUTO: 0 K/UL — SIGNIFICANT CHANGE UP (ref 0–0.2)
BASOPHILS NFR BLD AUTO: 0 % — SIGNIFICANT CHANGE UP (ref 0–2)
BILIRUB SERPL-MCNC: 1.9 MG/DL — HIGH (ref 0.2–1.2)
BILIRUB UR-MCNC: ABNORMAL
BUN SERPL-MCNC: 25 MG/DL — HIGH (ref 7–23)
CALCIUM SERPL-MCNC: 8.2 MG/DL — LOW (ref 8.5–10.1)
CHLORIDE SERPL-SCNC: 97 MMOL/L — SIGNIFICANT CHANGE UP (ref 96–108)
CO2 SERPL-SCNC: 26 MMOL/L — SIGNIFICANT CHANGE UP (ref 22–31)
COLOR SPEC: YELLOW — SIGNIFICANT CHANGE UP
CREAT SERPL-MCNC: 0.57 MG/DL — SIGNIFICANT CHANGE UP (ref 0.5–1.3)
D DIMER BLD IA.RAPID-MCNC: 2411 NG/ML DDU — HIGH
DIFF PNL FLD: ABNORMAL
ELLIPTOCYTES BLD QL SMEAR: SLIGHT — SIGNIFICANT CHANGE UP
EOSINOPHIL # BLD AUTO: 0 K/UL — SIGNIFICANT CHANGE UP (ref 0–0.5)
EOSINOPHIL NFR BLD AUTO: 0 % — SIGNIFICANT CHANGE UP (ref 0–6)
GLUCOSE SERPL-MCNC: 134 MG/DL — HIGH (ref 70–99)
GLUCOSE UR QL: NEGATIVE — SIGNIFICANT CHANGE UP
HCT VFR BLD CALC: 27.6 % — LOW (ref 39–50)
HGB BLD-MCNC: 9.2 G/DL — LOW (ref 13–17)
HYPOCHROMIA BLD QL: SLIGHT — SIGNIFICANT CHANGE UP
INR BLD: 1.64 RATIO — HIGH (ref 0.88–1.16)
KETONES UR-MCNC: ABNORMAL
LACTATE SERPL-SCNC: 2.5 MMOL/L — HIGH (ref 0.7–2)
LEUKOCYTE ESTERASE UR-ACNC: NEGATIVE — SIGNIFICANT CHANGE UP
LYMPHOCYTES # BLD AUTO: 0.17 K/UL — LOW (ref 1–3.3)
LYMPHOCYTES # BLD AUTO: 4 % — LOW (ref 13–44)
MANUAL SMEAR VERIFICATION: SIGNIFICANT CHANGE UP
MCHC RBC-ENTMCNC: 27.5 PG — SIGNIFICANT CHANGE UP (ref 27–34)
MCHC RBC-ENTMCNC: 33.3 GM/DL — SIGNIFICANT CHANGE UP (ref 32–36)
MCV RBC AUTO: 82.4 FL — SIGNIFICANT CHANGE UP (ref 80–100)
MICROCYTES BLD QL: SLIGHT — SIGNIFICANT CHANGE UP
MONOCYTES # BLD AUTO: 0.17 K/UL — SIGNIFICANT CHANGE UP (ref 0–0.9)
MONOCYTES NFR BLD AUTO: 4 % — SIGNIFICANT CHANGE UP (ref 2–14)
MYELOCYTES NFR BLD: 1 % — HIGH (ref 0–0)
NEUTROPHILS # BLD AUTO: 3.86 K/UL — SIGNIFICANT CHANGE UP (ref 1.8–7.4)
NEUTROPHILS NFR BLD AUTO: 84 % — HIGH (ref 43–77)
NEUTS BAND # BLD: 7 % — SIGNIFICANT CHANGE UP (ref 0–8)
NITRITE UR-MCNC: NEGATIVE — SIGNIFICANT CHANGE UP
NRBC # BLD: 2 — SIGNIFICANT CHANGE UP
NRBC # BLD: SIGNIFICANT CHANGE UP /100 WBCS (ref 0–0)
NT-PROBNP SERPL-SCNC: 1397 PG/ML — HIGH (ref 0–125)
OVALOCYTES BLD QL SMEAR: SLIGHT — SIGNIFICANT CHANGE UP
PH UR: 6.5 — SIGNIFICANT CHANGE UP (ref 5–8)
PLAT MORPH BLD: NORMAL — SIGNIFICANT CHANGE UP
PLATELET # BLD AUTO: 224 K/UL — SIGNIFICANT CHANGE UP (ref 150–400)
POIKILOCYTOSIS BLD QL AUTO: SLIGHT — SIGNIFICANT CHANGE UP
POTASSIUM SERPL-MCNC: 3 MMOL/L — LOW (ref 3.5–5.3)
POTASSIUM SERPL-SCNC: 3 MMOL/L — LOW (ref 3.5–5.3)
PROT SERPL-MCNC: 5.9 G/DL — LOW (ref 6–8.3)
PROT UR-MCNC: 25 MG/DL
PROTHROM AB SERPL-ACNC: 18.8 SEC — HIGH (ref 10–12.9)
RBC # BLD: 3.35 M/UL — LOW (ref 4.2–5.8)
RBC # FLD: 16.2 % — HIGH (ref 10.3–14.5)
RBC BLD AUTO: ABNORMAL
RBC CASTS # UR COMP ASSIST: SIGNIFICANT CHANGE UP /HPF (ref 0–4)
SODIUM SERPL-SCNC: 135 MMOL/L — SIGNIFICANT CHANGE UP (ref 135–145)
SP GR SPEC: 1 — LOW (ref 1.01–1.02)
TSH SERPL-MCNC: 2.37 UIU/ML — SIGNIFICANT CHANGE UP (ref 0.36–3.74)
UROBILINOGEN FLD QL: 8
WBC # BLD: 4.24 K/UL — SIGNIFICANT CHANGE UP (ref 3.8–10.5)
WBC # FLD AUTO: 4.24 K/UL — SIGNIFICANT CHANGE UP (ref 3.8–10.5)
WBC UR QL: SIGNIFICANT CHANGE UP

## 2019-11-22 PROCEDURE — 71275 CT ANGIOGRAPHY CHEST: CPT | Mod: 26

## 2019-11-22 PROCEDURE — 71045 X-RAY EXAM CHEST 1 VIEW: CPT | Mod: 26

## 2019-11-22 PROCEDURE — 74177 CT ABD & PELVIS W/CONTRAST: CPT | Mod: 26

## 2019-11-22 PROCEDURE — 93010 ELECTROCARDIOGRAM REPORT: CPT

## 2019-11-22 PROCEDURE — 93970 EXTREMITY STUDY: CPT | Mod: 26

## 2019-11-22 PROCEDURE — 99285 EMERGENCY DEPT VISIT HI MDM: CPT

## 2019-11-22 RX ORDER — POTASSIUM CHLORIDE 20 MEQ
10 PACKET (EA) ORAL
Refills: 0 | Status: COMPLETED | OUTPATIENT
Start: 2019-11-22 | End: 2019-11-22

## 2019-11-22 RX ORDER — SENNA PLUS 8.6 MG/1
1 TABLET ORAL AT BEDTIME
Refills: 0 | Status: DISCONTINUED | OUTPATIENT
Start: 2019-11-22 | End: 2019-12-07

## 2019-11-22 RX ORDER — MORPHINE SULFATE 50 MG/1
2 CAPSULE, EXTENDED RELEASE ORAL EVERY 4 HOURS
Refills: 0 | Status: DISCONTINUED | OUTPATIENT
Start: 2019-11-22 | End: 2019-11-29

## 2019-11-22 RX ORDER — TIOTROPIUM BROMIDE 18 UG/1
1 CAPSULE ORAL; RESPIRATORY (INHALATION) DAILY
Refills: 0 | Status: DISCONTINUED | OUTPATIENT
Start: 2019-11-22 | End: 2019-12-06

## 2019-11-22 RX ORDER — MAGNESIUM OXIDE 400 MG ORAL TABLET 241.3 MG
400 TABLET ORAL DAILY
Refills: 0 | Status: DISCONTINUED | OUTPATIENT
Start: 2019-11-22 | End: 2019-12-06

## 2019-11-22 RX ORDER — LORATADINE 10 MG/1
10 TABLET ORAL DAILY
Refills: 0 | Status: DISCONTINUED | OUTPATIENT
Start: 2019-11-22 | End: 2019-12-06

## 2019-11-22 RX ORDER — TIOTROPIUM BROMIDE 18 UG/1
1 CAPSULE ORAL; RESPIRATORY (INHALATION)
Qty: 0 | Refills: 0 | DISCHARGE

## 2019-11-22 RX ORDER — MORPHINE SULFATE 50 MG/1
3 CAPSULE, EXTENDED RELEASE ORAL EVERY 4 HOURS
Refills: 0 | Status: DISCONTINUED | OUTPATIENT
Start: 2019-11-22 | End: 2019-11-29

## 2019-11-22 RX ORDER — DOCUSATE SODIUM 100 MG
1 CAPSULE ORAL
Qty: 0 | Refills: 0 | DISCHARGE

## 2019-11-22 RX ORDER — IPRATROPIUM/ALBUTEROL SULFATE 18-103MCG
3 AEROSOL WITH ADAPTER (GRAM) INHALATION
Refills: 0 | Status: COMPLETED | OUTPATIENT
Start: 2019-11-22 | End: 2019-11-22

## 2019-11-22 RX ORDER — MORPHINE SULFATE 50 MG/1
1 CAPSULE, EXTENDED RELEASE ORAL EVERY 4 HOURS
Refills: 0 | Status: DISCONTINUED | OUTPATIENT
Start: 2019-11-22 | End: 2019-11-29

## 2019-11-22 RX ORDER — ONDANSETRON 8 MG/1
4 TABLET, FILM COATED ORAL EVERY 6 HOURS
Refills: 0 | Status: DISCONTINUED | OUTPATIENT
Start: 2019-11-22 | End: 2019-12-07

## 2019-11-22 RX ORDER — SODIUM CHLORIDE 9 MG/ML
1 INJECTION INTRAMUSCULAR; INTRAVENOUS; SUBCUTANEOUS THREE TIMES A DAY
Refills: 0 | Status: DISCONTINUED | OUTPATIENT
Start: 2019-11-22 | End: 2019-12-06

## 2019-11-22 RX ORDER — IPRATROPIUM/ALBUTEROL SULFATE 18-103MCG
3 AEROSOL WITH ADAPTER (GRAM) INHALATION EVERY 4 HOURS
Refills: 0 | Status: DISCONTINUED | OUTPATIENT
Start: 2019-11-22 | End: 2019-11-24

## 2019-11-22 RX ORDER — HYDROCORTISONE 20 MG
100 TABLET ORAL ONCE
Refills: 0 | Status: COMPLETED | OUTPATIENT
Start: 2019-11-22 | End: 2019-11-22

## 2019-11-22 RX ORDER — DIGOXIN 250 MCG
0.25 TABLET ORAL DAILY
Refills: 0 | Status: DISCONTINUED | OUTPATIENT
Start: 2019-11-22 | End: 2019-12-06

## 2019-11-22 RX ORDER — MORPHINE SULFATE 50 MG/1
1 CAPSULE, EXTENDED RELEASE ORAL
Qty: 0 | Refills: 0 | DISCHARGE

## 2019-11-22 RX ORDER — ONDANSETRON 8 MG/1
1 TABLET, FILM COATED ORAL
Qty: 0 | Refills: 0 | DISCHARGE

## 2019-11-22 RX ORDER — BUDESONIDE AND FORMOTEROL FUMARATE DIHYDRATE 160; 4.5 UG/1; UG/1
2 AEROSOL RESPIRATORY (INHALATION)
Refills: 0 | Status: DISCONTINUED | OUTPATIENT
Start: 2019-11-22 | End: 2019-12-06

## 2019-11-22 RX ORDER — SENNA PLUS 8.6 MG/1
1 TABLET ORAL
Qty: 0 | Refills: 0 | DISCHARGE

## 2019-11-22 RX ORDER — SODIUM CHLORIDE 9 MG/ML
2500 INJECTION INTRAMUSCULAR; INTRAVENOUS; SUBCUTANEOUS ONCE
Refills: 0 | Status: COMPLETED | OUTPATIENT
Start: 2019-11-22 | End: 2019-11-22

## 2019-11-22 RX ORDER — ASPIRIN/CALCIUM CARB/MAGNESIUM 324 MG
81 TABLET ORAL DAILY
Refills: 0 | Status: DISCONTINUED | OUTPATIENT
Start: 2019-11-22 | End: 2019-11-23

## 2019-11-22 RX ADMIN — Medication 100 MILLIGRAM(S): at 19:15

## 2019-11-22 RX ADMIN — Medication 3 MILLILITER(S): at 19:30

## 2019-11-22 RX ADMIN — Medication 3 MILLILITER(S): at 19:12

## 2019-11-22 RX ADMIN — SODIUM CHLORIDE 2500 MILLILITER(S): 9 INJECTION INTRAMUSCULAR; INTRAVENOUS; SUBCUTANEOUS at 19:11

## 2019-11-22 RX ADMIN — MORPHINE SULFATE 3 MILLIGRAM(S): 50 CAPSULE, EXTENDED RELEASE ORAL at 22:55

## 2019-11-22 RX ADMIN — Medication 100 MILLIEQUIVALENT(S): at 21:52

## 2019-11-22 RX ADMIN — Medication 100 MILLIEQUIVALENT(S): at 22:45

## 2019-11-22 NOTE — ED PROVIDER NOTE - MUSCULOSKELETAL, MLM
Spine appears normal, range of motion is not limited, no muscle or joint tenderness bl le edema pitting

## 2019-11-22 NOTE — ED ADULT NURSE NOTE - NSIMPLEMENTINTERV_GEN_ALL_ED
Implemented All Fall Risk Interventions:  [place] to call system. Call bell, personal items and telephone within reach. Instruct patient to call for assistance. Room bathroom lighting operational. Non-slip footwear when patient is off stretcher. Physically safe environment: no spills, clutter or unnecessary equipment. Stretcher in lowest position, wheels locked, appropriate side rails in place. Provide visual cue, wrist band, yellow gown, etc. Monitor gait and stability. Monitor for mental status changes and reorient to person, place, and time. Review medications for side effects contributing to fall risk. Reinforce activity limits and safety measures with patient and family.

## 2019-11-22 NOTE — H&P ADULT - NSICDXPASTMEDICALHX_GEN_ALL_CORE_FT
PAST MEDICAL HISTORY:  AF (atrial fibrillation)     COPD (chronic obstructive pulmonary disease)     Depression     Dyspnea     HTN (hypertension)     Hyponatremia     Lung cancer     Mediastinal adenopathy     Pericardial effusion

## 2019-11-22 NOTE — H&P ADULT - NSHPSOCIALHISTORY_GEN_ALL_CORE
Significant tobacco use > 40 pack years. Quit Dec 2018. No etoh. No drug use. Ambulates with cane. lives with wife and dog at home.

## 2019-11-22 NOTE — PROVIDER CONTACT NOTE (CRITICAL VALUE NOTIFICATION) - TEST AND RESULT REPORTED:
Family requesting palliative care to follow patient at home. Discussed with daughter Modesta Valdivia who states that they would like to keep [address] CM called [facility] but they do not provide palliative care.  Await a call back from Modesta Valdivia to inform her of abo lactate 2.5

## 2019-11-22 NOTE — H&P ADULT - PROBLEM SELECTOR PLAN 6
-Stable -Continue digoxin  -Patient not on AC as he states "no one started me"  -Hold toprol and dilt given low BP

## 2019-11-22 NOTE — H&P ADULT - PROBLEM SELECTOR PLAN 8
-Will hold lasix as patient's BP low  -s/p 2.6L   -Re-evaluate in a.m. -Stage II pressure ulcer on sacrum

## 2019-11-22 NOTE — ED PROVIDER NOTE - CONSTITUTIONAL, MLM
normal... chronically ill appearing, well nourished, awake, alert, oriented to person, place, time/situation sitting forward on bed pale

## 2019-11-22 NOTE — H&P ADULT - NSHPPHYSICALEXAM_GEN_ALL_CORE
Vital Signs Last 24 Hrs  T(C): 36.1 (22 Nov 2019 18:25), Max: 36.1 (22 Nov 2019 18:25)  T(F): 97 (22 Nov 2019 18:25), Max: 97 (22 Nov 2019 18:25)  HR: 81 (22 Nov 2019 19:24) (81 - 98)  BP: 110/59 (22 Nov 2019 19:24) (86/58 - 110/59)  RR: 20 (22 Nov 2019 19:24) (20 - 22)  SpO2: 92% (22 Nov 2019 19:24) (92% - 92%)    Physical Exam:  General: Well developed, well nourished, NAD  HEENT: NCAT, PERRLA, EOMI bl, dry mucous membranes  Neck: Supple,   Neurology: A&Ox3, nonfocal,  sensation intact, moving all extremities  Respiratory: Coarse breath sounds b/l  CV: RRR, +S1/S2, no murmurs, rubs or gallops  Abdominal: Soft, NT, ND +BSx4  Extremities: 3+ pitting edema in LE b/l below knees  Skin: warm, dry Vital Signs Last 24 Hrs  T(C): 36.1 (22 Nov 2019 18:25), Max: 36.1 (22 Nov 2019 18:25)  T(F): 97 (22 Nov 2019 18:25), Max: 97 (22 Nov 2019 18:25)  HR: 81 (22 Nov 2019 19:24) (81 - 98)  BP: 110/59 (22 Nov 2019 19:24) (86/58 - 110/59)  RR: 20 (22 Nov 2019 19:24) (20 - 22)  SpO2: 92% (22 Nov 2019 19:24) (92% - 92%)    Physical Exam:  General: Well developed, well nourished, NAD  HEENT: NCAT, PERRLA, EOMI bl, dry mucous membranes  Neck: Supple,   Neurology: A&Ox3, nonfocal,  sensation intact, moving all extremities  Respiratory: Coarse breath sounds b/l  CV: RRR, +S1/S2, no murmurs, rubs or gallops  Abdominal: Soft, NT, ND +BSx4  Extremities: 3+ pitting edema in LE b/l below knees  Skin: warm, dry, stage II pressure ulcer on sacrum

## 2019-11-22 NOTE — H&P ADULT - PROBLEM SELECTOR PLAN 5
-Patient DNR/DNI, MOLST in chart. To be fully filled out by palliative care/morning team  -F/U Palliative care

## 2019-11-22 NOTE — H&P ADULT - PROBLEM SELECTOR PLAN 1
-Patient presenting for SOB with history of 2 pleural effusions requiring drainage as well cardiac window  -CXR consistent with infiltrates and w small L pleural effusion -Patient presenting for SOB with history of 2 pleural effusions requiring drainage as well cardiac window  -CXR consistent with infiltrates and w small L pleural effusion  -Note, patient s/p 2.6L with pleural effusion and underlying SOB but no evidence of -CHF as of echo 1/2019 but will be cautious with further fluid administration given baseline edema -Patient presenting for SOB with history of 2 pleural effusions requiring drainage as well cardiac window  -CXR consistent with infiltrates and w small L pleural effusion  -Note, patient s/p 2.6L with pleural effusion and underlying SOB but no evidence of -CHF as of echo 1/2019 but will be cautious with further fluid administration given baseline edema  -F/U CTA chest, abdomen and pelvis -Patient presenting for SOB with history of 2 pleural effusions requiring drainage as well cardiac window  -CXR consistent with infiltrates and w small L pleural effusion  -Note, patient s/p 2.6L with pleural effusion and underlying SOB but no evidence of -CHF as of echo 1/2019 but will be cautious with further fluid administration given baseline edema  -F/U CTA chest, abdomen and pelvis  -F/U echo in a.m.

## 2019-11-22 NOTE — H&P ADULT - ATTENDING COMMENTS
chart reviewed and all notes appreciaed.  pt is an unfortunate 59 year old male with metastatic lung ca has had pleurAL effusions in the past drained.  now comes ion for sob and cant rest----cxr small pleural effusion and infiltrate possible pna  will start iv abx   requested pulm consult dr dunlap  and will get hem onc dr connolly group

## 2019-11-22 NOTE — ED PROVIDER NOTE - PROGRESS NOTE DETAILS
dw dr mon order ct after creat admit to hosptial pt too ill  ct chest if wihtout if with get chest abd and pelvis.  pt improving elizabeth beavers accepts to tele resident called

## 2019-11-22 NOTE — ED PROVIDER NOTE - ENMT, MLM
Airway patent, Nasal mucosa clear. Mouth with normal mucosa. Throat has no vesicles, no oropharyngeal exudates and uvula is midline. no g f r

## 2019-11-22 NOTE — H&P ADULT - HISTORY OF PRESENT ILLNESS
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 Patient states the SOB has been worsening and that's what prompted him to come to the ED. He denies recent fevers, chills but admits he's been shaking since he's been on chemotherapy. Admits on and off nausea, and episodes of vomiting over the past week. Denies hematemesis/hemoptysis. Denies chest pain, palpitations, abdominal pain, diarrhea, constipation. Admits LE edema which has been worsening in past week, and reports 100 lb weight loss since January. He has had no appetite, and reports not eating anything at all. He notes he recently changed chem to Paclitaxel, as per patient a body scan demonstrated mets to brain and bone (R shoulder, L ribs). His last radiation dose was August 2019.     In the ED:  T: 97 HR: 98 BP: 86/58 RR: 22 92% RA   WBC: 4.24 h/h: 9.2/27.6 coags wnl, D-Dimer 2411  Lactate 2.5 Na: 135, K: 3.0  Cr: 0.57 glucose 134 TSH: 2.37 proBNP 1397  s/p duonebs, 100 mg hydrocortisone, potassium chloride 10x3  U/S doppler LE: L superificial thrombus noted  -CTA and CT abdomen and pelvis pending  -EKG, significant artificant due to shaking. Official read pending

## 2019-11-22 NOTE — ED PROVIDER NOTE - CLINICAL SUMMARY MEDICAL DECISION MAKING FREE TEXT BOX
treat hypoxia dyspnea ro pn pe dvt labs us ct consult oncology admit to hospital fo rpn very sick male with metatsattic cancer on chemo

## 2019-11-22 NOTE — H&P ADULT - NSHPOUTPATIENTPROVIDERS_GEN_ALL_CORE
PMD DR. Rios Khan Onc - Dr. Martino PMD DR. Nation  Heme Onc - Dr. Steven Bush Cardio  Dr. Spencer Nephro

## 2019-11-22 NOTE — ED ADULT NURSE NOTE - OBJECTIVE STATEMENT
patient came in ED from home with spouse at the bedside. spouse of the patient states he was having shortness of breath X 7 days, worst for the past 2 days. bilateral leg swelling X 3 weeks. denies chest pain or discomfort. afebrile. spouse added patient last received Chemo Treatment last Tuesday. patient noted hypotensive on arrival and + dyspnea on exertion. DR Franks s/e patient at the bedside. right chest wall port accessed for blood work. on fall precaution. due medications ordered given.

## 2019-11-22 NOTE — H&P ADULT - PROBLEM SELECTOR PLAN 2
-F/U Dr. Harris Recs  -Patient receiveds 30 mg PO morphine q12H  -Oxycodone  q6h PRN  -Will do 3mg IV morphine severe, 2, 1 for mod and mild  -senna, colace  -Mag oxidie -F/U Dr. Harris Recs  -Patient receiveds 30 mg PO morphine q12H  -Oxycodone  q6h PRN  -Will do 3mg IV morphine severe, 2, 1 for mod and mild  -senna, colace  -Mag oxide  -zofran 4mg IV q6H PRN

## 2019-11-22 NOTE — ED ADULT NURSE NOTE - PMH
AF (atrial fibrillation)    COPD (chronic obstructive pulmonary disease)    Depression    Dyspnea    HTN (hypertension)    Hyponatremia    Lung cancer    Mediastinal adenopathy    Pericardial effusion

## 2019-11-22 NOTE — ED PROVIDER NOTE - OBJECTIVE STATEMENT
pt is a 58 yo male who has metastatic lung cancer to brain bone and abd undergoing active chemo and xrt has not been eating or drinking well for a while. went for routine eval by dr Bingham/[facility] oncology because he has been sob for 7 days worse over past 2 and was found to be hypotensive hypoxic and edematous.  sent to er  pmd dr Nation pt is a 58 yo male who has metastatic small cell lung cancer to brain bone and abd undergoing active chemo and xrt has not been eating or drinking well for a while. went for routine eval by dr Bingham/[facility] oncology because he has been sob for 7 days worse over past 2 and was found to be hypotensive hypoxic and edematous.  sent to er  pmd dr Nation

## 2019-11-22 NOTE — H&P ADULT - ASSESSMENT
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

## 2019-11-22 NOTE — H&P ADULT - NSHPREVIEWOFSYSTEMS_GEN_ALL_CORE
Constitutional: denies fever, chills, diaphoresis   HEENT: denies blurry vision, double vision, eye pain, difficulty hearing  Respiratory: denies SOB, cough, sputum production  Cardiovascular: denies CP, palpitations, admits RADHA  Gastrointestinal: denies nausea, vomiting, diarrhea, constipation, abdominal pain  Genitourinary: denies dysuria, frequency, urgency, hematuria   Skin/Breast: admits buttock ulcer/rash  Neurologic: denies headache, weakness, dizziness, paresthesias, numbness/tingling

## 2019-11-23 DIAGNOSIS — I48.91 UNSPECIFIED ATRIAL FIBRILLATION: ICD-10-CM

## 2019-11-23 LAB
ALBUMIN SERPL ELPH-MCNC: 2.7 G/DL — LOW (ref 3.3–5)
ALP SERPL-CCNC: 89 U/L — SIGNIFICANT CHANGE UP (ref 40–120)
ALT FLD-CCNC: 21 U/L — SIGNIFICANT CHANGE UP (ref 12–78)
ANION GAP SERPL CALC-SCNC: 10 MMOL/L — SIGNIFICANT CHANGE UP (ref 5–17)
AST SERPL-CCNC: 26 U/L — SIGNIFICANT CHANGE UP (ref 15–37)
BILIRUB SERPL-MCNC: 1.8 MG/DL — HIGH (ref 0.2–1.2)
BUN SERPL-MCNC: 17 MG/DL — SIGNIFICANT CHANGE UP (ref 7–23)
CALCIUM SERPL-MCNC: 7.9 MG/DL — LOW (ref 8.5–10.1)
CHLORIDE SERPL-SCNC: 99 MMOL/L — SIGNIFICANT CHANGE UP (ref 96–108)
CO2 SERPL-SCNC: 28 MMOL/L — SIGNIFICANT CHANGE UP (ref 22–31)
CREAT SERPL-MCNC: 0.54 MG/DL — SIGNIFICANT CHANGE UP (ref 0.5–1.3)
GLUCOSE SERPL-MCNC: 110 MG/DL — HIGH (ref 70–99)
HCT VFR BLD CALC: 26.6 % — LOW (ref 39–50)
HCV AB S/CO SERPL IA: 0.16 S/CO — SIGNIFICANT CHANGE UP (ref 0–0.99)
HCV AB SERPL-IMP: SIGNIFICANT CHANGE UP
HGB BLD-MCNC: 8.6 G/DL — LOW (ref 13–17)
LACTATE SERPL-SCNC: 2.3 MMOL/L — HIGH (ref 0.7–2)
LACTATE SERPL-SCNC: 2.6 MMOL/L — HIGH (ref 0.7–2)
MCHC RBC-ENTMCNC: 26.6 PG — LOW (ref 27–34)
MCHC RBC-ENTMCNC: 32.3 GM/DL — SIGNIFICANT CHANGE UP (ref 32–36)
MCV RBC AUTO: 82.4 FL — SIGNIFICANT CHANGE UP (ref 80–100)
NRBC # BLD: 0 /100 WBCS — SIGNIFICANT CHANGE UP (ref 0–0)
PLATELET # BLD AUTO: 200 K/UL — SIGNIFICANT CHANGE UP (ref 150–400)
POTASSIUM SERPL-MCNC: 3.2 MMOL/L — LOW (ref 3.5–5.3)
POTASSIUM SERPL-SCNC: 3.2 MMOL/L — LOW (ref 3.5–5.3)
PROT SERPL-MCNC: 5.9 G/DL — LOW (ref 6–8.3)
RBC # BLD: 3.23 M/UL — LOW (ref 4.2–5.8)
RBC # FLD: 16.4 % — HIGH (ref 10.3–14.5)
SODIUM SERPL-SCNC: 137 MMOL/L — SIGNIFICANT CHANGE UP (ref 135–145)
WBC # BLD: 3.58 K/UL — LOW (ref 3.8–10.5)
WBC # FLD AUTO: 3.58 K/UL — LOW (ref 3.8–10.5)

## 2019-11-23 PROCEDURE — 71045 X-RAY EXAM CHEST 1 VIEW: CPT | Mod: 26

## 2019-11-23 PROCEDURE — 93306 TTE W/DOPPLER COMPLETE: CPT | Mod: 26

## 2019-11-23 PROCEDURE — 99223 1ST HOSP IP/OBS HIGH 75: CPT

## 2019-11-23 RX ORDER — CHLORHEXIDINE GLUCONATE 213 G/1000ML
1 SOLUTION TOPICAL DAILY
Refills: 0 | Status: DISCONTINUED | OUTPATIENT
Start: 2019-11-23 | End: 2019-11-24

## 2019-11-23 RX ORDER — POTASSIUM CHLORIDE 20 MEQ
40 PACKET (EA) ORAL ONCE
Refills: 0 | Status: COMPLETED | OUTPATIENT
Start: 2019-11-23 | End: 2019-11-23

## 2019-11-23 RX ORDER — DRONABINOL 2.5 MG
5 CAPSULE ORAL EVERY 12 HOURS
Refills: 0 | Status: DISCONTINUED | OUTPATIENT
Start: 2019-11-23 | End: 2019-11-30

## 2019-11-23 RX ORDER — ENOXAPARIN SODIUM 100 MG/ML
80 INJECTION SUBCUTANEOUS
Refills: 0 | Status: DISCONTINUED | OUTPATIENT
Start: 2019-11-23 | End: 2019-11-27

## 2019-11-23 RX ADMIN — MORPHINE SULFATE 1 MILLIGRAM(S): 50 CAPSULE, EXTENDED RELEASE ORAL at 12:17

## 2019-11-23 RX ADMIN — SODIUM CHLORIDE 1 GRAM(S): 9 INJECTION INTRAMUSCULAR; INTRAVENOUS; SUBCUTANEOUS at 21:14

## 2019-11-23 RX ADMIN — ENOXAPARIN SODIUM 80 MILLIGRAM(S): 100 INJECTION SUBCUTANEOUS at 10:30

## 2019-11-23 RX ADMIN — ENOXAPARIN SODIUM 80 MILLIGRAM(S): 100 INJECTION SUBCUTANEOUS at 21:15

## 2019-11-23 RX ADMIN — Medication 3 MILLILITER(S): at 21:05

## 2019-11-23 RX ADMIN — Medication 40 MILLIGRAM(S): at 21:14

## 2019-11-23 RX ADMIN — MORPHINE SULFATE 1 MILLIGRAM(S): 50 CAPSULE, EXTENDED RELEASE ORAL at 11:47

## 2019-11-23 RX ADMIN — TIOTROPIUM BROMIDE 1 CAPSULE(S): 18 CAPSULE ORAL; RESPIRATORY (INHALATION) at 05:01

## 2019-11-23 RX ADMIN — Medication 40 MILLIEQUIVALENT(S): at 10:30

## 2019-11-23 RX ADMIN — MORPHINE SULFATE 2 MILLIGRAM(S): 50 CAPSULE, EXTENDED RELEASE ORAL at 05:50

## 2019-11-23 RX ADMIN — SODIUM CHLORIDE 1 GRAM(S): 9 INJECTION INTRAMUSCULAR; INTRAVENOUS; SUBCUTANEOUS at 13:28

## 2019-11-23 RX ADMIN — LORATADINE 10 MILLIGRAM(S): 10 TABLET ORAL at 11:46

## 2019-11-23 RX ADMIN — Medication 1 TABLET(S): at 11:46

## 2019-11-23 RX ADMIN — Medication 100 MILLIEQUIVALENT(S): at 00:22

## 2019-11-23 RX ADMIN — Medication 40 MILLIGRAM(S): at 13:28

## 2019-11-23 RX ADMIN — Medication 0.25 MILLIGRAM(S): at 05:00

## 2019-11-23 RX ADMIN — CHLORHEXIDINE GLUCONATE 1 APPLICATION(S): 213 SOLUTION TOPICAL at 11:50

## 2019-11-23 RX ADMIN — BUDESONIDE AND FORMOTEROL FUMARATE DIHYDRATE 2 PUFF(S): 160; 4.5 AEROSOL RESPIRATORY (INHALATION) at 18:06

## 2019-11-23 RX ADMIN — BUDESONIDE AND FORMOTEROL FUMARATE DIHYDRATE 2 PUFF(S): 160; 4.5 AEROSOL RESPIRATORY (INHALATION) at 05:01

## 2019-11-23 RX ADMIN — Medication 5 MILLIGRAM(S): at 17:39

## 2019-11-23 RX ADMIN — MAGNESIUM OXIDE 400 MG ORAL TABLET 400 MILLIGRAM(S): 241.3 TABLET ORAL at 11:46

## 2019-11-23 RX ADMIN — MORPHINE SULFATE 2 MILLIGRAM(S): 50 CAPSULE, EXTENDED RELEASE ORAL at 05:27

## 2019-11-23 RX ADMIN — SODIUM CHLORIDE 1 GRAM(S): 9 INJECTION INTRAMUSCULAR; INTRAVENOUS; SUBCUTANEOUS at 05:01

## 2019-11-23 NOTE — CONSULT NOTE ADULT - SUBJECTIVE AND OBJECTIVE BOX
[facility] - Gurpreet Bush, Corey Diaz, Sajan Abebe Savella  Office Number: [phone]    Initial Consult Note    CHIEF COMPLAINT: Patient is a 59y old  Male who presents with a chief complaint of PNA        HPI:  59 M PMH COPD, HTN, LE edema 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 He also has a history of a pericardial effusion s/p window, and of a pleural effusion s/p thoracentesis.  Patient states the SOB has been worsening and that's what prompted him to come to the ED. He denies recent fevers, chills but admits he's been shaking since he's been on chemotherapy. Admits on and off nausea, and episodes of vomiting over the past week. Denies hematemesis/hemoptysis. Denies chest pain, palpitations, abdominal pain, diarrhea, constipation. Admits LE edema which has been worsening in past week, and reports 100 lb weight loss since January. He has had no appetite, and reports not eating anything at all. He notes he recently changed chem to Paclitaxel, as per patient a body scan demonstrated mets to brain and bone (R shoulder, L ribs). His last radiation dose was August 2019.     In the ED:  T: 97 HR: 98 BP: 86/58 RR: 22 92% RA   WBC: 4.24 h/h: 9.2/27.6 coags wnl, D-Dimer 2411  Lactate 2.5 Na: 135, K: 3.0  Cr: 0.57 glucose 134 TSH: 2.37 proBNP 1397  s/p duonebs, 100 mg hydrocortisone, potassium chloride 10x3  U/S doppler LE: L superificial thrombus noted  -EKG, significant artificant due to shaking. Official read pending (22 Nov 2019 20:57)    His CT PA showed small b/l basal PEs.        PAST MEDICAL & SURGICAL HISTORY:  Lung cancer  Depression  Dyspnea  Mediastinal adenopathy  Pericardial effusion  AF (atrial fibrillation)  Hyponatremia  COPD (chronic obstructive pulmonary disease)  HTN (hypertension)  S/P pericardial window creation      SOCIAL HISTORY:  No tobacco, ethanol, or drug abuse.  Former smoker    FAMILY HISTORY:  No pertinent family history in first degree relatives    No family history of acute MI or sudden cardiac death.    MEDICATIONS  (STANDING):  aspirin  chewable 81 milliGRAM(s) Oral daily  budesonide 160 MICROgram(s)/formoterol 4.5 MICROgram(s) Inhaler 2 Puff(s) Inhalation two times a day  chlorhexidine 2% Cloths 1 Application(s) Topical daily  digoxin     Tablet 0.25 milliGRAM(s) Oral daily  enoxaparin Injectable 80 milliGRAM(s) SubCutaneous two times a day  loratadine 10 milliGRAM(s) Oral daily  magnesium oxide 400 milliGRAM(s) Oral daily  methylPREDNISolone sodium succinate Injectable 40 milliGRAM(s) IV Push every 8 hours  multivitamin 1 Tablet(s) Oral daily  sodium chloride 1 Gram(s) Oral three times a day  tiotropium 18 MICROgram(s) Capsule 1 Capsule(s) Inhalation daily    MEDICATIONS  (PRN):  albuterol/ipratropium for Nebulization 3 milliLiter(s) Nebulizer every 4 hours PRN Shortness of Breath and/or Wheezing  guaiFENesin    Syrup 100 milliGRAM(s) Oral every 6 hours PRN Cough  morphine  - Injectable 3 milliGRAM(s) IV Push every 4 hours PRN Severe Pain (7 - 10)  morphine  - Injectable 2 milliGRAM(s) IV Push every 4 hours PRN Moderate Pain (4 - 6)  morphine  - Injectable 1 milliGRAM(s) IV Push every 4 hours PRN Mild Pain (1 - 3)  ondansetron Injectable 4 milliGRAM(s) IV Push every 6 hours PRN Nausea  senna 8.6 milliGRAM(s) Oral Tablet - Peds 1 Tablet(s) Oral at bedtime PRN for constipation      Allergies    penicillin (Unknown)             REVIEW OF SYSTEMS:    All other review of systems is negative unless indicated above    VITAL SIGNS:   Vital Signs Last 24 Hrs  T(C): 36.7 (23 Nov 2019 08:16), Max: 36.7 (23 Nov 2019 08:16)  T(F): 98 (23 Nov 2019 08:16), Max: 98 (23 Nov 2019 08:16)  HR: 101 (23 Nov 2019 08:16) (81 - 101)  BP: 101/66 (23 Nov 2019 08:16) (86/58 - 110/59)  BP(mean): 66 (23 Nov 2019 08:16) (66 - 66)  RR: 19 (23 Nov 2019 08:16) (17 - 22)  SpO2: 95% (23 Nov 2019 08:16) (92% - 98%)    I&O's Summary    22 Nov 2019 07:01  -  23 Nov 2019 07:00  --------------------------------------------------------  IN: 800 mL / OUT: 1000 mL / NET: -200 mL    23 Nov 2019 07:01  -  23 Nov 2019 10:57  --------------------------------------------------------  IN: 0 mL / OUT: 200 mL / NET: -200 mL        On Exam:    Constitutional: NAD, alert and oriented x 3  Lungs:  Mildly labored.  Coarse bs b/l  Cardiovascular: RRR.  S1 and S2 positive.  No murmurs, rubs, gallops or clicks  Gastrointestinal: Bowel Sounds present, soft, nontender.   Lymph: b/l + peripheral edema to the mid ankle. No cervical lymphadenopathy.  Neurological: Alert, no focal deficits  Skin: No rahes or ulcers   Psych:  Anxious    LABS: All Labs Reviewed:                        8.6    3.58  )-----------( 200      ( 23 Nov 2019 07:22 )             26.6                         9.2    4.24  )-----------( 224      ( 22 Nov 2019 19:37 )             27.6     23 Nov 2019 07:22    137    |  99     |  17     ----------------------------<  110    3.2     |  28     |  0.54   22 Nov 2019 19:37    135    |  97     |  25     ----------------------------<  134    3.0     |  26     |  0.57     Ca    7.9        23 Nov 2019 07:22  Ca    8.2        22 Nov 2019 19:37    TPro  5.9    /  Alb  2.7    /  TBili  1.8    /  DBili  x      /  AST  26     /  ALT  21     /  AlkPhos  89     23 Nov 2019 07:22  TPro  5.9    /  Alb  2.8    /  TBili  1.9    /  DBili  x      /  AST  30     /  ALT  21     /  AlkPhos  86     22 Nov 2019 19:37    PT/INR - ( 22 Nov 2019 19:37 )   PT: 18.8 sec;   INR: 1.64 ratio         PTT - (Nov 2019 19:37 )  PTT:27.1 sec  CARDIAC MARKERS ( 23 Nov 2019 07:22 )  <.015 ng/mL / x     / 110 U/L / x     / 4.9 ng/mL      Blood Culture:   11-23 @ 07:22  Pro Bnp 1245  11-22 @ 19:37  Pro Bnp 1397    11-22 @ 19:37  TSH: 2.37      RADIOLOGY:    < from: CT Angio Chest w/ IV Cont (11.22.19 @ 21:41) >    EXAM:  CT ABDOMEN AND PELVIS IC                          EXAM:  CT ANGIO CHEST (W)AW IC                            PROCEDURE DATE:  11/22/2019          INTERPRETATION:        Addendum created by Jorgito Manuel MD on 11/22/2019 10:59:51 PM EST     THIS REPORT CONTAINS FINDINGS THAT MAY BE CRITICAL TO PATIENT CARE. The   findings were verbally communicated via telephone conference with JUANJO Quinones at 10:50 PM EST on 11/22/2019. The findings were acknowledged and   understood.        --Patient has history of known lung carcinoma with metastasis.    Initial report created on 11/22/2019 10:59:12 PM EST     PROCEDURE INFORMATION:   Exam: CT Angiography Chest With Contrast   Exam date and time: 11/22/2019 9:25 PM   Age: 59 years old   Clinical history: SOB; Shortness of breath     TECHNIQUE:   Imaging protocol: Computed tomographic angiography of the chest with   intravenous contrast.   3D rendering: MIP reconstructed images were created and reviewed.     COMPARISON:   CT ANGIO CHEST WITHOUT AND OR WITH IV CONTRAST 2/5/2019 12:51 AM     FINDINGS:   Pulmonary arteries: Adequate contrast enhancement of the pulmonary   arteries.   Abnormal filling defects -emboli within bilateral lower lobe segmental   pulmonary arterial vessels.  Aorta: No evidence of thoracic aortic dissection. Chronic atherosclerotic   calcification of the vasculature.   Lungs:    Bilateral groundglass mosaic attenuation, most pronounced in the right   upper and lower lung zones.  Thick-walled left apicalbleb.  Paraseptal emphysematous changes lung apices.  Subpleural areas of scarring/fibrosis left upper and superior segment   left lower lobes.    Pleural space: Moderate-large left pleural effusion nodule left-sided   pleural thickening is suggestive of pleural-based metastatic disease.    Heart: Heart size within normal limits.  Mediastinum: Interval decrease in confluent mediastinal and hilar   lymphadenopathy.  Lymph nodes: Enlarged substernal and left pericardial and pericardial   mediastinal lymph nodes.  Bones/joints: Heterogeneous appearance of the visualized bones, with   areas of mottled sclerosis involving the sternum and left third rib,   suspicious for metastatic disease.      IMPRESSION:    Bilateral segmental lower lobe pulmonary emboli.    Bilateral groundglass, mosaic attenuation most pronounced in the right   lung, may reflect small airway versus small vessel disease.    Moderate left-sided pleural effusion with nodular pleural thickening   suggestive of pleural-based metastatic disease.    Enlarged retrosternal and alla and paracardiac lymph nodes.    Interval decrease in confluent mediastinal/hilar lymphadenopathy.    Findings suggestive of osseous metastatic disease.    See above final report, study was preliminary reported by [facility].      =========================  PROCEDURE INFORMATION:   Exam: CT Abdomen And Pelvis With Contrast   Exam date and time: 11/22/2019 9:25 PM   Age: 59 years old   Clinical history: Other: SOB; Shortness of breath. Metastatic lung cancer.    TECHNIQUE:   Imaging protocol: Computed tomography of the abdomen and pelvis with   intravenous contrast.     COMPARISON:   CT ANGIO CHEST WITHOUT AND OR WITH IV CONTRAST 2/5/2019 12:51 AM     FINDINGS:    There is diffuse nodular thickening along the pleural surface of the left   hemidiaphragm consistent with pleural-based metastatic disease.    Liver: Multiple varying sized low density lesions throughout liver,   largest 2.8   cm lateral segment left lobe.   Gallbladder and bile ducts: Mild layering hyperdensity within the   gallbladder   lumen.   Pancreas: Ovoid 3 x 4.5 cm hypodense area body of the pancreas.   Spleen: Normal. No splenomegaly.   Adrenals: Normal. No mass.   Kidneys and ureters: Normal. No hydronephrosis.   Stomach and bowel: Unremarkable. No obstruction. No mucosal thickening.   Appendix: No evidence of appendicitis.   Intraperitoneal space: Mild-moderate abdominal and pelvic free fluid.   Vasculature: Chronic atherosclerotic calcification of the vasculature.   Lymph nodes: Multiple areas enlarged lymph nodes: 3 cm bilateral   retrocrural,   3.6 left periaortic, 2-4 cm lina hepatis, 5 cm along the stomach.     Bladder: Unremarkable as visualized.   Reproductive: Prostate appears within normal limits.   Bones/joints: Chronic degenerative changes of the lumbar spine. Bilateral   pars   defects at L5 with grade 1 spondylolisthesis at L5-S1 level.   Soft tissues: Unremarkable.     IMPRESSION:   1. Possible pancreatic mass-neoplasm with multifocalhepatic metastasis   versus   pancreatic and hepatic metastasis.   2. Extensive upper abdominal and retroperitoneal lymphadenopathy as   described   above.   3. Mild-moderate abdominal and pelvic free fluid.   4. Suspected stones and/or debris in gallbladder. No evidence of   intrahepatic   or extrahepatic biliary ductal dilatation.   5. Bilateral pars defects at L5 with grade 1 spondylolisthesis at L5-S1   level.    See above final report, preliminary reported by [facility] radiology.                MANA VILLEGAS M.D., ATTENDING RADIOLOGIST  This document has been electronically signed. Nov 23 2019  9:13AM              < end of copied text >      EKG:  Marked artifact.  Needs to be repeated

## 2019-11-23 NOTE — CONSULT NOTE ADULT - ASSESSMENT
58 yo male with initial diagnosis of limited stage Small Cell Lung Cancer in 12/2018 treated with Carbo//16/Atezolizumab with concurrent RT with initial response, unfortunately with disease progression with brain mets noted in 8/2019 s/p WBRT and systemic disease progression noted on PET/CT in 9/2019 with evidence of intra-abdominal and bone and pleural disease, started on weekly taxol chemotherapy; presents now with weakness and dyspnea; found to have Pulmonary embolism and further disease progression with pleural effusion, bone lesions, liver lesions and pancreatic mass.    - agree with full dose lovenox 1mg/kg Q12 hours; can eventually transition to DOAC when patient closer to discharge  - for poor appetite, would start marinol 5mg BID  - overall prognosis is poor given rapid disease progression; ECOG performance status would need to improve prior to initiation of additional cytotoxic chemotherapy  - will follow with you  - case discussed with Dr. Metzger, Housestaff, Dietician

## 2019-11-23 NOTE — CHART NOTE - NSCHARTNOTEFT_GEN_A_CORE
-Patient with lactate 2.6 up from 2.5 in ED s/p 2.6 L    -Patient seen and examined at bedside    Physical Exam:  Vital Signs Last 24 Hrs  T(C): 36.4 (23 Nov 2019 00:09), Max: 36.4 (22 Nov 2019 23:01)  T(F): 97.6 (23 Nov 2019 00:09), Max: 97.6 (23 Nov 2019 00:09)  HR: 95 (23 Nov 2019 00:09) (81 - 98)  BP: 96/60 (23 Nov 2019 00:09) (86/58 - 110/59)  RR: 18 (23 Nov 2019 00:09) (18 - 22)  SpO2: 98% (23 Nov 2019 00:09) (92% - 98%)    General: Well developed, well nourished, NAD  HEENT: NCAT  Neck: Supple  Respiratory: Coarse breath sounds b/l same as prior, tachypneic  CV: RRR, +S1/S2, no murmurs, rubs or gallops  Abdominal: Soft, NT, ND +BSx4  Extremities: 3+ pitting edema in LE b/l below knees  Skin: warm, dry    A/P:  -59 M PMH COPD, HTN, LE edema Small cell lung cancer with mets to brain and bone on chemo presents to ED c/o SOB and cough for the past 7 days admitted for PNA.   -Patient with elevated lactate s/p 2.6 L NS in ED  -Nurse noted patient had lactate drawn prior to full 2.6L and had only received 2L when lactate obtained  -Patient with echo 1/2019, demonstrating normal EF but patient has received multilpe chest radiations since August and with history of pleural effusions  -Unclear if possible underlying cardiomyopathy, and will be cautious with fluid administration at this time  -Will Obtain CXR given tachypnea and compare to one on admission for further management of fluid administration with elevated lactate at this time  -RN to notify if any changes   -Will continue to monitor -Patient with lactate 2.6 up from 2.5 in ED s/p 2.5 L    -Patient seen and examined at bedside    Physical Exam:  Vital Signs Last 24 Hrs  T(C): 36.4 (23 Nov 2019 00:09), Max: 36.4 (22 Nov 2019 23:01)  T(F): 97.6 (23 Nov 2019 00:09), Max: 97.6 (23 Nov 2019 00:09)  HR: 95 (23 Nov 2019 00:09) (81 - 98)  BP: 96/60 (23 Nov 2019 00:09) (86/58 - 110/59)  RR: 18 (23 Nov 2019 00:09) (18 - 22)  SpO2: 98% (23 Nov 2019 00:09) (92% - 98%)    General: Well developed, well nourished, NAD  HEENT: NCAT  Neck: Supple  Respiratory: Coarse breath sounds b/l same as prior, tachypneic  CV: RRR, +S1/S2, no murmurs, rubs or gallops  Abdominal: Soft, NT, ND +BSx4  Extremities: 3+ pitting edema in LE b/l below knees  Skin: warm, dry    A/P:  -59 M PMH COPD, HTN, LE edema Small cell lung cancer with mets to brain and bone on chemo presents to ED c/o SOB and cough for the past 7 days admitted for PNA.   -Patient with elevated lactate s/p 2.5 L NS in ED  -Nurse noted patient had lactate drawn prior to full 2.6L and had only received 2L when lactate obtained  -Patient with echo 1/2019, demonstrating normal EF but patient has received multilpe chest radiations since August and with history of pleural effusions  -Unclear if possible underlying cardiomyopathy, and will be cautious with fluid administration at this time  -Will Obtain CXR given tachypnea and compare to one on admission for further management of fluid administration with elevated lactate at this time  -RN to notify if any changes   -Will continue to monitor Patient with lactate 2.6 up from 2.5 in ED s/p 2.5 L    -Patient seen and examined at bedside. Patient states he has been feeling short of breath since prior to admission. Denies worsening of shortness of breath since admission. Requests to sit in chair.     Physical Exam:  Vital Signs Last 24 Hrs  T(C): 36.4 (23 Nov 2019 00:09), Max: 36.4 (22 Nov 2019 23:01)  T(F): 97.6 (23 Nov 2019 00:09), Max: 97.6 (23 Nov 2019 00:09)  HR: 95 (23 Nov 2019 00:09) (81 - 98)  BP: 96/60 (23 Nov 2019 00:09) (86/58 - 110/59)  RR: 18 (23 Nov 2019 00:09) (18 - 22)  SpO2: 98% (23 Nov 2019 00:09) (92% - 98%)    General: Well developed, well nourished, NAD  HEENT: NCAT  Neck: Supple  Respiratory: Coarse breath sounds b/l same as prior, tachypneic  CV: RRR, +S1/S2, no murmurs, rubs or gallops  Abdominal: Soft, NT, ND +BSx4  Extremities: 3+ pitting edema in LE b/l below knees  Skin: warm, dry    A/P: 59 M PMH COPD, HTN, LE edema Small cell lung cancer with mets to brain and bone on chemo presents to ED c/o SOB and cough for the past 7 days admitted for PNA. Notified by RN that patient with elevated lactate s/p 2.5 L NS in ED    -Nurse noted patient had lactate drawn prior to full 2.6L and had only received 2L when lactate obtained  -Patient with echo 1/2019, demonstrating normal EF but patient has received multiple chest radiations since August and with history of pleural effusions  -Unclear if possible underlying cardiomyopathy, and will be cautious with fluid administration at this time  -Will Obtain CXR given tachypnea and compare to one on admission for further management of fluid administration with elevated lactate at this time  -RN to notify if any changes   -Will continue to monitor    ADDENDUM 2:43AM:   Spoke to [facility] regarding repeat chest xray. Increasing left pleural effusion, worsening right lung congestion and worsening pulmonary edema noted s/p IVF administration. Will hold off on further IVF at this time and repeat lactate in AM.

## 2019-11-23 NOTE — DIETITIAN INITIAL EVALUATION ADULT. - PROBLEM SELECTOR PLAN 6
-Continue digoxin  -Patient not on AC as he states "no one started me"  -Hold toprol and dilt given low BP

## 2019-11-23 NOTE — DIETITIAN INITIAL EVALUATION ADULT. - ADD RECOMMEND
1) Recommend continue regular diet + Ensure three times per day. 2) Suggest appetite stimulant (Marinol). Discussed with MD. 3) Encourage adequate intake. 4) Monitor weights, labs, intake, GI tolerance, skin integrity.

## 2019-11-23 NOTE — DIETITIAN INITIAL EVALUATION ADULT. - PROBLEM SELECTOR PLAN 2
-F/U Dr. Harris Recs  -Patient receiveds 30 mg PO morphine q12H  -Oxycodone  q6h PRN  -Will do 3mg IV morphine severe, 2, 1 for mod and mild  -senna, colace  -Mag oxide  -zofran 4mg IV q6H PRN

## 2019-11-23 NOTE — CHART NOTE - NSCHARTNOTEFT_GEN_A_CORE
Upon Nutritional Assessment by the Registered Dietitian your patient was determined to meet criteria / has evidence of the following diagnosis/diagnoses:          [ ]  Mild Protein Calorie Malnutrition        [ ]  Moderate Protein Calorie Malnutrition        [ x ] Severe Protein Calorie Malnutrition        [ ] Unspecified Protein Calorie Malnutrition        [ ] Underweight / BMI <19        [ ] Morbid Obesity / BMI > 40      Findings as based on:  •  Comprehensive nutrition assessment and consultation  •  Calorie counts (nutrient intake analysis)  •  Food acceptance and intake status from observations by staff  •  Follow up  •  Patient education    Nutrition focused physical exam completed with pt/spouse consent. Revealed severe muscle wasting (temporal, acromion, clavicle) and severe fat loss (orbital, tricep). +fluid likely masking further s/s of muscle and fat loss.   35.7% weight loss x 1 year.   <75% PO x >1 month.     Treatment:    The following diet has been recommended:  1) Recommend continue regular diet + Ensure three times per day.   2) Suggest appetite stimulant (Marinol). Discussed with MD.   3) Encourage adequate intake.   4) Monitor weights, labs, intake, GI tolerance, skin integrity.      PROVIDER Section:     By signing this assessment you are acknowledging and agree with the diagnosis/diagnoses assigned by the Registered Dietitian    Comments:

## 2019-11-23 NOTE — CONSULT NOTE ADULT - ASSESSMENT
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 He also has a history of a pericardial effusion s/p window, and of a pleural effusion s/p thoracentesis.  Now found to have b/l PE's    - Continue telemetry monitor  - CT reviewed.  No evidence of pericardial effusion  - Start full dose Lovenox at 1mg/kg bid  - Monitor for signs of acute blood loss both clinically and by trending H/H  - Send CE and BNP to assess for right heart strain  - Check echocardiogram  - Can d/c aspirin to minimize risk of bleeding as he is going to be on full dose anticoagulation  - Telemetry shows rate controlled AF.  Continue digoxin at current dose.  Check digoxin level  - Monitor and replete lytes  - TO follow closely with you

## 2019-11-23 NOTE — DIETITIAN INITIAL EVALUATION ADULT. - PROBLEM SELECTOR PLAN 1
-Patient presenting for SOB with history of 2 pleural effusions requiring drainage as well cardiac window  -CXR consistent with infiltrates and w small L pleural effusion  -Note, patient s/p 2.6L with pleural effusion and underlying SOB but no evidence of -CHF as of echo 1/2019 but will be cautious with further fluid administration given baseline edema  -F/U CTA chest, abdomen and pelvis  -F/U echo in a.m.

## 2019-11-23 NOTE — CHART NOTE - NSCHARTNOTEFT_GEN_A_CORE
Following up lactate signed out from this morning. Patient seen and examined at bedside, reports continued SOB, currently on 3L NC. Admits to SOB for the past week prompting ED evaluation. Denies chest pain, palpitations. Denies recent travel. Reports generalized weakness which he contributes to weekly chemotherapy, states he has been more sedentary. Admits to chronic lower extremity edema, but has improved over the past week. Admits to unintentional weight loss.     Vital Signs Last 24 Hrs  T(C): 36.7 (23 Nov 2019 08:16), Max: 36.7 (23 Nov 2019 08:16)  T(F): 98 (23 Nov 2019 08:16), Max: 98 (23 Nov 2019 08:16)  HR: 101 (23 Nov 2019 08:16) (81 - 101)  BP: 101/66 (23 Nov 2019 08:16) (86/58 - 110/59)  BP(mean): 66 (23 Nov 2019 08:16) (66 - 66)  RR: 19 (23 Nov 2019 08:16) (17 - 22)  SpO2: 95% (23 Nov 2019 08:16) (92% - 98%)    Physical Exam:  General: frail male in NAD on 3L NC  HEENT: NCAT, PERRL, EOMI bl  Neurology: A&Ox3, nonfocal   Respiratory: diminished breath sounds at left lung base, mild rhonchi bilaterally  CV: IRRR, no murmur   Extremities: 1+ pitting edema bilaterally     A/P: 59 M PMH COPD, HTN, LE edema Small cell lung cancer with mets to brain and bone on chemo presents to ED c/o SOB and cough.    1. SOB likely multifactorial - PE v worsening metastatic lung cancer  -Start Lovenox 80mg BID for PE  -PE likely provoked in the setting of hypercoagulable state with metastatic lung cancer. Patient with a fib and high CHADVASc score, consult cardio Dr. Abebe, as patient may need ?lifelong AC.   -Follow up BNP, cardiac enzymes, and TTE to assess for R heart strain  -Hypokalemic at 3.2, stat KCl 40mEq   -Lactate downtrending to 2.3, will hold fluids. No need to trend further  -RN to call for any changes, will continue to follow

## 2019-11-23 NOTE — DIETITIAN INITIAL EVALUATION ADULT. - OTHER INFO
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 p/w SOB. Spouse present upon visit. Pt/spouse report very low appetite/intake x 1 month PTA. Report pt consumes only 1-2 Ensure/day with Gatorade, no solid foods. Pt states he has no appetite and cannot tolerate even a small amount of food. Spouse states pt previously weighed 280 lb 1/2019, progressive weight loss since then. Current weight 180 lb. Nutrition focused physical exam completed with pt/spouse consent. Revealed severe muscle wasting (temporal, acromion, clavicle) and severe fat loss (orbital, tricep). +fluid possibly masking further s/s of muscle and fat loss. Pt currently receiving Ensure three times per day along with regular diet, however pt eating very little and has not consumed Ensure. Dietary preferences obtained and honored. Spoke with heme/oncology, recommended Marinol in setting of chronic low appetite. Pt states he "does not want to be tube fed." Pt is a 58 y/o M with PMH small cell lung cancer with mets to brain and bone on chemo, COPD, HTN, LE edema. p/w SOB. Spouse present upon visit. Pt/spouse report very low appetite/intake x 1 month PTA. Report pt consumes only 1-2 Ensure/day with Gatorade, no solid foods. Pt states he has no appetite and cannot tolerate even a small amount of food. Spouse states pt previously weighed 280 lb 1/2019, progressive weight loss since then. Current weight 180 lb. Nutrition focused physical exam completed with pt/spouse consent. Revealed severe muscle wasting (temporal, acromion, clavicle) and severe fat loss (orbital, tricep). +fluid likely masking further s/s of muscle and fat loss. Pt currently receiving Ensure three times per day along with regular diet, however pt eating very little and has not consumed Ensure. Dietary preferences obtained and honored. Spoke with heme/oncology, recommended Marinol in setting of chronic low appetite. Pt states he "does not want to be tube fed."

## 2019-11-23 NOTE — CONSULT NOTE ADULT - SUBJECTIVE AND OBJECTIVE BOX
Patient is a 59y old  Male who presents with a chief complaint of dyspnea and weakness(23 Nov 2019 10:56)      HPI:  59 M well known to our group with Small cell lung cancer initially diagnosed in 12/2018 and treated with Carbo/-16 and atezolizumab starting with cycle #4. He completed total of 6 cycles of therapy with chemotherapy and continued on atezolizumab but unfortunately by 8/2019 developed brain mets and underwent whole brain RT at that time. In September 2019 he was found to have evidence of disease progression with PET scan with FDG avid mass in PETER, right axill, pleural based nodules, intra-abdominal and retroperitoneal lymph nodes and bones including left femur and bilateral humerus bones. He was started again on treatment with Paclitaxel weekly in 9/2019 with multiple treatment delays and holds due to cytopenias. He presents to the ER with increased SOB and progressive weakness. Wife is at bedside and reports that over course of last several months, he has clinically declined dramatically.    In the ER CXR showed bilateral infiltrates and hypoxia, clinically improved with oxygen and IV steroid therapy. CT C/A/P showed bilateral pulmonary emboli and by 11/23/19 he was started on full dose anticoagulation with Lovenox.    Asked to see patient and further evaluate.    ROS:  Negative except for: progressive dyspnea, poor appetite, increased LE edema    PAST MEDICAL & SURGICAL HISTORY:  Lung cancer  Depression  Dyspnea  Mediastinal adenopathy  Pericardial effusion  AF (atrial fibrillation)  Hyponatremia  COPD (chronic obstructive pulmonary disease)  HTN (hypertension)  S/P pericardial window creation      SOCIAL HISTORY:  former smoker; quit 12/2018; 40 pack year history  denies ETOH use  previously worked as  for [employer]    FAMILY HISTORY:  No pertinent family history in first degree relatives      MEDICATIONS  (STANDING):  budesonide 160 MICROgram(s)/formoterol 4.5 MICROgram(s) Inhaler 2 Puff(s) Inhalation two times a day  chlorhexidine 2% Cloths 1 Application(s) Topical daily  digoxin     Tablet 0.25 milliGRAM(s) Oral daily  enoxaparin Injectable 80 milliGRAM(s) SubCutaneous two times a day  loratadine 10 milliGRAM(s) Oral daily  magnesium oxide 400 milliGRAM(s) Oral daily  methylPREDNISolone sodium succinate Injectable 40 milliGRAM(s) IV Push every 8 hours  multivitamin 1 Tablet(s) Oral daily  sodium chloride 1 Gram(s) Oral three times a day  tiotropium 18 MICROgram(s) Capsule 1 Capsule(s) Inhalation daily    MEDICATIONS  (PRN):  albuterol/ipratropium for Nebulization 3 milliLiter(s) Nebulizer every 4 hours PRN Shortness of Breath and/or Wheezing  guaiFENesin    Syrup 100 milliGRAM(s) Oral every 6 hours PRN Cough  morphine  - Injectable 3 milliGRAM(s) IV Push every 4 hours PRN Severe Pain (7 - 10)  morphine  - Injectable 2 milliGRAM(s) IV Push every 4 hours PRN Moderate Pain (4 - 6)  morphine  - Injectable 1 milliGRAM(s) IV Push every 4 hours PRN Mild Pain (1 - 3)  ondansetron Injectable 4 milliGRAM(s) IV Push every 6 hours PRN Nausea  senna 8.6 milliGRAM(s) Oral Tablet - Peds 1 Tablet(s) Oral at bedtime PRN for constipation      Allergies    penicillin (Unknown)    Intolerances        Vital Signs Last 24 Hrs  T(C): 36.2 (23 Nov 2019 12:23), Max: 36.7 (23 Nov 2019 08:16)  T(F): 97.2 (23 Nov 2019 12:23), Max: 98 (23 Nov 2019 08:16)  HR: 101 (23 Nov 2019 12:23) (81 - 101)  BP: 107/69 (23 Nov 2019 12:23) (86/58 - 110/59)  BP(mean): 66 (23 Nov 2019 08:16) (66 - 66)  RR: 18 (23 Nov 2019 12:23) (17 - 22)  SpO2: 99% (23 Nov 2019 12:23) (92% - 99%)    PHYSICAL EXAM  General: adult in NAD  HEENT: clear oropharynx, anicteric sclera, pink conjunctivae  Neck: supple  CV: normal S1S2 with no murmur rubs or gallops  Lungs: reduced breath sounds at bilateral bases; + infusaport in right chest   Abdomen: soft non-tender non-distended, no hepato/splenomegaly  Ext: pitting edema at ankles  Skin: no rashes and no petichiae  Neuro: alert and oriented X3 no focal deficits      LABS:    CBC Full  -  ( 23 Nov 2019 07:22 )  WBC Count : 3.58 K/uL  RBC Count : 3.23 M/uL  Hemoglobin : 8.6 g/dL  Hematocrit : 26.6 %  Platelet Count - Automated : 200 K/uL  Mean Cell Volume : 82.4 fl  Mean Cell Hemoglobin : 26.6 pg  Mean Cell Hemoglobin Concentration : 32.3 gm/dL      11-23    137  |  99  |  17  ----------------------------<  110<H>  3.2<L>   |  28  |  0.54    Ca    7.9<L>      23 Nov 2019 07:22    TPro  5.9<L>  /  Alb  2.7<L>  /  TBili  1.8<H>  /  DBili  x   /  AST  26  /  ALT  21  /  AlkPhos  89  11-23    PT/INR - ( 22 Nov 2019 19:37 )   PT: 18.8 sec;   INR: 1.64 ratio    PTT - ( 22 Nov 2019 19:37 )  PTT:27.1 sec    BLOOD SMEAR INTERPRETATION:  * RBC - normocytic, normochromic, + ovalocytes and elliptocytes  * WBC - neutrophils with normal morphology, small mature lymphs, no evidence of left shift  * Platelets - normal in morphology, increased number    RADIOLOGY :    CT Angio CHEST IMPRESSION:    Bilateral segmental lower lobe pulmonary emboli.  Bilateral groundglass, mosaic attenuation most pronounced in the right lung, may reflect small airway versus small vessel disease.  Moderate left-sided pleural effusion with nodular pleural thickening suggestive of pleural-based metastatic disease.  Enlarged retrosternal and alla and paracardiac lymph nodes.  Interval decrease in confluent mediastinal/hilar lymphadenopathy.  Findings suggestive of osseous metastatic disease.    CT ABD/PLV IMPRESSION:     1. Possible pancreatic mass-neoplasm with multifocalhepatic metastasis versus pancreatic and hepatic metastasis.   2. Extensive upper abdominal and retroperitoneal lymphadenopathy as described above.   3. Mild-moderate abdominal and pelvic free fluid.   4. Suspected stones and/or debris in gallbladder. No evidence of intrahepatic or extrahepatic biliary ductal dilatation.   5. Bilateral pars defects at L5 with grade 1 spondylolisthesis at L5-S1 level.

## 2019-11-23 NOTE — CONSULT NOTE ADULT - CONSULT REASON
Small Cell Lung Cancer with malignant effusion and recurrence with brain mets - C34.82, C34.81, C79.31, C78.2, J91.0

## 2019-11-24 LAB
ANION GAP SERPL CALC-SCNC: 15 MMOL/L — SIGNIFICANT CHANGE UP (ref 5–17)
BUN SERPL-MCNC: 18 MG/DL — SIGNIFICANT CHANGE UP (ref 7–23)
CALCIUM SERPL-MCNC: 8.5 MG/DL — SIGNIFICANT CHANGE UP (ref 8.5–10.1)
CHLORIDE SERPL-SCNC: 99 MMOL/L — SIGNIFICANT CHANGE UP (ref 96–108)
CO2 SERPL-SCNC: 23 MMOL/L — SIGNIFICANT CHANGE UP (ref 22–31)
CREAT SERPL-MCNC: 0.93 MG/DL — SIGNIFICANT CHANGE UP (ref 0.5–1.3)
CULTURE RESULTS: NO GROWTH — SIGNIFICANT CHANGE UP
GLUCOSE SERPL-MCNC: 162 MG/DL — HIGH (ref 70–99)
HCT VFR BLD CALC: 27.8 % — LOW (ref 39–50)
HGB BLD-MCNC: 8.9 G/DL — LOW (ref 13–17)
MCHC RBC-ENTMCNC: 26.8 PG — LOW (ref 27–34)
MCHC RBC-ENTMCNC: 32 GM/DL — SIGNIFICANT CHANGE UP (ref 32–36)
MCV RBC AUTO: 83.7 FL — SIGNIFICANT CHANGE UP (ref 80–100)
NRBC # BLD: 0 /100 WBCS — SIGNIFICANT CHANGE UP (ref 0–0)
PLATELET # BLD AUTO: 228 K/UL — SIGNIFICANT CHANGE UP (ref 150–400)
POTASSIUM SERPL-MCNC: 3 MMOL/L — LOW (ref 3.5–5.3)
POTASSIUM SERPL-SCNC: 3 MMOL/L — LOW (ref 3.5–5.3)
RBC # BLD: 3.32 M/UL — LOW (ref 4.2–5.8)
RBC # FLD: 16.8 % — HIGH (ref 10.3–14.5)
SODIUM SERPL-SCNC: 137 MMOL/L — SIGNIFICANT CHANGE UP (ref 135–145)
SPECIMEN SOURCE: SIGNIFICANT CHANGE UP
WBC # BLD: 4.59 K/UL — SIGNIFICANT CHANGE UP (ref 3.8–10.5)
WBC # FLD AUTO: 4.59 K/UL — SIGNIFICANT CHANGE UP (ref 3.8–10.5)

## 2019-11-24 PROCEDURE — 99233 SBSQ HOSP IP/OBS HIGH 50: CPT

## 2019-11-24 RX ORDER — CHLORHEXIDINE GLUCONATE 213 G/1000ML
1 SOLUTION TOPICAL DAILY
Refills: 0 | Status: DISCONTINUED | OUTPATIENT
Start: 2019-11-24 | End: 2019-12-06

## 2019-11-24 RX ORDER — IPRATROPIUM/ALBUTEROL SULFATE 18-103MCG
3 AEROSOL WITH ADAPTER (GRAM) INHALATION EVERY 6 HOURS
Refills: 0 | Status: DISCONTINUED | OUTPATIENT
Start: 2019-11-24 | End: 2019-11-27

## 2019-11-24 RX ORDER — POTASSIUM CHLORIDE 20 MEQ
40 PACKET (EA) ORAL ONCE
Refills: 0 | Status: COMPLETED | OUTPATIENT
Start: 2019-11-24 | End: 2019-11-24

## 2019-11-24 RX ADMIN — SODIUM CHLORIDE 1 GRAM(S): 9 INJECTION INTRAMUSCULAR; INTRAVENOUS; SUBCUTANEOUS at 22:20

## 2019-11-24 RX ADMIN — LORATADINE 10 MILLIGRAM(S): 10 TABLET ORAL at 11:50

## 2019-11-24 RX ADMIN — CHLORHEXIDINE GLUCONATE 1 APPLICATION(S): 213 SOLUTION TOPICAL at 11:52

## 2019-11-24 RX ADMIN — Medication 40 MILLIEQUIVALENT(S): at 11:50

## 2019-11-24 RX ADMIN — CHLORHEXIDINE GLUCONATE 1 APPLICATION(S): 213 SOLUTION TOPICAL at 13:56

## 2019-11-24 RX ADMIN — Medication 1 TABLET(S): at 11:50

## 2019-11-24 RX ADMIN — BUDESONIDE AND FORMOTEROL FUMARATE DIHYDRATE 2 PUFF(S): 160; 4.5 AEROSOL RESPIRATORY (INHALATION) at 05:12

## 2019-11-24 RX ADMIN — Medication 40 MILLIGRAM(S): at 05:10

## 2019-11-24 RX ADMIN — Medication 5 MILLIGRAM(S): at 17:16

## 2019-11-24 RX ADMIN — ENOXAPARIN SODIUM 80 MILLIGRAM(S): 100 INJECTION SUBCUTANEOUS at 22:19

## 2019-11-24 RX ADMIN — MAGNESIUM OXIDE 400 MG ORAL TABLET 400 MILLIGRAM(S): 241.3 TABLET ORAL at 11:50

## 2019-11-24 RX ADMIN — Medication 0.25 MILLIGRAM(S): at 05:10

## 2019-11-24 RX ADMIN — ENOXAPARIN SODIUM 80 MILLIGRAM(S): 100 INJECTION SUBCUTANEOUS at 09:58

## 2019-11-24 RX ADMIN — BUDESONIDE AND FORMOTEROL FUMARATE DIHYDRATE 2 PUFF(S): 160; 4.5 AEROSOL RESPIRATORY (INHALATION) at 17:18

## 2019-11-24 RX ADMIN — SODIUM CHLORIDE 1 GRAM(S): 9 INJECTION INTRAMUSCULAR; INTRAVENOUS; SUBCUTANEOUS at 05:10

## 2019-11-24 RX ADMIN — Medication 5 MILLIGRAM(S): at 05:10

## 2019-11-24 RX ADMIN — SODIUM CHLORIDE 1 GRAM(S): 9 INJECTION INTRAMUSCULAR; INTRAVENOUS; SUBCUTANEOUS at 14:01

## 2019-11-24 RX ADMIN — TIOTROPIUM BROMIDE 1 CAPSULE(S): 18 CAPSULE ORAL; RESPIRATORY (INHALATION) at 07:21

## 2019-11-24 RX ADMIN — Medication 40 MILLIGRAM(S): at 14:02

## 2019-11-24 RX ADMIN — Medication 3 MILLILITER(S): at 03:20

## 2019-11-24 RX ADMIN — Medication 40 MILLIGRAM(S): at 22:19

## 2019-11-24 NOTE — PROGRESS NOTE ADULT - SUBJECTIVE AND OBJECTIVE BOX
Patient seen and examined;  Chart reviewed and events noted;   feeling better; breathing is easier; appetite still poor    MEDICATIONS  (STANDING):  budesonide 160 MICROgram(s)/formoterol 4.5 MICROgram(s) Inhaler 2 Puff(s) Inhalation two times a day  chlorhexidine 2% Cloths 1 Application(s) Topical daily  digoxin     Tablet 0.25 milliGRAM(s) Oral daily  dronabinol 5 milliGRAM(s) Oral every 12 hours  enoxaparin Injectable 80 milliGRAM(s) SubCutaneous two times a day  loratadine 10 milliGRAM(s) Oral daily  magnesium oxide 400 milliGRAM(s) Oral daily  methylPREDNISolone sodium succinate Injectable 40 milliGRAM(s) IV Push every 8 hours  multivitamin 1 Tablet(s) Oral daily  potassium chloride    Tablet ER 40 milliEquivalent(s) Oral once  sodium chloride 1 Gram(s) Oral three times a day  tiotropium 18 MICROgram(s) Capsule 1 Capsule(s) Inhalation daily    MEDICATIONS  (PRN):  albuterol/ipratropium for Nebulization 3 milliLiter(s) Nebulizer every 4 hours PRN Shortness of Breath and/or Wheezing  guaiFENesin    Syrup 100 milliGRAM(s) Oral every 6 hours PRN Cough  morphine  - Injectable 3 milliGRAM(s) IV Push every 4 hours PRN Severe Pain (7 - 10)  morphine  - Injectable 2 milliGRAM(s) IV Push every 4 hours PRN Moderate Pain (4 - 6)  morphine  - Injectable 1 milliGRAM(s) IV Push every 4 hours PRN Mild Pain (1 - 3)  ondansetron Injectable 4 milliGRAM(s) IV Push every 6 hours PRN Nausea  senna 8.6 milliGRAM(s) Oral Tablet - Peds 1 Tablet(s) Oral at bedtime PRN for constipation      Vital Signs Last 24 Hrs  T(C): 36.4 (24 Nov 2019 08:07), Max: 36.7 (24 Nov 2019 00:39)  T(F): 97.6 (24 Nov 2019 08:07), Max: 98.1 (24 Nov 2019 00:39)  HR: 112 (24 Nov 2019 08:07) (77 - 112)  BP: 107/67 (24 Nov 2019 08:07) (105/69 - 118/75)  BP(mean): --  RR: 18 (24 Nov 2019 08:07) (18 - 18)  SpO2: 93% (24 Nov 2019 08:07) (93% - 100%)    PHYSICAL EXAM  General: adult in NAD  HEENT: clear oropharynx, anicteric sclera, pink conjunctivae; + alopecia  Neck: supple  CV: normal S1S2 with no murmur rubs or gallops  Lungs: clear to auscultation, no wheezes, no rhales  Abdomen: soft non-tender non-distended, no hepato/splenomegaly  Ext: + edema at ankles  Skin: no rashes and no petichiae  Neuro: alert and oriented X3 no focal deficits      LABS:                        8.9    4.59  )-----------( 228      ( 24 Nov 2019 06:09 )             27.8     Hemoglobin: 8.9 g/dL (11-24 @ 06:09)  Hemoglobin: 8.6 g/dL (11-23 @ 07:22)  Hemoglobin: 9.2 g/dL (11-22 @ 19:37)    11-24    137  |  99  |  18  ----------------------------<  162<H>  3.0<L>   |  23  |  0.93    Ca    8.5      24 Nov 2019 06:09    TPro  5.9<L>  /  Alb  2.7<L>  /  TBili  1.8<H>  /  DBili  x   /  AST  26  /  ALT  21  /  AlkPhos  89  11-23    PT/INR - ( 22 Nov 2019 19:37 )   PT: 18.8 sec;   INR: 1.64 ratio    PTT - ( 22 Nov 2019 19:37 )  PTT:27.1 sec

## 2019-11-24 NOTE — PROGRESS NOTE ADULT - ATTENDING COMMENTS
all consults appreciated  pt with metastatic small cell lung ca.  wit pna vs pleural effusion  b/l pulm emboli  continue full dose aAC.  dr fabi cisneros has seen the patient yesterday and will leave a note .  continue abx and steroids  prognosis is gaurded.

## 2019-11-24 NOTE — PROGRESS NOTE ADULT - ASSESSMENT
Steph Pradhan DNP, NP-C  Cardiology   Spectra #[phone] M PMH atrial fibrillation, COPD, HTN, LE edema 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 He also has a history of a pericardial effusion s/p window, and of a pleural effusion s/p thoracentesis.  Now found to have b/l PE's    PE  - CTA chest showed segmental PE's in B/L lower lobe   - He is at high risk of embolic events  - He is now on full dose Lovenox.  Heme following, to switch to DOAC per recs  - Monitor for s/s bleeding  - Pro-BNP is mildly elevated (>1000) and Her troponin is negative  - TTE showed hyperdynamic LV, normal RVSF with no significant valvular disease.  However, showed large left pleural effusion  - CT chest showed moderate left pleural effusion.  Recommend Pulm eval  - Can keep off of aspirin to minimize risk of bleeding as he is going to be on full dose anticoagulation  - Avoid diuresing patient in the setting of his hyperdynamic LV and B/L PE's to avoid hemodynamic collapse    Chronic Afib  - Remains in Afib with RVR, mostly during activity.  This is likely reactive to his lung Ca and PE as well as his hypokalemia  - Continue digoxin at current dose.  Check digoxin level in am (ordered)  - Monitor and replete lytes.  Please replete aggressively  - He is now on full dose Lovenox to be switched to DOAC.  Monitor for s/s bleeding    HTN  - BP improved wit 's  - No anti-HTN needed    Further cardiac w/u pending clinical course  To follow closely with you    Steph Pradhan DNP, NP-C  Cardiology   Spectra #[phone] M PMH atrial fibrillation, COPD, HTN, LE edema 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 He also has a history of a pericardial effusion s/p window, and of a pleural effusion s/p thoracentesis.  Now found to have b/l PE's    PE  - CTA chest showed segmental PE's in B/L lower lobe   - He is at high risk of embolic events  - He is now on full dose Lovenox.  Heme following, to switch to DOAC per recs  - Monitor for s/s bleeding  - Pro-BNP is mildly elevated (>1000) and his troponin is negative  - TTE showed hyperdynamic LV, normal RVSF with no significant valvular disease.  However, showed large left pleural effusion  - CT chest showed moderate left pleural effusion.  Recommend Pulm eval  - Can keep off of aspirin to minimize risk of bleeding as he is going to be on full dose anticoagulation  - Avoid diuresing patient in the setting of his hyperdynamic LV and B/L PE's to avoid hemodynamic collapse    Chronic Afib  - Remains in Afib with RVR, mostly during activity.  This is likely reactive to his lung Ca and PE as well as his hypokalemia  - Continue digoxin at current dose.  Check digoxin level in am (ordered)  - Monitor and replete lytes.  Please replete aggressively  - He is now on full dose Lovenox to be switched to DOAC.  Monitor for s/s bleeding    HTN  - BP improved wit 's  - No anti-HTN needed    Further cardiac w/u pending clinical course  To follow closely with you    Steph Pradhan DNP, NP-C  Cardiology   Spectra #[phone]

## 2019-11-24 NOTE — PROGRESS NOTE ADULT - SUBJECTIVE AND OBJECTIVE BOX
Patient is a 59y old  Male who presents with a chief complaint of PNA (2019 13:53)      INTERVAL HPI/OVERNIGHT EVENTS:    More alert today. Admits to shortness of breath    MEDICATIONS  (STANDING):  budesonide 160 MICROgram(s)/formoterol 4.5 MICROgram(s) Inhaler 2 Puff(s) Inhalation two times a day  chlorhexidine 4% Liquid 1 Application(s) Topical daily  digoxin     Tablet 0.25 milliGRAM(s) Oral daily  dronabinol 5 milliGRAM(s) Oral every 12 hours  enoxaparin Injectable 80 milliGRAM(s) SubCutaneous two times a day  loratadine 10 milliGRAM(s) Oral daily  magnesium oxide 400 milliGRAM(s) Oral daily  methylPREDNISolone sodium succinate Injectable 40 milliGRAM(s) IV Push every 8 hours  multivitamin 1 Tablet(s) Oral daily  sodium chloride 1 Gram(s) Oral three times a day  tiotropium 18 MICROgram(s) Capsule 1 Capsule(s) Inhalation daily      MEDICATIONS  (PRN):  albuterol/ipratropium for Nebulization 3 milliLiter(s) Nebulizer every 4 hours PRN Shortness of Breath and/or Wheezing  guaiFENesin    Syrup 100 milliGRAM(s) Oral every 6 hours PRN Cough  morphine  - Injectable 3 milliGRAM(s) IV Push every 4 hours PRN Severe Pain (7 - 10)  morphine  - Injectable 2 milliGRAM(s) IV Push every 4 hours PRN Moderate Pain (4 - 6)  morphine  - Injectable 1 milliGRAM(s) IV Push every 4 hours PRN Mild Pain (1 - 3)  ondansetron Injectable 4 milliGRAM(s) IV Push every 6 hours PRN Nausea  senna 8.6 milliGRAM(s) Oral Tablet - Peds 1 Tablet(s) Oral at bedtime PRN for constipation      Allergies    penicillin (Unknown)    Intolerances        PAST MEDICAL & SURGICAL HISTORY:  Lung cancer  Depression  Dyspnea  Mediastinal adenopathy  Pericardial effusion  AF (atrial fibrillation)  Hyponatremia  COPD (chronic obstructive pulmonary disease)  HTN (hypertension)  S/P pericardial window creation      Vital Signs Last 24 Hrs  T(C): 36.4 (2019 19:19), Max: 36.7 (2019 00:39)  T(F): 97.5 (2019 19:19), Max: 98.1 (2019 00:39)  HR: 103 (2019 19:19) (77 - 112)  BP: 104/6 (2019 19:19) (104/6 - 118/75)  BP(mean): --  RR: 19 (2019 19:19) (18 - 19)  SpO2: 95% (2019 19:19) (93% - 100%)    PHYSICAL EXAMINATION:    GENERAL: The patient is awake and alert in no apparent distress.     HEENT: Head is normocephalic and atraumatic. Extraocular muscles are intact. Mucous membranes are moist.    NECK: Supple.    LUNGS: bilateral wheezes with prolonged expiration    HEART: Regular rate and rhythm without murmur.    ABDOMEN: Soft, nontender, and nondistended.      EXTREMITIES: Without any cyanosis, clubbing, rash, lesions or edema.    NEUROLOGIC: Grossly intact.    SKIN: No ulceration or induration present.      LABS:                        8.9    4.59  )-----------( 228      ( 2019 06:09 )             27.8         137  |  99  |  18  ----------------------------<  162<H>  3.0<L>   |  23  |  0.93    Ca    8.5      2019 06:09    TPro  5.9<L>  /  Alb  2.7<L>  /  TBili  1.8<H>  /  DBili  x   /  AST  26  /  ALT  21  /  AlkPhos  89        Urinalysis Basic - ( 2019 23:19 )    Color: Yellow / Appearance: Clear / S.005 / pH: x  Gluc: x / Ketone: Small  / Bili: Small / Urobili: 8   Blood: x / Protein: 25 mg/dL / Nitrite: Negative   Leuk Esterase: Negative / RBC: 0-2 /HPF / WBC 0-2   Sq Epi: x / Non Sq Epi: x / Bacteria: x        CARDIAC MARKERS ( 2019 07:22 )  <.015 ng/mL / x     / 110 U/L / x     / 4.9 ng/mL        Serum Pro-Brain Natriuretic Peptide: 1245 pg/mL (19 @ 07:22)  Serum Pro-Brain Natriuretic Peptide: 1397 pg/mL (19 @ 19:37)          MICROBIOLOGY:  Culture Results:   No growth ( @ 11:14)  Culture Results:   No growth to date. ( @ 23:09)  Culture Results:   No growth to date. ( @ 23:09)      RADIOLOGY & ADDITIONAL STUDIES:    Assessment:    Small cell carcinoma of lung with progression of disease  Acute Hypoxic respiratory failure  COPD with exacerbation  Pulmonary emboli    Plan:    Continue oxygen + anticoagulation  Duoneb 4 times daily  Continue IV steroids  Will discuss findings with the patient's wife by phone

## 2019-11-24 NOTE — PROGRESS NOTE ADULT - SUBJECTIVE AND OBJECTIVE BOX
[facility] -- Gurpreet Bush, Corey Diaz, Sajan Abebe Savella, [place]  Office # [phone]    Follow Up:  Dyspnea    Subjective/Observations:     REVIEW OF SYSTEMS: All other review of systems is negative unless indicated above  PAST MEDICAL & SURGICAL HISTORY:  Lung cancer  Depression  Dyspnea  Mediastinal adenopathy  Pericardial effusion  AF (atrial fibrillation)  Hyponatremia  COPD (chronic obstructive pulmonary disease)  HTN (hypertension)  S/P pericardial window creation    MEDICATIONS  (STANDING):  budesonide 160 MICROgram(s)/formoterol 4.5 MICROgram(s) Inhaler 2 Puff(s) Inhalation two times a day  digoxin     Tablet 0.25 milliGRAM(s) Oral daily  dronabinol 5 milliGRAM(s) Oral every 12 hours  enoxaparin Injectable 80 milliGRAM(s) SubCutaneous two times a day  loratadine 10 milliGRAM(s) Oral daily  magnesium oxide 400 milliGRAM(s) Oral daily  methylPREDNISolone sodium succinate Injectable 40 milliGRAM(s) IV Push every 8 hours  multivitamin 1 Tablet(s) Oral daily  sodium chloride 1 Gram(s) Oral three times a day  tiotropium 18 MICROgram(s) Capsule 1 Capsule(s) Inhalation daily    MEDICATIONS  (PRN):  albuterol/ipratropium for Nebulization 3 milliLiter(s) Nebulizer every 4 hours PRN Shortness of Breath and/or Wheezing  guaiFENesin    Syrup 100 milliGRAM(s) Oral every 6 hours PRN Cough  morphine  - Injectable 3 milliGRAM(s) IV Push every 4 hours PRN Severe Pain (7 - 10)  morphine  - Injectable 2 milliGRAM(s) IV Push every 4 hours PRN Moderate Pain (4 - 6)  morphine  - Injectable 1 milliGRAM(s) IV Push every 4 hours PRN Mild Pain (1 - 3)  ondansetron Injectable 4 milliGRAM(s) IV Push every 6 hours PRN Nausea  senna 8.6 milliGRAM(s) Oral Tablet - Peds 1 Tablet(s) Oral at bedtime PRN for constipation    Allergies    penicillin (Unknown)    Intolerances      Vital Signs Last 24 Hrs  T(C): 36.4 (24 Nov 2019 08:07), Max: 36.7 (24 Nov 2019 00:39)  T(F): 97.6 (24 Nov 2019 08:07), Max: 98.1 (24 Nov 2019 00:39)  HR: 112 (24 Nov 2019 08:07) (77 - 112)  BP: 107/67 (24 Nov 2019 08:07) (105/69 - 118/75)  BP(mean): --  RR: 18 (24 Nov 2019 08:07) (18 - 18)  SpO2: 93% (24 Nov 2019 08:07) (93% - 100%)  I&O's Summary    23 Nov 2019 07:01  -  24 Nov 2019 07:00  --------------------------------------------------------  IN: 0 mL / OUT: 350 mL / NET: -350 mL        PHYSICAL EXAM:  TELE:   Constitutional: NAD, awake and alert, well-developed  HEENT: Moist Mucous Membranes, Anicteric  Pulmonary: Non-labored, breath sounds are clear bilaterally, No wheezing, rales or rhonchi  Cardiovascular: Regular, S1 and S2, No murmurs, rubs, gallops or clicks  Gastrointestinal: Bowel Sounds present, soft, nontender.   Lymph: No peripheral edema. No lymphadenopathy.  Skin: No visible rashes or ulcers.  Psych:  Mood & affect appropriate  LABS: All Labs Reviewed:                        8.9    4.59  )-----------( 228      ( 24 Nov 2019 06:09 )             27.8                         8.6    3.58  )-----------( 200      ( 23 Nov 2019 07:22 )             26.6                         9.2    4.24  )-----------( 224      ( 22 Nov 2019 19:37 )             27.6     24 Nov 2019 06:09    137    |  99     |  18     ----------------------------<  162    3.0     |  23     |  0.93   23 Nov 2019 07:22    137    |  99     |  17     ----------------------------<  110    3.2     |  28     |  0.54   22 Nov 2019 19:37    135    |  97     |  25     ----------------------------<  134    3.0     |  26     |  0.57     Ca    8.5        24 Nov 2019 06:09  Ca    7.9        23 Nov 2019 07:22  Ca    8.2        22 Nov 2019 19:37    TPro  5.9    /  Alb  2.7    /  TBili  1.8    /  DBili  x      /  AST  26     /  ALT  21     /  AlkPhos  89     23 Nov 2019 07:22  TPro  5.9    /  Alb  2.8    /  TBili  1.9    /  DBili  x      /  AST  30     /  ALT  21     /  AlkPhos  86     22 Nov 2019 19:37    PT/INR - ( 22 Nov 2019 19:37 )   PT: 18.8 sec;   INR: 1.64 ratio     PTT - ( 22 Nov 2019 19:37 )  PTT:27.1 sec  CARDIAC MARKERS ( 23 Nov 2019 07:22 )  <.015 ng/mL / x     / 110 U/L / x     / 4.9 ng/mL    < from: TTE Echo Doppler w/o Cont (11.23.19 @ 15:29) >     EXAM:  ECHO TTE WO CON COMP W DOPPLR      PROCEDURE DATE:  11/23/2019      INTERPRETATION:  Ordering Physician: DOMINIC SERRATO [id number]    Indication: Pulmonary embolism  Technologist Initials: DL  Study Quality: Technically difficult and limited   A complete echocardiographic study was performed utilizing standard   protocol including spectral and color Doppler in all echocardiographic   windows.    Height: 183 cm  Weight: 82 kg  BSA: 2.0 sq m  Blood Pressure: 101/66    MEASUREMENTS  IVS: 1.2cm  PWT: 1.1cm  LA: 3.0cm  AO: 3.8cm  LVIDd: 3.7cm  LVIDs: 2.0cm    LVEF: 75%    FINDINGS  Left Ventricle: Hyperdynamic left ventricular systolic function.  Aortic Valve: The aortic valve is not well-visualized. It appears   calcified. No significant aortic insufficiency.  Mitral Valve: Mitral annular calcification calcified mitral valve   leaflets. Mild mitral insufficiency.  Tricuspid Valve: Not well visualized  Pulmonic Valve: Not well visualized  Left Atrium: Grossly normal  Right Ventricle: The right ventricle is not well visualized. Grossly,   normal right ventricular size and systolic function.  Right Atrium: Grossly normal    Pericardium/Pleura: Large left pleural effusion.  Echogenic material is   noted in the pleural space.    CONCLUSIONS:  Technically difficult and limited study.  1. Hyperdynamic left ventricular systolic function.  2. Mitral annular calcification and calcified mitral valve leaflets with   mild mitral insufficiency.  3. Grossly normal left atrial size.  4. The right ventricle is not well visualized. Grossly, normal right   ventricular size and systolic function.  5.  Large left pleural effusion which echogenic material noted in the   pleural space.    QAMAR ECHEVARRIA M.D., ATTENDING CARDIOLOGIST  This document has been electronically signed. Nov 24 2019  7:29AM      < end of copied text >    < from: Xray Chest 1 View- PORTABLE-Urgent (11.23.19 @ 02:19) >    EXAM:  XR CHEST PORTABLE URGENT 1V                            PROCEDURE DATE:  11/23/2019      INTERPRETATION:  INDICATION: Tachypnea    PRIORS: 11/22/2019.    VIEWS: Portable AP radiography of the chest performed.    FINDINGS: Heart size cannot be quantitated on this portable radiograph.   No definite superior mediastinal widening is identified. A right-sided   IVAD is identified. Bilateral perihilar opacities are without significant   change; underlying mass lesion/hilar lymphadenopathy cannot be excluded.   Stable linear scarlike opacity identified within the left upper lung   field. Left pleural effusion appears unchanged. No right pleural   effusion. No pneumothorax demonstrated. No mediastinal shift is noted. No   acute osseous fractures are noted.    IMPRESSION: No significant interval change. See above discussion.    ISAC MELVIN   This document has been electronically signed. Nov 23 2019  8:36AM     < end of copied text >    < from: CT Angio Chest w/ IV Cont (11.22.19 @ 21:41) >    EXAM:  CT ABDOMEN AND PELVIS IC                          EXAM:  CT ANGIO CHEST (W)AW IC                            PROCEDURE DATE:  11/22/2019          INTERPRETATION:        Addendum created by Jorgito Manuel MD on 11/22/2019 10:59:51 PM EST     THIS REPORT CONTAINS FINDINGS THAT MAY BE CRITICAL TO PATIENT CARE. The   findings were verbally communicated via telephone conference with JUANJO Quinones at 10:50 PM EST on 11/22/2019. The findings were acknowledged and   understood.        --Patient has history of known lung carcinoma with metastasis.    Initial report created on 11/22/2019 10:59:12 PM EST     PROCEDURE INFORMATION:   Exam: CT Angiography Chest With Contrast   Exam date and time: 11/22/2019 9:25 PM   Age: 59 years old   Clinical history: SOB; Shortness of breath     TECHNIQUE:   Imaging protocol: Computed tomographic angiography of the chest with   intravenous contrast.   3D rendering: MIP reconstructed images were created and reviewed.     COMPARISON:   CT ANGIO CHEST WITHOUT AND OR WITH IV CONTRAST 2/5/2019 12:51 AM     FINDINGS:   Pulmonary arteries: Adequate contrast enhancement of the pulmonary   arteries.   Abnormal filling defects -emboli within bilateral lower lobe segmental   pulmonary arterial vessels.  Aorta: No evidence of thoracic aortic dissection. Chronic atherosclerotic   calcification of the vasculature.   Lungs:    Bilateral groundglass mosaic attenuation, most pronounced in the right   upper and lower lung zones.  Thick-walled left apicalbleb.  Paraseptal emphysematous changes lung apices.  Subpleural areas of scarring/fibrosis left upper and superior segment   left lower lobes.    Pleural space: Moderate-large left pleural effusion nodule left-sided   pleural thickening is suggestive of pleural-based metastatic disease.    Heart: Heart size within normal limits.  Mediastinum: Interval decrease in confluent mediastinal and hilar   lymphadenopathy.  Lymph nodes: Enlarged substernal and left pericardial and pericardial   mediastinal lymph nodes.  Bones/joints: Heterogeneous appearance of the visualized bones, with   areas of mottled sclerosis involving the sternum and left third rib,   suspicious for metastatic disease.      IMPRESSION:    Bilateral segmental lower lobe pulmonary emboli.    Bilateral groundglass, mosaic attenuation most pronounced in the right   lung, may reflect small airway versus small vessel disease.    Moderate left-sided pleural effusion with nodular pleural thickening   suggestive of pleural-based metastatic disease.    Enlarged retrosternal and alla and paracardiac lymph nodes.    Interval decrease in confluent mediastinal/hilar lymphadenopathy.    Findings suggestive of osseous metastatic disease.    See above final report, study was preliminary reported by [facility].      =========================  PROCEDURE INFORMATION:   Exam: CT Abdomen And Pelvis With Contrast   Exam date and time: 11/22/2019 9:25 PM   Age: 59 years old   Clinical history: Other: SOB; Shortness of breath. Metastatic lung cancer.    TECHNIQUE:   Imaging protocol: Computed tomography of the abdomen and pelvis with   intravenous contrast.     COMPARISON:   CT ANGIO CHEST WITHOUT AND OR WITH IV CONTRAST 2/5/2019 12:51 AM     FINDINGS:    There is diffuse nodular thickening along the pleural surface of the left   hemidiaphragm consistent with pleural-based metastatic disease.    Liver: Multiple varying sized low density lesions throughout liver,   largest 2.8   cm lateral segment left lobe.   Gallbladder and bile ducts: Mild layering hyperdensity within the   gallbladder   lumen.   Pancreas: Ovoid 3 x 4.5 cm hypodense area body of the pancreas.   Spleen: Normal. No splenomegaly.   Adrenals: Normal. No mass.   Kidneys and ureters: Normal. No hydronephrosis.   Stomach and bowel: Unremarkable. No obstruction. No mucosal thickening.   Appendix: No evidence of appendicitis.   Intraperitoneal space: Mild-moderate abdominal and pelvic free fluid.   Vasculature: Chronic atherosclerotic calcification of the vasculature.   Lymph nodes: Multiple areas enlarged lymph nodes: 3 cm bilateral   retrocrural,   3.6 left periaortic, 2-4 cm lina hepatis, 5 cm along the stomach.     Bladder: Unremarkable as visualized.   Reproductive: Prostate appears within normal limits.   Bones/joints: Chronic degenerative changes of the lumbar spine. Bilateral   pars   defects at L5 with grade 1 spondylolisthesis at L5-S1 level.   Soft tissues: Unremarkable.     IMPRESSION:   1. Possible pancreatic mass-neoplasm with multifocalhepatic metastasis   versus   pancreatic and hepatic metastasis.   2. Extensive upper abdominal and retroperitoneal lymphadenopathy as   described   above.   3. Mild-moderate abdominal and pelvic free fluid.   4. Suspected stones and/or debris in gallbladder. No evidence of   intrahepatic   or extrahepatic biliary ductal dilatation.   5. Bilateral pars defects at L5 with grade 1 spondylolisthesis at L5-S1   level.    See above final report, preliminary reported by [facility].    MANA VILLEGAS M.D., ATTENDING RADIOLOGIST  This document has been electronically signed. Nov 23 2019  9:13AM      < end of copied text > [facility] -- Gurpreet Bush, Corey Diaz, Sajan Abebe Savella, Goodger  Office # [phone]    Follow Up:  Dyspnea    Subjective/Observations: Seen and evaluated, sitting on the chair with NC.  Admits to be breathing better, however, still dyspneic during conversation.  Denies any CP or palpitations    REVIEW OF SYSTEMS: All other review of systems is negative unless indicated above  PAST MEDICAL & SURGICAL HISTORY:  Lung cancer  Depression  Dyspnea  Mediastinal adenopathy  Pericardial effusion  AF (atrial fibrillation)  Hyponatremia  COPD (chronic obstructive pulmonary disease)  HTN (hypertension)  S/P pericardial window creation    MEDICATIONS  (STANDING):  budesonide 160 MICROgram(s)/formoterol 4.5 MICROgram(s) Inhaler 2 Puff(s) Inhalation two times a day  digoxin     Tablet 0.25 milliGRAM(s) Oral daily  dronabinol 5 milliGRAM(s) Oral every 12 hours  enoxaparin Injectable 80 milliGRAM(s) SubCutaneous two times a day  loratadine 10 milliGRAM(s) Oral daily  magnesium oxide 400 milliGRAM(s) Oral daily  methylPREDNISolone sodium succinate Injectable 40 milliGRAM(s) IV Push every 8 hours  multivitamin 1 Tablet(s) Oral daily  sodium chloride 1 Gram(s) Oral three times a day  tiotropium 18 MICROgram(s) Capsule 1 Capsule(s) Inhalation daily    MEDICATIONS  (PRN):  albuterol/ipratropium for Nebulization 3 milliLiter(s) Nebulizer every 4 hours PRN Shortness of Breath and/or Wheezing  guaiFENesin    Syrup 100 milliGRAM(s) Oral every 6 hours PRN Cough  morphine  - Injectable 3 milliGRAM(s) IV Push every 4 hours PRN Severe Pain (7 - 10)  morphine  - Injectable 2 milliGRAM(s) IV Push every 4 hours PRN Moderate Pain (4 - 6)  morphine  - Injectable 1 milliGRAM(s) IV Push every 4 hours PRN Mild Pain (1 - 3)  ondansetron Injectable 4 milliGRAM(s) IV Push every 6 hours PRN Nausea  senna 8.6 milliGRAM(s) Oral Tablet - Peds 1 Tablet(s) Oral at bedtime PRN for constipation    Allergies    penicillin (Unknown)    Intolerances    Vital Signs Last 24 Hrs  T(C): 36.4 (24 Nov 2019 08:07), Max: 36.7 (24 Nov 2019 00:39)  T(F): 97.6 (24 Nov 2019 08:07), Max: 98.1 (24 Nov 2019 00:39)  HR: 112 (24 Nov 2019 08:07) (77 - 112)  BP: 107/67 (24 Nov 2019 08:07) (105/69 - 118/75)  BP(mean): --  RR: 18 (24 Nov 2019 08:07) (18 - 18)  SpO2: 93% (24 Nov 2019 08:07) (93% - 100%)  I&O's Summary    23 Nov 2019 07:01  -  24 Nov 2019 07:00  --------------------------------------------------------  IN: 0 mL / OUT: 350 mL / NET: -350 mL      PHYSICAL EXAM:  TELE: Afib with RVR  Constitutional: NAD, awake and alert, cachectic  HEENT: Moist Mucous Membranes, Anicteric  Pulmonary: Non-labored, breath sounds are diminished bilaterally, No wheezing, rales or rhonchi  Cardiovascular: Irregularly irregular, S1 and S2, No murmurs, rubs, gallops or clicks  Gastrointestinal: Bowel Sounds present, soft, nontender.   Lymph: 3+ BLE edema. No lymphadenopathy.  Skin: No visible rashes or ulcers.  Psych:  Mood & affect appropriate  LABS: All Labs Reviewed:                        8.9    4.59  )-----------( 228      ( 24 Nov 2019 06:09 )             27.8                         8.6    3.58  )-----------( 200      ( 23 Nov 2019 07:22 )             26.6                         9.2    4.24  )-----------( 224      ( 22 Nov 2019 19:37 )             27.6     24 Nov 2019 06:09    137    |  99     |  18     ----------------------------<  162    3.0     |  23     |  0.93   23 Nov 2019 07:22    137    |  99     |  17     ----------------------------<  110    3.2     |  28     |  0.54   22 Nov 2019 19:37    135    |  97     |  25     ----------------------------<  134    3.0     |  26     |  0.57     Ca    8.5        24 Nov 2019 06:09  Ca    7.9        23 Nov 2019 07:22  Ca    8.2        22 Nov 2019 19:37    TPro  5.9    /  Alb  2.7    /  TBili  1.8    /  DBili  x      /  AST  26     /  ALT  21     /  AlkPhos  89     23 Nov 2019 07:22  TPro  5.9    /  Alb  2.8    /  TBili  1.9    /  DBili  x      /  AST  30     /  ALT  21     /  AlkPhos  86     22 Nov 2019 19:37    PT/INR - ( 22 Nov 2019 19:37 )   PT: 18.8 sec;   INR: 1.64 ratio     PTT - ( 22 Nov 2019 19:37 )  PTT:27.1 sec  CARDIAC MARKERS ( 23 Nov 2019 07:22 )  <.015 ng/mL / x     / 110 U/L / x     / 4.9 ng/mL    < from: TTE Echo Doppler w/o Cont (11.23.19 @ 15:29) >     EXAM:  ECHO TTE WO CON COMP W DOPPLR      PROCEDURE DATE:  11/23/2019      INTERPRETATION:  Ordering Physician: DOMINIC SERRATO [id number]    Indication: Pulmonary embolism  Technologist Initials: DL  Study Quality: Technically difficult and limited   A complete echocardiographic study was performed utilizing standard   protocol including spectral and color Doppler in all echocardiographic   windows.    Height: 183 cm  Weight: 82 kg  BSA: 2.0 sq m  Blood Pressure: 101/66    MEASUREMENTS  IVS: 1.2cm  PWT: 1.1cm  LA: 3.0cm  AO: 3.8cm  LVIDd: 3.7cm  LVIDs: 2.0cm    LVEF: 75%    FINDINGS  Left Ventricle: Hyperdynamic left ventricular systolic function.  Aortic Valve: The aortic valve is not well-visualized. It appears   calcified. No significant aortic insufficiency.  Mitral Valve: Mitral annular calcification calcified mitral valve   leaflets. Mild mitral insufficiency.  Tricuspid Valve: Not well visualized  Pulmonic Valve: Not well visualized  Left Atrium: Grossly normal  Right Ventricle: The right ventricle is not well visualized. Grossly,   normal right ventricular size and systolic function.  Right Atrium: Grossly normal    Pericardium/Pleura: Large left pleural effusion.  Echogenic material is   noted in the pleural space.    CONCLUSIONS:  Technically difficult and limited study.  1. Hyperdynamic left ventricular systolic function.  2. Mitral annular calcification and calcified mitral valve leaflets with   mild mitral insufficiency.  3. Grossly normal left atrial size.  4. The right ventricle is not well visualized. Grossly, normal right   ventricular size and systolic function.  5.  Large left pleural effusion which echogenic material noted in the   pleural space.    QAMAR ECHEVARRIA M.D., ATTENDING CARDIOLOGIST  This document has been electronically signed. Nov 24 2019  7:29AM      < end of copied text >    < from: Xray Chest 1 View- PORTABLE-Urgent (11.23.19 @ 02:19) >    EXAM:  XR CHEST PORTABLE URGENT 1V                            PROCEDURE DATE:  11/23/2019      INTERPRETATION:  INDICATION: Tachypnea    PRIORS: 11/22/2019.    VIEWS: Portable AP radiography of the chest performed.    FINDINGS: Heart size cannot be quantitated on this portable radiograph.   No definite superior mediastinal widening is identified. A right-sided   IVAD is identified. Bilateral perihilar opacities are without significant   change; underlying mass lesion/hilar lymphadenopathy cannot be excluded.   Stable linear scarlike opacity identified within the left upper lung   field. Left pleural effusion appears unchanged. No right pleural   effusion. No pneumothorax demonstrated. No mediastinal shift is noted. No   acute osseous fractures are noted.    IMPRESSION: No significant interval change. See above discussion.    ISAC MELVIN   This document has been electronically signed. Nov 23 2019  8:36AM     < end of copied text >    < from: CT Angio Chest w/ IV Cont (11.22.19 @ 21:41) >    EXAM:  CT ABDOMEN AND PELVIS IC                          EXAM:  CT ANGIO CHEST (W)AW IC                          PROCEDURE DATE:  11/22/2019      INTERPRETATION:      Addendum created by Jorgito Manuel MD on 11/22/2019 10:59:51 PM EST     THIS REPORT CONTAINS FINDINGS THAT MAY BE CRITICAL TO PATIENT CARE. The   findings were verbally communicated via telephone conference with JUANJO Quinones at 10:50 PM EST on 11/22/2019. The findings were acknowledged and   understood.        --Patient has history of known lung carcinoma with metastasis.    Initial report created on 11/22/2019 10:59:12 PM EST     PROCEDURE INFORMATION:   Exam: CT Angiography Chest With Contrast   Exam date and time: 11/22/2019 9:25 PM   Age: 59 years old   Clinical history: SOB; Shortness of breath     TECHNIQUE:   Imaging protocol: Computed tomographic angiography of the chest with   intravenous contrast.   3D rendering: MIP reconstructed images were created and reviewed.     COMPARISON:   CT ANGIO CHEST WITHOUT AND OR WITH IV CONTRAST 2/5/2019 12:51 AM     FINDINGS:   Pulmonary arteries: Adequate contrast enhancement of the pulmonary   arteries.   Abnormal filling defects -emboli within bilateral lower lobe segmental   pulmonary arterial vessels.  Aorta: No evidence of thoracic aortic dissection. Chronic atherosclerotic   calcification of the vasculature.   Lungs:    Bilateral groundglass mosaic attenuation, most pronounced in the right   upper and lower lung zones.  Thick-walled left apicalbleb.  Paraseptal emphysematous changes lung apices.  Subpleural areas of scarring/fibrosis left upper and superior segment   left lower lobes.    Pleural space: Moderate-large left pleural effusion nodule left-sided   pleural thickening is suggestive of pleural-based metastatic disease.    Heart: Heart size within normal limits.  Mediastinum: Interval decrease in confluent mediastinal and hilar   lymphadenopathy.  Lymph nodes: Enlarged substernal and left pericardial and pericardial   mediastinal lymph nodes.  Bones/joints: Heterogeneous appearance of the visualized bones, with   areas of mottled sclerosis involving the sternum and left third rib,   suspicious for metastatic disease.      IMPRESSION:    Bilateral segmental lower lobe pulmonary emboli.    Bilateral groundglass, mosaic attenuation most pronounced in the right   lung, may reflect small airway versus small vessel disease.    Moderate left-sided pleural effusion with nodular pleural thickening   suggestive of pleural-based metastatic disease.    Enlarged retrosternal and alla and paracardiac lymph nodes.    Interval decrease in confluent mediastinal/hilar lymphadenopathy.    Findings suggestive of osseous metastatic disease.    See above final report, study was preliminary reported by [facility].      =========================  PROCEDURE INFORMATION:   Exam: CT Abdomen And Pelvis With Contrast   Exam date and time: 11/22/2019 9:25 PM   Age: 59 years old   Clinical history: Other: SOB; Shortness of breath. Metastatic lung cancer.    TECHNIQUE:   Imaging protocol: Computed tomography of the abdomen and pelvis with   intravenous contrast.     COMPARISON:   CT ANGIO CHEST WITHOUT AND OR WITH IV CONTRAST 2/5/2019 12:51 AM     FINDINGS:    There is diffuse nodular thickening along the pleural surface of the left   hemidiaphragm consistent with pleural-based metastatic disease.    Liver: Multiple varying sized low density lesions throughout liver,   largest 2.8   cm lateral segment left lobe.   Gallbladder and bile ducts: Mild layering hyperdensity within the   gallbladder   lumen.   Pancreas: Ovoid 3 x 4.5 cm hypodense area body of the pancreas.   Spleen: Normal. No splenomegaly.   Adrenals: Normal. No mass.   Kidneys and ureters: Normal. No hydronephrosis.   Stomach and bowel: Unremarkable. No obstruction. No mucosal thickening.   Appendix: No evidence of appendicitis.   Intraperitoneal space: Mild-moderate abdominal and pelvic free fluid.   Vasculature: Chronic atherosclerotic calcification of the vasculature.   Lymph nodes: Multiple areas enlarged lymph nodes: 3 cm bilateral   retrocrural,   3.6 left periaortic, 2-4 cm lina hepatis, 5 cm along the stomach.     Bladder: Unremarkable as visualized.   Reproductive: Prostate appears within normal limits.   Bones/joints: Chronic degenerative changes of the lumbar spine. Bilateral   pars   defects at L5 with grade 1 spondylolisthesis at L5-S1 level.   Soft tissues: Unremarkable.     IMPRESSION:   1. Possible pancreatic mass-neoplasm with multifocalhepatic metastasis   versus   pancreatic and hepatic metastasis.   2. Extensive upper abdominal and retroperitoneal lymphadenopathy as   described   above.   3. Mild-moderate abdominal and pelvic free fluid.   4. Suspected stones and/or debris in gallbladder. No evidence of   intrahepatic   or extrahepatic biliary ductal dilatation.   5. Bilateral pars defects at L5 with grade 1 spondylolisthesis at L5-S1   level.    See above final report, preliminary reported by [facility] radiology.    MANA VILLEGAS M.D., ATTENDING RADIOLOGIST  This document has been electronically signed. Nov 23 2019  9:13AM      < end of copied text >

## 2019-11-24 NOTE — PROGRESS NOTE ADULT - SUBJECTIVE AND OBJECTIVE BOX
Patient is a 59y old  Male who presents with a chief complaint of PNA (2019 11:57)      INTERVAL HPI/OVERNIGHT EVENTS:  T(C): 36.6 (19 @ 12:28), Max: 36.7 (19 @ 00:39)  HR: 97 (19 @ 12:28) (77 - 112)  BP: 113/73 (19 @ 12:28) (105/69 - 118/75)  RR: 18 (19 @ 12:28) (18 - 18)  SpO2: 100% (19 @ 12:28) (93% - 100%)  Wt(kg): --  I&O's Summary    2019 07:01  -  2019 07:00  --------------------------------------------------------  IN: 0 mL / OUT: 350 mL / NET: -350 mL        LABS:                        8.9    4.59  )-----------( 228      ( 2019 06:09 )             27.8     11-24    137  |  99  |  18  ----------------------------<  162<H>  3.0<L>   |  23  |  0.93    Ca    8.5      2019 06:09    TPro  5.9<L>  /  Alb  2.7<L>  /  TBili  1.8<H>  /  DBili  x   /  AST  26  /  ALT  21  /  AlkPhos  89  11-    PT/INR - ( 2019 19:37 )   PT: 18.8 sec;   INR: 1.64 ratio         PTT - ( 2019 19:37 )  PTT:27.1 sec  Urinalysis Basic - ( 2019 23:19 )    Color: Yellow / Appearance: Clear / S.005 / pH: x  Gluc: x / Ketone: Small  / Bili: Small / Urobili: 8   Blood: x / Protein: 25 mg/dL / Nitrite: Negative   Leuk Esterase: Negative / RBC: 0-2 /HPF / WBC 0-2   Sq Epi: x / Non Sq Epi: x / Bacteria: x      CAPILLARY BLOOD GLUCOSE            Urinalysis Basic - ( 2019 23:19 )    Color: Yellow / Appearance: Clear / S.005 / pH: x  Gluc: x / Ketone: Small  / Bili: Small / Urobili: 8   Blood: x / Protein: 25 mg/dL / Nitrite: Negative   Leuk Esterase: Negative / RBC: 0-2 /HPF / WBC 0-2   Sq Epi: x / Non Sq Epi: x / Bacteria: x        MEDICATIONS  (STANDING):  budesonide 160 MICROgram(s)/formoterol 4.5 MICROgram(s) Inhaler 2 Puff(s) Inhalation two times a day  chlorhexidine 4% Liquid 1 Application(s) Topical daily  digoxin     Tablet 0.25 milliGRAM(s) Oral daily  dronabinol 5 milliGRAM(s) Oral every 12 hours  enoxaparin Injectable 80 milliGRAM(s) SubCutaneous two times a day  loratadine 10 milliGRAM(s) Oral daily  magnesium oxide 400 milliGRAM(s) Oral daily  methylPREDNISolone sodium succinate Injectable 40 milliGRAM(s) IV Push every 8 hours  multivitamin 1 Tablet(s) Oral daily  sodium chloride 1 Gram(s) Oral three times a day  tiotropium 18 MICROgram(s) Capsule 1 Capsule(s) Inhalation daily    MEDICATIONS  (PRN):  albuterol/ipratropium for Nebulization 3 milliLiter(s) Nebulizer every 4 hours PRN Shortness of Breath and/or Wheezing  guaiFENesin    Syrup 100 milliGRAM(s) Oral every 6 hours PRN Cough  morphine  - Injectable 3 milliGRAM(s) IV Push every 4 hours PRN Severe Pain (7 - 10)  morphine  - Injectable 2 milliGRAM(s) IV Push every 4 hours PRN Moderate Pain (4 - 6)  morphine  - Injectable 1 milliGRAM(s) IV Push every 4 hours PRN Mild Pain (1 - 3)  ondansetron Injectable 4 milliGRAM(s) IV Push every 6 hours PRN Nausea  senna 8.6 milliGRAM(s) Oral Tablet - Peds 1 Tablet(s) Oral at bedtime PRN for constipation      REVIEW OF SYSTEMS:  CONSTITUTIONAL: No fever, weight loss, or fatigue  EYES: No eye pain, visual disturbances, or discharge  ENMT:  No difficulty hearing, tinnitus, vertigo; No sinus or throat pain  NECK: No pain or stiffness  RESPIRATORY: No cough, wheezing, chills or hemoptysis; No shortness of breath  CARDIOVASCULAR: No chest pain, palpitations, dizziness, or leg swelling  GASTROINTESTINAL: No abdominal or epigastric pain. No nausea, vomiting, or hematemesis; No diarrhea or constipation. No melena or hematochezia.  MUSCULOSKELETAL: No joint pain or swelling; No muscle, back, or extremity pain    RADIOLOGY & ADDITIONAL TESTS:    Imaging Personally Reviewed:  [ ] YES  [ ] NO    Consultant(s) Notes Reviewed:  [ ] YES  [ ] NO    PHYSICAL EXAM:  GENERAL: NAD, well-groomed, well-developed  NERVOUS SYSTEM:  Alert & Oriented X3, Good concentration; Motor Strength 5/5 B/L upper and lower extremities; DTRs 2+ intact and symmetric  CHEST/LUNG: Clear to percussion bilaterally; No rales, rhonchi, wheezing, or rubs  HEART: Regular rate and rhythm; No murmurs, rubs, or gallops  ABDOMEN: Soft, Nontender, Nondistended; Bowel sounds present  s    Care Discussed with Consultants/Other Providers [ ] YES  [ ] NO

## 2019-11-24 NOTE — PROGRESS NOTE ADULT - ASSESSMENT
60 yo male with initial diagnosis of limited stage Small Cell Lung Cancer in 12/2018 treated with Carbo//16/Atezolizumab with concurrent RT with initial response, unfortunately with disease progression with brain mets noted in 8/2019 s/p WBRT and systemic disease progression noted on PET/CT in 9/2019 with evidence of intra-abdominal and bone and pleural disease, started on weekly taxol chemotherapy; presents now with weakness and dyspnea; found to have Pulmonary embolism and further disease progression with pleural effusion, bone lesions, liver lesions and pancreatic mass.    - continue lovenox 1mg/kg Q12 hours; can eventually transition to DOAC when patient closer to discharge  - for poor appetite, continue  marinol 5mg BID  - overall prognosis is poor given rapid disease progression; ECOG performance status would need to improve prior to initiation of additional cytotoxic chemotherapy - possibly topotecan  - will follow with you

## 2019-11-25 LAB
ANION GAP SERPL CALC-SCNC: 11 MMOL/L — SIGNIFICANT CHANGE UP (ref 5–17)
BUN SERPL-MCNC: 22 MG/DL — SIGNIFICANT CHANGE UP (ref 7–23)
CALCIUM SERPL-MCNC: 8.6 MG/DL — SIGNIFICANT CHANGE UP (ref 8.5–10.1)
CHLORIDE SERPL-SCNC: 102 MMOL/L — SIGNIFICANT CHANGE UP (ref 96–108)
CO2 SERPL-SCNC: 26 MMOL/L — SIGNIFICANT CHANGE UP (ref 22–31)
CREAT SERPL-MCNC: 0.79 MG/DL — SIGNIFICANT CHANGE UP (ref 0.5–1.3)
DIGOXIN SERPL-MCNC: 1.2 NG/ML — SIGNIFICANT CHANGE UP (ref 0.8–2)
GLUCOSE SERPL-MCNC: 149 MG/DL — HIGH (ref 70–99)
HCT VFR BLD CALC: 28.7 % — LOW (ref 39–50)
HGB BLD-MCNC: 9.1 G/DL — LOW (ref 13–17)
MCHC RBC-ENTMCNC: 26.8 PG — LOW (ref 27–34)
MCHC RBC-ENTMCNC: 31.7 GM/DL — LOW (ref 32–36)
MCV RBC AUTO: 84.7 FL — SIGNIFICANT CHANGE UP (ref 80–100)
NRBC # BLD: 0 /100 WBCS — SIGNIFICANT CHANGE UP (ref 0–0)
PLATELET # BLD AUTO: 260 K/UL — SIGNIFICANT CHANGE UP (ref 150–400)
POTASSIUM SERPL-MCNC: 4.1 MMOL/L — SIGNIFICANT CHANGE UP (ref 3.5–5.3)
POTASSIUM SERPL-SCNC: 4.1 MMOL/L — SIGNIFICANT CHANGE UP (ref 3.5–5.3)
RBC # BLD: 3.39 M/UL — LOW (ref 4.2–5.8)
RBC # FLD: 17.2 % — HIGH (ref 10.3–14.5)
SODIUM SERPL-SCNC: 139 MMOL/L — SIGNIFICANT CHANGE UP (ref 135–145)
WBC # BLD: 8.83 K/UL — SIGNIFICANT CHANGE UP (ref 3.8–10.5)
WBC # FLD AUTO: 8.83 K/UL — SIGNIFICANT CHANGE UP (ref 3.8–10.5)

## 2019-11-25 PROCEDURE — 99232 SBSQ HOSP IP/OBS MODERATE 35: CPT

## 2019-11-25 RX ADMIN — Medication 3 MILLILITER(S): at 08:04

## 2019-11-25 RX ADMIN — Medication 40 MILLIGRAM(S): at 13:16

## 2019-11-25 RX ADMIN — BUDESONIDE AND FORMOTEROL FUMARATE DIHYDRATE 2 PUFF(S): 160; 4.5 AEROSOL RESPIRATORY (INHALATION) at 21:39

## 2019-11-25 RX ADMIN — BUDESONIDE AND FORMOTEROL FUMARATE DIHYDRATE 2 PUFF(S): 160; 4.5 AEROSOL RESPIRATORY (INHALATION) at 09:14

## 2019-11-25 RX ADMIN — ENOXAPARIN SODIUM 80 MILLIGRAM(S): 100 INJECTION SUBCUTANEOUS at 17:37

## 2019-11-25 RX ADMIN — SODIUM CHLORIDE 1 GRAM(S): 9 INJECTION INTRAMUSCULAR; INTRAVENOUS; SUBCUTANEOUS at 13:16

## 2019-11-25 RX ADMIN — Medication 40 MILLIGRAM(S): at 21:38

## 2019-11-25 RX ADMIN — Medication 3 MILLILITER(S): at 19:43

## 2019-11-25 RX ADMIN — Medication 5 MILLIGRAM(S): at 17:37

## 2019-11-25 RX ADMIN — Medication 3 MILLILITER(S): at 13:59

## 2019-11-25 RX ADMIN — Medication 5 MILLIGRAM(S): at 06:18

## 2019-11-25 RX ADMIN — MAGNESIUM OXIDE 400 MG ORAL TABLET 400 MILLIGRAM(S): 241.3 TABLET ORAL at 13:16

## 2019-11-25 RX ADMIN — SODIUM CHLORIDE 1 GRAM(S): 9 INJECTION INTRAMUSCULAR; INTRAVENOUS; SUBCUTANEOUS at 21:38

## 2019-11-25 RX ADMIN — MORPHINE SULFATE 1 MILLIGRAM(S): 50 CAPSULE, EXTENDED RELEASE ORAL at 18:32

## 2019-11-25 RX ADMIN — Medication 0.25 MILLIGRAM(S): at 06:14

## 2019-11-25 RX ADMIN — CHLORHEXIDINE GLUCONATE 1 APPLICATION(S): 213 SOLUTION TOPICAL at 13:22

## 2019-11-25 RX ADMIN — MORPHINE SULFATE 1 MILLIGRAM(S): 50 CAPSULE, EXTENDED RELEASE ORAL at 17:42

## 2019-11-25 RX ADMIN — Medication 1 TABLET(S): at 13:16

## 2019-11-25 RX ADMIN — SODIUM CHLORIDE 1 GRAM(S): 9 INJECTION INTRAMUSCULAR; INTRAVENOUS; SUBCUTANEOUS at 06:14

## 2019-11-25 RX ADMIN — Medication 40 MILLIGRAM(S): at 06:13

## 2019-11-25 RX ADMIN — TIOTROPIUM BROMIDE 1 CAPSULE(S): 18 CAPSULE ORAL; RESPIRATORY (INHALATION) at 09:14

## 2019-11-25 RX ADMIN — ENOXAPARIN SODIUM 80 MILLIGRAM(S): 100 INJECTION SUBCUTANEOUS at 06:13

## 2019-11-25 RX ADMIN — LORATADINE 10 MILLIGRAM(S): 10 TABLET ORAL at 13:16

## 2019-11-25 RX ADMIN — Medication 3 MILLILITER(S): at 00:47

## 2019-11-25 NOTE — PROGRESS NOTE ADULT - PROBLEM SELECTOR PLAN 9
-Will hold lasix as patient's BP low  -s/p 2.6L   -Re-evaluate in a.m. - Continue to hold Lasix as patient's BP low.   - s/p 2.6L   - Re-evaluate in AM. - Continue to hold Lasix as patient's BP low and echo showing hyperdynamic LV function  - s/p 2.6L   - Re-evaluate in AM.

## 2019-11-25 NOTE — PROGRESS NOTE ADULT - PROBLEM SELECTOR PLAN 2
- CTA Chest 11/22/2019 suggestive of pleural-based metastatic disease.  - CTA Abd and Pelvis 11/22/2019: - Possible pancreatic mass-neoplasm with multifocalhepatic metastasis versus pancreatic and hepatic metastasis. Extensive upper abdominal and retroperitoneal lymphadenopathy as described above. Mild-moderate abdominal and pelvic free fluid.   - Seen by Pulm today, Dr. Soto; continue O2 and AC, Duoneb 4x daily, continue IV steroids.  - Continue Morphine 1mg or 2mg or 3mg IVp q4H PRN, re: mild or mod or severe  -Oxycodone  q6h PRN  -Will do 3mg IV morphine severe, 2, 1 for mod and mild  -senna, colace  -Mag oxide  -zofran 4mg IV q6H PRN - CTA Chest 11/22/2019 suggestive of pleural-based metastatic disease.  - CTA Abd and Pelvis 11/22/2019: - Possible pancreatic mass-neoplasm with multifocalhepatic metastasis versus pancreatic and hepatic metastasis. Extensive upper abdominal and retroperitoneal lymphadenopathy as described above. Mild-moderate abdominal and pelvic free fluid.   - Seen by Pulm today, Dr. Soto; continue O2 and AC, Duoneb 4x daily, continue IV steroids.  - Continue Morphine 1mg or 2mg or 3mg IVp q4H PRN, re: mild or mod or severe pain  - Continue Senna 8.6 mg 1 tab PO QHS.  - Continue Odansetron 4 mg IVp q6H PRN, re: nausea.   - Continue Magnesium Oxide 400 mg PO daily.   - Continue Dronabinol 5 mg PO q12H. - CTA Chest 11/22/2019 suggestive of pleural-based metastatic disease.  - CTA Abd and Pelvis 11/22/2019: - Possible pancreatic mass-neoplasm with multifocal hepatic metastasis versus pancreatic and hepatic metastasis. Extensive upper abdominal and retroperitoneal lymphadenopathy as described above. Mild-moderate abdominal and pelvic free fluid.   - Seen by PulmDr. Soto; continue O2 and AC, Duoneb 4x daily, continue IV steroids.  - Continue Morphine 1mg or 2mg or 3mg IVp q4H PRN, re: mild or mod or severe pain  - Continue Senna 8.6 mg 1 tab PO QHS.  - Continue Odansetron 4 mg IVp q6H PRN, re: nausea.   - Continue Magnesium Oxide 400 mg PO daily.   - Continue Dronabinol 5 mg PO q12H.

## 2019-11-25 NOTE — PROVIDER CONTACT NOTE (OTHER) - SITUATION
Pt HR fluctuates in [id number], Pt had 6 beats Vtach, denies CP/Cd, SOB, dizziness and lightheadedness

## 2019-11-25 NOTE — PROGRESS NOTE ADULT - PROBLEM SELECTOR PLAN 1
-Patient presenting for SOB with history of 2 pleural effusions requiring drainage as well cardiac window  -CXR consistent with infiltrates and w small L pleural effusion  -Note, patient s/p 2.6L with pleural effusion and underlying SOB but no evidence of -CHF as of echo 1/2019 but will be cautious with further fluid administration given baseline edema  - Echo (11/23/2019) Hyperdynamic left ventricular systolic function. Mitral annular calcification and calcified mitral valve leaflets with mild mitral insufficiency. Large left pleural effusion which echogenic material noted in the pleural space.  - CTA Chest 11/22/2019 suggestive of pleural-based metastatic disease.  - Continue Albuterol/Ipratropium 3mL Nebs q6H. - Patient presenting for SOB with history of 2 pleural effusions requiring drainage as well cardiac window  - CXR consistent with infiltrates and w small L pleural effusion  - Note, patient s/p 2.6L with pleural effusion and underlying SOB but no evidence of - CHF as of echo 1/2019 but will be cautious with further fluid administration given baseline edema  - CTA Chest 11/22/2019 suggestive of pleural-based metastatic disease.  - Echo (11/23/2019) Hyperdynamic left ventricular systolic function. Mitral annular calcification and calcified mitral valve leaflets with mild mitral insufficiency. Large left pleural effusion which echogenic material noted in the pleural space.  - Seen by Pulm today, Dr. Soto; recs appreciated.  - Continue supplemental O2.   - Continue Enoxaparin 80 mg subcu BID.  - Continue Methylprednisolone 40 mg IVp q8H.  - Continue Albuterol/Ipratropium 3mL Nebs q6H. - Patient presenting for SOB with past history of 2 pleural effusions requiring drainage as well cardiac window  - CXR consistent with infiltrates and w small L pleural effusion  - Note, patient s/p 2.6L with pleural effusion and underlying SOB but no evidence of - CHF as of echo 1/2019 but will be cautious with further fluid administration given baseline edema  - CTA Chest 11/22/2019 suggestive of pleural-based metastatic disease.  - Echo (11/23/2019) Hyperdynamic left ventricular systolic function. Large left pleural effusion which echogenic material noted in the pleural space.  - Seen by Pulm, Dr. Soto; recs appreciated.  - Continue supplemental O2.   - Continue Enoxaparin 80 mg subcu BID.  - Continue Methylprednisolone 40 mg IVp q8H.  - Continue Albuterol/Ipratropium 3mL Nebs q6H. likely multifactorial from b/l PE, PNA, and metastatic pleural effusions (patient has a past history of 2 pleural effusions requiring drainage as well cardiac window)  - CXR consistent with infiltrates and w small L pleural effusion  - Note, patient s/p 2.6L with pleural effusion and underlying SOB but no evidence of CHF but will be cautious with further fluid administration given baseline edema  - CTA Chest 11/22/2019 suggestive of pleural-based metastatic disease.  - Echo (11/23/2019) Hyperdynamic left ventricular systolic function. Large left pleural effusion which echogenic material noted in the pleural space.  - Seen by Pulm, Dr. Soto; recs appreciated.  - Continue supplemental O2.   - Continue Enoxaparin 80 mg BID for PE, eventually transition to DOAC per heme/onc rec  - Continue Methylprednisolone 40 mg IVp q8H.  - Continue Albuterol/Ipratropium 3mL Nebs q6H.

## 2019-11-25 NOTE — PROGRESS NOTE ADULT - PROBLEM SELECTOR PLAN 3
-Continue ipratropium  -Duonebs Q4H PRN  -Spiriva  -Continue advair diskus - Continue Tiotropium 18mcg 1 cap inhalation daily.  - Continue Albuterol/Ipratropium 3mL Nebs q6H.  - Continue Budesonide 160 mcg/Formoterol 4.5 mcg 2 puff inhalation BID.

## 2019-11-25 NOTE — PROVIDER CONTACT NOTE (OTHER) - REASON
Pt HR fluctuates in [id number], Pt had 6 beats Vtach, denies CP/Cd, SOB, dizziness and lightheadedness

## 2019-11-25 NOTE — PROGRESS NOTE ADULT - ASSESSMENT
60 yo male with initial diagnosis of limited stage Small Cell Lung Cancer in 12/2018 treated with Carbo//16/Atezolizumab with concurrent RT with initial response, unfortunately with disease progression with brain mets noted in 8/2019 s/p WBRT and systemic disease progression noted on PET/CT in 9/2019 with evidence of intra-abdominal and bone and pleural disease, started on weekly taxol chemotherapy; presents now with weakness and dyspnea; found to have Pulmonary embolism and further disease progression with pleural effusion, bone lesions, liver lesions and pancreatic mass.    - continue lovenox 1mg/kg Q12 hours; can eventually transition to DOAC when patient closer to discharge  - for poor appetite, continue  marinol 5mg BID; seems to be improving  - to complete re-staging scans, will check MRI brain during this admission; wife advised to bring in discs from [facility] for Radiology department to compare  - overall prognosis is poor given rapid disease progression; ECOG performance status would need to improve prior to initiation of additional cytotoxic chemotherapy - possibly topotecan  - will follow with you

## 2019-11-25 NOTE — PROGRESS NOTE ADULT - SUBJECTIVE AND OBJECTIVE BOX
Ms. San is a 59y old Male presents with a chief complaint of PNA (25 Nov 2019 14:23)    INTERVAL HPI/OVERNIGHT EVENTS: Pt seen and examined at bedside. Pt admits to SOB this AM while getting up from the chair with his nurse. Pt required one round of nebs post-incident and SOB resolved. Pt denies feeling of dizziness, palpitations, sweating, or LOC during the incident. Pt also admits to watery diarrhea without blood this AM after his senna was given 2/2 no bowel movement for 5 days.       T(C): 36.5 (11-25-19 @ 11:53), Max: 36.7 (11-24-19 @ 15:47)  HR: 106 (11-25-19 @ 11:53) (96 - 115)  BP: 103/61 (11-25-19 @ 11:53) (98/61 - 113/56)  RR: 24 (11-25-19 @ 11:53) (18 - 24)  SpO2: 95% (11-25-19 @ 11:53) (94% - 99%)  Wt(kg): --  I&O's Summary    24 Nov 2019 07:01  -  25 Nov 2019 07:00  --------------------------------------------------------  IN: 500 mL / OUT: 1 mL / NET: 499 mL        LABS:                        9.1    8.83  )-----------( 260      ( 25 Nov 2019 06:58 )             28.7     11-25    139  |  102  |  22  ----------------------------<  149<H>  4.1   |  26  |  0.79    Ca    8.6      25 Nov 2019 06:58          CAPILLARY BLOOD GLUCOSE                MEDICATIONS  (STANDING):  albuterol/ipratropium for Nebulization 3 milliLiter(s) Nebulizer every 6 hours  budesonide 160 MICROgram(s)/formoterol 4.5 MICROgram(s) Inhaler 2 Puff(s) Inhalation two times a day  chlorhexidine 4% Liquid 1 Application(s) Topical daily  digoxin     Tablet 0.25 milliGRAM(s) Oral daily  dronabinol 5 milliGRAM(s) Oral every 12 hours  enoxaparin Injectable 80 milliGRAM(s) SubCutaneous two times a day  loratadine 10 milliGRAM(s) Oral daily  magnesium oxide 400 milliGRAM(s) Oral daily  methylPREDNISolone sodium succinate Injectable 40 milliGRAM(s) IV Push every 8 hours  multivitamin 1 Tablet(s) Oral daily  sodium chloride 1 Gram(s) Oral three times a day  tiotropium 18 MICROgram(s) Capsule 1 Capsule(s) Inhalation daily    MEDICATIONS  (PRN):  guaiFENesin    Syrup 100 milliGRAM(s) Oral every 6 hours PRN Cough  morphine  - Injectable 3 milliGRAM(s) IV Push every 4 hours PRN Severe Pain (7 - 10)  morphine  - Injectable 2 milliGRAM(s) IV Push every 4 hours PRN Moderate Pain (4 - 6)  morphine  - Injectable 1 milliGRAM(s) IV Push every 4 hours PRN Mild Pain (1 - 3)  ondansetron Injectable 4 milliGRAM(s) IV Push every 6 hours PRN Nausea  senna 8.6 milliGRAM(s) Oral Tablet - Peds 1 Tablet(s) Oral at bedtime PRN for constipation      REVIEW OF SYSTEMS:  CONSTITUTIONAL: +weight loss, +fatigue  Head: +chemo induced alopecia  RESPIRATORY: +shortness of breath, dry cough, +wheezing, no chills, no hemoptysis  CARDIOVASCULAR: +leg swelling, No chest pain, no palpitations, no dizziness  GASTROINTESTINAL: +diarrhea. No abdominal pain. No epigastric pain. No nausea, vomiting, or hematemesis. No melena or hematochezia.  NEUROLOGICAL: No headaches, memory loss, loss of strength, numbness  SKIN: no lesions.  ENDOCRINE: +hair loss  MUSCULOSKELETAL: No joint pain.  PSYCHIATRIC: No difficulty sleeping.    All other ROS within normal limits.     RADIOLOGY & ADDITIONAL TESTS:    < from: CT Angio Chest w/ IV Cont (11.22.19 @ 21:41) >    Lungs:  Bilateral groundglass mosaic attenuation, most pronounced in the right   upper and lower lung zones.  Thick-walled left apicalbleb.  Paraseptal emphysematous changes lung apices.  Subpleural areas of scarring/fibrosis left upper and superior segment   left lower lobes.    Pleural space: Moderate-large left pleural effusion nodule left-sided   pleural thickening is suggestive of pleural-based metastatic disease.    Heart: Heart size within normal limits.  Mediastinum: Interval decrease in confluent mediastinal and hilar   lymphadenopathy.  Lymph nodes: Enlarged substernal and left pericardial and pericardial   mediastinal lymph nodes.    < end of copied text >      < from: CT Angio Chest w/ IV Cont (11.22.19 @ 21:41) >  IMPRESSION:   1. Possible pancreatic mass-neoplasm with multifocalhepatic metastasis   versus   pancreatic and hepatic metastasis.   2. Extensive upper abdominal and retroperitoneal lymphadenopathy as   described   above.   3. Mild-moderate abdominal and pelvic free fluid.   4. Suspected stones and/or debris in gallbladder. No evidence of   intrahepatic   or extrahepatic biliary ductal dilatation.   5. Bilateral pars defects at L5 with grade 1 spondylolisthesis at L5-S1   level.    < end of copied text >    11/23/2019 Echo, 11/24/2019 Report  CONCLUSIONS:   Technically difficult and limited study.  1. Hyperdynamic left ventricular systolic function.  2. Mitral annular calcification and calcified mitral valve leaflets with mild mitral insufficiency.  3. Grossly normal left atrial size.  4. The right ventricle is not well visualized. Grossly, normal right ventricular size and systolic function.  5. Large left pleural effusion which echogenic material noted in the pleural space.         Imaging Personally Reviewed:  [X] YES  [ ] NO    Consultant(s) Notes Reviewed:  [X] YES  [ ] NO    PHYSICAL EXAM:  GENERAL: NAD, comfortable  HEAD: hair loss  EYES: EOMI, conjunctiva and sclera clear  ENMT: Moist mucous membranes  NECK: Supple  NERVOUS SYSTEM:  Alert & Oriented X3, Good concentration; Motor Strength 5/5 B/L upper and lower extremities; sensation grossly intact upper and lower extremities  CHEST/LUNG: mild wheezing and moderate rhonchi b/l  HEART: irregularly irregular; No murmurs, rubs, or gallops  ABDOMEN: Soft, Nontender, Nondistended; Bowel sounds present  EXTREMITIES:  4+ pitting edema b/l LE. 2+ radial pulses.  SKIN: No rashes or lesions    Care Discussed with Consultants/Other Providers [x] YES  [ ] NO Ms. San is a 59y old Male presents with a chief complaint of PNA (25 Nov 2019 14:23)    INTERVAL HPI/OVERNIGHT EVENTS: Pt seen and examined at bedside. Pt admits to SOB this AM while getting up from the chair with his nurse. Pt required one round of nebs post-incident and SOB resolved. Pt denies feeling of dizziness, palpitations, sweating, or LOC during the incident. Pt also admits to watery diarrhea without blood this AM after his senna was given 2/2 no bowel movement for 5 days.       T(C): 36.5 (11-25-19 @ 11:53), Max: 36.7 (11-24-19 @ 15:47)  HR: 106 (11-25-19 @ 11:53) (96 - 115)  BP: 103/61 (11-25-19 @ 11:53) (98/61 - 113/56)  RR: 24 (11-25-19 @ 11:53) (18 - 24)  SpO2: 95% (11-25-19 @ 11:53) (94% - 99%)  Wt(kg): --  I&O's Summary    24 Nov 2019 07:01  -  25 Nov 2019 07:00  --------------------------------------------------------  IN: 500 mL / OUT: 1 mL / NET: 499 mL        LABS:                        9.1    8.83  )-----------( 260      ( 25 Nov 2019 06:58 )             28.7     11-25    139  |  102  |  22  ----------------------------<  149<H>  4.1   |  26  |  0.79    Ca    8.6      25 Nov 2019 06:58      CAPILLARY BLOOD GLUCOSE      MEDICATIONS  (STANDING):  albuterol/ipratropium for Nebulization 3 milliLiter(s) Nebulizer every 6 hours  budesonide 160 MICROgram(s)/formoterol 4.5 MICROgram(s) Inhaler 2 Puff(s) Inhalation two times a day  chlorhexidine 4% Liquid 1 Application(s) Topical daily  digoxin     Tablet 0.25 milliGRAM(s) Oral daily  dronabinol 5 milliGRAM(s) Oral every 12 hours  enoxaparin Injectable 80 milliGRAM(s) SubCutaneous two times a day  loratadine 10 milliGRAM(s) Oral daily  magnesium oxide 400 milliGRAM(s) Oral daily  methylPREDNISolone sodium succinate Injectable 40 milliGRAM(s) IV Push every 8 hours  multivitamin 1 Tablet(s) Oral daily  sodium chloride 1 Gram(s) Oral three times a day  tiotropium 18 MICROgram(s) Capsule 1 Capsule(s) Inhalation daily    MEDICATIONS  (PRN):  guaiFENesin    Syrup 100 milliGRAM(s) Oral every 6 hours PRN Cough  morphine  - Injectable 3 milliGRAM(s) IV Push every 4 hours PRN Severe Pain (7 - 10)  morphine  - Injectable 2 milliGRAM(s) IV Push every 4 hours PRN Moderate Pain (4 - 6)  morphine  - Injectable 1 milliGRAM(s) IV Push every 4 hours PRN Mild Pain (1 - 3)  ondansetron Injectable 4 milliGRAM(s) IV Push every 6 hours PRN Nausea  senna 8.6 milliGRAM(s) Oral Tablet - Peds 1 Tablet(s) Oral at bedtime PRN for constipation      REVIEW OF SYSTEMS:  CONSTITUTIONAL: +weight loss, +fatigue  Head: +chemo induced alopecia  RESPIRATORY: +shortness of breath, dry cough, +wheezing, no chills, no hemoptysis  CARDIOVASCULAR: +leg swelling, No chest pain, no palpitations, no dizziness  GASTROINTESTINAL: +diarrhea. No abdominal pain. No epigastric pain. No nausea, vomiting, or hematemesis. No melena or hematochezia.  NEUROLOGICAL: No headaches, memory loss, loss of strength, numbness  SKIN: no lesions.  ENDOCRINE: +hair loss  MUSCULOSKELETAL: No joint pain.  PSYCHIATRIC: No difficulty sleeping.    All other ROS within normal limits.     RADIOLOGY & ADDITIONAL TESTS:    < from: CT Angio Chest w/ IV Cont (11.22.19 @ 21:41) >    Lungs:  Bilateral groundglass mosaic attenuation, most pronounced in the right   upper and lower lung zones.  Thick-walled left apicalbleb.  Paraseptal emphysematous changes lung apices.  Subpleural areas of scarring/fibrosis left upper and superior segment   left lower lobes.    Pleural space: Moderate-large left pleural effusion nodule left-sided   pleural thickening is suggestive of pleural-based metastatic disease.    Heart: Heart size within normal limits.  Mediastinum: Interval decrease in confluent mediastinal and hilar   lymphadenopathy.  Lymph nodes: Enlarged substernal and left pericardial and pericardial   mediastinal lymph nodes.    < end of copied text >      < from: CT Angio Chest w/ IV Cont (11.22.19 @ 21:41) >  IMPRESSION:   1. Possible pancreatic mass-neoplasm with multifocalhepatic metastasis   versus   pancreatic and hepatic metastasis.   2. Extensive upper abdominal and retroperitoneal lymphadenopathy as   described   above.   3. Mild-moderate abdominal and pelvic free fluid.   4. Suspected stones and/or debris in gallbladder. No evidence of   intrahepatic   or extrahepatic biliary ductal dilatation.   5. Bilateral pars defects at L5 with grade 1 spondylolisthesis at L5-S1   level.    < end of copied text >    11/23/2019 Echo, 11/24/2019 Report  CONCLUSIONS:   Technically difficult and limited study.  1. Hyperdynamic left ventricular systolic function.  2. Mitral annular calcification and calcified mitral valve leaflets with mild mitral insufficiency.  3. Grossly normal left atrial size.  4. The right ventricle is not well visualized. Grossly, normal right ventricular size and systolic function.  5. Large left pleural effusion which echogenic material noted in the pleural space.         Imaging Personally Reviewed:  [X] YES  [ ] NO    Consultant(s) Notes Reviewed:  [X] YES  [ ] NO    PHYSICAL EXAM:  GENERAL: NAD, comfortable  HEAD: hair loss  EYES: EOMI, conjunctiva and sclera clear  ENMT: Moist mucous membranes  NECK: Supple  NERVOUS SYSTEM:  Alert & Oriented X3, Good concentration; Motor Strength 5/5 B/L upper and lower extremities; sensation grossly intact upper and lower extremities  CHEST/LUNG: mild wheezing and moderate rhonchi b/l  HEART: irregularly irregular; No murmurs, rubs, or gallops  ABDOMEN: Soft, Nontender, Nondistended; Bowel sounds present  EXTREMITIES:  4+ pitting edema b/l LE. 2+ radial pulses.  SKIN: No rashes or lesions    Care Discussed with Consultants/Other Providers [x] YES  [ ] NO Ms. San is a 59y old Male presents with a chief complaint of PNA (25 Nov 2019 14:23)    INTERVAL HPI/OVERNIGHT EVENTS: Pt seen and examined at bedside. Pt admits to SOB this AM while getting up from the chair with his nurse. Pt required one round of nebs post-incident and SOB resolved. Pt denies feeling of dizziness, palpitations, sweating, or LOC during the incident. Pt also admits to watery diarrhea without blood this AM after his senna was given 2/2 no bowel movement for 5 days. No acute events overnight.      T(C): 36.5 (11-25-19 @ 11:53), Max: 36.7 (11-24-19 @ 15:47)  HR: 106 (11-25-19 @ 11:53) (96 - 115)  BP: 103/61 (11-25-19 @ 11:53) (98/61 - 113/56)  RR: 24 (11-25-19 @ 11:53) (18 - 24)  SpO2: 95% (11-25-19 @ 11:53) (94% - 99%)  Wt(kg): --  I&O's Summary    24 Nov 2019 07:01  -  25 Nov 2019 07:00  --------------------------------------------------------  IN: 500 mL / OUT: 1 mL / NET: 499 mL        LABS:                        9.1    8.83  )-----------( 260      ( 25 Nov 2019 06:58 )             28.7     11-25    139  |  102  |  22  ----------------------------<  149<H>  4.1   |  26  |  0.79    Ca    8.6      25 Nov 2019 06:58        MEDICATIONS  (STANDING):  albuterol/ipratropium for Nebulization 3 milliLiter(s) Nebulizer every 6 hours  budesonide 160 MICROgram(s)/formoterol 4.5 MICROgram(s) Inhaler 2 Puff(s) Inhalation two times a day  chlorhexidine 4% Liquid 1 Application(s) Topical daily  digoxin     Tablet 0.25 milliGRAM(s) Oral daily  dronabinol 5 milliGRAM(s) Oral every 12 hours  enoxaparin Injectable 80 milliGRAM(s) SubCutaneous two times a day  loratadine 10 milliGRAM(s) Oral daily  magnesium oxide 400 milliGRAM(s) Oral daily  methylPREDNISolone sodium succinate Injectable 40 milliGRAM(s) IV Push every 8 hours  multivitamin 1 Tablet(s) Oral daily  sodium chloride 1 Gram(s) Oral three times a day  tiotropium 18 MICROgram(s) Capsule 1 Capsule(s) Inhalation daily    MEDICATIONS  (PRN):  guaiFENesin    Syrup 100 milliGRAM(s) Oral every 6 hours PRN Cough  morphine  - Injectable 3 milliGRAM(s) IV Push every 4 hours PRN Severe Pain (7 - 10)  morphine  - Injectable 2 milliGRAM(s) IV Push every 4 hours PRN Moderate Pain (4 - 6)  morphine  - Injectable 1 milliGRAM(s) IV Push every 4 hours PRN Mild Pain (1 - 3)  ondansetron Injectable 4 milliGRAM(s) IV Push every 6 hours PRN Nausea  senna 8.6 milliGRAM(s) Oral Tablet - Peds 1 Tablet(s) Oral at bedtime PRN for constipation      REVIEW OF SYSTEMS:  CONSTITUTIONAL: admits weight loss, fatigue  Head: admits chemo induced alopecia  RESPIRATORY: admits shortness of breath, dry cough, wheezing, no chills, no hemoptysis  CARDIOVASCULAR: admits chronic leg swelling, No chest pain, no palpitations, no dizziness  GASTROINTESTINAL: Admits diarrhea. No abdominal pain. No epigastric pain. No nausea, vomiting, or hematemesis. No melena or hematochezia.  NEUROLOGICAL: No headaches, memory loss, loss of strength, numbness  SKIN: no lesions.  ENDOCRINE: admits hair loss  MUSCULOSKELETAL: No joint pain.  PSYCHIATRIC: No difficulty sleeping.  All other ROS within normal limits.     RADIOLOGY & ADDITIONAL TESTS:  < from: TTE Echo Doppler w/o Cont (11.23.19 @ 15:29) >  CONCLUSIONS:  Technically difficult and limited study.  1. Hyperdynamic left ventricular systolic function.  2. Mitral annular calcification and calcified mitral valve leaflets with   mild mitral insufficiency.  3. Grossly normal left atrial size.  4. The right ventricle is not well visualized. Grossly, normal right   ventricular size and systolic function.  5.  Large left pleural effusion which echogenic material noted in the   pleural space.    < end of copied text >      Imaging Personally Reviewed:  [X] YES  [ ] NO    Consultant(s) Notes Reviewed:  [X] YES  [ ] NO    PHYSICAL EXAM:  GENERAL: NAD, comfortable  HEAD: bald, atraumatic, normocephalic  EYES: EOMI, conjunctiva and sclera clear  ENMT: Moist mucous membranes  NECK: Supple  NERVOUS SYSTEM:  Alert & Oriented X3, Good concentration; Motor Strength 5/5 B/L upper and lower extremities; sensation grossly intact upper and lower extremities  CHEST/LUNG: mild wheezing and moderate rhonchi b/l  HEART: irregularly irregular rate and rhythm; No murmurs, rubs, or gallops  ABDOMEN: Soft, Nontender, Nondistended; Bowel sounds present  EXTREMITIES:  4+ pitting edema b/l LE. 2+ radial pulses.  SKIN: No rashes or lesions    Care Discussed with Consultants/Other Providers [x] YES  [ ] NO

## 2019-11-25 NOTE — PROGRESS NOTE ADULT - PROBLEM SELECTOR PLAN 4
-Will hold toprol and diltiazem given hypotension Recent BP: 103/61.   -Will hold Toprol and Diltiazem given hypotension.

## 2019-11-25 NOTE — PROGRESS NOTE ADULT - SUBJECTIVE AND OBJECTIVE BOX
[facility] -- Gurpreet Bush, Corey Diaz, Sajan Abebe Savella  Office # [phone]      Follow Up:    afib    Subjective/Observations:     Seen at bedside +dyspnea on exertion   Denies chest pain dizziness palpitations n/v/d fever or chills     REVIEW OF SYSTEMS: All other review of systems is negative unless indicated above    PAST MEDICAL & SURGICAL HISTORY:  Lung cancer  Depression  Dyspnea  Mediastinal adenopathy  Pericardial effusion  AF (atrial fibrillation)  Hyponatremia  COPD (chronic obstructive pulmonary disease)  HTN (hypertension)  S/P pericardial     MEDICATIONS  (STANDING):  albuterol/ipratropium for Nebulization 3 milliLiter(s) Nebulizer every 6 hours  budesonide 160 MICROgram(s)/formoterol 4.5 MICROgram(s) Inhaler 2 Puff(s) Inhalation two times a day  chlorhexidine 4% Liquid 1 Application(s) Topical daily  digoxin     Tablet 0.25 milliGRAM(s) Oral daily  dronabinol 5 milliGRAM(s) Oral every 12 hours  enoxaparin Injectable 80 milliGRAM(s) SubCutaneous two times a day  loratadine 10 milliGRAM(s) Oral daily  magnesium oxide 400 milliGRAM(s) Oral daily  methylPREDNISolone sodium succinate Injectable 40 milliGRAM(s) IV Push every 8 hours  multivitamin 1 Tablet(s) Oral daily  sodium chloride 1 Gram(s) Oral three times a day  tiotropium 18 MICROgram(s) Capsule 1 Capsule(s) Inhalation daily    MEDICATIONS  (PRN):  guaiFENesin    Syrup 100 milliGRAM(s) Oral every 6 hours PRN Cough  morphine  - Injectable 3 milliGRAM(s) IV Push every 4 hours PRN Severe Pain (7 - 10)  morphine  - Injectable 2 milliGRAM(s) IV Push every 4 hours PRN Moderate Pain (4 - 6)  morphine  - Injectable 1 milliGRAM(s) IV Push every 4 hours PRN Mild Pain (1 - 3)  ondansetron Injectable 4 milliGRAM(s) IV Push every 6 hours PRN Nausea  senna 8.6 milliGRAM(s) Oral Tablet - Peds 1 Tablet(s) Oral at bedtime PRN for constipation      Allergies    penicillin (Unknown)    Intolerances        Vital Signs Last 24 Hrs  T(C): 36.4 (25 Nov 2019 07:40), Max: 36.7 (24 Nov 2019 15:47)  T(F): 97.5 (25 Nov 2019 07:40), Max: 98 (24 Nov 2019 15:47)  HR: 112 (25 Nov 2019 07:40) (97 - 115)  BP: 98/65 (25 Nov 2019 07:40) (98/61 - 113/73)  BP(mean): --  RR: 18 (25 Nov 2019 07:40) (18 - 19)  SpO2: 95% (25 Nov 2019 07:40) (94% - 100%)    I&O's Summary    24 Nov 2019 07:01  -  25 Nov 2019 07:00  --------------------------------------------------------  IN: 500 mL / OUT: 1 mL / NET: 499 mL          PHYSICAL EXAM:  TELE: afib with periods of RVR  Constitutional: NAD, awake and alert, pale   HEENT: Moist Mucous Membranes, Anicteric  Pulmonary: Non-labored, breath sounds are DIM bilaterally, No wheezing, crackles or rhonchi  Cardiovascular: IRRegular, S1 and S2 nl, No murmurs, rubs, gallops or clicks  Gastrointestinal: Bowel Sounds present, soft, nontender.   Lymph: + LE edema   Skin: No visible rashes or ulcers.  Psych:  Mood & affect appropriate    LABS: All Labs Reviewed:                        9.1    8.83  )-----------( 260      ( 25 Nov 2019 06:58 )             28.7                         8.9    4.59  )-----------( 228      ( 24 Nov 2019 06:09 )             27.8                         8.6    3.58  )-----------( 200      ( 23 Nov 2019 07:22 )             26.6     25 Nov 2019 06:58    139    |  102    |  22     ----------------------------<  149    4.1     |  26     |  0.79   24 Nov 2019 06:09    137    |  99     |  18     ----------------------------<  162    3.0     |  23     |  0.93   23 Nov 2019 07:22    137    |  99     |  17     ----------------------------<  110    3.2     |  28     |  0.54     Ca    8.6        25 Nov 2019 06:58  Ca    8.5        24 Nov 2019 06:09  Ca    7.9        23 Nov 2019 07:22    TPro  5.9    /  Alb  2.7    /  TBili  1.8    /  DBili  x      /  AST  26     /  ALT  21     /  AlkPhos  89     23 Nov 2019 07:22  TPro  5.9    /  Alb  2.8    /  TBili  1.9    /  DBili  x      /  AST  30     /  ALT  21     /  AlkPhos  86     22 Nov 2019 19:37             ECG:    < from: 12 Lead ECG (11.22.19 @ 21:48) >  Ventricular Rate 159 BPM    Atrial Rate 258 BPM    QRS Duration 120 ms    Q-T Interval 296 ms    QTC Calculation(Bezet) 481 ms    R Axis 34 degrees    T Axis 30 degrees    Diagnosis Line Artifact.  Repeat  Confirmed by QAMAR ABEBE ([id number]) on 11/23/2019 1:42:28 PM    < end of copied text >    Echo:    < from: TTE Echo Doppler w/o Cont (11.23.19 @ 15:29) >  CONCLUSIONS:  Technically difficult and limited study.  1. Hyperdynamic left ventricular systolic function.  2. Mitral annular calcification and calcified mitral valve leaflets with   mild mitral insufficiency.  3. Grossly normal left atrial size.  4. The right ventricle is not well visualized. Grossly, normal right   ventricular size and systolic function.  5.  Large left pleural effusion which echogenic material noted in the   pleural space.          < end of copied text >    Radiology:  < from: Xray Chest 1 View- PORTABLE-Urgent (11.23.19 @ 02:19) >    FINDINGS: Heart size cannot be quantitated on this portable radiograph.   No definite superior mediastinal widening is identified. A right-sided   IVAD is identified. Bilateral perihilar opacities are without significant   change; underlying mass lesion/hilar lymphadenopathy cannot be excluded.   Stable linear scarlike opacity identified within the left upper lung   field. Left pleural effusion appears unchanged. No right pleural   effusion. No pneumothorax demonstrated. No mediastinal shift is noted. No   acute osseous fractures are noted.    IMPRESSION: No significant interval change. See above discussion.    < end of copied text >

## 2019-11-25 NOTE — PROVIDER CONTACT NOTE (OTHER) - REASON
Pt's HR sustaining in the 140's,denies CP/CD, lightheadedness, palpitation , dyspnea on excretion noted

## 2019-11-25 NOTE — PROGRESS NOTE ADULT - PROBLEM SELECTOR PLAN 5
-Patient DNR/DNI, MOLST in chart. To be fully filled out by palliative care/morning team  -F/U Palliative care - Patient DNR/DNI, MOLST in chart.   - Seen by Palliative Care today, MARY Monteiro; pt agrees with DNR/DNI.

## 2019-11-25 NOTE — PROGRESS NOTE ADULT - PROBLEM SELECTOR PLAN 6
-Continue digoxin  -Patient not on AC as he states "no one started me"  -Hold toprol and dilt given low BP - Continue Digoxin 0.25 mg PO daily.   - Continue Enoxaparin 80 mg subQ BID.   - Continue to HOLD Toprol and Diltiazem given low BP (103/61 today).

## 2019-11-25 NOTE — PROGRESS NOTE ADULT - SUBJECTIVE AND OBJECTIVE BOX
Patient seen and examined;  Chart reviewed and events noted;   had episode of dyspnea/tachycardia after standing up this morning  has eaten small amounts after starting marinol  per wife more forgetful since WBRT      MEDICATIONS  (STANDING):  albuterol/ipratropium for Nebulization 3 milliLiter(s) Nebulizer every 6 hours  budesonide 160 MICROgram(s)/formoterol 4.5 MICROgram(s) Inhaler 2 Puff(s) Inhalation two times a day  chlorhexidine 4% Liquid 1 Application(s) Topical daily  digoxin     Tablet 0.25 milliGRAM(s) Oral daily  dronabinol 5 milliGRAM(s) Oral every 12 hours  enoxaparin Injectable 80 milliGRAM(s) SubCutaneous two times a day  loratadine 10 milliGRAM(s) Oral daily  magnesium oxide 400 milliGRAM(s) Oral daily  methylPREDNISolone sodium succinate Injectable 40 milliGRAM(s) IV Push every 8 hours  multivitamin 1 Tablet(s) Oral daily  sodium chloride 1 Gram(s) Oral three times a day  tiotropium 18 MICROgram(s) Capsule 1 Capsule(s) Inhalation daily    MEDICATIONS  (PRN):  guaiFENesin    Syrup 100 milliGRAM(s) Oral every 6 hours PRN Cough  morphine  - Injectable 3 milliGRAM(s) IV Push every 4 hours PRN Severe Pain (7 - 10)  morphine  - Injectable 2 milliGRAM(s) IV Push every 4 hours PRN Moderate Pain (4 - 6)  morphine  - Injectable 1 milliGRAM(s) IV Push every 4 hours PRN Mild Pain (1 - 3)  ondansetron Injectable 4 milliGRAM(s) IV Push every 6 hours PRN Nausea  senna 8.6 milliGRAM(s) Oral Tablet - Peds 1 Tablet(s) Oral at bedtime PRN for constipation      Vital Signs Last 24 Hrs  T(C): 36.4 (25 Nov 2019 07:40), Max: 36.7 (24 Nov 2019 15:47)  T(F): 97.5 (25 Nov 2019 07:40), Max: 98 (24 Nov 2019 15:47)  HR: 96 (25 Nov 2019 08:04) (96 - 115)  BP: 98/65 (25 Nov 2019 07:40) (98/61 - 113/73)  RR: 18 (25 Nov 2019 07:40) (18 - 19)  SpO2: 95% (25 Nov 2019 08:04) (94% - 100%)    PHYSICAL EXAM  General: adult in NAD  HEENT: clear oropharynx, anicteric sclera, pink conjunctivae; + alopecia  Neck: supple  CV: normal S1S2 with no murmur rubs or gallops  Lungs: reduced breath sounds at bases  Abdomen: soft non-tender non-distended, no hepato/splenomegaly  Ext: + edema at ankles  Skin: no rashes and no petichiae  Neuro: alert and oriented X3 no focal deficits      LABS:                        9.1    8.83  )-----------( 260      ( 25 Nov 2019 06:58 )             28.7     11-25    139  |  102  |  22  ----------------------------<  149<H>  4.1   |  26  |  0.79    Ca    8.6      25 Nov 2019 06:58

## 2019-11-25 NOTE — PROGRESS NOTE ADULT - PROBLEM SELECTOR PLAN 10
-Lovenox 40 mg subQ daily - Continue Enoxaparin 80 mg subQ daily. - Continue Enoxaparin 80 mg subQ daily for therapeutic treatment of PE.    IMPROVE VTE Individual Risk Assessment    RISK                                                                Points  [  ] Previous VTE                                                  3  [  ] Thrombophilia                                               2  [  ] Lower limb paralysis                                      2        (unable to hold up >15 seconds)    [ x ] Current Cancer                                              2         (within 6 months)  [  ] Immobilization > 24 hrs                                1  [  ] ICU/CCU stay > 24 hours                              1  [ x ] Age > 60                                                      1  IMPROVE VTE Score: 3    IMPROVE Score 0-1: Low Risk, No VTE prophylaxis required for most patients, encourage ambulation.   IMPROVE Score 2-3: At risk, pharmacologic VTE prophylaxis is indicated for most patients (in the absence of a contraindication)  IMPROVE Score > or = 4: High Risk, pharmacologic VTE prophylaxis is indicated for most patients (in the absence of a contraindication) - Continue Enoxaparin 80 mg subQ BID for therapeutic treatment of PE.    IMPROVE VTE Individual Risk Assessment    RISK                                                                Points  [  ] Previous VTE                                                  3  [  ] Thrombophilia                                               2  [  ] Lower limb paralysis                                      2        (unable to hold up >15 seconds)    [ x ] Current Cancer                                              2         (within 6 months)  [  ] Immobilization > 24 hrs                                1  [  ] ICU/CCU stay > 24 hours                              1  [ x ] Age > 60                                                      1  IMPROVE VTE Score: 3    IMPROVE Score 0-1: Low Risk, No VTE prophylaxis required for most patients, encourage ambulation.   IMPROVE Score 2-3: At risk, pharmacologic VTE prophylaxis is indicated for most patients (in the absence of a contraindication)  IMPROVE Score > or = 4: High Risk, pharmacologic VTE prophylaxis is indicated for most patients (in the absence of a contraindication)

## 2019-11-25 NOTE — PROGRESS NOTE ADULT - SUBJECTIVE AND OBJECTIVE BOX
Patient is a 59y old  Male who presents with a chief complaint of PNA (25 Nov 2019 14:23)      INTERVAL HPI/OVERNIGHT EVENTS:    Complains of weakness and anorexia. Shortness of breath has improved    MEDICATIONS  (STANDING):  albuterol/ipratropium for Nebulization 3 milliLiter(s) Nebulizer every 6 hours  budesonide 160 MICROgram(s)/formoterol 4.5 MICROgram(s) Inhaler 2 Puff(s) Inhalation two times a day  chlorhexidine 4% Liquid 1 Application(s) Topical daily  digoxin     Tablet 0.25 milliGRAM(s) Oral daily  dronabinol 5 milliGRAM(s) Oral every 12 hours  enoxaparin Injectable 80 milliGRAM(s) SubCutaneous two times a day  loratadine 10 milliGRAM(s) Oral daily  magnesium oxide 400 milliGRAM(s) Oral daily  methylPREDNISolone sodium succinate Injectable 40 milliGRAM(s) IV Push every 8 hours  multivitamin 1 Tablet(s) Oral daily  sodium chloride 1 Gram(s) Oral three times a day  tiotropium 18 MICROgram(s) Capsule 1 Capsule(s) Inhalation daily      MEDICATIONS  (PRN):  guaiFENesin    Syrup 100 milliGRAM(s) Oral every 6 hours PRN Cough  morphine  - Injectable 3 milliGRAM(s) IV Push every 4 hours PRN Severe Pain (7 - 10)  morphine  - Injectable 2 milliGRAM(s) IV Push every 4 hours PRN Moderate Pain (4 - 6)  morphine  - Injectable 1 milliGRAM(s) IV Push every 4 hours PRN Mild Pain (1 - 3)  ondansetron Injectable 4 milliGRAM(s) IV Push every 6 hours PRN Nausea  senna 8.6 milliGRAM(s) Oral Tablet - Peds 1 Tablet(s) Oral at bedtime PRN for constipation      Allergies    penicillin (Unknown)    Intolerances        PAST MEDICAL & SURGICAL HISTORY:  Lung cancer  Depression  Dyspnea  Mediastinal adenopathy  Pericardial effusion  AF (atrial fibrillation)  Hyponatremia  COPD (chronic obstructive pulmonary disease)  HTN (hypertension)  S/P pericardial window creation      Vital Signs Last 24 Hrs  T(C): 36.6 (25 Nov 2019 16:19), Max: 36.6 (25 Nov 2019 16:19)  T(F): 97.9 (25 Nov 2019 16:19), Max: 97.9 (25 Nov 2019 16:19)  HR: 90 (25 Nov 2019 19:45) (90 - 118)  BP: 93/62 (25 Nov 2019 16:19) (93/62 - 103/68)  BP(mean): --  RR: 22 (25 Nov 2019 16:19) (18 - 24)  SpO2: 96% (25 Nov 2019 19:45) (94% - 97%)    PHYSICAL EXAMINATION:    GENERAL: The patient is awake and alert in no apparent distress.     HEENT: Head is normocephalic and atraumatic. Extraocular muscles are intact. Mucous membranes are moist.    NECK: Supple.    LUNGS: diminished breath sounds both bases    HEART: Regular rate and rhythm without murmur.    ABDOMEN: Soft, nontender, and nondistended.      EXTREMITIES: positive edema bilateral    NEUROLOGIC: Grossly intact.    SKIN: No ulceration or induration present.      LABS:                        9.1    8.83  )-----------( 260      ( 25 Nov 2019 06:58 )             28.7     11-25    139  |  102  |  22  ----------------------------<  149<H>  4.1   |  26  |  0.79    Ca    8.6      25 Nov 2019 06:58                  Serum Pro-Brain Natriuretic Peptide: 1245 pg/mL (11-23-19 @ 07:22)          MICROBIOLOGY:  Culture Results:   No growth (11-23 @ 11:14)  Culture Results:   No growth to date. (11-22 @ 23:09)  Culture Results:   No growth to date. 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

## 2019-11-25 NOTE — PROGRESS NOTE ADULT - ASSESSMENT
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 He also has a history of a pericardial effusion s/p window, and of a pleural effusion s/p thoracentesis.  Now found to have b/l PE's    PE  - CTA chest showed segmental PE's in B/L lower lobe   -  full dose Lovenox as per hem / pulm   - TTE showed hyperdynamic LV, normal RVSF with no significant valvular disease.  However, showed large left pleural effusion  - CT chest showed moderate left pleural effusion- seen by pulmonary recommending steroids, oxygen and Duoneb  - Can keep off of aspirin to minimize risk of bleeding as he is going to be on full dose anticoagulation      Chronic Afib  - Remains in Afib with RVR, mostly during activity- tachycardia multifactorial in setting of underlying lung cancer, PE and anemia   - Continue digoxin - Digoxin level pending   - Monitor and replete lytes.   - He is now on full dose Lovenox to be switched to DOAC.  Monitor for s/s bleeding    HTN  -Blood pressure stable 98/65, continue to hold bb and cardizem       Further cardiac w/u pending clinical course  To follow closely with you  Valentina Elder FNP-C  Cardiology NP  [id number] [phone]

## 2019-11-26 LAB
ANION GAP SERPL CALC-SCNC: 7 MMOL/L — SIGNIFICANT CHANGE UP (ref 5–17)
BUN SERPL-MCNC: 21 MG/DL — SIGNIFICANT CHANGE UP (ref 7–23)
CALCIUM SERPL-MCNC: 8.2 MG/DL — LOW (ref 8.5–10.1)
CHLORIDE SERPL-SCNC: 103 MMOL/L — SIGNIFICANT CHANGE UP (ref 96–108)
CO2 SERPL-SCNC: 29 MMOL/L — SIGNIFICANT CHANGE UP (ref 22–31)
CREAT SERPL-MCNC: 0.68 MG/DL — SIGNIFICANT CHANGE UP (ref 0.5–1.3)
GLUCOSE SERPL-MCNC: 135 MG/DL — HIGH (ref 70–99)
HCT VFR BLD CALC: 27.5 % — LOW (ref 39–50)
HGB BLD-MCNC: 8.7 G/DL — LOW (ref 13–17)
MCHC RBC-ENTMCNC: 26.9 PG — LOW (ref 27–34)
MCHC RBC-ENTMCNC: 31.6 GM/DL — LOW (ref 32–36)
MCV RBC AUTO: 84.9 FL — SIGNIFICANT CHANGE UP (ref 80–100)
NRBC # BLD: 1 /100 WBCS — HIGH (ref 0–0)
PLATELET # BLD AUTO: 238 K/UL — SIGNIFICANT CHANGE UP (ref 150–400)
POTASSIUM SERPL-MCNC: 4 MMOL/L — SIGNIFICANT CHANGE UP (ref 3.5–5.3)
POTASSIUM SERPL-SCNC: 4 MMOL/L — SIGNIFICANT CHANGE UP (ref 3.5–5.3)
RBC # BLD: 3.24 M/UL — LOW (ref 4.2–5.8)
RBC # FLD: 17.2 % — HIGH (ref 10.3–14.5)
SODIUM SERPL-SCNC: 139 MMOL/L — SIGNIFICANT CHANGE UP (ref 135–145)
WBC # BLD: 9.5 K/UL — SIGNIFICANT CHANGE UP (ref 3.8–10.5)
WBC # FLD AUTO: 9.5 K/UL — SIGNIFICANT CHANGE UP (ref 3.8–10.5)

## 2019-11-26 PROCEDURE — 99233 SBSQ HOSP IP/OBS HIGH 50: CPT

## 2019-11-26 RX ORDER — ACETYLCYSTEINE 200 MG/ML
5 VIAL (ML) MISCELLANEOUS
Refills: 0 | Status: DISCONTINUED | OUTPATIENT
Start: 2019-11-26 | End: 2019-12-06

## 2019-11-26 RX ORDER — METOPROLOL TARTRATE 50 MG
25 TABLET ORAL DAILY
Refills: 0 | Status: DISCONTINUED | OUTPATIENT
Start: 2019-11-26 | End: 2019-11-27

## 2019-11-26 RX ADMIN — CHLORHEXIDINE GLUCONATE 1 APPLICATION(S): 213 SOLUTION TOPICAL at 15:48

## 2019-11-26 RX ADMIN — SODIUM CHLORIDE 1 GRAM(S): 9 INJECTION INTRAMUSCULAR; INTRAVENOUS; SUBCUTANEOUS at 22:18

## 2019-11-26 RX ADMIN — SODIUM CHLORIDE 1 GRAM(S): 9 INJECTION INTRAMUSCULAR; INTRAVENOUS; SUBCUTANEOUS at 15:48

## 2019-11-26 RX ADMIN — ENOXAPARIN SODIUM 80 MILLIGRAM(S): 100 INJECTION SUBCUTANEOUS at 18:58

## 2019-11-26 RX ADMIN — Medication 1 TABLET(S): at 15:48

## 2019-11-26 RX ADMIN — Medication 40 MILLIGRAM(S): at 18:59

## 2019-11-26 RX ADMIN — LORATADINE 10 MILLIGRAM(S): 10 TABLET ORAL at 15:48

## 2019-11-26 RX ADMIN — MAGNESIUM OXIDE 400 MG ORAL TABLET 400 MILLIGRAM(S): 241.3 TABLET ORAL at 15:48

## 2019-11-26 RX ADMIN — Medication 3 MILLILITER(S): at 00:30

## 2019-11-26 RX ADMIN — BUDESONIDE AND FORMOTEROL FUMARATE DIHYDRATE 2 PUFF(S): 160; 4.5 AEROSOL RESPIRATORY (INHALATION) at 05:22

## 2019-11-26 RX ADMIN — Medication 40 MILLIGRAM(S): at 05:22

## 2019-11-26 RX ADMIN — Medication 3 MILLILITER(S): at 07:33

## 2019-11-26 RX ADMIN — Medication 0.25 MILLIGRAM(S): at 05:22

## 2019-11-26 RX ADMIN — Medication 3 MILLILITER(S): at 15:06

## 2019-11-26 RX ADMIN — TIOTROPIUM BROMIDE 1 CAPSULE(S): 18 CAPSULE ORAL; RESPIRATORY (INHALATION) at 05:22

## 2019-11-26 RX ADMIN — ENOXAPARIN SODIUM 80 MILLIGRAM(S): 100 INJECTION SUBCUTANEOUS at 05:22

## 2019-11-26 RX ADMIN — Medication 5 MILLIGRAM(S): at 19:35

## 2019-11-26 RX ADMIN — Medication 5 MILLIGRAM(S): at 05:22

## 2019-11-26 RX ADMIN — SODIUM CHLORIDE 1 GRAM(S): 9 INJECTION INTRAMUSCULAR; INTRAVENOUS; SUBCUTANEOUS at 05:22

## 2019-11-26 RX ADMIN — Medication 3 MILLILITER(S): at 19:56

## 2019-11-26 NOTE — PROGRESS NOTE ADULT - PROBLEM SELECTOR PLAN 2
- CTA Chest 11/22/2019 suggestive of pleural-based metastatic disease.  - CTA Abd and Pelvis 11/22/2019: - Possible pancreatic mass-neoplasm with multifocal hepatic metastasis versus pancreatic and hepatic metastasis. Extensive upper abdominal and retroperitoneal lymphadenopathy as described above. Mild-moderate abdominal and pelvic free fluid.   - Seen by PulmDr. Soto; continue O2 and AC, Duoneb 4x daily, taper IV steroids.  - Continue Morphine 1mg or 2mg or 3mg IVp q4H PRN, re: mild or mod or severe pain  - Continue Senna 8.6 mg 1 tab PO QHS.  - Continue Odansetron 4 mg IVp q6H PRN, re: nausea.   - Continue Magnesium Oxide 400 mg PO daily.   - Continue Dronabinol 5 mg PO q12H.  - MRI ordered, f/u.

## 2019-11-26 NOTE — ADVANCED PRACTICE NURSE CONSULT - RECOMMEDATIONS
1. RN educated in the care of this patients skin care and prevention   1a: No diaper  1b: Maintain moisture free environment  1c: Apply cavilon daily and criticaid q shift and PRN  1d: help this patient reposition his self  2.. NM made aware of the potential for skin breakdown with this patient

## 2019-11-26 NOTE — PROGRESS NOTE ADULT - PROBLEM SELECTOR PLAN 9
- Continue to HOLD Lasix, per cardio rec.  - Re-evaluate in AM. Continue to hold Lasix as patient's BP low and echo showing hyperdynamic LV function  - Re-evaluate in AM.

## 2019-11-26 NOTE — PROGRESS NOTE ADULT - PROBLEM SELECTOR PLAN 6
- Continue Digoxin 0.25 mg PO daily.   - Continue Enoxaparin 80 mg subQ BID.   - Continue to HOLD Diltiazem.  - Restart Metoprolol 25 mg PO daily with hold parameters (home dose: 200 mg PO daily). - Patient DNR/DNI, MOLST in chart.   - Seen by Palliative Care, MARY Monteiro; pt agrees with DNR/DNI.

## 2019-11-26 NOTE — PROGRESS NOTE ADULT - PROBLEM SELECTOR PLAN 10
- Continue Enoxaparin 80 mg subQ BID for therapeutic treatment of PE.    IMPROVE VTE Individual Risk Assessment    RISK                                                                Points  [  ] Previous VTE                                                  3  [  ] Thrombophilia                                               2  [  ] Lower limb paralysis                                      2        (unable to hold up >15 seconds)    [ x ] Current Cancer                                              2         (within 6 months)  [  ] Immobilization > 24 hrs                                1  [  ] ICU/CCU stay > 24 hours                              1  [ x ] Age > 60                                                      1  IMPROVE VTE Score: 3    IMPROVE Score 0-1: Low Risk, No VTE prophylaxis required for most patients, encourage ambulation.   IMPROVE Score 2-3: At risk, pharmacologic VTE prophylaxis is indicated for most patients (in the absence of a contraindication)  IMPROVE Score > or = 4: High Risk, pharmacologic VTE prophylaxis is indicated for most patients (in the absence of a contraindication)

## 2019-11-26 NOTE — PROGRESS NOTE ADULT - PROBLEM SELECTOR PLAN 5
- Patient DNR/DNI, MOLST in chart.   - Seen by Palliative Care today, MARY Monteiro; pt agrees with DNR/DNI. - Patient DNR/DNI, MOLST in chart.   - Seen by Palliative Care, MARY Monteiro; pt agrees with DNR/DNI. BP soft but holding above 90/60s  - Continue to HOLD Diltiazem.  - Restart Metoprolol 25 mg PO daily with hold parameters  (home dose: 200mg PO daily).

## 2019-11-26 NOTE — CHART NOTE - NSCHARTNOTEFT_GEN_A_CORE
Assessment/Follow up: Pt A+Ox4. Continues on regular diet with ensure enlive 8oz TID. Per pt somewhat improved appetite/po intake over past few days since starting Marinol; intake % per EMR x 4 days. Pt reports consuming ~50% breakfast entree today 11/26. Pt reports consuming ~2 bottles ensure enlive per/day (vanilla or strawberry). Additional food preferences/meal alternatives obtained. No report N/V. Large loose yellow stool 11/25. Per Heme/Onc overall prognosis poor given rapid disease progression.     Factors impacting intake: [ ] none [ ] nausea  [ ] vomiting [ ] diarrhea [ ] constipation  [ ]chewing problems [ ] swallowing issues  [x ] other: lack of appetite, metastatic cancer    Diet Presciption: Diet, Regular:   Supplement Feeding Modality:  Oral  Ensure Enlive Cans or Servings Per Day:  1       Frequency:  Three Times a day (11-22-19 @ 22:48)    Intake: Fair; % documented in EMR past 4 days    Current Weight: Weight (kg): 81.6 (11-22 @ 18:25); request current weight to assess, leona feet/ankles/knees/legs 3+edema    Pertinent Medications: MEDICATIONS  (STANDING):  albuterol/ipratropium for Nebulization 3 milliLiter(s) Nebulizer every 6 hours  budesonide 160 MICROgram(s)/formoterol 4.5 MICROgram(s) Inhaler 2 Puff(s) Inhalation two times a day  chlorhexidine 4% Liquid 1 Application(s) Topical daily  digoxin     Tablet 0.25 milliGRAM(s) Oral daily  dronabinol 5 milliGRAM(s) Oral every 12 hours  enoxaparin Injectable 80 milliGRAM(s) SubCutaneous two times a day  loratadine 10 milliGRAM(s) Oral daily  magnesium oxide 400 milliGRAM(s) Oral daily  methylPREDNISolone sodium succinate Injectable 40 milliGRAM(s) IV Push two times a day  multivitamin 1 Tablet(s) Oral daily  sodium chloride 1 Gram(s) Oral three times a day  tiotropium 18 MICROgram(s) Capsule 1 Capsule(s) Inhalation daily    MEDICATIONS  (PRN):  guaiFENesin    Syrup 100 milliGRAM(s) Oral every 6 hours PRN Cough  morphine  - Injectable 3 milliGRAM(s) IV Push every 4 hours PRN Severe Pain (7 - 10)  morphine  - Injectable 2 milliGRAM(s) IV Push every 4 hours PRN Moderate Pain (4 - 6)  morphine  - Injectable 1 milliGRAM(s) IV Push every 4 hours PRN Mild Pain (1 - 3)  ondansetron Injectable 4 milliGRAM(s) IV Push every 6 hours PRN Nausea  senna 8.6 milliGRAM(s) Oral Tablet - Peds 1 Tablet(s) Oral at bedtime PRN for constipation    Pertinent Labs: 11-26 Na139 mmol/L Glu 135 mg/dL<H> K+ 4.0 mmol/L Cr  0.68 mg/dL BUN 21 mg/dL 11-23 Alb 2.7 g/dL<L>     CAPILLARY BLOOD GLUCOSE        Skin: leona buttocks unstageable pressure ulcers. Aditya 17.     Estimated Needs:   [x ] no change since previous assessment  [ ] recalculated:     Previous Nutrition Diagnosis:   [ ] Inadequate Energy Intake [ ]Inadequate Oral Intake [ ] Excessive Energy Intake   [ ] Underweight [ ] Increased Nutrient Needs [ ] Overweight/Obesity   [ ] Altered GI Function [ ] Unintended Weight Loss [ ] Food & Nutrition Related Knowledge Deficit [x ] Malnutrition     Nutrition Diagnosis is [x ] ongoing  [ ] resolved [ ] not applicable     New Nutrition Diagnosis: [ x] not applicable       Interventions:   Recommend  [ ] Change Diet To:  [ ] Nutrition Supplement  [ ] Nutrition Support  [x ] Other: 500mg Vit C BID, continued encouragement with meals/snacks and ensure enlive supplement.     Monitoring and Evaluation:   [x ] PO intake [ x ] Tolerance to diet prescription [ x ] weights [ x ] labs[ x ] follow up per protocol  [ ] other:

## 2019-11-26 NOTE — PROGRESS NOTE ADULT - PROBLEM SELECTOR PLAN 4
Recent BP: 114/71.   - Continue to HOLD Diltiazem.  - Restart Metoprolol 25 mg PO daily with hold parameters  (home dose: 200mg PO daily). - Continue Tiotropium 18mcg 1 cap inhalation daily.  - Continue Albuterol/Ipratropium 3mL Nebs q6H.  - Continue Budesonide 160 mcg/Formoterol 4.5 mcg 2 puff inhalation BID.

## 2019-11-26 NOTE — PROGRESS NOTE ADULT - ASSESSMENT
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 He also has a history of a pericardial effusion s/p window, and of a pleural effusion s/p thoracentesis.  Now found to have b/l PE's    PE  - CTA chest showed segmental PE's in B/L lower lobe   -  full dose Lovenox as per hem / pulm   - TTE showed hyperdynamic LV, normal RVSF with no significant valvular disease.  However, showed large left pleural effusion  - CT chest showed moderate left pleural effusion- seen by pulmonary recommending steroids, oxygen and Duoneb  - Can keep off of aspirin to minimize risk of bleeding as he is going to be on full dose anticoagulation      Chronic Afib  - Remains in Afib with RVR, mostly during activity- tachycardia multifactorial in setting of underlying lung cancer, PE and anemia-  if systolic bp remains > 100 can resume toprol but start at 25 mg daily ( takes 200mg daily  at home )   - Continue digoxin - Digoxin level pending   - Monitor and replete lytes.   -  on full dose Lovenox to be switched to DOAC.  Monitor for s/s bleeding    HTN  -on cardizem and toprol at home was held on admission for hypotension now improved 114/71      Further cardiac w/u pending clinical course  To follow closely with you  Valentina GALLEGOP-C  Cardiology NP  [facility] [phone] 59 M PMH atrial fibrillation, COPD, HTN, LE edema 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 He also has a history of a pericardial effusion s/p window, and of a pleural effusion s/p thoracentesis.  Now found to have b/l PE's    PE  - CTA chest showed segmental PE's in B/L lower lobe   -  full dose Lovenox as per hem / pulm   - TTE showed hyperdynamic LV, normal RVSF with no significant valvular disease.  However, showed large left pleural effusion  - CT chest showed moderate left pleural effusion- seen by pulmonary recommending steroids, oxygen and Duoneb  - Can keep off of aspirin to minimize risk of bleeding as he is going to be on full dose anticoagulation      Chronic Afib  - Remains in Afib with RVR, mostly during activity- tachycardia multifactorial in setting of underlying lung cancer, PE and anemia-  if systolic bp remains > 100 can resume toprol but start at 25 mg daily ( takes 200mg daily  at home )   - Continue digoxin - Digoxin level pending   - Monitor and replete lytes.   -  on full dose Lovenox to be switched to DOAC.  Monitor for s/s bleeding    HTN  -on cardizem and toprol at home was held on admission for hypotension now improved 114/71  - will start bb    Monitor and replete electrolytes. Keep K>4.0 and Mg>2.0.   Further cardiac w/u pending clinical course  To follow closely with you  Valentina Elder FNP-C  Cardiology NP  [facility] [phone]

## 2019-11-26 NOTE — PROGRESS NOTE ADULT - SUBJECTIVE AND OBJECTIVE BOX
All interim records and events noted.    up in chair, no sig SOB      MEDICATIONS  (STANDING):  albuterol/ipratropium for Nebulization 3 milliLiter(s) Nebulizer every 6 hours  budesonide 160 MICROgram(s)/formoterol 4.5 MICROgram(s) Inhaler 2 Puff(s) Inhalation two times a day  chlorhexidine 4% Liquid 1 Application(s) Topical daily  digoxin     Tablet 0.25 milliGRAM(s) Oral daily  dronabinol 5 milliGRAM(s) Oral every 12 hours  enoxaparin Injectable 80 milliGRAM(s) SubCutaneous two times a day  loratadine 10 milliGRAM(s) Oral daily  magnesium oxide 400 milliGRAM(s) Oral daily  methylPREDNISolone sodium succinate Injectable 40 milliGRAM(s) IV Push two times a day  multivitamin 1 Tablet(s) Oral daily  sodium chloride 1 Gram(s) Oral three times a day  tiotropium 18 MICROgram(s) Capsule 1 Capsule(s) Inhalation daily    MEDICATIONS  (PRN):  guaiFENesin    Syrup 100 milliGRAM(s) Oral every 6 hours PRN Cough  morphine  - Injectable 3 milliGRAM(s) IV Push every 4 hours PRN Severe Pain (7 - 10)  morphine  - Injectable 2 milliGRAM(s) IV Push every 4 hours PRN Moderate Pain (4 - 6)  morphine  - Injectable 1 milliGRAM(s) IV Push every 4 hours PRN Mild Pain (1 - 3)  ondansetron Injectable 4 milliGRAM(s) IV Push every 6 hours PRN Nausea  senna 8.6 milliGRAM(s) Oral Tablet - Peds 1 Tablet(s) Oral at bedtime PRN for constipation      Vital Signs Last 24 Hrs  T(C): 36.5 (26 Nov 2019 08:00), Max: 36.8 (25 Nov 2019 23:45)  T(F): 97.7 (26 Nov 2019 08:00), Max: 98.2 (25 Nov 2019 23:45)  HR: 114 (26 Nov 2019 08:00) (82 - 124)  BP: 114/71 (26 Nov 2019 08:00) (91/58 - 114/71)  BP(mean): --  RR: 19 (26 Nov 2019 08:00) (19 - 24)  SpO2: 100% (26 Nov 2019 08:00) (95% - 100%)    PHYSICAL EXAM  General: well developed  well nourished but frail appearing man sitting in chair, in no acute distress  Head: atraumatic, normocephalic, alopecia  ENT: sclera anicteric, buccal mucosa moist  Neck: supple, trachea midline  CV: S1 S2, regular rate and rhythm  Lungs: clear to auscultation, no wheezes/rhonchi  Abdomen: soft, nontender, bowel sounds present, pouchy  Extrem: 2+leona legs edema to below knees  Skin: no significant increased ecchymosis/petechiae  Neuro: alert and oriented, responds appropriately      LABS:             8.7    9.50  )-----------( 238      ( 11-26 @ 06:14 )             27.5                9.1    8.83  )-----------( 260      ( 11-25 @ 06:58 )             28.7                8.9    4.59  )-----------( 228      ( 11-24 @ 06:09 )             27.8       11-26    139  |  103  |  21  ----------------------------<  135<H>  4.0   |  29  |  0.68    Ca    8.2<L>      26 Nov 2019 06:14      11-22 @ 19:37  PT18.8 INR1.64  PTT27.1      RADIOLOGY & ADDITIONAL STUDIES:    IMPRESSION/RECOMMENDATIONS:

## 2019-11-26 NOTE — PROGRESS NOTE ADULT - ATTENDING COMMENTS
I saw and examined the patient personally. Spoke with above provider regarding this case. I reviewed the above findings completely.  I agree with the above history, physical, and plan which I have edited where appropriate. I saw and examined the patient personally. Spoke with above provider regarding this case. I reviewed the above findings completely.  I agree with the above history, physical, and plan which I have edited where appropriate.    still vol ol but likely will not tolerate aggressive diuresis.   cont dig for AF  start toprol

## 2019-11-26 NOTE — PROGRESS NOTE ADULT - SUBJECTIVE AND OBJECTIVE BOX
[facility] -- Gurpreet Bush, Corey Diaz Pannella, Patel, Savella  Office # [phone]      Follow Up:    afib     Subjective/Observations:   Seen at bedside sitting in chair denies chest pain dizziness improvement in dyspnea     REVIEW OF SYSTEMS: All other review of systems is negative unless indicated above    PAST MEDICAL & SURGICAL HISTORY:  Lung cancer  Depression  Dyspnea  Mediastinal adenopathy  Pericardial effusion  AF (atrial fibrillation)  Hyponatremia  COPD (chronic obstructive pulmonary disease)  HTN (hypertension)  S/P pericardial window creation      MEDICATIONS  (STANDING):  albuterol/ipratropium for Nebulization 3 milliLiter(s) Nebulizer every 6 hours  budesonide 160 MICROgram(s)/formoterol 4.5 MICROgram(s) Inhaler 2 Puff(s) Inhalation two times a day  chlorhexidine 4% Liquid 1 Application(s) Topical daily  digoxin     Tablet 0.25 milliGRAM(s) Oral daily  dronabinol 5 milliGRAM(s) Oral every 12 hours  enoxaparin Injectable 80 milliGRAM(s) SubCutaneous two times a day  loratadine 10 milliGRAM(s) Oral daily  magnesium oxide 400 milliGRAM(s) Oral daily  methylPREDNISolone sodium succinate Injectable 40 milliGRAM(s) IV Push two times a day  multivitamin 1 Tablet(s) Oral daily  sodium chloride 1 Gram(s) Oral three times a day  tiotropium 18 MICROgram(s) Capsule 1 Capsule(s) Inhalation daily    MEDICATIONS  (PRN):  guaiFENesin    Syrup 100 milliGRAM(s) Oral every 6 hours PRN Cough  morphine  - Injectable 3 milliGRAM(s) IV Push every 4 hours PRN Severe Pain (7 - 10)  morphine  - Injectable 2 milliGRAM(s) IV Push every 4 hours PRN Moderate Pain (4 - 6)  morphine  - Injectable 1 milliGRAM(s) IV Push every 4 hours PRN Mild Pain (1 - 3)  ondansetron Injectable 4 milliGRAM(s) IV Push every 6 hours PRN Nausea  senna 8.6 milliGRAM(s) Oral Tablet - Peds 1 Tablet(s) Oral at bedtime PRN for constipation      Allergies    penicillin (Unknown)    Intolerances        Vital Signs Last 24 Hrs  T(C): 36.5 (26 Nov 2019 08:00), Max: 36.8 (25 Nov 2019 23:45)  T(F): 97.7 (26 Nov 2019 08:00), Max: 98.2 (25 Nov 2019 23:45)  HR: 114 (26 Nov 2019 08:00) (82 - 124)  BP: 114/71 (26 Nov 2019 08:00) (91/58 - 114/71)  BP(mean): --  RR: 19 (26 Nov 2019 08:00) (19 - 24)  SpO2: 100% (26 Nov 2019 08:00) (95% - 100%)    I&O's Summary    25 Nov 2019 07:01  -  26 Nov 2019 07:00  --------------------------------------------------------  IN: 100 mL / OUT: 0 mL / NET: 100 mL          PHYSICAL EXAM:  TELE: afib   Constitutional: NAD, awake and alert  HEENT: Moist Mucous Membranes, Anicteric  Pulmonary: Non-labored, breath sounds are DIM bilaterally, No wheezing, crackles or rhonchi  Cardiovascular: IRRegular, S1 and S2 nl, No murmurs, rubs, gallops or clicks  Gastrointestinal: Bowel Sounds present, soft, nontender.   Lymph:+ LE bilateral edema   Skin: No visible rashes or ulcers., ACW port   Psych:  Mood & affect appropriate    LABS: All Labs Reviewed:                        8.7    9.50  )-----------( 238      ( 26 Nov 2019 06:14 )             27.5                         9.1    8.83  )-----------( 260      ( 25 Nov 2019 06:58 )             28.7                         8.9    4.59  )-----------( 228      ( 24 Nov 2019 06:09 )             27.8     26 Nov 2019 06:14    139    |  103    |  21     ----------------------------<  135    4.0     |  29     |  0.68   25 Nov 2019 06:58    139    |  102    |  22     ----------------------------<  149    4.1     |  26     |  0.79   24 Nov 2019 06:09    137    |  99     |  18     ----------------------------<  162    3.0     |  23     |  0.93     Ca    8.2        26 Nov 2019 06:14  Ca    8.6        25 Nov 2019 06:58  Ca    8.5        24 Nov 2019 06:09               ECG:    < from: 12 Lead ECG (11.22.19 @ 21:48) >    Ventricular Rate 159 BPM    Atrial Rate 258 BPM    QRS Duration 120 ms    Q-T Interval 296 ms    QTC Calculation(Bezet) 481 ms    R Axis 34 degrees    T Axis 30 degrees    Diagnosis Line Artifact.  Repeat    < end of copied text >      Echo:    < from: TTE Echo Doppler w/o Cont (11.23.19 @ 15:29) >  CONCLUSIONS:  Technically difficult and limited study.  1. Hyperdynamic left ventricular systolic function.  2. Mitral annular calcification and calcified mitral valve leaflets with   mild mitral insufficiency.  3. Grossly normal left atrial size.  4. The right ventricle is not well visualized. Grossly, normal right   ventricular size and systolic function.  5.  Large left pleural effusion which echogenic material noted in the   pleural space.          < end of copied text >    Radiology:    < from: Xray Chest 1 View- PORTABLE-Urgent (11.23.19 @ 02:19) >  VIEWS: Portable AP radiography of the chest performed.    FINDINGS: Heart size cannot be quantitated on this portable radiograph.   No definite superior mediastinal widening is identified. A right-sided   IVAD is identified. Bilateral perihilar opacities are without significant   change; underlying mass lesion/hilar lymphadenopathy cannot be excluded.   Stable linear scarlike opacity identified within the left upper lung   field. Left pleural effusion appears unchanged. No right pleural   effusion. No pneumothorax demonstrated. No mediastinal shift is noted. No   acute osseous fractures are noted.    IMPRESSION: No significant interval change. See above discussion.    < end of copied text > [facility] -- Gurpreet Bush, Corey Diaz Pannella, Patel, Savella  Office # [phone]      Follow Up:    afib     Subjective/Observations:   Seen at bedside sitting in chair denies chest pain dizziness improvement in dyspnea     REVIEW OF SYSTEMS: All other review of systems is negative unless indicated above    PAST MEDICAL & SURGICAL HISTORY:  Lung cancer  Depression  Dyspnea  Mediastinal adenopathy  Pericardial effusion  AF (atrial fibrillation)  Hyponatremia  COPD (chronic obstructive pulmonary disease)  HTN (hypertension)  S/P pericardial window creation      MEDICATIONS  (STANDING):  albuterol/ipratropium for Nebulization 3 milliLiter(s) Nebulizer every 6 hours  budesonide 160 MICROgram(s)/formoterol 4.5 MICROgram(s) Inhaler 2 Puff(s) Inhalation two times a day  chlorhexidine 4% Liquid 1 Application(s) Topical daily  digoxin     Tablet 0.25 milliGRAM(s) Oral daily  dronabinol 5 milliGRAM(s) Oral every 12 hours  enoxaparin Injectable 80 milliGRAM(s) SubCutaneous two times a day  loratadine 10 milliGRAM(s) Oral daily  magnesium oxide 400 milliGRAM(s) Oral daily  methylPREDNISolone sodium succinate Injectable 40 milliGRAM(s) IV Push two times a day  multivitamin 1 Tablet(s) Oral daily  sodium chloride 1 Gram(s) Oral three times a day  tiotropium 18 MICROgram(s) Capsule 1 Capsule(s) Inhalation daily    MEDICATIONS  (PRN):  guaiFENesin    Syrup 100 milliGRAM(s) Oral every 6 hours PRN Cough  morphine  - Injectable 3 milliGRAM(s) IV Push every 4 hours PRN Severe Pain (7 - 10)  morphine  - Injectable 2 milliGRAM(s) IV Push every 4 hours PRN Moderate Pain (4 - 6)  morphine  - Injectable 1 milliGRAM(s) IV Push every 4 hours PRN Mild Pain (1 - 3)  ondansetron Injectable 4 milliGRAM(s) IV Push every 6 hours PRN Nausea  senna 8.6 milliGRAM(s) Oral Tablet - Peds 1 Tablet(s) Oral at bedtime PRN for constipation      Allergies    penicillin (Unknown)    Intolerances        Vital Signs Last 24 Hrs  T(C): 36.5 (26 Nov 2019 08:00), Max: 36.8 (25 Nov 2019 23:45)  T(F): 97.7 (26 Nov 2019 08:00), Max: 98.2 (25 Nov 2019 23:45)  HR: 114 (26 Nov 2019 08:00) (82 - 124)  BP: 114/71 (26 Nov 2019 08:00) (91/58 - 114/71)  BP(mean): --  RR: 19 (26 Nov 2019 08:00) (19 - 24)  SpO2: 100% (26 Nov 2019 08:00) (95% - 100%)    I&O's Summary    25 Nov 2019 07:01  -  26 Nov 2019 07:00  --------------------------------------------------------  IN: 100 mL / OUT: 0 mL / NET: 100 mL          PHYSICAL EXAM:  TELE: afib   Constitutional: NAD, awake and alert  HEENT: Moist Mucous Membranes, Anicteric  Pulmonary: Non-labored, breath sounds are DIM bilaterally, No wheezing, crackles or rhonchi  Cardiovascular: IRRegular, tachy, S1 and S2 nl, No murmurs, rubs, gallops or clicks  Gastrointestinal: Bowel Sounds present, soft, nontender.   Lymph:+ LE bilateral edema   Skin: No visible rashes or ulcers., ACW port   Psych:  Mood & affect appropriate    LABS: All Labs Reviewed:                        8.7    9.50  )-----------( 238      ( 26 Nov 2019 06:14 )             27.5                         9.1    8.83  )-----------( 260      ( 25 Nov 2019 06:58 )             28.7                         8.9    4.59  )-----------( 228      ( 24 Nov 2019 06:09 )             27.8     26 Nov 2019 06:14    139    |  103    |  21     ----------------------------<  135    4.0     |  29     |  0.68   25 Nov 2019 06:58    139    |  102    |  22     ----------------------------<  149    4.1     |  26     |  0.79   24 Nov 2019 06:09    137    |  99     |  18     ----------------------------<  162    3.0     |  23     |  0.93     Ca    8.2        26 Nov 2019 06:14  Ca    8.6        25 Nov 2019 06:58  Ca    8.5        24 Nov 2019 06:09               ECG:    < from: 12 Lead ECG (11.22.19 @ 21:48) >    Ventricular Rate 159 BPM    Atrial Rate 258 BPM    QRS Duration 120 ms    Q-T Interval 296 ms    QTC Calculation(Bezet) 481 ms    R Axis 34 degrees    T Axis 30 degrees    Diagnosis Line Artifact.  Repeat    < end of copied text >      Echo:    < from: TTE Echo Doppler w/o Cont (11.23.19 @ 15:29) >  CONCLUSIONS:  Technically difficult and limited study.  1. Hyperdynamic left ventricular systolic function.  2. Mitral annular calcification and calcified mitral valve leaflets with   mild mitral insufficiency.  3. Grossly normal left atrial size.  4. The right ventricle is not well visualized. Grossly, normal right   ventricular size and systolic function.  5.  Large left pleural effusion which echogenic material noted in the   pleural space.          < end of copied text >    Radiology:    < from: Xray Chest 1 View- PORTABLE-Urgent (11.23.19 @ 02:19) >  VIEWS: Portable AP radiography of the chest performed.    FINDINGS: Heart size cannot be quantitated on this portable radiograph.   No definite superior mediastinal widening is identified. A right-sided   IVAD is identified. Bilateral perihilar opacities are without significant   change; underlying mass lesion/hilar lymphadenopathy cannot be excluded.   Stable linear scarlike opacity identified within the left upper lung   field. Left pleural effusion appears unchanged. No right pleural   effusion. No pneumothorax demonstrated. No mediastinal shift is noted. No   acute osseous fractures are noted.    IMPRESSION: No significant interval change. See above discussion.    < end of copied text >

## 2019-11-26 NOTE — PROGRESS NOTE ADULT - PROBLEM SELECTOR PLAN 3
- Continue Tiotropium 18mcg 1 cap inhalation daily.  - Continue Albuterol/Ipratropium 3mL Nebs q6H.  - Continue Budesonide 160 mcg/Formoterol 4.5 mcg 2 puff inhalation BID. - Continue Digoxin 0.25 mg PO daily.   - Continue Enoxaparin 80 mg subQ BID.   - Continue to HOLD Diltiazem.  - Restart Metoprolol 25 mg PO daily with hold parameters (home dose: 200 mg PO daily).

## 2019-11-26 NOTE — PROGRESS NOTE ADULT - SUBJECTIVE AND OBJECTIVE BOX
Patient is a 59y old  Male who presents with a chief complaint of PNA (26 Nov 2019 10:46)      INTERVAL HPI/OVERNIGHT EVENTS:    Feels better but continues to have cough and difficulty clearing secretions    MEDICATIONS  (STANDING):  albuterol/ipratropium for Nebulization 3 milliLiter(s) Nebulizer every 6 hours  budesonide 160 MICROgram(s)/formoterol 4.5 MICROgram(s) Inhaler 2 Puff(s) Inhalation two times a day  chlorhexidine 4% Liquid 1 Application(s) Topical daily  digoxin     Tablet 0.25 milliGRAM(s) Oral daily  dronabinol 5 milliGRAM(s) Oral every 12 hours  enoxaparin Injectable 80 milliGRAM(s) SubCutaneous two times a day  loratadine 10 milliGRAM(s) Oral daily  magnesium oxide 400 milliGRAM(s) Oral daily  methylPREDNISolone sodium succinate Injectable 40 milliGRAM(s) IV Push two times a day  metoprolol succinate ER 25 milliGRAM(s) Oral daily  multivitamin 1 Tablet(s) Oral daily  sodium chloride 1 Gram(s) Oral three times a day  tiotropium 18 MICROgram(s) Capsule 1 Capsule(s) Inhalation daily      MEDICATIONS  (PRN):  guaiFENesin    Syrup 100 milliGRAM(s) Oral every 6 hours PRN Cough  morphine  - Injectable 3 milliGRAM(s) IV Push every 4 hours PRN Severe Pain (7 - 10)  morphine  - Injectable 2 milliGRAM(s) IV Push every 4 hours PRN Moderate Pain (4 - 6)  morphine  - Injectable 1 milliGRAM(s) IV Push every 4 hours PRN Mild Pain (1 - 3)  ondansetron Injectable 4 milliGRAM(s) IV Push every 6 hours PRN Nausea  senna 8.6 milliGRAM(s) Oral Tablet - Peds 1 Tablet(s) Oral at bedtime PRN for constipation      Allergies    penicillin (Unknown)    Intolerances        PAST MEDICAL & SURGICAL HISTORY:  Lung cancer  Depression  Dyspnea  Mediastinal adenopathy  Pericardial effusion  AF (atrial fibrillation)  Hyponatremia  COPD (chronic obstructive pulmonary disease)  HTN (hypertension)  S/P pericardial window creation      Vital Signs Last 24 Hrs  T(C): 36.3 (26 Nov 2019 20:05), Max: 36.8 (25 Nov 2019 23:45)  T(F): 97.3 (26 Nov 2019 20:05), Max: 98.2 (25 Nov 2019 23:45)  HR: 116 (26 Nov 2019 20:05) (82 - 120)  BP: 114/73 (26 Nov 2019 20:05) (97/63 - 114/73)  BP(mean): --  RR: 20 (26 Nov 2019 20:05) (19 - 20)  SpO2: 100% (26 Nov 2019 20:05) (96% - 100%)    PHYSICAL EXAMINATION:    GENERAL: The patient is awake and alert in no apparent distress.     HEENT: Head is normocephalic and atraumatic. Extraocular muscles are intact. Mucous membranes are moist.    NECK: Supple.    LUNGS: bilateral expiratory wheezes with prolonged expiration    HEART: Regular rate and rhythm without murmur.    ABDOMEN: Soft, nontender, and nondistended.      EXTREMITIES: massive edema bilateral    NEUROLOGIC: Grossly intact.    SKIN: No ulceration or induration present.      LABS:                        8.7    9.50  )-----------( 238      ( 26 Nov 2019 06:14 )             27.5     11-26    139  |  103  |  21  ----------------------------<  135<H>  4.0   |  29  |  0.68    Ca    8.2<L>      26 Nov 2019 06:14                          MICROBIOLOGY:  Culture Results:   No growth (11-23 @ 11:14)  Culture Results:   No growth to date. (11-22 @ 23:09)  Culture Results:   No growth to date. (11-22 @ 23:09)      RADIOLOGY & ADDITIONAL STUDIES:    Assessment:    COPD with exacerbation  Acute Hypoxic respiratory failure  Small cell CA of lung    Plan:    Taper steroids  Add mucomyst and continue Duoneb  Oxygen  Oncology follow-up Patient is a 59y old  Male who presents with a chief complaint of PNA (26 Nov 2019 10:46)      INTERVAL HPI/OVERNIGHT EVENTS:    Feels better but continues to have cough and difficulty clearing secretions    MEDICATIONS  (STANDING):  albuterol/ipratropium for Nebulization 3 milliLiter(s) Nebulizer every 6 hours  budesonide 160 MICROgram(s)/formoterol 4.5 MICROgram(s) Inhaler 2 Puff(s) Inhalation two times a day  chlorhexidine 4% Liquid 1 Application(s) Topical daily  digoxin     Tablet 0.25 milliGRAM(s) Oral daily  dronabinol 5 milliGRAM(s) Oral every 12 hours  enoxaparin Injectable 80 milliGRAM(s) SubCutaneous two times a day  loratadine 10 milliGRAM(s) Oral daily  magnesium oxide 400 milliGRAM(s) Oral daily  methylPREDNISolone sodium succinate Injectable 40 milliGRAM(s) IV Push two times a day  metoprolol succinate ER 25 milliGRAM(s) Oral daily  multivitamin 1 Tablet(s) Oral daily  sodium chloride 1 Gram(s) Oral three times a day  tiotropium 18 MICROgram(s) Capsule 1 Capsule(s) Inhalation daily      MEDICATIONS  (PRN):  guaiFENesin    Syrup 100 milliGRAM(s) Oral every 6 hours PRN Cough  morphine  - Injectable 3 milliGRAM(s) IV Push every 4 hours PRN Severe Pain (7 - 10)  morphine  - Injectable 2 milliGRAM(s) IV Push every 4 hours PRN Moderate Pain (4 - 6)  morphine  - Injectable 1 milliGRAM(s) IV Push every 4 hours PRN Mild Pain (1 - 3)  ondansetron Injectable 4 milliGRAM(s) IV Push every 6 hours PRN Nausea  senna 8.6 milliGRAM(s) Oral Tablet - Peds 1 Tablet(s) Oral at bedtime PRN for constipation      Allergies    penicillin (Unknown)    Intolerances        PAST MEDICAL & SURGICAL HISTORY:  Lung cancer  Depression  Dyspnea  Mediastinal adenopathy  Pericardial effusion  AF (atrial fibrillation)  Hyponatremia  COPD (chronic obstructive pulmonary disease)  HTN (hypertension)  S/P pericardial window creation      Vital Signs Last 24 Hrs  T(C): 36.3 (26 Nov 2019 20:05), Max: 36.8 (25 Nov 2019 23:45)  T(F): 97.3 (26 Nov 2019 20:05), Max: 98.2 (25 Nov 2019 23:45)  HR: 116 (26 Nov 2019 20:05) (82 - 120)  BP: 114/73 (26 Nov 2019 20:05) (97/63 - 114/73)  BP(mean): --  RR: 20 (26 Nov 2019 20:05) (19 - 20)  SpO2: 100% (26 Nov 2019 20:05) (96% - 100%)    PHYSICAL EXAMINATION:    GENERAL: The patient is awake and alert in no apparent distress.     HEENT: Head is normocephalic and atraumatic. Extraocular muscles are intact. Mucous membranes are moist.    NECK: Supple.    LUNGS: bilateral expiratory wheezes with prolonged expiration    HEART: Regular rate and rhythm without murmur.    ABDOMEN: Soft, nontender, and nondistended.      EXTREMITIES: massive edema bilateral    NEUROLOGIC: Grossly intact.    SKIN: No ulceration or induration present.      LABS:                        8.7    9.50  )-----------( 238      ( 26 Nov 2019 06:14 )             27.5     11-26    139  |  103  |  21  ----------------------------<  135<H>  4.0   |  29  |  0.68    Ca    8.2<L>      26 Nov 2019 06:14                          MICROBIOLOGY:  Culture Results:   No growth (11-23 @ 11:14)  Culture Results:   No growth to date. (11-22 @ 23:09)  Culture Results:   No growth to date. (11-22 @ 23:09)      RADIOLOGY & ADDITIONAL STUDIES:    Assessment:    COPD with exacerbation  Acute Hypoxic respiratory failure  Pulmonary emboli  Small cell CA of lung    Plan:    Continue Lovenox  Taper steroids  Add mucomyst and continue Duoneb  Oxygen  Oncology follow-up

## 2019-11-26 NOTE — PROGRESS NOTE ADULT - PROBLEM SELECTOR PLAN 1
likely multifactorial from b/l PE, PNA, and metastatic pleural effusions (patient has a past history of 2 pleural effusions requiring drainage as well cardiac window)  - CXR consistent with infiltrates and w small L pleural effusion  - Note, patient s/p 2.6L with pleural effusion and underlying SOB but no evidence of CHF but will be cautious with further fluid administration given baseline edema  - CTA Chest 11/22/2019 suggestive of pleural-based metastatic disease.  - Echo (11/23/2019) Hyperdynamic left ventricular systolic function. Large left pleural effusion which echogenic material noted in the pleural space.  - Seen by Pulm, Dr. Soto; recs appreciated.  - Continue supplemental O2.   - Continue Enoxaparin 80 mg BID for PE, eventually transition to DOAC per heme/onc rec  - Taper Steroids: Start Methylprednisolone 40 mg IVp q12H.  - Continue Albuterol/Ipratropium 3mL Nebs q6H. likely multifactorial from b/l PE, PNA, and metastatic pleural effusions (patient has a past history of 2 pleural effusions requiring drainage as well cardiac window)  - CXR consistent with infiltrates and w small L pleural effusion  - Note, patient s/p 2.6L with pleural effusion and underlying SOB but no evidence of CHF. Will be cautious with further fluid administration given baseline edema  - CTA Chest 11/22/2019 suggestive of pleural-based metastatic disease.  - Echo (11/23/2019) Hyperdynamic left ventricular systolic function. Large left pleural effusion which echogenic material noted in the pleural space.  - Seen by Pulm, Dr. Soto; recs appreciated.  - Continue supplemental O2.   - Continue Enoxaparin 80 mg BID for PE, eventually transition to DOAC per heme/onc rec  - Taper Steroids: Start Methylprednisolone 40 mg IVp q12H.  - Continue Albuterol/Ipratropium 3mL Nebs q6H.

## 2019-11-26 NOTE — PROGRESS NOTE ADULT - ASSESSMENT
60 yo man well known to our group, w met small cell ca of lung first presented as limited stage [id number] post Carbo//16/Atezolizumab with concurrent RT with initial response but then brain mets 8/2019 s/p WBRT and systemic disease progression on PET/CT 9/2019 with intra-abdominal, bone and pleural disease, started on weekly Taxol.  Adm now with weakness and dyspnea; found to have pulmonary embolism and further disease progression with pleural effusion, bone lesions, liver lesions and pancreatic mass.    - continue Lovenox, can then transition to DOAC when patient ready to discharge  - to complete re-staging scans, will check MRI brain during this admission. Wife brought in discs from [facility] for Radiology department to compare  - continue acute care

## 2019-11-26 NOTE — PROGRESS NOTE ADULT - SUBJECTIVE AND OBJECTIVE BOX
SONI RAFA  59y  Male      Patient is a 59y old  Male who presents with a chief complaint of PNA (26 Nov 2019 08:42)      INTERVAL HPI/OVERNIGHT EVENTS:        REVIEW OF SYSTEMS:  CONSTITUTIONAL: No fever, weight loss, or fatigue  EYES: No eye pain, visual disturbances, or discharge  ENMT:  No difficulty hearing, tinnitus, vertigo; No sinus or throat pain  NECK: No pain or stiffness  BREASTS: No pain, masses, or nipple discharge  RESPIRATORY: No cough, wheezing, chills or hemoptysis; No shortness of breath  CARDIOVASCULAR: No chest pain, palpitations, dizziness, or leg swelling  GASTROINTESTINAL: No abdominal or epigastric pain. No nausea, vomiting, or hematemesis; No diarrhea or constipation. No melena or hematochezia.  GENITOURINARY: No dysuria, frequency, hematuria, or incontinence  NEUROLOGICAL: No headaches, memory loss, loss of strength, numbness, or tremors  SKIN: No itching, burning, rashes, or lesions   LYMPH NODES: No enlarged glands  ENDOCRINE: No heat or cold intolerance; No hair loss  MUSCULOSKELETAL: No joint pain or swelling; No muscle, back, or extremity pain  PSYCHIATRIC: No depression, anxiety, mood swings, or difficulty sleeping  HEME/LYMPH: No easy bruising, or bleeding gums  ALLERY AND IMMUNOLOGIC: No hives or eczema    T(C): 36.5 (11-26-19 @ 08:00), Max: 36.8 (11-25-19 @ 23:45)  HR: 114 (11-26-19 @ 08:00) (82 - 124)  BP: 114/71 (11-26-19 @ 08:00) (91/58 - 114/71)  RR: 19 (11-26-19 @ 08:00) (19 - 24)  SpO2: 100% (11-26-19 @ 08:00) (95% - 100%)  Wt(kg): --Vital Signs Last 24 Hrs  T(C): 36.5 (26 Nov 2019 08:00), Max: 36.8 (25 Nov 2019 23:45)  T(F): 97.7 (26 Nov 2019 08:00), Max: 98.2 (25 Nov 2019 23:45)  HR: 114 (26 Nov 2019 08:00) (82 - 124)  BP: 114/71 (26 Nov 2019 08:00) (91/58 - 114/71)  BP(mean): --  RR: 19 (26 Nov 2019 08:00) (19 - 24)  SpO2: 100% (26 Nov 2019 08:00) (95% - 100%)    PHYSICAL EXAM:  GENERAL: NAD, well-groomed, well-developed  HEAD:  Atraumatic, Normocephalic  EYES: EOMI, PERRLA, conjunctiva and sclera clear  ENMT: No tonsillar erythema, exudates, or enlargement; Moist mucous membranes, Good dentition, No lesions  NECK: Supple, No JVD, Normal thyroid  NERVOUS SYSTEM:  Alert & Oriented X3, Good concentration; Motor Strength 5/5 B/L upper and lower extremities; DTRs 2+ intact and symmetric  CHEST/LUNG: Clear to percussion bilaterally; No rales, rhonchi, wheezing, or rubs  HEART: Regular rate and rhythm; No murmurs, rubs, or gallops  ABDOMEN: Soft, Nontender, Nondistended; Bowel sounds present  EXTREMITIES:  2+ Peripheral Pulses, No clubbing, cyanosis, or edema  LYMPH: No lymphadenopathy noted  SKIN: No rashes or lesions    Consultant(s) Notes Reviewed:  [x ] YES  [ ] NO  Care Discussed with Consultants/Other Providers [ x] YES  [ ] NO    LABS:                        8.7    9.50  )-----------( 238      ( 26 Nov 2019 06:14 )             27.5     11-26    139  |  103  |  21  ----------------------------<  135<H>  4.0   |  29  |  0.68    Ca    8.2<L>      26 Nov 2019 06:14          CAPILLARY BLOOD GLUCOSE                RADIOLOGY & ADDITIONAL TESTS:    Imaging Personally Reviewed:  [ ] YES  [ ] NO    HEALTH ISSUES - PROBLEM Dx:  AF (atrial fibrillation): AF (atrial fibrillation)  Need for prophylactic measure: Need for prophylactic measure  Lower extremity edema: Lower extremity edema  Pressure ulcer: Pressure ulcer  Thrombophlebitis: Thrombophlebitis  DNR (do not resuscitate): DNR (do not resuscitate)  HTN (hypertension): HTN (hypertension)  COPD (chronic obstructive pulmonary disease): COPD (chronic obstructive pulmonary disease)  Lung cancer: Lung cancer  SOB (shortness of breath): SOB (shortness of breath) Mr. San is a 59y old  Male who presents with a chief complaint of PNA (26 Nov 2019 08:42)    INTERVAL HPI/OVERNIGHT EVENTS: Pt seen and examined at bedside. Pt still admits to shortness of breath, improved from yesterday, when getting up out of his chair. Pt denies chest pain, dizziness, or palpitations. Pt denies a BM since yesterday AM's watery diarrhea s/p senna. No acute events overnight.    REVIEW OF SYSTEMS:  CONSTITUTIONAL: admits weight loss, fatigue  Head: admits chemo induced alopecia  RESPIRATORY: admits shortness of breath, no cough, no wheezing, no chills, no hemoptysis  CARDIOVASCULAR: admits chronic leg swelling, no chest pain, no palpitations, no dizziness  GASTROINTESTINAL: No BM since yesterday AM. No abdominal pain. No epigastric pain. No nausea, vomiting, or hematemesis. No melena or hematochezia.  NEUROLOGICAL: No headaches, memory loss, loss of strength, numbness  SKIN: no lesions.  ENDOCRINE: admits hair loss  MUSCULOSKELETAL: No joint pain.  PSYCHIATRIC: No difficulty sleeping.  All other ROS within normal limits.     T(C): 36.5 (11-26-19 @ 08:00), Max: 36.8 (11-25-19 @ 23:45)  HR: 114 (11-26-19 @ 08:00) (82 - 124)  BP: 114/71 (11-26-19 @ 08:00) (91/58 - 114/71)  RR: 19 (11-26-19 @ 08:00) (19 - 24)  SpO2: 100% (11-26-19 @ 08:00) (95% - 100%)  Wt(kg): --Vital Signs Last 24 Hrs  T(C): 36.5 (26 Nov 2019 08:00), Max: 36.8 (25 Nov 2019 23:45)  T(F): 97.7 (26 Nov 2019 08:00), Max: 98.2 (25 Nov 2019 23:45)  HR: 114 (26 Nov 2019 08:00) (82 - 124)  BP: 114/71 (26 Nov 2019 08:00) (91/58 - 114/71)  BP(mean): --  RR: 19 (26 Nov 2019 08:00) (19 - 24)  SpO2: 100% (26 Nov 2019 08:00) (95% - 100%)      PHYSICAL EXAM:  GENERAL: NAD, comfortable  HEAD: bald, atraumatic, normocephalic  EYES: EOMI, conjunctiva and sclera clear  ENMT: Moist mucous membranes  NECK: Supple  NERVOUS SYSTEM:  Alert & Oriented X3, Good concentration; Motor Strength 5/5 B/L upper and lower extremities; sensation grossly intact upper and lower extremities  CHEST/LUNG: mild wheezing and moderate rhonchi b/l  HEART: irregularly irregular rate and rhythm; No murmurs, rubs, or gallops  ABDOMEN: Soft, Nontender, Nondistended; Bowel sounds present  EXTREMITIES:  4+ pitting edema b/l LE. 2+ radial pulses.  SKIN: No rashes or lesions        Consultant(s) Notes Reviewed:  [x ] YES  [ ] NO  Care Discussed with Consultants/Other Providers [ x] YES  [ ] NO    LABS:                        8.7    9.50  )-----------( 238      ( 26 Nov 2019 06:14 )             27.5     11-26    139  |  103  |  21  ----------------------------<  135<H>  4.0   |  29  |  0.68    Ca    8.2<L>      26 Nov 2019 06:14          CAPILLARY BLOOD GLUCOSE          RADIOLOGY & ADDITIONAL TESTS:  None    Imaging Personally Reviewed:  [X] YES  [ ] NO    HEALTH ISSUES - PROBLEM Dx:  AF (atrial fibrillation): AF (atrial fibrillation)  Need for prophylactic measure: Need for prophylactic measure  Lower extremity edema: Lower extremity edema  Pressure ulcer: Pressure ulcer  Thrombophlebitis: Thrombophlebitis  DNR (do not resuscitate): DNR (do not resuscitate)  HTN (hypertension): HTN (hypertension)  COPD (chronic obstructive pulmonary disease): COPD (chronic obstructive pulmonary disease)  Lung cancer: Lung cancer  SOB (shortness of breath): SOB (shortness of breath) Mr. San is a 59y old  Male who presents with a chief complaint of PNA (26 Nov 2019 08:42)    INTERVAL HPI/OVERNIGHT EVENTS: Pt seen and examined at bedside. Pt still admits to shortness of breath, improved from yesterday, when getting up out of his chair. Pt denies chest pain, dizziness, or palpitations. Pt denies a BM since yesterday AM's watery diarrhea s/p senna. Overnight, patient was tachycardic at 140s.     REVIEW OF SYSTEMS:  CONSTITUTIONAL: admits weight loss, fatigue  Head: admits chemo induced alopecia  RESPIRATORY: admits shortness of breath, no cough, no wheezing, no chills, no hemoptysis  CARDIOVASCULAR: admits chronic leg swelling, no chest pain, no palpitations, no dizziness  GASTROINTESTINAL: No BM since yesterday AM. No abdominal pain. No epigastric pain. No nausea, vomiting, or hematemesis. No melena or hematochezia.  NEUROLOGICAL: No headaches, memory loss, loss of strength, numbness  SKIN: no lesions.  ENDOCRINE: admits hair loss  MUSCULOSKELETAL: No joint pain.  PSYCHIATRIC: No difficulty sleeping.  All other ROS within normal limits.     T(C): 36.5 (11-26-19 @ 08:00), Max: 36.8 (11-25-19 @ 23:45)  HR: 114 (11-26-19 @ 08:00) (82 - 124)  BP: 114/71 (11-26-19 @ 08:00) (91/58 - 114/71)  RR: 19 (11-26-19 @ 08:00) (19 - 24)  SpO2: 100% (11-26-19 @ 08:00) (95% - 100%)  Wt(kg): --Vital Signs Last 24 Hrs  T(C): 36.5 (26 Nov 2019 08:00), Max: 36.8 (25 Nov 2019 23:45)  T(F): 97.7 (26 Nov 2019 08:00), Max: 98.2 (25 Nov 2019 23:45)  HR: 114 (26 Nov 2019 08:00) (82 - 124)  BP: 114/71 (26 Nov 2019 08:00) (91/58 - 114/71)  BP(mean): --  RR: 19 (26 Nov 2019 08:00) (19 - 24)  SpO2: 100% (26 Nov 2019 08:00) (95% - 100%)    PHYSICAL EXAM:  GENERAL: NAD, comfortable  HEAD: bald, atraumatic, normocephalic  EYES: EOMI, conjunctiva and sclera clear  ENMT: Moist mucous membranes  NECK: Supple  NERVOUS SYSTEM:  Alert & Oriented X3, Good concentration; Motor Strength 5/5 B/L upper and lower extremities; sensation grossly intact upper and lower extremities  CHEST/LUNG: clear to auscultation b/l. no wheezing. no rhonchi.  HEART: irregularly irregular rate and rhythm; No murmurs, rubs, or gallops  ABDOMEN: Soft, Nontender, Nondistended; Bowel sounds present  EXTREMITIES:  2+ pitting edema b/l LE. 2+ radial pulses. nonpalpable dorsalis pedis due to edema.   SKIN: No rashes or lesions        Consultant(s) Notes Reviewed:  [x ] YES  [ ] NO  Care Discussed with Consultants/Other Providers [ x] YES  [ ] NO    LABS:                        8.7    9.50  )-----------( 238      ( 26 Nov 2019 06:14 )             27.5     11-26    139  |  103  |  21  ----------------------------<  135<H>  4.0   |  29  |  0.68    Ca    8.2<L>      26 Nov 2019 06:14          CAPILLARY BLOOD GLUCOSE    RADIOLOGY & ADDITIONAL TESTS:  < from: CT Angio Chest w/ IV Cont (11.22.19 @ 21:41) >  Lungs:    Bilateral groundglass mosaic attenuation, most pronounced in the right   upper and lower lung zones.  Thick-walled left apicalbleb.  Paraseptal emphysematous changes lung apices.  Subpleural areas of scarring/fibrosis left upper and superior segment   left lower lobes.    Pleural space: Moderate-large left pleural effusion nodule left-sided   pleural thickening is suggestive of pleural-based metastatic disease.      < end of copied text >      Imaging Personally Reviewed:  [X] YES  [ ] NO    HEALTH ISSUES - PROBLEM Dx:  AF (atrial fibrillation): AF (atrial fibrillation)  Need for prophylactic measure: Need for prophylactic measure  Lower extremity edema: Lower extremity edema  Pressure ulcer: Pressure ulcer  Thrombophlebitis: Thrombophlebitis  DNR (do not resuscitate): DNR (do not resuscitate)  HTN (hypertension): HTN (hypertension)  COPD (chronic obstructive pulmonary disease): COPD (chronic obstructive pulmonary disease)  Lung cancer: Lung cancer  SOB (shortness of breath): SOB (shortness of breath) Mr. San is a 59y old  Male who presents with a chief complaint of PNA (26 Nov 2019 08:42)    INTERVAL HPI/OVERNIGHT EVENTS: Pt seen and examined at bedside. Pt still admits to shortness of breath, improved from yesterday, when getting up out of his chair. Pt denies chest pain, dizziness, or palpitations. Pt denies a BM since yesterday AM's watery diarrhea s/p senna. Overnight, patient was tachycardic at 140s but was asymptomatic.     REVIEW OF SYSTEMS:  CONSTITUTIONAL: admits weight loss, fatigue  Head: admits chemo induced alopecia  RESPIRATORY: admits shortness of breath, no cough, no wheezing, no chills, no hemoptysis  CARDIOVASCULAR: admits chronic leg swelling, no chest pain, no palpitations, no dizziness  GASTROINTESTINAL: No BM since yesterday AM. No abdominal pain. No epigastric pain. No nausea, vomiting, or hematemesis. No melena or hematochezia.  NEUROLOGICAL: No headaches, memory loss, loss of strength, numbness  SKIN: no lesions.  ENDOCRINE: admits hair loss  MUSCULOSKELETAL: No joint pain.  PSYCHIATRIC: No difficulty sleeping.  All other ROS within normal limits.     T(C): 36.5 (11-26-19 @ 08:00), Max: 36.8 (11-25-19 @ 23:45)  HR: 114 (11-26-19 @ 08:00) (82 - 124)  BP: 114/71 (11-26-19 @ 08:00) (91/58 - 114/71)  RR: 19 (11-26-19 @ 08:00) (19 - 24)  SpO2: 100% (11-26-19 @ 08:00) (95% - 100%)  Wt(kg): --Vital Signs Last 24 Hrs  T(C): 36.5 (26 Nov 2019 08:00), Max: 36.8 (25 Nov 2019 23:45)  T(F): 97.7 (26 Nov 2019 08:00), Max: 98.2 (25 Nov 2019 23:45)  HR: 114 (26 Nov 2019 08:00) (82 - 124)  BP: 114/71 (26 Nov 2019 08:00) (91/58 - 114/71)  BP(mean): --  RR: 19 (26 Nov 2019 08:00) (19 - 24)  SpO2: 100% (26 Nov 2019 08:00) (95% - 100%)    PHYSICAL EXAM:  GENERAL: NAD, comfortable  HEAD: bald, atraumatic, normocephalic  EYES: EOMI, conjunctiva and sclera clear  ENMT: Moist mucous membranes  NECK: Supple  NERVOUS SYSTEM:  Alert & Oriented X3, Good concentration; Motor Strength 5/5 B/L upper and lower extremities; sensation grossly intact upper and lower extremities  CHEST/LUNG: clear to auscultation b/l. no wheezing. no rhonchi.  HEART: irregularly irregular rate and rhythm; No murmurs, rubs, or gallops  ABDOMEN: Soft, Nontender, Nondistended; Bowel sounds present  EXTREMITIES:  3+ pitting edema b/l LE. 2+ radial pulses. nonpalpable dorsalis pedis due to edema.   SKIN: No rashes or lesions        Consultant(s) Notes Reviewed:  [x ] YES  [ ] NO  Care Discussed with Consultants/Other Providers [ x] YES  [ ] NO    LABS:                        8.7    9.50  )-----------( 238      ( 26 Nov 2019 06:14 )             27.5     11-26    139  |  103  |  21  ----------------------------<  135<H>  4.0   |  29  |  0.68    Ca    8.2<L>      26 Nov 2019 06:14          CAPILLARY BLOOD GLUCOSE    RADIOLOGY & ADDITIONAL TESTS:  < from: CT Angio Chest w/ IV Cont (11.22.19 @ 21:41) >  Lungs:    Bilateral groundglass mosaic attenuation, most pronounced in the right   upper and lower lung zones.  Thick-walled left apicalbleb.  Paraseptal emphysematous changes lung apices.  Subpleural areas of scarring/fibrosis left upper and superior segment   left lower lobes.    Pleural space: Moderate-large left pleural effusion nodule left-sided   pleural thickening is suggestive of pleural-based metastatic disease.      < end of copied text >      Imaging Personally Reviewed:  [X] YES  [ ] NO    HEALTH ISSUES - PROBLEM Dx:  AF (atrial fibrillation): AF (atrial fibrillation)  Need for prophylactic measure: Need for prophylactic measure  Lower extremity edema: Lower extremity edema  Pressure ulcer: Pressure ulcer  Thrombophlebitis: Thrombophlebitis  DNR (do not resuscitate): DNR (do not resuscitate)  HTN (hypertension): HTN (hypertension)  COPD (chronic obstructive pulmonary disease): COPD (chronic obstructive pulmonary disease)  Lung cancer: Lung cancer  SOB (shortness of breath): SOB (shortness of breath)

## 2019-11-27 LAB
ANION GAP SERPL CALC-SCNC: 7 MMOL/L — SIGNIFICANT CHANGE UP (ref 5–17)
APTT BLD: 29.6 SEC — SIGNIFICANT CHANGE UP (ref 28.5–37)
BUN SERPL-MCNC: 24 MG/DL — HIGH (ref 7–23)
CALCIUM SERPL-MCNC: 9.1 MG/DL — SIGNIFICANT CHANGE UP (ref 8.5–10.1)
CHLORIDE SERPL-SCNC: 104 MMOL/L — SIGNIFICANT CHANGE UP (ref 96–108)
CO2 SERPL-SCNC: 30 MMOL/L — SIGNIFICANT CHANGE UP (ref 22–31)
CREAT SERPL-MCNC: 0.81 MG/DL — SIGNIFICANT CHANGE UP (ref 0.5–1.3)
GLUCOSE SERPL-MCNC: 143 MG/DL — HIGH (ref 70–99)
HCT VFR BLD CALC: 29.7 % — LOW (ref 39–50)
HGB BLD-MCNC: 9.3 G/DL — LOW (ref 13–17)
MCHC RBC-ENTMCNC: 27 PG — SIGNIFICANT CHANGE UP (ref 27–34)
MCHC RBC-ENTMCNC: 31.3 GM/DL — LOW (ref 32–36)
MCV RBC AUTO: 86.3 FL — SIGNIFICANT CHANGE UP (ref 80–100)
NRBC # BLD: 1 /100 WBCS — HIGH (ref 0–0)
PLATELET # BLD AUTO: 228 K/UL — SIGNIFICANT CHANGE UP (ref 150–400)
POTASSIUM SERPL-MCNC: 4.7 MMOL/L — SIGNIFICANT CHANGE UP (ref 3.5–5.3)
POTASSIUM SERPL-SCNC: 4.7 MMOL/L — SIGNIFICANT CHANGE UP (ref 3.5–5.3)
RBC # BLD: 3.44 M/UL — LOW (ref 4.2–5.8)
RBC # FLD: 18 % — HIGH (ref 10.3–14.5)
SODIUM SERPL-SCNC: 141 MMOL/L — SIGNIFICANT CHANGE UP (ref 135–145)
WBC # BLD: 12.91 K/UL — HIGH (ref 3.8–10.5)
WBC # FLD AUTO: 12.91 K/UL — HIGH (ref 3.8–10.5)

## 2019-11-27 PROCEDURE — 99233 SBSQ HOSP IP/OBS HIGH 50: CPT

## 2019-11-27 RX ORDER — LEVALBUTEROL 1.25 MG/.5ML
0.63 SOLUTION, CONCENTRATE RESPIRATORY (INHALATION) EVERY 6 HOURS
Refills: 0 | Status: DISCONTINUED | OUTPATIENT
Start: 2019-11-27 | End: 2019-12-06

## 2019-11-27 RX ORDER — HEPARIN SODIUM 5000 [USP'U]/ML
6500 INJECTION INTRAVENOUS; SUBCUTANEOUS EVERY 6 HOURS
Refills: 0 | Status: DISCONTINUED | OUTPATIENT
Start: 2019-11-27 | End: 2019-12-04

## 2019-11-27 RX ORDER — HEPARIN SODIUM 5000 [USP'U]/ML
6500 INJECTION INTRAVENOUS; SUBCUTANEOUS ONCE
Refills: 0 | Status: COMPLETED | OUTPATIENT
Start: 2019-11-27 | End: 2019-11-27

## 2019-11-27 RX ORDER — METOPROLOL TARTRATE 50 MG
50 TABLET ORAL DAILY
Refills: 0 | Status: DISCONTINUED | OUTPATIENT
Start: 2019-11-28 | End: 2019-11-28

## 2019-11-27 RX ORDER — MORPHINE SULFATE 50 MG/1
1 CAPSULE, EXTENDED RELEASE ORAL ONCE
Refills: 0 | Status: DISCONTINUED | OUTPATIENT
Start: 2019-11-27 | End: 2019-11-27

## 2019-11-27 RX ORDER — FUROSEMIDE 40 MG
20 TABLET ORAL ONCE
Refills: 0 | Status: COMPLETED | OUTPATIENT
Start: 2019-11-27 | End: 2019-11-27

## 2019-11-27 RX ORDER — HEPARIN SODIUM 5000 [USP'U]/ML
3000 INJECTION INTRAVENOUS; SUBCUTANEOUS EVERY 6 HOURS
Refills: 0 | Status: DISCONTINUED | OUTPATIENT
Start: 2019-11-27 | End: 2019-12-04

## 2019-11-27 RX ORDER — HEPARIN SODIUM 5000 [USP'U]/ML
INJECTION INTRAVENOUS; SUBCUTANEOUS
Qty: 25000 | Refills: 0 | Status: DISCONTINUED | OUTPATIENT
Start: 2019-11-27 | End: 2019-11-29

## 2019-11-27 RX ORDER — IPRATROPIUM BROMIDE 0.2 MG/ML
500 SOLUTION, NON-ORAL INHALATION EVERY 6 HOURS
Refills: 0 | Status: DISCONTINUED | OUTPATIENT
Start: 2019-11-27 | End: 2019-12-06

## 2019-11-27 RX ORDER — METOPROLOL TARTRATE 50 MG
25 TABLET ORAL ONCE
Refills: 0 | Status: COMPLETED | OUTPATIENT
Start: 2019-11-27 | End: 2019-11-27

## 2019-11-27 RX ADMIN — Medication 4 MILLILITER(S): at 19:08

## 2019-11-27 RX ADMIN — CHLORHEXIDINE GLUCONATE 1 APPLICATION(S): 213 SOLUTION TOPICAL at 16:54

## 2019-11-27 RX ADMIN — Medication 1 TABLET(S): at 11:26

## 2019-11-27 RX ADMIN — SODIUM CHLORIDE 1 GRAM(S): 9 INJECTION INTRAMUSCULAR; INTRAVENOUS; SUBCUTANEOUS at 21:33

## 2019-11-27 RX ADMIN — HEPARIN SODIUM 6500 UNIT(S): 5000 INJECTION INTRAVENOUS; SUBCUTANEOUS at 18:04

## 2019-11-27 RX ADMIN — Medication 25 MILLIGRAM(S): at 12:44

## 2019-11-27 RX ADMIN — SODIUM CHLORIDE 1 GRAM(S): 9 INJECTION INTRAMUSCULAR; INTRAVENOUS; SUBCUTANEOUS at 05:56

## 2019-11-27 RX ADMIN — SODIUM CHLORIDE 1 GRAM(S): 9 INJECTION INTRAMUSCULAR; INTRAVENOUS; SUBCUTANEOUS at 17:28

## 2019-11-27 RX ADMIN — Medication 5 MILLIGRAM(S): at 05:56

## 2019-11-27 RX ADMIN — Medication 5 MILLILITER(S): at 14:22

## 2019-11-27 RX ADMIN — ENOXAPARIN SODIUM 80 MILLIGRAM(S): 100 INJECTION SUBCUTANEOUS at 05:56

## 2019-11-27 RX ADMIN — MORPHINE SULFATE 2 MILLIGRAM(S): 50 CAPSULE, EXTENDED RELEASE ORAL at 17:52

## 2019-11-27 RX ADMIN — MORPHINE SULFATE 2 MILLIGRAM(S): 50 CAPSULE, EXTENDED RELEASE ORAL at 17:37

## 2019-11-27 RX ADMIN — Medication 20 MILLIGRAM(S): at 11:26

## 2019-11-27 RX ADMIN — Medication 3 MILLILITER(S): at 00:51

## 2019-11-27 RX ADMIN — BUDESONIDE AND FORMOTEROL FUMARATE DIHYDRATE 2 PUFF(S): 160; 4.5 AEROSOL RESPIRATORY (INHALATION) at 05:56

## 2019-11-27 RX ADMIN — Medication 3 MILLILITER(S): at 07:29

## 2019-11-27 RX ADMIN — Medication 0.25 MILLIGRAM(S): at 05:56

## 2019-11-27 RX ADMIN — BUDESONIDE AND FORMOTEROL FUMARATE DIHYDRATE 2 PUFF(S): 160; 4.5 AEROSOL RESPIRATORY (INHALATION) at 18:39

## 2019-11-27 RX ADMIN — HEPARIN SODIUM 1500 UNIT(S)/HR: 5000 INJECTION INTRAVENOUS; SUBCUTANEOUS at 18:04

## 2019-11-27 RX ADMIN — Medication 40 MILLIGRAM(S): at 05:56

## 2019-11-27 RX ADMIN — MORPHINE SULFATE 1 MILLIGRAM(S): 50 CAPSULE, EXTENDED RELEASE ORAL at 19:27

## 2019-11-27 RX ADMIN — Medication 500 MICROGRAM(S): at 14:22

## 2019-11-27 RX ADMIN — Medication 5 MILLIGRAM(S): at 17:28

## 2019-11-27 RX ADMIN — Medication 5 MILLILITER(S): at 07:29

## 2019-11-27 RX ADMIN — Medication 40 MILLIGRAM(S): at 17:29

## 2019-11-27 RX ADMIN — MORPHINE SULFATE 1 MILLIGRAM(S): 50 CAPSULE, EXTENDED RELEASE ORAL at 20:00

## 2019-11-27 RX ADMIN — Medication 25 MILLIGRAM(S): at 05:56

## 2019-11-27 RX ADMIN — LORATADINE 10 MILLIGRAM(S): 10 TABLET ORAL at 11:27

## 2019-11-27 RX ADMIN — TIOTROPIUM BROMIDE 1 CAPSULE(S): 18 CAPSULE ORAL; RESPIRATORY (INHALATION) at 05:57

## 2019-11-27 RX ADMIN — Medication 500 MICROGRAM(S): at 19:08

## 2019-11-27 RX ADMIN — MAGNESIUM OXIDE 400 MG ORAL TABLET 400 MILLIGRAM(S): 241.3 TABLET ORAL at 11:26

## 2019-11-27 NOTE — DISCHARGE NOTE PROVIDER - HOSPITAL COURSE
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 Patient states the SOB has been worsening and that's what prompted him to come to the ED. He denies recent fevers, chills but admits he's been shaking since he's been on chemotherapy. Admits on and off nausea, and episodes of vomiting over the past week. Denies hematemesis/hemoptysis. Denies chest pain, palpitations, abdominal pain, diarrhea, constipation. Admits LE edema which has been worsening in past week, and reports 100 lb weight loss since January. He has had no appetite, and reports not eating anything at all. He notes he recently changed chem to Paclitaxel, as per patient a body scan demonstrated mets to brain and bone (R shoulder, L ribs). His last radiation dose was August 2019.         In the ED:    T: 97 HR: 98 BP: 86/58 RR: 22 92% RA     WBC: 4.24 h/h: 9.2/27.6 coags wnl, D-Dimer 2411    Lactate 2.5 Na: 135, K: 3.0    Cr: 0.57 glucose 134 TSH: 2.37 proBNP 1397    s/p duonebs, 100 mg hydrocortisone, potassium chloride 10x3    U/S doppler LE: L superificial thrombus noted    -CTA and CT abdomen and pelvis pending    -EKG, significant artifacts due to shaking.            HOSPITAL COURSE:            CONSULTANTS:    Cardio: Dr. Nicole    Pulm: Dr. Monteiro            ---        FINAL DISCHARGE DIAGNOSIS LIST:    Please see last daily progress note for final discharge diagnoses

## 2019-11-27 NOTE — DISCHARGE NOTE PROVIDER - NSDCMRMEDTOKEN_GEN_ALL_CORE_FT
Advair Diskus 250 mcg-50 mcg inhalation powder: 1 puff(s) inhaled 2 times a day   aspirin 81 mg oral tablet, chewable: 1 tab(s) orally once a day  Colace 100 mg oral capsule: 1 cap(s) orally 2 times a day  digoxin 250 mcg (0.25 mg) oral tablet: 1 tab(s) orally once a day  dilTIAZem 420 mg/24 hours oral tablet, extended release: 1 tab(s) orally once a day  furosemide 40 mg oral tablet: 1 tab(s) orally 2 times a day  guaiFENesin 100 mg/5 mL oral liquid: 5 milliliter(s) orally every 6 hours, As needed, Cough  ipratropium-albuterol 0.5 mg-2.5 mg/3 mLinhalation solution: 3 milliliter(s) inhaled every 6 hours  loratadine 10 mg oral tablet: 1 tab(s) orally once a day  magnesium oxide 400 mg (241.3 mg elemental magnesium) oral tablet: 1 tab(s) orally once a day  morphine 30 mg oral tablet: 1 tab(s) orally 2 times a day, As Needed  Multiple Vitamins oral tablet: 1 tab(s) orally once a day  NEBULIZER machine: Dx. COPD  Percocet 10/325 oral tablet: 1 tab(s) orally every 6 hours, As Needed  potassium chloride 10 mEq oral tablet, extended release: 1 tab(s) orally 2 times a day  Senna 8.6 mg oral tablet: 1 tab(s) orally once a day (at bedtime), As Needed - for constipation  Sodium Chloride 1 g oral tablet: 1 tab(s) orally 3 times a day   Spiriva: 1 puff(s) inhaled once a day  Toprol- mg oral tablet, extended release: 1 tab(s) orally once a day  Zofran 8 mg oral tablet: 1 tab(s) orally 3 times a day, As Needed

## 2019-11-27 NOTE — PROGRESS NOTE ADULT - PROBLEM SELECTOR PLAN 2
- CTA Chest 11/22/2019 suggestive of pleural-based metastatic disease.  - CTA Abd and Pelvis 11/22/2019: - Possible pancreatic mass-neoplasm with multifocal hepatic metastasis versus pancreatic and hepatic metastasis. Extensive upper abdominal and retroperitoneal lymphadenopathy as described above. Mild-moderate abdominal and pelvic free fluid.   - Unable to obtain MRI yesterday due to orthopnea and back pain on table.   - Seen by Pulm, Dr. Soto; recs appreciated.   - Seen by Heme/Onc, Dr. Smalls; recs appreciated.   - Continue Morphine 1mg or 2mg or 3mg IVp q4H PRN, re: mild or mod or severe pain  - Continue Senna 8.6 mg 1 tab PO QHS.  - Continue Odansetron 4 mg IVp q6H PRN, re: nausea.   - Continue Magnesium Oxide 400 mg PO daily.   - Continue Dronabinol 5 mg PO q12H.

## 2019-11-27 NOTE — PROGRESS NOTE ADULT - SUBJECTIVE AND OBJECTIVE BOX
[INTERVAL HX: ]  Patient seen and examined;  Chart reviewed and events noted;   c/o SOB with lying down.   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 Patient states the SOB has been worsening and that's what prompted him to come to the ED. He denies recent fevers, chills but admits he's been shaking since he's been on chemotherapy. Admits on and off nausea, and episodes of vomiting over the past week. Denies hematemesis/hemoptysis. Denies chest pain, palpitations, abdominal pain, diarrhea, constipation. Admits LE edema which has been worsening in past week, and reports 100 lb weight loss since January. He has had no appetite, and reports not eating anything at all. He notes he recently changed chem to Paclitaxel, as per patient a body scan demonstrated mets to brain and bone (R shoulder, L ribs). His last radiation dose was August 2019.     In the ED:  T: 97 HR: 98 BP: 86/58 RR: 22 92% RA   WBC: 4.24 h/h: 9.2/27.6 coags wnl, D-Dimer 2411  Lactate 2.5 Na: 135, K: 3.0  Cr: 0.57 glucose 134 TSH: 2.37 proBNP 1397  s/p duonebs, 100 mg hydrocortisone, potassium chloride 10x3  U/S doppler LE: L superificial thrombus noted  -CTA and CT abdomen and pelvis pending  -EKG, significant artificant due to shaking. Official read pending (22 Nov 2019 20:57)            MEDICATIONS  (STANDING):  acetylcysteine 10%  Inhalation 5 milliLiter(s) Inhalation four times a day  budesonide 160 MICROgram(s)/formoterol 4.5 MICROgram(s) Inhaler 2 Puff(s) Inhalation two times a day  chlorhexidine 4% Liquid 1 Application(s) Topical daily  digoxin     Tablet 0.25 milliGRAM(s) Oral daily  dronabinol 5 milliGRAM(s) Oral every 12 hours  heparin  Infusion.  Unit(s)/Hr (15 mL/Hr) IV Continuous <Continuous>  heparin  Injectable 6500 Unit(s) IV Push once  ipratropium    for Nebulization 500 MICROGram(s) Nebulizer every 6 hours  loratadine 10 milliGRAM(s) Oral daily  magnesium oxide 400 milliGRAM(s) Oral daily  methylPREDNISolone sodium succinate Injectable 40 milliGRAM(s) IV Push two times a day  metoprolol succinate ER 25 milliGRAM(s) Oral once  multivitamin 1 Tablet(s) Oral daily  sodium chloride 1 Gram(s) Oral three times a day  tiotropium 18 MICROgram(s) Capsule 1 Capsule(s) Inhalation daily    MEDICATIONS  (PRN):  guaiFENesin    Syrup 100 milliGRAM(s) Oral every 6 hours PRN Cough  heparin  Injectable 6500 Unit(s) IV Push every 6 hours PRN For aPTT less than 40  heparin  Injectable 3000 Unit(s) IV Push every 6 hours PRN For aPTT between 40 - 57  levalbuterol Inhalation 0.63 milliGRAM(s) Inhalation every 6 hours PRN shortness of breath  morphine  - Injectable 3 milliGRAM(s) IV Push every 4 hours PRN Severe Pain (7 - 10)  morphine  - Injectable 2 milliGRAM(s) IV Push every 4 hours PRN Moderate Pain (4 - 6)  morphine  - Injectable 1 milliGRAM(s) IV Push every 4 hours PRN Mild Pain (1 - 3)  ondansetron Injectable 4 milliGRAM(s) IV Push every 6 hours PRN Nausea  senna 8.6 milliGRAM(s) Oral Tablet - Peds 1 Tablet(s) Oral at bedtime PRN for constipation      Vital Signs Last 24 Hrs  T(C): 36.8 (27 Nov 2019 11:05), Max: 36.8 (26 Nov 2019 12:30)  T(F): 98.2 (27 Nov 2019 11:05), Max: 98.2 (26 Nov 2019 12:30)  HR: 116 (27 Nov 2019 11:05) (84 - 120)  BP: 103/67 (27 Nov 2019 11:05) (99/62 - 114/73)  BP(mean): --  RR: 19 (27 Nov 2019 11:05) (19 - 20)  SpO2: 99% (27 Nov 2019 11:05) (91% - 100%)      [PHYSICAL EXAM]  General: adult in NAD,  WN,  WD.  HEENT: clear oropharynx, anicteric sclera, pink conjunctivae.  Neck: supple, no masses.  CV: normal S1S2, no murmur, no rubs, no gallops.  Lungs: dec BL L base to auscultation, +wheezes, no rales, no rhonchi.  Abdomen: soft, non-tender, non-distended, no hepatosplenomegaly, normal BS, no guarding.  Ext: no clubbing, no cyanosis, +BL edema.  Skin: no rashes,  no petechiae, no venous stasis changes.  Neuro: alert and oriented X3  , no focal motor deficits.  LN: no SC STEVE.      [LABS:]                        9.3    12.91 )-----------( 228      ( 27 Nov 2019 04:59 )             29.7     11-27    141  |  104  |  24<H>  ----------------------------<  143<H>  4.7   |  30  |  0.81    Ca    9.1      27 Nov 2019 04:59                    [RADIOLOGY STUDIES:]

## 2019-11-27 NOTE — PROGRESS NOTE ADULT - PROBLEM SELECTOR PLAN 5
BP soft but holding above 90/60s  - Continue to HOLD Diltiazem.  - Continue Metoprolol 25 mg PO daily with hold parameters.

## 2019-11-27 NOTE — PROGRESS NOTE ADULT - PROBLEM SELECTOR PLAN 10
- Continue Enoxaparin 80 mg subQ BID for therapeutic treatment of PE.    IMPROVE VTE Individual Risk Assessment    RISK                                                                Points  [  ] Previous VTE                                                  3  [  ] Thrombophilia                                               2  [  ] Lower limb paralysis                                      2        (unable to hold up >15 seconds)    [ x ] Current Cancer                                              2         (within 6 months)  [  ] Immobilization > 24 hrs                                1  [  ] ICU/CCU stay > 24 hours                              1  [ x ] Age > 60                                                      1  IMPROVE VTE Score: 3    IMPROVE Score 0-1: Low Risk, No VTE prophylaxis required for most patients, encourage ambulation.   IMPROVE Score 2-3: At risk, pharmacologic VTE prophylaxis is indicated for most patients (in the absence of a contraindication)  IMPROVE Score > or = 4: High Risk, pharmacologic VTE prophylaxis is indicated for most patients (in the absence of a contraindication) - Switch to heparin drip    IMPROVE VTE Individual Risk Assessment    RISK                                                                Points  [  ] Previous VTE                                                  3  [  ] Thrombophilia                                               2  [  ] Lower limb paralysis                                      2        (unable to hold up >15 seconds)    [ x ] Current Cancer                                              2         (within 6 months)  [  ] Immobilization > 24 hrs                                1  [  ] ICU/CCU stay > 24 hours                              1  [ x ] Age > 60                                                      1  IMPROVE VTE Score: 3    IMPROVE Score 0-1: Low Risk, No VTE prophylaxis required for most patients, encourage ambulation.   IMPROVE Score 2-3: At risk, pharmacologic VTE prophylaxis is indicated for most patients (in the absence of a contraindication)  IMPROVE Score > or = 4: High Risk, pharmacologic VTE prophylaxis is indicated for most patients (in the absence of a contraindication)

## 2019-11-27 NOTE — PROGRESS NOTE ADULT - PROBLEM SELECTOR PLAN 6
- Patient DNR/DNI, MOLST in chart.   - Seen by Palliative Care, MARY Monteiro; pt agrees with DNR/DNI.

## 2019-11-27 NOTE — PROGRESS NOTE ADULT - PROBLEM SELECTOR PLAN 3
- Continue Digoxin 0.25 mg PO daily.   - Continue Enoxaparin 80 mg subQ BID.   - Continue to HOLD Diltiazem.  - Continue Metoprolol 25 mg PO daily with hold parameters. - Continue Digoxin 0.25 mg PO daily.   - Switch to heparin drip this afternoon  - Continue to HOLD Diltiazem.  - Continue Metoprolol 25 mg PO daily with hold parameters, will increase if BP starts to improve

## 2019-11-27 NOTE — PROGRESS NOTE ADULT - ASSESSMENT
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 He also has a history of a pericardial effusion s/p window, and of a pleural effusion s/p thoracentesis.  Now found to have b/l PE's    PE  - CTA chest showed segmental PE's in B/L lower lobe   -  full dose Lovenox as per hem / pulm   - TTE showed hyperdynamic LV, normal RVSF with no significant valvular disease.  However, showed large left pleural effusion  - CT chest showed moderate left pleural effusion- seen by pulmonary recommending steroids, oxygen and Duoneb  - Can keep off of aspirin to minimize risk of bleeding as he is going to be on full dose anticoagulation    Chronic Afib  - Remains in Afib with RVR, mostly during activity  - tachycardia multifactorial in setting of underlying lung cancer, PE and anemia  -  CW toprol 25 mg daily ( takes 200mg daily  at home )   - Continue digoxin   - Monitor and replete lytes.   -  on full dose Lovenox to be switched to DOAC.  Monitor for s/s bleeding    HTN  -on cardizem and toprol at home was held on admission for hypotension now improved 114/71  - Continue on bb    Monitor and replete electrolytes. Keep K>4.0 and Mg>2.0.   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 He also has a history of a pericardial effusion s/p window, and of a pleural effusion s/p thoracentesis.  Now found to have b/l PE's    PE  - CTA chest showed segmental PE's in B/L lower lobe   -  full dose Lovenox as per hem / pulm   - TTE showed hyperdynamic LV, normal RVSF with no significant valvular disease.  However, showed large left pleural effusion  - CT chest showed moderate left pleural effusion- seen by pulmonary recommending steroids, oxygen and Duoneb  - Can keep off of aspirin to minimize risk of bleeding as he is going to be on full dose anticoagulation  - given malignancy, more definitive management of this effusion with a pleurex catheter may be appropriate    Chronic Afib  - Remains in Afib with RVR, mostly during activity  - tachycardia multifactorial in setting of underlying lung cancer, PE and anemia  -  CW toprol 25 mg daily ( takes 200mg daily  at home )   - if bp will support increasing the dose would try this tomorrow  - Continue digoxin   - Monitor and replete lytes.   -  on full dose Lovenox to be switched to DOAC.  Monitor for s/s bleeding    HTN  -on cardizem and toprol at home was held on admission for hypotension now improved 114/71  - Continue on bb    Monitor and replete electrolytes. Keep K>4.0 and Mg>2.0.   Further cardiac w/u pending clinical course  To follow closely with you  Jermaine Dave [facility]  Cardiology   Spectra #[phone]

## 2019-11-27 NOTE — DISCHARGE NOTE PROVIDER - NSDCCPCAREPLAN_GEN_ALL_CORE_FT
PRINCIPAL DISCHARGE DIAGNOSIS  Diagnosis: Pneumonia  Assessment and Plan of Treatment:       SECONDARY DISCHARGE DIAGNOSES  Diagnosis: Hypoxia  Assessment and Plan of Treatment:

## 2019-11-27 NOTE — PROGRESS NOTE ADULT - PROBLEM SELECTOR PLAN 8
Stage II pressure ulcer on sacrum  - Seen by Wound Care, IVETH Farrell RN; keep moisture free, apply cavilon daily and criticaid q shift and PRN.

## 2019-11-27 NOTE — PROGRESS NOTE ADULT - PROBLEM SELECTOR PLAN 4
- Continue Tiotropium 18mcg 1 cap inhalation daily.  - Continue Albuterol/Ipratropium 3mL Nebs q6H.  - Continue Budesonide 160 mcg/Formoterol 4.5 mcg 2 puff inhalation BID.

## 2019-11-27 NOTE — PROGRESS NOTE ADULT - SUBJECTIVE AND OBJECTIVE BOX
Mr. San is a 59y old  Male who presents with a chief complaint of PNA (27 Nov 2019 10:19)      INTERVAL HPI/OVERNIGHT EVENTS: Pt seen and examined at bedside. Pt noted he didn't sleep as well and feels more uncomfortable today. Pt is AAOx2. Pt admits to SOB worsening, even after his 7am duonebs and on 3LNC, palpitations, and back ache. Pt's MRI was cancelled last night due to back pain, tachypnea, and orthopnea on the table. Pt admits to a loose bowel movement x 1, last night. Overnight, patient was tachycardic at 130s.    T(C): 36.6 (11-27-19 @ 08:08), Max: 36.8 (11-26-19 @ 12:30)  HR: 116 (11-27-19 @ 08:08) (84 - 120)  BP: 107/73 (11-27-19 @ 08:08) (99/62 - 114/73)  RR: 20 (11-27-19 @ 08:08) (19 - 20)  SpO2: 91% (11-27-19 @ 08:08) (91% - 100%)  Wt(kg): --  I&O's Summary      LABS:                        9.3    12.91 )-----------( 228      ( 27 Nov 2019 04:59 )             29.7     11-27    141  |  104  |  24<H>  ----------------------------<  143<H>  4.7   |  30  |  0.81    Ca    9.1      27 Nov 2019 04:59          CAPILLARY BLOOD GLUCOSE                MEDICATIONS  (STANDING):  acetylcysteine 10%  Inhalation 5 milliLiter(s) Inhalation four times a day  albuterol/ipratropium for Nebulization 3 milliLiter(s) Nebulizer every 6 hours  budesonide 160 MICROgram(s)/formoterol 4.5 MICROgram(s) Inhaler 2 Puff(s) Inhalation two times a day  chlorhexidine 4% Liquid 1 Application(s) Topical daily  digoxin     Tablet 0.25 milliGRAM(s) Oral daily  dronabinol 5 milliGRAM(s) Oral every 12 hours  enoxaparin Injectable 80 milliGRAM(s) SubCutaneous two times a day  furosemide   Injectable 20 milliGRAM(s) IV Push once  loratadine 10 milliGRAM(s) Oral daily  magnesium oxide 400 milliGRAM(s) Oral daily  methylPREDNISolone sodium succinate Injectable 40 milliGRAM(s) IV Push two times a day  multivitamin 1 Tablet(s) Oral daily  sodium chloride 1 Gram(s) Oral three times a day  tiotropium 18 MICROgram(s) Capsule 1 Capsule(s) Inhalation daily    MEDICATIONS  (PRN):  guaiFENesin    Syrup 100 milliGRAM(s) Oral every 6 hours PRN Cough  morphine  - Injectable 3 milliGRAM(s) IV Push every 4 hours PRN Severe Pain (7 - 10)  morphine  - Injectable 2 milliGRAM(s) IV Push every 4 hours PRN Moderate Pain (4 - 6)  morphine  - Injectable 1 milliGRAM(s) IV Push every 4 hours PRN Mild Pain (1 - 3)  ondansetron Injectable 4 milliGRAM(s) IV Push every 6 hours PRN Nausea  senna 8.6 milliGRAM(s) Oral Tablet - Peds 1 Tablet(s) Oral at bedtime PRN for constipation      REVIEW OF SYSTEMS:  CONSTITUTIONAL: admits weight loss, fatigue  Head: admits chemo induced alopecia  RESPIRATORY: admits shortness of breath, admits orthopnea, no cough, no wheezing, no chills, no hemoptysis  CARDIOVASCULAR: admits chronic leg swelling, admits palpitations, no chest pain, no dizziness  GASTROINTESTINAL: 1 loose BM last night. No abdominal pain. No epigastric pain. No nausea, vomiting, or hematemesis. No melena or hematochezia.  NEUROLOGICAL: No headaches, no memory loss, no loss of strength, no numbness  SKIN: no lesions.  ENDOCRINE: admits hair loss  MUSCULOSKELETAL: admits to back pain.  PSYCHIATRIC: admits to difficulty sleeping.  All other ROS within normal limits.     RADIOLOGY & ADDITIONAL TESTS:  None    Imaging Personally Reviewed:  [ ] YES  [ ] NO    Consultant(s) Notes Reviewed:  [x] YES  [ ] NO    PHYSICAL EXAM:  GENERAL: NAD, comfortable  HEAD: bald, atraumatic, normocephalic  EYES: EOMI, conjunctiva and sclera clear  ENMT: Moist mucous membranes  NECK: Supple  NERVOUS SYSTEM:  Alert & Oriented X2, Motor Strength 5/5 B/L upper and lower extremities; sensation grossly intact upper and lower extremities  CHEST/LUNG: left rhonchi throughout. right upper lobe rhonchi. no wheezing.  HEART: irregularly irregular rate and rhythm; No murmurs, rubs, or gallops  ABDOMEN: Soft, Nontender, Nondistended; Bowel sounds present.  EXTREMITIES:  4+ pitting edema b/l LE. 2+ radial pulses. nonpalpable dorsalis pedis due to edema.   SKIN: No rashes or lesions    Care Discussed with Consultants/Other Providers [x] YES  [ ] NO Mr. San is a 59y old  Male who presents with a chief complaint of PNA (27 Nov 2019 10:19)      INTERVAL HPI/OVERNIGHT EVENTS: Pt seen and examined at bedside. Pt noted he didn't sleep as well and feels more uncomfortable today. Pt is AAOx2. Pt admits to SOB worsening, even after his 7:30am duonebs and on 3LNC, palpitations, and back ache. Pt's MRI was cancelled last night due to back pain, tachypnea, and orthopnea on the table. Pt admits to a loose bowel movement x 1, last night. Overnight, patient was tachycardic at 130s but asymptomatic.    T(C): 36.6 (11-27-19 @ 08:08), Max: 36.8 (11-26-19 @ 12:30)  HR: 116 (11-27-19 @ 08:08) (84 - 120)  BP: 107/73 (11-27-19 @ 08:08) (99/62 - 114/73)  RR: 20 (11-27-19 @ 08:08) (19 - 20)  SpO2: 91% (11-27-19 @ 08:08) (91% - 100%)  Wt(kg): --  I&O's Summary      LABS:                        9.3    12.91 )-----------( 228      ( 27 Nov 2019 04:59 )             29.7     11-27    141  |  104  |  24<H>  ----------------------------<  143<H>  4.7   |  30  |  0.81    Ca    9.1      27 Nov 2019 04:59            MEDICATIONS  (STANDING):  acetylcysteine 10%  Inhalation 5 milliLiter(s) Inhalation four times a day  albuterol/ipratropium for Nebulization 3 milliLiter(s) Nebulizer every 6 hours  budesonide 160 MICROgram(s)/formoterol 4.5 MICROgram(s) Inhaler 2 Puff(s) Inhalation two times a day  chlorhexidine 4% Liquid 1 Application(s) Topical daily  digoxin     Tablet 0.25 milliGRAM(s) Oral daily  dronabinol 5 milliGRAM(s) Oral every 12 hours  enoxaparin Injectable 80 milliGRAM(s) SubCutaneous two times a day  furosemide   Injectable 20 milliGRAM(s) IV Push once  loratadine 10 milliGRAM(s) Oral daily  magnesium oxide 400 milliGRAM(s) Oral daily  methylPREDNISolone sodium succinate Injectable 40 milliGRAM(s) IV Push two times a day  multivitamin 1 Tablet(s) Oral daily  sodium chloride 1 Gram(s) Oral three times a day  tiotropium 18 MICROgram(s) Capsule 1 Capsule(s) Inhalation daily    MEDICATIONS  (PRN):  guaiFENesin    Syrup 100 milliGRAM(s) Oral every 6 hours PRN Cough  morphine  - Injectable 3 milliGRAM(s) IV Push every 4 hours PRN Severe Pain (7 - 10)  morphine  - Injectable 2 milliGRAM(s) IV Push every 4 hours PRN Moderate Pain (4 - 6)  morphine  - Injectable 1 milliGRAM(s) IV Push every 4 hours PRN Mild Pain (1 - 3)  ondansetron Injectable 4 milliGRAM(s) IV Push every 6 hours PRN Nausea  senna 8.6 milliGRAM(s) Oral Tablet - Peds 1 Tablet(s) Oral at bedtime PRN for constipation      REVIEW OF SYSTEMS:  CONSTITUTIONAL: admits weight loss, fatigue  Head: admits chemo induced alopecia  RESPIRATORY: admits shortness of breath, admits orthopnea, no cough, no wheezing, no chills, no hemoptysis  CARDIOVASCULAR: admits chronic leg swelling, admits palpitations, no chest pain, no dizziness  GASTROINTESTINAL: 1 loose BM last night. No abdominal pain. No epigastric pain. No nausea, vomiting, or hematemesis. No melena or hematochezia.  NEUROLOGICAL: No headaches, no memory loss, no loss of strength, no numbness  SKIN: no lesions.  ENDOCRINE: admits hair loss  MUSCULOSKELETAL: admits to back pain.  PSYCHIATRIC: admits to difficulty sleeping.  All other ROS within normal limits.     RADIOLOGY & ADDITIONAL TESTS:  None in the past 24 hours    Imaging Personally Reviewed:  [ x ] YES  [ ] NO    Consultant(s) Notes Reviewed:  [x] YES  [ ] NO    PHYSICAL EXAM:  GENERAL: NAD, comfortable  HEAD: bald, atraumatic, normocephalic  EYES: EOMI, conjunctiva and sclera clear  ENMT: Moist mucous membranes  NECK: Supple  NERVOUS SYSTEM:  Alert & Oriented X2, Motor Strength 5/5 B/L upper and lower extremities; sensation grossly intact upper and lower extremities  CHEST/LUNG: left rhonchi throughout. right upper lobe rhonchi. no wheezing.  HEART: irregularly irregular rate and rhythm; No murmurs, rubs, or gallops  ABDOMEN: Soft, Nontender, Nondistended; Bowel sounds present.  EXTREMITIES:  4+ pitting edema b/l LE. 2+ radial pulses. nonpalpable dorsalis pedis due to edema.   SKIN: No rashes or lesions    Care Discussed with Consultants/Other Providers [x] YES  [ ] NO Mr. San is a 59y old  Male who presents with a chief complaint of PNA (27 Nov 2019 10:19)      INTERVAL HPI/OVERNIGHT EVENTS: Pt seen and examined at bedside. Pt noted he didn't sleep as well and feels more uncomfortable today. Pt is AAOx2. Pt admits to SOB worsening, even after his 7:30am duonebs and on 3LNC, palpitations, and back ache. Pt's MRI was cancelled last night due to back pain, tachypnea, and orthopnea on the table. Pt admits to a loose bowel movement x 1, last night. Overnight, patient was tachycardic at 130s but asymptomatic. Possible pleurx catheter insertion for effusion on Friday, needs to be NPO after midnight on thursday and transitioned to a heparin drip.    T(C): 36.6 (11-27-19 @ 08:08), Max: 36.8 (11-26-19 @ 12:30)  HR: 116 (11-27-19 @ 08:08) (84 - 120)  BP: 107/73 (11-27-19 @ 08:08) (99/62 - 114/73)  RR: 20 (11-27-19 @ 08:08) (19 - 20)  SpO2: 91% (11-27-19 @ 08:08) (91% - 100%)  Wt(kg): --  I&O's Summary      LABS:                        9.3    12.91 )-----------( 228      ( 27 Nov 2019 04:59 )             29.7     11-27    141  |  104  |  24<H>  ----------------------------<  143<H>  4.7   |  30  |  0.81    Ca    9.1      27 Nov 2019 04:59            MEDICATIONS  (STANDING):  acetylcysteine 10%  Inhalation 5 milliLiter(s) Inhalation four times a day  albuterol/ipratropium for Nebulization 3 milliLiter(s) Nebulizer every 6 hours  budesonide 160 MICROgram(s)/formoterol 4.5 MICROgram(s) Inhaler 2 Puff(s) Inhalation two times a day  chlorhexidine 4% Liquid 1 Application(s) Topical daily  digoxin     Tablet 0.25 milliGRAM(s) Oral daily  dronabinol 5 milliGRAM(s) Oral every 12 hours  enoxaparin Injectable 80 milliGRAM(s) SubCutaneous two times a day  furosemide   Injectable 20 milliGRAM(s) IV Push once  loratadine 10 milliGRAM(s) Oral daily  magnesium oxide 400 milliGRAM(s) Oral daily  methylPREDNISolone sodium succinate Injectable 40 milliGRAM(s) IV Push two times a day  multivitamin 1 Tablet(s) Oral daily  sodium chloride 1 Gram(s) Oral three times a day  tiotropium 18 MICROgram(s) Capsule 1 Capsule(s) Inhalation daily    MEDICATIONS  (PRN):  guaiFENesin    Syrup 100 milliGRAM(s) Oral every 6 hours PRN Cough  morphine  - Injectable 3 milliGRAM(s) IV Push every 4 hours PRN Severe Pain (7 - 10)  morphine  - Injectable 2 milliGRAM(s) IV Push every 4 hours PRN Moderate Pain (4 - 6)  morphine  - Injectable 1 milliGRAM(s) IV Push every 4 hours PRN Mild Pain (1 - 3)  ondansetron Injectable 4 milliGRAM(s) IV Push every 6 hours PRN Nausea  senna 8.6 milliGRAM(s) Oral Tablet - Peds 1 Tablet(s) Oral at bedtime PRN for constipation      REVIEW OF SYSTEMS:  CONSTITUTIONAL: admits weight loss, fatigue  Head: admits chemo induced alopecia  RESPIRATORY: admits shortness of breath, admits orthopnea, no cough, no wheezing, no chills, no hemoptysis  CARDIOVASCULAR: admits chronic leg swelling, admits palpitations, no chest pain, no dizziness  GASTROINTESTINAL: 1 loose BM last night. No abdominal pain. No epigastric pain. No nausea, vomiting, or hematemesis. No melena or hematochezia.  NEUROLOGICAL: No headaches, no memory loss, no loss of strength, no numbness  SKIN: no lesions.  ENDOCRINE: admits hair loss  MUSCULOSKELETAL: admits to back pain.  PSYCHIATRIC: admits to difficulty sleeping.  All other ROS within normal limits.     RADIOLOGY & ADDITIONAL TESTS:  None in the past 24 hours    Imaging Personally Reviewed:  [ x ] YES  [ ] NO    Consultant(s) Notes Reviewed:  [x] YES  [ ] NO    PHYSICAL EXAM:  GENERAL: NAD, comfortable  HEAD: bald, atraumatic, normocephalic  EYES: EOMI, conjunctiva and sclera clear  ENMT: Moist mucous membranes  NECK: Supple  NERVOUS SYSTEM:  Alert & Oriented X2, Motor Strength 5/5 B/L upper and lower extremities; sensation grossly intact upper and lower extremities  CHEST/LUNG: left rhonchi throughout. right upper lobe rhonchi. no wheezing.  HEART: irregularly irregular rate and rhythm; No murmurs, rubs, or gallops  ABDOMEN: Soft, Nontender, Nondistended; Bowel sounds present.  EXTREMITIES:  4+ pitting edema b/l LE. 2+ radial pulses. nonpalpable dorsalis pedis due to edema.   SKIN: No rashes or lesions    Care Discussed with Consultants/Other Providers [x] YES  [ ] NO

## 2019-11-27 NOTE — PROGRESS NOTE ADULT - PROBLEM SELECTOR PLAN 9
Pt with shortness of breath, orthopnea, and 4+ pitting edema b/l LE.   - Restart Furosemide 20 mg IVp x 1.   - Re-evaluate.

## 2019-11-27 NOTE — PROGRESS NOTE ADULT - ASSESSMENT
60 yo man well known to our group, w met small cell ca of lung first presented as limited stage [id number] post Carbo//16/Atezolizumab with concurrent RT with initial response but then brain mets 8/2019 s/p WBRT and systemic disease progression on PET/CT 9/2019 with intra-abdominal, bone and pleural disease, started on weekly Taxol with GCSF support due to cytopenia with tx.    Adm now with weakness and dyspnea; found to have pulmonary embolism and further disease progression with pleural effusion, bone lesions, liver lesions and pancreatic mass.    Unable to lie flat for MRI brain.     RECOMMEND  DC Lovenox   Switch to Heparin drip this afternoon. Rcvd 5AM dose of Lovenox.  Plan therapeutic thoracentesis +/- pleurex catheter Fri AM, D/w IR Dr Ortiz and Dr pulm Dr Soto.   Once tap done, if no bleed, resume heparin. Once stable transition to Eliquis     Change Duoneb, neb with Xopenex and Atrovent neb.     Lasix per cardio, d/w Dr Nicole    To complete re-staging scans, will check MRI brain during this admission. Wife brought in discs from [facility] for Radiology department to compare    NPO past midnight, Thanksgiving    Continue acute care

## 2019-11-27 NOTE — PROGRESS NOTE ADULT - PROBLEM SELECTOR PLAN 1
likely multifactorial from b/l PE, PNA, and metastatic pleural effusions (patient has a past history of 2 pleural effusions requiring drainage as well cardiac window)  - CXR consistent with infiltrates and w small L pleural effusion  - Note, patient s/p 2.6L with pleural effusion and underlying SOB but no evidence of CHF. Will be cautious with further fluid administration given baseline edema  - CTA Chest 11/22/2019 suggestive of pleural-based metastatic disease.  - Echo (11/23/2019) Hyperdynamic left ventricular systolic function. Large left pleural effusion which echogenic material noted in the pleural space.  - Seen by Pulm, Dr. Soto; recs appreciated.  - Continue supplemental O2.   - Continue Enoxaparin 80 mg BID for PE, eventually transition to DOAC per heme/onc rec  - Tapering Steroids: Continue Methylprednisolone 40 mg IVp q12H.  - Continue Albuterol/Ipratropium 3mL Nebs q6H.  - Start Acetylcysteine 10% inhalation 5mL QID. likely multifactorial from b/l PE, PNA, and metastatic pleural effusions (patient has a past history of 2 pleural effusions requiring drainage as well cardiac window)  - CXR consistent with infiltrates and w small L pleural effusion  - Note, patient s/p 2.6L with pleural effusion and underlying SOB but no evidence of CHF. Will be cautious with further fluid administration given baseline edema  - CTA Chest 11/22/2019 suggestive of pleural-based metastatic disease.  - Echo (11/23/2019) Hyperdynamic left ventricular systolic function. Large left pleural effusion which echogenic material noted in the pleural space.  - Seen by Pulm, Dr. Soto; recs appreciated.  - Continue supplemental O2.   - Continue Enoxaparin 80 mg BID for PE, eventually transition to DOAC per heme/onc rec  - Tapering Steroids: Continue Methylprednisolone 40 mg IVp q12H.  - Continue Albuterol/Ipratropium 3mL Nebs q6H.  - Start Acetylcysteine 10% inhalation 5mL QID.  - Possible Pleura Cath and Thoracentesis on Friday. likely multifactorial from b/l PE, PNA, and metastatic pleural effusions (patient has a past history of 2 pleural effusions requiring drainage as well cardiac window)  -worsening SOB today, will give trial of 20 mg IV Lasix x1 and monitor  - CXR consistent with infiltrates and w small L pleural effusion  - Note, patient s/p 2.6L with pleural effusion and underlying SOB but no evidence of CHF. Will be cautious with further fluid administration given baseline edema  - CTA Chest 11/22/2019 suggestive of pleural-based metastatic disease.  - Echo (11/23/2019) Hyperdynamic left ventricular systolic function. Large left pleural effusion with echogenic material noted in the pleural space.  - Seen by Pulm, Dr. Soto; recs appreciated.  - Continue supplemental O2.   - STOP Lovenox 80 mg, transition to heparin drip this afternoon, discontinue for pleuryx cather on Friday and resume if no bleeding, eventually transition to DOAC (Eliquis) per heme/onc rec  - Tapering Steroids: Continue Methylprednisolone 40 mg IVp q12H.  - Continue Albuterol/Ipratropium 3mL Nebs q6H with mucomyst.  - Possible Pleura Cath and Thoracentesis on Friday. likely multifactorial from b/l PE, PNA, and metastatic pleural effusions (patient has a past history of 2 pleural effusions requiring drainage as well cardiac window)  -worsening SOB today, will give trial of 20 mg IV Lasix x1 and monitor  - CXR consistent with infiltrates and w small L pleural effusion  - Note, patient s/p 2.6L with pleural effusion and underlying SOB but no evidence of CHF. Will be cautious with further fluid administration given baseline edema  - CTA Chest 11/22/2019 suggestive of pleural-based metastatic disease.  - Echo (11/23/2019) Hyperdynamic left ventricular systolic function. Large left pleural effusion with echogenic material noted in the pleural space.  - Seen by Pulm, Dr. Soto; recs appreciated.  - Continue supplemental O2.   - STOP Lovenox 80 mg, transition to heparin drip this afternoon, discontinue for pleuryx cather on Friday and resume if no bleeding, eventually transition to DOAC (Eliquis) per heme/onc rec  - Tapering Steroids: Continue Methylprednisolone 40 mg IVp q12H.  - Continue Albuterol/Ipratropium 3mL Nebs q6H with mucomyst.  - Possible Pleura Cath and Thoracentesis on Friday. Needs to be NPO after midnight tomorrow!

## 2019-11-27 NOTE — PROGRESS NOTE ADULT - SUBJECTIVE AND OBJECTIVE BOX
Patient is a 59y old  Male who presents with a chief complaint of PNA (27 Nov 2019 16:54)      INTERVAL HPI/OVERNIGHT EVENTS:    Claims that shortness of breath is somewhat improved    MEDICATIONS  (STANDING):  acetylcysteine 10%  Inhalation 5 milliLiter(s) Inhalation four times a day  budesonide 160 MICROgram(s)/formoterol 4.5 MICROgram(s) Inhaler 2 Puff(s) Inhalation two times a day  chlorhexidine 4% Liquid 1 Application(s) Topical daily  digoxin     Tablet 0.25 milliGRAM(s) Oral daily  dronabinol 5 milliGRAM(s) Oral every 12 hours  heparin  Infusion.  Unit(s)/Hr (15 mL/Hr) IV Continuous <Continuous>  ipratropium    for Nebulization 500 MICROGram(s) Nebulizer every 6 hours  loratadine 10 milliGRAM(s) Oral daily  magnesium oxide 400 milliGRAM(s) Oral daily  methylPREDNISolone sodium succinate Injectable 40 milliGRAM(s) IV Push two times a day  multivitamin 1 Tablet(s) Oral daily  sodium chloride 1 Gram(s) Oral three times a day  tiotropium 18 MICROgram(s) Capsule 1 Capsule(s) Inhalation daily      MEDICATIONS  (PRN):  guaiFENesin    Syrup 100 milliGRAM(s) Oral every 6 hours PRN Cough  heparin  Injectable 6500 Unit(s) IV Push every 6 hours PRN For aPTT less than 40  heparin  Injectable 3000 Unit(s) IV Push every 6 hours PRN For aPTT between 40 - 57  levalbuterol Inhalation 0.63 milliGRAM(s) Inhalation every 6 hours PRN shortness of breath  morphine  - Injectable 3 milliGRAM(s) IV Push every 4 hours PRN Severe Pain (7 - 10)  morphine  - Injectable 2 milliGRAM(s) IV Push every 4 hours PRN Moderate Pain (4 - 6)  morphine  - Injectable 1 milliGRAM(s) IV Push every 4 hours PRN Mild Pain (1 - 3)  ondansetron Injectable 4 milliGRAM(s) IV Push every 6 hours PRN Nausea  senna 8.6 milliGRAM(s) Oral Tablet - Peds 1 Tablet(s) Oral at bedtime PRN for constipation      Allergies    penicillin (Unknown)    Intolerances        PAST MEDICAL & SURGICAL HISTORY:  Lung cancer  Depression  Dyspnea  Mediastinal adenopathy  Pericardial effusion  AF (atrial fibrillation)  Hyponatremia  COPD (chronic obstructive pulmonary disease)  HTN (hypertension)  S/P pericardial window creation      Vital Signs Last 24 Hrs  T(C): 36.6 (27 Nov 2019 19:40), Max: 36.8 (27 Nov 2019 11:05)  T(F): 97.9 (27 Nov 2019 19:40), Max: 98.2 (27 Nov 2019 11:05)  HR: 113 (27 Nov 2019 19:40) (90 - 117)  BP: 107/69 (27 Nov 2019 19:40) (99/62 - 107/73)  BP(mean): --  RR: 18 (27 Nov 2019 19:40) (18 - 20)  SpO2: 99% (27 Nov 2019 19:40) (91% - 99%)    PHYSICAL EXAMINATION:    GENERAL: The patient is awake and alert in no apparent distress.     HEENT: Head is normocephalic and atraumatic. Extraocular muscles are intact. Mucous membranes are moist.    NECK: Supple.    LUNGS: diminished breath sounds both bases    HEART: Regular rate and rhythm without murmur.    ABDOMEN: Soft, nontender, and nondistended.      EXTREMITIES: massive bilateral edema    NEUROLOGIC: Grossly intact.    SKIN: No ulceration or induration present.      LABS:                        9.3    12.91 )-----------( 228      ( 27 Nov 2019 04:59 )             29.7     11-27    141  |  104  |  24<H>  ----------------------------<  143<H>  4.7   |  30  |  0.81    Ca    9.1      27 Nov 2019 04:59      PTT - ( 27 Nov 2019 12:45 )  PTT:29.6 sec                    MICROBIOLOGY:      RADIOLOGY & ADDITIONAL STUDIES:    Assessment:    Bilateral Pulmonary emboli  small cell carcinoma of lung  pleural effusions  Acute hypoxic respiratory failure    Plan;    Supplemental oxygen  Continue IV heparin  For left thoracentesis by IR on 11/29    Plan:

## 2019-11-27 NOTE — PROGRESS NOTE ADULT - SUBJECTIVE AND OBJECTIVE BOX
[facility] -- Gurpreet Bush, Emily, Corey, Sajan Pickard Savella  Office # [phone]      Follow Up:    Afib   Subjective/Observations:   No events overnight resting comfortably in bed.  No complaints of chest pain, dyspnea, or palpitations reported. No signs of orthopnea or PND.     REVIEW OF SYSTEMS: All other review of systems is negative unless indicated above    PAST MEDICAL & SURGICAL HISTORY:  Lung cancer  Depression  Dyspnea  Mediastinal adenopathy  Pericardial effusion  AF (atrial fibrillation)  Hyponatremia  COPD (chronic obstructive pulmonary disease)  HTN (hypertension)  S/P pericardial window creation      MEDICATIONS  (STANDING):  acetylcysteine 10%  Inhalation 5 milliLiter(s) Inhalation four times a day  albuterol/ipratropium for Nebulization 3 milliLiter(s) Nebulizer every 6 hours  budesonide 160 MICROgram(s)/formoterol 4.5 MICROgram(s) Inhaler 2 Puff(s) Inhalation two times a day  chlorhexidine 4% Liquid 1 Application(s) Topical daily  digoxin     Tablet 0.25 milliGRAM(s) Oral daily  dronabinol 5 milliGRAM(s) Oral every 12 hours  enoxaparin Injectable 80 milliGRAM(s) SubCutaneous two times a day  loratadine 10 milliGRAM(s) Oral daily  magnesium oxide 400 milliGRAM(s) Oral daily  methylPREDNISolone sodium succinate Injectable 40 milliGRAM(s) IV Push two times a day  metoprolol succinate ER 25 milliGRAM(s) Oral daily  multivitamin 1 Tablet(s) Oral daily  sodium chloride 1 Gram(s) Oral three times a day  tiotropium 18 MICROgram(s) Capsule 1 Capsule(s) Inhalation daily    MEDICATIONS  (PRN):  guaiFENesin    Syrup 100 milliGRAM(s) Oral every 6 hours PRN Cough  morphine  - Injectable 3 milliGRAM(s) IV Push every 4 hours PRN Severe Pain (7 - 10)  morphine  - Injectable 2 milliGRAM(s) IV Push every 4 hours PRN Moderate Pain (4 - 6)  morphine  - Injectable 1 milliGRAM(s) IV Push every 4 hours PRN Mild Pain (1 - 3)  ondansetron Injectable 4 milliGRAM(s) IV Push every 6 hours PRN Nausea  senna 8.6 milliGRAM(s) Oral Tablet - Peds 1 Tablet(s) Oral at bedtime PRN for constipation      Allergies    penicillin (Unknown)    Intolerances        Vital Signs Last 24 Hrs  T(C): 36.6 (27 Nov 2019 08:08), Max: 36.8 (26 Nov 2019 12:30)  T(F): 97.8 (27 Nov 2019 08:08), Max: 98.2 (26 Nov 2019 12:30)  HR: 116 (27 Nov 2019 08:08) (84 - 120)  BP: 107/73 (27 Nov 2019 08:08) (99/62 - 114/73)  BP(mean): --  RR: 20 (27 Nov 2019 08:08) (19 - 20)  SpO2: 91% (27 Nov 2019 08:08) (91% - 100%)    I&O's Summary        PHYSICAL EXAM:  TELE: Afib   Constitutional: NAD, awake and alert, well-developed  HEENT: Moist Mucous Membranes, Anicteric  Pulmonary:  Non-labored, breath sounds bilateral rhonchi throughout , No wheezing, crackles .   Cardiovascular: Irregular, S1 and S2 nl, No murmurs, rubs, gallops or clicks  Gastrointestinal: Bowel Sounds present, soft, nontender.   Lymph: No lymphadenopathy. + peripheral edema.  Skin: No visible rashes or ulcers.  Psych:  Mood & affect appropriate    LABS: All Labs Reviewed:                        9.3    12.91 )-----------( 228      ( 27 Nov 2019 04:59 )             29.7                         8.7    9.50  )-----------( 238      ( 26 Nov 2019 06:14 )             27.5                         9.1    8.83  )-----------( 260      ( 25 Nov 2019 06:58 )             28.7     27 Nov 2019 04:59    141    |  104    |  24     ----------------------------<  143    4.7     |  30     |  0.81   26 Nov 2019 06:14    139    |  103    |  21     ----------------------------<  135    4.0     |  29     |  0.68   25 Nov 2019 06:58    139    |  102    |  22     ----------------------------<  149    4.1     |  26     |  0.79     Ca    9.1        27 Nov 2019 04:59  Ca    8.2        26 Nov 2019 06:14  Ca    8.6        25 Nov 2019 06:58               ECG:  < from: 12 Lead ECG (02.04.19 @ 22:56) >  Ventricular Rate 83 BPM    Atrial Rate 69 BPM    QRS Duration 92 ms    Q-T Interval 374 ms    QTC Calculation(Bezet) 439 ms    R Axis 62 degrees    T Axis 247 degrees    Diagnosis Line Atrial fibrillation  Incomplete right bundle branch block  ST &T wave abnormality, consider inferior ischemia or digitalis effect  ST & T wave abnormality, consider anterolateral ischemia or digitalis effect  Confirmed by QAMAR PICKARD ([id number]) on 2/5/2019 1:06:43 PM    < end of copied text >    Echo:  < from: TTE Echo Doppler w/o Cont (11.23.19 @ 15:29) >  EXAM:  ECHO TTE WO CON COMP W DOPPLR         PROCEDURE DATE:  11/23/2019        INTERPRETATION:  Ordering Physician: DOMINIC SERRATO [id number]    Indication: Pulmonary embolism  Technologist Initials: DL  Study Quality: Technically difficult and limited   A complete echocardiographic study was performed utilizing standard   protocol including spectral and color Doppler in all echocardiographic   windows.    Height: 183 cm  Weight: 82 kg  BSA: 2.0 sq m  Blood Pressure: 101/66    MEASUREMENTS  IVS: 1.2cm  PWT: 1.1cm  LA: 3.0cm  AO: 3.8cm  LVIDd: 3.7cm  LVIDs: 2.0cm    LVEF: 75%    FINDINGS  Left Ventricle: Hyperdynamic left ventricular systolic function.  Aortic Valve: The aortic valve is not well-visualized. It appears   calcified. No significant aortic insufficiency.  Mitral Valve: Mitral annular calcification calcified mitral valve   leaflets. Mild mitral insufficiency.  Tricuspid Valve: Not well visualized  Pulmonic Valve: Not well visualized  Left Atrium: Grossly normal  Right Ventricle: The right ventricle is not well visualized. Grossly,   normal right ventricular size and systolic function.  Right Atrium: Grossly normal    Pericardium/Pleura: Large left pleural effusion.  Echogenic material is   noted in the pleural space.      CONCLUSIONS:  Technically difficult and limited study.  1. Hyperdynamic left ventricular systolic function.  2. Mitral annular calcification and calcified mitral valve leaflets with   mild mitral insufficiency.  3. Grossly normal left atrial size.  4. The right ventricle is not well visualized. Grossly, normal right   ventricular size and systolic function.  5.  Large left pleural effusion which echogenic material noted in the   pleural space.        QAMAR ECHEVARRIA M.D., ATTENDING CARDIOLOGIST  This document has been electronically signed. Nov 24 2019  7:29AM    < end of copied text >    Radiology:  < from: Xray Chest 1 View- PORTABLE-Urgent (11.23.19 @ 02:19) >  EXAM:  XR CHEST PORTABLE URGENT 1V                            PROCEDURE DATE:  11/23/2019          INTERPRETATION:  INDICATION: Tachypnea    PRIORS: 11/22/2019.    VIEWS: Portable AP radiography of the chest performed.    FINDINGS: Heart size cannot be quantitated on this portable radiograph.   No definite superior mediastinal widening is identified. A right-sided   IVAD is identified. Bilateral perihilar opacities are without significant   change; underlying mass lesion/hilar lymphadenopathy cannot be excluded.   Stable linear scarlike opacity identified within the left upper lung   field. Left pleural effusion appears unchanged. No right pleural   effusion. No pneumothorax demonstrated. No mediastinal shift is noted. No   acute osseous fractures are noted.    IMPRESSION: No significant interval change. See above discussion.    < from: CT Angio Chest w/ IV Cont (11.22.19 @ 21:41) >  EXAM:  CT ABDOMEN AND PELVIS IC                          EXAM:  CT ANGIO CHEST (W)AW IC                            PROCEDURE DATE:  11/22/2019          INTERPRETATION:        Addendum created by Jorgito Manuel MD on 11/22/2019 10:59:51 PM EST     THIS REPORT CONTAINS FINDINGS THAT MAY BE CRITICAL TO PATIENT CARE. The   findings were verbally communicated via telephone conference with JUANJO Quinones at 10:50 PM EST on 11/22/2019. The findings were acknowledged and   understood.        --Patient has history of known lung carcinoma with metastasis.    Initial report created on 11/22/2019 10:59:12 PM EST     PROCEDURE INFORMATION:   Exam: CT Angiography Chest With Contrast   Exam date and time: 11/22/2019 9:25 PM   Age: 59 years old   Clinical history: SOB; Shortness of breath     TECHNIQUE:   Imaging protocol: Computed tomographic angiography of the chest with   intravenous contrast.   3D rendering: MIP reconstructed images were created and reviewed.     COMPARISON:   CT ANGIO CHEST WITHOUT AND OR WITH IV CONTRAST 2/5/2019 12:51 AM     FINDINGS:   Pulmonary arteries: Adequate contrast enhancement of the pulmonary   arteries.   Abnormal filling defects -emboli within bilateral lower lobe segmental   pulmonary arterial vessels.  Aorta: No evidence of thoracic aortic dissection. Chronic atherosclerotic   calcification of the vasculature.   Lungs:    Bilateral groundglass mosaic attenuation, most pronounced in the right   upper and lower lung zones.  Thick-walled left apicalbleb.  Paraseptal emphysematous changes lung apices.  Subpleural areas of scarring/fibrosis left upper and superior segment   left lower lobes.    Pleural space: Moderate-large left pleural effusion nodule left-sided   pleural thickening is suggestive of pleural-based metastatic disease.    Heart: Heart size within normal limits.  Mediastinum: Interval decrease in confluent mediastinal and hilar   lymphadenopathy.  Lymph nodes: Enlarged substernal and left pericardial and pericardial   mediastinal lymph nodes.  Bones/joints: Heterogeneous appearance of the visualized bones, with   areas of mottled sclerosis involving the sternum and left third rib,   suspicious for metastatic disease.      IMPRESSION:    Bilateral segmental lower lobe pulmonary emboli.    Bilateral groundglass, mosaic attenuation most pronounced in the right   lung, may reflect small airway versus small vessel disease.    Moderate left-sided pleural effusion with nodular pleural thickening   suggestive of pleural-based metastatic disease.    Enlarged retrosternal and alla and paracardiac lymph nodes.    Interval decrease in confluent mediastinal/hilar lymphadenopathy.    Findings suggestive of osseous metastatic disease.    See above final report, study was preliminary reported by [facility].      =========================  PROCEDURE INFORMATION:   Exam: CT Abdomen And Pelvis With Contrast   Exam date and time: 11/22/2019 9:25 PM   Age: 59 years old   Clinical history: Other: SOB; Shortness of breath. Metastatic lung cancer.    TECHNIQUE:   Imaging protocol: Computed tomography of the abdomen and pelvis with   intravenous contrast.     COMPARISON:   CT ANGIO CHEST WITHOUT AND OR WITH IV CONTRAST 2/5/2019 12:51 AM     FINDINGS:    There is diffuse nodular thickening along the pleural surface of the left   hemidiaphragm consistent with pleural-based metastatic disease.    Liver: Multiple varying sized low density lesions throughout liver,   largest 2.8   cm lateral segment left lobe.   Gallbladder and bile ducts: Mild layering hyperdensity within the   gallbladder   lumen.   Pancreas: Ovoid 3 x 4.5 cm hypodense area body of the pancreas.   Spleen: Normal. No splenomegaly.   Adrenals: Normal. No mass.   Kidneys and ureters: Normal. No hydronephrosis.   Stomach and bowel: Unremarkable. No obstruction. No mucosal thickening.   Appendix: No evidence of appendicitis.   Intraperitoneal space: Mild-moderate abdominal and pelvic free fluid.   Vasculature: Chronic atherosclerotic calcification of the vasculature.   Lymph nodes: Multiple areas enlarged lymph nodes: 3 cm bilateral   retrocrural,   3.6 left periaortic, 2-4 cm lina hepatis, 5 cm along the stomach.     Bladder: Unremarkable as visualized.   Reproductive: Prostate appears within normal limits.   Bones/joints: Chronic degenerative changes of the lumbar spine. Bilateral   pars   defects at L5 with grade 1 spondylolisthesis at L5-S1 level.   Soft tissues: Unremarkable.     IMPRESSION:   1. Possible pancreatic mass-neoplasm with multifocalhepatic metastasis   versus   pancreatic and hepatic metastasis.   2. Extensive upper abdominal and retroperitoneal lymphadenopathy as   described   above.   3. Mild-moderate abdominal and pelvic free fluid.   4. Suspected stones and/or debris in gallbladder. No evidence of   intrahepatic   or extrahepatic biliary ductal dilatation.   5. Bilateral pars defects at L5 with grade 1 spondylolisthesis at L5-S1   level.    See above final report, preliminary reported by [facility] radiology.    MANA VILLEGAS M.D., ATTENDING RADIOLOGIST  This document has been electronically signed. Nov 23 2019  9:13AM          < end of copied text >    ISAC MELVIN   This document has been electronically signed. Nov 23 2019  8:36AM    < end of copied text > [facility] -- Gurpreet Bush, Emily, Corey, Sajan Pickard Savella  Office # [phone]      Follow Up:    Afib   Subjective/Observations:   No events overnight resting comfortably in bed.  No complaints of chest pain, dyspnea, or palpitations reported. No signs of orthopnea or PND.     REVIEW OF SYSTEMS: All other review of systems is negative unless indicated above    PAST MEDICAL & SURGICAL HISTORY:  Lung cancer  Depression  Dyspnea  Mediastinal adenopathy  Pericardial effusion  AF (atrial fibrillation)  Hyponatremia  COPD (chronic obstructive pulmonary disease)  HTN (hypertension)  S/P pericardial window creation      MEDICATIONS  (STANDING):  acetylcysteine 10%  Inhalation 5 milliLiter(s) Inhalation four times a day  albuterol/ipratropium for Nebulization 3 milliLiter(s) Nebulizer every 6 hours  budesonide 160 MICROgram(s)/formoterol 4.5 MICROgram(s) Inhaler 2 Puff(s) Inhalation two times a day  chlorhexidine 4% Liquid 1 Application(s) Topical daily  digoxin     Tablet 0.25 milliGRAM(s) Oral daily  dronabinol 5 milliGRAM(s) Oral every 12 hours  enoxaparin Injectable 80 milliGRAM(s) SubCutaneous two times a day  loratadine 10 milliGRAM(s) Oral daily  magnesium oxide 400 milliGRAM(s) Oral daily  methylPREDNISolone sodium succinate Injectable 40 milliGRAM(s) IV Push two times a day  metoprolol succinate ER 25 milliGRAM(s) Oral daily  multivitamin 1 Tablet(s) Oral daily  sodium chloride 1 Gram(s) Oral three times a day  tiotropium 18 MICROgram(s) Capsule 1 Capsule(s) Inhalation daily    MEDICATIONS  (PRN):  guaiFENesin    Syrup 100 milliGRAM(s) Oral every 6 hours PRN Cough  morphine  - Injectable 3 milliGRAM(s) IV Push every 4 hours PRN Severe Pain (7 - 10)  morphine  - Injectable 2 milliGRAM(s) IV Push every 4 hours PRN Moderate Pain (4 - 6)  morphine  - Injectable 1 milliGRAM(s) IV Push every 4 hours PRN Mild Pain (1 - 3)  ondansetron Injectable 4 milliGRAM(s) IV Push every 6 hours PRN Nausea  senna 8.6 milliGRAM(s) Oral Tablet - Peds 1 Tablet(s) Oral at bedtime PRN for constipation      Allergies    penicillin (Unknown)    Intolerances        Vital Signs Last 24 Hrs  T(C): 36.6 (27 Nov 2019 08:08), Max: 36.8 (26 Nov 2019 12:30)  T(F): 97.8 (27 Nov 2019 08:08), Max: 98.2 (26 Nov 2019 12:30)  HR: 116 (27 Nov 2019 08:08) (84 - 120)  BP: 107/73 (27 Nov 2019 08:08) (99/62 - 114/73)  BP(mean): --  RR: 20 (27 Nov 2019 08:08) (19 - 20)  SpO2: 91% (27 Nov 2019 08:08) (91% - 100%)    I&O's Summary        PHYSICAL EXAM:  TELE: Afib   Constitutional: NAD, awake and alert, well-developed  HEENT: Moist Mucous Membranes, Anicteric  Pulmonary:  Non-labored, breath sounds bilateral rhonchi throughout , decr bs left No wheezing, crackles .   Cardiovascular: Irregular, S1 and S2 nl, No murmurs, rubs, gallops or clicks  Gastrointestinal: Bowel Sounds present, soft, nontender.   Lymph: No lymphadenopathy. + peripheral edema.  Skin: No visible rashes or ulcers.  Psych:  Mood & affect appropriate    LABS: All Labs Reviewed:                        9.3    12.91 )-----------( 228      ( 27 Nov 2019 04:59 )             29.7                         8.7    9.50  )-----------( 238      ( 26 Nov 2019 06:14 )             27.5                         9.1    8.83  )-----------( 260      ( 25 Nov 2019 06:58 )             28.7     27 Nov 2019 04:59    141    |  104    |  24     ----------------------------<  143    4.7     |  30     |  0.81   26 Nov 2019 06:14    139    |  103    |  21     ----------------------------<  135    4.0     |  29     |  0.68   25 Nov 2019 06:58    139    |  102    |  22     ----------------------------<  149    4.1     |  26     |  0.79     Ca    9.1        27 Nov 2019 04:59  Ca    8.2        26 Nov 2019 06:14  Ca    8.6        25 Nov 2019 06:58               ECG:  < from: 12 Lead ECG (02.04.19 @ 22:56) >  Ventricular Rate 83 BPM    Atrial Rate 69 BPM    QRS Duration 92 ms    Q-T Interval 374 ms    QTC Calculation(Bezet) 439 ms    R Axis 62 degrees    T Axis 247 degrees    Diagnosis Line Atrial fibrillation  Incomplete right bundle branch block  ST &T wave abnormality, consider inferior ischemia or digitalis effect  ST & T wave abnormality, consider anterolateral ischemia or digitalis effect  Confirmed by QAMAR PICKARD ([id number]) on 2/5/2019 1:06:43 PM    < end of copied text >    Echo:  < from: TTE Echo Doppler w/o Cont (11.23.19 @ 15:29) >  EXAM:  ECHO TTE WO CON COMP W DOPPLR         PROCEDURE DATE:  11/23/2019        INTERPRETATION:  Ordering Physician: DOMINIC SERRATO [id number]    Indication: Pulmonary embolism  Technologist Initials: DL  Study Quality: Technically difficult and limited   A complete echocardiographic study was performed utilizing standard   protocol including spectral and color Doppler in all echocardiographic   windows.    Height: 183 cm  Weight: 82 kg  BSA: 2.0 sq m  Blood Pressure: 101/66    MEASUREMENTS  IVS: 1.2cm  PWT: 1.1cm  LA: 3.0cm  AO: 3.8cm  LVIDd: 3.7cm  LVIDs: 2.0cm    LVEF: 75%    FINDINGS  Left Ventricle: Hyperdynamic left ventricular systolic function.  Aortic Valve: The aortic valve is not well-visualized. It appears   calcified. No significant aortic insufficiency.  Mitral Valve: Mitral annular calcification calcified mitral valve   leaflets. Mild mitral insufficiency.  Tricuspid Valve: Not well visualized  Pulmonic Valve: Not well visualized  Left Atrium: Grossly normal  Right Ventricle: The right ventricle is not well visualized. Grossly,   normal right ventricular size and systolic function.  Right Atrium: Grossly normal    Pericardium/Pleura: Large left pleural effusion.  Echogenic material is   noted in the pleural space.      CONCLUSIONS:  Technically difficult and limited study.  1. Hyperdynamic left ventricular systolic function.  2. Mitral annular calcification and calcified mitral valve leaflets with   mild mitral insufficiency.  3. Grossly normal left atrial size.  4. The right ventricle is not well visualized. Grossly, normal right   ventricular size and systolic function.  5.  Large left pleural effusion which echogenic material noted in the   pleural space.        QAMAR ECHEVARRIA M.D., ATTENDING CARDIOLOGIST  This document has been electronically signed. Nov 24 2019  7:29AM    < end of copied text >    Radiology:  < from: Xray Chest 1 View- PORTABLE-Urgent (11.23.19 @ 02:19) >  EXAM:  XR CHEST PORTABLE URGENT 1V                            PROCEDURE DATE:  11/23/2019          INTERPRETATION:  INDICATION: Tachypnea    PRIORS: 11/22/2019.    VIEWS: Portable AP radiography of the chest performed.    FINDINGS: Heart size cannot be quantitated on this portable radiograph.   No definite superior mediastinal widening is identified. A right-sided   IVAD is identified. Bilateral perihilar opacities are without significant   change; underlying mass lesion/hilar lymphadenopathy cannot be excluded.   Stable linear scarlike opacity identified within the left upper lung   field. Left pleural effusion appears unchanged. No right pleural   effusion. No pneumothorax demonstrated. No mediastinal shift is noted. No   acute osseous fractures are noted.    IMPRESSION: No significant interval change. See above discussion.    < from: CT Angio Chest w/ IV Cont (11.22.19 @ 21:41) >  EXAM:  CT ABDOMEN AND PELVIS IC                          EXAM:  CT ANGIO CHEST (W)AW IC                            PROCEDURE DATE:  11/22/2019          INTERPRETATION:        Addendum created by Jorgito Manuel MD on 11/22/2019 10:59:51 PM EST     THIS REPORT CONTAINS FINDINGS THAT MAY BE CRITICAL TO PATIENT CARE. The   findings were verbally communicated via telephone conference with JUANJO Quinones at 10:50 PM EST on 11/22/2019. The findings were acknowledged and   understood.        --Patient has history of known lung carcinoma with metastasis.    Initial report created on 11/22/2019 10:59:12 PM EST     PROCEDURE INFORMATION:   Exam: CT Angiography Chest With Contrast   Exam date and time: 11/22/2019 9:25 PM   Age: 59 years old   Clinical history: SOB; Shortness of breath     TECHNIQUE:   Imaging protocol: Computed tomographic angiography of the chest with   intravenous contrast.   3D rendering: MIP reconstructed images were created and reviewed.     COMPARISON:   CT ANGIO CHEST WITHOUT AND OR WITH IV CONTRAST 2/5/2019 12:51 AM     FINDINGS:   Pulmonary arteries: Adequate contrast enhancement of the pulmonary   arteries.   Abnormal filling defects -emboli within bilateral lower lobe segmental   pulmonary arterial vessels.  Aorta: No evidence of thoracic aortic dissection. Chronic atherosclerotic   calcification of the vasculature.   Lungs:    Bilateral groundglass mosaic attenuation, most pronounced in the right   upper and lower lung zones.  Thick-walled left apicalbleb.  Paraseptal emphysematous changes lung apices.  Subpleural areas of scarring/fibrosis left upper and superior segment   left lower lobes.    Pleural space: Moderate-large left pleural effusion nodule left-sided   pleural thickening is suggestive of pleural-based metastatic disease.    Heart: Heart size within normal limits.  Mediastinum: Interval decrease in confluent mediastinal and hilar   lymphadenopathy.  Lymph nodes: Enlarged substernal and left pericardial and pericardial   mediastinal lymph nodes.  Bones/joints: Heterogeneous appearance of the visualized bones, with   areas of mottled sclerosis involving the sternum and left third rib,   suspicious for metastatic disease.      IMPRESSION:    Bilateral segmental lower lobe pulmonary emboli.    Bilateral groundglass, mosaic attenuation most pronounced in the right   lung, may reflect small airway versus small vessel disease.    Moderate left-sided pleural effusion with nodular pleural thickening   suggestive of pleural-based metastatic disease.    Enlarged retrosternal and alla and paracardiac lymph nodes.    Interval decrease in confluent mediastinal/hilar lymphadenopathy.    Findings suggestive of osseous metastatic disease.    See above final report, study was preliminary reported by [facility].      =========================  PROCEDURE INFORMATION:   Exam: CT Abdomen And Pelvis With Contrast   Exam date and time: 11/22/2019 9:25 PM   Age: 59 years old   Clinical history: Other: SOB; Shortness of breath. Metastatic lung cancer.    TECHNIQUE:   Imaging protocol: Computed tomography of the abdomen and pelvis with   intravenous contrast.     COMPARISON:   CT ANGIO CHEST WITHOUT AND OR WITH IV CONTRAST 2/5/2019 12:51 AM     FINDINGS:    There is diffuse nodular thickening along the pleural surface of the left   hemidiaphragm consistent with pleural-based metastatic disease.    Liver: Multiple varying sized low density lesions throughout liver,   largest 2.8   cm lateral segment left lobe.   Gallbladder and bile ducts: Mild layering hyperdensity within the   gallbladder   lumen.   Pancreas: Ovoid 3 x 4.5 cm hypodense area body of the pancreas.   Spleen: Normal. No splenomegaly.   Adrenals: Normal. No mass.   Kidneys and ureters: Normal. No hydronephrosis.   Stomach and bowel: Unremarkable. No obstruction. No mucosal thickening.   Appendix: No evidence of appendicitis.   Intraperitoneal space: Mild-moderate abdominal and pelvic free fluid.   Vasculature: Chronic atherosclerotic calcification of the vasculature.   Lymph nodes: Multiple areas enlarged lymph nodes: 3 cm bilateral   retrocrural,   3.6 left periaortic, 2-4 cm lina hepatis, 5 cm along the stomach.     Bladder: Unremarkable as visualized.   Reproductive: Prostate appears within normal limits.   Bones/joints: Chronic degenerative changes of the lumbar spine. Bilateral   pars   defects at L5 with grade 1 spondylolisthesis at L5-S1 level.   Soft tissues: Unremarkable.     IMPRESSION:   1. Possible pancreatic mass-neoplasm with multifocalhepatic metastasis   versus   pancreatic and hepatic metastasis.   2. Extensive upper abdominal and retroperitoneal lymphadenopathy as   described   above.   3. Mild-moderate abdominal and pelvic free fluid.   4. Suspected stones and/or debris in gallbladder. No evidence of   intrahepatic   or extrahepatic biliary ductal dilatation.   5. Bilateral pars defects at L5 with grade 1 spondylolisthesis at L5-S1   level.    See above final report, preliminary reported by [facility] radiology.    MANA VILLEGAS M.D., ATTENDING RADIOLOGIST  This document has been electronically signed. Nov 23 2019  9:13AM          < end of copied text >    ISAC MELVIN   This document has been electronically signed. Nov 23 2019  8:36AM    < end of copied text >

## 2019-11-27 NOTE — PROGRESS NOTE ADULT - ATTENDING COMMENTS
The patient was personally seen and examined, in addition to being examined and evaluated by NP.  All elements of the note were edited where appropriate.    remains tachycardic in af  try to incr toprol if possible  needs more definitive management of his effusion, likely for pleurex

## 2019-11-27 NOTE — DISCHARGE NOTE PROVIDER - CARE PROVIDER_API CALL
Shahab Soto (MD)  Internal Medicine; Pulmonary Disease  [address], [address]  [place] [address]  Phone: [phone]  Fax: [phone]  Established Patient  Follow Up Time:

## 2019-11-28 LAB
ANION GAP SERPL CALC-SCNC: 8 MMOL/L — SIGNIFICANT CHANGE UP (ref 5–17)
APTT BLD: 71 SEC — HIGH (ref 28.5–37)
APTT BLD: 83.3 SEC — HIGH (ref 28.5–37)
BUN SERPL-MCNC: 22 MG/DL — SIGNIFICANT CHANGE UP (ref 7–23)
CALCIUM SERPL-MCNC: 8.1 MG/DL — LOW (ref 8.5–10.1)
CHLORIDE SERPL-SCNC: 100 MMOL/L — SIGNIFICANT CHANGE UP (ref 96–108)
CO2 SERPL-SCNC: 30 MMOL/L — SIGNIFICANT CHANGE UP (ref 22–31)
CREAT SERPL-MCNC: 0.6 MG/DL — SIGNIFICANT CHANGE UP (ref 0.5–1.3)
CULTURE RESULTS: SIGNIFICANT CHANGE UP
CULTURE RESULTS: SIGNIFICANT CHANGE UP
GLUCOSE SERPL-MCNC: 120 MG/DL — HIGH (ref 70–99)
HCT VFR BLD CALC: 29.5 % — LOW (ref 39–50)
HGB BLD-MCNC: 9.2 G/DL — LOW (ref 13–17)
MCHC RBC-ENTMCNC: 26.7 PG — LOW (ref 27–34)
MCHC RBC-ENTMCNC: 31.2 GM/DL — LOW (ref 32–36)
MCV RBC AUTO: 85.8 FL — SIGNIFICANT CHANGE UP (ref 80–100)
NRBC # BLD: 0 /100 WBCS — SIGNIFICANT CHANGE UP (ref 0–0)
PLATELET # BLD AUTO: 217 K/UL — SIGNIFICANT CHANGE UP (ref 150–400)
POTASSIUM SERPL-MCNC: 3.9 MMOL/L — SIGNIFICANT CHANGE UP (ref 3.5–5.3)
POTASSIUM SERPL-SCNC: 3.9 MMOL/L — SIGNIFICANT CHANGE UP (ref 3.5–5.3)
RBC # BLD: 3.44 M/UL — LOW (ref 4.2–5.8)
RBC # FLD: 18.6 % — HIGH (ref 10.3–14.5)
SODIUM SERPL-SCNC: 138 MMOL/L — SIGNIFICANT CHANGE UP (ref 135–145)
SPECIMEN SOURCE: SIGNIFICANT CHANGE UP
SPECIMEN SOURCE: SIGNIFICANT CHANGE UP
WBC # BLD: 12.65 K/UL — HIGH (ref 3.8–10.5)
WBC # FLD AUTO: 12.65 K/UL — HIGH (ref 3.8–10.5)

## 2019-11-28 PROCEDURE — 99233 SBSQ HOSP IP/OBS HIGH 50: CPT

## 2019-11-28 RX ORDER — FUROSEMIDE 40 MG
20 TABLET ORAL ONCE
Refills: 0 | Status: COMPLETED | OUTPATIENT
Start: 2019-11-28 | End: 2019-11-28

## 2019-11-28 RX ORDER — METOPROLOL TARTRATE 50 MG
50 TABLET ORAL DAILY
Refills: 0 | Status: DISCONTINUED | OUTPATIENT
Start: 2019-11-28 | End: 2019-11-28

## 2019-11-28 RX ORDER — METOPROLOL TARTRATE 50 MG
50 TABLET ORAL ONCE
Refills: 0 | Status: COMPLETED | OUTPATIENT
Start: 2019-11-28 | End: 2019-11-28

## 2019-11-28 RX ORDER — METOPROLOL TARTRATE 50 MG
100 TABLET ORAL DAILY
Refills: 0 | Status: DISCONTINUED | OUTPATIENT
Start: 2019-11-29 | End: 2019-12-06

## 2019-11-28 RX ADMIN — Medication 1 TABLET(S): at 12:33

## 2019-11-28 RX ADMIN — TIOTROPIUM BROMIDE 1 CAPSULE(S): 18 CAPSULE ORAL; RESPIRATORY (INHALATION) at 05:35

## 2019-11-28 RX ADMIN — BUDESONIDE AND FORMOTEROL FUMARATE DIHYDRATE 2 PUFF(S): 160; 4.5 AEROSOL RESPIRATORY (INHALATION) at 05:35

## 2019-11-28 RX ADMIN — HEPARIN SODIUM 1500 UNIT(S)/HR: 5000 INJECTION INTRAVENOUS; SUBCUTANEOUS at 08:26

## 2019-11-28 RX ADMIN — Medication 50 MILLIGRAM(S): at 05:35

## 2019-11-28 RX ADMIN — Medication 500 MICROGRAM(S): at 00:51

## 2019-11-28 RX ADMIN — Medication 20 MILLIGRAM(S): at 08:26

## 2019-11-28 RX ADMIN — Medication 500 MICROGRAM(S): at 07:27

## 2019-11-28 RX ADMIN — MAGNESIUM OXIDE 400 MG ORAL TABLET 400 MILLIGRAM(S): 241.3 TABLET ORAL at 12:33

## 2019-11-28 RX ADMIN — Medication 5 MILLIGRAM(S): at 17:16

## 2019-11-28 RX ADMIN — MORPHINE SULFATE 3 MILLIGRAM(S): 50 CAPSULE, EXTENDED RELEASE ORAL at 13:59

## 2019-11-28 RX ADMIN — Medication 50 MILLIGRAM(S): at 08:26

## 2019-11-28 RX ADMIN — SODIUM CHLORIDE 1 GRAM(S): 9 INJECTION INTRAMUSCULAR; INTRAVENOUS; SUBCUTANEOUS at 13:25

## 2019-11-28 RX ADMIN — Medication 5 MILLILITER(S): at 19:43

## 2019-11-28 RX ADMIN — MORPHINE SULFATE 3 MILLIGRAM(S): 50 CAPSULE, EXTENDED RELEASE ORAL at 13:29

## 2019-11-28 RX ADMIN — Medication 5 MILLILITER(S): at 07:27

## 2019-11-28 RX ADMIN — SODIUM CHLORIDE 1 GRAM(S): 9 INJECTION INTRAMUSCULAR; INTRAVENOUS; SUBCUTANEOUS at 05:33

## 2019-11-28 RX ADMIN — Medication 500 MICROGRAM(S): at 19:43

## 2019-11-28 RX ADMIN — MORPHINE SULFATE 1 MILLIGRAM(S): 50 CAPSULE, EXTENDED RELEASE ORAL at 10:30

## 2019-11-28 RX ADMIN — Medication 5 MILLIGRAM(S): at 05:33

## 2019-11-28 RX ADMIN — SODIUM CHLORIDE 1 GRAM(S): 9 INJECTION INTRAMUSCULAR; INTRAVENOUS; SUBCUTANEOUS at 21:41

## 2019-11-28 RX ADMIN — Medication 0.25 MILLIGRAM(S): at 05:33

## 2019-11-28 RX ADMIN — Medication 500 MICROGRAM(S): at 13:29

## 2019-11-28 RX ADMIN — MORPHINE SULFATE 1 MILLIGRAM(S): 50 CAPSULE, EXTENDED RELEASE ORAL at 10:00

## 2019-11-28 RX ADMIN — LORATADINE 10 MILLIGRAM(S): 10 TABLET ORAL at 12:33

## 2019-11-28 RX ADMIN — CHLORHEXIDINE GLUCONATE 1 APPLICATION(S): 213 SOLUTION TOPICAL at 12:33

## 2019-11-28 RX ADMIN — MORPHINE SULFATE 3 MILLIGRAM(S): 50 CAPSULE, EXTENDED RELEASE ORAL at 21:38

## 2019-11-28 RX ADMIN — MORPHINE SULFATE 3 MILLIGRAM(S): 50 CAPSULE, EXTENDED RELEASE ORAL at 22:08

## 2019-11-28 RX ADMIN — HEPARIN SODIUM 1500 UNIT(S)/HR: 5000 INJECTION INTRAVENOUS; SUBCUTANEOUS at 00:40

## 2019-11-28 RX ADMIN — Medication 40 MILLIGRAM(S): at 05:33

## 2019-11-28 NOTE — PROGRESS NOTE ADULT - PROBLEM SELECTOR PLAN 3
- Continue Digoxin 0.25 mg PO daily.   - Switch to heparin drip this afternoon  - Continue to HOLD Diltiazem.  - Continue Metoprolol 25 mg PO daily with hold parameters, will increase if BP starts to improve

## 2019-11-28 NOTE — PROGRESS NOTE ADULT - SUBJECTIVE AND OBJECTIVE BOX
[INTERVAL HX: ]  Patient seen and examined;  Chart reviewed and events noted;   no CP, + SOB, + PARKS.   On nebs. Seems more comfortable breathing wise compared to yest.   Awaiting tap or pleurex tomorrow.   NPO past midnight.   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 Patient states the SOB has been worsening and that's what prompted him to come to the ED. He denies recent fevers, chills but admits he's been shaking since he's been on chemotherapy. Admits on and off nausea, and episodes of vomiting over the past week. Denies hematemesis/hemoptysis. Denies chest pain, palpitations, abdominal pain, diarrhea, constipation. Admits LE edema which has been worsening in past week, and reports 100 lb weight loss since January. He has had no appetite, and reports not eating anything at all. He notes he recently changed chem to Paclitaxel, as per patient a body scan demonstrated mets to brain and bone (R shoulder, L ribs). His last radiation dose was August 2019.     In the ED:  T: 97 HR: 98 BP: 86/58 RR: 22 92% RA   WBC: 4.24 h/h: 9.2/27.6 coags wnl, D-Dimer 2411  Lactate 2.5 Na: 135, K: 3.0  Cr: 0.57 glucose 134 TSH: 2.37 proBNP 1397  s/p duonebs, 100 mg hydrocortisone, potassium chloride 10x3  U/S doppler LE: L superificial thrombus noted  -CTA and CT abdomen and pelvis pending  -EKG, significant artificant due to shaking. Official read pending (22 Nov 2019 20:57)            MEDICATIONS  (STANDING):  acetylcysteine 10%  Inhalation 5 milliLiter(s) Inhalation four times a day  budesonide 160 MICROgram(s)/formoterol 4.5 MICROgram(s) Inhaler 2 Puff(s) Inhalation two times a day  chlorhexidine 4% Liquid 1 Application(s) Topical daily  digoxin     Tablet 0.25 milliGRAM(s) Oral daily  dronabinol 5 milliGRAM(s) Oral every 12 hours  heparin  Infusion.  Unit(s)/Hr (15 mL/Hr) IV Continuous <Continuous>  ipratropium    for Nebulization 500 MICROGram(s) Nebulizer every 6 hours  loratadine 10 milliGRAM(s) Oral daily  magnesium oxide 400 milliGRAM(s) Oral daily  methylPREDNISolone sodium succinate Injectable 40 milliGRAM(s) IV Push two times a day  multivitamin 1 Tablet(s) Oral daily  sodium chloride 1 Gram(s) Oral three times a day  tiotropium 18 MICROgram(s) Capsule 1 Capsule(s) Inhalation daily    MEDICATIONS  (PRN):  guaiFENesin    Syrup 100 milliGRAM(s) Oral every 6 hours PRN Cough  heparin  Injectable 6500 Unit(s) IV Push every 6 hours PRN For aPTT less than 40  heparin  Injectable 3000 Unit(s) IV Push every 6 hours PRN For aPTT between 40 - 57  levalbuterol Inhalation 0.63 milliGRAM(s) Inhalation every 6 hours PRN shortness of breath  morphine  - Injectable 3 milliGRAM(s) IV Push every 4 hours PRN Severe Pain (7 - 10)  morphine  - Injectable 2 milliGRAM(s) IV Push every 4 hours PRN Moderate Pain (4 - 6)  morphine  - Injectable 1 milliGRAM(s) IV Push every 4 hours PRN Mild Pain (1 - 3)  ondansetron Injectable 4 milliGRAM(s) IV Push every 6 hours PRN Nausea  senna 8.6 milliGRAM(s) Oral Tablet - Peds 1 Tablet(s) Oral at bedtime PRN for constipation      Vital Signs Last 24 Hrs  T(C): 36.3 (28 Nov 2019 07:49), Max: 36.7 (27 Nov 2019 16:00)  T(F): 97.4 (28 Nov 2019 07:49), Max: 98.1 (27 Nov 2019 16:00)  HR: 98 (28 Nov 2019 07:49) (98 - 117)  BP: 109/75 (28 Nov 2019 07:49) (102/68 - 109/75)  BP(mean): --  RR: 19 (28 Nov 2019 07:49) (18 - 19)  SpO2: 97% (28 Nov 2019 07:49) (97% - 99%)      [PHYSICAL EXAM]  General: adult in NAD,  WN,  WD.  HEENT: clear oropharynx, anicteric sclera, pink conjunctivae.  Neck: supple, no masses.  CV: normal S1S2, no murmur, no rubs, no gallops.  Lungs: dec BL L base to 1/3 down lung. R clear to auscultation, no wheezes, no rales, no rhonchi.  Abdomen: soft, non-tender, non-distended, no hepatosplenomegaly, normal BS, no guarding.  Ext: no clubbing, no cyanosis, trace edema.  Skin: no rashes,  no petechiae, no venous stasis changes.  Neuro: alert and oriented X2  , no focal motor deficits.  LN: no SC STEVE.      [LABS:]                        9.2    12.65 )-----------( 217      ( 28 Nov 2019 05:32 )             29.5     11-28    138  |  100  |  22  ----------------------------<  120<H>  3.9   |  30  |  0.60    Ca    8.1<L>      28 Nov 2019 05:32      PTT - ( 28 Nov 2019 06:33 )  PTT:71.0 sec              [RADIOLOGY STUDIES:]

## 2019-11-28 NOTE — PROGRESS NOTE ADULT - SUBJECTIVE AND OBJECTIVE BOX
[facility] -- Gurpreet Bush, Emily, Corey, Sajan Pickard Savella  Office # [phone]      Follow Up:    Afib   Subjective/Observations:   No events overnight resting comfortably in bed.  No complaints of chest pain, dyspnea, or palpitations reported. No signs of orthopnea or PND.     REVIEW OF SYSTEMS: All other review of systems is negative unless indicated above    PAST MEDICAL & SURGICAL HISTORY:  Lung cancer  Depression  Dyspnea  Mediastinal adenopathy  Pericardial effusion  AF (atrial fibrillation)  Hyponatremia  COPD (chronic obstructive pulmonary disease)  HTN (hypertension)  S/P pericardial window creation      MEDICATIONS  (STANDING):  acetylcysteine 10%  Inhalation 5 milliLiter(s) Inhalation four times a day  budesonide 160 MICROgram(s)/formoterol 4.5 MICROgram(s) Inhaler 2 Puff(s) Inhalation two times a day  chlorhexidine 4% Liquid 1 Application(s) Topical daily  digoxin     Tablet 0.25 milliGRAM(s) Oral daily  dronabinol 5 milliGRAM(s) Oral every 12 hours  heparin  Infusion.  Unit(s)/Hr (15 mL/Hr) IV Continuous <Continuous>  ipratropium    for Nebulization 500 MICROGram(s) Nebulizer every 6 hours  loratadine 10 milliGRAM(s) Oral daily  magnesium oxide 400 milliGRAM(s) Oral daily  methylPREDNISolone sodium succinate Injectable 40 milliGRAM(s) IV Push two times a day  metoprolol succinate ER 50 milliGRAM(s) Oral daily  multivitamin 1 Tablet(s) Oral daily  sodium chloride 1 Gram(s) Oral three times a day  tiotropium 18 MICROgram(s) Capsule 1 Capsule(s) Inhalation daily    MEDICATIONS  (PRN):  guaiFENesin    Syrup 100 milliGRAM(s) Oral every 6 hours PRN Cough  heparin  Injectable 6500 Unit(s) IV Push every 6 hours PRN For aPTT less than 40  heparin  Injectable 3000 Unit(s) IV Push every 6 hours PRN For aPTT between 40 - 57  levalbuterol Inhalation 0.63 milliGRAM(s) Inhalation every 6 hours PRN shortness of breath  morphine  - Injectable 3 milliGRAM(s) IV Push every 4 hours PRN Severe Pain (7 - 10)  morphine  - Injectable 2 milliGRAM(s) IV Push every 4 hours PRN Moderate Pain (4 - 6)  morphine  - Injectable 1 milliGRAM(s) IV Push every 4 hours PRN Mild Pain (1 - 3)  ondansetron Injectable 4 milliGRAM(s) IV Push every 6 hours PRN Nausea  senna 8.6 milliGRAM(s) Oral Tablet - Peds 1 Tablet(s) Oral at bedtime PRN for constipation      Allergies    penicillin (Unknown)    Intolerances        Vital Signs Last 24 Hrs  T(C): 36.3 (28 Nov 2019 04:30), Max: 36.8 (27 Nov 2019 11:05)  T(F): 97.3 (28 Nov 2019 04:30), Max: 98.2 (27 Nov 2019 11:05)  HR: 101 (28 Nov 2019 07:28) (98 - 117)  BP: 103/70 (28 Nov 2019 04:30) (102/68 - 107/73)  BP(mean): --  RR: 18 (28 Nov 2019 04:30) (18 - 20)  SpO2: 99% (28 Nov 2019 07:28) (91% - 99%)    I&O's Summary    27 Nov 2019 07:01  -  28 Nov 2019 07:00  --------------------------------------------------------  IN: 180 mL / OUT: 650 mL / NET: -470 mL          PHYSICAL EXAM:  TELE: Afib   Constitutional: NAD, awake and alert, well-developed  HEENT: Moist Mucous Membranes, Anicteric  Pulmonary: Non-labored, breath sounds are clear bilaterally, No wheezing, crackles or rhonchi  Cardiovascular: Regular, S1 and S2 nl, No murmurs, rubs, gallops or clicks  Gastrointestinal: Bowel Sounds present, soft, nontender.   Lymph: No lymphadenopathy. +2 RADHA peripheral edema.  Skin: No visible rashes or ulcers.  Psych:  Mood & affect appropriate    LABS: All Labs Reviewed:                        9.2    12.65 )-----------( 217      ( 28 Nov 2019 05:32 )             29.5                         9.4    13.93 )-----------( 213      ( 27 Nov 2019 23:54 )             30.1                         9.3    12.91 )-----------( 228      ( 27 Nov 2019 04:59 )             29.7     28 Nov 2019 05:32    138    |  100    |  22     ----------------------------<  120    3.9     |  30     |  0.60   27 Nov 2019 04:59    141    |  104    |  24     ----------------------------<  143    4.7     |  30     |  0.81   26 Nov 2019 06:14    139    |  103    |  21     ----------------------------<  135    4.0     |  29     |  0.68     Ca    8.1        28 Nov 2019 05:32  Ca    9.1        27 Nov 2019 04:59  Ca    8.2        26 Nov 2019 06:14      PTT - ( 28 Nov 2019 06:33 )  PTT:71.0 sec         ECG:  < from: 12 Lead ECG (02.04.19 @ 22:56) >  Ventricular Rate 83 BPM    Atrial Rate 69 BPM    QRS Duration 92 ms    Q-T Interval 374 ms    QTC Calculation(Bezet) 439 ms    R Axis 62 degrees    T Axis 247 degrees    Diagnosis Line Atrial fibrillation  Incomplete right bundle branch block  ST &T wave abnormality, consider inferior ischemia or digitalis effect  ST & T wave abnormality, consider anterolateral ischemia or digitalis effect  Confirmed by QAMAR PICKARD ([id number]) on 2/5/2019 1:06:43 PM    < end of copied text >    Echo:  < from: TTE Echo Doppler w/o Cont (11.23.19 @ 15:29) >  EXAM:  ECHO TTE WO CON COMP W DOPPLR         PROCEDURE DATE:  11/23/2019        INTERPRETATION:  Ordering Physician: DOMINIC SERRATO [id number]    Indication: Pulmonary embolism  Technologist Initials: DL  Study Quality: Technically difficult and limited   A complete echocardiographic study was performed utilizing standard   protocol including spectral and color Doppler in all echocardiographic   windows.    Height: 183 cm  Weight: 82 kg  BSA: 2.0 sq m  Blood Pressure: 101/66    MEASUREMENTS  IVS: 1.2cm  PWT: 1.1cm  LA: 3.0cm  AO: 3.8cm  LVIDd: 3.7cm  LVIDs: 2.0cm    LVEF: 75%    FINDINGS  Left Ventricle: Hyperdynamic left ventricular systolic function.  Aortic Valve: The aortic valve is not well-visualized. It appears   calcified. No significant aortic insufficiency.  Mitral Valve: Mitral annular calcification calcified mitral valve   leaflets. Mild mitral insufficiency.  Tricuspid Valve: Not well visualized  Pulmonic Valve: Not well visualized  Left Atrium: Grossly normal  Right Ventricle: The right ventricle is not well visualized. Grossly,   normal right ventricular size and systolic function.  Right Atrium: Grossly normal    Pericardium/Pleura: Large left pleural effusion.  Echogenic material is   noted in the pleural space.      CONCLUSIONS:  Technically difficult and limited study.  1. Hyperdynamic left ventricular systolic function.  2. Mitral annular calcification and calcified mitral valve leaflets with   mild mitral insufficiency.  3. Grossly normal left atrial size.  4. The right ventricle is not well visualized. Grossly, normal right   ventricular size and systolic function.  5.  Large left pleural effusion which echogenic material noted in the   pleural space.        QAMAR ECHEVARRIA M.D., ATTENDING CARDIOLOGIST  This document has been electronically signed. Nov 24 2019  7:29AM    < end of copied text >    Radiology:  < from: Xray Chest 1 View- PORTABLE-Urgent (11.23.19 @ 02:19) >  EXAM:  XR CHEST PORTABLE URGENT 1V                            PROCEDURE DATE:  11/23/2019          INTERPRETATION:  INDICATION: Tachypnea    PRIORS: 11/22/2019.    VIEWS: Portable AP radiography of the chest performed.    FINDINGS: Heart size cannot be quantitated on this portable radiograph.   No definite superior mediastinal widening is identified. A right-sided   IVAD is identified. Bilateral perihilar opacities are without significant   change; underlying mass lesion/hilar lymphadenopathy cannot be excluded.   Stable linear scarlike opacity identified within the left upper lung   field. Left pleural effusion appears unchanged. No right pleural   effusion. No pneumothorax demonstrated. No mediastinal shift is noted. No   acute osseous fractures are noted.    IMPRESSION: No significant interval change. See above discussion.    < from: CT Angio Chest w/ IV Cont (11.22.19 @ 21:41) >  EXAM:  CT ABDOMEN AND PELVIS IC                          EXAM:  CT ANGIO CHEST (W)AW IC                            PROCEDURE DATE:  11/22/2019          INTERPRETATION:        Addendum created by Jorgito Manuel MD on 11/22/2019 10:59:51 PM EST     THIS REPORT CONTAINS FINDINGS THAT MAY BE CRITICAL TO PATIENT CARE. The   findings were verbally communicated via telephone conference with JUANJO Quinones at 10:50 PM EST on 11/22/2019. The findings were acknowledged and   understood.        --Patient has history of known lung carcinoma with metastasis.    Initial report created on 11/22/2019 10:59:12 PM EST     PROCEDURE INFORMATION:   Exam: CT Angiography Chest With Contrast   Exam date and time: 11/22/2019 9:25 PM   Age: 59 years old   Clinical history: SOB; Shortness of breath     TECHNIQUE:   Imaging protocol: Computed tomographic angiography of the chest with   intravenous contrast.   3D rendering: MIP reconstructed images were created and reviewed.     COMPARISON:   CT ANGIO CHEST WITHOUT AND OR WITH IV CONTRAST 2/5/2019 12:51 AM     FINDINGS:   Pulmonary arteries: Adequate contrast enhancement of the pulmonary   arteries.   Abnormal filling defects -emboli within bilateral lower lobe segmental   pulmonary arterial vessels.  Aorta: No evidence of thoracic aortic dissection. Chronic atherosclerotic   calcification of the vasculature.   Lungs:    Bilateral groundglass mosaic attenuation, most pronounced in the right   upper and lower lung zones.  Thick-walled left apicalbleb.  Paraseptal emphysematous changes lung apices.  Subpleural areas of scarring/fibrosis left upper and superior segment   left lower lobes.    Pleural space: Moderate-large left pleural effusion nodule left-sided   pleural thickening is suggestive of pleural-based metastatic disease.    Heart: Heart size within normal limits.  Mediastinum: Interval decrease in confluent mediastinal and hilar   lymphadenopathy.  Lymph nodes: Enlarged substernal and left pericardial and pericardial   mediastinal lymph nodes.  Bones/joints: Heterogeneous appearance of the visualized bones, with   areas of mottled sclerosis involving the sternum and left third rib,   suspicious for metastatic disease.      IMPRESSION:    Bilateral segmental lower lobe pulmonary emboli.    Bilateral groundglass, mosaic attenuation most pronounced in the right   lung, may reflect small airway versus small vessel disease.    Moderate left-sided pleural effusion with nodular pleural thickening   suggestive of pleural-based metastatic disease.    Enlarged retrosternal and alla and paracardiac lymph nodes.    Interval decrease in confluent mediastinal/hilar lymphadenopathy.    Findings suggestive of osseous metastatic disease.    See above final report, study was preliminary reported by [facility].      =========================  PROCEDURE INFORMATION:   Exam: CT Abdomen And Pelvis With Contrast   Exam date and time: 11/22/2019 9:25 PM   Age: 59 years old   Clinical history: Other: SOB; Shortness of breath. Metastatic lung cancer.    TECHNIQUE:   Imaging protocol: Computed tomography of the abdomen and pelvis with   intravenous contrast.     COMPARISON:   CT ANGIO CHEST WITHOUT AND OR WITH IV CONTRAST 2/5/2019 12:51 AM     FINDINGS:    There is diffuse nodular thickening along the pleural surface of the left   hemidiaphragm consistent with pleural-based metastatic disease.    Liver: Multiple varying sized low density lesions throughout liver,   largest 2.8   cm lateral segment left lobe.   Gallbladder and bile ducts: Mild layering hyperdensity within the   gallbladder   lumen.   Pancreas: Ovoid 3 x 4.5 cm hypodense area body of the pancreas.   Spleen: Normal. No splenomegaly.   Adrenals: Normal. No mass.   Kidneys and ureters: Normal. No hydronephrosis.   Stomach and bowel: Unremarkable. No obstruction. No mucosal thickening.   Appendix: No evidence of appendicitis.   Intraperitoneal space: Mild-moderate abdominal and pelvic free fluid.   Vasculature: Chronic atherosclerotic calcification of the vasculature.   Lymph nodes: Multiple areas enlarged lymph nodes: 3 cm bilateral   retrocrural,   3.6 left periaortic, 2-4 cm lina hepatis, 5 cm along the stomach.     Bladder: Unremarkable as visualized.   Reproductive: Prostate appears within normal limits.   Bones/joints: Chronic degenerative changes of the lumbar spine. Bilateral   pars   defects at L5 with grade 1 spondylolisthesis at L5-S1 level.   Soft tissues: Unremarkable.     IMPRESSION:   1. Possible pancreatic mass-neoplasm with multifocalhepatic metastasis   versus   pancreatic and hepatic metastasis.   2. Extensive upper abdominal and retroperitoneal lymphadenopathy as   described   above.   3. Mild-moderate abdominal and pelvic free fluid.   4. Suspected stones and/or debris in gallbladder. No evidence of   intrahepatic   or extrahepatic biliary ductal dilatation.   5. Bilateral pars defects at L5 with grade 1 spondylolisthesis at L5-S1   level.    See above final report, preliminary reported by [facility] radiology.    MANA VILLEGAS M.D., ATTENDING RADIOLOGIST  This document has been electronically signed. Nov 23 2019  9:13AM          < end of copied text >    ISAC MELVIN   This document has been electronically signed. Nov 23 2019  8:36AM

## 2019-11-28 NOTE — PROGRESS NOTE ADULT - SUBJECTIVE AND OBJECTIVE BOX
Patient is a 59y old  Male who presents with a chief complaint of PNA (28 Nov 2019 12:42)      INTERVAL HPI/OVERNIGHT EVENTS:    Continues to have back pain, shortness of breath, and pedal edema    MEDICATIONS  (STANDING):  acetylcysteine 10%  Inhalation 5 milliLiter(s) Inhalation four times a day  budesonide 160 MICROgram(s)/formoterol 4.5 MICROgram(s) Inhaler 2 Puff(s) Inhalation two times a day  chlorhexidine 4% Liquid 1 Application(s) Topical daily  digoxin     Tablet 0.25 milliGRAM(s) Oral daily  dronabinol 5 milliGRAM(s) Oral every 12 hours  heparin  Infusion.  Unit(s)/Hr (15 mL/Hr) IV Continuous <Continuous>  ipratropium    for Nebulization 500 MICROGram(s) Nebulizer every 6 hours  loratadine 10 milliGRAM(s) Oral daily  magnesium oxide 400 milliGRAM(s) Oral daily  methylPREDNISolone sodium succinate Injectable 40 milliGRAM(s) IV Push two times a day  multivitamin 1 Tablet(s) Oral daily  sodium chloride 1 Gram(s) Oral three times a day  tiotropium 18 MICROgram(s) Capsule 1 Capsule(s) Inhalation daily      MEDICATIONS  (PRN):  guaiFENesin    Syrup 100 milliGRAM(s) Oral every 6 hours PRN Cough  heparin  Injectable 6500 Unit(s) IV Push every 6 hours PRN For aPTT less than 40  heparin  Injectable 3000 Unit(s) IV Push every 6 hours PRN For aPTT between 40 - 57  levalbuterol Inhalation 0.63 milliGRAM(s) Inhalation every 6 hours PRN shortness of breath  morphine  - Injectable 3 milliGRAM(s) IV Push every 4 hours PRN Severe Pain (7 - 10)  morphine  - Injectable 2 milliGRAM(s) IV Push every 4 hours PRN Moderate Pain (4 - 6)  morphine  - Injectable 1 milliGRAM(s) IV Push every 4 hours PRN Mild Pain (1 - 3)  ondansetron Injectable 4 milliGRAM(s) IV Push every 6 hours PRN Nausea  senna 8.6 milliGRAM(s) Oral Tablet - Peds 1 Tablet(s) Oral at bedtime PRN for constipation      Allergies    penicillin (Unknown)    Intolerances        PAST MEDICAL & SURGICAL HISTORY:  Lung cancer  Depression  Dyspnea  Mediastinal adenopathy  Pericardial effusion  AF (atrial fibrillation)  Hyponatremia  COPD (chronic obstructive pulmonary disease)  HTN (hypertension)  S/P pericardial window creation      Vital Signs Last 24 Hrs  T(C): 36.4 (28 Nov 2019 16:30), Max: 36.6 (27 Nov 2019 19:40)  T(F): 97.5 (28 Nov 2019 16:30), Max: 97.9 (27 Nov 2019 19:40)  HR: 105 (28 Nov 2019 16:30) (98 - 113)  BP: 111/75 (28 Nov 2019 16:30) (103/70 - 111/75)  BP(mean): --  RR: 18 (28 Nov 2019 16:30) (18 - 19)  SpO2: 95% (28 Nov 2019 16:30) (94% - 99%)    PHYSICAL EXAMINATION:    GENERAL: The patient is awake and alert in no apparent distress.     HEENT: Head is normocephalic and atraumatic. Extraocular muscles are intact. Mucous membranes are moist.    NECK: Supple.    LUNGS: scattered rhonchi bilateral and diminished breath sounds both bases    HEART: Regular rate and rhythm without murmur.    ABDOMEN: Soft, nontender, and nondistended.      EXTREMITIES: massive bilateral weeping edema    NEUROLOGIC: Grossly intact.    SKIN: No ulceration or induration present.      LABS:                        9.2    12.65 )-----------( 217      ( 28 Nov 2019 05:32 )             29.5     11-28    138  |  100  |  22  ----------------------------<  120<H>  3.9   |  30  |  0.60    Ca    8.1<L>      28 Nov 2019 05:32      PTT - ( 28 Nov 2019 06:33 )  PTT:71.0 sec      Assessment:    Acute Hypoxic respiratory failure  Bilateral pulmonary emboli  Small cell carcinoma of lung with widespread metastatic disease  Atrial fibrillation    Plan:    Continue oxygen  For left thoracentesis in AM by interventional radiology  Continue diuretics  Taper steroids  nebulized bronchodilators  IV heparin

## 2019-11-28 NOTE — PROGRESS NOTE ADULT - ASSESSMENT
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 He also has a history of a pericardial effusion s/p window, and of a pleural effusion s/p thoracentesis.  Now found to have b/l PE's    PE  - CTA chest showed segmental PE's in B/L lower lobe   -C/W heparin gtt   -Target aPTT = 58 - 99 seconds.   - TTE showed hyperdynamic LV, normal RVSF with no significant valvular disease.  However, showed large left pleural effusion  - CT chest showed moderate left pleural effusion- seen by pulmonary recommending steroids, oxygen and Duoneb  - Can keep off of aspirin to minimize risk of bleeding as he is going to be on full dose anticoagulation  - given malignancy, more definitive management of this effusion with a pleurex catheter may be appropriate    Chronic Afib  - Remains in Afib with RVR, mostly during activity  - tachycardia multifactorial in setting of underlying lung cancer, PE and anemia  -  will increase  toprol to 100 mg daily today ( takes 200mg daily  at home )   - Continue digoxin   - Monitor and replete lytes.   -  on heparin drip to be switched to DOAC.  Monitor for s/s bleeding  -C/W heparin gtt   -Target aPTT = 58 - 99 seconds.     HTN  -on cardizem and toprol at home was held on admission for hypotension now improved 114/71  - Continue on bb    Lower extremity edema  cw lasix 20 mg iv x1    Monitor and replete electrolytes. Keep K>4.0 and Mg>2.0.   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 He also has a history of a pericardial effusion s/p window, and of a pleural effusion s/p thoracentesis.  Now found to have b/l PE's    PE  - CTA chest showed segmental PE's in B/L lower lobe   - C/W heparin gtt   -Target aPTT = 58 - 99 seconds.   - TTE showed hyperdynamic LV, normal RVSF with no significant valvular disease.  However, showed large left pleural effusion  - CT chest showed moderate left pleural effusion- seen by pulmonary recommending steroids, oxygen and Duoneb  - Can keep off of aspirin to minimize risk of bleeding as he is going to be on full dose anticoagulation  - given malignancy, more definitive management of this effusion with a pleurex catheter may be appropriate    Chronic Afib  - Remains in Afib with RVR, mostly during activity  - tachycardia multifactorial in setting of underlying lung cancer, PE and anemia  - will increase toprol to 100 mg daily today ( takes 200mg daily  at home )   - Continue digoxin   - Monitor and replete lytes.   - on heparin drip to be switched to DOAC.  Monitor for s/s bleeding    HTN  - on cardizem and toprol at home was held on admission for hypotension now improved 114/71  - Continue on bb    Lower extremity edema  lasix 20 mg iv x1    Monitor and replete electrolytes. Keep K>4.0 and Mg>2.0.   Further cardiac w/u pending clinical course  To follow closely with you  Jermaine Dave [facility]  Cardiology   Spectra #[phone]

## 2019-11-28 NOTE — PROGRESS NOTE ADULT - PROBLEM SELECTOR PLAN 1
likely multifactorial from b/l PE, PNA, and metastatic pleural effusions (patient has a past history of 2 pleural effusions requiring drainage as well cardiac window)  -worsening SOB today, will give trial of 20 mg IV Lasix x1 and monitor  - CXR consistent with infiltrates and w small L pleural effusion  - Note, patient s/p 2.6L with pleural effusion and underlying SOB but no evidence of CHF. Will be cautious with further fluid administration given baseline edema  - CTA Chest 11/22/2019 suggestive of pleural-based metastatic disease.  - Echo (11/23/2019) Hyperdynamic left ventricular systolic function. Large left pleural effusion with echogenic material noted in the pleural space.  - Seen by Pulm, Dr. Soto; recs appreciated.  - Continue supplemental O2.   - STOP Lovenox 80 mg, transition to heparin drip this afternoon, discontinue for pleuryx cather on Friday and resume if no bleeding, eventually transition to DOAC (Eliquis) per heme/onc rec  - Tapering Steroids: Continue Methylprednisolone 40 mg IVp q12H.  - Continue Albuterol/Ipratropium 3mL Nebs q6H with mucomyst.  - Possible Pleura Cath and Thoracentesis on Friday. Needs to be NPO after midnight tomorrow!

## 2019-11-28 NOTE — PROGRESS NOTE ADULT - ASSESSMENT
58 yo man well known to our group, w met small cell ca of lung first presented as limited stage [id number] post Carbo//16/Atezolizumab with concurrent RT with initial response but then brain mets 8/2019 s/p WBRT and systemic disease progression on PET/CT 9/2019 with intra-abdominal, bone and pleural disease, started on weekly Taxol with GCSF support due to cytopenia with tx.    Adm now with weakness and dyspnea; found to have pulmonary embolism and further disease progression with pleural effusion, bone lesions, liver lesions and pancreatic mass.    Unable to lie flat for MRI brain.     RECOMMEND  HOLD Lovenox   Continue Heparin drip till 6AM tomorrow.   Plan therapeutic thoracentesis +/- pleurex catheter Fri AM, D/w IR Dr Ortiz and Dr pulm Dr Soto.   Once tap done, if no bleed, resume heparin. Once stable transition to Eliquis     Had changed Duoneb to separate: neb with Xopenex and Atrovent neb.     Lasix per cardio,.     To complete re-staging scans, will check MRI brain during this admission.   Wife had brought in discs from [facility] for Radiology department to compare    NPO past midnight, Thanksgiving    Continue acute care

## 2019-11-28 NOTE — PROGRESS NOTE ADULT - SUBJECTIVE AND OBJECTIVE BOX
Patient is a 59y old  Male who presents with a chief complaint of PNA (28 Nov 2019 07:37)  still w mild dyspnea, although comfortable at rest    INTERVAL HPI/OVERNIGHT EVENTS:  T(C): 36.3 (11-28-19 @ 07:49), Max: 36.7 (11-27-19 @ 16:00)  HR: 98 (11-28-19 @ 07:49) (98 - 117)  BP: 109/75 (11-28-19 @ 07:49) (102/68 - 109/75)  RR: 19 (11-28-19 @ 07:49) (18 - 19)  SpO2: 97% (11-28-19 @ 07:49) (97% - 99%)  Wt(kg): --  I&O's Summary    27 Nov 2019 07:01  -  28 Nov 2019 07:00  --------------------------------------------------------  IN: 180 mL / OUT: 650 mL / NET: -470 mL    28 Nov 2019 07:01  -  28 Nov 2019 11:09  --------------------------------------------------------  IN: 45 mL / OUT: 400 mL / NET: -355 mL        LABS:                        9.2    12.65 )-----------( 217      ( 28 Nov 2019 05:32 )             29.5     11-28    138  |  100  |  22  ----------------------------<  120<H>  3.9   |  30  |  0.60    Ca    8.1<L>      28 Nov 2019 05:32      PTT - ( 28 Nov 2019 06:33 )  PTT:71.0 sec    CAPILLARY BLOOD GLUCOSE                MEDICATIONS  (STANDING):  acetylcysteine 10%  Inhalation 5 milliLiter(s) Inhalation four times a day  budesonide 160 MICROgram(s)/formoterol 4.5 MICROgram(s) Inhaler 2 Puff(s) Inhalation two times a day  chlorhexidine 4% Liquid 1 Application(s) Topical daily  digoxin     Tablet 0.25 milliGRAM(s) Oral daily  dronabinol 5 milliGRAM(s) Oral every 12 hours  heparin  Infusion.  Unit(s)/Hr (15 mL/Hr) IV Continuous <Continuous>  ipratropium    for Nebulization 500 MICROGram(s) Nebulizer every 6 hours  loratadine 10 milliGRAM(s) Oral daily  magnesium oxide 400 milliGRAM(s) Oral daily  methylPREDNISolone sodium succinate Injectable 40 milliGRAM(s) IV Push two times a day  multivitamin 1 Tablet(s) Oral daily  sodium chloride 1 Gram(s) Oral three times a day  tiotropium 18 MICROgram(s) Capsule 1 Capsule(s) Inhalation daily    MEDICATIONS  (PRN):  guaiFENesin    Syrup 100 milliGRAM(s) Oral every 6 hours PRN Cough  heparin  Injectable 6500 Unit(s) IV Push every 6 hours PRN For aPTT less than 40  heparin  Injectable 3000 Unit(s) IV Push every 6 hours PRN For aPTT between 40 - 57  levalbuterol Inhalation 0.63 milliGRAM(s) Inhalation every 6 hours PRN shortness of breath  morphine  - Injectable 3 milliGRAM(s) IV Push every 4 hours PRN Severe Pain (7 - 10)  morphine  - Injectable 2 milliGRAM(s) IV Push every 4 hours PRN Moderate Pain (4 - 6)  morphine  - Injectable 1 milliGRAM(s) IV Push every 4 hours PRN Mild Pain (1 - 3)  ondansetron Injectable 4 milliGRAM(s) IV Push every 6 hours PRN Nausea  senna 8.6 milliGRAM(s) Oral Tablet - Peds 1 Tablet(s) Oral at bedtime PRN for constipation      REVIEW OF SYSTEMS:  CONSTITUTIONAL: No fever, weight loss, or fatigue  EYES: No eye pain, visual disturbances, or discharge  ENMT:  No difficulty hearing, tinnitus, vertigo; No sinus or throat pain  NECK: No pain or stiffness  RESPIRATORY: dyspnea  CARDIOVASCULAR: No chest pain, palpitations, dizziness, or leg swelling  GASTROINTESTINAL: No abdominal or epigastric pain. No nausea, vomiting, or hematemesis; No diarrhea or constipation. No melena or hematochezia.  GENITOURINARY: No dysuria, frequency, hematuria, or incontinence  NEUROLOGICAL: No headaches, memory loss, loss of strength, numbness, or tremors  SKIN: No itching, burning, rashes, or lesions   LYMPH NODES: No enlarged glands  ENDOCRINE: No heat or cold intolerance; No hair loss  MUSCULOSKELETAL: No joint pain or swelling; No muscle, back, or extremity pain  PSYCHIATRIC: No depression, anxiety, mood swings, or difficulty sleeping  HEME/LYMPH: No easy bruising, or bleeding gums  ALLERY AND IMMUNOLOGIC: No hives or eczema    RADIOLOGY & ADDITIONAL TESTS:    Imaging Personally Reviewed:  [ ] YES  [ ] NO    Consultant(s) Notes Reviewed:  [ ] YES  [ ] NO    PHYSICAL EXAM:  GENERAL: NAD, well-groomed, well-developed  HEAD:  Atraumatic, Normocephalic  EYES: EOMI, PERRLA, conjunctiva and sclera clear  ENMT: No tonsillar erythema, exudates, or enlargement; Moist mucous membranes, Good dentition, No lesions  NECK: Supple, No JVD, Normal thyroid  NERVOUS SYSTEM:  Alert & Oriented X3, Good concentration; Motor Strength 5/5 B/L upper and lower extremities; DTRs 2+ intact and symmetric  CHEST/LUNG: decreased bs left base  HEART: Regular rate and rhythm; No murmurs, rubs, or gallops  ABDOMEN: Soft, Nontender, Nondistended; Bowel sounds present  EXTREMITIES:  2+ Peripheral Pulses, No clubbing, cyanosis, or edema  LYMPH: No lymphadenopathy noted  SKIN: No rashes or lesions    Care Discussed with Consultants/Other Providers [ ] YES  [ ] NO

## 2019-11-28 NOTE — PROGRESS NOTE ADULT - PROBLEM SELECTOR PLAN 10
- Switch to heparin drip    IMPROVE VTE Individual Risk Assessment    RISK                                                                Points  [  ] Previous VTE                                                  3  [  ] Thrombophilia                                               2  [  ] Lower limb paralysis                                      2        (unable to hold up >15 seconds)    [ x ] Current Cancer                                              2         (within 6 months)  [  ] Immobilization > 24 hrs                                1  [  ] ICU/CCU stay > 24 hours                              1  [ x ] Age > 60                                                      1  IMPROVE VTE Score: 3    IMPROVE Score 0-1: Low Risk, No VTE prophylaxis required for most patients, encourage ambulation.   IMPROVE Score 2-3: At risk, pharmacologic VTE prophylaxis is indicated for most patients (in the absence of a contraindication)  IMPROVE Score > or = 4: High Risk, pharmacologic VTE prophylaxis is indicated for most patients (in the absence of a contraindication)

## 2019-11-29 LAB
ANION GAP SERPL CALC-SCNC: 8 MMOL/L — SIGNIFICANT CHANGE UP (ref 5–17)
APTT BLD: 47.7 SEC — HIGH (ref 28.5–37)
APTT BLD: 54 SEC — HIGH (ref 28.5–37)
BUN SERPL-MCNC: 25 MG/DL — HIGH (ref 7–23)
CALCIUM SERPL-MCNC: 8 MG/DL — LOW (ref 8.5–10.1)
CHLORIDE SERPL-SCNC: 100 MMOL/L — SIGNIFICANT CHANGE UP (ref 96–108)
CO2 SERPL-SCNC: 30 MMOL/L — SIGNIFICANT CHANGE UP (ref 22–31)
CREAT SERPL-MCNC: 0.6 MG/DL — SIGNIFICANT CHANGE UP (ref 0.5–1.3)
GLUCOSE SERPL-MCNC: 94 MG/DL — SIGNIFICANT CHANGE UP (ref 70–99)
HCT VFR BLD CALC: 32 % — LOW (ref 39–50)
HCT VFR BLD CALC: 33.2 % — LOW (ref 39–50)
HGB BLD-MCNC: 10 G/DL — LOW (ref 13–17)
HGB BLD-MCNC: 10.2 G/DL — LOW (ref 13–17)
MCHC RBC-ENTMCNC: 26.2 PG — LOW (ref 27–34)
MCHC RBC-ENTMCNC: 26.5 PG — LOW (ref 27–34)
MCHC RBC-ENTMCNC: 30.7 GM/DL — LOW (ref 32–36)
MCHC RBC-ENTMCNC: 31.3 GM/DL — LOW (ref 32–36)
MCV RBC AUTO: 84.9 FL — SIGNIFICANT CHANGE UP (ref 80–100)
MCV RBC AUTO: 85.1 FL — SIGNIFICANT CHANGE UP (ref 80–100)
NRBC # BLD: 0 /100 WBCS — SIGNIFICANT CHANGE UP (ref 0–0)
NRBC # BLD: 0 /100 WBCS — SIGNIFICANT CHANGE UP (ref 0–0)
PLATELET # BLD AUTO: 189 K/UL — SIGNIFICANT CHANGE UP (ref 150–400)
PLATELET # BLD AUTO: 198 K/UL — SIGNIFICANT CHANGE UP (ref 150–400)
POTASSIUM SERPL-MCNC: 3.8 MMOL/L — SIGNIFICANT CHANGE UP (ref 3.5–5.3)
POTASSIUM SERPL-SCNC: 3.8 MMOL/L — SIGNIFICANT CHANGE UP (ref 3.5–5.3)
RBC # BLD: 3.77 M/UL — LOW (ref 4.2–5.8)
RBC # BLD: 3.9 M/UL — LOW (ref 4.2–5.8)
RBC # FLD: 18.7 % — HIGH (ref 10.3–14.5)
RBC # FLD: 19 % — HIGH (ref 10.3–14.5)
SODIUM SERPL-SCNC: 138 MMOL/L — SIGNIFICANT CHANGE UP (ref 135–145)
WBC # BLD: 15.88 K/UL — HIGH (ref 3.8–10.5)
WBC # BLD: 20.7 K/UL — HIGH (ref 3.8–10.5)
WBC # FLD AUTO: 15.88 K/UL — HIGH (ref 3.8–10.5)
WBC # FLD AUTO: 20.7 K/UL — HIGH (ref 3.8–10.5)

## 2019-11-29 PROCEDURE — 32555 ASPIRATE PLEURA W/ IMAGING: CPT | Mod: LT

## 2019-11-29 PROCEDURE — 71045 X-RAY EXAM CHEST 1 VIEW: CPT | Mod: 26,59

## 2019-11-29 PROCEDURE — 99232 SBSQ HOSP IP/OBS MODERATE 35: CPT

## 2019-11-29 PROCEDURE — 71046 X-RAY EXAM CHEST 2 VIEWS: CPT | Mod: 26

## 2019-11-29 RX ORDER — MORPHINE SULFATE 50 MG/1
2 CAPSULE, EXTENDED RELEASE ORAL EVERY 4 HOURS
Refills: 0 | Status: DISCONTINUED | OUTPATIENT
Start: 2019-11-29 | End: 2019-12-06

## 2019-11-29 RX ORDER — MORPHINE SULFATE 50 MG/1
4 CAPSULE, EXTENDED RELEASE ORAL ONCE
Refills: 0 | Status: ACTIVE | OUTPATIENT
Start: 2019-11-29

## 2019-11-29 RX ORDER — MORPHINE SULFATE 50 MG/1
1 CAPSULE, EXTENDED RELEASE ORAL EVERY 4 HOURS
Refills: 0 | Status: DISCONTINUED | OUTPATIENT
Start: 2019-11-29 | End: 2019-12-06

## 2019-11-29 RX ORDER — HEPARIN SODIUM 5000 [USP'U]/ML
INJECTION INTRAVENOUS; SUBCUTANEOUS
Qty: 25000 | Refills: 0 | Status: DISCONTINUED | OUTPATIENT
Start: 2019-11-29 | End: 2019-12-04

## 2019-11-29 RX ADMIN — HEPARIN SODIUM 1500 UNIT(S)/HR: 5000 INJECTION INTRAVENOUS; SUBCUTANEOUS at 16:30

## 2019-11-29 RX ADMIN — Medication 1 TABLET(S): at 13:44

## 2019-11-29 RX ADMIN — Medication 500 MICROGRAM(S): at 20:35

## 2019-11-29 RX ADMIN — SODIUM CHLORIDE 1 GRAM(S): 9 INJECTION INTRAMUSCULAR; INTRAVENOUS; SUBCUTANEOUS at 13:45

## 2019-11-29 RX ADMIN — Medication 100 MILLIGRAM(S): at 06:25

## 2019-11-29 RX ADMIN — LORATADINE 10 MILLIGRAM(S): 10 TABLET ORAL at 13:44

## 2019-11-29 RX ADMIN — SODIUM CHLORIDE 1 GRAM(S): 9 INJECTION INTRAMUSCULAR; INTRAVENOUS; SUBCUTANEOUS at 23:04

## 2019-11-29 RX ADMIN — Medication 500 MICROGRAM(S): at 13:54

## 2019-11-29 RX ADMIN — Medication 5 MILLILITER(S): at 07:02

## 2019-11-29 RX ADMIN — BUDESONIDE AND FORMOTEROL FUMARATE DIHYDRATE 2 PUFF(S): 160; 4.5 AEROSOL RESPIRATORY (INHALATION) at 23:05

## 2019-11-29 RX ADMIN — Medication 500 MICROGRAM(S): at 07:02

## 2019-11-29 RX ADMIN — SODIUM CHLORIDE 1 GRAM(S): 9 INJECTION INTRAMUSCULAR; INTRAVENOUS; SUBCUTANEOUS at 06:17

## 2019-11-29 RX ADMIN — Medication 5 MILLIGRAM(S): at 20:37

## 2019-11-29 RX ADMIN — Medication 40 MILLIGRAM(S): at 06:17

## 2019-11-29 RX ADMIN — Medication 5 MILLILITER(S): at 13:52

## 2019-11-29 RX ADMIN — MORPHINE SULFATE 3 MILLIGRAM(S): 50 CAPSULE, EXTENDED RELEASE ORAL at 23:34

## 2019-11-29 RX ADMIN — MORPHINE SULFATE 3 MILLIGRAM(S): 50 CAPSULE, EXTENDED RELEASE ORAL at 08:00

## 2019-11-29 RX ADMIN — Medication 500 MICROGRAM(S): at 00:50

## 2019-11-29 RX ADMIN — BUDESONIDE AND FORMOTEROL FUMARATE DIHYDRATE 2 PUFF(S): 160; 4.5 AEROSOL RESPIRATORY (INHALATION) at 06:19

## 2019-11-29 RX ADMIN — MORPHINE SULFATE 3 MILLIGRAM(S): 50 CAPSULE, EXTENDED RELEASE ORAL at 07:50

## 2019-11-29 RX ADMIN — TIOTROPIUM BROMIDE 1 CAPSULE(S): 18 CAPSULE ORAL; RESPIRATORY (INHALATION) at 06:19

## 2019-11-29 RX ADMIN — Medication 4 MILLILITER(S): at 20:36

## 2019-11-29 RX ADMIN — Medication 0.25 MILLIGRAM(S): at 06:17

## 2019-11-29 RX ADMIN — MORPHINE SULFATE 3 MILLIGRAM(S): 50 CAPSULE, EXTENDED RELEASE ORAL at 12:28

## 2019-11-29 RX ADMIN — CHLORHEXIDINE GLUCONATE 1 APPLICATION(S): 213 SOLUTION TOPICAL at 13:45

## 2019-11-29 RX ADMIN — MAGNESIUM OXIDE 400 MG ORAL TABLET 400 MILLIGRAM(S): 241.3 TABLET ORAL at 13:44

## 2019-11-29 NOTE — PROGRESS NOTE ADULT - PROBLEM SELECTOR PLAN 9
Pt with shortness of breath, orthopnea, and 4+ pitting edema b/l LE.   - Restart Furosemide 20 mg IVp x 1.   - Re-evaluate. Pt with shortness of breath, orthopnea, and 4+ pitting edema b/l LE.   - Re-evaluate.

## 2019-11-29 NOTE — PROGRESS NOTE ADULT - PROBLEM SELECTOR PLAN 10
- Switch to heparin drip    IMPROVE VTE Individual Risk Assessment    RISK                                                                Points  [  ] Previous VTE                                                  3  [  ] Thrombophilia                                               2  [  ] Lower limb paralysis                                      2        (unable to hold up >15 seconds)    [ x ] Current Cancer                                              2         (within 6 months)  [  ] Immobilization > 24 hrs                                1  [  ] ICU/CCU stay > 24 hours                              1  [ x ] Age > 60                                                      1  IMPROVE VTE Score: 3    IMPROVE Score 0-1: Low Risk, No VTE prophylaxis required for most patients, encourage ambulation.   IMPROVE Score 2-3: At risk, pharmacologic VTE prophylaxis is indicated for most patients (in the absence of a contraindication)  IMPROVE Score > or = 4: High Risk, pharmacologic VTE prophylaxis is indicated for most patients (in the absence of a contraindication) - On heparin drip    IMPROVE VTE Individual Risk Assessment    RISK                                                                Points  [  ] Previous VTE                                                  3  [  ] Thrombophilia                                               2  [  ] Lower limb paralysis                                      2        (unable to hold up >15 seconds)    [ x ] Current Cancer                                              2         (within 6 months)  [  ] Immobilization > 24 hrs                                1  [  ] ICU/CCU stay > 24 hours                              1  [ x ] Age > 60                                                      1  IMPROVE VTE Score: 3    IMPROVE Score 0-1: Low Risk, No VTE prophylaxis required for most patients, encourage ambulation.   IMPROVE Score 2-3: At risk, pharmacologic VTE prophylaxis is indicated for most patients (in the absence of a contraindication)  IMPROVE Score > or = 4: High Risk, pharmacologic VTE prophylaxis is indicated for most patients (in the absence of a contraindication)

## 2019-11-29 NOTE — PROGRESS NOTE ADULT - SUBJECTIVE AND OBJECTIVE BOX
[INTERVAL HX: ]  Patient seen and examined;  Chart reviewed and events noted;   s/p therapeutic L thoracentesis with 600cc removed.   Dyspnea better        Patient is a 59y Male with a known history of :  Pneumonia due to organism (J18.9) [Active]  Lung cancer (C34.90) [Active]  Depression (F32.9) [Active]  Dyspnea (R06.00) [Active]  Mediastinal adenopathy (R59.0) [Active]  Pericardial effusion (I31.3) [Active]  AF (atrial fibrillation) (I48.91) [Active]  Hyponatremia (E87.1) [Active]  COPD (chronic obstructive pulmonary disease) (J44.9) [Active]  HTN (hypertension) (I10) [Active]  S/P pericardial window creation (Z98.890) [Active]  No significant past surgical history ([id number]) [Inactive]    HPI:  59 M PMH COPD, HTN, LE edema 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 Patient states the SOB has been worsening and that's what prompted him to come to the ED. He denies recent fevers, chills but admits he's been shaking since he's been on chemotherapy. Admits on and off nausea, and episodes of vomiting over the past week. Denies hematemesis/hemoptysis. Denies chest pain, palpitations, abdominal pain, diarrhea, constipation. Admits LE edema which has been worsening in past week, and reports 100 lb weight loss since January. He has had no appetite, and reports not eating anything at all. He notes he recently changed chem to Paclitaxel, as per patient a body scan demonstrated mets to brain and bone (R shoulder, L ribs). His last radiation dose was August 2019.     In the ED:  T: 97 HR: 98 BP: 86/58 RR: 22 92% RA   WBC: 4.24 h/h: 9.2/27.6 coags wnl, D-Dimer 2411  Lactate 2.5 Na: 135, K: 3.0  Cr: 0.57 glucose 134 TSH: 2.37 proBNP 1397  s/p duonebs, 100 mg hydrocortisone, potassium chloride 10x3  U/S doppler LE: L superificial thrombus noted  -CTA and CT abdomen and pelvis pending  -EKG, significant artificant due to shaking. Official read pending (22 Nov 2019 20:57)            MEDICATIONS  (STANDING):  acetylcysteine 10%  Inhalation 5 milliLiter(s) Inhalation four times a day  budesonide 160 MICROgram(s)/formoterol 4.5 MICROgram(s) Inhaler 2 Puff(s) Inhalation two times a day  chlorhexidine 4% Liquid 1 Application(s) Topical daily  digoxin     Tablet 0.25 milliGRAM(s) Oral daily  dronabinol 5 milliGRAM(s) Oral every 12 hours  heparin  Infusion.  Unit(s)/Hr (15 mL/Hr) IV Continuous <Continuous>  ipratropium    for Nebulization 500 MICROGram(s) Nebulizer every 6 hours  loratadine 10 milliGRAM(s) Oral daily  magnesium oxide 400 milliGRAM(s) Oral daily  methylPREDNISolone sodium succinate Injectable 40 milliGRAM(s) IV Push daily  metoprolol succinate  milliGRAM(s) Oral daily  multivitamin 1 Tablet(s) Oral daily  sodium chloride 1 Gram(s) Oral three times a day  tiotropium 18 MICROgram(s) Capsule 1 Capsule(s) Inhalation daily    MEDICATIONS  (PRN):  guaiFENesin    Syrup 100 milliGRAM(s) Oral every 6 hours PRN Cough  heparin  Injectable 6500 Unit(s) IV Push every 6 hours PRN For aPTT less than 40  heparin  Injectable 3000 Unit(s) IV Push every 6 hours PRN For aPTT between 40 - 57  levalbuterol Inhalation 0.63 milliGRAM(s) Inhalation every 6 hours PRN shortness of breath  morphine  - Injectable 3 milliGRAM(s) IV Push every 4 hours PRN Severe Pain (7 - 10)  morphine  - Injectable 2 milliGRAM(s) IV Push every 4 hours PRN Moderate Pain (4 - 6)  morphine  - Injectable 1 milliGRAM(s) IV Push every 4 hours PRN Mild Pain (1 - 3)  ondansetron Injectable 4 milliGRAM(s) IV Push every 6 hours PRN Nausea  senna 8.6 milliGRAM(s) Oral Tablet - Peds 1 Tablet(s) Oral at bedtime PRN for constipation      Vital Signs Last 24 Hrs  T(C): 36.3 (29 Nov 2019 15:54), Max: 36.7 (28 Nov 2019 19:45)  T(F): 97.3 (29 Nov 2019 15:54), Max: 98 (28 Nov 2019 19:45)  HR: 109 (29 Nov 2019 15:54) (79 - 109)  BP: 106/73 (29 Nov 2019 15:54) (95/67 - 128/80)  BP(mean): --  RR: 18 (29 Nov 2019 15:54) (18 - 24)  SpO2: 98% (29 Nov 2019 15:54) (95% - 100%)      [PHYSICAL EXAM]  General: adult in NAD,  WN,  WD.  HEENT: clear oropharynx, anicteric sclera, pink conjunctivae.  Neck: supple, no masses.  CV: normal S1S2, no murmur, no rubs, no gallops.  Lungs: clear to auscultation, dec BS L base. bandage CDI, no wheezes, no rales, no rhonchi.  Abdomen: soft, non-tender, non-distended, no hepatosplenomegaly, normal BS, no guarding.  Ext: no clubbing, no cyanosis, no edema.  Skin: no rashes,  no petechiae, no venous stasis changes.  Neuro: alert and oriented X4  , no focal motor deficits.  LN: no SC STEVE.      [LABS:]                        10.2   15.88 )-----------( 198      ( 29 Nov 2019 06:58 )             33.2     11-29    138  |  100  |  25<H>  ----------------------------<  94  3.8   |  30  |  0.60    Ca    8.0<L>      29 Nov 2019 06:58      PTT - ( 29 Nov 2019 06:58 )  PTT:47.7 sec      [RADIOLOGY STUDIES:]  < from: Xray Chest 1 View- PORTABLE-Urgent (11.29.19 @ 12:11) >  EXAM:  US THORACENTESIS LT [facility]                        EXAM:  XR CHEST PORTABLE URGENT 1V                        PROCEDURE DATE:  11/29/2019          INTERPRETATION:    Ultrasound-guided diagnostic and therapeutic left pleural drainage:  Chest portable semierect single AP view:  Clinical History:    Left pleural fluid. Carcinoma lung. Image guided drainage procedure.    Technique:    An informed consent obtained from patient . Risks and benefits explained   in detail. Patient made sit on the stretcher Forward leaning on a pillow.   Time out procedure was performed. Limited ultrasonography demonstrated   left pleural fluid. The site marked on the skin.    The access site prepped and draped in sterile fashion. 1% lidocaine   utilized for local anesthesia. After pleural cavity accessed, under   ultrasound guidance, using 6 Greek centesis catheter. Approximately 700   cc of nonclotting hemorrhagic fluid drained. The catheter removed. Site   dressed in sterile fashion.   Chest x-ray demonstrated no pneumothorax.   Patient tolerated the procedure well. No complications noted. Patient had   9 out of 10 pain in the site of the drainage, as well as hypoxia. 3 mg   intravenous morphine administered. Patient showed significant improvement   both in pain and hypoxia. Dr. Harris aware. Patient transferred to the   floor in stable condition for further observation and management.    Impression:  No pneumothorax in the postprocedure chest radiograph. Persisting   bilateral perihilar densities, right IJ Port-A-Cath.  Ultrasound guided left pleural drainage. Fluid sent for lab analysis.    < end of copied text >

## 2019-11-29 NOTE — CHART NOTE - NSCHARTNOTEFT_GEN_A_CORE
patient for tunneled pleural drainage catheter. D/W Dr. Harris. Not enough fluid on chest x ray for placement of Pleurx. Plan to do thoracentesis. An informed consent obtained. DNR status discussed.

## 2019-11-29 NOTE — PROGRESS NOTE ADULT - ASSESSMENT
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 He also has a history of a pericardial effusion s/p window, and of a pleural effusion s/p thoracentesis.  Now found to have b/l PE's    PE  - CTA chest showed segmental PE's in B/L lower lobe   - C/W heparin gtt target aPTT = 58 - 99 seconds.   - TTE showed hyperdynamic LV, normal RVSF with no significant valvular disease.  However, showed large left pleural effusion  - CT chest showed moderate left pleural effusion- seen by pulmonary recommending steroids, oxygen and Duoneb  - Can keep off of aspirin to minimize risk of bleeding as he is going to be on full dose anticoagulation  - Given malignancy, more definitive management of this effusion with a pleurex catheter may be appropriate    Chronic Afib  - Remains in Afib with RVR, mostly during activity  - Tachycardia multifactorial in setting of underlying lung cancer, PE and anemia  - will increase toprol to 100 mg daily today ( takes 200mg daily  at home )   - Continue digoxin   - Monitor and replete lytes.   - C/W heparin drip to be switched to DOAC.  Monitor for s/s bleeding    HTN  - C/W BB       Monitor and replete electrolytes. Keep K>4.0 and Mg>2.0.   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 He also has a history of a pericardial effusion s/p window, and of a pleural effusion s/p thoracentesis.  Now found to have b/l PE's Pt. scheduled for thoracentesis today.    Left pleural effusion   - NPO for thoracentesis today     PE  - CTA chest showed segmental PE's in B/L lower lobe   - C/W heparin gtt target aPTT = 58 - 99 seconds. Will switch to DOAC after procedure   - TTE showed hyperdynamic LV, normal RVSF with no significant valvular disease.  However, showed large left pleural effusion  - CT chest showed moderate left pleural effusion- seen by pulmonary recommending steroids, oxygen and Duoneb  - Can keep off of aspirin to minimize risk of bleeding as he is going to be on full dose anticoagulation  - Given malignancy, more definitive management of this effusion with a pleurex catheter may be appropriate    Chronic Afib  - Remains in Afib with RVR, mostly during activity  - Tachycardia multifactorial in setting of underlying lung cancer, PE and anemia  - will increase toprol to 100 mg daily today ( takes 200mg daily  at home )   - Continue digoxin   - Monitor and replete lytes.   - C/W heparin drip to be switched to DOAC.  Monitor for s/s bleeding    HTN  - C/W BB       Monitor and replete electrolytes. Keep K>4.0 and Mg>2.0.   Further cardiac w/u pending clinical course    To follow closely with you  Jolene Johnson DNP, ANP-c  Cardiology   Spectra #[phone] 59 M PMH atrial fibrillation, COPD, HTN, LE edema 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 He also has a history of a pericardial effusion s/p window, and of a pleural effusion s/p thoracentesis.  Now found to have b/l PE's Pt. scheduled for thoracentesis today.    Left pleural effusion   - NPO for thoracentesis today     PE  - CTA chest showed segmental PE's in B/L lower lobe   - C/W heparin gtt target aPTT = 58 - 99 seconds. Will switch to DOAC after procedure   - TTE showed hyperdynamic LV, normal RVSF with no significant valvular disease.  However, showed large left pleural effusion  - CT chest showed moderate left pleural effusion- seen by pulmonary recommending steroids, oxygen and Duoneb  - Can keep off of aspirin to minimize risk of bleeding as he is going to be on full dose anticoagulation  - Given malignancy, more definitive management of this effusion with a pleurex catheter may be appropriate    Chronic Afib  - Remains in Afib with RVR, mostly during activity  - Tachycardia multifactorial in setting of underlying lung cancer, PE and anemia  -  toprol to 100 mg daily today ( takes 200mg daily  at home )   - Continue digoxin   - Monitor and replete lytes.   - C/W heparin drip to be switched to DOAC.  Monitor for s/s bleeding    HTN  - C/W BB       Monitor and replete electrolytes. Keep K>4.0 and Mg>2.0.   Further cardiac w/u pending clinical course    To follow closely with you  Jolene Johnson DNP, ANP-c  Cardiology   Spectra #[phone]

## 2019-11-29 NOTE — BRIEF OPERATIVE NOTE - OPERATION/FINDINGS
left ultrasound guided thoracentesis done. 700cc nonclotting hemorrhagic fluid drained. Post procedure chest x ray showed no pneumothorax.

## 2019-11-29 NOTE — PROGRESS NOTE ADULT - SUBJECTIVE AND OBJECTIVE BOX
[facility] -- Gurpreet Bush, Corey Diaz, Sajan Abebe Savella  Office # [phone]    Follow Up: PNA/Afib      HPI:  59 M PMH COPD, HTN, LE edema 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 Patient states the SOB has been worsening and that's what prompted him to come to the ED. He denies recent fevers, chills but admits he's been shaking since he's been on chemotherapy. Admits on and off nausea, and episodes of vomiting over the past week. Denies hematemesis/hemoptysis. Denies chest pain, palpitations, abdominal pain, diarrhea, constipation. Admits LE edema which has been worsening in past week, and reports 100 lb weight loss since January. He has had no appetite, and reports not eating anything at all. He notes he recently changed chem to Paclitaxel, as per patient a body scan demonstrated mets to brain and bone (R shoulder, L ribs). His last radiation dose was August 2019.     In the ED:  T: 97 HR: 98 BP: 86/58 RR: 22 92% RA   WBC: 4.24 h/h: 9.2/27.6 coags wnl, D-Dimer 2411  Lactate 2.5 Na: 135, K: 3.0  Cr: 0.57 glucose 134 TSH: 2.37 proBNP 1397  s/p duonebs, 100 mg hydrocortisone, potassium chloride 10x3  U/S doppler LE: L superificial thrombus noted  -CTA and CT abdomen and pelvis pending  -EKG, significant artificant due to shaking. Official read pending (22 Nov 2019 20:57)      Subjective/Observations:     Pt. seen examined and evaluated. Pt. resting comfortably in bed in NAD, with no respiratory distress, no chest pain, dyspnea, palpitations, PND or orthopnea     REVIEW OF SYSTEMS: All other review of systems is negative unless indicated above    PAST MEDICAL & SURGICAL HISTORY:  Lung cancer  Depression  Dyspnea  Mediastinal adenopathy  Pericardial effusion  AF (atrial fibrillation)  Hyponatremia  COPD (chronic obstructive pulmonary disease)  HTN (hypertension)  S/P pericardial window creation      MEDICATIONS  (STANDING):  acetylcysteine 10%  Inhalation 5 milliLiter(s) Inhalation four times a day  budesonide 160 MICROgram(s)/formoterol 4.5 MICROgram(s) Inhaler 2 Puff(s) Inhalation two times a day  chlorhexidine 4% Liquid 1 Application(s) Topical daily  digoxin     Tablet 0.25 milliGRAM(s) Oral daily  dronabinol 5 milliGRAM(s) Oral every 12 hours  heparin  Infusion.  Unit(s)/Hr (15 mL/Hr) IV Continuous <Continuous>  ipratropium    for Nebulization 500 MICROGram(s) Nebulizer every 6 hours  loratadine 10 milliGRAM(s) Oral daily  magnesium oxide 400 milliGRAM(s) Oral daily  methylPREDNISolone sodium succinate Injectable 40 milliGRAM(s) IV Push daily  metoprolol succinate  milliGRAM(s) Oral daily  multivitamin 1 Tablet(s) Oral daily  sodium chloride 1 Gram(s) Oral three times a day  tiotropium 18 MICROgram(s) Capsule 1 Capsule(s) Inhalation daily    MEDICATIONS  (PRN):  guaiFENesin    Syrup 100 milliGRAM(s) Oral every 6 hours PRN Cough  heparin  Injectable 6500 Unit(s) IV Push every 6 hours PRN For aPTT less than 40  heparin  Injectable 3000 Unit(s) IV Push every 6 hours PRN For aPTT between 40 - 57  levalbuterol Inhalation 0.63 milliGRAM(s) Inhalation every 6 hours PRN shortness of breath  morphine  - Injectable 3 milliGRAM(s) IV Push every 4 hours PRN Severe Pain (7 - 10)  morphine  - Injectable 2 milliGRAM(s) IV Push every 4 hours PRN Moderate Pain (4 - 6)  morphine  - Injectable 1 milliGRAM(s) IV Push every 4 hours PRN Mild Pain (1 - 3)  ondansetron Injectable 4 milliGRAM(s) IV Push every 6 hours PRN Nausea  senna 8.6 milliGRAM(s) Oral Tablet - Peds 1 Tablet(s) Oral at bedtime PRN for constipation      Allergies: penicillin (Unknown)    Intolerances    Vital Signs Last 24 Hrs  T(C): 36.3 (29 Nov 2019 05:51), Max: 36.7 (28 Nov 2019 19:45)  T(F): 97.4 (29 Nov 2019 05:51), Max: 98 (28 Nov 2019 19:45)  HR: 104 (29 Nov 2019 07:06) (79 - 106)  BP: 105/71 (29 Nov 2019 05:51) (99/75 - 111/75)  BP(mean): --  RR: 18 (29 Nov 2019 05:51) (18 - 19)  SpO2: 100% (29 Nov 2019 05:51) (94% - 100%)    I&O's Summary    28 Nov 2019 07:01  -  29 Nov 2019 07:00  --------------------------------------------------------  IN: 180 mL / OUT: 850 mL / NET: -670 mL          PHYSICAL EXAM:    Constitutional: NAD, awake and alert, well-developed  HEENT: Moist Mucous Membranes, Anicteric  Pulmonary: Non-labored, breath sounds are clear bilaterally, No wheezing, rales or rhonchi  Cardiovascular: Regular, S1 and S2, No murmurs, rubs, gallops or clicks  Gastrointestinal: Bowel Sounds present, soft, nontender.   Lymph: No peripheral edema. No lymphadenopathy.  Skin: No visible rashes or ulcers.  Psych:  Mood & affect appropriate    LABS: All Labs Reviewed:                        10.2   15.88 )-----------( 198      ( 29 Nov 2019 06:58 )             33.2         Interpretation of Telemetry:       ECHO:   < from: TTE Echo Doppler w/o Cont (11.23.19 @ 15:29) >  LVEF: 75%    FINDINGS  Left Ventricle: Hyperdynamic left ventricular systolic function.  Aortic Valve: The aortic valve is not well-visualized. It appears   calcified. No significant aortic insufficiency.  Mitral Valve: Mitral annular calcification calcified mitral valve   leaflets. Mild mitral insufficiency.  Tricuspid Valve: Not well visualized  Pulmonic Valve: Not well visualized  Left Atrium: Grossly normal  Right Ventricle: The right ventricle is not well visualized. Grossly,   normal right ventricular size and systolic function.  Right Atrium: Grossly normal    Pericardium/Pleura: Large left pleural effusion.  Echogenic material is   noted in the pleural space.      CONCLUSIONS:  Technically difficult and limited study.  1. Hyperdynamic left ventricular systolic function.  2. Mitral annular calcification and calcified mitral valve leaflets with   mild mitral insufficiency.  3. Grossly normal left atrial size.  4. The right ventricle is not well visualized. Grossly, normal right   ventricular size and systolic function.  5.  Large left pleural effusion which echogenic material noted in the   pleural space.        Imaging:  < from: CT Angio Chest w/ IV Cont (11.22.19 @ 21:41) >    FINDINGS:   Pulmonary arteries: Adequate contrast enhancement of the pulmonary   arteries.   Abnormal filling defects -emboli within bilateral lower lobe segmental   pulmonary arterial vessels.  Aorta: No evidence of thoracic aortic dissection. Chronic atherosclerotic   calcification of the vasculature.   Lungs:    Bilateral groundglass mosaic attenuation, most pronounced in the right   upper and lower lung zones.  Thick-walled left apicalbleb.  Paraseptal emphysematous changes lung apices.  Subpleural areas of scarring/fibrosis left upper and superior segment   left lower lobes.    Pleural space: Moderate-large left pleural effusion nodule left-sided   pleural thickening is suggestive of pleural-based metastatic disease.    Heart: Heart size within normal limits.  Mediastinum: Interval decrease in confluent mediastinal and hilar   lymphadenopathy.  Lymph nodes: Enlarged substernal and left pericardial and pericardial   mediastinal lymph nodes.  Bones/joints: Heterogeneous appearance of the visualized bones, with   areas of mottled sclerosis involving the sternum and left third rib,   suspicious for metastatic disease.      IMPRESSION:    Bilateral segmental lower lobe pulmonary emboli.    Bilateral groundglass, mosaic attenuation most pronounced in the right   lung, may reflect small airway versus small vessel disease.    Moderate left-sided pleural effusion with nodular pleural thickening   suggestive of pleural-based metastatic disease.    Enlarged retrosternal and alla and paracardiac lymph nodes.    Interval decrease in confluent mediastinal/hilar lymphadenopathy.    Findings suggestive of osseous metastatic disease. [facility] -- Gurpreet Bush, Corey Diaz, Sajan Abebe Savella  Office # [phone]    Follow Up: PNA/Afib      HPI:  59 M PMH COPD, HTN, LE edema 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 Patient states the SOB has been worsening and that's what prompted him to come to the ED. He denies recent fevers, chills but admits he's been shaking since he's been on chemotherapy. Admits on and off nausea, and episodes of vomiting over the past week. Denies hematemesis/hemoptysis. Denies chest pain, palpitations, abdominal pain, diarrhea, constipation. Admits LE edema which has been worsening in past week, and reports 100 lb weight loss since January. He has had no appetite, and reports not eating anything at all. He notes he recently changed chem to Paclitaxel, as per patient a body scan demonstrated mets to brain and bone (R shoulder, L ribs). His last radiation dose was August 2019.     In the ED:  T: 97 HR: 98 BP: 86/58 RR: 22 92% RA   WBC: 4.24 h/h: 9.2/27.6 coags wnl, D-Dimer 2411  Lactate 2.5 Na: 135, K: 3.0  Cr: 0.57 glucose 134 TSH: 2.37 proBNP 1397  s/p duonebs, 100 mg hydrocortisone, potassium chloride 10x3  U/S doppler LE: L superificial thrombus noted  -CTA and CT abdomen and pelvis pending  -EKG, significant artificant due to shaking. Official read pending (22 Nov 2019 20:57)      Subjective/Observations:     Pt. seen examined and evaluated. Pt. resting comfortably in bed in NAD, with no respiratory distress, no chest pain, dyspnea, palpitations, PND or orthopnea     REVIEW OF SYSTEMS: All other review of systems is negative unless indicated above    PAST MEDICAL & SURGICAL HISTORY:  Lung cancer  Depression  Dyspnea  Mediastinal adenopathy  Pericardial effusion  AF (atrial fibrillation)  Hyponatremia  COPD (chronic obstructive pulmonary disease)  HTN (hypertension)  S/P pericardial window creation      MEDICATIONS  (STANDING):  acetylcysteine 10%  Inhalation 5 milliLiter(s) Inhalation four times a day  budesonide 160 MICROgram(s)/formoterol 4.5 MICROgram(s) Inhaler 2 Puff(s) Inhalation two times a day  chlorhexidine 4% Liquid 1 Application(s) Topical daily  digoxin     Tablet 0.25 milliGRAM(s) Oral daily  dronabinol 5 milliGRAM(s) Oral every 12 hours  heparin  Infusion.  Unit(s)/Hr (15 mL/Hr) IV Continuous <Continuous>  ipratropium    for Nebulization 500 MICROGram(s) Nebulizer every 6 hours  loratadine 10 milliGRAM(s) Oral daily  magnesium oxide 400 milliGRAM(s) Oral daily  methylPREDNISolone sodium succinate Injectable 40 milliGRAM(s) IV Push daily  metoprolol succinate  milliGRAM(s) Oral daily  multivitamin 1 Tablet(s) Oral daily  sodium chloride 1 Gram(s) Oral three times a day  tiotropium 18 MICROgram(s) Capsule 1 Capsule(s) Inhalation daily    MEDICATIONS  (PRN):  guaiFENesin    Syrup 100 milliGRAM(s) Oral every 6 hours PRN Cough  heparin  Injectable 6500 Unit(s) IV Push every 6 hours PRN For aPTT less than 40  heparin  Injectable 3000 Unit(s) IV Push every 6 hours PRN For aPTT between 40 - 57  levalbuterol Inhalation 0.63 milliGRAM(s) Inhalation every 6 hours PRN shortness of breath  morphine  - Injectable 3 milliGRAM(s) IV Push every 4 hours PRN Severe Pain (7 - 10)  morphine  - Injectable 2 milliGRAM(s) IV Push every 4 hours PRN Moderate Pain (4 - 6)  morphine  - Injectable 1 milliGRAM(s) IV Push every 4 hours PRN Mild Pain (1 - 3)  ondansetron Injectable 4 milliGRAM(s) IV Push every 6 hours PRN Nausea  senna 8.6 milliGRAM(s) Oral Tablet - Peds 1 Tablet(s) Oral at bedtime PRN for constipation      Allergies: penicillin (Unknown)    Intolerances    Vital Signs Last 24 Hrs  T(C): 36.3 (29 Nov 2019 05:51), Max: 36.7 (28 Nov 2019 19:45)  T(F): 97.4 (29 Nov 2019 05:51), Max: 98 (28 Nov 2019 19:45)  HR: 104 (29 Nov 2019 07:06) (79 - 106)  BP: 105/71 (29 Nov 2019 05:51) (99/75 - 111/75)  BP(mean): --  RR: 18 (29 Nov 2019 05:51) (18 - 19)  SpO2: 100% (29 Nov 2019 05:51) (94% - 100%)    I&O's Summary    28 Nov 2019 07:01  -  29 Nov 2019 07:00  --------------------------------------------------------  IN: 180 mL / OUT: 850 mL / NET: -670 mL          PHYSICAL EXAM:    Constitutional: NAD, awake and alert, well-developed  HEENT: Moist Mucous Membranes, Anicteric  Pulmonary: Non-labored, left lobe fine crackles noted rt. lobe CTA +3B/L LE edema  Cardiovascular: Regular, S1 and S2, No murmurs, rubs, gallops or clicks  Gastrointestinal: Bowel Sounds present, soft, nontender.   Lymph: No peripheral edema. No lymphadenopathy.  Skin: No visible rashes or ulcers.  Psych:  Mood & affect appropriate    LABS: All Labs Reviewed:                        10.2   15.88 )-----------( 198      ( 29 Nov 2019 06:58 )             33.2         Interpretation of Telemetry: Overnight on telemetry afib 100's       ECHO:   < from: TTE Echo Doppler w/o Cont (11.23.19 @ 15:29) >  LVEF: 75%    FINDINGS  Left Ventricle: Hyperdynamic left ventricular systolic function.  Aortic Valve: The aortic valve is not well-visualized. It appears   calcified. No significant aortic insufficiency.  Mitral Valve: Mitral annular calcification calcified mitral valve   leaflets. Mild mitral insufficiency.  Tricuspid Valve: Not well visualized  Pulmonic Valve: Not well visualized  Left Atrium: Grossly normal  Right Ventricle: The right ventricle is not well visualized. Grossly,   normal right ventricular size and systolic function.  Right Atrium: Grossly normal    Pericardium/Pleura: Large left pleural effusion.  Echogenic material is   noted in the pleural space.      CONCLUSIONS:  Technically difficult and limited study.  1. Hyperdynamic left ventricular systolic function.  2. Mitral annular calcification and calcified mitral valve leaflets with   mild mitral insufficiency.  3. Grossly normal left atrial size.  4. The right ventricle is not well visualized. Grossly, normal right   ventricular size and systolic function.  5.  Large left pleural effusion which echogenic material noted in the   pleural space.        Imaging:  < from: CT Angio Chest w/ IV Cont (11.22.19 @ 21:41) >    FINDINGS:   Pulmonary arteries: Adequate contrast enhancement of the pulmonary   arteries.   Abnormal filling defects -emboli within bilateral lower lobe segmental   pulmonary arterial vessels.  Aorta: No evidence of thoracic aortic dissection. Chronic atherosclerotic   calcification of the vasculature.   Lungs:    Bilateral ground glass mosaic attenuation, most pronounced in the right   upper and lower lung zones.  Thick-walled left apicalbleb.  Paraseptal emphysematous changes lung apices.  Subpleural areas of scarring/fibrosis left upper and superior segment   left lower lobes.    Pleural space: Moderate-large left pleural effusion nodule left-sided   pleural thickening is suggestive of pleural-based metastatic disease.    Heart: Heart size within normal limits.  Mediastinum: Interval decrease in confluent mediastinal and hilar   lymphadenopathy.  Lymph nodes: Enlarged substernal and left pericardial and pericardial   mediastinal lymph nodes.  Bones/joints: Heterogeneous appearance of the visualized bones, with   areas of mottled sclerosis involving the sternum and left third rib,   suspicious for metastatic disease.      IMPRESSION:    Bilateral segmental lower lobe pulmonary emboli.    Bilateral ground glass, mosaic attenuation most pronounced in the right   lung, may reflect small airway versus small vessel disease.    Moderate left-sided pleural effusion with nodular pleural thickening   suggestive of pleural-based metastatic disease.    Enlarged retrosternal and alla and paracardiac lymph nodes.    Interval decrease in confluent mediastinal/hilar lymphadenopathy.    Findings suggestive of osseous metastatic disease.

## 2019-11-29 NOTE — PROGRESS NOTE ADULT - PROBLEM SELECTOR PLAN 1
likely multifactorial from b/l PE, PNA, and metastatic pleural effusions (patient has a past history of 2 pleural effusions requiring drainage as well cardiac window)  -worsening SOB today, will give trial of 20 mg IV Lasix x1 and monitor  - CXR consistent with infiltrates and w small L pleural effusion  - Note, patient s/p 2.6L with pleural effusion and underlying SOB but no evidence of CHF. Will be cautious with further fluid administration given baseline edema  - CTA Chest 11/22/2019 suggestive of pleural-based metastatic disease.  - Echo (11/23/2019) Hyperdynamic left ventricular systolic function. Large left pleural effusion with echogenic material noted in the pleural space.  - Seen by Pulm, Dr. Soto; recs appreciated.  - Continue supplemental O2.   - STOP Lovenox 80 mg, transition to heparin drip this afternoon, discontinue for pleuryx cather on Friday and resume if no bleeding, eventually transition to DOAC (Eliquis) per heme/onc rec  - Tapering Steroids: Continue Methylprednisolone 40 mg IVp q12H.  - Continue Albuterol/Ipratropium 3mL Nebs q6H with mucomyst.  - Possible Pleura Cath and Thoracentesis on Friday. Needs to be NPO after midnight tomorrow! likely multifactorial from b/l PE, PNA, and metastatic pleural effusions (patient has a past history of 2 pleural effusions requiring drainage as well cardiac window)  s/p L pleuryx catheter, patient feeling much better  - CXR consistent with infiltrates and w small L pleural effusion, hold off on additional Lasix for now.  - Note, patient s/p 2.6L with pleural effusion and underlying SOB but no evidence of CHF. Will be cautious with further fluid administration given baseline edema  - CTA Chest 11/22/2019 suggestive of pleural-based metastatic disease.  - Echo (11/23/2019) Hyperdynamic left ventricular systolic function. Large left pleural effusion with echogenic material noted in the pleural space.  - Seen by Pulm, Dr. Soto; recs appreciated.  - Continue supplemental O2.   - On heparin drip, eventually transition to DOAC (Eliquis) per heme/onc rec  - Tapering Steroids: Continue Methylprednisolone 40 mg IVp daily.  - Continue Albuterol/Ipratropium 3mL Nebs q6H with mucomyst. likely multifactorial from b/l PE, PNA, and metastatic pleural effusions (patient has a past history of 2 pleural effusions requiring drainage as well cardiac window)  s/p L pleuryx catheter, 700 ccs drained, f/u fluid studies, patient feeling much better  - CXR consistent with infiltrates and w small L pleural effusion, hold off on additional Lasix for now.  - Note, patient s/p 2.6L with pleural effusion and underlying SOB but no evidence of CHF. Will be cautious with further fluid administration given baseline edema  - CTA Chest 11/22/2019 suggestive of pleural-based metastatic disease.  - Echo (11/23/2019) Hyperdynamic left ventricular systolic function. Large left pleural effusion with echogenic material noted in the pleural space.  - Seen by Pulm, Dr. Soto; recs appreciated.  - Continue supplemental O2.   - On heparin drip, eventually transition to DOAC (Eliquis) per heme/onc rec  - Tapering Steroids: Continue Methylprednisolone 40 mg IVp daily.  - Continue Albuterol/Ipratropium 3mL Nebs q6H with mucomyst. likely multifactorial from b/l PE, PNA, and metastatic pleural effusions (patient has a past history of 2 pleural effusions requiring drainage as well cardiac window)  s/p L thoracentesis, 700 ccs drained, f/u fluid studies, patient feeling much better  - CXR consistent with infiltrates and w small L pleural effusion, hold off on additional Lasix for now.  - Note, patient s/p 2.6L with pleural effusion and underlying SOB but no evidence of CHF. Will be cautious with further fluid administration given baseline edema  - CTA Chest 11/22/2019 suggestive of pleural-based metastatic disease.  - Echo (11/23/2019) Hyperdynamic left ventricular systolic function. Large left pleural effusion with echogenic material noted in the pleural space.  - Seen by Pulm, Dr. Soto; recs appreciated.  - Continue supplemental O2.   - On heparin drip, eventually transition to DOAC (Eliquis) per heme/onc rec  - Tapering Steroids: Continue Methylprednisolone 40 mg IVp daily.  - Continue Albuterol/Ipratropium 3mL Nebs q6H with mucomyst.

## 2019-11-29 NOTE — CHART NOTE - NSCHARTNOTEFT_GEN_A_CORE
Assessment: Pt seen for malnutrition f/u. Tolerating diet, but taking only small amount per pt/dtr, but slightly improved. reports drinking 1 can/ensure/day or less, encouraged pt to increase to 1.5 cans/day consistently.On marinol Rx.  S/p thoracentesis, did not eat breakfast/lunch-NPO. BM noted 11/27. No new wt available. No pressure injuries noted at present.     Factors impacting intake: [ ] none [ ] nausea  [ ] vomiting [ ] diarrhea [ ] constipation  [ ]chewing problems [ ] swallowing issues  [ ] other:     Diet Presciption: Diet, Regular:   Supplement Feeding Modality:  Oral  Ensure Enlive Cans or Servings Per Day:  1       Frequency:  Three Times a day (11-22-19 @ 22:48)  Diet, NPO after Midnight:      NPO Start Date: 28-Nov-2019,   NPO Start Time: 23:59  Except Medications (11-28-19 @ 11:12)    Intake: 50% or less most meals    Current Weight: Weight (kg): 81.6 (11-22 @ 18:25)  % Weight Change    Pertinent Medications: MEDICATIONS  (STANDING):  acetylcysteine 10%  Inhalation 5 milliLiter(s) Inhalation four times a day  budesonide 160 MICROgram(s)/formoterol 4.5 MICROgram(s) Inhaler 2 Puff(s) Inhalation two times a day  chlorhexidine 4% Liquid 1 Application(s) Topical daily  digoxin     Tablet 0.25 milliGRAM(s) Oral daily  dronabinol 5 milliGRAM(s) Oral every 12 hours  heparin  Infusion.  Unit(s)/Hr (15 mL/Hr) IV Continuous <Continuous>  ipratropium    for Nebulization 500 MICROGram(s) Nebulizer every 6 hours  loratadine 10 milliGRAM(s) Oral daily  magnesium oxide 400 milliGRAM(s) Oral daily  methylPREDNISolone sodium succinate Injectable 40 milliGRAM(s) IV Push daily  metoprolol succinate  milliGRAM(s) Oral daily  multivitamin 1 Tablet(s) Oral daily  sodium chloride 1 Gram(s) Oral three times a day  tiotropium 18 MICROgram(s) Capsule 1 Capsule(s) Inhalation daily    MEDICATIONS  (PRN):  guaiFENesin    Syrup 100 milliGRAM(s) Oral every 6 hours PRN Cough  heparin  Injectable 6500 Unit(s) IV Push every 6 hours PRN For aPTT less than 40  heparin  Injectable 3000 Unit(s) IV Push every 6 hours PRN For aPTT between 40 - 57  levalbuterol Inhalation 0.63 milliGRAM(s) Inhalation every 6 hours PRN shortness of breath  morphine  - Injectable 3 milliGRAM(s) IV Push every 4 hours PRN Severe Pain (7 - 10)  morphine  - Injectable 2 milliGRAM(s) IV Push every 4 hours PRN Moderate Pain (4 - 6)  morphine  - Injectable 1 milliGRAM(s) IV Push every 4 hours PRN Mild Pain (1 - 3)  ondansetron Injectable 4 milliGRAM(s) IV Push every 6 hours PRN Nausea  senna 8.6 milliGRAM(s) Oral Tablet - Peds 1 Tablet(s) Oral at bedtime PRN for constipation    Pertinent Labs: 11-29 Na138 mmol/L Glu 94 mg/dL K+ 3.8 mmol/L Cr  0.60 mg/dL BUN 25 mg/dL<H> 11-23 Alb 2.7 g/dL<L>     CAPILLARY BLOOD GLUCOSE        Skin:     Estimated Needs:   [x ] no change since previous assessment  [ ] recalculated:     Previous Nutrition Diagnosis:   [ ] Inadequate Energy Intake [ ]Inadequate Oral Intake [ ] Excessive Energy Intake   [ ] Underweight [ ] Increased Nutrient Needs [ ] Overweight/Obesity   [ ] Altered GI Function [ ] Unintended Weight Loss [ ] Food & Nutrition Related Knowledge Deficit [ x] Malnutrition     Nutrition Diagnosis is [x ] ongoing  [ ] resolved [ ] not applicable     New Nutrition Diagnosis: [ ] not applicable       Interventions:   Recommend  [ ] Change Diet To:  [x ] Nutrition Supplement Encourage pt to consume po supplements in between meals.  [ ] Nutrition Support  [ ] Other:     Monitoring and Evaluation:   [ ] PO intake [ x ] Tolerance to diet prescription [ x ] weights [ x ] labs[ x ] follow up per protocol  [ ] other:

## 2019-11-29 NOTE — PROGRESS NOTE ADULT - ASSESSMENT
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    Adm now with weakness and dyspnea; found to have pulmonary embolism and further disease progression with pleural effusion, bone lesions, liver lesions and pancreatic mass.  s/p 600cc therapeutic throacentesis 11/29/19 AM.     Unable to lie flat for MRI brain.     RECOMMEND  HOLD Lovenox   Resume Heparin drip  s/p therapeutic thoracentesis today.   No pleurex catheter as insufficient fluid.   D/w IR Dr Murray today.   D/w pulm Dr Soto.     As tap done, if no bleed, resume heparin.   Once stable transition to [facility]     Had changed Duoneb to separate: neb with Xopenex and Atrovent neb.     Lasix, Digoxin,  per cardio,.     To complete re-staging scans, will check MRI brain during this admission.   Wife had brought in discs from [facility] for Radiology department to compare      Continue acute care

## 2019-11-29 NOTE — CHART NOTE - NSCHARTNOTEFT_GEN_A_CORE
patient for tunnelled left pleural drainage catheter. Due inadequate volume procedure is changed to thoracentesis after d/w Dr. Harris. An informed consent obtained. DNR status discussed.

## 2019-11-29 NOTE — PROGRESS NOTE ADULT - PROBLEM SELECTOR PLAN 3
- Continue Digoxin 0.25 mg PO daily.   - Switch to heparin drip this afternoon  - Continue to HOLD Diltiazem.  - Continue Metoprolol 25 mg PO daily with hold parameters, will increase if BP starts to improve - Continue Digoxin 0.25 mg PO daily.   - Continue heparin drip  - Continue to HOLD Diltiazem.  - Continue Metoprolol 100 mg PO daily with hold parameters

## 2019-11-29 NOTE — PROGRESS NOTE ADULT - SUBJECTIVE AND OBJECTIVE BOX
Patient is a 59y old  Male who presents with a chief complaint of PNA (29 Nov 2019 07:07)      INTERVAL HPI/OVERNIGHT EVENTS:  T(C): 36.4 (11-29-19 @ 13:49), Max: 36.7 (11-28-19 @ 19:45)  HR: 100 (11-29-19 @ 13:52) (79 - 106)  BP: 128/80 (11-29-19 @ 13:49) (95/67 - 128/80)  RR: 24 (11-29-19 @ 07:16) (18 - 24)  SpO2: 100% (11-29-19 @ 07:16) (95% - 100%)  Wt(kg): --  I&O's Summary    28 Nov 2019 07:01  -  29 Nov 2019 07:00  --------------------------------------------------------  IN: 180 mL / OUT: 850 mL / NET: -670 mL        LABS:                        10.2   15.88 )-----------( 198      ( 29 Nov 2019 06:58 )             33.2     11-29    138  |  100  |  25<H>  ----------------------------<  94  3.8   |  30  |  0.60    Ca    8.0<L>      29 Nov 2019 06:58      PTT - ( 29 Nov 2019 06:58 )  PTT:47.7 sec    CAPILLARY BLOOD GLUCOSE                MEDICATIONS  (STANDING):  acetylcysteine 10%  Inhalation 5 milliLiter(s) Inhalation four times a day  budesonide 160 MICROgram(s)/formoterol 4.5 MICROgram(s) Inhaler 2 Puff(s) Inhalation two times a day  chlorhexidine 4% Liquid 1 Application(s) Topical daily  digoxin     Tablet 0.25 milliGRAM(s) Oral daily  dronabinol 5 milliGRAM(s) Oral every 12 hours  heparin  Infusion.  Unit(s)/Hr (15 mL/Hr) IV Continuous <Continuous>  ipratropium    for Nebulization 500 MICROGram(s) Nebulizer every 6 hours  loratadine 10 milliGRAM(s) Oral daily  magnesium oxide 400 milliGRAM(s) Oral daily  methylPREDNISolone sodium succinate Injectable 40 milliGRAM(s) IV Push daily  metoprolol succinate  milliGRAM(s) Oral daily  multivitamin 1 Tablet(s) Oral daily  sodium chloride 1 Gram(s) Oral three times a day  tiotropium 18 MICROgram(s) Capsule 1 Capsule(s) Inhalation daily    MEDICATIONS  (PRN):  guaiFENesin    Syrup 100 milliGRAM(s) Oral every 6 hours PRN Cough  heparin  Injectable 6500 Unit(s) IV Push every 6 hours PRN For aPTT less than 40  heparin  Injectable 3000 Unit(s) IV Push every 6 hours PRN For aPTT between 40 - 57  levalbuterol Inhalation 0.63 milliGRAM(s) Inhalation every 6 hours PRN shortness of breath  morphine  - Injectable 3 milliGRAM(s) IV Push every 4 hours PRN Severe Pain (7 - 10)  morphine  - Injectable 2 milliGRAM(s) IV Push every 4 hours PRN Moderate Pain (4 - 6)  morphine  - Injectable 1 milliGRAM(s) IV Push every 4 hours PRN Mild Pain (1 - 3)  ondansetron Injectable 4 milliGRAM(s) IV Push every 6 hours PRN Nausea  senna 8.6 milliGRAM(s) Oral Tablet - Peds 1 Tablet(s) Oral at bedtime PRN for constipation      REVIEW OF SYSTEMS:  CONSTITUTIONAL: No fever, weight loss, or fatigue  EYES: No eye pain, visual disturbances, or discharge  ENMT:  No difficulty hearing, tinnitus, vertigo; No sinus or throat pain  NECK: No pain or stiffness  RESPIRATORY: No cough, wheezing, chills or hemoptysis; No shortness of breath  CARDIOVASCULAR: No chest pain, palpitations, dizziness, or leg swelling  GASTROINTESTINAL: No abdominal or epigastric pain. No nausea, vomiting, or hematemesis; No diarrhea or constipation. No melena or hematochezia.  GENITOURINARY: No dysuria, frequency, hematuria, or incontinence  NEUROLOGICAL: No headaches, memory loss, loss of strength, numbness, or tremors  ALLERY AND IMMUNOLOGIC: No hives or eczema    RADIOLOGY & ADDITIONAL TESTS:    Imaging Personally Reviewed:  [ ] YES  [ ] NO    Consultant(s) Notes Reviewed:  [ ] YES  [ ] NO    PHYSICAL EXAM:  GENERAL: NAD, well-groomed, well-developed  NERVOUS SYSTEM:  Alert & Oriented X3, Good concentration; Motor Strength 5/5 B/L upper and lower extremities; DTRs 2+ intact and symmetric  CHEST/LUNG: Clear to percussion bilaterally; No rales, rhonchi, wheezing, or rubs  HEART: Regular rate and rhythm; No murmurs, rubs, or gallops  ABDOMEN: Soft, Nontender, Nondistended; Bowel sounds present  EXTREMITIES:  2+ Peripheral Pulses, No clubbing, cyanosis, or edema    Care Discussed with Consultants/Other Providers [ ] YES  [ ] NO Patient is a 59y old  Male who presents with a chief complaint of PNA (29 Nov 2019 07:07)      INTERVAL HPI/OVERNIGHT EVENTS: Pt seen and examined at bedside. No acute events overnight. Complaining of hoarseness in voice. Was NPO after midnight for Pleurx catheter today. SOB unchanged. Weeping in lower extremities b/l.    ***Saw patient after Pleurx catheter. 700 ccs drained. Pt feeling much better.    T(C): 36.4 (11-29-19 @ 13:49), Max: 36.7 (11-28-19 @ 19:45)  HR: 100 (11-29-19 @ 13:52) (79 - 106)  BP: 128/80 (11-29-19 @ 13:49) (95/67 - 128/80)  RR: 24 (11-29-19 @ 07:16) (18 - 24)  SpO2: 100% (11-29-19 @ 07:16) (95% - 100%)  Wt(kg): --  I&O's Summary    28 Nov 2019 07:01  -  29 Nov 2019 07:00  --------------------------------------------------------  IN: 180 mL / OUT: 850 mL / NET: -670 mL        LABS:                        10.2   15.88 )-----------( 198      ( 29 Nov 2019 06:58 )             33.2     11-29    138  |  100  |  25<H>  ----------------------------<  94  3.8   |  30  |  0.60    Ca    8.0<L>      29 Nov 2019 06:58      PTT - ( 29 Nov 2019 06:58 )  PTT:47.7 sec    CAPILLARY BLOOD GLUCOSE                MEDICATIONS  (STANDING):  acetylcysteine 10%  Inhalation 5 milliLiter(s) Inhalation four times a day  budesonide 160 MICROgram(s)/formoterol 4.5 MICROgram(s) Inhaler 2 Puff(s) Inhalation two times a day  chlorhexidine 4% Liquid 1 Application(s) Topical daily  digoxin     Tablet 0.25 milliGRAM(s) Oral daily  dronabinol 5 milliGRAM(s) Oral every 12 hours  heparin  Infusion.  Unit(s)/Hr (15 mL/Hr) IV Continuous <Continuous>  ipratropium    for Nebulization 500 MICROGram(s) Nebulizer every 6 hours  loratadine 10 milliGRAM(s) Oral daily  magnesium oxide 400 milliGRAM(s) Oral daily  methylPREDNISolone sodium succinate Injectable 40 milliGRAM(s) IV Push daily  metoprolol succinate  milliGRAM(s) Oral daily  multivitamin 1 Tablet(s) Oral daily  sodium chloride 1 Gram(s) Oral three times a day  tiotropium 18 MICROgram(s) Capsule 1 Capsule(s) Inhalation daily    MEDICATIONS  (PRN):  guaiFENesin    Syrup 100 milliGRAM(s) Oral every 6 hours PRN Cough  heparin  Injectable 6500 Unit(s) IV Push every 6 hours PRN For aPTT less than 40  heparin  Injectable 3000 Unit(s) IV Push every 6 hours PRN For aPTT between 40 - 57  levalbuterol Inhalation 0.63 milliGRAM(s) Inhalation every 6 hours PRN shortness of breath  morphine  - Injectable 3 milliGRAM(s) IV Push every 4 hours PRN Severe Pain (7 - 10)  morphine  - Injectable 2 milliGRAM(s) IV Push every 4 hours PRN Moderate Pain (4 - 6)  morphine  - Injectable 1 milliGRAM(s) IV Push every 4 hours PRN Mild Pain (1 - 3)  ondansetron Injectable 4 milliGRAM(s) IV Push every 6 hours PRN Nausea  senna 8.6 milliGRAM(s) Oral Tablet - Peds 1 Tablet(s) Oral at bedtime PRN for constipation      REVIEW OF SYSTEMS:  CONSTITUTIONAL: No fever, weight loss, or fatigue  EYES: No eye pain, visual disturbances, or discharge  ENMT:  Admits hoarse voice, No difficulty hearing, tinnitus, vertigo; No sinus or throat pain  NECK: No pain or stiffness  RESPIRATORY: Admits cough, sob. No wheezing, chills or hemoptysis  CARDIOVASCULAR: Admits chronic leg swelling. No chest pain, palpitations, dizziness.  GASTROINTESTINAL: No abdominal or epigastric pain. No nausea, vomiting, or hematemesis; No diarrhea or constipation. No melena or hematochezia.  GENITOURINARY: No dysuria, frequency, hematuria, or incontinence  NEUROLOGICAL: Admits memory loss after radiation therapy, No headaches, loss of strength, numbness, or tremors  ALLERY AND IMMUNOLOGIC: No hives or eczema    RADIOLOGY & ADDITIONAL TESTS:    Imaging Personally Reviewed:  [ x] YES  [ ] NO    Consultant(s) Notes Reviewed:  [ x] YES  [ ] NO    PHYSICAL EXAM:  GENERAL: NAD, well-groomed, well-developed  NERVOUS SYSTEM:  Alert & Oriented X2-3, Good concentration  CHEST/LUNG: L lung base crackles, L lung apex rhonchi, No wheezing, or rubs  HEART: Irregularly irregular rate and rhythm; No murmurs, rubs, or gallops  ABDOMEN: Soft, Nontender, Nondistended; Bowel sounds present  EXTREMITIES:  2+ Peripheral Pulses, 4+ pitting edema in lower extremities b/l, weeping, No clubbing, cyanosis    Care Discussed with Consultants/Other Providers [ x] YES  [ ] NO Patient is a 59y old  Male who presents with a chief complaint of PNA (29 Nov 2019 07:07)      INTERVAL HPI/OVERNIGHT EVENTS: Pt seen and examined at bedside. No acute events overnight. Complaining of hoarseness in voice. Was NPO after midnight for Pleurx catheter today. Pleuryx catheter changed to thoracentesis due to inadequate lung volumes. SOB unchanged. Weeping in lower extremities b/l.    ***Saw patient after thoracentesis. 700 ccs drained. Pt feeling much better.    T(C): 36.4 (11-29-19 @ 13:49), Max: 36.7 (11-28-19 @ 19:45)  HR: 100 (11-29-19 @ 13:52) (79 - 106)  BP: 128/80 (11-29-19 @ 13:49) (95/67 - 128/80)  RR: 24 (11-29-19 @ 07:16) (18 - 24)  SpO2: 100% (11-29-19 @ 07:16) (95% - 100%)  Wt(kg): --  I&O's Summary    28 Nov 2019 07:01  -  29 Nov 2019 07:00  --------------------------------------------------------  IN: 180 mL / OUT: 850 mL / NET: -670 mL        LABS:                        10.2   15.88 )-----------( 198      ( 29 Nov 2019 06:58 )             33.2     11-29    138  |  100  |  25<H>  ----------------------------<  94  3.8   |  30  |  0.60    Ca    8.0<L>      29 Nov 2019 06:58      PTT - ( 29 Nov 2019 06:58 )  PTT:47.7 sec    CAPILLARY BLOOD GLUCOSE                MEDICATIONS  (STANDING):  acetylcysteine 10%  Inhalation 5 milliLiter(s) Inhalation four times a day  budesonide 160 MICROgram(s)/formoterol 4.5 MICROgram(s) Inhaler 2 Puff(s) Inhalation two times a day  chlorhexidine 4% Liquid 1 Application(s) Topical daily  digoxin     Tablet 0.25 milliGRAM(s) Oral daily  dronabinol 5 milliGRAM(s) Oral every 12 hours  heparin  Infusion.  Unit(s)/Hr (15 mL/Hr) IV Continuous <Continuous>  ipratropium    for Nebulization 500 MICROGram(s) Nebulizer every 6 hours  loratadine 10 milliGRAM(s) Oral daily  magnesium oxide 400 milliGRAM(s) Oral daily  methylPREDNISolone sodium succinate Injectable 40 milliGRAM(s) IV Push daily  metoprolol succinate  milliGRAM(s) Oral daily  multivitamin 1 Tablet(s) Oral daily  sodium chloride 1 Gram(s) Oral three times a day  tiotropium 18 MICROgram(s) Capsule 1 Capsule(s) Inhalation daily    MEDICATIONS  (PRN):  guaiFENesin    Syrup 100 milliGRAM(s) Oral every 6 hours PRN Cough  heparin  Injectable 6500 Unit(s) IV Push every 6 hours PRN For aPTT less than 40  heparin  Injectable 3000 Unit(s) IV Push every 6 hours PRN For aPTT between 40 - 57  levalbuterol Inhalation 0.63 milliGRAM(s) Inhalation every 6 hours PRN shortness of breath  morphine  - Injectable 3 milliGRAM(s) IV Push every 4 hours PRN Severe Pain (7 - 10)  morphine  - Injectable 2 milliGRAM(s) IV Push every 4 hours PRN Moderate Pain (4 - 6)  morphine  - Injectable 1 milliGRAM(s) IV Push every 4 hours PRN Mild Pain (1 - 3)  ondansetron Injectable 4 milliGRAM(s) IV Push every 6 hours PRN Nausea  senna 8.6 milliGRAM(s) Oral Tablet - Peds 1 Tablet(s) Oral at bedtime PRN for constipation      REVIEW OF SYSTEMS:  CONSTITUTIONAL: No fever, weight loss, or fatigue  EYES: No eye pain, visual disturbances, or discharge  ENMT:  Admits hoarse voice, No difficulty hearing, tinnitus, vertigo; No sinus or throat pain  NECK: No pain or stiffness  RESPIRATORY: Admits cough, sob. No wheezing, chills or hemoptysis  CARDIOVASCULAR: Admits chronic leg swelling. No chest pain, palpitations, dizziness.  GASTROINTESTINAL: No abdominal or epigastric pain. No nausea, vomiting, or hematemesis; No diarrhea or constipation. No melena or hematochezia.  GENITOURINARY: No dysuria, frequency, hematuria, or incontinence  NEUROLOGICAL: Admits memory loss after radiation therapy, No headaches, loss of strength, numbness, or tremors  ALLERY AND IMMUNOLOGIC: No hives or eczema    RADIOLOGY & ADDITIONAL TESTS:    Imaging Personally Reviewed:  [ x] YES  [ ] NO    Consultant(s) Notes Reviewed:  [ x] YES  [ ] NO    PHYSICAL EXAM:  GENERAL: NAD, well-groomed, well-developed  NERVOUS SYSTEM:  Alert & Oriented X2-3, Good concentration  CHEST/LUNG: L lung base crackles, L lung apex rhonchi, No wheezing, or rubs  HEART: Irregularly irregular rate and rhythm; No murmurs, rubs, or gallops  ABDOMEN: Soft, Nontender, Nondistended; Bowel sounds present  EXTREMITIES:  2+ Peripheral Pulses, 4+ pitting edema in lower extremities b/l, weeping, No clubbing, cyanosis    Care Discussed with Consultants/Other Providers [ x] YES  [ ] NO

## 2019-11-29 NOTE — PROGRESS NOTE ADULT - SUBJECTIVE AND OBJECTIVE BOX
Patient is a 59y old  Male who presents with a chief complaint of PNA (29 Nov 2019 14:53)      INTERVAL HPI/OVERNIGHT EVENTS:    Feels better today. Underwent left thoracentesis with improvement in shortness of breath    MEDICATIONS  (STANDING):  acetylcysteine 10%  Inhalation 5 milliLiter(s) Inhalation four times a day  budesonide 160 MICROgram(s)/formoterol 4.5 MICROgram(s) Inhaler 2 Puff(s) Inhalation two times a day  chlorhexidine 4% Liquid 1 Application(s) Topical daily  digoxin     Tablet 0.25 milliGRAM(s) Oral daily  dronabinol 5 milliGRAM(s) Oral every 12 hours  heparin  Infusion.  Unit(s)/Hr (15 mL/Hr) IV Continuous <Continuous>  ipratropium    for Nebulization 500 MICROGram(s) Nebulizer every 6 hours  loratadine 10 milliGRAM(s) Oral daily  magnesium oxide 400 milliGRAM(s) Oral daily  methylPREDNISolone sodium succinate Injectable 40 milliGRAM(s) IV Push daily  metoprolol succinate  milliGRAM(s) Oral daily  multivitamin 1 Tablet(s) Oral daily  sodium chloride 1 Gram(s) Oral three times a day  tiotropium 18 MICROgram(s) Capsule 1 Capsule(s) Inhalation daily      MEDICATIONS  (PRN):  guaiFENesin    Syrup 100 milliGRAM(s) Oral every 6 hours PRN Cough  heparin  Injectable 6500 Unit(s) IV Push every 6 hours PRN For aPTT less than 40  heparin  Injectable 3000 Unit(s) IV Push every 6 hours PRN For aPTT between 40 - 57  levalbuterol Inhalation 0.63 milliGRAM(s) Inhalation every 6 hours PRN shortness of breath  morphine  - Injectable 3 milliGRAM(s) IV Push every 4 hours PRN Severe Pain (7 - 10)  morphine  - Injectable 2 milliGRAM(s) IV Push every 4 hours PRN Moderate Pain (4 - 6)  morphine  - Injectable 1 milliGRAM(s) IV Push every 4 hours PRN Mild Pain (1 - 3)  ondansetron Injectable 4 milliGRAM(s) IV Push every 6 hours PRN Nausea  senna 8.6 milliGRAM(s) Oral Tablet - Peds 1 Tablet(s) Oral at bedtime PRN for constipation      Allergies    penicillin (Unknown)    Intolerances        PAST MEDICAL & SURGICAL HISTORY:  Lung cancer  Depression  Dyspnea  Mediastinal adenopathy  Pericardial effusion  AF (atrial fibrillation)  Hyponatremia  COPD (chronic obstructive pulmonary disease)  HTN (hypertension)  S/P pericardial window creation      Vital Signs Last 24 Hrs  T(C): 36.4 (29 Nov 2019 13:49), Max: 36.7 (28 Nov 2019 19:45)  T(F): 97.5 (29 Nov 2019 13:49), Max: 98 (28 Nov 2019 19:45)  HR: 100 (29 Nov 2019 13:52) (79 - 106)  BP: 128/80 (29 Nov 2019 13:49) (95/67 - 128/80)  BP(mean): --  RR: 24 (29 Nov 2019 07:16) (18 - 24)  SpO2: 100% (29 Nov 2019 07:16) (95% - 100%)    PHYSICAL EXAMINATION:    GENERAL: The patient is awake and alert in no apparent distress.     HEENT: Head is normocephalic and atraumatic. Extraocular muscles are intact. Mucous membranes are moist.    NECK: Supple.    LUNGS: Clear to auscultation without wheezing, rales or rhonchi; respirations unlabored    HEART: Regular rate and rhythm without murmur.    ABDOMEN: Soft, nontender, and nondistended.      EXTREMITIES: massive edema bilateral    NEUROLOGIC: Grossly intact.    SKIN: No ulceration or induration present.      LABS:                        10.2   15.88 )-----------( 198      ( 29 Nov 2019 06:58 )             33.2     11-29    138  |  100  |  25<H>  ----------------------------<  94  3.8   |  30  |  0.60    Ca    8.0<L>      29 Nov 2019 06:58      PTT - ( 29 Nov 2019 06:58 )  PTT:47.7           Assessment:    Small cell carcinoma of lung with widespread metastatic disease  Malignant left pleural effusion - S/P pleural drainage  COPD  Acute Hypoxic respiratory failure    Plan:    Continue oxygen + Ipratropium + Mucomyst + Symbicort + spiriva  IV steroids  Cardiac monitoring

## 2019-11-29 NOTE — PROGRESS NOTE ADULT - PROBLEM SELECTOR PLAN 5
BP soft but holding above 90/60s  - Continue to HOLD Diltiazem.  - Continue Metoprolol 25 mg PO daily with hold parameters. BP soft but holding above 90/60s  - Continue to HOLD Diltiazem.  - Continue Metoprolol 100 mg PO daily with hold parameters.

## 2019-11-30 LAB
ANION GAP SERPL CALC-SCNC: 10 MMOL/L — SIGNIFICANT CHANGE UP (ref 5–17)
APTT BLD: 65.1 SEC — HIGH (ref 28.5–37)
APTT BLD: 99.3 SEC — HIGH (ref 28.5–37)
BUN SERPL-MCNC: 26 MG/DL — HIGH (ref 7–23)
CALCIUM SERPL-MCNC: 8.3 MG/DL — LOW (ref 8.5–10.1)
CHLORIDE SERPL-SCNC: 97 MMOL/L — SIGNIFICANT CHANGE UP (ref 96–108)
CO2 SERPL-SCNC: 28 MMOL/L — SIGNIFICANT CHANGE UP (ref 22–31)
CREAT SERPL-MCNC: 0.55 MG/DL — SIGNIFICANT CHANGE UP (ref 0.5–1.3)
GLUCOSE SERPL-MCNC: 118 MG/DL — HIGH (ref 70–99)
HCT VFR BLD CALC: 33.6 % — LOW (ref 39–50)
HGB BLD-MCNC: 10.5 G/DL — LOW (ref 13–17)
MCHC RBC-ENTMCNC: 26.6 PG — LOW (ref 27–34)
MCHC RBC-ENTMCNC: 31.3 GM/DL — LOW (ref 32–36)
MCV RBC AUTO: 85.3 FL — SIGNIFICANT CHANGE UP (ref 80–100)
NRBC # BLD: 0 /100 WBCS — SIGNIFICANT CHANGE UP (ref 0–0)
PLATELET # BLD AUTO: 181 K/UL — SIGNIFICANT CHANGE UP (ref 150–400)
POTASSIUM SERPL-MCNC: 3.6 MMOL/L — SIGNIFICANT CHANGE UP (ref 3.5–5.3)
POTASSIUM SERPL-SCNC: 3.6 MMOL/L — SIGNIFICANT CHANGE UP (ref 3.5–5.3)
RBC # BLD: 3.94 M/UL — LOW (ref 4.2–5.8)
RBC # FLD: 19.1 % — HIGH (ref 10.3–14.5)
SODIUM SERPL-SCNC: 135 MMOL/L — SIGNIFICANT CHANGE UP (ref 135–145)
WBC # BLD: 20.24 K/UL — HIGH (ref 3.8–10.5)
WBC # FLD AUTO: 20.24 K/UL — HIGH (ref 3.8–10.5)

## 2019-11-30 PROCEDURE — 99232 SBSQ HOSP IP/OBS MODERATE 35: CPT

## 2019-11-30 RX ORDER — MORPHINE SULFATE 50 MG/1
3 CAPSULE, EXTENDED RELEASE ORAL EVERY 4 HOURS
Refills: 0 | Status: DISCONTINUED | OUTPATIENT
Start: 2019-11-30 | End: 2019-12-02

## 2019-11-30 RX ADMIN — TIOTROPIUM BROMIDE 1 CAPSULE(S): 18 CAPSULE ORAL; RESPIRATORY (INHALATION) at 07:35

## 2019-11-30 RX ADMIN — MORPHINE SULFATE 3 MILLIGRAM(S): 50 CAPSULE, EXTENDED RELEASE ORAL at 22:00

## 2019-11-30 RX ADMIN — MAGNESIUM OXIDE 400 MG ORAL TABLET 400 MILLIGRAM(S): 241.3 TABLET ORAL at 13:35

## 2019-11-30 RX ADMIN — Medication 500 MICROGRAM(S): at 02:34

## 2019-11-30 RX ADMIN — Medication 500 MICROGRAM(S): at 21:04

## 2019-11-30 RX ADMIN — SODIUM CHLORIDE 1 GRAM(S): 9 INJECTION INTRAMUSCULAR; INTRAVENOUS; SUBCUTANEOUS at 05:23

## 2019-11-30 RX ADMIN — HEPARIN SODIUM 1700 UNIT(S)/HR: 5000 INJECTION INTRAVENOUS; SUBCUTANEOUS at 09:27

## 2019-11-30 RX ADMIN — MORPHINE SULFATE 2 MILLIGRAM(S): 50 CAPSULE, EXTENDED RELEASE ORAL at 05:31

## 2019-11-30 RX ADMIN — HEPARIN SODIUM 1700 UNIT(S)/HR: 5000 INJECTION INTRAVENOUS; SUBCUTANEOUS at 17:07

## 2019-11-30 RX ADMIN — MORPHINE SULFATE 3 MILLIGRAM(S): 50 CAPSULE, EXTENDED RELEASE ORAL at 21:27

## 2019-11-30 RX ADMIN — HEPARIN SODIUM 1700 UNIT(S)/HR: 5000 INJECTION INTRAVENOUS; SUBCUTANEOUS at 02:15

## 2019-11-30 RX ADMIN — SODIUM CHLORIDE 1 GRAM(S): 9 INJECTION INTRAMUSCULAR; INTRAVENOUS; SUBCUTANEOUS at 13:35

## 2019-11-30 RX ADMIN — HEPARIN SODIUM 3000 UNIT(S): 5000 INJECTION INTRAVENOUS; SUBCUTANEOUS at 02:17

## 2019-11-30 RX ADMIN — Medication 5 MILLIGRAM(S): at 10:23

## 2019-11-30 RX ADMIN — BUDESONIDE AND FORMOTEROL FUMARATE DIHYDRATE 2 PUFF(S): 160; 4.5 AEROSOL RESPIRATORY (INHALATION) at 07:36

## 2019-11-30 RX ADMIN — Medication 1 TABLET(S): at 13:35

## 2019-11-30 RX ADMIN — SODIUM CHLORIDE 1 GRAM(S): 9 INJECTION INTRAMUSCULAR; INTRAVENOUS; SUBCUTANEOUS at 21:27

## 2019-11-30 RX ADMIN — Medication 500 MICROGRAM(S): at 13:56

## 2019-11-30 RX ADMIN — Medication 4 MILLILITER(S): at 13:55

## 2019-11-30 RX ADMIN — Medication 100 MILLIGRAM(S): at 05:23

## 2019-11-30 RX ADMIN — MORPHINE SULFATE 2 MILLIGRAM(S): 50 CAPSULE, EXTENDED RELEASE ORAL at 17:30

## 2019-11-30 RX ADMIN — Medication 5 MILLILITER(S): at 21:04

## 2019-11-30 RX ADMIN — Medication 4 MILLILITER(S): at 07:08

## 2019-11-30 RX ADMIN — MORPHINE SULFATE 2 MILLIGRAM(S): 50 CAPSULE, EXTENDED RELEASE ORAL at 10:53

## 2019-11-30 RX ADMIN — MORPHINE SULFATE 3 MILLIGRAM(S): 50 CAPSULE, EXTENDED RELEASE ORAL at 00:05

## 2019-11-30 RX ADMIN — MORPHINE SULFATE 2 MILLIGRAM(S): 50 CAPSULE, EXTENDED RELEASE ORAL at 17:19

## 2019-11-30 RX ADMIN — CHLORHEXIDINE GLUCONATE 1 APPLICATION(S): 213 SOLUTION TOPICAL at 13:36

## 2019-11-30 RX ADMIN — MORPHINE SULFATE 2 MILLIGRAM(S): 50 CAPSULE, EXTENDED RELEASE ORAL at 00:42

## 2019-11-30 RX ADMIN — MORPHINE SULFATE 1 MILLIGRAM(S): 50 CAPSULE, EXTENDED RELEASE ORAL at 14:35

## 2019-11-30 RX ADMIN — MORPHINE SULFATE 2 MILLIGRAM(S): 50 CAPSULE, EXTENDED RELEASE ORAL at 00:12

## 2019-11-30 RX ADMIN — Medication 40 MILLIGRAM(S): at 05:23

## 2019-11-30 RX ADMIN — Medication 5 MILLIGRAM(S): at 17:19

## 2019-11-30 RX ADMIN — BUDESONIDE AND FORMOTEROL FUMARATE DIHYDRATE 2 PUFF(S): 160; 4.5 AEROSOL RESPIRATORY (INHALATION) at 21:28

## 2019-11-30 RX ADMIN — Medication 0.25 MILLIGRAM(S): at 05:23

## 2019-11-30 RX ADMIN — Medication 500 MICROGRAM(S): at 07:09

## 2019-11-30 RX ADMIN — LORATADINE 10 MILLIGRAM(S): 10 TABLET ORAL at 13:35

## 2019-11-30 RX ADMIN — MORPHINE SULFATE 2 MILLIGRAM(S): 50 CAPSULE, EXTENDED RELEASE ORAL at 10:23

## 2019-11-30 RX ADMIN — MORPHINE SULFATE 2 MILLIGRAM(S): 50 CAPSULE, EXTENDED RELEASE ORAL at 06:14

## 2019-11-30 RX ADMIN — MORPHINE SULFATE 1 MILLIGRAM(S): 50 CAPSULE, EXTENDED RELEASE ORAL at 14:05

## 2019-11-30 NOTE — PROGRESS NOTE ADULT - ASSESSMENT
58 yo man well known to our group, w met small cell ca of lung first presented as limited stage [id number] post Carbo//16/Atezolizumab with concurrent RT with initial response but then brain mets 8/2019 s/p WBRT and systemic disease progression on PET/CT 9/2019 with intra-abdominal, bone and pleural disease, started on weekly Taxol with GCSF support due to cytopenia with tx.    Adm now with weakness and dyspnea; found to have pulmonary embolism and further disease progression with pleural effusion, bone lesions, liver lesions and pancreatic mass.  s/p 700cc therapeutic thoracentesis 11/29/19 AM.     Unable to lie flat for MRI brain.     RECOMMEND  HOLD Lovenox   Continue Heparin drip  s/p therapeutic thoracentesis Fri. No pleurex catheter as insufficient fluid.   If still SOB tomorrow, consider repaet CT scan to eval pleura effusion.   Suspect over all sx due to cancer, fluid and PE.     Once stable transition to [facility]     Had changed Duoneb to separate: neb with Xopenex and Atrovent neb.     Lasix, Digoxin,  per cardio,.     To complete re-staging scans, will check MRI brain during this admission.   Wife had brought in discs from [facility] for Radiology department to compare    Continue acute care    Will follow.

## 2019-11-30 NOTE — PROGRESS NOTE ADULT - PROBLEM SELECTOR PLAN 10
- On heparin drip    IMPROVE VTE Individual Risk Assessment    RISK                                                                Points  [  ] Previous VTE                                                  3  [  ] Thrombophilia                                               2  [  ] Lower limb paralysis                                      2        (unable to hold up >15 seconds)    [ x ] Current Cancer                                              2         (within 6 months)  [  ] Immobilization > 24 hrs                                1  [  ] ICU/CCU stay > 24 hours                              1  [ x ] Age > 60                                                      1  IMPROVE VTE Score: 3    IMPROVE Score 0-1: Low Risk, No VTE prophylaxis required for most patients, encourage ambulation.   IMPROVE Score 2-3: At risk, pharmacologic VTE prophylaxis is indicated for most patients (in the absence of a contraindication)  IMPROVE Score > or = 4: High Risk, pharmacologic VTE prophylaxis is indicated for most patients (in the absence of a contraindication)

## 2019-11-30 NOTE — PROGRESS NOTE ADULT - SUBJECTIVE AND OBJECTIVE BOX
Patient is a 59y old  Male who presents with a chief complaint of PNA (30 Nov 2019 13:26)      INTERVAL HPI/OVERNIGHT EVENTS:    Feels better today. Shortness of breath has improved    MEDICATIONS  (STANDING):  acetylcysteine 10%  Inhalation 5 milliLiter(s) Inhalation four times a day  budesonide 160 MICROgram(s)/formoterol 4.5 MICROgram(s) Inhaler 2 Puff(s) Inhalation two times a day  chlorhexidine 4% Liquid 1 Application(s) Topical daily  digoxin     Tablet 0.25 milliGRAM(s) Oral daily  dronabinol 5 milliGRAM(s) Oral every 12 hours  heparin  Infusion.  Unit(s)/Hr (15 mL/Hr) IV Continuous <Continuous>  ipratropium    for Nebulization 500 MICROGram(s) Nebulizer every 6 hours  loratadine 10 milliGRAM(s) Oral daily  magnesium oxide 400 milliGRAM(s) Oral daily  methylPREDNISolone sodium succinate Injectable 40 milliGRAM(s) IV Push daily  metoprolol succinate  milliGRAM(s) Oral daily  multivitamin 1 Tablet(s) Oral daily  sodium chloride 1 Gram(s) Oral three times a day  tiotropium 18 MICROgram(s) Capsule 1 Capsule(s) Inhalation daily      MEDICATIONS  (PRN):  guaiFENesin    Syrup 100 milliGRAM(s) Oral every 6 hours PRN Cough  heparin  Injectable 6500 Unit(s) IV Push every 6 hours PRN For aPTT less than 40  heparin  Injectable 3000 Unit(s) IV Push every 6 hours PRN For aPTT between 40 - 57  levalbuterol Inhalation 0.63 milliGRAM(s) Inhalation every 6 hours PRN shortness of breath  morphine  - Injectable 2 milliGRAM(s) IV Push every 4 hours PRN Moderate Pain (4 - 6)  morphine  - Injectable 1 milliGRAM(s) IV Push every 4 hours PRN Mild Pain (1 - 3)  ondansetron Injectable 4 milliGRAM(s) IV Push every 6 hours PRN Nausea  senna 8.6 milliGRAM(s) Oral Tablet - Peds 1 Tablet(s) Oral at bedtime PRN for constipation      Allergies    penicillin (Unknown)    Intolerances        PAST MEDICAL & SURGICAL HISTORY:  Lung cancer  Depression  Dyspnea  Mediastinal adenopathy  Pericardial effusion  AF (atrial fibrillation)  Hyponatremia  COPD (chronic obstructive pulmonary disease)  HTN (hypertension)  S/P pericardial window creation      Vital Signs Last 24 Hrs  T(C): 36.7 (30 Nov 2019 11:38), Max: 36.7 (29 Nov 2019 19:50)  T(F): 98 (30 Nov 2019 11:38), Max: 98 (29 Nov 2019 19:50)  HR: 100 (30 Nov 2019 13:58) (79 - 114)  BP: 95/67 (30 Nov 2019 11:38) (95/67 - 106/75)  BP(mean): --  RR: 18 (30 Nov 2019 11:38) (18 - 18)  SpO2: 100% (30 Nov 2019 11:38) (97% - 100%)    PHYSICAL EXAMINATION:    GENERAL: The patient is awake and alert in no apparent distress.     HEENT: Head is normocephalic and atraumatic. Extraocular muscles are intact. Mucous membranes are moist.    NECK: Supple.    LUNGS: diminished air entry bilateral - no wheezes    HEART: Regular rate and rhythm without murmur.    ABDOMEN: Soft, nontender, and nondistended.      EXTREMITIES: massive pedal edema bilateral    NEUROLOGIC: Grossly intact.    SKIN: No ulceration or induration present.      LABS:                        10.5   20.24 )-----------( 181      ( 30 Nov 2019 07:54 )             33.6     11-30    135  |  97  |  26<H>  ----------------------------<  118<H>  3.6   |  28  |  0.55    Ca    8.3<L>      30 Nov 2019 07:54      PTT - ( 30 Nov 2019 07:54 )  PTT:99.3 sec    Assessment:    Small cell carcinoma of lung  S/P left thoracentesis - malignant effusion  Bilateral Pulmonary Emboli  Acute Hypoxic respiratory failure  COPD    Plan:    Change to oral steroids  Continue oxygen + IV heparin  Physical therapy  Nebulized bronchodilators

## 2019-11-30 NOTE — PROGRESS NOTE ADULT - PROBLEM SELECTOR PLAN 3
- Continue Digoxin 0.25 mg PO daily.   - Continue heparin drip  - Continue to HOLD Diltiazem.  - Continue Metoprolol 100 mg PO daily with hold parameters

## 2019-11-30 NOTE — PROGRESS NOTE ADULT - SUBJECTIVE AND OBJECTIVE BOX
Patient is a 59y old  Male who presents with a chief complaint of PNA (29 Nov 2019 16:28)  s/p IR drainage 700 cc  somewhat better, but still dyspneic and orthopneic    INTERVAL HPI/OVERNIGHT EVENTS:  T(C): 36.7 (11-30-19 @ 11:38), Max: 36.7 (11-29-19 @ 19:50)  HR: 96 (11-30-19 @ 11:38) (79 - 114)  BP: 95/67 (11-30-19 @ 11:38) (95/67 - 128/80)  RR: 18 (11-30-19 @ 11:38) (18 - 18)  SpO2: 100% (11-30-19 @ 11:38) (97% - 100%)  Wt(kg): --  I&O's Summary    29 Nov 2019 07:01  -  30 Nov 2019 07:00  --------------------------------------------------------  IN: 107 mL / OUT: 0 mL / NET: 107 mL        LABS:                        10.5   20.24 )-----------( 181      ( 30 Nov 2019 07:54 )             33.6     11-30    135  |  97  |  26<H>  ----------------------------<  118<H>  3.6   |  28  |  0.55    Ca    8.3<L>      30 Nov 2019 07:54      PTT - ( 30 Nov 2019 07:54 )  PTT:99.3 sec    CAPILLARY BLOOD GLUCOSE                MEDICATIONS  (STANDING):  acetylcysteine 10%  Inhalation 5 milliLiter(s) Inhalation four times a day  budesonide 160 MICROgram(s)/formoterol 4.5 MICROgram(s) Inhaler 2 Puff(s) Inhalation two times a day  chlorhexidine 4% Liquid 1 Application(s) Topical daily  digoxin     Tablet 0.25 milliGRAM(s) Oral daily  dronabinol 5 milliGRAM(s) Oral every 12 hours  heparin  Infusion.  Unit(s)/Hr (15 mL/Hr) IV Continuous <Continuous>  ipratropium    for Nebulization 500 MICROGram(s) Nebulizer every 6 hours  loratadine 10 milliGRAM(s) Oral daily  magnesium oxide 400 milliGRAM(s) Oral daily  methylPREDNISolone sodium succinate Injectable 40 milliGRAM(s) IV Push daily  metoprolol succinate  milliGRAM(s) Oral daily  multivitamin 1 Tablet(s) Oral daily  sodium chloride 1 Gram(s) Oral three times a day  tiotropium 18 MICROgram(s) Capsule 1 Capsule(s) Inhalation daily    MEDICATIONS  (PRN):  guaiFENesin    Syrup 100 milliGRAM(s) Oral every 6 hours PRN Cough  heparin  Injectable 6500 Unit(s) IV Push every 6 hours PRN For aPTT less than 40  heparin  Injectable 3000 Unit(s) IV Push every 6 hours PRN For aPTT between 40 - 57  levalbuterol Inhalation 0.63 milliGRAM(s) Inhalation every 6 hours PRN shortness of breath  morphine  - Injectable 2 milliGRAM(s) IV Push every 4 hours PRN Moderate Pain (4 - 6)  morphine  - Injectable 1 milliGRAM(s) IV Push every 4 hours PRN Mild Pain (1 - 3)  ondansetron Injectable 4 milliGRAM(s) IV Push every 6 hours PRN Nausea  senna 8.6 milliGRAM(s) Oral Tablet - Peds 1 Tablet(s) Oral at bedtime PRN for constipation      REVIEW OF SYSTEMS:  CONSTITUTIONAL: No fever, EYES: No eye pain, visual disturbances, or discharge  ENMT:  No difficulty hearing, tinnitus, vertigo; No sinus or throat pain  NECK: No pain or stiffness  RESPIRATORY: dyspnea, orthopnea  no cest pain, palpitations, dizziness, or leg swelling  GASTROINTESTINAL: No abdominal or epigastric pain. No nausea, vomiting, or hematemesis; No diarrhea or constipation. No melena or hematochezia.  GENITOURINARY: No dysuria, frequency, hematuria, or incontinence  NEUROLOGICAL: No headaches, memory loss, loss of strength, numbness, or tremors  SKIN: No itching, burning, rashes, or lesions   LYMPH NODES: No enlarged glands  ENDOCRINE: No heat or cold intolerance; No hair loss  MUSCULOSKELETAL: No joint pain or swelling; No muscle, back, or extremity pain  PSYCHIATRIC: No depression, anxiety, mood swings, or difficulty sleeping  HEME/LYMPH: No easy bruising, or bleeding gums  ALLERY AND IMMUNOLOGIC: No hives or eczema    RADIOLOGY & ADDITIONAL TESTS:    Imaging Personally Reviewed:  [ ] YES  [ ] NO    Consultant(s) Notes Reviewed:  [ ] YES  [ ] NO    PHYSICAL EXAM:  GENERAL: NAD, mild cachexia  Normocephalic  EYES: EOMI, PERRLA, conjunctiva and sclera clear  ENMT: No tonsillar erythema, exudates, or enlargement; Moist mucous membranes, Good dentition, No lesions  NECK: Supple, No JVD, Normal thyroid  NERVOUS SYSTEM:  Alert & Oriented X3, Good concentration; Motor Strength 5/5 B/L upper and lower extremities; DTRs 2+ intact and symmetric  CHEST/LUNG: dec bs bl  HEART: Regular rate and rhythm; No murmurs, rubs, or gallops  ABDOMEN: Soft, Nontender, Nondistended; Bowel sounds present  EXTREMITIES:  2+ Peripheral Pulses, No clubbing, cyanosis, or edema  LYMPH: No lymphadenopathy noted  SKIN: No rashes or lesions    Care Discussed with Consultants/Other Providers [ ] YES  [ ] NO

## 2019-11-30 NOTE — PROGRESS NOTE ADULT - SUBJECTIVE AND OBJECTIVE BOX
[INTERVAL HX: ]  Patient seen and examined;  Chart reviewed and events noted;   c/o improved breathing.   No CP, no diarrhea, no fever.   no bleeding.     Patient is a 59y Male with a known history of :  Pneumonia due to organism (J18.9) [Active]  Lung cancer (C34.90) [Active]  Depression (F32.9) [Active]  Dyspnea (R06.00) [Active]  Mediastinal adenopathy (R59.0) [Active]  Pericardial effusion (I31.3) [Active]  AF (atrial fibrillation) (I48.91) [Active]  Hyponatremia (E87.1) [Active]  COPD (chronic obstructive pulmonary disease) (J44.9) [Active]  HTN (hypertension) (I10) [Active]  S/P pericardial window creation (Z98.890) [Active]  No significant past surgical history ([id number]) [Inactive]    HPI:  59 M PMH COPD, HTN, LE edema 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 Patient states the SOB has been worsening and that's what prompted him to come to the ED. He denies recent fevers, chills but admits he's been shaking since he's been on chemotherapy. Admits on and off nausea, and episodes of vomiting over the past week. Denies hematemesis/hemoptysis. Denies chest pain, palpitations, abdominal pain, diarrhea, constipation. Admits LE edema which has been worsening in past week, and reports 100 lb weight loss since January. He has had no appetite, and reports not eating anything at all. He notes he recently changed chem to Paclitaxel, as per patient a body scan demonstrated mets to brain and bone (R shoulder, L ribs). His last radiation dose was August 2019.     In the ED:  T: 97 HR: 98 BP: 86/58 RR: 22 92% RA   WBC: 4.24 h/h: 9.2/27.6 coags wnl, D-Dimer 2411  Lactate 2.5 Na: 135, K: 3.0  Cr: 0.57 glucose 134 TSH: 2.37 proBNP 1397  s/p duonebs, 100 mg hydrocortisone, potassium chloride 10x3  U/S doppler LE: L superificial thrombus noted  -CTA and CT abdomen and pelvis pending  -EKG, significant artificant due to shaking. Official read pending (22 Nov 2019 20:57)            MEDICATIONS  (STANDING):  acetylcysteine 10%  Inhalation 5 milliLiter(s) Inhalation four times a day  budesonide 160 MICROgram(s)/formoterol 4.5 MICROgram(s) Inhaler 2 Puff(s) Inhalation two times a day  chlorhexidine 4% Liquid 1 Application(s) Topical daily  digoxin     Tablet 0.25 milliGRAM(s) Oral daily  dronabinol 5 milliGRAM(s) Oral every 12 hours  heparin  Infusion.  Unit(s)/Hr (15 mL/Hr) IV Continuous <Continuous>  ipratropium    for Nebulization 500 MICROGram(s) Nebulizer every 6 hours  loratadine 10 milliGRAM(s) Oral daily  magnesium oxide 400 milliGRAM(s) Oral daily  methylPREDNISolone sodium succinate Injectable 40 milliGRAM(s) IV Push daily  metoprolol succinate  milliGRAM(s) Oral daily  multivitamin 1 Tablet(s) Oral daily  sodium chloride 1 Gram(s) Oral three times a day  tiotropium 18 MICROgram(s) Capsule 1 Capsule(s) Inhalation daily    MEDICATIONS  (PRN):  guaiFENesin    Syrup 100 milliGRAM(s) Oral every 6 hours PRN Cough  heparin  Injectable 6500 Unit(s) IV Push every 6 hours PRN For aPTT less than 40  heparin  Injectable 3000 Unit(s) IV Push every 6 hours PRN For aPTT between 40 - 57  levalbuterol Inhalation 0.63 milliGRAM(s) Inhalation every 6 hours PRN shortness of breath  morphine  - Injectable 2 milliGRAM(s) IV Push every 4 hours PRN Moderate Pain (4 - 6)  morphine  - Injectable 1 milliGRAM(s) IV Push every 4 hours PRN Mild Pain (1 - 3)  ondansetron Injectable 4 milliGRAM(s) IV Push every 6 hours PRN Nausea  senna 8.6 milliGRAM(s) Oral Tablet - Peds 1 Tablet(s) Oral at bedtime PRN for constipation      Vital Signs Last 24 Hrs  T(C): 36.7 (30 Nov 2019 11:38), Max: 36.7 (29 Nov 2019 19:50)  T(F): 98 (30 Nov 2019 11:38), Max: 98 (29 Nov 2019 19:50)  HR: 96 (30 Nov 2019 11:38) (79 - 114)  BP: 95/67 (30 Nov 2019 11:38) (95/67 - 128/80)  BP(mean): --  RR: 18 (30 Nov 2019 11:38) (18 - 18)  SpO2: 100% (30 Nov 2019 11:38) (97% - 100%)      [PHYSICAL EXAM]  General: adult in NAD,  WN,  WD. Alopecia.   HEENT: clear oropharynx, anicteric sclera, pink conjunctivae.  Neck: supple, no masses.  CV: normal S1S2, no murmur, no rubs, no gallops.  Lungs: clear to auscultation with some dec BS L base, no wheezes, no rales, no rhonchi.  Abdomen: soft, non-tender, non-distended, no hepatosplenomegaly, normal BS, no guarding.  Ext: no clubbing, no cyanosis, +edema.  Skin: no rashes,  no petechiae, no venous stasis changes.  Neuro: alert and oriented X 3 , no focal motor deficits.  LN: no SC STEVE.      [LABS:]                        10.5   20.24 )-----------( 181      ( 30 Nov 2019 07:54 )             33.6     11-30  135  |  97  |  26<H>  ----------------------------<  118<H>  3.6   |  28  |  0.55  Ca    8.3<L>      30 Nov 2019 07:54      PTT - ( 30 Nov 2019 07:54 )  PTT:99.3 sec      < from: US Guided Thoracentesis w/Imaging, Left (11.29.19 @ 13:10) >  EXAM:  US THORACENTESIS [facility]                          EXAM:  XR CHEST PORTABLE URGENT 1V                            PROCEDURE DATE:  11/29/2019          INTERPRETATION:    Ultrasound-guided diagnostic and therapeutic left pleural drainage:  Chest portable semierect single AP view:  Clinical History:    Left pleural fluid. Carcinoma lung. Image guided drainage procedure.    Technique:    An informed consent obtained from patient . Risks and benefits explained   in detail. Patient made sit on the stretcher Forward leaning on a pillow.   Time out procedure was performed. Limited ultrasonography demonstrated   left pleural fluid. The site marked on the skin.    The access site prepped and draped in sterile fashion. 1% lidocaine   utilized for local anesthesia. After pleural cavity accessed, under   ultrasound guidance, using 6 Greek centesis catheter. Approximately 700   cc of nonclotting hemorrhagic fluid drained. The catheter removed. Site   dressed in sterile fashion.   Chest x-ray demonstrated no pneumothorax.   Patient tolerated the procedure well. No complications noted. Patient had   9 out of 10 pain in the site of the drainage, as well as hypoxia. 3 mg   intravenous morphine administered. Patient showed significant improvement   both in pain and hypoxia. Dr. Harris aware. Patient transferred to the   floor in stable condition for further observation and management.    Impression:  No pneumothorax in the postprocedure chest radiograph. Persisting bilateral perihilar densities, right IJ Port-A-Cath.  Ultrasound guided left pleural drainage. Fluid sent for lab analysis.  FRANDY BUNCH M.D., ATTENDING RADIOLOGIST  < end of copied text >

## 2019-11-30 NOTE — PROGRESS NOTE ADULT - ATTENDING COMMENTS
addendum:  dw oncology- still w symptomatic dyspnea- will consider repeat ct chest tomorrow, and may need repeat IR drainage  will cont iv heparin gtt for now.  once clear that no further invasive procedure to be performed this admit, will transition to eliquis

## 2019-11-30 NOTE — PROGRESS NOTE ADULT - ASSESSMENT
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 He also has a history of a pericardial effusion s/p window, and of a pleural effusion s/p thoracentesis.  Now found to have b/l PE's Pt. scheduled for thoracentesis today.    Left pleural effusion   - s/p Left thoracentesis 700cc non-clotting hemorrhagic fluid drained 11/29/19.  H/H stable  - post procedure CXR with no PTX   - Pulm following    PE  - CTA chest showed segmental PE's in B/L lower lobe   - C/W heparin gtt target aPTT = 58 - 99 seconds. Will switch to DOAC after procedure   - TTE showed hyperdynamic LV, normal RVSF with no significant valvular disease.  However, showed large left pleural effusion  - CT chest showed moderate left pleural effusion- seen by pulmonary recommending steroids, oxygen and Duoneb  - Can keep off of aspirin to minimize risk of bleeding as he is going to be on full dose anticoagulation  - Given malignancy, more definitive management of this effusion with a pleurex catheter may be appropriate    Chronic Afib  - Remains in Afib with RVR, mostly during activity  - Tachycardia multifactorial in setting of underlying lung cancer, PE and anemia  -  toprol to 100 mg daily today ( takes 200mg daily  at home )   - Continue digoxin   - Monitor and replete lytes.   - C/W heparin drip to be switched to DOAC.  Monitor for s/s bleeding    HTN  - C/W BB       Monitor and replete electrolytes. Keep K>4.0 and Mg>2.0.   Further cardiac w/u pending clinical course    To follow closely with you    Jocelyne Nguyen, [facility]  Cardiology   Spectra #[phone]

## 2019-11-30 NOTE — PROGRESS NOTE ADULT - PROBLEM SELECTOR PLAN 1
likely multifactorial from b/l PE, PNA, and metastatic pleural effusions (patient has a past history of 2 pleural effusions requiring drainage as well cardiac window)  s/p L thoracentesis, 700 ccs drained, f/u fluid studies, patient feeling much better  - CXR consistent with infiltrates and w small L pleural effusion, hold off on additional Lasix for now.  - Note, patient s/p 2.6L with pleural effusion and underlying SOB but no evidence of CHF. Will be cautious with further fluid administration given baseline edema  - CTA Chest 11/22/2019 suggestive of pleural-based metastatic disease.  - Echo (11/23/2019) Hyperdynamic left ventricular systolic function. Large left pleural effusion with echogenic material noted in the pleural space.  - Seen by Pulm, Dr. Soto; recs appreciated.  - Continue supplemental O2.   - On heparin drip, eventually transition to DOAC (Eliquis) per heme/onc rec  - Tapering Steroids: Continue Methylprednisolone 40 mg IVp daily.  - Continue Albuterol/Ipratropium 3mL Nebs q6H with mucomyst.

## 2019-11-30 NOTE — PROGRESS NOTE ADULT - SUBJECTIVE AND OBJECTIVE BOX
[facility] -- Gurpreet Bush, Corey Diaz, Sajan Pickard Savella  Office # [phone]      Follow Up:  PNA/AFib    Subjective/Observations: Seen and examined.  Sitting up in chair with no new complaints.  States he is feeling better today and denies cp, palpitations or worsening sob.  NAD.  Son at bedside.        REVIEW OF SYSTEMS: All other review of systems is negative unless indicated above    PAST MEDICAL & SURGICAL HISTORY:  Lung cancer  Depression  Dyspnea  Mediastinal adenopathy  Pericardial effusion  AF (atrial fibrillation)  Hyponatremia  COPD (chronic obstructive pulmonary disease)  HTN (hypertension)  S/P pericardial window creation      MEDICATIONS  (STANDING):  acetylcysteine 10%  Inhalation 5 milliLiter(s) Inhalation four times a day  budesonide 160 MICROgram(s)/formoterol 4.5 MICROgram(s) Inhaler 2 Puff(s) Inhalation two times a day  chlorhexidine 4% Liquid 1 Application(s) Topical daily  digoxin     Tablet 0.25 milliGRAM(s) Oral daily  dronabinol 5 milliGRAM(s) Oral every 12 hours  heparin  Infusion.  Unit(s)/Hr (15 mL/Hr) IV Continuous <Continuous>  ipratropium    for Nebulization 500 MICROGram(s) Nebulizer every 6 hours  loratadine 10 milliGRAM(s) Oral daily  magnesium oxide 400 milliGRAM(s) Oral daily  methylPREDNISolone sodium succinate Injectable 40 milliGRAM(s) IV Push daily  metoprolol succinate  milliGRAM(s) Oral daily  multivitamin 1 Tablet(s) Oral daily  sodium chloride 1 Gram(s) Oral three times a day  tiotropium 18 MICROgram(s) Capsule 1 Capsule(s) Inhalation daily    MEDICATIONS  (PRN):  guaiFENesin    Syrup 100 milliGRAM(s) Oral every 6 hours PRN Cough  heparin  Injectable 6500 Unit(s) IV Push every 6 hours PRN For aPTT less than 40  heparin  Injectable 3000 Unit(s) IV Push every 6 hours PRN For aPTT between 40 - 57  levalbuterol Inhalation 0.63 milliGRAM(s) Inhalation every 6 hours PRN shortness of breath  morphine  - Injectable 2 milliGRAM(s) IV Push every 4 hours PRN Moderate Pain (4 - 6)  morphine  - Injectable 1 milliGRAM(s) IV Push every 4 hours PRN Mild Pain (1 - 3)  ondansetron Injectable 4 milliGRAM(s) IV Push every 6 hours PRN Nausea  senna 8.6 milliGRAM(s) Oral Tablet - Peds 1 Tablet(s) Oral at bedtime PRN for constipation      Allergies    penicillin (Unknown)    Intolerances            Vital Signs Last 24 Hrs  T(C): 36.7 (30 Nov 2019 11:38), Max: 36.7 (29 Nov 2019 19:50)  T(F): 98 (30 Nov 2019 11:38), Max: 98 (29 Nov 2019 19:50)  HR: 96 (30 Nov 2019 11:38) (79 - 114)  BP: 95/67 (30 Nov 2019 11:38) (95/67 - 128/80)  BP(mean): --  RR: 18 (30 Nov 2019 11:38) (18 - 18)  SpO2: 100% (30 Nov 2019 11:38) (97% - 100%)    I&O's Summary    29 Nov 2019 07:01  -  30 Nov 2019 07:00  --------------------------------------------------------  IN: 107 mL / OUT: 0 mL / NET: 107 mL          PHYSICAL EXAM:  TELE: AF 100s  Constitutional: NAD, awake and alert, frail, cachectic.  Supplemental NC 02 in place  HEENT: Moist Mucous Membranes, Anicteric  Pulmonary: SOB noted, breath sounds are decreased in bases bilaterally, No wheezing, rales or rhonchi  Cardiovascular: Irregularly Irregular, S1 and S2, No murmurs, rubs, gallops or clicks  Gastrointestinal: Bowel Sounds present, soft, nontender.   Lymph: +2-3 pitting edema in lower extremities bilaterally. No lymphadenopathy.  Skin: No visible rashes or ulcers.  Psych:  Mood & affect appropriate    LABS: All Labs Reviewed:                        10.5   20.24 )-----------( 181      ( 30 Nov 2019 07:54 )             33.6                         10.0   20.70 )-----------( 189      ( 29 Nov 2019 22:33 )             32.0                         10.2   15.88 )-----------( 198      ( 29 Nov 2019 06:58 )             33.2     30 Nov 2019 07:54    135    |  97     |  26     ----------------------------<  118    3.6     |  28     |  0.55   29 Nov 2019 06:58    138    |  100    |  25     ----------------------------<  94     3.8     |  30     |  0.60   28 Nov 2019 05:32    138    |  100    |  22     ----------------------------<  120    3.9     |  30     |  0.60     Ca    8.3        30 Nov 2019 07:54  Ca    8.0        29 Nov 2019 06:58  Ca    8.1        28 Nov 2019 05:32      PTT - ( 30 Nov 2019 07:54 )  PTT:99.3 sec      < from: 12 Lead ECG (11.22.19 @ 21:48) >  Ventricular Rate 159 BPM    Atrial Rate 258 BPM    QRS Duration 120 ms    Q-T Interval 296 ms    QTC Calculation(Bezet) 481 ms    R Axis 34 degrees    T Axis 30 degrees    Diagnosis Line Artifact.  Repeat  Confirmed by QAMAR PICKARD ([id number]) on 11/23/2019 1:42:28 PM    < end of copied text >      < from: TTE Echo Doppler w/o Cont (11.23.19 @ 15:29) >   EXAM:  ECHO TTE WO CON COMP W DOPPLR         PROCEDURE DATE:  11/23/2019        INTERPRETATION:  Ordering Physician: DOMINIC SERRATO [id number]    Indication: Pulmonary embolism  Technologist Initials: DL  Study Quality: Technically difficult and limited   A complete echocardiographic study was performed utilizing standard   protocol including spectral and color Doppler in all echocardiographic   windows.    Height: 183 cm  Weight: 82 kg  BSA: 2.0 sq m  Blood Pressure: 101/66    MEASUREMENTS  IVS: 1.2cm  PWT: 1.1cm  LA: 3.0cm  AO: 3.8cm  LVIDd: 3.7cm  LVIDs: 2.0cm    LVEF: 75%    FINDINGS  Left Ventricle: Hyperdynamic left ventricular systolic function.  Aortic Valve: The aortic valve is not well-visualized. It appears   calcified. No significant aortic insufficiency.  Mitral Valve: Mitral annular calcification calcified mitral valve   leaflets. Mild mitral insufficiency.  Tricuspid Valve: Not well visualized  Pulmonic Valve: Not well visualized  Left Atrium: Grossly normal  Right Ventricle: The right ventricle is not well visualized. Grossly,   normal right ventricular size and systolic function.  Right Atrium: Grossly normal    Pericardium/Pleura: Large left pleural effusion.  Echogenic material is   noted in the pleural space.      CONCLUSIONS:  Technically difficult and limited study.  1. Hyperdynamic left ventricular systolic function.  2. Mitral annular calcification and calcified mitral valve leaflets with   mild mitral insufficiency.  3. Grossly normal left atrial size.  4. The right ventricle is not well visualized. Grossly, normal right   ventricular size and systolic function.  5.  Large left pleural effusion which echogenic material noted in the   pleural space.                    QAMAR ECHEVARRIA M.D., ATTENDING CARDIOLOGIST  This document has been electronically signed. Nov 24 2019  7:29AM    < end of copied text >     < from: US Guided Thoracentesis w/Imaging, Left (11.29.19 @ 13:10) >  EXAM:  US THORACENTESIS LT \[facility]\""                          EXAM:  XR CHEST PORTABLE URGENT 1V                            PROCEDURE DATE:  11/29/2019          INTERPRETATION:    Ultrasound-guided diagnostic and therapeutic left pleural drainage:  Chest portable semierect single AP view:  Clinical History:    Left pleural fluid. Carcinoma lung. Image guided drainage procedure.    Technique:    An informed consent obtained from patient . Risks and benefits explained   in detail. Patient made sit on the stretcher Forward leaning on a pillow.   Time out procedure was performed. Limited ultrasonography demonstrated   left pleural fluid. The site marked on the skin.    The access site prepped and draped in sterile fashion. 1% lidocaine   utilized for local anesthesia. After pleural cavity accessed, under   ultrasound guidance, using 6 [place] centesis catheter. Approximately 700   cc of nonclotting hemorrhagic fluid drained. The catheter removed. Site   dressed in sterile fashion.   Chest x-ray demonstrated no pneumothorax.   Patient tolerated the procedure well. No complications noted. Patient had   9 out of 10 pain in the site of the drainage, as well as hypoxia. 3 mg   intravenous morphine administered. Patient showed significant improvement   both in pain and hypoxia. Dr. Harris aware. Patient transferred to the   floor in stable condition for further observation and management.    Impression:      No pneumothorax in the postprocedure chest radiograph. Persisting   bilateral perihilar densities, right IJ Port-A-Cath.  Ultrasound guided left pleural drainage. Fluid sent for lab analysis.                FRANDY BUNCH M.D., ATTENDING RADIOLOGIST  This document has been electronically signed. Nov 29 2019 12:51PM                < end of copied text >      < from: Xray Chest 1 View- PORTABLE-Urgent (11.29.19 @ 12:11) >  EXAM:  US THORACENTESIS [facility]                          EXAM:  XR CHEST PORTABLE URGENT 1V                            PROCEDURE DATE:  11/29/2019          INTERPRETATION:    Ultrasound-guided diagnostic and therapeutic left pleural drainage:  Chest portable semierect single AP view:  Clinical History:    Left pleural fluid. Carcinoma lung. Image guided drainage procedure.    Technique:    An informed consent obtained from patient . Risks and benefits explained   in detail. Patient made sit on the stretcher Forward leaning on a pillow.   Time out procedure was performed. Limited ultrasonography demonstrated   left pleural fluid. The site marked on the skin.    The access site prepped and draped in sterile fashion. 1% lidocaine   utilized for local anesthesia. After pleural cavity accessed, under   ultrasound guidance, using 6 [place] centesis catheter. Approximately 700   cc of nonclotting hemorrhagic fluid drained. The catheter removed. Site   dressed in sterile fashion.   Chest x-ray demonstrated no pneumothorax.   Patient tolerated the procedure well. No complications noted. Patient had   9 out of 10 pain in the site of the drainage, as well as hypoxia. 3 mg   intravenous morphine administered. Patient showed significant improvement   both in pain and hypoxia. Dr. Harris aware. Patient transferred to the   floor in stable condition for further observation and management.    Impression:      No pneumothorax in the postprocedure chest radiograph. Persisting   bilateral perihilar densities, right IJ Port-A-Cath.  Ultrasound guided left pleural drainage. Fluid sent for lab analysis.                FRANDY BUNCH M.D., ATTENDING RADIOLOGIST  This document has been electronically signed. Nov 29 2019 12:51PM        < end of copied text >

## 2019-11-30 NOTE — PROGRESS NOTE ADULT - PROBLEM SELECTOR PLAN 5
BP soft but holding above 90/60s  - Continue to HOLD Diltiazem.  - Continue Metoprolol 100 mg PO daily with hold parameters.

## 2019-12-01 LAB
APTT BLD: 73.4 SEC — HIGH (ref 28.5–37)
HCT VFR BLD CALC: 32.8 % — LOW (ref 39–50)
HGB BLD-MCNC: 10.6 G/DL — LOW (ref 13–17)
MCHC RBC-ENTMCNC: 27.3 PG — SIGNIFICANT CHANGE UP (ref 27–34)
MCHC RBC-ENTMCNC: 32.3 GM/DL — SIGNIFICANT CHANGE UP (ref 32–36)
MCV RBC AUTO: 84.5 FL — SIGNIFICANT CHANGE UP (ref 80–100)
NRBC # BLD: 0 /100 WBCS — SIGNIFICANT CHANGE UP (ref 0–0)
PLATELET # BLD AUTO: 141 K/UL — LOW (ref 150–400)
RBC # BLD: 3.88 M/UL — LOW (ref 4.2–5.8)
RBC # FLD: 18.5 % — HIGH (ref 10.3–14.5)
WBC # BLD: 21.77 K/UL — HIGH (ref 3.8–10.5)
WBC # FLD AUTO: 21.77 K/UL — HIGH (ref 3.8–10.5)

## 2019-12-01 PROCEDURE — 99233 SBSQ HOSP IP/OBS HIGH 50: CPT

## 2019-12-01 PROCEDURE — 71046 X-RAY EXAM CHEST 2 VIEWS: CPT | Mod: 26

## 2019-12-01 RX ORDER — DRONABINOL 2.5 MG
5 CAPSULE ORAL EVERY 12 HOURS
Refills: 0 | Status: DISCONTINUED | OUTPATIENT
Start: 2019-12-01 | End: 2019-12-07

## 2019-12-01 RX ORDER — FUROSEMIDE 40 MG
40 TABLET ORAL ONCE
Refills: 0 | Status: COMPLETED | OUTPATIENT
Start: 2019-12-01 | End: 2019-12-01

## 2019-12-01 RX ORDER — MORPHINE SULFATE 50 MG/1
3 CAPSULE, EXTENDED RELEASE ORAL ONCE
Refills: 0 | Status: DISCONTINUED | OUTPATIENT
Start: 2019-12-01 | End: 2019-12-01

## 2019-12-01 RX ADMIN — CHLORHEXIDINE GLUCONATE 1 APPLICATION(S): 213 SOLUTION TOPICAL at 12:13

## 2019-12-01 RX ADMIN — MORPHINE SULFATE 3 MILLIGRAM(S): 50 CAPSULE, EXTENDED RELEASE ORAL at 16:25

## 2019-12-01 RX ADMIN — Medication 100 MILLIGRAM(S): at 05:22

## 2019-12-01 RX ADMIN — LORATADINE 10 MILLIGRAM(S): 10 TABLET ORAL at 12:14

## 2019-12-01 RX ADMIN — MORPHINE SULFATE 3 MILLIGRAM(S): 50 CAPSULE, EXTENDED RELEASE ORAL at 06:56

## 2019-12-01 RX ADMIN — Medication 5 MILLIGRAM(S): at 19:51

## 2019-12-01 RX ADMIN — Medication 0.25 MILLIGRAM(S): at 05:22

## 2019-12-01 RX ADMIN — Medication 500 MICROGRAM(S): at 01:31

## 2019-12-01 RX ADMIN — Medication 1 TABLET(S): at 12:14

## 2019-12-01 RX ADMIN — Medication 40 MILLIGRAM(S): at 19:51

## 2019-12-01 RX ADMIN — MORPHINE SULFATE 3 MILLIGRAM(S): 50 CAPSULE, EXTENDED RELEASE ORAL at 13:00

## 2019-12-01 RX ADMIN — MORPHINE SULFATE 3 MILLIGRAM(S): 50 CAPSULE, EXTENDED RELEASE ORAL at 16:40

## 2019-12-01 RX ADMIN — MORPHINE SULFATE 2 MILLIGRAM(S): 50 CAPSULE, EXTENDED RELEASE ORAL at 22:05

## 2019-12-01 RX ADMIN — Medication 500 MICROGRAM(S): at 06:59

## 2019-12-01 RX ADMIN — Medication 4 MILLILITER(S): at 06:58

## 2019-12-01 RX ADMIN — MAGNESIUM OXIDE 400 MG ORAL TABLET 400 MILLIGRAM(S): 241.3 TABLET ORAL at 12:14

## 2019-12-01 RX ADMIN — BUDESONIDE AND FORMOTEROL FUMARATE DIHYDRATE 2 PUFF(S): 160; 4.5 AEROSOL RESPIRATORY (INHALATION) at 19:22

## 2019-12-01 RX ADMIN — Medication 4 MILLILITER(S): at 14:55

## 2019-12-01 RX ADMIN — HEPARIN SODIUM 1700 UNIT(S)/HR: 5000 INJECTION INTRAVENOUS; SUBCUTANEOUS at 07:09

## 2019-12-01 RX ADMIN — BUDESONIDE AND FORMOTEROL FUMARATE DIHYDRATE 2 PUFF(S): 160; 4.5 AEROSOL RESPIRATORY (INHALATION) at 07:14

## 2019-12-01 RX ADMIN — MORPHINE SULFATE 2 MILLIGRAM(S): 50 CAPSULE, EXTENDED RELEASE ORAL at 19:51

## 2019-12-01 RX ADMIN — SODIUM CHLORIDE 1 GRAM(S): 9 INJECTION INTRAMUSCULAR; INTRAVENOUS; SUBCUTANEOUS at 05:22

## 2019-12-01 RX ADMIN — TIOTROPIUM BROMIDE 1 CAPSULE(S): 18 CAPSULE ORAL; RESPIRATORY (INHALATION) at 12:14

## 2019-12-01 RX ADMIN — Medication 500 MICROGRAM(S): at 19:38

## 2019-12-01 RX ADMIN — MORPHINE SULFATE 3 MILLIGRAM(S): 50 CAPSULE, EXTENDED RELEASE ORAL at 12:23

## 2019-12-01 RX ADMIN — SODIUM CHLORIDE 1 GRAM(S): 9 INJECTION INTRAMUSCULAR; INTRAVENOUS; SUBCUTANEOUS at 22:10

## 2019-12-01 RX ADMIN — SODIUM CHLORIDE 1 GRAM(S): 9 INJECTION INTRAMUSCULAR; INTRAVENOUS; SUBCUTANEOUS at 12:14

## 2019-12-01 RX ADMIN — Medication 500 MICROGRAM(S): at 14:56

## 2019-12-01 RX ADMIN — MORPHINE SULFATE 3 MILLIGRAM(S): 50 CAPSULE, EXTENDED RELEASE ORAL at 06:11

## 2019-12-01 RX ADMIN — Medication 20 MILLIGRAM(S): at 05:22

## 2019-12-01 NOTE — PROGRESS NOTE ADULT - SUBJECTIVE AND OBJECTIVE BOX
[INTERVAL HX: ]  Patient seen and examined;  Chart reviewed and events noted;   c/o SOB with lying supine.   No CP, no fever.   +LE edema.  On heparin drip. No bleeding.       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 Patient states the SOB has been worsening and that's what prompted him to come to the ED. He denies recent fevers, chills but admits he's been shaking since he's been on chemotherapy. Admits on and off nausea, and episodes of vomiting over the past week. Denies hematemesis/hemoptysis. Denies chest pain, palpitations, abdominal pain, diarrhea, constipation. Admits LE edema which has been worsening in past week, and reports 100 lb weight loss since January. He has had no appetite, and reports not eating anything at all. He notes he recently changed chem to Paclitaxel, as per patient a body scan demonstrated mets to brain and bone (R shoulder, L ribs). His last radiation dose was August 2019.     In the ED:  T: 97 HR: 98 BP: 86/58 RR: 22 92% RA   WBC: 4.24 h/h: 9.2/27.6 coags wnl, D-Dimer 2411  Lactate 2.5 Na: 135, K: 3.0  Cr: 0.57 glucose 134 TSH: 2.37 proBNP 1397  s/p duonebs, 100 mg hydrocortisone, potassium chloride 10x3  U/S doppler LE: L superificial thrombus noted  -CTA and CT abdomen and pelvis pending  -EKG, significant artificant due to shaking. Official read pending (22 Nov 2019 20:57)            MEDICATIONS  (STANDING):  acetylcysteine 10%  Inhalation 5 milliLiter(s) Inhalation four times a day  budesonide 160 MICROgram(s)/formoterol 4.5 MICROgram(s) Inhaler 2 Puff(s) Inhalation two times a day  chlorhexidine 4% Liquid 1 Application(s) Topical daily  digoxin     Tablet 0.25 milliGRAM(s) Oral daily  dronabinol 5 milliGRAM(s) Oral every 12 hours  heparin  Infusion.  Unit(s)/Hr (15 mL/Hr) IV Continuous <Continuous>  ipratropium    for Nebulization 500 MICROGram(s) Nebulizer every 6 hours  loratadine 10 milliGRAM(s) Oral daily  magnesium oxide 400 milliGRAM(s) Oral daily  metoprolol succinate  milliGRAM(s) Oral daily  multivitamin 1 Tablet(s) Oral daily  predniSONE   Tablet 20 milliGRAM(s) Oral daily  sodium chloride 1 Gram(s) Oral three times a day  tiotropium 18 MICROgram(s) Capsule 1 Capsule(s) Inhalation daily    MEDICATIONS  (PRN):  guaiFENesin    Syrup 100 milliGRAM(s) Oral every 6 hours PRN Cough  heparin  Injectable 6500 Unit(s) IV Push every 6 hours PRN For aPTT less than 40  heparin  Injectable 3000 Unit(s) IV Push every 6 hours PRN For aPTT between 40 - 57  levalbuterol Inhalation 0.63 milliGRAM(s) Inhalation every 6 hours PRN shortness of breath  morphine  - Injectable 2 milliGRAM(s) IV Push every 4 hours PRN Moderate Pain (4 - 6)  morphine  - Injectable 1 milliGRAM(s) IV Push every 4 hours PRN Mild Pain (1 - 3)  morphine  - Injectable 3 milliGRAM(s) IV Push every 4 hours PRN Severe Pain (7 - 10)  ondansetron Injectable 4 milliGRAM(s) IV Push every 6 hours PRN Nausea  senna 8.6 milliGRAM(s) Oral Tablet - Peds 1 Tablet(s) Oral at bedtime PRN for constipation      Vital Signs Last 24 Hrs  T(C): 36.3 (01 Dec 2019 07:35), Max: 36.8 (30 Nov 2019 23:31)  T(F): 97.3 (01 Dec 2019 07:35), Max: 98.2 (30 Nov 2019 23:31)  HR: 124 (01 Dec 2019 07:35) (82 - 124)  BP: 111/73 (01 Dec 2019 07:35) (91/65 - 111/73)  BP(mean): --  RR: 19 (01 Dec 2019 07:35) (17 - 19)  SpO2: 100% (01 Dec 2019 07:35) (94% - 100%)      [PHYSICAL EXAM]  General: adult in NAD,  WN,  WD.  HEENT: clear oropharynx, anicteric sclera, pink conjunctivae.  Neck: supple, no masses.  CV: normal S1S2, no murmur, no rubs, no gallops.  Lungs: clear to auscultation, no wheezes, no rales, no rhonchi. Dec BL L base, dullness 1/4 up  Abdomen: soft, non-tender, non-distended, no hepatosplenomegaly, normal BS, no guarding.  Ext: no clubbing, no cyanosis, no edema.  Skin: no rashes,  no petechiae, no venous stasis changes.  Neuro: alert and oriented X 3 , no focal motor deficits.  LN: no SC STEVE.      [LABS:]                        10.6   21.77 )-----------( 141      ( 01 Dec 2019 06:27 )             32.8     11-30    135  |  97  |  26<H>  ----------------------------<  118<H>  3.6   |  28  |  0.55    Ca    8.3<L>      30 Nov 2019 07:54      PTT - ( 01 Dec 2019 06:27 )  PTT:73.4 sec              [RADIOLOGY STUDIES:]

## 2019-12-01 NOTE — PROGRESS NOTE ADULT - SUBJECTIVE AND OBJECTIVE BOX
Patient is a 59y old  Male who presents with a chief complaint of PNA (01 Dec 2019 14:25)      INTERVAL HPI/OVERNIGHT EVENTS:    had severe shortness of breath when laying flat in radiology    MEDICATIONS  (STANDING):  acetylcysteine 10%  Inhalation 5 milliLiter(s) Inhalation four times a day  budesonide 160 MICROgram(s)/formoterol 4.5 MICROgram(s) Inhaler 2 Puff(s) Inhalation two times a day  chlorhexidine 4% Liquid 1 Application(s) Topical daily  digoxin     Tablet 0.25 milliGRAM(s) Oral daily  dronabinol 5 milliGRAM(s) Oral every 12 hours  furosemide   Injectable 40 milliGRAM(s) IV Push once  heparin  Infusion.  Unit(s)/Hr (15 mL/Hr) IV Continuous <Continuous>  ipratropium    for Nebulization 500 MICROGram(s) Nebulizer every 6 hours  loratadine 10 milliGRAM(s) Oral daily  magnesium oxide 400 milliGRAM(s) Oral daily  metoprolol succinate  milliGRAM(s) Oral daily  multivitamin 1 Tablet(s) Oral daily  predniSONE   Tablet 20 milliGRAM(s) Oral daily  sodium chloride 1 Gram(s) Oral three times a day  tiotropium 18 MICROgram(s) Capsule 1 Capsule(s) Inhalation daily      MEDICATIONS  (PRN):  guaiFENesin    Syrup 100 milliGRAM(s) Oral every 6 hours PRN Cough  heparin  Injectable 6500 Unit(s) IV Push every 6 hours PRN For aPTT less than 40  heparin  Injectable 3000 Unit(s) IV Push every 6 hours PRN For aPTT between 40 - 57  levalbuterol Inhalation 0.63 milliGRAM(s) Inhalation every 6 hours PRN shortness of breath  morphine  - Injectable 2 milliGRAM(s) IV Push every 4 hours PRN Moderate Pain (4 - 6)  morphine  - Injectable 1 milliGRAM(s) IV Push every 4 hours PRN Mild Pain (1 - 3)  morphine  - Injectable 3 milliGRAM(s) IV Push every 4 hours PRN Severe Pain (7 - 10)  ondansetron Injectable 4 milliGRAM(s) IV Push every 6 hours PRN Nausea  senna 8.6 milliGRAM(s) Oral Tablet - Peds 1 Tablet(s) Oral at bedtime PRN for constipation      Allergies    penicillin (Unknown)    Intolerances        PAST MEDICAL & SURGICAL HISTORY:  Lung cancer  Depression  Dyspnea  Mediastinal adenopathy  Pericardial effusion  AF (atrial fibrillation)  Hyponatremia  COPD (chronic obstructive pulmonary disease)  HTN (hypertension)  S/P pericardial window creation      Vital Signs Last 24 Hrs  T(C): 36.4 (01 Dec 2019 15:22), Max: 36.8 (30 Nov 2019 23:31)  T(F): 97.6 (01 Dec 2019 15:22), Max: 98.2 (30 Nov 2019 23:31)  HR: 114 (01 Dec 2019 15:22) (82 - 124)  BP: 102/70 (01 Dec 2019 15:22) (91/65 - 111/73)  BP(mean): --  RR: 18 (01 Dec 2019 15:22) (17 - 19)  SpO2: 100% (01 Dec 2019 15:22) (94% - 100%)    PHYSICAL EXAMINATION:    GENERAL: The patient is awake and alert in no apparent distress.     HEENT: Head is normocephalic and atraumatic. Extraocular muscles are intact. Mucous membranes are moist.    NECK: Supple.    LUNGS: scattered bilateral rhonchi    HEART: Regular rate and rhythm without murmur.    ABDOMEN: Soft, nontender, and nondistended.      EXTREMITIES: positive massive edema bilateral    NEUROLOGIC: Grossly intact.    SKIN: No ulceration or induration present.      LABS:                        10.6   21.77 )-----------( 141      ( 01 Dec 2019 06:27 )             32.8     11-30    135  |  97  |  26<H>  ----------------------------<  118<H>  3.6   |  28  |  0.55    Ca    8.3<L>      30 Nov 2019 07:54      PTT - ( 01 Dec 2019 06:27 )  PTT:73.4 sec          Assessment:    Small cell carcinoma of lung  Malignant pleural effusion - S/P left thoracentesis  Pulmonary emboli  Acute Hypoxic respiratory failure    Plan:    Continue oxygen + IV heparin  Chemo as per oncology when stable

## 2019-12-01 NOTE — PROGRESS NOTE ADULT - PROBLEM SELECTOR PLAN 7
- Stable  - Will try lymphedema pumps if available  - Encourage lower extremity elevation - Stable  - Will try lymphedema pumps if available  - IV lasix 40mg x1 and reassess edema   - Encourage lower extremity elevation

## 2019-12-01 NOTE — PROGRESS NOTE ADULT - ASSESSMENT
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 He also has a history of a pericardial effusion s/p window, and of a pleural effusion s/p thoracentesis.  Now found to have b/l PE's Pt. scheduled for thoracentesis today.    Left pleural effusion   - s/p Left thoracentesis 700cc non-clotting hemorrhagic fluid drained 11/29/19.    - post procedure CXR with no PTX   - Pulm following.  Ct chest ordered to determine appropriateness of repeat tapping due to reaccumulation, however, patient unable to tolerate it    PE  - CTA chest showed segmental PE's in B/L lower lobe   - Continue hHeparin gtt target aPTT = 58 - 99 seconds.  Can hold Heparin drip 6 hours prior to procedure.  Will switch to DOAC after  - TTE showed hyperdynamic LV, normal RVSF with no significant valvular disease.  However, showed large left pleural effusion s/p thora  - CT chest showed moderate left pleural effusion.  Pulmonary recommending steroids, oxygen and Duoneb  - Can keep off of aspirin to minimize risk of bleeding as he is going to be on full dose anticoagulation  - Given malignancy, more definitive management of this effusion with a pleurex catheter may be appropriate.  However, no need at this point per Pulm    Chronic Afib  - Remains in Afib with RVR up to 110's mostly during activity  - Tachycardia multifactorial in setting of underlying lung cancer, PE and anemia  - Continue Toprol 100 mg daily ( takes 200mg daily  at home )   - Continue digoxin (last level=1.2)  - Monitor and replete lytes.   - Continue Heparin drip and switch to DOAC after thora  Monitor and replete electrolytes. Keep K>4.0 and Mg>2.0.     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 He also has a history of a pericardial effusion s/p window, and of a pleural effusion s/p thoracentesis.  Now found to have b/l PE's Pt. scheduled for thoracentesis today.    Left pleural effusion   - s/p Left thoracentesis 700cc non-clotting hemorrhagic fluid drained 11/29/19.    - post procedure CXR with no PTX   - Pulm following.  Ct chest ordered to determine appropriateness of repeat tapping due to reaccumulation, however, patient unable to tolerate it  - maybe try bedside US chest  - Also if necessary try to reinitiate lasix    PE  - CTA chest showed segmental PE's in B/L lower lobe   - Continue hHeparin gtt target aPTT = 58 - 99 seconds.  Can hold Heparin drip 6 hours prior to procedure.  Will switch to DOAC after  - TTE showed hyperdynamic LV, normal RVSF with no significant valvular disease.  However, showed large left pleural effusion s/p thora  - CT chest showed moderate left pleural effusion.  Pulmonary recommending steroids, oxygen and Duoneb  - Can keep off of aspirin to minimize risk of bleeding as he is going to be on full dose anticoagulation  - Given malignancy, more definitive management of this effusion with a pleurex catheter may be appropriate.  However, no need at this point per Pulm    Chronic Afib  - Remains in Afib with RVR up to 110's mostly during activity  - Tachycardia multifactorial in setting of underlying lung cancer, PE and anemia  - Continue Toprol 100 mg daily ( takes 200mg daily  at home )   - Continue digoxin (last level=1.2)  - Monitor and replete lytes.   - Continue Heparin drip and switch to DOAC after thora  Monitor and replete electrolytes. Keep K>4.0 and Mg>2.0.     HTN  - C/W BB     - lower ext edema multifactorial. ?lymphedema pumps  Further cardiac w/u pending clinical course  To follow closely with you    Steph Pradhan DNP, NP-C  Cardiology   Spectra #[phone]

## 2019-12-01 NOTE — PROGRESS NOTE ADULT - PROBLEM SELECTOR PLAN 2
- CTA Chest 11/22/2019 suggestive of pleural-based metastatic disease.  - CTA Abd and Pelvis 11/22/2019: - Possible pancreatic mass-neoplasm with multifocal hepatic metastasis versus pancreatic and hepatic metastasis. Extensive upper abdominal and retroperitoneal lymphadenopathy as described above. Mild-moderate abdominal and pelvic free fluid.   - Unable to obtain MRI due to orthopnea and back pain on table.   - Seen by Pulm, Dr. Soto; recs appreciated.   - Seen by Heme/Onc, Dr. Smalls; recs appreciated.   - Continue Morphine 1mg or 2mg or 3mg IVp q4H PRN, re: mild or mod or severe pain  - Continue Senna 8.6 mg 1 tab PO QHS.  - Continue Odansetron 4 mg IVp q6H PRN, re: nausea.   - Continue Magnesium Oxide 400 mg PO daily.   - Continue Dronabinol 5 mg PO q12H.

## 2019-12-01 NOTE — PROGRESS NOTE ADULT - SUBJECTIVE AND OBJECTIVE BOX
[facility] -- Gurpreet Bush, Corey Diaz, Sajan Pickard Savella, [place]ger  Office # [phone]    Follow Up:  Dyspnea, Afib on AC    Subjective/Observations:     REVIEW OF SYSTEMS: All other review of systems is negative unless indicated above  PAST MEDICAL & SURGICAL HISTORY:  Lung cancer  Depression  Dyspnea  Mediastinal adenopathy  Pericardial effusion  AF (atrial fibrillation)  Hyponatremia  COPD (chronic obstructive pulmonary disease)  HTN (hypertension)  S/P pericardial window creation    MEDICATIONS  (STANDING):  acetylcysteine 10%  Inhalation 5 milliLiter(s) Inhalation four times a day  budesonide 160 MICROgram(s)/formoterol 4.5 MICROgram(s) Inhaler 2 Puff(s) Inhalation two times a day  chlorhexidine 4% Liquid 1 Application(s) Topical daily  digoxin     Tablet 0.25 milliGRAM(s) Oral daily  dronabinol 5 milliGRAM(s) Oral every 12 hours  heparin  Infusion.  Unit(s)/Hr (15 mL/Hr) IV Continuous <Continuous>  ipratropium    for Nebulization 500 MICROGram(s) Nebulizer every 6 hours  loratadine 10 milliGRAM(s) Oral daily  magnesium oxide 400 milliGRAM(s) Oral daily  metoprolol succinate  milliGRAM(s) Oral daily  multivitamin 1 Tablet(s) Oral daily  predniSONE   Tablet 20 milliGRAM(s) Oral daily  sodium chloride 1 Gram(s) Oral three times a day  tiotropium 18 MICROgram(s) Capsule 1 Capsule(s) Inhalation daily    MEDICATIONS  (PRN):  guaiFENesin    Syrup 100 milliGRAM(s) Oral every 6 hours PRN Cough  heparin  Injectable 6500 Unit(s) IV Push every 6 hours PRN For aPTT less than 40  heparin  Injectable 3000 Unit(s) IV Push every 6 hours PRN For aPTT between 40 - 57  levalbuterol Inhalation 0.63 milliGRAM(s) Inhalation every 6 hours PRN shortness of breath  morphine  - Injectable 2 milliGRAM(s) IV Push every 4 hours PRN Moderate Pain (4 - 6)  morphine  - Injectable 1 milliGRAM(s) IV Push every 4 hours PRN Mild Pain (1 - 3)  morphine  - Injectable 3 milliGRAM(s) IV Push every 4 hours PRN Severe Pain (7 - 10)  ondansetron Injectable 4 milliGRAM(s) IV Push every 6 hours PRN Nausea  senna 8.6 milliGRAM(s) Oral Tablet - Peds 1 Tablet(s) Oral at bedtime PRN for constipation    Allergies    penicillin (Unknown)    Intolerances    Vital Signs Last 24 Hrs  T(C): 36.3 (01 Dec 2019 11:40), Max: 36.8 (30 Nov 2019 23:31)  T(F): 97.3 (01 Dec 2019 11:40), Max: 98.2 (30 Nov 2019 23:31)  HR: 104 (01 Dec 2019 11:40) (82 - 124)  BP: 110/71 (01 Dec 2019 11:40) (91/65 - 111/73)  BP(mean): --  RR: 19 (01 Dec 2019 11:40) (17 - 19)  SpO2: 100% (01 Dec 2019 11:40) (94% - 100%)  I&O's Summary    30 Nov 2019 07:01  -  01 Dec 2019 07:00  --------------------------------------------------------  IN: 187 mL / OUT: 850 mL / NET: -663 mL    PHYSICAL EXAM:  TELE: Afib  Constitutional: NAD, awake and alert, obese  HEENT: Moist Mucous Membranes, Anicteric  Pulmonary: Non-labored, breath sounds are clear bilaterally, No wheezing, rales or rhonchi  Cardiovascular: Regular, S1 and S2, No murmurs, rubs, gallops or clicks  Gastrointestinal: Bowel Sounds present, soft, nontender.   Lymph: 3-4+ BLE edema. No lymphadenopathy.  Skin: No visible rashes or ulcers.  Psych:  Mood & affect appropriate  LABS: All Labs Reviewed:                        10.6   21.77 )-----------( 141      ( 01 Dec 2019 06:27 )             32.8                         10.5   20.24 )-----------( 181      ( 30 Nov 2019 07:54 )             33.6                         10.0   20.70 )-----------( 189      ( 29 Nov 2019 22:33 )             32.0     30 Nov 2019 07:54    135    |  97     |  26     ----------------------------<  118    3.6     |  28     |  0.55   29 Nov 2019 06:58    138    |  100    |  25     ----------------------------<  94     3.8     |  30     |  0.60     Ca    8.3        30 Nov 2019 07:54  Ca    8.0        29 Nov 2019 06:58    PTT - ( 01 Dec 2019 06:27 )  PTT:73.4 sec    < from: TTE Echo Doppler w/o Cont (11.23.19 @ 15:29) >     EXAM:  ECHO TTE WO CON COMP W DOPPLR      PROCEDURE DATE:  11/23/2019      INTERPRETATION:  Ordering Physician: DOMINIC SERRATO [id number]    Indication: Pulmonary embolism  Technologist Initials: DL  Study Quality: Technically difficult and limited   A complete echocardiographic study was performed utilizing standard   protocol including spectral and color Doppler in all echocardiographic   windows.    Height: 183 cm  Weight: 82 kg  BSA: 2.0 sq m  Blood Pressure: 101/66    MEASUREMENTS  IVS: 1.2cm  PWT: 1.1cm  LA: 3.0cm  AO: 3.8cm  LVIDd: 3.7cm  LVIDs: 2.0cm    LVEF: 75%    FINDINGS  Left Ventricle: Hyperdynamic left ventricular systolic function.  Aortic Valve: The aortic valve is not well-visualized. It appears   calcified. No significant aortic insufficiency.  Mitral Valve: Mitral annular calcification calcified mitral valve   leaflets. Mild mitral insufficiency.  Tricuspid Valve: Not well visualized  Pulmonic Valve: Not well visualized  Left Atrium: Grossly normal  Right Ventricle: The right ventricle is not well visualized. Grossly,   normal right ventricular size and systolic function.  Right Atrium: Grossly normal    Pericardium/Pleura: Large left pleural effusion.  Echogenic material is   noted in the pleural space.    CONCLUSIONS:  Technically difficult and limited study.  1. Hyperdynamic left ventricular systolic function.  2. Mitral annular calcification and calcified mitral valve leaflets with   mild mitral insufficiency.  3. Grossly normal left atrial size.  4. The right ventricle is not well visualized. Grossly, normal right   ventricular size and systolic function.  5.  Large left pleural effusion which echogenic material noted in the   pleural space.    QAMAR ECHEVARRIA M.D., ATTENDING CARDIOLOGIST  This document has been electronically signed. Nov 24 2019  7:29AM      < end of copied text >    < from: Xray Chest 2 Views PA/Lat (12.01.19 @ 13:32) >    EXAM:  XR CHEST PA LAT 2V                          PROCEDURE DATE:  12/01/2019      INTERPRETATION:  Clinical information: Lung cancer, pneumonia    2 views of the chest, frontal and lateral    Comparison exam dated 11/29/2019, 12:13 PM    Bilateral perihilar airspace disease not significantly changed since   prior study. Left costophrenic angle is blunted unchanged since prior   exam. No pneumothorax.    Heart size within normal limits. Aortic knob contains calcification. No   acute osseous abnormalities.    Cardiac monitor leads overlie the chest. Right-sided Port-A-Cath present   with tip localized to approximate SVC-RA junction.    IMPRESSION: See above report    MEGGAN CHEUNG M.D.,ATTENDING RADIOLOGIST  This document has been electronically signed. Dec  1 2019  2:19PM     < end of copied text >    < from: 12 Lead ECG (11.22.19 @ 21:48) >    Ventricular Rate 159 BPM    Atrial Rate 258 BPM    QRS Duration 120 ms    Q-T Interval 296 ms    QTC Calculation(Bezet) 481 ms    R Axis 34 degrees    T Axis 30 degrees    Diagnosis Line Artifact.  Repeat  Confirmed by QAMAR PICKARD ([id number]) on 11/23/2019 1:42:28 PM    < end of copied text > [facility] -- Gurpreet Bush, Corey Diaz, Sajan Pickard Savella, [place]  Office # [phone]    Follow Up:  Dyspnea, Afib on AC    Subjective/Observations: Sitting on the chair, very upset.  c/o inability to tolerate CT chest due to difficulty breathing.      REVIEW OF SYSTEMS: All other review of systems is negative unless indicated above  PAST MEDICAL & SURGICAL HISTORY:  Lung cancer  Depression  Dyspnea  Mediastinal adenopathy  Pericardial effusion  AF (atrial fibrillation)  Hyponatremia  COPD (chronic obstructive pulmonary disease)  HTN (hypertension)  S/P pericardial window creation    MEDICATIONS  (STANDING):  acetylcysteine 10%  Inhalation 5 milliLiter(s) Inhalation four times a day  budesonide 160 MICROgram(s)/formoterol 4.5 MICROgram(s) Inhaler 2 Puff(s) Inhalation two times a day  chlorhexidine 4% Liquid 1 Application(s) Topical daily  digoxin     Tablet 0.25 milliGRAM(s) Oral daily  dronabinol 5 milliGRAM(s) Oral every 12 hours  heparin  Infusion.  Unit(s)/Hr (15 mL/Hr) IV Continuous <Continuous>  ipratropium    for Nebulization 500 MICROGram(s) Nebulizer every 6 hours  loratadine 10 milliGRAM(s) Oral daily  magnesium oxide 400 milliGRAM(s) Oral daily  metoprolol succinate  milliGRAM(s) Oral daily  multivitamin 1 Tablet(s) Oral daily  predniSONE   Tablet 20 milliGRAM(s) Oral daily  sodium chloride 1 Gram(s) Oral three times a day  tiotropium 18 MICROgram(s) Capsule 1 Capsule(s) Inhalation daily    MEDICATIONS  (PRN):  guaiFENesin    Syrup 100 milliGRAM(s) Oral every 6 hours PRN Cough  heparin  Injectable 6500 Unit(s) IV Push every 6 hours PRN For aPTT less than 40  heparin  Injectable 3000 Unit(s) IV Push every 6 hours PRN For aPTT between 40 - 57  levalbuterol Inhalation 0.63 milliGRAM(s) Inhalation every 6 hours PRN shortness of breath  morphine  - Injectable 2 milliGRAM(s) IV Push every 4 hours PRN Moderate Pain (4 - 6)  morphine  - Injectable 1 milliGRAM(s) IV Push every 4 hours PRN Mild Pain (1 - 3)  morphine  - Injectable 3 milliGRAM(s) IV Push every 4 hours PRN Severe Pain (7 - 10)  ondansetron Injectable 4 milliGRAM(s) IV Push every 6 hours PRN Nausea  senna 8.6 milliGRAM(s) Oral Tablet - Peds 1 Tablet(s) Oral at bedtime PRN for constipation    Allergies    penicillin (Unknown)    Intolerances    Vital Signs Last 24 Hrs  T(C): 36.3 (01 Dec 2019 11:40), Max: 36.8 (30 Nov 2019 23:31)  T(F): 97.3 (01 Dec 2019 11:40), Max: 98.2 (30 Nov 2019 23:31)  HR: 104 (01 Dec 2019 11:40) (82 - 124)  BP: 110/71 (01 Dec 2019 11:40) (91/65 - 111/73)  BP(mean): --  RR: 19 (01 Dec 2019 11:40) (17 - 19)  SpO2: 100% (01 Dec 2019 11:40) (94% - 100%)  I&O's Summary    30 Nov 2019 07:01  -  01 Dec 2019 07:00  --------------------------------------------------------  IN: 187 mL / OUT: 850 mL / NET: -663 mL    PHYSICAL EXAM:  TELE: Afib  Constitutional: NAD, awake and alert, cachectic  HEENT: Moist Mucous Membranes, Anicteric  Pulmonary: Non-labored, breath sounds are clear bilaterally, No wheezing, rales or rhonchi  Cardiovascular: Irregularly irregular, S1 and S2, + murmurs.  No rubs, gallops or clicks  Gastrointestinal: Bowel Sounds present, soft, nontender.   Lymph: 3-4+ BLE edema. No lymphadenopathy.  Skin: No visible rashes or ulcers.  Right SC port-a-cath  Psych:  Mood & affect: anxious and upset  LABS: All Labs Reviewed:                        10.6   21.77 )-----------( 141      ( 01 Dec 2019 06:27 )             32.8                         10.5   20.24 )-----------( 181      ( 30 Nov 2019 07:54 )             33.6                         10.0   20.70 )-----------( 189      ( 29 Nov 2019 22:33 )             32.0     30 Nov 2019 07:54    135    |  97     |  26     ----------------------------<  118    3.6     |  28     |  0.55   29 Nov 2019 06:58    138    |  100    |  25     ----------------------------<  94     3.8     |  30     |  0.60     Ca    8.3        30 Nov 2019 07:54  Ca    8.0        29 Nov 2019 06:58    PTT - ( 01 Dec 2019 06:27 )  PTT:73.4 sec    < from: TTE Echo Doppler w/o Cont (11.23.19 @ 15:29) >     EXAM:  ECHO TTE WO CON COMP W DOPPLR      PROCEDURE DATE:  11/23/2019      INTERPRETATION:  Ordering Physician: DOMINIC SERRATO [id number]    Indication: Pulmonary embolism  Technologist Initials: DL  Study Quality: Technically difficult and limited   A complete echocardiographic study was performed utilizing standard   protocol including spectral and color Doppler in all echocardiographic   windows.    Height: 183 cm  Weight: 82 kg  BSA: 2.0 sq m  Blood Pressure: 101/66    MEASUREMENTS  IVS: 1.2cm  PWT: 1.1cm  LA: 3.0cm  AO: 3.8cm  LVIDd: 3.7cm  LVIDs: 2.0cm    LVEF: 75%    FINDINGS  Left Ventricle: Hyperdynamic left ventricular systolic function.  Aortic Valve: The aortic valve is not well-visualized. It appears   calcified. No significant aortic insufficiency.  Mitral Valve: Mitral annular calcification calcified mitral valve   leaflets. Mild mitral insufficiency.  Tricuspid Valve: Not well visualized  Pulmonic Valve: Not well visualized  Left Atrium: Grossly normal  Right Ventricle: The right ventricle is not well visualized. Grossly,   normal right ventricular size and systolic function.  Right Atrium: Grossly normal    Pericardium/Pleura: Large left pleural effusion.  Echogenic material is   noted in the pleural space.    CONCLUSIONS:  Technically difficult and limited study.  1. Hyperdynamic left ventricular systolic function.  2. Mitral annular calcification and calcified mitral valve leaflets with   mild mitral insufficiency.  3. Grossly normal left atrial size.  4. The right ventricle is not well visualized. Grossly, normal right   ventricular size and systolic function.  5.  Large left pleural effusion which echogenic material noted in the   pleural space.    QAMAR ECHEVARRIA M.D., ATTENDING CARDIOLOGIST  This document has been electronically signed. Nov 24 2019  7:29AM      < end of copied text >    < from: Xray Chest 2 Views PA/Lat (12.01.19 @ 13:32) >    EXAM:  XR CHEST PA LAT 2V                          PROCEDURE DATE:  12/01/2019      INTERPRETATION:  Clinical information: Lung cancer, pneumonia    2 views of the chest, frontal and lateral    Comparison exam dated 11/29/2019, 12:13 PM    Bilateral perihilar airspace disease not significantly changed since   prior study. Left costophrenic angle is blunted unchanged since prior   exam. No pneumothorax.    Heart size within normal limits. Aortic knob contains calcification. No   acute osseous abnormalities.    Cardiac monitor leads overlie the chest. Right-sided Port-A-Cath present   with tip localized to approximate SVC-RA junction.    IMPRESSION: See above report    MEGGAN CHEUNG M.D.,ATTENDING RADIOLOGIST  This document has been electronically signed. Dec  1 2019  2:19PM     < end of copied text >    < from: 12 Lead ECG (11.22.19 @ 21:48) >    Ventricular Rate 159 BPM    Atrial Rate 258 BPM    QRS Duration 120 ms    Q-T Interval 296 ms    QTC Calculation(Bezet) 481 ms    R Axis 34 degrees    T Axis 30 degrees    Diagnosis Line Artifact.  Repeat  Confirmed by QAMAR PICKARD ([id number]) on 11/23/2019 1:42:28 PM    < end of copied text >

## 2019-12-01 NOTE — PROGRESS NOTE ADULT - ASSESSMENT
58 yo man well known to our group, w met small cell ca of lung first presented as limited stage [id number] post Carbo//16/Atezolizumab with concurrent RT with initial response but then brain mets 8/2019 s/p WBRT and systemic disease progression on PET/CT 9/2019 with intra-abdominal, bone and pleural disease, started on weekly Taxol with GCSF support due to cytopenia with tx.    Adm now with weakness and dyspnea; found to have pulmonary embolism and further disease progression with pleural effusion, bone lesions, liver lesions and pancreatic mass.  s/p 700cc therapeutic thoracentesis 11/29/19 AM.     Unable to lie flat for MRI brain.     RECOMMEND  Continue Heparin drip. Consider change to Eliquis today.   s/p therapeutic thoracentesis Fri. No pleurex catheter as insufficient fluid.   700cc remmoved    Repeat CXR-PA-Lat.   May consider repeat CT scan to eval pleura effusion.   Suspect over all due to combination: sx due to cancer, fluid, cardiac and PE.       Cont neb with Xopenex and Atrovent neb.     Lasix, Digoxin,  per cardio,.     To complete re-staging scans.  Hold MRI brain. Consider CT scan head outpt.   Wife had brought in discs from [facility] for Radiology department to compare    Continue acute care    Will follow.     DC planning if stble.   Office appt tomorrow.

## 2019-12-01 NOTE — PROGRESS NOTE ADULT - SUBJECTIVE AND OBJECTIVE BOX
Patient is a 59y old  Male who presents with a chief complaint of PNA (30 Nov 2019 15:37)    INTERVAL HPI/OVERNIGHT EVENTS: No events overnight. Patient seen and examined sitting up in recliner. He stated his sob hasn't changed since yesterday. He denied chest pain, abdominal pain, unable to raise legs.     T(C): 36.3 (12-01-19 @ 07:35), Max: 36.8 (11-30-19 @ 23:31)  HR: 124 (12-01-19 @ 07:35) (82 - 124)  BP: 111/73 (12-01-19 @ 07:35) (91/65 - 111/73)  RR: 19 (12-01-19 @ 07:35) (17 - 19)  SpO2: 100% (12-01-19 @ 07:35) (94% - 100%)  Wt(kg): --  I&O's Summary    30 Nov 2019 07:01  -  01 Dec 2019 07:00  --------------------------------------------------------  IN: 187 mL / OUT: 850 mL / NET: -663 mL        LABS:                        10.6   21.77 )-----------( 141      ( 01 Dec 2019 06:27 )             32.8     11-30    135  |  97  |  26<H>  ----------------------------<  118<H>  3.6   |  28  |  0.55    Ca    8.3<L>      30 Nov 2019 07:54      PTT - ( 01 Dec 2019 06:27 )  PTT:73.4 sec    CAPILLARY BLOOD GLUCOSE    MEDICATIONS  (STANDING):  acetylcysteine 10%  Inhalation 5 milliLiter(s) Inhalation four times a day  budesonide 160 MICROgram(s)/formoterol 4.5 MICROgram(s) Inhaler 2 Puff(s) Inhalation two times a day  chlorhexidine 4% Liquid 1 Application(s) Topical daily  digoxin     Tablet 0.25 milliGRAM(s) Oral daily  dronabinol 5 milliGRAM(s) Oral every 12 hours  heparin  Infusion.  Unit(s)/Hr (15 mL/Hr) IV Continuous <Continuous>  ipratropium    for Nebulization 500 MICROGram(s) Nebulizer every 6 hours  loratadine 10 milliGRAM(s) Oral daily  magnesium oxide 400 milliGRAM(s) Oral daily  metoprolol succinate  milliGRAM(s) Oral daily  multivitamin 1 Tablet(s) Oral daily  predniSONE   Tablet 20 milliGRAM(s) Oral daily  sodium chloride 1 Gram(s) Oral three times a day  tiotropium 18 MICROgram(s) Capsule 1 Capsule(s) Inhalation daily    MEDICATIONS  (PRN):  guaiFENesin    Syrup 100 milliGRAM(s) Oral every 6 hours PRN Cough  heparin  Injectable 6500 Unit(s) IV Push every 6 hours PRN For aPTT less than 40  heparin  Injectable 3000 Unit(s) IV Push every 6 hours PRN For aPTT between 40 - 57  levalbuterol Inhalation 0.63 milliGRAM(s) Inhalation every 6 hours PRN shortness of breath  morphine  - Injectable 2 milliGRAM(s) IV Push every 4 hours PRN Moderate Pain (4 - 6)  morphine  - Injectable 1 milliGRAM(s) IV Push every 4 hours PRN Mild Pain (1 - 3)  morphine  - Injectable 3 milliGRAM(s) IV Push every 4 hours PRN Severe Pain (7 - 10)  ondansetron Injectable 4 milliGRAM(s) IV Push every 6 hours PRN Nausea  senna 8.6 milliGRAM(s) Oral Tablet - Peds 1 Tablet(s) Oral at bedtime PRN for constipation      REVIEW OF SYSTEMS:  CONSTITUTIONAL: No fever, weight loss, or fatigue  ENMT:  No difficulty hearing, tinnitus, vertigo; No sinus or throat pain  NECK: No pain or stiffness  RESPIRATORY: admits to sob   CARDIOVASCULAR: No chest pain, palpitations, dizziness, or leg swelling  GASTROINTESTINAL: No abdominal or epigastric pain. No nausea, vomiting, or hematemesis; No diarrhea or constipation. No melena or hematochezia.  GENITOURINARY: No dysuria, frequency, hematuria, or incontinence  NEUROLOGICAL: No headaches, memory loss, loss of strength, numbness, or tremors  SKIN: No itching, burning, rashes, or lesions   LYMPH NODES: No enlarged glands  ENDOCRINE: No heat or cold intolerance; No hair loss  MUSCULOSKELETAL: dependent lower extremity swelling b/l.  PSYCHIATRIC: No depression, anxiety, mood swings, or difficulty sleeping  HEME/LYMPH: No easy bruising, or bleeding gums  ALLERY AND IMMUNOLOGIC: No hives or eczema    RADIOLOGY & ADDITIONAL TESTS:    Imaging Personally Reviewed:  [ ] YES  [ ] NO    Consultant(s) Notes Reviewed:  [ ] YES  [ ] NO    PHYSICAL EXAM:  GENERAL: Cachectic male patient appears ill, NAD  HEAD:  Atraumatic, Normocephalic  EYES: PERRLA, conjunctiva and sclera clear  ENMT: Moist mucous membranes  NECK: Supple  NERVOUS SYSTEM:  Alert & Oriented X3  CHEST/LUNG: chemoport noted in Left upper chest wall, diminished breath sounds bilaterally; No rales, rhonchi, wheezing, or rubs  HEART: Regular rate and rhythm; No murmurs, rubs, or gallops  ABDOMEN: Soft, Nontender, Nondistended; Bowel sounds present  EXTREMITIES: 2+ Peripheral Pulses, b/l 4+ pitting edema from feet to below knee   SKIN: No rashes or lesions    Care Discussed with Consultants/Other Providers [x ] YES  [ ] NO

## 2019-12-01 NOTE — PROGRESS NOTE ADULT - PROBLEM SELECTOR PLAN 1
likely multifactorial from b/l PE, PNA, and metastatic pleural effusions (patient has a past history of 2 pleural effusions requiring drainage as well cardiac window)  s/p L thoracentesis, 700 ccs drained, f/u fluid studies, patient feeling much better  - CXR consistent with infiltrates and w small L pleural effusion, hold off on additional Lasix for now.  - Note, patient s/p 2.6L with pleural effusion and underlying SOB but no evidence of CHF. Will be cautious with further fluid administration given baseline edema  - CTA Chest 11/22/2019 suggestive of pleural-based metastatic disease.  - Echo (11/23/2019) Hyperdynamic left ventricular systolic function. Large left pleural effusion with echogenic material noted in the pleural space.  - Seen by Pulm, Dr. Soto; recs appreciated.  - Continue supplemental O2.   - On heparin drip, eventually transition to DOAC (Eliquis) per heme/onc rec  - Tapering Steroids: On Prednisone 20mg daily   - Continue Albuterol/Ipratropium 3mL Nebs q6H with mucomyst. likely multifactorial from b/l PE, PNA, and metastatic pleural effusions (patient has a past history of 2 pleural effusions requiring drainage as well cardiac window)  - f/u CXR today. If CXR shows fluid collection, will order CT chest. If no fluid collection will stop heparin gtt and start eliquis   s/p L thoracentesis, 700 ccs drained, f/u fluid studies, patient feeling much better  - CXR consistent with infiltrates and w small L pleural effusion, hold off on additional Lasix for now.  - Note, patient s/p 2.6L with pleural effusion and underlying SOB but no evidence of CHF. Will be cautious with further fluid administration given baseline edema  - CTA Chest 11/22/2019 suggestive of pleural-based metastatic disease.  - Echo (11/23/2019) Hyperdynamic left ventricular systolic function. Large left pleural effusion with echogenic material noted in the pleural space.  - Seen by Pulm, Dr. Soto; recs appreciated.  - Continue supplemental O2.   - On heparin drip, eventually transition to DOAC (Eliquis) per heme/onc rec  - Tapering Steroids: On Prednisone 20mg daily   - Continue Albuterol/Ipratropium 3mL Nebs q6H with mucomyst. likely multifactorial from b/l PE, PNA, and metastatic pleural effusions (patient has a past history of 2 pleural effusions requiring drainage as well cardiac window)  - f/u CXR today. If CXR shows fluid collection, will order CT chest. If CT chest shows drainable amount of fluid, will need to call IR tomorrow. If no fluid collection will stop heparin gtt and start eliquis   s/p L thoracentesis, 700 ccs drained, f/u fluid studies, patient feeling much better  - CXR consistent with infiltrates and w small L pleural effusion, hold off on additional Lasix for now.  - Note, patient s/p 2.6L with pleural effusion and underlying SOB but no evidence of CHF. Will be cautious with further fluid administration given baseline edema  - CTA Chest 11/22/2019 suggestive of pleural-based metastatic disease.  - Echo (11/23/2019) Hyperdynamic left ventricular systolic function. Large left pleural effusion with echogenic material noted in the pleural space.  - Seen by Pulm, Dr. Soto; recs appreciated.  - Continue supplemental O2.   - On heparin drip, eventually transition to DOAC (Eliquis) per heme/onc rec  - Tapering Steroids: On Prednisone 20mg daily   - Continue Albuterol/Ipratropium 3mL Nebs q6H with mucomyst.

## 2019-12-01 NOTE — PROGRESS NOTE ADULT - ATTENDING COMMENTS
I saw and examined the patient personally. Spoke with above provider regarding this case. I reviewed the above findings completely.  I agree with the above history, physical, and plan which I have edited where appropriate.    if cannot lay down consider lasix 40mg IV. can give first dose today to see if any symptomatic improvement.

## 2019-12-02 LAB
ANION GAP SERPL CALC-SCNC: 8 MMOL/L — SIGNIFICANT CHANGE UP (ref 5–17)
APTT BLD: 72.4 SEC — HIGH (ref 28.5–37)
BUN SERPL-MCNC: 27 MG/DL — HIGH (ref 7–23)
CALCIUM SERPL-MCNC: 8.5 MG/DL — SIGNIFICANT CHANGE UP (ref 8.5–10.1)
CHLORIDE SERPL-SCNC: 97 MMOL/L — SIGNIFICANT CHANGE UP (ref 96–108)
CO2 SERPL-SCNC: 29 MMOL/L — SIGNIFICANT CHANGE UP (ref 22–31)
CREAT SERPL-MCNC: 0.62 MG/DL — SIGNIFICANT CHANGE UP (ref 0.5–1.3)
GLUCOSE SERPL-MCNC: 136 MG/DL — HIGH (ref 70–99)
HCT VFR BLD CALC: 30.9 % — LOW (ref 39–50)
HGB BLD-MCNC: 9.9 G/DL — LOW (ref 13–17)
MCHC RBC-ENTMCNC: 27 PG — SIGNIFICANT CHANGE UP (ref 27–34)
MCHC RBC-ENTMCNC: 32 GM/DL — SIGNIFICANT CHANGE UP (ref 32–36)
MCV RBC AUTO: 84.2 FL — SIGNIFICANT CHANGE UP (ref 80–100)
NRBC # BLD: 0 /100 WBCS — SIGNIFICANT CHANGE UP (ref 0–0)
PLATELET # BLD AUTO: 136 K/UL — LOW (ref 150–400)
POTASSIUM SERPL-MCNC: 4.5 MMOL/L — SIGNIFICANT CHANGE UP (ref 3.5–5.3)
POTASSIUM SERPL-SCNC: 4.5 MMOL/L — SIGNIFICANT CHANGE UP (ref 3.5–5.3)
RBC # BLD: 3.67 M/UL — LOW (ref 4.2–5.8)
RBC # FLD: 18.5 % — HIGH (ref 10.3–14.5)
SODIUM SERPL-SCNC: 134 MMOL/L — LOW (ref 135–145)
WBC # BLD: 23.94 K/UL — HIGH (ref 3.8–10.5)
WBC # FLD AUTO: 23.94 K/UL — HIGH (ref 3.8–10.5)

## 2019-12-02 PROCEDURE — 99233 SBSQ HOSP IP/OBS HIGH 50: CPT

## 2019-12-02 PROCEDURE — 76604 US EXAM CHEST: CPT | Mod: 26

## 2019-12-02 RX ORDER — FUROSEMIDE 40 MG
40 TABLET ORAL ONCE
Refills: 0 | Status: COMPLETED | OUTPATIENT
Start: 2019-12-02 | End: 2019-12-02

## 2019-12-02 RX ORDER — FUROSEMIDE 40 MG
20 TABLET ORAL ONCE
Refills: 0 | Status: DISCONTINUED | OUTPATIENT
Start: 2019-12-02 | End: 2019-12-02

## 2019-12-02 RX ORDER — FENTANYL CITRATE 50 UG/ML
1 INJECTION INTRAVENOUS
Refills: 0 | Status: DISCONTINUED | OUTPATIENT
Start: 2019-12-02 | End: 2019-12-03

## 2019-12-02 RX ORDER — HYDROMORPHONE HYDROCHLORIDE 2 MG/ML
1 INJECTION INTRAMUSCULAR; INTRAVENOUS; SUBCUTANEOUS EVERY 4 HOURS
Refills: 0 | Status: DISCONTINUED | OUTPATIENT
Start: 2019-12-02 | End: 2019-12-07

## 2019-12-02 RX ADMIN — TIOTROPIUM BROMIDE 1 CAPSULE(S): 18 CAPSULE ORAL; RESPIRATORY (INHALATION) at 07:31

## 2019-12-02 RX ADMIN — MORPHINE SULFATE 3 MILLIGRAM(S): 50 CAPSULE, EXTENDED RELEASE ORAL at 08:35

## 2019-12-02 RX ADMIN — Medication 500 MICROGRAM(S): at 00:07

## 2019-12-02 RX ADMIN — SODIUM CHLORIDE 1 GRAM(S): 9 INJECTION INTRAMUSCULAR; INTRAVENOUS; SUBCUTANEOUS at 13:26

## 2019-12-02 RX ADMIN — SODIUM CHLORIDE 1 GRAM(S): 9 INJECTION INTRAMUSCULAR; INTRAVENOUS; SUBCUTANEOUS at 21:08

## 2019-12-02 RX ADMIN — CHLORHEXIDINE GLUCONATE 1 APPLICATION(S): 213 SOLUTION TOPICAL at 12:19

## 2019-12-02 RX ADMIN — HYDROMORPHONE HYDROCHLORIDE 1 MILLIGRAM(S): 2 INJECTION INTRAMUSCULAR; INTRAVENOUS; SUBCUTANEOUS at 13:49

## 2019-12-02 RX ADMIN — Medication 0.25 MILLIGRAM(S): at 05:50

## 2019-12-02 RX ADMIN — Medication 500 MICROGRAM(S): at 13:40

## 2019-12-02 RX ADMIN — Medication 5 MILLIGRAM(S): at 17:18

## 2019-12-02 RX ADMIN — Medication 100 MILLIGRAM(S): at 05:50

## 2019-12-02 RX ADMIN — HYDROMORPHONE HYDROCHLORIDE 1 MILLIGRAM(S): 2 INJECTION INTRAMUSCULAR; INTRAVENOUS; SUBCUTANEOUS at 21:17

## 2019-12-02 RX ADMIN — FENTANYL CITRATE 1 PATCH: 50 INJECTION INTRAVENOUS at 12:01

## 2019-12-02 RX ADMIN — HEPARIN SODIUM 1700 UNIT(S)/HR: 5000 INJECTION INTRAVENOUS; SUBCUTANEOUS at 07:25

## 2019-12-02 RX ADMIN — Medication 500 MICROGRAM(S): at 19:02

## 2019-12-02 RX ADMIN — MORPHINE SULFATE 2 MILLIGRAM(S): 50 CAPSULE, EXTENDED RELEASE ORAL at 03:57

## 2019-12-02 RX ADMIN — HYDROMORPHONE HYDROCHLORIDE 1 MILLIGRAM(S): 2 INJECTION INTRAMUSCULAR; INTRAVENOUS; SUBCUTANEOUS at 13:04

## 2019-12-02 RX ADMIN — HYDROMORPHONE HYDROCHLORIDE 1 MILLIGRAM(S): 2 INJECTION INTRAMUSCULAR; INTRAVENOUS; SUBCUTANEOUS at 21:40

## 2019-12-02 RX ADMIN — MORPHINE SULFATE 3 MILLIGRAM(S): 50 CAPSULE, EXTENDED RELEASE ORAL at 08:20

## 2019-12-02 RX ADMIN — BUDESONIDE AND FORMOTEROL FUMARATE DIHYDRATE 2 PUFF(S): 160; 4.5 AEROSOL RESPIRATORY (INHALATION) at 18:47

## 2019-12-02 RX ADMIN — MAGNESIUM OXIDE 400 MG ORAL TABLET 400 MILLIGRAM(S): 241.3 TABLET ORAL at 12:05

## 2019-12-02 RX ADMIN — SODIUM CHLORIDE 1 GRAM(S): 9 INJECTION INTRAMUSCULAR; INTRAVENOUS; SUBCUTANEOUS at 05:50

## 2019-12-02 RX ADMIN — BUDESONIDE AND FORMOTEROL FUMARATE DIHYDRATE 2 PUFF(S): 160; 4.5 AEROSOL RESPIRATORY (INHALATION) at 07:31

## 2019-12-02 RX ADMIN — Medication 20 MILLIGRAM(S): at 05:51

## 2019-12-02 RX ADMIN — Medication 5 MILLIGRAM(S): at 05:50

## 2019-12-02 RX ADMIN — Medication 40 MILLIGRAM(S): at 16:24

## 2019-12-02 RX ADMIN — Medication 1 TABLET(S): at 12:05

## 2019-12-02 RX ADMIN — Medication 500 MICROGRAM(S): at 07:47

## 2019-12-02 RX ADMIN — LORATADINE 10 MILLIGRAM(S): 10 TABLET ORAL at 12:05

## 2019-12-02 RX ADMIN — MORPHINE SULFATE 2 MILLIGRAM(S): 50 CAPSULE, EXTENDED RELEASE ORAL at 04:27

## 2019-12-02 RX ADMIN — FENTANYL CITRATE 1 PATCH: 50 INJECTION INTRAVENOUS at 19:22

## 2019-12-02 NOTE — PROGRESS NOTE ADULT - ATTENDING COMMENTS
I saw and examined the patient personally. Spoke with above provider regarding this case. I reviewed the above findings completely.  I agree with the above history, physical, and plan which I have edited where appropriate.  worsening vol ol and more dyspneic. give lasix 40mg IV today. Please continue to maintain strict I/Os, monitor daily weights, Cr, and K.  f.u US chest

## 2019-12-02 NOTE — PROGRESS NOTE ADULT - PROBLEM SELECTOR PLAN 10
- Continue Heparin 25kU in Dextrose 5% @ 1500U/hr.     IMPROVE VTE Individual Risk Assessment    RISK                                                                Points  [  ] Previous VTE                                                  3  [  ] Thrombophilia                                               2  [  ] Lower limb paralysis                                      2        (unable to hold up >15 seconds)    [ x ] Current Cancer                                              2         (within 6 months)  [  ] Immobilization > 24 hrs                                1  [  ] ICU/CCU stay > 24 hours                              1  [ x ] Age > 60                                                      1  IMPROVE VTE Score: 3    IMPROVE Score 0-1: Low Risk, No VTE prophylaxis required for most patients, encourage ambulation.   IMPROVE Score 2-3: At risk, pharmacologic VTE prophylaxis is indicated for most patients (in the absence of a contraindication)  IMPROVE Score > or = 4: High Risk, pharmacologic VTE prophylaxis is indicated for most patients (in the absence of a contraindication). - Continue Heparin drip    IMPROVE VTE Individual Risk Assessment    RISK                                                                Points  [  ] Previous VTE                                                  3  [  ] Thrombophilia                                               2  [  ] Lower limb paralysis                                      2        (unable to hold up >15 seconds)    [ x ] Current Cancer                                              2         (within 6 months)  [  ] Immobilization > 24 hrs                                1  [  ] ICU/CCU stay > 24 hours                              1  [ x ] Age > 60                                                      1  IMPROVE VTE Score: 3    IMPROVE Score 0-1: Low Risk, No VTE prophylaxis required for most patients, encourage ambulation.   IMPROVE Score 2-3: At risk, pharmacologic VTE prophylaxis is indicated for most patients (in the absence of a contraindication)  IMPROVE Score > or = 4: High Risk, pharmacologic VTE prophylaxis is indicated for most patients (in the absence of a contraindication).

## 2019-12-02 NOTE — PROGRESS NOTE ADULT - PROBLEM SELECTOR PLAN 3
- Continue Digoxin 0.25 mg PO daily.   - Continue Heparin 25kU in Dextrose 5% @ 1500U/hr.   - Continue Metoprolol 100 mg PO daily with hold parameters. - Seen by Cardio, Dr. CELY Moeller; recs appreciated.  - Continue Digoxin 0.25 mg PO daily.   - Continue Heparin 25kU in Dextrose 5% @ 1500U/hr.   - Continue Metoprolol 100 mg PO daily with hold parameters. - Seen by Cardio, Dr. CELY Moeller; recs appreciated.  - Continue Digoxin 0.25 mg PO daily.   - Continue heparin drip  - Continue Metoprolol 100 mg PO daily with hold parameters.

## 2019-12-02 NOTE — PROGRESS NOTE ADULT - SUBJECTIVE AND OBJECTIVE BOX
RAFA SHAFER  59y  Male      Patient is a 59y old  Male who presents with a chief complaint of PNA (02 Dec 2019 08:32)      INTERVAL HPI/OVERNIGHT EVENTS:        REVIEW OF SYSTEMS:  CONSTITUTIONAL: No fever, weight loss, or fatigue  EYES: No eye pain, visual disturbances, or discharge  ENMT:  No difficulty hearing, tinnitus, vertigo; No sinus or throat pain  NECK: No pain or stiffness  BREASTS: No pain, masses, or nipple discharge  RESPIRATORY: No cough, wheezing, chills or hemoptysis; No shortness of breath  CARDIOVASCULAR: No chest pain, palpitations, dizziness, or leg swelling  GASTROINTESTINAL: No abdominal or epigastric pain. No nausea, vomiting, or hematemesis; No diarrhea or constipation. No melena or hematochezia.  GENITOURINARY: No dysuria, frequency, hematuria, or incontinence  NEUROLOGICAL: No headaches, memory loss, loss of strength, numbness, or tremors  SKIN: No itching, burning, rashes, or lesions   LYMPH NODES: No enlarged glands  ENDOCRINE: No heat or cold intolerance; No hair loss  MUSCULOSKELETAL: No joint pain or swelling; No muscle, back, or extremity pain  PSYCHIATRIC: No depression, anxiety, mood swings, or difficulty sleeping  HEME/LYMPH: No easy bruising, or bleeding gums  ALLERY AND IMMUNOLOGIC: No hives or eczema    T(C): 36.3 (12-02-19 @ 07:45), Max: 36.6 (12-01-19 @ 23:35)  HR: 108 (12-02-19 @ 07:50) (82 - 117)  BP: 100/70 (12-02-19 @ 07:45) (100/70 - 110/71)  RR: 20 (12-02-19 @ 07:45) (18 - 20)  SpO2: 100% (12-02-19 @ 07:50) (92% - 100%)  Wt(kg): --Vital Signs Last 24 Hrs  T(C): 36.3 (02 Dec 2019 07:45), Max: 36.6 (01 Dec 2019 23:35)  T(F): 97.3 (02 Dec 2019 07:45), Max: 97.8 (01 Dec 2019 23:35)  HR: 108 (02 Dec 2019 07:50) (82 - 117)  BP: 100/70 (02 Dec 2019 07:45) (100/70 - 110/71)  BP(mean): --  RR: 20 (02 Dec 2019 07:45) (18 - 20)  SpO2: 100% (02 Dec 2019 07:50) (92% - 100%)    PHYSICAL EXAM:  GENERAL: NAD, well-groomed, well-developed  HEAD:  Atraumatic, Normocephalic  EYES: EOMI, PERRLA, conjunctiva and sclera clear  ENMT: No tonsillar erythema, exudates, or enlargement; Moist mucous membranes, Good dentition, No lesions  NECK: Supple, No JVD, Normal thyroid  NERVOUS SYSTEM:  Alert & Oriented X3, Good concentration; Motor Strength 5/5 B/L upper and lower extremities; DTRs 2+ intact and symmetric  CHEST/LUNG: Clear to percussion bilaterally; No rales, rhonchi, wheezing, or rubs  HEART: Regular rate and rhythm; No murmurs, rubs, or gallops  ABDOMEN: Soft, Nontender, Nondistended; Bowel sounds present  EXTREMITIES:  2+ Peripheral Pulses, No clubbing, cyanosis, or edema  LYMPH: No lymphadenopathy noted  SKIN: No rashes or lesions    Consultant(s) Notes Reviewed:  [x ] YES  [ ] NO  Care Discussed with Consultants/Other Providers [ x] YES  [ ] NO    LABS:                        9.9    23.94 )-----------( 136      ( 02 Dec 2019 06:20 )             30.9           PTT - ( 02 Dec 2019 06:20 )  PTT:72.4 sec    CAPILLARY BLOOD GLUCOSE                RADIOLOGY & ADDITIONAL TESTS:    Imaging Personally Reviewed:  [ ] YES  [ ] NO    HEALTH ISSUES - PROBLEM Dx:  AF (atrial fibrillation): AF (atrial fibrillation)  Need for prophylactic measure: Need for prophylactic measure  Lower extremity edema: Lower extremity edema  Pressure ulcer: Pressure ulcer  Thrombophlebitis: Thrombophlebitis  DNR (do not resuscitate): DNR (do not resuscitate)  HTN (hypertension): HTN (hypertension)  COPD (chronic obstructive pulmonary disease): COPD (chronic obstructive pulmonary disease)  Lung cancer: Lung cancer  SOB (shortness of breath): SOB (shortness of breath) Mr. San is a 59y old  Male who presents with a chief complaint of PNA (02 Dec 2019 08:32)      INTERVAL HPI/OVERNIGHT EVENTS: Pt seen and examined at bedside. Pt admits to sleeping "okay" while seated in the chair, worsening shortness of breath, orthopnea, and increasing palpitations. Pt denies nausea, vomiting, changes in urine and bowel movements. Pt noted he is having constant, sharp, shooting back pain without radiation, minimally relieved by morphine. No acute overnight events, per RN.     REVIEW OF SYSTEMS:  CONSTITUTIONAL: No fever, +weight loss  ENMT:  No difficulty hearing.  RESPIRATORY: +shortness of breath, +orthopnea  CARDIOVASCULAR: +palpitations, +chronic leg swelling, no chest pain, no dizziness  GASTROINTESTINAL: +loose usual bowel movement. No abdominal pain. No nausea, no vomiting, no hematemesis. No melena. No hematochezia.  GENITOURINARY: No dysuria, no frequency, no hematuria, no incontinence  NEUROLOGICAL: +loss of LE strength, no headaches  ENDOCRINE: +hair loss 2/2 to chemotherapy  MUSCULOSKELETAL: +dependent lower extremity swelling b/l, +back pain  PSYCHIATRIC: No difficulty sleeping  All other ROS within normal limits.     T(C): 36.3 (12-02-19 @ 07:45), Max: 36.6 (12-01-19 @ 23:35)  HR: 108 (12-02-19 @ 07:50) (82 - 117)  BP: 100/70 (12-02-19 @ 07:45) (100/70 - 110/71)  RR: 20 (12-02-19 @ 07:45) (18 - 20)  SpO2: 100% (12-02-19 @ 07:50) (92% - 100%)  Wt(kg): --Vital Signs Last 24 Hrs  T(C): 36.3 (02 Dec 2019 07:45), Max: 36.6 (01 Dec 2019 23:35)  T(F): 97.3 (02 Dec 2019 07:45), Max: 97.8 (01 Dec 2019 23:35)  HR: 108 (02 Dec 2019 07:50) (82 - 117)  BP: 100/70 (02 Dec 2019 07:45) (100/70 - 110/71)  BP(mean): --  RR: 20 (02 Dec 2019 07:45) (18 - 20)  SpO2: 100% (02 Dec 2019 07:50) (92% - 100%)    PHYSICAL EXAM:  GENERAL: Cachectic male, NAD  HEAD:  Bald, Atraumatic, Normocephalic  EYES: Conjunctiva and sclera clear  ENMT: Moist mucous membranes  NECK: Supple  NERVOUS SYSTEM:  Alert & Oriented X2, Motor Strength 5/5 B/L upper extremities and 4/5 B/L lower extremities; sensation grossly intact upper and lower extremities  CHEST/LUNG: clear to auscultation throughout and b/l, chemoport noted in Left upper chest wall, diminished breath sounds bilaterally; No rales, rhonchi, wheezing, or rubs.  HEART: irregularly irregular; No murmurs, rubs, or gallops.  ABDOMEN: Soft, Nontender, Nondistended; Bowel sounds present  EXTREMITIES: 2+ Radial Pulses, non-palpable dorsalis pedis pulse 2/2 edema, 4+ pitting with weeping edema b/l lower extremities  SKIN: +posterior lumbar hematomas          Consultant(s) Notes Reviewed:  [x ] YES  [ ] NO  Care Discussed with Consultants/Other Providers [ x] YES  [ ] NO    LABS:                        9.9    23.94 )-----------( 136      ( 02 Dec 2019 06:20 )             30.9           PTT - ( 02 Dec 2019 06:20 )  PTT:72.4 sec    CAPILLARY BLOOD GLUCOSE                RADIOLOGY & ADDITIONAL TESTS:      Imaging Personally Reviewed:  [X] YES  [ ] NO  < from: US Chest (12.02.19 @ 10:21) >  Bilateral chest ultrasound.    No pleural fluid is detected on the right. On the left, there is a   pleural effusion. Measurements submitted are consistent with a volume of   1033 cc.    Impression: Left pleural effusion as described    < end of copied text >    HEALTH ISSUES - PROBLEM Dx:  AF (atrial fibrillation): AF (atrial fibrillation)  Need for prophylactic measure: Need for prophylactic measure  Lower extremity edema: Lower extremity edema  Pressure ulcer: Pressure ulcer  Thrombophlebitis: Thrombophlebitis  DNR (do not resuscitate): DNR (do not resuscitate)  HTN (hypertension): HTN (hypertension)  COPD (chronic obstructive pulmonary disease): COPD (chronic obstructive pulmonary disease)  Lung cancer: Lung cancer  SOB (shortness of breath): SOB (shortness of breath) Mr. San is a 59y old  Male who presents with a chief complaint of PNA (02 Dec 2019 08:32)      INTERVAL HPI/OVERNIGHT EVENTS: Pt seen and examined at bedside. Pt admits to sleeping "okay" while seated in the chair, worsening shortness of breath, orthopnea, and increasing palpitations. Pt denies nausea, vomiting, changes in urine and bowel movements. Pt noted he is having constant, sharp, shooting back pain without radiation, minimally relieved by morphine. No acute overnight events, per RN.     REVIEW OF SYSTEMS:  CONSTITUTIONAL: No fever, +weight loss  ENMT:  No difficulty hearing.  RESPIRATORY: +shortness of breath, +orthopnea  CARDIOVASCULAR: +palpitations, +chronic leg swelling, no chest pain, no dizziness  GASTROINTESTINAL: +loose usual bowel movement. No abdominal pain. No nausea, no vomiting, no hematemesis. No melena. No hematochezia.  GENITOURINARY: No dysuria, no frequency, no hematuria, no incontinence  NEUROLOGICAL: +loss of LE strength, no headaches  ENDOCRINE: +hair loss 2/2 to chemotherapy  MUSCULOSKELETAL: +dependent lower extremity swelling b/l, +back pain  PSYCHIATRIC: No difficulty sleeping  All other ROS within normal limits.     T(C): 36.3 (12-02-19 @ 07:45), Max: 36.6 (12-01-19 @ 23:35)  HR: 108 (12-02-19 @ 07:50) (82 - 117)  BP: 100/70 (12-02-19 @ 07:45) (100/70 - 110/71)  RR: 20 (12-02-19 @ 07:45) (18 - 20)  SpO2: 100% (12-02-19 @ 07:50) (92% - 100%)  Wt(kg): --Vital Signs Last 24 Hrs  T(C): 36.3 (02 Dec 2019 07:45), Max: 36.6 (01 Dec 2019 23:35)  T(F): 97.3 (02 Dec 2019 07:45), Max: 97.8 (01 Dec 2019 23:35)  HR: 108 (02 Dec 2019 07:50) (82 - 117)  BP: 100/70 (02 Dec 2019 07:45) (100/70 - 110/71)  BP(mean): --  RR: 20 (02 Dec 2019 07:45) (18 - 20)  SpO2: 100% (02 Dec 2019 07:50) (92% - 100%)    PHYSICAL EXAM:  GENERAL: Cachectic male, NAD  HEAD:  Bald, Atraumatic, Normocephalic  EYES: Conjunctiva and sclera clear  ENMT: Moist mucous membranes  NECK: Supple  NERVOUS SYSTEM:  Alert & Oriented X2, Motor Strength 5/5 B/L upper extremities and 4/5 B/L lower extremities; sensation grossly intact upper and lower extremities  CHEST/LUNG: clear to auscultation throughout and b/l, chemoport noted in Left upper chest wall, diminished breath sounds bilaterally; No rales, rhonchi, wheezing, or rubs.  HEART: irregularly irregular; No murmurs, rubs, or gallops.  ABDOMEN: Soft, Nontender, Nondistended; Bowel sounds present  EXTREMITIES: 2+ Radial Pulses, non-palpable dorsalis pedis pulse 2/2 edema, 4+ pitting with weeping edema b/l lower extremities  SKIN: +posterior lumbar hematomas          Consultant(s) Notes Reviewed:  [x ] YES  [ ] NO  Care Discussed with Consultants/Other Providers [ x] YES  [ ] NO    LABS:                        9.9    23.94 )-----------( 136      ( 02 Dec 2019 06:20 )             30.9     12-02    134<L>  |  97  |  27<H>  ----------------------------<  136<H>  4.5   |  29  |  0.62          PTT - ( 02 Dec 2019 06:20 )  PTT:72.4 sec    CAPILLARY BLOOD GLUCOSE                RADIOLOGY & ADDITIONAL TESTS:        < from: US Chest (12.02.19 @ 10:21) >  Bilateral chest ultrasound.    No pleural fluid is detected on the right. On the left, there is a   pleural effusion. Measurements submitted are consistent with a volume of   1033 cc.    Impression: Left pleural effusion as described    < end of copied text >      Imaging Personally Reviewed:  [X] YES  [ ] NO      HEALTH ISSUES - PROBLEM Dx:  AF (atrial fibrillation): AF (atrial fibrillation)  Need for prophylactic measure: Need for prophylactic measure  Lower extremity edema: Lower extremity edema  Pressure ulcer: Pressure ulcer  Thrombophlebitis: Thrombophlebitis  DNR (do not resuscitate): DNR (do not resuscitate)  HTN (hypertension): HTN (hypertension)  COPD (chronic obstructive pulmonary disease): COPD (chronic obstructive pulmonary disease)  Lung cancer: Lung cancer  SOB (shortness of breath): SOB (shortness of breath) Mr. San is a 59y old  Male who presents with a chief complaint of PNA (02 Dec 2019 08:32)      INTERVAL HPI/OVERNIGHT EVENTS: Pt seen and examined at bedside. Pt admits to sleeping "okay" while seated in the chair, shortness of breath is unchanged, orthopnea, and increasing palpitations. Pt denies nausea, vomiting, changes in urine and bowel movements. Pt noted he is having constant, sharp, shooting back pain without radiation, minimally relieved by morphine. No acute overnight events, per RN.     REVIEW OF SYSTEMS:  CONSTITUTIONAL: No fever, +weight loss  ENMT:  No difficulty hearing.  RESPIRATORY: +shortness of breath, +orthopnea  CARDIOVASCULAR: +palpitations, +chronic leg swelling, no chest pain, no dizziness  GASTROINTESTINAL: +loose usual bowel movement. No abdominal pain. No nausea, no vomiting, no hematemesis. No melena. No hematochezia.  GENITOURINARY: No dysuria, no frequency, no hematuria, no incontinence  NEUROLOGICAL: +loss of LE strength, no headaches  ENDOCRINE: +hair loss 2/2 to chemotherapy  MUSCULOSKELETAL: +dependent lower extremity swelling b/l, +back pain  PSYCHIATRIC: No difficulty sleeping  All other ROS within normal limits.     T(C): 36.3 (12-02-19 @ 07:45), Max: 36.6 (12-01-19 @ 23:35)  HR: 108 (12-02-19 @ 07:50) (82 - 117)  BP: 100/70 (12-02-19 @ 07:45) (100/70 - 110/71)  RR: 20 (12-02-19 @ 07:45) (18 - 20)  SpO2: 100% (12-02-19 @ 07:50) (92% - 100%)  Wt(kg): --Vital Signs Last 24 Hrs  T(C): 36.3 (02 Dec 2019 07:45), Max: 36.6 (01 Dec 2019 23:35)  T(F): 97.3 (02 Dec 2019 07:45), Max: 97.8 (01 Dec 2019 23:35)  HR: 108 (02 Dec 2019 07:50) (82 - 117)  BP: 100/70 (02 Dec 2019 07:45) (100/70 - 110/71)  BP(mean): --  RR: 20 (02 Dec 2019 07:45) (18 - 20)  SpO2: 100% (02 Dec 2019 07:50) (92% - 100%)    PHYSICAL EXAM:  GENERAL: Cachectic male, NAD  HEAD:  Bald, Atraumatic, Normocephalic  EYES: PERRLA, Conjunctiva and sclera clear  ENMT: Moist mucous membranes  NECK: Supple  NERVOUS SYSTEM:  Alert & Oriented X2, Motor Strength 5/5 B/L upper extremities and 4/5 B/L lower extremities; sensation grossly intact upper and lower extremities  CHEST/LUNG: clear to auscultation throughout and b/l, chemoport noted in right upper chest wall, diminished breath sounds bilaterally; No rales, rhonchi, wheezing, or rubs.  HEART: irregularly irregular rate and rhythm; No murmurs, rubs, or gallops.  ABDOMEN: Soft, Nontender, Nondistended; Bowel sounds present  EXTREMITIES: 2+ Radial Pulses, non-palpable dorsalis pedis pulse 2/2 edema, 4+ pitting with weeping edema b/l lower extremities  SKIN: warm, well-perfused        Consultant(s) Notes Reviewed:  [x ] YES  [ ] NO  Care Discussed with Consultants/Other Providers [ x] YES  [ ] NO    LABS:                        9.9    23.94 )-----------( 136      ( 02 Dec 2019 06:20 )             30.9     12-02    134<L>  |  97  |  27<H>  ----------------------------<  136<H>  4.5   |  29  |  0.62          PTT - ( 02 Dec 2019 06:20 )  PTT:72.4 sec    CAPILLARY BLOOD GLUCOSE                RADIOLOGY & ADDITIONAL TESTS:        < from: US Chest (12.02.19 @ 10:21) >  Bilateral chest ultrasound.    No pleural fluid is detected on the right. On the left, there is a   pleural effusion. Measurements submitted are consistent with a volume of   1033 cc.    Impression: Left pleural effusion as described    < end of copied text >      Imaging Personally Reviewed:  [X] YES  [ ] NO      HEALTH ISSUES - PROBLEM Dx:  AF (atrial fibrillation): AF (atrial fibrillation)  Need for prophylactic measure: Need for prophylactic measure  Lower extremity edema: Lower extremity edema  Pressure ulcer: Pressure ulcer  Thrombophlebitis: Thrombophlebitis  DNR (do not resuscitate): DNR (do not resuscitate)  HTN (hypertension): HTN (hypertension)  COPD (chronic obstructive pulmonary disease): COPD (chronic obstructive pulmonary disease)  Lung cancer: Lung cancer  SOB (shortness of breath): SOB (shortness of breath)

## 2019-12-02 NOTE — PROGRESS NOTE ADULT - ASSESSMENT
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    Adm now with weakness and dyspnea; found to have pulmonary embolism and further disease progression with pleural effusion, bone lesions, liver lesions and pancreatic mass.  s/p 700cc therapeutic thoracentesis 11/29/19 AM; despite this had re-accumulation of fluid    Unable to lie flat for MRI brain.     RECOMMEND  Continue Heparin drip. Consider change to Eliquis  s/p therapeutic thoracentesis Fri 11/29/19. No pleurex catheter as insufficient fluid.; however fluid has quickly re-accumulated and would consider repeat CT chest and possible pleurex placement  Suspect over all dyspnea due to combination: sx due to cancer, fluid, cardiac and PE.     To complete re-staging scans.  Hold MRI brain. Consider CT scan head outpt as patient unable to lay flat     Continue acute care  Would add pain medication - fentanyl patch  Will follow.     Case discussed with Dr. Perlman (hospitalist)

## 2019-12-02 NOTE — PROGRESS NOTE ADULT - SUBJECTIVE AND OBJECTIVE BOX
Patient seen and examined;  Chart reviewed and events noted;   continues to require Oxygen  continues to have back pain      MEDICATIONS  (STANDING):  acetylcysteine 10%  Inhalation 5 milliLiter(s) Inhalation four times a day  budesonide 160 MICROgram(s)/formoterol 4.5 MICROgram(s) Inhaler 2 Puff(s) Inhalation two times a day  chlorhexidine 4% Liquid 1 Application(s) Topical daily  digoxin     Tablet 0.25 milliGRAM(s) Oral daily  dronabinol 5 milliGRAM(s) Oral every 12 hours  heparin  Infusion.  Unit(s)/Hr (15 mL/Hr) IV Continuous <Continuous>  ipratropium    for Nebulization 500 MICROGram(s) Nebulizer every 6 hours  loratadine 10 milliGRAM(s) Oral daily  magnesium oxide 400 milliGRAM(s) Oral daily  metoprolol succinate  milliGRAM(s) Oral daily  multivitamin 1 Tablet(s) Oral daily  predniSONE   Tablet 20 milliGRAM(s) Oral daily  sodium chloride 1 Gram(s) Oral three times a day  tiotropium 18 MICROgram(s) Capsule 1 Capsule(s) Inhalation daily    MEDICATIONS  (PRN):  guaiFENesin    Syrup 100 milliGRAM(s) Oral every 6 hours PRN Cough  heparin  Injectable 6500 Unit(s) IV Push every 6 hours PRN For aPTT less than 40  heparin  Injectable 3000 Unit(s) IV Push every 6 hours PRN For aPTT between 40 - 57  levalbuterol Inhalation 0.63 milliGRAM(s) Inhalation every 6 hours PRN shortness of breath  morphine  - Injectable 2 milliGRAM(s) IV Push every 4 hours PRN Moderate Pain (4 - 6)  morphine  - Injectable 1 milliGRAM(s) IV Push every 4 hours PRN Mild Pain (1 - 3)  morphine  - Injectable 3 milliGRAM(s) IV Push every 4 hours PRN Severe Pain (7 - 10)  ondansetron Injectable 4 milliGRAM(s) IV Push every 6 hours PRN Nausea  senna 8.6 milliGRAM(s) Oral Tablet - Peds 1 Tablet(s) Oral at bedtime PRN for constipation      Vital Signs Last 24 Hrs  T(C): 36.3 (02 Dec 2019 07:45), Max: 36.6 (01 Dec 2019 23:35)  T(F): 97.3 (02 Dec 2019 07:45), Max: 97.8 (01 Dec 2019 23:35)  HR: 108 (02 Dec 2019 07:50) (82 - 117)  BP: 100/70 (02 Dec 2019 07:45) (100/70 - 110/71)  RR: 20 (02 Dec 2019 07:45) (18 - 20)  SpO2: 100% (02 Dec 2019 07:50) (92% - 100%)    PHYSICAL EXAM  General: adult in NAD  HEENT: clear oropharynx, anicteric sclera, pink conjunctivae  Neck: supple  CV: normal S1S2 with no murmur rubs or gallops  Lungs: reduced breath sounds at right base (half way up) despite thoracentesis last week  Abdomen: soft non-tender non-distended, no hepato/splenomegaly  Ext: extensive LE edema  Skin: no rashes and no petichiae  Neuro: alert and oriented X3 no focal deficits      LABS:                        9.9    23.94 )-----------( 136      ( 02 Dec 2019 06:20 )             30.9     WBC Count: 23.94 K/uL (12-02 @ 06:20)  WBC Count: 21.77 K/uL (12-01 @ 06:27)  WBC Count: 20.24 K/uL (11-30 @ 07:54)  WBC Count: 20.70 K/uL (11-29 @ 22:33)  WBC Count: 15.88 K/uL (11-29 @ 06:58)    Hemoglobin: 9.9 g/dL (12-02 @ 06:20)  Hemoglobin: 10.6 g/dL (12-01 @ 06:27)  Hemoglobin: 10.5 g/dL (11-30 @ 07:54)  Hemoglobin: 10.0 g/dL (11-29 @ 22:33)  Hemoglobin: 10.2 g/dL (11-29 @ 06:58)          PTT - ( 02 Dec 2019 06:20 )  PTT:72.4 sec

## 2019-12-02 NOTE — PROGRESS NOTE ADULT - PROBLEM SELECTOR PLAN 5
BP soft but holding above 90/60s  - Continue to HOLD Diltiazem.  - Continue Metoprolol 100 mg PO daily with hold parameters. BP soft but holding above 90/60s  - Seen by Cardio, Dr. CELY Moeller; recs appreciated.  - Continue to HOLD Diltiazem.  - Continue Metoprolol 100 mg PO daily with hold parameters.

## 2019-12-02 NOTE — PROGRESS NOTE ADULT - SUBJECTIVE AND OBJECTIVE BOX
[facility] -- Gurpreet Bush, Emily, Corey, Sajan Abebe Savella  Office # [phone]      Follow Up:  afib    Subjective/Observations:     Seen at bedside sitting in chair, + shortness of breath   denies chest pain dizziness palpitations n/v/d/ fever or chills     REVIEW OF SYSTEMS: All other review of systems is negative unless indicated above    PAST MEDICAL & SURGICAL HISTORY:  Lung cancer  Depression  Dyspnea  Mediastinal adenopathy  Pericardial effusion  AF (atrial fibrillation)  Hyponatremia  COPD (chronic obstructive pulmonary disease)  HTN (hypertension)  S/P pericardial window creation      MEDICATIONS  (STANDING):  acetylcysteine 10%  Inhalation 5 milliLiter(s) Inhalation four times a day  budesonide 160 MICROgram(s)/formoterol 4.5 MICROgram(s) Inhaler 2 Puff(s) Inhalation two times a day  chlorhexidine 4% Liquid 1 Application(s) Topical daily  digoxin     Tablet 0.25 milliGRAM(s) Oral daily  dronabinol 5 milliGRAM(s) Oral every 12 hours  heparin  Infusion.  Unit(s)/Hr (15 mL/Hr) IV Continuous <Continuous>  ipratropium    for Nebulization 500 MICROGram(s) Nebulizer every 6 hours  loratadine 10 milliGRAM(s) Oral daily  magnesium oxide 400 milliGRAM(s) Oral daily  metoprolol succinate  milliGRAM(s) Oral daily  multivitamin 1 Tablet(s) Oral daily  predniSONE   Tablet 20 milliGRAM(s) Oral daily  sodium chloride 1 Gram(s) Oral three times a day  tiotropium 18 MICROgram(s) Capsule 1 Capsule(s) Inhalation daily    MEDICATIONS  (PRN):  guaiFENesin    Syrup 100 milliGRAM(s) Oral every 6 hours PRN Cough  heparin  Injectable 6500 Unit(s) IV Push every 6 hours PRN For aPTT less than 40  heparin  Injectable 3000 Unit(s) IV Push every 6 hours PRN For aPTT between 40 - 57  levalbuterol Inhalation 0.63 milliGRAM(s) Inhalation every 6 hours PRN shortness of breath  morphine  - Injectable 2 milliGRAM(s) IV Push every 4 hours PRN Moderate Pain (4 - 6)  morphine  - Injectable 1 milliGRAM(s) IV Push every 4 hours PRN Mild Pain (1 - 3)  morphine  - Injectable 3 milliGRAM(s) IV Push every 4 hours PRN Severe Pain (7 - 10)  ondansetron Injectable 4 milliGRAM(s) IV Push every 6 hours PRN Nausea  senna 8.6 milliGRAM(s) Oral Tablet - Peds 1 Tablet(s) Oral at bedtime PRN for constipation      Allergies    penicillin (Unknown)    Intolerances        Vital Signs Last 24 Hrs  T(C): 36.3 (02 Dec 2019 07:45), Max: 36.6 (01 Dec 2019 23:35)  T(F): 97.3 (02 Dec 2019 07:45), Max: 97.8 (01 Dec 2019 23:35)  HR: 108 (02 Dec 2019 07:50) (82 - 117)  BP: 100/70 (02 Dec 2019 07:45) (100/70 - 110/71)  BP(mean): --  RR: 20 (02 Dec 2019 07:45) (18 - 20)  SpO2: 100% (02 Dec 2019 07:50) (92% - 100%)    I&O's Summary    01 Dec 2019 07:01  -  02 Dec 2019 07:00  --------------------------------------------------------  IN: 204 mL / OUT: 1250 mL / NET: -1046 mL          PHYSICAL EXAM:  TELE: afib 118   Constitutional: NAD, awake and alert,   HEENT: Moist Mucous Membranes, Anicteric  Pulmonary: Non-labored, breath sounds are DIm L > R   Cardiovascular: IRRegular, S1 and S2 nl, No murmurs, rubs, gallops or clicks  Gastrointestinal: Bowel Sounds present, soft, nontender.   Lymph:+ chronic pitting bilateral LE edema   Skin: No visible rashes or ulcers.  Psych:  Mood & affect appropriate    LABS: All Labs Reviewed:                        9.9    23.94 )-----------( 136      ( 02 Dec 2019 06:20 )             30.9                         10.6   21.77 )-----------( 141      ( 01 Dec 2019 06:27 )             32.8                         10.5   20.24 )-----------( 181      ( 30 Nov 2019 07:54 )             33.6     30 Nov 2019 07:54    135    |  97     |  26     ----------------------------<  118    3.6     |  28     |  0.55     Ca    8.3        30 Nov 2019 07:54      PTT - ( 02 Dec 2019 06:20 )  PTT:72.4 sec         ECG:    < from: 12 Lead ECG (11.22.19 @ 21:48) >    Ventricular Rate 159 BPM    Atrial Rate 258 BPM    QRS Duration 120 ms    Q-T Interval 296 ms    QTC Calculation(Bezet) 481 ms    R Axis 34 degrees    T Axis 30 degrees    < end of copied text >      Echo:  < from: TTE Echo Doppler w/o Cont (11.23.19 @ 15:29) >  Technically difficult and limited study.  1. Hyperdynamic left ventricular systolic function.  2. Mitral annular calcification and calcified mitral valve leaflets with   mild mitral insufficiency.  3. Grossly normal left atrial size.  4. The right ventricle is not well visualized. Grossly, normal right   ventricular size and systolic function.  5.  Large left pleural effusion which echogenic material noted in the   pleural space.    < end of copied text >      Radiology:  < from: Xray Chest 2 Views PA/Lat (12.01.19 @ 13:32) >  Bilateral perihilar airspace disease not significantly changed since   prior study. Left costophrenic angle is blunted unchanged since prior   exam. No pneumothorax.    Heart size within normal limits. Aortic knob contains calcification. No   acute osseous abnormalities.    Cardiac monitor leads overlie the chest. Right-sided Port-A-Cath present   with tip localized to approximate SVC-RA junction.    IMPRESSION: See above report    < end of copied text >

## 2019-12-02 NOTE — PROGRESS NOTE ADULT - SUBJECTIVE AND OBJECTIVE BOX
Patient is a 59y old  Male who presents with a chief complaint of PNA (02 Dec 2019 10:58)      INTERVAL HPI/OVERNIGHT EVENTS:    Continues to have shortness of breath    MEDICATIONS  (STANDING):  acetylcysteine 10%  Inhalation 5 milliLiter(s) Inhalation four times a day  budesonide 160 MICROgram(s)/formoterol 4.5 MICROgram(s) Inhaler 2 Puff(s) Inhalation two times a day  chlorhexidine 4% Liquid 1 Application(s) Topical daily  digoxin     Tablet 0.25 milliGRAM(s) Oral daily  dronabinol 5 milliGRAM(s) Oral every 12 hours  fentaNYL   Patch  12 MICROgram(s)/Hr 1 Patch Transdermal every 72 hours  heparin  Infusion.  Unit(s)/Hr (15 mL/Hr) IV Continuous <Continuous>  ipratropium    for Nebulization 500 MICROGram(s) Nebulizer every 6 hours  loratadine 10 milliGRAM(s) Oral daily  magnesium oxide 400 milliGRAM(s) Oral daily  metoprolol succinate  milliGRAM(s) Oral daily  multivitamin 1 Tablet(s) Oral daily  predniSONE   Tablet 20 milliGRAM(s) Oral daily  sodium chloride 1 Gram(s) Oral three times a day  tiotropium 18 MICROgram(s) Capsule 1 Capsule(s) Inhalation daily      MEDICATIONS  (PRN):  guaiFENesin    Syrup 100 milliGRAM(s) Oral every 6 hours PRN Cough  heparin  Injectable 6500 Unit(s) IV Push every 6 hours PRN For aPTT less than 40  heparin  Injectable 3000 Unit(s) IV Push every 6 hours PRN For aPTT between 40 - 57  HYDROmorphone  Injectable 1 milliGRAM(s) IV Push every 4 hours PRN Severe Pain (7 - 10)  levalbuterol Inhalation 0.63 milliGRAM(s) Inhalation every 6 hours PRN shortness of breath  morphine  - Injectable 2 milliGRAM(s) IV Push every 4 hours PRN Moderate Pain (4 - 6)  morphine  - Injectable 1 milliGRAM(s) IV Push every 4 hours PRN Mild Pain (1 - 3)  ondansetron Injectable 4 milliGRAM(s) IV Push every 6 hours PRN Nausea  senna 8.6 milliGRAM(s) Oral Tablet - Peds 1 Tablet(s) Oral at bedtime PRN for constipation      Allergies    penicillin (Unknown)    Intolerances        PAST MEDICAL & SURGICAL HISTORY:  Lung cancer  Depression  Dyspnea  Mediastinal adenopathy  Pericardial effusion  AF (atrial fibrillation)  Hyponatremia  COPD (chronic obstructive pulmonary disease)  HTN (hypertension)  S/P pericardial window creation      Vital Signs Last 24 Hrs  T(C): 36.4 (02 Dec 2019 20:47), Max: 36.6 (01 Dec 2019 23:35)  T(F): 97.5 (02 Dec 2019 20:47), Max: 97.8 (01 Dec 2019 23:35)  HR: 114 (02 Dec 2019 20:47) (82 - 117)  BP: 100/70 (02 Dec 2019 20:47) (100/70 - 128/78)  BP(mean): --  RR: 18 (02 Dec 2019 20:47) (18 - 22)  SpO2: 96% (02 Dec 2019 20:47) (95% - 100%)    PHYSICAL EXAMINATION:    GENERAL: The patient is awake and alert in no apparent distress.     HEENT: Head is normocephalic and atraumatic. Extraocular muscles are intact. Mucous membranes are moist.    NECK: Supple.    LUNGS: scattered coarse rhonchi    HEART: Regular rate and rhythm without murmur.    ABDOMEN: Soft, nontender, and nondistended.      EXTREMITIES: massive edema bilateral    NEUROLOGIC: Grossly intact.    SKIN: No ulceration or induration present.      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

## 2019-12-02 NOTE — PROGRESS NOTE ADULT - PROBLEM SELECTOR PLAN 4
- Continue Tiotropium 18mcg 1 cap inhalation daily.  - Continue Ipratropium 500 mcg Nebs q6H.   - Continue Levalbuterol 0.63 mg inhalation q6H PRN, re: SOB.   - Continue Acetylcysteine 10% inhalation 5mL QID.   - Continue Budesonide 160 mcg/Formoterol 4.5 mcg 2 puff inhalation BID.

## 2019-12-02 NOTE — PROGRESS NOTE ADULT - PROBLEM SELECTOR PLAN 9
Pt with shortness of breath, orthopnea, and 4+ pitting with weeping edema b/l LE.   - Try Lymphedema Pumps if available  - Provide Furosemide after BMP AM.   - Encourage lower extremity elevation. Pt with shortness of breath, orthopnea, and 4+ pitting with weeping edema b/l LE.   - Try Lymphedema Pumps if available  - Start Furosemide 40mg IVp x 1.  - Encourage lower extremity elevation. Pt with shortness of breath, orthopnea, and 4+ pitting with weeping edema b/l LE.   - patient would likely benefit from lymphedema pumps outpatient  - Furosemide 40 mg x1  - Encourage lower extremity elevation.

## 2019-12-02 NOTE — CHART NOTE - NSCHARTNOTEFT_GEN_A_CORE
Assessment: Pt seen for malnutrition follow up. Chart reviewed, hospital course noted.    Pt states appetite has been "terrible" and that food has no appeal to him whatsoever. Has been intermittently NPO for procedures - ultrasound, thoracentesis which has interrupted po intake. PO intake ranges significantly, 0-100% but predictably suboptimal as pt often hasn't felt well enough to eat due to respiratory distress. Noted with edematous B/L extremities. Denied N/V/diarrhea/constipation, last BM yesterday per patient report. Has been consuming Ensure supplement, at least 1 bottle daily. Encouraged to consume at least 2 bottles daily and work his way up to 3 bottles per day to assist with suboptimal po intake. On marinol, Multivitamin, prednisone.    Factors impacting intake: [ ] none [ ] nausea  [ ] vomiting [ ] diarrhea [ ] constipation  [ ]chewing problems [ ] swallowing issues  [X] other: persistent lack of appetite in setting of metastatic cancer    Diet, Regular:   Supplement Feeding Modality:  Oral  Ensure Enlive Cans or Servings Per Day:  1       Frequency:  Three Times a day (11-22-19 @ 22:48)  Diet, NPO after Midnight:      NPO Start Date: 01-Dec-2019,   NPO Start Time: 23:59  Except Medications (12-01-19 @ 17:36)    Intake: suboptimal, 0-100% but typically less than <50% of meals    Current Weight: 169.3 pounds (12/1, standing scale)    Pertinent Medications: MEDICATIONS  (STANDING):  acetylcysteine 10%  Inhalation 5 milliLiter(s) Inhalation four times a day  budesonide 160 MICROgram(s)/formoterol 4.5 MICROgram(s) Inhaler 2 Puff(s) Inhalation two times a day  chlorhexidine 4% Liquid 1 Application(s) Topical daily  digoxin     Tablet 0.25 milliGRAM(s) Oral daily  dronabinol 5 milliGRAM(s) Oral every 12 hours  fentaNYL   Patch  12 MICROgram(s)/Hr 1 Patch Transdermal every 72 hours  heparin  Infusion.  Unit(s)/Hr (15 mL/Hr) IV Continuous <Continuous>  ipratropium    for Nebulization 500 MICROGram(s) Nebulizer every 6 hours  loratadine 10 milliGRAM(s) Oral daily  magnesium oxide 400 milliGRAM(s) Oral daily  metoprolol succinate  milliGRAM(s) Oral daily  multivitamin 1 Tablet(s) Oral daily  predniSONE   Tablet 20 milliGRAM(s) Oral daily  sodium chloride 1 Gram(s) Oral three times a day  tiotropium 18 MICROgram(s) Capsule 1 Capsule(s) Inhalation daily    MEDICATIONS  (PRN):  guaiFENesin    Syrup 100 milliGRAM(s) Oral every 6 hours PRN Cough  heparin  Injectable 6500 Unit(s) IV Push every 6 hours PRN For aPTT less than 40  heparin  Injectable 3000 Unit(s) IV Push every 6 hours PRN For aPTT between 40 - 57  HYDROmorphone  Injectable 1 milliGRAM(s) IV Push every 4 hours PRN Severe Pain (7 - 10)  levalbuterol Inhalation 0.63 milliGRAM(s) Inhalation every 6 hours PRN shortness of breath  morphine  - Injectable 2 milliGRAM(s) IV Push every 4 hours PRN Moderate Pain (4 - 6)  morphine  - Injectable 1 milliGRAM(s) IV Push every 4 hours PRN Mild Pain (1 - 3)  ondansetron Injectable 4 milliGRAM(s) IV Push every 6 hours PRN Nausea  senna 8.6 milliGRAM(s) Oral Tablet - Peds 1 Tablet(s) Oral at bedtime PRN for constipation    Pertinent Labs: 12-02 Na134 mmol/L<L> Glu 136 mg/dL<H> K+ 4.5 mmol/L Cr  0.62 mg/dL BUN 27 mg/dL<H>     CAPILLARY BLOOD GLUCOSE        Skin: +4 B/L leg, ankle, knee edema, no pressure injuries    Estimated Needs:   [X] no change since previous assessment  [ ] recalculated:     Previous Nutrition Diagnosis:   [ ] Inadequate Energy Intake [ ]Inadequate Oral Intake [ ] Excessive Energy Intake   [ ] Underweight [ ] Increased Nutrient Needs [ ] Overweight/Obesity   [ ] Altered GI Function [ ] Unintended Weight Loss [ ] Food & Nutrition Related Knowledge Deficit [X] Malnutrition (chronic, severe)    Nutrition Diagnosis is [X] ongoing  [ ] resolved [ ] not applicable     New Nutrition Diagnosis: [X] not applicable       Interventions:   Recommend  [ ] Change Diet To:  [X] Nutrition Supplement: Ensure Enlive TID as ordered. Each 8 oz shake provides 350 kcal, 20 grams of protein. Will send with Ensure. Pt encouraged to consume supplement between meals.  [ ] Nutrition Support  [X] Other: Encourage po intake at all meals. Appetite stimulant as ordered. Supportive care.     Monitoring and Evaluation:   [ ] PO intake [ x ] Tolerance to diet prescription [ x ] weights [ x ] labs[ x ] follow up per protocol  [ ] other:

## 2019-12-02 NOTE — PROGRESS NOTE ADULT - PROBLEM SELECTOR PLAN 1
Likely multifactorial from b/l PE, PNA, and metastatic pleural effusions (patient has a past history of 2 pleural effusions requiring drainage as well cardiac window)  - s/p L thoracentesis 11/29, 700 ccs drained. Fluid Analysis not ordered, per laboratory.   - US Chest 12/2: Bilateral chest ultrasound. No pleural fluid is detected on the right. On the left, there is a pleural effusion. Measurements submitted are consistent with a volume of 1033 cc.  - Seen by Cardio, Dr. CELY Moeller; recs appreciated.  - Seen by Pulm, Dr. Soto; continue O2 and Heparin 25kU in Dextrose 5% @ 1500U/hr IV.  - Seen by Heme/Onc, Dr. Harris; no pleurex catheter 2/2 insufficient fluid, possibly change to Eliquis, Head CT Scan outpt.    - Tapering Steroids: Continue Prednisone 20mg daily   - Continue Ipratropium 500 mcg Nebs q6H.   - Continue Levalbuterol 0.63 mg inhalation q6H PRN, re: SOB.   - Continue Acetylcysteine 10% inhalation 5mL QID.   - F/U BMP this AM and start Furosemide IVp accordingly. Likely multifactorial from b/l PE, PNA, and metastatic pleural effusions (patient has a past history of 2 pleural effusions requiring drainage as well cardiac window)  - s/p L thoracentesis 11/29, 700 ccs drained. Fluid Analysis not ordered, per laboratory.   - US Chest 12/2: Bilateral chest ultrasound. No pleural fluid is detected on the right. On the left, there is a pleural effusion. Measurements submitted are consistent with a volume of 1033 cc.  - Seen by Cardio, Dr. CELY Moeller; recs appreciated.  - Seen by Pulm, Dr. Soto; continue O2 and Heparin 25kU in Dextrose 5% @ 1500U/hr IV.  - Seen by Heme/Onc, Dr. Harris; no pleurex catheter 2/2 insufficient fluid, possibly change to Eliquis, Head CT Scan outpt.    - Tapering Steroids: Continue Prednisone 20mg daily   - Continue Ipratropium 500 mcg Nebs q6H.   - Continue Levalbuterol 0.63 mg inhalation q6H PRN, re: SOB.   - Continue Acetylcysteine 10% inhalation 5mL QID.   - Start Furosemide 20 mg IVp x 1. Likely multifactorial from b/l PE, PNA, and metastatic pleural effusions (patient has a past history of 2 pleural effusions requiring drainage as well cardiac window)  - s/p L thoracentesis 11/29, 700 ccs drained. Fluid Analysis not ordered, per laboratory.   - US Chest 12/2: Bilateral chest ultrasound. No pleural fluid is detected on the right. On the left, there is a pleural effusion. Measurements submitted are consistent with a volume of 1033 cc.  - Seen by Cardio, Dr. CELY Moeller; recs appreciated.  - Seen by Pulm, Dr. Soto; continue O2 and Heparin 25kU in Dextrose 5% @ 1500U/hr IV.  - Seen by Heme/Onc, Dr. Harris; no pleurex catheter 2/2 insufficient fluid, possibly change to Eliquis, Head CT Scan outpt.    - Tapering Steroids: Continue Prednisone 20mg daily   - Continue Ipratropium 500 mcg Nebs q6H.   - Continue Levalbuterol 0.63 mg inhalation q6H PRN, re: SOB.   - Continue Acetylcysteine 10% inhalation 5mL QID.   - Start Furosemide 20 mg IVp x 1.  - Possible repeat Thoracentesis and Pleurex Cath on Wednesday, per IR. Likely multifactorial from b/l PE, PNA, and metastatic pleural effusions (patient has a past history of 2 pleural effusions requiring drainage as well cardiac window)  - s/p L thoracentesis 11/29, 700 ccs drained  - US Chest 12/2: Bilateral chest ultrasound showing left pleural effusion. Measurements submitted are consistent with a volume of 1033 cc.  - Seen by Cardio, Dr. CELY Moeller; recs appreciated.  - Seen by Pulm, Dr. Soto; continue O2 and heparin drip  - Seen by Heme/Onc, Dr. Harris  - Tapering Steroids: Continue Prednisone 20mg daily   - Continue Ipratropium 500 mcg Nebs q6H.   - Continue Levalbuterol 0.63 mg inhalation q6H PRN, re: SOB.   - Continue Acetylcysteine 10% inhalation 5mL QID.   - Repeat Thoracentesis and Pleurex Cath on Wednesday, per IR.

## 2019-12-02 NOTE — PROGRESS NOTE ADULT - PROBLEM SELECTOR PLAN 7
- Stable  - Try Lymphedema Pumps if available  - Provide Furosemide after BMP AM.   - Encourage lower extremity elevation. - Stable  - Try Lymphedema Pumps if available  - Start Furosemide 40mg IVp x 1.  - Encourage lower extremity elevation. - Stable  - patient would likely benefit from lymphedema pumps outpatient  - give furosemide 40 mg x1  - Encourage lower extremity elevation.

## 2019-12-03 LAB
ANION GAP SERPL CALC-SCNC: 8 MMOL/L — SIGNIFICANT CHANGE UP (ref 5–17)
APTT BLD: 74.1 SEC — HIGH (ref 28.5–37)
BUN SERPL-MCNC: 27 MG/DL — HIGH (ref 7–23)
CALCIUM SERPL-MCNC: 7.7 MG/DL — LOW (ref 8.5–10.1)
CHLORIDE SERPL-SCNC: 94 MMOL/L — LOW (ref 96–108)
CO2 SERPL-SCNC: 29 MMOL/L — SIGNIFICANT CHANGE UP (ref 22–31)
CREAT SERPL-MCNC: 0.57 MG/DL — SIGNIFICANT CHANGE UP (ref 0.5–1.3)
GLUCOSE SERPL-MCNC: 123 MG/DL — HIGH (ref 70–99)
HCT VFR BLD CALC: 31.6 % — LOW (ref 39–50)
HGB BLD-MCNC: 10 G/DL — LOW (ref 13–17)
MCHC RBC-ENTMCNC: 26.5 PG — LOW (ref 27–34)
MCHC RBC-ENTMCNC: 31.6 GM/DL — LOW (ref 32–36)
MCV RBC AUTO: 83.6 FL — SIGNIFICANT CHANGE UP (ref 80–100)
NRBC # BLD: 0 /100 WBCS — SIGNIFICANT CHANGE UP (ref 0–0)
PLATELET # BLD AUTO: 139 K/UL — LOW (ref 150–400)
POTASSIUM SERPL-MCNC: 3.6 MMOL/L — SIGNIFICANT CHANGE UP (ref 3.5–5.3)
POTASSIUM SERPL-SCNC: 3.6 MMOL/L — SIGNIFICANT CHANGE UP (ref 3.5–5.3)
RBC # BLD: 3.78 M/UL — LOW (ref 4.2–5.8)
RBC # FLD: 18.1 % — HIGH (ref 10.3–14.5)
SODIUM SERPL-SCNC: 131 MMOL/L — LOW (ref 135–145)
WBC # BLD: 26.56 K/UL — HIGH (ref 3.8–10.5)
WBC # FLD AUTO: 26.56 K/UL — HIGH (ref 3.8–10.5)

## 2019-12-03 PROCEDURE — 71045 X-RAY EXAM CHEST 1 VIEW: CPT | Mod: 26

## 2019-12-03 PROCEDURE — 99233 SBSQ HOSP IP/OBS HIGH 50: CPT

## 2019-12-03 RX ORDER — FENTANYL CITRATE 50 UG/ML
1 INJECTION INTRAVENOUS
Refills: 0 | Status: DISCONTINUED | OUTPATIENT
Start: 2019-12-03 | End: 2019-12-06

## 2019-12-03 RX ORDER — FUROSEMIDE 40 MG
40 TABLET ORAL DAILY
Refills: 0 | Status: DISCONTINUED | OUTPATIENT
Start: 2019-12-03 | End: 2019-12-03

## 2019-12-03 RX ORDER — FUROSEMIDE 40 MG
40 TABLET ORAL DAILY
Refills: 0 | Status: DISCONTINUED | OUTPATIENT
Start: 2019-12-03 | End: 2019-12-04

## 2019-12-03 RX ADMIN — SODIUM CHLORIDE 1 GRAM(S): 9 INJECTION INTRAMUSCULAR; INTRAVENOUS; SUBCUTANEOUS at 22:24

## 2019-12-03 RX ADMIN — HYDROMORPHONE HYDROCHLORIDE 1 MILLIGRAM(S): 2 INJECTION INTRAMUSCULAR; INTRAVENOUS; SUBCUTANEOUS at 18:25

## 2019-12-03 RX ADMIN — FENTANYL CITRATE 1 PATCH: 50 INJECTION INTRAVENOUS at 07:22

## 2019-12-03 RX ADMIN — Medication 20 MILLIGRAM(S): at 05:00

## 2019-12-03 RX ADMIN — SODIUM CHLORIDE 1 GRAM(S): 9 INJECTION INTRAMUSCULAR; INTRAVENOUS; SUBCUTANEOUS at 13:43

## 2019-12-03 RX ADMIN — SODIUM CHLORIDE 1 GRAM(S): 9 INJECTION INTRAMUSCULAR; INTRAVENOUS; SUBCUTANEOUS at 05:00

## 2019-12-03 RX ADMIN — Medication 500 MICROGRAM(S): at 00:21

## 2019-12-03 RX ADMIN — HEPARIN SODIUM 1700 UNIT(S)/HR: 5000 INJECTION INTRAVENOUS; SUBCUTANEOUS at 09:30

## 2019-12-03 RX ADMIN — HYDROMORPHONE HYDROCHLORIDE 1 MILLIGRAM(S): 2 INJECTION INTRAMUSCULAR; INTRAVENOUS; SUBCUTANEOUS at 18:10

## 2019-12-03 RX ADMIN — Medication 5 MILLIGRAM(S): at 18:06

## 2019-12-03 RX ADMIN — Medication 100 MILLIGRAM(S): at 05:00

## 2019-12-03 RX ADMIN — HYDROMORPHONE HYDROCHLORIDE 1 MILLIGRAM(S): 2 INJECTION INTRAMUSCULAR; INTRAVENOUS; SUBCUTANEOUS at 05:00

## 2019-12-03 RX ADMIN — LORATADINE 10 MILLIGRAM(S): 10 TABLET ORAL at 11:12

## 2019-12-03 RX ADMIN — BUDESONIDE AND FORMOTEROL FUMARATE DIHYDRATE 2 PUFF(S): 160; 4.5 AEROSOL RESPIRATORY (INHALATION) at 07:25

## 2019-12-03 RX ADMIN — TIOTROPIUM BROMIDE 1 CAPSULE(S): 18 CAPSULE ORAL; RESPIRATORY (INHALATION) at 07:24

## 2019-12-03 RX ADMIN — Medication 1 TABLET(S): at 11:12

## 2019-12-03 RX ADMIN — BUDESONIDE AND FORMOTEROL FUMARATE DIHYDRATE 2 PUFF(S): 160; 4.5 AEROSOL RESPIRATORY (INHALATION) at 18:18

## 2019-12-03 RX ADMIN — Medication 5 MILLIGRAM(S): at 05:00

## 2019-12-03 RX ADMIN — HYDROMORPHONE HYDROCHLORIDE 1 MILLIGRAM(S): 2 INJECTION INTRAMUSCULAR; INTRAVENOUS; SUBCUTANEOUS at 10:16

## 2019-12-03 RX ADMIN — FENTANYL CITRATE 1 PATCH: 50 INJECTION INTRAVENOUS at 19:23

## 2019-12-03 RX ADMIN — FENTANYL CITRATE 1 PATCH: 50 INJECTION INTRAVENOUS at 13:43

## 2019-12-03 RX ADMIN — Medication 0.25 MILLIGRAM(S): at 05:00

## 2019-12-03 RX ADMIN — HYDROMORPHONE HYDROCHLORIDE 1 MILLIGRAM(S): 2 INJECTION INTRAMUSCULAR; INTRAVENOUS; SUBCUTANEOUS at 10:01

## 2019-12-03 RX ADMIN — HYDROMORPHONE HYDROCHLORIDE 1 MILLIGRAM(S): 2 INJECTION INTRAMUSCULAR; INTRAVENOUS; SUBCUTANEOUS at 04:44

## 2019-12-03 RX ADMIN — MAGNESIUM OXIDE 400 MG ORAL TABLET 400 MILLIGRAM(S): 241.3 TABLET ORAL at 11:13

## 2019-12-03 RX ADMIN — HYDROMORPHONE HYDROCHLORIDE 1 MILLIGRAM(S): 2 INJECTION INTRAMUSCULAR; INTRAVENOUS; SUBCUTANEOUS at 22:51

## 2019-12-03 RX ADMIN — CHLORHEXIDINE GLUCONATE 1 APPLICATION(S): 213 SOLUTION TOPICAL at 13:47

## 2019-12-03 RX ADMIN — HYDROMORPHONE HYDROCHLORIDE 1 MILLIGRAM(S): 2 INJECTION INTRAMUSCULAR; INTRAVENOUS; SUBCUTANEOUS at 22:21

## 2019-12-03 RX ADMIN — Medication 500 MICROGRAM(S): at 19:59

## 2019-12-03 NOTE — PROGRESS NOTE ADULT - PROBLEM SELECTOR PLAN 9
Pt with shortness of breath, orthopnea, and 4+ pitting b/l LE.   - patient would likely benefit from lymphedema pumps outpatient  - start Furosemide 40 mg IV daily  - Encourage lower extremity elevation.

## 2019-12-03 NOTE — PROVIDER CONTACT NOTE (OTHER) - ASSESSMENT
Pt feels palpitations and slightly anxious and  SOB. nasal cannula put back in pt nose.
Pt resting in chair, verbalized that the feeling went away after he sat back in chair, denies SOB, CO and CD
Pt's HR sustaining in the 140's,denies CP/CD, dyspnea on excretion noted
Pt OOB to chair, DENIES CP/CD.

## 2019-12-03 NOTE — PROGRESS NOTE ADULT - PROBLEM SELECTOR PLAN 3
- Cardio following; recs appreciated.  - Continue Digoxin 0.25 mg PO daily.   - Continue heparin drip  - Continue Metoprolol 100 mg PO daily with hold parameters.

## 2019-12-03 NOTE — PROGRESS NOTE ADULT - PROBLEM SELECTOR PLAN 1
Likely multifactorial from b/l PE, PNA, and metastatic pleural effusions (patient has a past history of 2 pleural effusions requiring drainage as well cardiac window)  - s/p L thoracentesis 11/29, 700 ccs drained  - US Chest 12/2: Bilateral chest ultrasound showing left pleural effusion, estimated volume of 1033 cc.  - Cardio following, start 40 mg IV Lasix daily  - Pulm following, continue O2 and heparin drip  - Seen by Heme/Onc, check CXR  - Tapering Steroids: Continue Prednisone 20mg daily   - Continue Ipratropium 500 mcg Nebs q6H.   - Continue Levalbuterol 0.63 mg inhalation q6H PRN, re: SOB.   - Continue Acetylcysteine 10% inhalation 5mL QID.   - Repeat Thoracentesis and Pleurex Cath on Wednesday, per IR.  - NPO AFTER MIDNIGHT, STOP HEPARIN DRIP 1 HOUR BEFORE PROCEDURE, RESUME POST-PROCEDURE AS LONG AS THERE IS NO ACTIVE BLEEDING

## 2019-12-03 NOTE — PROGRESS NOTE ADULT - ASSESSMENT
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 He also has a history of a pericardial effusion s/p window, and of a pleural effusion s/p thoracentesis.  Now found to have b/l PE'    Left pleural effusion   - s/p Left thoracentesis 700cc non-clotting hemorrhagic fluid drained 11/29/19.    - post procedure CXR with no PTX   - Pulm following.  Ct chest ordered to determine appropriateness of repeat tapping due to reaccumulation, however, patient unable to tolerate it.  - s/o chest USD, noted to have at least >1L on left  - Followed by Hem/onc  - Was given Lasix 40 mg IV x 2 days.  Will continue daily.  Monitor for contractile alkalosis    PE  - CTA chest showed segmental PE's in B/L lower lobe   - Continue Heparin drip for now.  Management per Primary  - TTE showed hyperdynamic LV, normal RVSF with no significant valvular disease.  However, showed large left pleural effusion s/p thora  - Can keep off of aspirin to minimize risk of bleeding as he is going to be on full dose anticoagulation    Chronic Afib  - Mostly rate-controlled, though had several peaks to 120-140 overnight  - Tachycardia multifactorial in setting of underlying lung cancer, PE, and anemia  - Continue Toprol 100 mg daily (takes 200mg daily  at home).  Unable to increase as there there is not much room given low BP (SBP 90's)  - Continue Digoxin (last level=1.2)  - Monitor and replete electrolytes. Keep K>4.0 and Mg>2.0.     HTN  - BP on the low side at 90's.   - Will tolerate for now as long as he is asymptomatic.  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 He also has a history of a pericardial effusion s/p window, and of a pleural effusion s/p thoracentesis.  Now found to have b/l PE'    Left pleural effusion   - s/p Left thoracentesis 700cc non-clotting hemorrhagic fluid drained 11/29/19.    - post procedure CXR with no PTX   - Pulm following.  Ct chest ordered to determine appropriateness of repeat tapping due to reaccumulation, however, patient unable to tolerate it.  - s/o chest USD, noted to have at least >1L on left.  Follow with Pulm re: decision to pursue thora vs Pleurex, though, the latter is probably more appropriate  - Followed by Hem/onc  - Was given Lasix 40 mg IV x 2 days.  Will continue daily.  Monitor for contractile alkalosis    PE  - CTA chest showed segmental PE's in B/L lower lobe   - Continue Heparin drip for now.  Management per Primary  - TTE showed hyperdynamic LV, normal RVSF with no significant valvular disease.  However, showed large left pleural effusion s/p thora  - Can keep off of aspirin to minimize risk of bleeding as he is going to be on full dose anticoagulation    Chronic Afib  - Mostly rate-controlled, though had several peaks to 120-140 overnight  - Tachycardia multifactorial in setting of underlying lung cancer, PE, and anemia  - Continue Toprol 100 mg daily (takes 200mg daily  at home).  Unable to increase as there there is not much room given low BP (SBP 90's)  - Continue Digoxin (last level=1.2)  - Monitor and replete electrolytes. Keep K>4.0 and Mg>2.0.     HTN  - BP on the low side at 90's.   - Will tolerate for now as long as he is asymptomatic.  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 He also has a history of a pericardial effusion s/p window, and of a pleural effusion s/p thoracentesis.  Now found to have b/l PE'    Left pleural effusion   - s/p Left thoracentesis 700cc non-clotting hemorrhagic fluid drained 11/29/19.    - post procedure CXR with no PTX   - Pulm following.  Ct chest ordered to determine appropriateness of repeat tapping due to reaccumulation, however, patient unable to tolerate it.  - s/o chest USD, noted to have at least >1L on left.  Follow with Pulm re: decision to pursue thora vs Pleurex, though, the latter is probably more appropriate  - Followed by Hem/onc  - Was given Lasix 40 mg IV x 2 days.  Will continue daily.  Monitor for contractile alkalosis.  He is still vol OL with effusions and b/l LE edema    PE  - CTA chest showed segmental PE's in B/L lower lobe   - Continue Heparin drip for now.  Management per Primary  - TTE showed hyperdynamic LV, normal RVSF with no significant valvular disease.  However, showed large left pleural effusion s/p thora  - Can keep off of aspirin to minimize risk of bleeding as he is going to be on full dose anticoagulation    Chronic Afib  - Mostly rate-controlled, though had several peaks to 120-140 overnight  - Tachycardia multifactorial in setting of underlying lung cancer, PE, and anemia  - Continue Toprol 100 mg daily (takes 200mg daily  at home).  Unable to increase as there there is not much room given low BP (SBP 90's)  - Continue Digoxin (last level=1.2)  - Monitor and replete electrolytes. Keep K>4.0 and Mg>2.0.     HTN  - BP on the low side at 90's.   - Will tolerate for now as long as he is asymptomatic.  Will continue BB for rate-control    Lymphedema  - Recommend compression stocking and elevation    Further cardiac w/u pending clinical course  To follow closely with you    Steph Pradhan DNP, NP-C  Cardiology   Spectra #[phone]

## 2019-12-03 NOTE — PROGRESS NOTE ADULT - SUBJECTIVE AND OBJECTIVE BOX
Patient is a 59y old  Male who presents with a chief complaint of PNA (03 Dec 2019 11:49)      INTERVAL HPI/OVERNIGHT EVENTS:    Complains of severe shortness of breath. Ultrasound yesterday revealed 1000 cc of left pleural fluid    MEDICATIONS  (STANDING):  acetylcysteine 10%  Inhalation 5 milliLiter(s) Inhalation four times a day  budesonide 160 MICROgram(s)/formoterol 4.5 MICROgram(s) Inhaler 2 Puff(s) Inhalation two times a day  chlorhexidine 4% Liquid 1 Application(s) Topical daily  digoxin     Tablet 0.25 milliGRAM(s) Oral daily  dronabinol 5 milliGRAM(s) Oral every 12 hours  fentaNYL   Patch  25 MICROgram(s)/Hr 1 Patch Transdermal every 72 hours  furosemide   Injectable 40 milliGRAM(s) IV Push daily  heparin  Infusion.  Unit(s)/Hr (15 mL/Hr) IV Continuous <Continuous>  ipratropium    for Nebulization 500 MICROGram(s) Nebulizer every 6 hours  loratadine 10 milliGRAM(s) Oral daily  magnesium oxide 400 milliGRAM(s) Oral daily  metoprolol succinate  milliGRAM(s) Oral daily  multivitamin 1 Tablet(s) Oral daily  predniSONE   Tablet 20 milliGRAM(s) Oral daily  sodium chloride 1 Gram(s) Oral three times a day  tiotropium 18 MICROgram(s) Capsule 1 Capsule(s) Inhalation daily      MEDICATIONS  (PRN):  guaiFENesin    Syrup 100 milliGRAM(s) Oral every 6 hours PRN Cough  heparin  Injectable 6500 Unit(s) IV Push every 6 hours PRN For aPTT less than 40  heparin  Injectable 3000 Unit(s) IV Push every 6 hours PRN For aPTT between 40 - 57  HYDROmorphone  Injectable 1 milliGRAM(s) IV Push every 4 hours PRN Severe Pain (7 - 10)  levalbuterol Inhalation 0.63 milliGRAM(s) Inhalation every 6 hours PRN shortness of breath  morphine  - Injectable 2 milliGRAM(s) IV Push every 4 hours PRN Moderate Pain (4 - 6)  morphine  - Injectable 1 milliGRAM(s) IV Push every 4 hours PRN Mild Pain (1 - 3)  ondansetron Injectable 4 milliGRAM(s) IV Push every 6 hours PRN Nausea  senna 8.6 milliGRAM(s) Oral Tablet - Peds 1 Tablet(s) Oral at bedtime PRN for constipation      Allergies    penicillin (Unknown)    Intolerances        PAST MEDICAL & SURGICAL HISTORY:  Lung cancer  Depression  Dyspnea  Mediastinal adenopathy  Pericardial effusion  AF (atrial fibrillation)  Hyponatremia  COPD (chronic obstructive pulmonary disease)  HTN (hypertension)  S/P pericardial window creation      Vital Signs Last 24 Hrs  T(C): 36.3 (03 Dec 2019 19:45), Max: 36.8 (02 Dec 2019 23:41)  T(F): 97.4 (03 Dec 2019 19:45), Max: 98.3 (02 Dec 2019 23:41)  HR: 90 (03 Dec 2019 19:59) (82 - 117)  BP: 93/61 (03 Dec 2019 19:45) (90/58 - 128/79)  BP(mean): --  RR: 19 (03 Dec 2019 19:45) (17 - 20)  SpO2: 90% (03 Dec 2019 19:59) (90% - 100%)    PHYSICAL EXAMINATION:    GENERAL: The patient is awake and alert in no apparent distress.     HEENT: Head is normocephalic and atraumatic. Extraocular muscles are intact. Mucous membranes are moist.    NECK: Supple.    LUNGS: diminished breath sounds left base; bilateral rhonchi    HEART: Regular rate and rhythm without murmur.    ABDOMEN: Soft, nontender, and nondistended.      EXTREMITIES: massive edema bilateral    NEUROLOGIC: Grossly intact.    SKIN: No ulceration or induration present.      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

## 2019-12-03 NOTE — PROGRESS NOTE ADULT - PROBLEM SELECTOR PLAN 2
- CTA Chest 11/22/2019 suggestive of pleural-based metastatic disease.  - Pulm following  - Heme/Onc following, change Heparin to Eliquis when possible, Head CT Scan outpt, started on Fentanyl 12mcg 1 patch q72H.  - Continue Morphine 1mg or 2mg  IVp q4H PRN, re: mild or mod pain  - Start Hydromorphone 1 mg IVp q4H PRN, re: severe pain.  - Continue Senna 8.6 mg 1 tab PO QHS.  - Continue Odansetron 4 mg IVp q6H PRN, re: nausea.   - Continue Magnesium Oxide 400 mg PO daily.   - Continue Dronabinol 5 mg PO q12H.

## 2019-12-03 NOTE — PROGRESS NOTE ADULT - ATTENDING COMMENTS
I personally saw and examined the patient in detail.  I have spoken to the above provider regarding the assessment and plan of care.  I reviewed the above assessment and plan of care, and agree.  I have made changes in the body of the note where appropriate.  Total time spent taking care of patient, reviewing data, and discussing case with medical team and staff in excess of 35 minutes.

## 2019-12-03 NOTE — PROGRESS NOTE ADULT - ASSESSMENT
60 yo man well known to our group, w met small cell ca of lung first presented as limited stage [id number] post Carbo//16/Atezolizumab with concurrent RT with initial response but then brain mets 8/2019 s/p WBRT and systemic disease progression on PET/CT 9/2019 with intra-abdominal, bone and pleural disease, started on weekly Taxol with GCSF support due to cytopenia with tx.    Adm now with weakness and dyspnea; found to have pulmonary embolism and further disease progression with pleural effusion, bone lesions, liver lesions and pancreatic mass.  s/p 700cc therapeutic thoracentesis 11/29/19 AM; despite this had re-accumulation of fluid  s/p 1000cc removed 12/02/19    Unable to lie flat for MRI brain.     RECOMMEND  Continue Heparin drip. Consider change to Eliquis when no procedures needed.     s/p therapeutic thoracentesis Fri 11/29/19. No pleurex catheter as insufficient fluid.;   however fluid has quickly re-accumulated and would consider repeat CT chest and possible pleurex placement  Suspect over all dyspnea due to combination: sx due to cancer, fluid, cardiac and PE.   s/p 1000cc thtoacenteiss yesterday. However, remains SOB today.   Repaet CXR today.     May need pleurex.   Consider CT surgery eval for pleurex.    Hold MRI brain. Consider CT scan head outpt as patient unable to lay flat     Continue acute care  Would add pain medication - fentanyl patch  Will follow. 58 yo man well known to our group, w met small cell ca of lung first presented as limited stage [id number] post Carbo//16/Atezolizumab with concurrent RT with initial response but then brain mets 8/2019 s/p WBRT and systemic disease progression on PET/CT 9/2019 with intra-abdominal, bone and pleural disease, started on weekly Taxol with GCSF support due to cytopenia with tx.    Adm now with weakness and dyspnea; found to have pulmonary embolism and further disease progression with pleural effusion, bone lesions, liver lesions and pancreatic mass.  s/p 700cc therapeutic thoracentesis 11/29/19 AM; despite this had re-accumulation of fluid  s/p Chest US with estiamted 1000cc on 12/02/19    Unable to lie flat for MRI brain.     RECOMMEND  Continue Heparin drip. Consider change to Eliquis when no procedures needed.     s/p therapeutic thoracentesis Fri 11/29/19. No pleurex catheter as insufficient fluid.;   however fluid has quickly re-accumulated and would consider repeat CT chest and possible pleurex placement  Suspect over all dyspnea due to combination: sx due to cancer, fluid, cardiac and PE.   However, remains SOB today.   Repeat CXR today.     May need pleurex.   Consider CT surgery eval for pleurex.    Hold MRI brain. Consider CT scan head outpt as patient unable to lay flat     Continue acute care  Would add pain medication - fentanyl patch  Will follow.

## 2019-12-03 NOTE — PROGRESS NOTE ADULT - PROBLEM SELECTOR PLAN 5
BP soft but holding above 90/60s  - Cardio following; recs appreciated.  - Continue to HOLD Diltiazem.  - Continue Metoprolol 100 mg PO daily with hold parameters.

## 2019-12-03 NOTE — CONSULT NOTE ADULT - SUBJECTIVE AND OBJECTIVE BOX
[facility], Division of Infectious Diseases  ANH Valderrama A. Lee    SONI, RAFA  59y, Male  [id number]    HPI--  59M previously known to our group, metastatic small cell lung cancer s/p chemo/neulasta ~1 day PTA, admitted to the hospital 11/22 with shortness of breath. Patient found to have recurrent pleural effusion which was drained. Patient had initially some improvement with his SOB it again has worsened, and the effusion has reaccumulated. He has had a progressive rise in his WBC despite decreasing doses of steroids. He notes increased LE edema (which, in the past, has been related to LAD), He has an annoying dry cough but otherwise denies any other acute complaints. No fevers, chills, or rigors. Generalized aches and pains, but no pleuritic pain/anginal pain. No N/V. Denies diarrhea. No urinary symptoms.     He's been maintained off antibiotics since an initial dose of levofloxacin in the ER.     PMH/PSH--  Lung cancer  Depression  Dyspnea  Mediastinal adenopathy  Pericardial effusion  AF (atrial fibrillation)  Hyponatremia  COPD (chronic obstructive pulmonary disease)  HTN (hypertension)  S/P pericardial window creation  No significant past surgical history      Allergies-- PCN, does not recall reaction, occurred when he was a baby.       Medications--  Antibiotics:   Immunologic:   Other: acetylcysteine 10%  Inhalation  budesonide 160 MICROgram(s)/formoterol 4.5 MICROgram(s) Inhaler  chlorhexidine 4% Liquid  digoxin     Tablet  dronabinol  fentaNYL   Patch  25 MICROgram(s)/Hr  furosemide   Injectable  guaiFENesin    Syrup PRN  heparin  Infusion.  heparin  Injectable PRN  heparin  Injectable PRN  HYDROmorphone  Injectable PRN  ipratropium    for Nebulization  levalbuterol Inhalation PRN  loratadine  magnesium oxide  metoprolol succinate ER  morphine  - Injectable PRN  morphine  - Injectable PRN  multivitamin  ondansetron Injectable PRN  predniSONE   Tablet  senna 8.6 milliGRAM(s) Oral Tablet - Peds PRN  sodium chloride  tiotropium 18 MICROgram(s) Capsule      Social History--  EtOH: denies   Tobacco: denies active, prior smoker  Drug Use: denies     Family/Marital History--    No pertinent family history in first degree relatives  Remainder not relevant to clinical concern.    Travel/Environmental/Occupational History:  [employer]     Review of Systems:  A >=10-point review of systems was obtained.   Review of systems otherwise negative except as previously noted.    Physical Exam--  Vital Signs: T(F): 97.4 (12-03-19 @ 12:26), Max: 98.3 (12-02-19 @ 23:41)  HR: 107 (12-03-19 @ 12:26)  BP: 103/69 (12-03-19 @ 12:26)  RR: 20 (12-03-19 @ 12:26)  SpO2: 100% (12-03-19 @ 12:26)  Wt(kg): --  General: Chronically ill-appearing Male in no mild respiratory distress.  HEENT: Alopecia. PERRL. EOMI. Anicteric. Conjunctiva pink and moist. Oropharynx clear. Dentition fair.  Neck: Not rigid. No sense of mass.  Nodes: None palpable.  Lungs: Diminished breath sounds bilaterally with scattered rhonchi and rales  Heart: Regular rate and rhythm. No Murmur. No rub. No gallop. No palpable thrill.  Abdomen: Bowel sounds present and normoactive. Soft. Mildly distended. Nontender. No sense of mass. No organomegaly.  Back: No spinal tenderness. No costovertebral angle tenderness.   Extremities: No cyanosis or clubbing. 3+ LE edema. UE wasted.  Skin: Warm. Dry. Good turgor. No rash. No vasculitic stigmata.  Psychiatric: Appropriate affect and mood for situation.       Laboratory & Imaging Data--  CBC                        10.0   26.56 )-----------( 139      ( 03 Dec 2019 07:16 )             31.6     WBC Count: 23.94 K/uL (12-02-19 @ 06:20)  WBC Count: 21.77 K/uL (12-01-19 @ 06:27)  WBC Count: 20.24 K/uL (11-30-19 @ 07:54)  WBC Count: 20.70 K/uL (11-29-19 @ 22:33)  WBC Count: 15.88 K/uL (11-29-19 @ 06:58)  WBC Count: 12.65 K/uL (11-28-19 @ 05:32)  WBC Count: 13.93 K/uL (11-27-19 @ 23:54)  WBC Count: 12.91 K/uL (11-27-19 @ 04:59)  WBC Count: 9.50 K/uL (11-26-19 @ 06:14)  WBC Count: 8.83 K/uL (11-25-19 @ 06:58)  WBC Count: 4.59 K/uL (11-24-19 @ 06:09)  WBC Count: 3.58 K/uL (11-23-19 @ 07:22)  WBC Count: 4.24 K/uL (11-22-19 @ 19:37)      Chemistries  12-03    131<L>  |  94<L>  |  27<H>  ----------------------------<  123<H>  3.6   |  29  |  0.57    Ca    7.7<L>      03 Dec 2019 07:16        Culture Data  Culture - Urine (collected 23 Nov 2019 11:14)  Source: .Urine Clean Catch (Midstream)  Final Report (24 Nov 2019 06:55):    No growth    Culture - Blood (collected 22 Nov 2019 23:09)  Source: .Blood Blood-Peripheral  Final Report (28 Nov 2019 01:00):    No growth at 5 days.    Culture - Blood (collected 22 Nov 2019 23:09)  Source: .Blood Blood-Peripheral  Final Report (28 Nov 2019 01:00):    No growth at 5 days.      < from: Xray Chest 1 View- PORTABLE-Urgent (12.03.19 @ 11:33) >  EXAM:  XR CHEST PORTABLE URGENT 1V                        PROCEDURE DATE:  12/03/2019    INTERPRETATION:    DATE OF STUDY: 12/3/2019    PRIOR:12/1/19.    CLINICAL INDICATION: Hx of lung ca. Has SOB. Assess for effusion.    TECHNIQUE: portable chest.    FINDINGS:   Right-sided Port-A-Cath present with tip at cavoatrial junction.  Thoracic aortic atheromatous changes and ectasia are stable.  Heart size is within normal limits.  Bilateral perihilar airspace disease not significantly changed since   prior study. No significant pleural effusion. No pneumothorax  No acute bony findings.    IMPRESSION:   1. Persistent perihilar airspace disease  2. No significant pleural effusion.    DIONNE CARPENTER M.D., ATTENDING RADIOLOGIST  This document has been electronically signed. Dec  3 2019 12:09PM      < end of copied text >    < from: US Chest (12.02.19 @ 10:21) >  EXAM:  US CHEST                        PROCEDURE DATE:  12/02/2019    INTERPRETATION:  Indication: Evaluate for pleural effusion. Antecedent   history of lung carcinoma.  Bilateral chest ultrasound.  No pleural fluid is detected on the right. On the left, there is a   pleural effusion. Measurements submitted are consistent with a volume of   1033 cc.    Impression: Left pleural effusion as described    < end of copied text >    < from: CT Angio Chest w/ IV Cont (11.22.19 @ 21:41) >    EXAM:  CT ABDOMEN AND PELVIS IC                          EXAM:  CT ANGIO CHEST (W)AW IC                            PROCEDURE DATE:  11/22/2019          INTERPRETATION:        Addendum created by Jorgito Manuel MD on 11/22/2019 10:59:51 PM EST     THIS REPORT CONTAINS FINDINGS THAT MAY BE CRITICAL TO PATIENT CARE. The   findings were verbally communicated via telephone conference with JUANJO Quinones at 10:50 PM EST on 11/22/2019. The findings were acknowledged and   understood.        --Patient has history of known lung carcinoma with metastasis.    Initial report created on 11/22/2019 10:59:12 PM EST     PROCEDURE INFORMATION:   Exam: CT Angiography Chest With Contrast   Exam date and time: 11/22/2019 9:25 PM   Age: 59 years old   Clinical history: SOB; Shortness of breath     TECHNIQUE:   Imaging protocol: Computed tomographic angiography of the chest with   intravenous contrast.   3D rendering: MIP reconstructed images were created and reviewed.     COMPARISON:   CT ANGIO CHEST WITHOUT AND OR WITH IV CONTRAST 2/5/2019 12:51 AM     FINDINGS:   Pulmonary arteries: Adequate contrast enhancement of the pulmonary   arteries.   Abnormal filling defects -emboli within bilateral lower lobe segmental   pulmonary arterial vessels.  Aorta: No evidence of thoracic aortic dissection. Chronic atherosclerotic   calcification of the vasculature.   Lungs:    Bilateral groundglass mosaic attenuation, most pronounced in the right   upper and lower lung zones.  Thick-walled left apicalbleb.  Paraseptal emphysematous changes lung apices.  Subpleural areas of scarring/fibrosis left upper and superior segment   left lower lobes.    Pleural space: Moderate-large left pleural effusion nodule left-sided   pleural thickening is suggestive of pleural-based metastatic disease.    Heart: Heart size within normal limits.  Mediastinum: Interval decrease in confluent mediastinal and hilar   lymphadenopathy.  Lymph nodes: Enlarged substernal and left pericardial and pericardial   mediastinal lymph nodes.  Bones/joints: Heterogeneous appearance of the visualized bones, with   areas of mottled sclerosis involving the sternum and left third rib,   suspicious for metastatic disease.      IMPRESSION:    Bilateral segmental lower lobe pulmonary emboli.    Bilateral groundglass, mosaic attenuation most pronounced in the right   lung, may reflect small airway versus small vessel disease.    Moderate left-sided pleural effusion with nodular pleural thickening   suggestive of pleural-based metastatic disease.    Enlarged retrosternal and alla and paracardiac lymph nodes.    Interval decrease in confluent mediastinal/hilar lymphadenopathy.    Findings suggestive of osseous metastatic disease.    See above final report, study was preliminary reported by [facility].      =========================  PROCEDURE INFORMATION:   Exam: CT Abdomen And Pelvis With Contrast   Exam date and time: 11/22/2019 9:25 PM   Age: 59 years old   Clinical history: Other: SOB; Shortness of breath. Metastatic lung cancer.    TECHNIQUE:   Imaging protocol: Computed tomography of the abdomen and pelvis with   intravenous contrast.     COMPARISON:   CT ANGIO CHEST WITHOUT AND OR WITH IV CONTRAST 2/5/2019 12:51 AM     FINDINGS:    There is diffuse nodular thickening along the pleural surface of the left   hemidiaphragm consistent with pleural-based metastatic disease.    Liver: Multiple varying sized low density lesions throughout liver,   largest 2.8 cm lateral segment left lobe.   Gallbladder and bile ducts: Mild layering hyperdensity within the   gallbladder lumen.   Pancreas: Ovoid 3 x 4.5 cm hypodense area body of the pancreas.   Spleen: Normal. No splenomegaly.   Adrenals: Normal. No mass.   Kidneys and ureters: Normal. No hydronephrosis.   Stomach and bowel: Unremarkable. No obstruction. No mucosal thickening.   Appendix: No evidence of appendicitis.   Intraperitoneal space: Mild-moderate abdominal and pelvic free fluid.   Vasculature: Chronic atherosclerotic calcification of the vasculature.   Lymph nodes: Multiple areas enlarged lymph nodes: 3 cm bilateral   retrocrural,   3.6 left periaortic, 2-4 cm lina hepatis, 5 cm along the stomach.     Bladder: Unremarkable as visualized.   Reproductive: Prostate appears within normal limits.   Bones/joints: Chronic degenerative changes of the lumbar spine. Bilateral   pars   defects at L5 with grade 1 spondylolisthesis at L5-S1 level.   Soft tissues: Unremarkable.     IMPRESSION:   1. Possible pancreatic mass-neoplasm with multifocalhepatic metastasis   versus   pancreatic and hepatic metastasis.   2. Extensive upper abdominal and retroperitoneal lymphadenopathy as   described   above.   3. Mild-moderate abdominal and pelvic free fluid.   4. Suspected stones and/or debris in gallbladder. No evidence of   intrahepatic   or extrahepatic biliary ductal dilatation.   5. Bilateral pars defects at L5 with grade 1 spondylolisthesis at L5-S1   level.    See above final report, preliminary reported by [facility] radiology.    MANA VILLEGAS M.D., ATTENDING RADIOLOGIST  This document has been electronically signed. Nov 23 2019  9:13AM    < end of copied text >

## 2019-12-03 NOTE — PROGRESS NOTE ADULT - SUBJECTIVE AND OBJECTIVE BOX
[facility] -- Gurpreet Bush, Corey Diaz, Sajan Pickard [place], [place]  Office # [phone]    Follow Up:  Afib with RVR    Subjective/Observations: Sitting on the chair, NC in use.  Still with SOB and PARKS but not in distress.  Overnight events noted, his HR spiked up to 120-140 intermittently    REVIEW OF SYSTEMS: All other review of systems is negative unless indicated above  PAST MEDICAL & SURGICAL HISTORY:  Lung cancer  Depression  Dyspnea  Mediastinal adenopathy  Pericardial effusion  AF (atrial fibrillation)  Hyponatremia  COPD (chronic obstructive pulmonary disease)  HTN (hypertension)  S/P pericardial window creation    MEDICATIONS  (STANDING):  acetylcysteine 10%  Inhalation 5 milliLiter(s) Inhalation four times a day  budesonide 160 MICROgram(s)/formoterol 4.5 MICROgram(s) Inhaler 2 Puff(s) Inhalation two times a day  chlorhexidine 4% Liquid 1 Application(s) Topical daily  digoxin     Tablet 0.25 milliGRAM(s) Oral daily  dronabinol 5 milliGRAM(s) Oral every 12 hours  fentaNYL   Patch  12 MICROgram(s)/Hr 1 Patch Transdermal every 72 hours  heparin  Infusion.  Unit(s)/Hr (15 mL/Hr) IV Continuous <Continuous>  ipratropium    for Nebulization 500 MICROGram(s) Nebulizer every 6 hours  loratadine 10 milliGRAM(s) Oral daily  magnesium oxide 400 milliGRAM(s) Oral daily  metoprolol succinate  milliGRAM(s) Oral daily  multivitamin 1 Tablet(s) Oral daily  predniSONE   Tablet 20 milliGRAM(s) Oral daily  sodium chloride 1 Gram(s) Oral three times a day  tiotropium 18 MICROgram(s) Capsule 1 Capsule(s) Inhalation daily    MEDICATIONS  (PRN):  guaiFENesin    Syrup 100 milliGRAM(s) Oral every 6 hours PRN Cough  heparin  Injectable 6500 Unit(s) IV Push every 6 hours PRN For aPTT less than 40  heparin  Injectable 3000 Unit(s) IV Push every 6 hours PRN For aPTT between 40 - 57  HYDROmorphone  Injectable 1 milliGRAM(s) IV Push every 4 hours PRN Severe Pain (7 - 10)  levalbuterol Inhalation 0.63 milliGRAM(s) Inhalation every 6 hours PRN shortness of breath  morphine  - Injectable 2 milliGRAM(s) IV Push every 4 hours PRN Moderate Pain (4 - 6)  morphine  - Injectable 1 milliGRAM(s) IV Push every 4 hours PRN Mild Pain (1 - 3)  ondansetron Injectable 4 milliGRAM(s) IV Push every 6 hours PRN Nausea  senna 8.6 milliGRAM(s) Oral Tablet - Peds 1 Tablet(s) Oral at bedtime PRN for constipation    Allergies    penicillin (Unknown)    Intolerances    Vital Signs Last 24 Hrs  T(C): 36.2 (03 Dec 2019 07:56), Max: 36.8 (02 Dec 2019 23:41)  T(F): 97.2 (03 Dec 2019 07:56), Max: 98.3 (02 Dec 2019 23:41)  HR: 105 (03 Dec 2019 07:56) (82 - 117)  BP: 96/65 (03 Dec 2019 07:56) (95/65 - 128/79)  BP(mean): --  RR: 17 (03 Dec 2019 07:56) (17 - 22)  SpO2: 100% (03 Dec 2019 07:56) (94% - 100%)  I&O's Summary    02 Dec 2019 07:01  -  03 Dec 2019 07:00  --------------------------------------------------------  IN: 541 mL / OUT: 1103 mL / NET: -562 mL     PHYSICAL EXAM:  TELE: Afib  Constitutional: NAD, awake and alert, well-developed  HEENT: Moist Mucous Membranes, Anicteric  Pulmonary: Non-labored, breath sounds are diminished bilaterally, No wheezing or rhonchi.   Cardiovascular: Irregularly irregular, S1 and S2, No murmurs, rubs, gallops or clicks  Gastrointestinal: Bowel Sounds present, soft, nontender.   Lymph: 3+ BLE edema. No lymphadenopathy.  Skin: No visible rashes or ulcers.  Very pale  Psych:  Mood & affect appropriate  LABS: All Labs Reviewed:                        10.0   26.56 )-----------( 139      ( 03 Dec 2019 07:16 )             31.6                         9.9    23.94 )-----------( 136      ( 02 Dec 2019 06:20 )             30.9                         10.6   21.77 )-----------( 141      ( 01 Dec 2019 06:27 )             32.8     03 Dec 2019 07:16    131    |  94     |  27     ----------------------------<  123    3.6     |  29     |  0.57   02 Dec 2019 11:41    134    |  97     |  27     ----------------------------<  136    4.5     |  29     |  0.62     Ca    7.7        03 Dec 2019 07:16  Ca    8.5        02 Dec 2019 11:41  PTT - ( 03 Dec 2019 07:16 )  PTT:74.1 sec    < from: TTE Echo Doppler w/o Cont (11.23.19 @ 15:29) >     EXAM:  ECHO TTE WO CON COMP W DOPPLR         PROCEDURE DATE:  11/23/2019        INTERPRETATION:  Ordering Physician: DOMINIC SERRATO [id number]    Indication: Pulmonary embolism  Technologist Initials: DL  Study Quality: Technically difficult and limited   A complete echocardiographic study was performed utilizing standard   protocol including spectral and color Doppler in all echocardiographic   windows.    Height: 183 cm  Weight: 82 kg  BSA: 2.0 sq m  Blood Pressure: 101/66    MEASUREMENTS  IVS: 1.2cm  PWT: 1.1cm  LA: 3.0cm  AO: 3.8cm  LVIDd: 3.7cm  LVIDs: 2.0cm    LVEF: 75%    FINDINGS  Left Ventricle: Hyperdynamic left ventricular systolic function.  Aortic Valve: The aortic valve is not well-visualized. It appears   calcified. No significant aortic insufficiency.  Mitral Valve: Mitral annular calcification calcified mitral valve   leaflets. Mild mitral insufficiency.  Tricuspid Valve: Not well visualized  Pulmonic Valve: Not well visualized  Left Atrium: Grossly normal  Right Ventricle: The right ventricle is not well visualized. Grossly,   normal right ventricular size and systolic function.  Right Atrium: Grossly normal    Pericardium/Pleura: Large left pleural effusion.  Echogenic material is   noted in the pleural space.    CONCLUSIONS:  Technically difficult and limited study.  1. Hyperdynamic left ventricular systolic function.  2. Mitral annular calcification and calcified mitral valve leaflets with   mild mitral insufficiency.  3. Grossly normal left atrial size.  4. The right ventricle is not well visualized. Grossly, normal right   ventricular size and systolic function.  5.  Large left pleural effusion which echogenic material noted in the   pleural space.    QAMAR ECHEVARRIA M.D., ATTENDING CARDIOLOGIST  This document has been electronically signed. Nov 24 2019  7:29AM      < end of copied text >  < from: US Chest (12.02.19 @ 10:21) >    EXAM:  US CHEST                          PROCEDURE DATE:  12/02/2019      INTERPRETATION:  Indication: Evaluate for pleural effusion. Antecedent   history of lung carcinoma.    Bilateral chest ultrasound.    No pleural fluid is detected on the right. On the left, there is a   pleural effusion. Measurements submitted are consistent with a volume of   1033 cc.    Impression: Left pleural effusion as described    JERICA FOSS M.D., ATTENDING RADIOLOGIST  This document has beenelectronically signed. Dec  2 2019 10:59AM      < end of copied text >    < from: 12 Lead ECG (11.22.19 @ 21:48) >    Ventricular Rate 159 BPM    Atrial Rate 258 BPM    QRS Duration 120 ms    Q-T Interval 296 ms    QTC Calculation(Bezet) 481 ms    R Axis 34 degrees    T Axis 30 degrees    Diagnosis Line Artifact.  Repeat  Confirmed by QAMAR PICKARD ([id number]) on 11/23/2019 1:42:28 PM    < end of copied text > [facility] -- Gurpreet Bush, Corey Diaz, Sajan Pickard [place], [place]  Office # [phone]    Follow Up:  Afib with RVR    Subjective/Observations: Sitting on the chair, NC in use.  Still with SOB and PARKS but not in distress.  Overnight events noted, his HR spiked up to 120-140 intermittently    REVIEW OF SYSTEMS: All other review of systems is negative unless indicated above  PAST MEDICAL & SURGICAL HISTORY:  Lung cancer  Depression  Dyspnea  Mediastinal adenopathy  Pericardial effusion  AF (atrial fibrillation)  Hyponatremia  COPD (chronic obstructive pulmonary disease)  HTN (hypertension)  S/P pericardial window creation    MEDICATIONS  (STANDING):  acetylcysteine 10%  Inhalation 5 milliLiter(s) Inhalation four times a day  budesonide 160 MICROgram(s)/formoterol 4.5 MICROgram(s) Inhaler 2 Puff(s) Inhalation two times a day  chlorhexidine 4% Liquid 1 Application(s) Topical daily  digoxin     Tablet 0.25 milliGRAM(s) Oral daily  dronabinol 5 milliGRAM(s) Oral every 12 hours  fentaNYL   Patch  12 MICROgram(s)/Hr 1 Patch Transdermal every 72 hours  heparin  Infusion.  Unit(s)/Hr (15 mL/Hr) IV Continuous <Continuous>  ipratropium    for Nebulization 500 MICROGram(s) Nebulizer every 6 hours  loratadine 10 milliGRAM(s) Oral daily  magnesium oxide 400 milliGRAM(s) Oral daily  metoprolol succinate  milliGRAM(s) Oral daily  multivitamin 1 Tablet(s) Oral daily  predniSONE   Tablet 20 milliGRAM(s) Oral daily  sodium chloride 1 Gram(s) Oral three times a day  tiotropium 18 MICROgram(s) Capsule 1 Capsule(s) Inhalation daily    MEDICATIONS  (PRN):  guaiFENesin    Syrup 100 milliGRAM(s) Oral every 6 hours PRN Cough  heparin  Injectable 6500 Unit(s) IV Push every 6 hours PRN For aPTT less than 40  heparin  Injectable 3000 Unit(s) IV Push every 6 hours PRN For aPTT between 40 - 57  HYDROmorphone  Injectable 1 milliGRAM(s) IV Push every 4 hours PRN Severe Pain (7 - 10)  levalbuterol Inhalation 0.63 milliGRAM(s) Inhalation every 6 hours PRN shortness of breath  morphine  - Injectable 2 milliGRAM(s) IV Push every 4 hours PRN Moderate Pain (4 - 6)  morphine  - Injectable 1 milliGRAM(s) IV Push every 4 hours PRN Mild Pain (1 - 3)  ondansetron Injectable 4 milliGRAM(s) IV Push every 6 hours PRN Nausea  senna 8.6 milliGRAM(s) Oral Tablet - Peds 1 Tablet(s) Oral at bedtime PRN for constipation    Allergies    penicillin (Unknown)    Intolerances    Vital Signs Last 24 Hrs  T(C): 36.2 (03 Dec 2019 07:56), Max: 36.8 (02 Dec 2019 23:41)  T(F): 97.2 (03 Dec 2019 07:56), Max: 98.3 (02 Dec 2019 23:41)  HR: 105 (03 Dec 2019 07:56) (82 - 117)  BP: 96/65 (03 Dec 2019 07:56) (95/65 - 128/79)  BP(mean): --  RR: 17 (03 Dec 2019 07:56) (17 - 22)  SpO2: 100% (03 Dec 2019 07:56) (94% - 100%)  I&O's Summary    02 Dec 2019 07:01  -  03 Dec 2019 07:00  --------------------------------------------------------  IN: 541 mL / OUT: 1103 mL / NET: -562 mL     PHYSICAL EXAM:  TELE: Afib  Constitutional: NAD, awake and alert, emaciated  HEENT: Moist Mucous Membranes, Anicteric  Pulmonary: Non-labored, breath sounds are diminished bilaterally L>R, No wheezing or rhonchi.   Cardiovascular: Irregularly irregular, S1 and S2, No murmurs, rubs, gallops or clicks  Gastrointestinal: Bowel Sounds present, soft, nontender.   Lymph: 3+ BLE edema. No lymphadenopathy.  Skin: No visible rashes or ulcers.  Very pale  Psych:  Mood & affect appropriate  LABS: All Labs Reviewed:                        10.0   26.56 )-----------( 139      ( 03 Dec 2019 07:16 )             31.6                         9.9    23.94 )-----------( 136      ( 02 Dec 2019 06:20 )             30.9                         10.6   21.77 )-----------( 141      ( 01 Dec 2019 06:27 )             32.8     03 Dec 2019 07:16    131    |  94     |  27     ----------------------------<  123    3.6     |  29     |  0.57   02 Dec 2019 11:41    134    |  97     |  27     ----------------------------<  136    4.5     |  29     |  0.62     Ca    7.7        03 Dec 2019 07:16  Ca    8.5        02 Dec 2019 11:41  PTT - ( 03 Dec 2019 07:16 )  PTT:74.1 sec    < from: TTE Echo Doppler w/o Cont (11.23.19 @ 15:29) >     EXAM:  ECHO TTE WO CON COMP W DOPPLR         PROCEDURE DATE:  11/23/2019        INTERPRETATION:  Ordering Physician: DOMINIC SERRATO [id number]    Indication: Pulmonary embolism  Technologist Initials: DL  Study Quality: Technically difficult and limited   A complete echocardiographic study was performed utilizing standard   protocol including spectral and color Doppler in all echocardiographic   windows.    Height: 183 cm  Weight: 82 kg  BSA: 2.0 sq m  Blood Pressure: 101/66    MEASUREMENTS  IVS: 1.2cm  PWT: 1.1cm  LA: 3.0cm  AO: 3.8cm  LVIDd: 3.7cm  LVIDs: 2.0cm    LVEF: 75%    FINDINGS  Left Ventricle: Hyperdynamic left ventricular systolic function.  Aortic Valve: The aortic valve is not well-visualized. It appears   calcified. No significant aortic insufficiency.  Mitral Valve: Mitral annular calcification calcified mitral valve   leaflets. Mild mitral insufficiency.  Tricuspid Valve: Not well visualized  Pulmonic Valve: Not well visualized  Left Atrium: Grossly normal  Right Ventricle: The right ventricle is not well visualized. Grossly,   normal right ventricular size and systolic function.  Right Atrium: Grossly normal    Pericardium/Pleura: Large left pleural effusion.  Echogenic material is   noted in the pleural space.    CONCLUSIONS:  Technically difficult and limited study.  1. Hyperdynamic left ventricular systolic function.  2. Mitral annular calcification and calcified mitral valve leaflets with   mild mitral insufficiency.  3. Grossly normal left atrial size.  4. The right ventricle is not well visualized. Grossly, normal right   ventricular size and systolic function.  5.  Large left pleural effusion which echogenic material noted in the   pleural space.    QAMAR ECHEVARRIA M.D., ATTENDING CARDIOLOGIST  This document has been electronically signed. Nov 24 2019  7:29AM      < end of copied text >  < from: US Chest (12.02.19 @ 10:21) >    EXAM:  US CHEST                          PROCEDURE DATE:  12/02/2019      INTERPRETATION:  Indication: Evaluate for pleural effusion. Antecedent   history of lung carcinoma.    Bilateral chest ultrasound.    No pleural fluid is detected on the right. On the left, there is a   pleural effusion. Measurements submitted are consistent with a volume of   1033 cc.    Impression: Left pleural effusion as described    JERICA FOSS M.D., ATTENDING RADIOLOGIST  This document has beenelectronically signed. Dec  2 2019 10:59AM      < end of copied text >    < from: 12 Lead ECG (11.22.19 @ 21:48) >    Ventricular Rate 159 BPM    Atrial Rate 258 BPM    QRS Duration 120 ms    Q-T Interval 296 ms    QTC Calculation(Bezet) 481 ms    R Axis 34 degrees    T Axis 30 degrees    Diagnosis Line Artifact.  Repeat  Confirmed by QAMAR PICKARD ([id number]) on 11/23/2019 1:42:28 PM    < end of copied text >

## 2019-12-03 NOTE — PROGRESS NOTE ADULT - SUBJECTIVE AND OBJECTIVE BOX
[INTERVAL HX: ]  Patient seen and examined;  Chart reviewed and events noted;   c/o marked increased LE edema.   Feels more SOB. Dec BS on L lung.     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 Patient states the SOB has been worsening and that's what prompted him to come to the ED. He denies recent fevers, chills but admits he's been shaking since he's been on chemotherapy. Admits on and off nausea, and episodes of vomiting over the past week. Denies hematemesis/hemoptysis. Denies chest pain, palpitations, abdominal pain, diarrhea, constipation. Admits LE edema which has been worsening in past week, and reports 100 lb weight loss since January. He has had no appetite, and reports not eating anything at all. He notes he recently changed chem to Paclitaxel, as per patient a body scan demonstrated mets to brain and bone (R shoulder, L ribs). His last radiation dose was August 2019.     In the ED:  T: 97 HR: 98 BP: 86/58 RR: 22 92% RA   WBC: 4.24 h/h: 9.2/27.6 coags wnl, D-Dimer 2411  Lactate 2.5 Na: 135, K: 3.0  Cr: 0.57 glucose 134 TSH: 2.37 proBNP 1397  s/p duonebs, 100 mg hydrocortisone, potassium chloride 10x3  U/S doppler LE: L superificial thrombus noted  -CTA and CT abdomen and pelvis pending  -EKG, significant artificant due to shaking. Official read pending (22 Nov 2019 20:57)      MEDICATIONS  (STANDING):  acetylcysteine 10%  Inhalation 5 milliLiter(s) Inhalation four times a day  budesonide 160 MICROgram(s)/formoterol 4.5 MICROgram(s) Inhaler 2 Puff(s) Inhalation two times a day  chlorhexidine 4% Liquid 1 Application(s) Topical daily  digoxin     Tablet 0.25 milliGRAM(s) Oral daily  dronabinol 5 milliGRAM(s) Oral every 12 hours  fentaNYL   Patch  12 MICROgram(s)/Hr 1 Patch Transdermal every 72 hours  furosemide   Injectable 40 milliGRAM(s) IV Push daily  heparin  Infusion.  Unit(s)/Hr (15 mL/Hr) IV Continuous <Continuous>  ipratropium    for Nebulization 500 MICROGram(s) Nebulizer every 6 hours  loratadine 10 milliGRAM(s) Oral daily  magnesium oxide 400 milliGRAM(s) Oral daily  metoprolol succinate  milliGRAM(s) Oral daily  multivitamin 1 Tablet(s) Oral daily  predniSONE   Tablet 20 milliGRAM(s) Oral daily  sodium chloride 1 Gram(s) Oral three times a day  tiotropium 18 MICROgram(s) Capsule 1 Capsule(s) Inhalation daily    MEDICATIONS  (PRN):  guaiFENesin    Syrup 100 milliGRAM(s) Oral every 6 hours PRN Cough  heparin  Injectable 6500 Unit(s) IV Push every 6 hours PRN For aPTT less than 40  heparin  Injectable 3000 Unit(s) IV Push every 6 hours PRN For aPTT between 40 - 57  HYDROmorphone  Injectable 1 milliGRAM(s) IV Push every 4 hours PRN Severe Pain (7 - 10)  levalbuterol Inhalation 0.63 milliGRAM(s) Inhalation every 6 hours PRN shortness of breath  morphine  - Injectable 2 milliGRAM(s) IV Push every 4 hours PRN Moderate Pain (4 - 6)  morphine  - Injectable 1 milliGRAM(s) IV Push every 4 hours PRN Mild Pain (1 - 3)  ondansetron Injectable 4 milliGRAM(s) IV Push every 6 hours PRN Nausea  senna 8.6 milliGRAM(s) Oral Tablet - Peds 1 Tablet(s) Oral at bedtime PRN for constipation      Vital Signs Last 24 Hrs  T(C): 36.2 (03 Dec 2019 07:56), Max: 36.8 (02 Dec 2019 23:41)  T(F): 97.2 (03 Dec 2019 07:56), Max: 98.3 (02 Dec 2019 23:41)  HR: 115 (03 Dec 2019 10:16) (82 - 117)  BP: 93/61 (03 Dec 2019 10:16) (93/61 - 128/79)  BP(mean): --  RR: 20 (03 Dec 2019 10:16) (17 - 22)  SpO2: 100% (03 Dec 2019 07:56) (94% - 100%)      [PHYSICAL EXAM]  General: adult in NAD,  WN,  WD.  HEENT: clear oropharynx, anicteric sclera, pink conjunctivae.  Neck: supple, no masses.  CV: normal S1S2, no murmur, no rubs, no gallops.  Lungs: clear to auscultation, no wheezes, no rales, no rhonchi.  Abdomen: soft, non-tender, non-distended, no hepatosplenomegaly, normal BS, no guarding.  Ext: no clubbing, no cyanosis, no edema.  Skin: no rashes,  no petechiae, no venous stasis changes.  Neuro: alert and oriented X3  , no focal motor deficits.  LN: no SC STEVE.      [LABS:]                        10.0   26.56 )-----------( 139      ( 03 Dec 2019 07:16 )             31.6     12-03    131<L>  |  94<L>  |  27<H>  ----------------------------<  123<H>  3.6   |  29  |  0.57    Ca    7.7<L>      03 Dec 2019 07:16      PTT - ( 03 Dec 2019 07:16 )  PTT:74.1 sec              [RADIOLOGY STUDIES:] [INTERVAL HX: ]  Patient seen and examined;  Chart reviewed and events noted;   c/o marked increased LE edema.   Feels more SOB. Dec BS on L lung.     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 Patient states the SOB has been worsening and that's what prompted him to come to the ED. He denies recent fevers, chills but admits he's been shaking since he's been on chemotherapy. Admits on and off nausea, and episodes of vomiting over the past week. Denies hematemesis/hemoptysis. Denies chest pain, palpitations, abdominal pain, diarrhea, constipation. Admits LE edema which has been worsening in past week, and reports 100 lb weight loss since January. He has had no appetite, and reports not eating anything at all. He notes he recently changed chem to Paclitaxel, as per patient a body scan demonstrated mets to brain and bone (R shoulder, L ribs). His last radiation dose was August 2019.     In the ED:  T: 97 HR: 98 BP: 86/58 RR: 22 92% RA   WBC: 4.24 h/h: 9.2/27.6 coags wnl, D-Dimer 2411  Lactate 2.5 Na: 135, K: 3.0  Cr: 0.57 glucose 134 TSH: 2.37 proBNP 1397  s/p duonebs, 100 mg hydrocortisone, potassium chloride 10x3  U/S doppler LE: L superificial thrombus noted  -CTA and CT abdomen and pelvis pending  -EKG, significant artificant due to shaking. Official read pending (22 Nov 2019 20:57)      MEDICATIONS  (STANDING):  acetylcysteine 10%  Inhalation 5 milliLiter(s) Inhalation four times a day  budesonide 160 MICROgram(s)/formoterol 4.5 MICROgram(s) Inhaler 2 Puff(s) Inhalation two times a day  chlorhexidine 4% Liquid 1 Application(s) Topical daily  digoxin     Tablet 0.25 milliGRAM(s) Oral daily  dronabinol 5 milliGRAM(s) Oral every 12 hours  fentaNYL   Patch  12 MICROgram(s)/Hr 1 Patch Transdermal every 72 hours  furosemide   Injectable 40 milliGRAM(s) IV Push daily  heparin  Infusion.  Unit(s)/Hr (15 mL/Hr) IV Continuous <Continuous>  ipratropium    for Nebulization 500 MICROGram(s) Nebulizer every 6 hours  loratadine 10 milliGRAM(s) Oral daily  magnesium oxide 400 milliGRAM(s) Oral daily  metoprolol succinate  milliGRAM(s) Oral daily  multivitamin 1 Tablet(s) Oral daily  predniSONE   Tablet 20 milliGRAM(s) Oral daily  sodium chloride 1 Gram(s) Oral three times a day  tiotropium 18 MICROgram(s) Capsule 1 Capsule(s) Inhalation daily    MEDICATIONS  (PRN):  guaiFENesin    Syrup 100 milliGRAM(s) Oral every 6 hours PRN Cough  heparin  Injectable 6500 Unit(s) IV Push every 6 hours PRN For aPTT less than 40  heparin  Injectable 3000 Unit(s) IV Push every 6 hours PRN For aPTT between 40 - 57  HYDROmorphone  Injectable 1 milliGRAM(s) IV Push every 4 hours PRN Severe Pain (7 - 10)  levalbuterol Inhalation 0.63 milliGRAM(s) Inhalation every 6 hours PRN shortness of breath  morphine  - Injectable 2 milliGRAM(s) IV Push every 4 hours PRN Moderate Pain (4 - 6)  morphine  - Injectable 1 milliGRAM(s) IV Push every 4 hours PRN Mild Pain (1 - 3)  ondansetron Injectable 4 milliGRAM(s) IV Push every 6 hours PRN Nausea  senna 8.6 milliGRAM(s) Oral Tablet - Peds 1 Tablet(s) Oral at bedtime PRN for constipation      Vital Signs Last 24 Hrs  T(C): 36.2 (03 Dec 2019 07:56), Max: 36.8 (02 Dec 2019 23:41)  T(F): 97.2 (03 Dec 2019 07:56), Max: 98.3 (02 Dec 2019 23:41)  HR: 115 (03 Dec 2019 10:16) (82 - 117)  BP: 93/61 (03 Dec 2019 10:16) (93/61 - 128/79)  BP(mean): --  RR: 20 (03 Dec 2019 10:16) (17 - 22)  SpO2: 100% (03 Dec 2019 07:56) (94% - 100%)      [PHYSICAL EXAM]  General: adult in NAD,  WN,  WD.  HEENT: clear oropharynx, anicteric sclera, pink conjunctivae.  Neck: supple, no masses.  CV: normal S1S2, no murmur, no rubs, no gallops.  Lungs: dec BS on L>R, no wheezes, basilar /transition rales, no rhonchi.  Abdomen: soft, non-tender, non-distended, no hepatosplenomegaly, normal BS, no guarding.  Ext: no clubbing, no cyanosis, +3 BL LE calf edema.  Skin: no rashes,  no petechiae, no venous stasis changes.  Neuro: alert and oriented X3  , no focal motor deficits.  LN: no SC STEVE.      [LABS:]                        10.0   26.56 )-----------( 139      ( 03 Dec 2019 07:16 )             31.6     12-03    131<L>  |  94<L>  |  27<H>  ----------------------------<  123<H>  3.6   |  29  |  0.57    Ca    7.7<L>      03 Dec 2019 07:16      PTT - ( 03 Dec 2019 07:16 )  PTT:74.1 sec              [RADIOLOGY STUDIES:]

## 2019-12-03 NOTE — PROGRESS NOTE ADULT - PROBLEM SELECTOR PLAN 10
- Continue Heparin drip    IMPROVE VTE Individual Risk Assessment    RISK                                                                Points  [  ] Previous VTE                                                  3  [  ] Thrombophilia                                               2  [  ] Lower limb paralysis                                      2        (unable to hold up >15 seconds)    [ x ] Current Cancer                                              2         (within 6 months)  [  ] Immobilization > 24 hrs                                1  [  ] ICU/CCU stay > 24 hours                              1  [ x ] Age > 60                                                      1  IMPROVE VTE Score: 3    IMPROVE Score 0-1: Low Risk, No VTE prophylaxis required for most patients, encourage ambulation.   IMPROVE Score 2-3: At risk, pharmacologic VTE prophylaxis is indicated for most patients (in the absence of a contraindication)  IMPROVE Score > or = 4: High Risk, pharmacologic VTE prophylaxis is indicated for most patients (in the absence of a contraindication).

## 2019-12-03 NOTE — PROGRESS NOTE ADULT - SUBJECTIVE AND OBJECTIVE BOX
SONIRAFA TRINH  59y  Male      Patient is a 59y old  Male who presents with a chief complaint of PNA (03 Dec 2019 08:56)      INTERVAL HPI/OVERNIGHT EVENTS: Pt seen and examined at bedside. No acute events overnight but had asymptomatic rises in HR to the 120s. Reports sob and cough is unchanged.         REVIEW OF SYSTEMS:  CONSTITUTIONAL: No fever, weight loss, or fatigue  EYES: No eye pain, visual disturbances, or discharge  ENMT:  No difficulty hearing, tinnitus, vertigo; No sinus or throat pain  NECK: No pain or stiffness  BREASTS: No pain, masses, or nipple discharge  RESPIRATORY: No cough, wheezing, chills or hemoptysis; No shortness of breath  CARDIOVASCULAR: No chest pain, palpitations, dizziness, or leg swelling  GASTROINTESTINAL: No abdominal or epigastric pain. No nausea, vomiting, or hematemesis; No diarrhea or constipation. No melena or hematochezia.  GENITOURINARY: No dysuria, frequency, hematuria, or incontinence  NEUROLOGICAL: No headaches, memory loss, loss of strength, numbness, or tremors  SKIN: No itching, burning, rashes, or lesions   LYMPH NODES: No enlarged glands  ENDOCRINE: No heat or cold intolerance; No hair loss  MUSCULOSKELETAL: No joint pain or swelling; No muscle, back, or extremity pain  PSYCHIATRIC: No depression, anxiety, mood swings, or difficulty sleeping  HEME/LYMPH: No easy bruising, or bleeding gums  ALLERY AND IMMUNOLOGIC: No hives or eczema    T(C): 36.2 (12-03-19 @ 07:56), Max: 36.8 (12-02-19 @ 23:41)  HR: 115 (12-03-19 @ 10:16) (82 - 117)  BP: 93/61 (12-03-19 @ 10:16) (93/61 - 128/79)  RR: 20 (12-03-19 @ 10:16) (17 - 22)  SpO2: 100% (12-03-19 @ 07:56) (94% - 100%)  Wt(kg): --Vital Signs Last 24 Hrs  T(C): 36.2 (03 Dec 2019 07:56), Max: 36.8 (02 Dec 2019 23:41)  T(F): 97.2 (03 Dec 2019 07:56), Max: 98.3 (02 Dec 2019 23:41)  HR: 115 (03 Dec 2019 10:16) (82 - 117)  BP: 93/61 (03 Dec 2019 10:16) (93/61 - 128/79)  BP(mean): --  RR: 20 (03 Dec 2019 10:16) (17 - 22)  SpO2: 100% (03 Dec 2019 07:56) (94% - 100%)    PHYSICAL EXAM:  GENERAL: NAD, well-groomed, well-developed  HEAD:  Atraumatic, Normocephalic  EYES: EOMI, PERRLA, conjunctiva and sclera clear  ENMT: No tonsillar erythema, exudates, or enlargement; Moist mucous membranes, Good dentition, No lesions  NECK: Supple, No JVD, Normal thyroid  NERVOUS SYSTEM:  Alert & Oriented X3, Good concentration; Motor Strength 5/5 B/L upper and lower extremities; DTRs 2+ intact and symmetric  CHEST/LUNG: Clear to percussion bilaterally; No rales, rhonchi, wheezing, or rubs  HEART: Regular rate and rhythm; No murmurs, rubs, or gallops  ABDOMEN: Soft, Nontender, Nondistended; Bowel sounds present  EXTREMITIES:  2+ Peripheral Pulses, No clubbing, cyanosis, or edema  LYMPH: No lymphadenopathy noted  SKIN: No rashes or lesions    Consultant(s) Notes Reviewed:  [x ] YES  [ ] NO  Care Discussed with Consultants/Other Providers [ x] YES  [ ] NO    LABS:                        10.0   26.56 )-----------( 139      ( 03 Dec 2019 07:16 )             31.6     12-03    131<L>  |  94<L>  |  27<H>  ----------------------------<  123<H>  3.6   |  29  |  0.57    Ca    7.7<L>      03 Dec 2019 07:16      PTT - ( 03 Dec 2019 07:16 )  PTT:74.1 sec    CAPILLARY BLOOD GLUCOSE                RADIOLOGY & ADDITIONAL TESTS:    Imaging Personally Reviewed:  [ ] YES  [ ] NO    HEALTH ISSUES - PROBLEM Dx:  AF (atrial fibrillation): AF (atrial fibrillation)  Need for prophylactic measure: Need for prophylactic measure  Lower extremity edema: Lower extremity edema  Pressure ulcer: Pressure ulcer  Thrombophlebitis: Thrombophlebitis  DNR (do not resuscitate): DNR (do not resuscitate)  HTN (hypertension): HTN (hypertension)  COPD (chronic obstructive pulmonary disease): COPD (chronic obstructive pulmonary disease)  Lung cancer: Lung cancer  SOB (shortness of breath): SOB (shortness of breath) RAFA SHAFER  59y  Male      Patient is a 59y old  Male who presents with a chief complaint of PNA (03 Dec 2019 08:56)      INTERVAL HPI/OVERNIGHT EVENTS: Pt seen and examined at bedside. No acute events overnight but had asymptomatic rises in HR to the 120s. Reports sob and cough, back pain is unchanged. Appetite is poor. Otherwise, has no complaints.      REVIEW OF SYSTEMS:  CONSTITUTIONAL: No fever, chills, or fatigue  EYES: No eye pain, visual disturbances, or discharge  ENMT:  No difficulty hearing, tinnitus, vertigo; No sinus or throat pain  NECK: No pain or stiffness  RESPIRATORY: Admits cough, shortness of breath, No wheezing, chills or hemoptysis  CARDIOVASCULAR: Admits chronic leg swelling, No chest pain, palpitations, dizziness  GASTROINTESTINAL: No abdominal or epigastric pain. No nausea, vomiting, or hematemesis; No diarrhea or constipation. No melena or hematochezia.  GENITOURINARY: No dysuria, frequency, hematuria, or incontinence  NEUROLOGICAL: No headaches, memory loss, loss of strength, numbness, or tremors  SKIN: No itching, burning, rashes, or lesions   MUSCULOSKELETAL: Admits back pain, No joint pain or swelling; No extremity pain  PSYCHIATRIC: No depression, anxiety, mood swings, or difficulty sleeping  ALLERY AND IMMUNOLOGIC: No hives or eczema    T(C): 36.2 (12-03-19 @ 07:56), Max: 36.8 (12-02-19 @ 23:41)  HR: 115 (12-03-19 @ 10:16) (82 - 117)  BP: 93/61 (12-03-19 @ 10:16) (93/61 - 128/79)  RR: 20 (12-03-19 @ 10:16) (17 - 22)  SpO2: 100% (12-03-19 @ 07:56) (94% - 100%)  Wt(kg): --Vital Signs Last 24 Hrs  T(C): 36.2 (03 Dec 2019 07:56), Max: 36.8 (02 Dec 2019 23:41)  T(F): 97.2 (03 Dec 2019 07:56), Max: 98.3 (02 Dec 2019 23:41)  HR: 115 (03 Dec 2019 10:16) (82 - 117)  BP: 93/61 (03 Dec 2019 10:16) (93/61 - 128/79)  BP(mean): --  RR: 20 (03 Dec 2019 10:16) (17 - 22)  SpO2: 100% (03 Dec 2019 07:56) (94% - 100%)    PHYSICAL EXAM:  GENERAL: NAD, cachectic, right chemo port  HEAD:  Atraumatic, Normocephalic, bald  EYES: EOMI, PERRLA, conjunctiva and sclera clear  ENMT: No tonsillar erythema, exudates, or enlargement; Moist mucous membranes, Good dentition, No lesions  NECK: Supple, No JVD, Normal thyroid  NERVOUS SYSTEM:  Alert & Oriented X3, Good concentration  CHEST/LUNG: Crackles on left middle and lower lung fields; No rales, rhonchi, wheezing, or rubs  HEART: Irregularly irregular rate and rhythm; No murmurs, rubs, or gallops  ABDOMEN: Soft, Nontender, Nondistended; Bowel sounds present  EXTREMITIES:  4+ pitting edema, no clubbing or cyanosis  LYMPH: No lymphadenopathy noted  SKIN: No rashes or lesions    Consultant(s) Notes Reviewed:  [x ] YES  [ ] NO  Care Discussed with Consultants/Other Providers [ x] YES  [ ] NO    LABS:                        10.0   26.56 )-----------( 139      ( 03 Dec 2019 07:16 )             31.6     12-03    131<L>  |  94<L>  |  27<H>  ----------------------------<  123<H>  3.6   |  29  |  0.57    Ca    7.7<L>      03 Dec 2019 07:16      PTT - ( 03 Dec 2019 07:16 )  PTT:74.1 sec          RADIOLOGY & ADDITIONAL TESTS:  < from: US Chest (12.02.19 @ 10:21) >  No pleural fluid is detected on the right. On the left, there is a   pleural effusion. Measurements submitted are consistent with a volume of   1033 cc.    Impression: Left pleural effusion as described    < end of copied text >      Imaging Personally Reviewed:  [ x ] YES  [ ] NO    HEALTH ISSUES - PROBLEM Dx:  AF (atrial fibrillation): AF (atrial fibrillation)  Need for prophylactic measure: Need for prophylactic measure  Lower extremity edema: Lower extremity edema  Pressure ulcer: Pressure ulcer  Thrombophlebitis: Thrombophlebitis  DNR (do not resuscitate): DNR (do not resuscitate)  HTN (hypertension): HTN (hypertension)  COPD (chronic obstructive pulmonary disease): COPD (chronic obstructive pulmonary disease)  Lung cancer: Lung cancer  SOB (shortness of breath): SOB (shortness of breath)

## 2019-12-04 DIAGNOSIS — D72.823 LEUKEMOID REACTION: ICD-10-CM

## 2019-12-04 LAB
ANION GAP SERPL CALC-SCNC: 10 MMOL/L — SIGNIFICANT CHANGE UP (ref 5–17)
APTT BLD: 25.5 SEC — LOW (ref 28.5–37)
APTT BLD: 76.1 SEC — HIGH (ref 28.5–37)
APTT BLD: > 200 SEC (ref 28.5–37)
BUN SERPL-MCNC: 34 MG/DL — HIGH (ref 7–23)
CALCIUM SERPL-MCNC: 8.1 MG/DL — LOW (ref 8.5–10.1)
CHLORIDE SERPL-SCNC: 94 MMOL/L — LOW (ref 96–108)
CO2 SERPL-SCNC: 26 MMOL/L — SIGNIFICANT CHANGE UP (ref 22–31)
CREAT SERPL-MCNC: 0.67 MG/DL — SIGNIFICANT CHANGE UP (ref 0.5–1.3)
GLUCOSE SERPL-MCNC: 118 MG/DL — HIGH (ref 70–99)
HCT VFR BLD CALC: 30.7 % — LOW (ref 39–50)
HCT VFR BLD CALC: 32.5 % — LOW (ref 39–50)
HGB BLD-MCNC: 10.4 G/DL — LOW (ref 13–17)
HGB BLD-MCNC: 9.9 G/DL — LOW (ref 13–17)
MCHC RBC-ENTMCNC: 26.6 PG — LOW (ref 27–34)
MCHC RBC-ENTMCNC: 26.9 PG — LOW (ref 27–34)
MCHC RBC-ENTMCNC: 32 GM/DL — SIGNIFICANT CHANGE UP (ref 32–36)
MCHC RBC-ENTMCNC: 32.2 GM/DL — SIGNIFICANT CHANGE UP (ref 32–36)
MCV RBC AUTO: 83.1 FL — SIGNIFICANT CHANGE UP (ref 80–100)
MCV RBC AUTO: 83.4 FL — SIGNIFICANT CHANGE UP (ref 80–100)
NRBC # BLD: 0 /100 WBCS — SIGNIFICANT CHANGE UP (ref 0–0)
NRBC # BLD: 0 /100 WBCS — SIGNIFICANT CHANGE UP (ref 0–0)
PLATELET # BLD AUTO: 139 K/UL — LOW (ref 150–400)
PLATELET # BLD AUTO: 147 K/UL — LOW (ref 150–400)
POTASSIUM SERPL-MCNC: 3.9 MMOL/L — SIGNIFICANT CHANGE UP (ref 3.5–5.3)
POTASSIUM SERPL-SCNC: 3.9 MMOL/L — SIGNIFICANT CHANGE UP (ref 3.5–5.3)
RBC # BLD: 3.68 M/UL — LOW (ref 4.2–5.8)
RBC # BLD: 3.91 M/UL — LOW (ref 4.2–5.8)
RBC # FLD: 18.4 % — HIGH (ref 10.3–14.5)
RBC # FLD: 18.4 % — HIGH (ref 10.3–14.5)
SODIUM SERPL-SCNC: 130 MMOL/L — LOW (ref 135–145)
WBC # BLD: 32.05 K/UL — HIGH (ref 3.8–10.5)
WBC # BLD: 39.01 K/UL — HIGH (ref 3.8–10.5)
WBC # FLD AUTO: 32.05 K/UL — HIGH (ref 3.8–10.5)
WBC # FLD AUTO: 39.01 K/UL — HIGH (ref 3.8–10.5)

## 2019-12-04 PROCEDURE — 99233 SBSQ HOSP IP/OBS HIGH 50: CPT

## 2019-12-04 PROCEDURE — 71045 X-RAY EXAM CHEST 1 VIEW: CPT | Mod: 26

## 2019-12-04 RX ORDER — HEPARIN SODIUM 5000 [USP'U]/ML
6500 INJECTION INTRAVENOUS; SUBCUTANEOUS EVERY 6 HOURS
Refills: 0 | Status: DISCONTINUED | OUTPATIENT
Start: 2019-12-04 | End: 2019-12-06

## 2019-12-04 RX ORDER — SODIUM CHLORIDE 9 MG/ML
500 INJECTION INTRAMUSCULAR; INTRAVENOUS; SUBCUTANEOUS ONCE
Refills: 0 | Status: COMPLETED | OUTPATIENT
Start: 2019-12-04 | End: 2019-12-04

## 2019-12-04 RX ORDER — HEPARIN SODIUM 5000 [USP'U]/ML
INJECTION INTRAVENOUS; SUBCUTANEOUS
Qty: 25000 | Refills: 0 | Status: DISCONTINUED | OUTPATIENT
Start: 2019-12-04 | End: 2019-12-06

## 2019-12-04 RX ORDER — HYDROMORPHONE HYDROCHLORIDE 2 MG/ML
1 INJECTION INTRAMUSCULAR; INTRAVENOUS; SUBCUTANEOUS ONCE
Refills: 0 | Status: DISCONTINUED | OUTPATIENT
Start: 2019-12-04 | End: 2019-12-04

## 2019-12-04 RX ORDER — HEPARIN SODIUM 5000 [USP'U]/ML
6500 INJECTION INTRAVENOUS; SUBCUTANEOUS ONCE
Refills: 0 | Status: COMPLETED | OUTPATIENT
Start: 2019-12-04 | End: 2019-12-04

## 2019-12-04 RX ORDER — HEPARIN SODIUM 5000 [USP'U]/ML
3000 INJECTION INTRAVENOUS; SUBCUTANEOUS EVERY 6 HOURS
Refills: 0 | Status: DISCONTINUED | OUTPATIENT
Start: 2019-12-04 | End: 2019-12-06

## 2019-12-04 RX ADMIN — Medication 5 MILLIGRAM(S): at 17:17

## 2019-12-04 RX ADMIN — Medication 500 MICROGRAM(S): at 13:17

## 2019-12-04 RX ADMIN — Medication 0.25 MILLIGRAM(S): at 05:31

## 2019-12-04 RX ADMIN — HYDROMORPHONE HYDROCHLORIDE 1 MILLIGRAM(S): 2 INJECTION INTRAMUSCULAR; INTRAVENOUS; SUBCUTANEOUS at 17:03

## 2019-12-04 RX ADMIN — HEPARIN SODIUM 0 UNIT(S)/HR: 5000 INJECTION INTRAVENOUS; SUBCUTANEOUS at 23:39

## 2019-12-04 RX ADMIN — Medication 500 MICROGRAM(S): at 00:21

## 2019-12-04 RX ADMIN — SODIUM CHLORIDE 1 GRAM(S): 9 INJECTION INTRAMUSCULAR; INTRAVENOUS; SUBCUTANEOUS at 14:19

## 2019-12-04 RX ADMIN — BUDESONIDE AND FORMOTEROL FUMARATE DIHYDRATE 2 PUFF(S): 160; 4.5 AEROSOL RESPIRATORY (INHALATION) at 18:36

## 2019-12-04 RX ADMIN — LORATADINE 10 MILLIGRAM(S): 10 TABLET ORAL at 16:48

## 2019-12-04 RX ADMIN — HEPARIN SODIUM 6500 UNIT(S): 5000 INJECTION INTRAVENOUS; SUBCUTANEOUS at 16:11

## 2019-12-04 RX ADMIN — HYDROMORPHONE HYDROCHLORIDE 1 MILLIGRAM(S): 2 INJECTION INTRAMUSCULAR; INTRAVENOUS; SUBCUTANEOUS at 03:56

## 2019-12-04 RX ADMIN — Medication 500 MICROGRAM(S): at 20:43

## 2019-12-04 RX ADMIN — CHLORHEXIDINE GLUCONATE 1 APPLICATION(S): 213 SOLUTION TOPICAL at 14:20

## 2019-12-04 RX ADMIN — TIOTROPIUM BROMIDE 1 CAPSULE(S): 18 CAPSULE ORAL; RESPIRATORY (INHALATION) at 05:31

## 2019-12-04 RX ADMIN — Medication 500 MICROGRAM(S): at 10:27

## 2019-12-04 RX ADMIN — MAGNESIUM OXIDE 400 MG ORAL TABLET 400 MILLIGRAM(S): 241.3 TABLET ORAL at 14:19

## 2019-12-04 RX ADMIN — HYDROMORPHONE HYDROCHLORIDE 1 MILLIGRAM(S): 2 INJECTION INTRAMUSCULAR; INTRAVENOUS; SUBCUTANEOUS at 12:33

## 2019-12-04 RX ADMIN — HYDROMORPHONE HYDROCHLORIDE 1 MILLIGRAM(S): 2 INJECTION INTRAMUSCULAR; INTRAVENOUS; SUBCUTANEOUS at 21:58

## 2019-12-04 RX ADMIN — Medication 5 MILLIGRAM(S): at 05:31

## 2019-12-04 RX ADMIN — HYDROMORPHONE HYDROCHLORIDE 1 MILLIGRAM(S): 2 INJECTION INTRAMUSCULAR; INTRAVENOUS; SUBCUTANEOUS at 21:27

## 2019-12-04 RX ADMIN — FENTANYL CITRATE 1 PATCH: 50 INJECTION INTRAVENOUS at 20:34

## 2019-12-04 RX ADMIN — HYDROMORPHONE HYDROCHLORIDE 1 MILLIGRAM(S): 2 INJECTION INTRAMUSCULAR; INTRAVENOUS; SUBCUTANEOUS at 03:26

## 2019-12-04 RX ADMIN — HEPARIN SODIUM 1500 UNIT(S)/HR: 5000 INJECTION INTRAVENOUS; SUBCUTANEOUS at 16:12

## 2019-12-04 RX ADMIN — HYDROMORPHONE HYDROCHLORIDE 1 MILLIGRAM(S): 2 INJECTION INTRAMUSCULAR; INTRAVENOUS; SUBCUTANEOUS at 16:48

## 2019-12-04 RX ADMIN — BUDESONIDE AND FORMOTEROL FUMARATE DIHYDRATE 2 PUFF(S): 160; 4.5 AEROSOL RESPIRATORY (INHALATION) at 05:31

## 2019-12-04 RX ADMIN — SODIUM CHLORIDE 1 GRAM(S): 9 INJECTION INTRAMUSCULAR; INTRAVENOUS; SUBCUTANEOUS at 21:27

## 2019-12-04 RX ADMIN — Medication 20 MILLIGRAM(S): at 05:31

## 2019-12-04 RX ADMIN — Medication 1 TABLET(S): at 14:20

## 2019-12-04 RX ADMIN — SODIUM CHLORIDE 2000 MILLILITER(S): 9 INJECTION INTRAMUSCULAR; INTRAVENOUS; SUBCUTANEOUS at 11:18

## 2019-12-04 RX ADMIN — HYDROMORPHONE HYDROCHLORIDE 1 MILLIGRAM(S): 2 INJECTION INTRAMUSCULAR; INTRAVENOUS; SUBCUTANEOUS at 12:08

## 2019-12-04 RX ADMIN — SODIUM CHLORIDE 1 GRAM(S): 9 INJECTION INTRAMUSCULAR; INTRAVENOUS; SUBCUTANEOUS at 05:31

## 2019-12-04 NOTE — PROGRESS NOTE ADULT - PROBLEM SELECTOR PLAN 7
- Stable  - patient would likely benefit from lymphedema pumps outpatient  - Encourage lower extremity elevation.

## 2019-12-04 NOTE — PROGRESS NOTE ADULT - PROBLEM SELECTOR PLAN 5
BP soft but holding on the low side of 90/60s  - Cardio following; recs appreciated.  - Continue to HOLD Diltiazem.  - Continue Metoprolol 100 mg PO daily with hold parameters. BP on the lower side today, D/C LASIX  - Cardio following; recs appreciated.  - Continue to HOLD Diltiazem.  - Continue Metoprolol 100 mg PO daily with hold parameters.

## 2019-12-04 NOTE — ADVANCED PRACTICE NURSE CONSULT - RECOMMEDATIONS
1. Zinc to intergluteal cleft Q shift and PRN for soiling.     MD made aware of recommendations  RN educated in the care of this patients skin care

## 2019-12-04 NOTE — PROGRESS NOTE ADULT - PROBLEM SELECTOR PLAN 2
F/U surveillance blood cultures  If patient undergoes additional thoracocentesis, would send for cell count, chemistries, bacterial cx etc though suspect yield low  Monitor WBC  Decrease steroids as able  Defer antibiotics at this time

## 2019-12-04 NOTE — PROGRESS NOTE ADULT - SUBJECTIVE AND OBJECTIVE BOX
[facility] -- Gurpreet Bush, Corey Diaz, Sajan Pickard Savella, [place]  Office # [phone]    Follow Up:  Afib with RVR, SOB    Subjective/Observations: Sitting on the chair, NC off at this time.  Dyspneic and tachycardic.  Subdued.    REVIEW OF SYSTEMS: All other review of systems is negative unless indicated above  PAST MEDICAL & SURGICAL HISTORY:  Lung cancer  Depression  Dyspnea  Mediastinal adenopathy  Pericardial effusion  AF (atrial fibrillation)  Hyponatremia  COPD (chronic obstructive pulmonary disease)  HTN (hypertension)  S/P pericardial window creation    MEDICATIONS  (STANDING):  acetylcysteine 10%  Inhalation 5 milliLiter(s) Inhalation four times a day  budesonide 160 MICROgram(s)/formoterol 4.5 MICROgram(s) Inhaler 2 Puff(s) Inhalation two times a day  chlorhexidine 4% Liquid 1 Application(s) Topical daily  digoxin     Tablet 0.25 milliGRAM(s) Oral daily  dronabinol 5 milliGRAM(s) Oral every 12 hours  fentaNYL   Patch  25 MICROgram(s)/Hr 1 Patch Transdermal every 72 hours  furosemide   Injectable 40 milliGRAM(s) IV Push daily  heparin  Infusion.  Unit(s)/Hr (15 mL/Hr) IV Continuous <Continuous>  ipratropium    for Nebulization 500 MICROGram(s) Nebulizer every 6 hours  loratadine 10 milliGRAM(s) Oral daily  magnesium oxide 400 milliGRAM(s) Oral daily  metoprolol succinate  milliGRAM(s) Oral daily  multivitamin 1 Tablet(s) Oral daily  predniSONE   Tablet 20 milliGRAM(s) Oral daily  sodium chloride 1 Gram(s) Oral three times a day  tiotropium 18 MICROgram(s) Capsule 1 Capsule(s) Inhalation daily    MEDICATIONS  (PRN):  guaiFENesin    Syrup 100 milliGRAM(s) Oral every 6 hours PRN Cough  heparin  Injectable 6500 Unit(s) IV Push every 6 hours PRN For aPTT less than 40  heparin  Injectable 3000 Unit(s) IV Push every 6 hours PRN For aPTT between 40 - 57  HYDROmorphone  Injectable 1 milliGRAM(s) IV Push every 4 hours PRN Severe Pain (7 - 10)  levalbuterol Inhalation 0.63 milliGRAM(s) Inhalation every 6 hours PRN shortness of breath  morphine  - Injectable 2 milliGRAM(s) IV Push every 4 hours PRN Moderate Pain (4 - 6)  morphine  - Injectable 1 milliGRAM(s) IV Push every 4 hours PRN Mild Pain (1 - 3)  ondansetron Injectable 4 milliGRAM(s) IV Push every 6 hours PRN Nausea  senna 8.6 milliGRAM(s) Oral Tablet - Peds 1 Tablet(s) Oral at bedtime PRN for constipation    Allergies    penicillin (Unknown)    Intolerances    Vital Signs Last 24 Hrs  T(C): 36.4 (04 Dec 2019 08:09), Max: 36.4 (03 Dec 2019 23:17)  T(F): 97.5 (04 Dec 2019 08:09), Max: 97.6 (03 Dec 2019 23:17)  HR: 115 (04 Dec 2019 08:09) (90 - 140)  BP: 92/65 (04 Dec 2019 08:09) (90/58 - 103/69)  BP(mean): --  RR: 20 (04 Dec 2019 08:09) (19 - 21)  SpO2: 91% (04 Dec 2019 04:21) (90% - 100%)  I&O's Summary    03 Dec 2019 07:01  -  04 Dec 2019 07:00  --------------------------------------------------------  IN: 384 mL / OUT: 150 mL / NET: 234 mL     PHYSICAL EXAM:  TELE: Afib with RVR  Constitutional: NAD, awake and alert, cachectic  HEENT: Moist Mucous Membranes, Anicteric  Pulmonary: Non-labored, breath sounds are diminished L>R bilaterally, No wheezing or rhonchi. + rales  Cardiovascular: Regular, S1 and S2, No murmurs, rubs, gallops or clicks  Gastrointestinal: Bowel Sounds present, soft, nontender.   Lymph: 4+ BLE edema. No lymphadenopathy.  Skin: No visible rashes or ulcers. Pale  Psych:  Mood & affect flat  LABS: All Labs Reviewed:                        10.4   39.01 )-----------( 147      ( 04 Dec 2019 05:23 )             32.5                         10.0   26.56 )-----------( 139      ( 03 Dec 2019 07:16 )             31.6                         9.9    23.94 )-----------( 136      ( 02 Dec 2019 06:20 )             30.9     04 Dec 2019 05:23    130    |  94     |  34     ----------------------------<  118    3.9     |  26     |  0.67   03 Dec 2019 07:16    131    |  94     |  27     ----------------------------<  123    3.6     |  29     |  0.57   02 Dec 2019 11:41    134    |  97     |  27     ----------------------------<  136    4.5     |  29     |  0.62     Ca    8.1        04 Dec 2019 05:23  Ca    7.7        03 Dec 2019 07:16  Ca    8.5        02 Dec 2019 11:41    PTT - ( 04 Dec 2019 05:23 )  PTT:76.1 sec     < from: TTE Echo Doppler w/o Cont (11.23.19 @ 15:29) >     EXAM:  ECHO TTE WO CON COMP W DOPPLR         PROCEDURE DATE:  11/23/2019        INTERPRETATION:  Ordering Physician: DOMINIC SERRATO [id number]    Indication: Pulmonary embolism  Technologist Initials: DL  Study Quality: Technically difficult and limited   A complete echocardiographic study was performed utilizing standard   protocol including spectral and color Doppler in all echocardiographic   windows.    Height: 183 cm  Weight: 82 kg  BSA: 2.0 sq m  Blood Pressure: 101/66    MEASUREMENTS  IVS: 1.2cm  PWT: 1.1cm  LA: 3.0cm  AO: 3.8cm  LVIDd: 3.7cm  LVIDs: 2.0cm    LVEF: 75%    FINDINGS  Left Ventricle: Hyperdynamic left ventricular systolic function.  Aortic Valve: The aortic valve is not well-visualized. It appears   calcified. No significant aortic insufficiency.  Mitral Valve: Mitral annular calcification calcified mitral valve   leaflets. Mild mitral insufficiency.  Tricuspid Valve: Not well visualized  Pulmonic Valve: Not well visualized  Left Atrium: Grossly normal  Right Ventricle: The right ventricle is not well visualized. Grossly,   normal right ventricular size and systolic function.  Right Atrium: Grossly normal    Pericardium/Pleura: Large left pleural effusion.  Echogenic material is   noted in the pleural space.    CONCLUSIONS:  Technically difficult and limited study.  1. Hyperdynamic left ventricular systolic function.  2. Mitral annular calcification and calcified mitral valve leaflets with   mild mitral insufficiency.  3. Grossly normal left atrial size.  4. The right ventricle is not well visualized. Grossly, normal right   ventricular size and systolic function.  5.  Large left pleural effusion which echogenic material noted in the   pleural space.    QAMAR ECHEVARRIA M.D., ATTENDING CARDIOLOGIST  This document has been electronically signed. Nov 24 2019  7:29AM      < end of copied text >  < from: US Chest (12.02.19 @ 10:21) >    EXAM:  US CHEST                          PROCEDURE DATE:  12/02/2019      INTERPRETATION:  Indication: Evaluate for pleural effusion. Antecedent   history of lung carcinoma.    Bilateral chest ultrasound.    No pleural fluid is detected on the right. On the left, there is a   pleural effusion. Measurements submitted are consistent with a volume of   1033 cc.    Impression: Left pleural effusion as described    JERICA FOSS M.D., ATTENDING RADIOLOGIST  This document has beenelectronically signed. Dec  2 2019 10:59AM      < end of copied text >    < from: 12 Lead ECG (11.22.19 @ 21:48) >    Ventricular Rate 159 BPM    Atrial Rate 258 BPM    QRS Duration 120 ms    Q-T Interval 296 ms    QTC Calculation(Bezet) 481 ms    R Axis 34 degrees    T Axis 30 degrees    Diagnosis Line Artifact.  Repeat  Confirmed by QAMAR PICKARD ([id number]) on 11/23/2019 1:42:28 PM    < end of copied text >

## 2019-12-04 NOTE — PROGRESS NOTE ADULT - SUBJECTIVE AND OBJECTIVE BOX
Mr. San is a 59y old  Male who presents with a chief complaint of PNA (04 Dec 2019 08:48)      INTERVAL HPI/OVERNIGHT EVENTS: Pt seen and examined at bedside. Pt noted his shortness of breath is worsening, did not sleep well overnight, and feels "awful" this AM. Lower back pain still aching, fentanyl patch minimal relief. No other new complaints at this time. Overnight, patient had a. fib and PVCs around 1AM on telemetry monitor, HRmax of 143 @ 7:11AM.    T(C): 36.4 (12-04-19 @ 08:09), Max: 36.4 (12-03-19 @ 23:17)  HR: 115 (12-04-19 @ 08:09) (90 - 140)  BP: 92/65 (12-04-19 @ 08:09) (90/58 - 103/69)  RR: 20 (12-04-19 @ 08:09) (19 - 21)  SpO2: 91% (12-04-19 @ 04:21) (90% - 100%)  Wt(kg): --  I&O's Summary    03 Dec 2019 07:01  -  04 Dec 2019 07:00  --------------------------------------------------------  IN: 384 mL / OUT: 150 mL / NET: 234 mL        LABS:                        10.4   39.01 )-----------( 147      ( 04 Dec 2019 05:23 )             32.5     12-04    130<L>  |  94<L>  |  34<H>  ----------------------------<  118<H>  3.9   |  26  |  0.67    Ca    8.1<L>      04 Dec 2019 05:23      PTT - ( 04 Dec 2019 05:23 )  PTT:76.1 sec    CAPILLARY BLOOD GLUCOSE        MEDICATIONS  (STANDING):  acetylcysteine 10%  Inhalation 5 milliLiter(s) Inhalation four times a day  budesonide 160 MICROgram(s)/formoterol 4.5 MICROgram(s) Inhaler 2 Puff(s) Inhalation two times a day  chlorhexidine 4% Liquid 1 Application(s) Topical daily  digoxin     Tablet 0.25 milliGRAM(s) Oral daily  dronabinol 5 milliGRAM(s) Oral every 12 hours  fentaNYL   Patch  25 MICROgram(s)/Hr 1 Patch Transdermal every 72 hours  furosemide   Injectable 40 milliGRAM(s) IV Push daily  heparin  Infusion.  Unit(s)/Hr (15 mL/Hr) IV Continuous <Continuous>  ipratropium    for Nebulization 500 MICROGram(s) Nebulizer every 6 hours  loratadine 10 milliGRAM(s) Oral daily  magnesium oxide 400 milliGRAM(s) Oral daily  metoprolol succinate  milliGRAM(s) Oral daily  multivitamin 1 Tablet(s) Oral daily  predniSONE   Tablet 20 milliGRAM(s) Oral daily  sodium chloride 1 Gram(s) Oral three times a day  tiotropium 18 MICROgram(s) Capsule 1 Capsule(s) Inhalation daily    MEDICATIONS  (PRN):  guaiFENesin    Syrup 100 milliGRAM(s) Oral every 6 hours PRN Cough  heparin  Injectable 6500 Unit(s) IV Push every 6 hours PRN For aPTT less than 40  heparin  Injectable 3000 Unit(s) IV Push every 6 hours PRN For aPTT between 40 - 57  HYDROmorphone  Injectable 1 milliGRAM(s) IV Push every 4 hours PRN Severe Pain (7 - 10)  levalbuterol Inhalation 0.63 milliGRAM(s) Inhalation every 6 hours PRN shortness of breath  morphine  - Injectable 2 milliGRAM(s) IV Push every 4 hours PRN Moderate Pain (4 - 6)  morphine  - Injectable 1 milliGRAM(s) IV Push every 4 hours PRN Mild Pain (1 - 3)  ondansetron Injectable 4 milliGRAM(s) IV Push every 6 hours PRN Nausea  senna 8.6 milliGRAM(s) Oral Tablet - Peds 1 Tablet(s) Oral at bedtime PRN for constipation      REVIEW OF SYSTEMS:  CONSTITUTIONAL: admits fatigue, no fever  ENMT: no difficulty hearing.  RESPIRATORY: admits cough, admits SOB, no wheezing  CARDIOVASCULAR: admits chronic leg swelling, admits palpitations, no chest pain  GASTROINTESTINAL: no abdominal pain. no nausea, no vomiting; no diarrhea, no constipation.   GENITOURINARY: no dysuria, no frequency, no incontinence  NEUROLOGICAL: admits tremors, no headaches, no memory loss  SKIN: no itching, no burning, no rashes  ENDOCRINE: admits chemotherapy-induced hair loss  MUSCULOSKELETAL: admits lower back pain  PSYCHIATRIC: admits difficulty sleeping      RADIOLOGY & ADDITIONAL TESTS:    < from: Xray Chest 1 View- PORTABLE-Urgent (12.03.19 @ 11:33) >  FINDINGS:   Right-sided Port-A-Cath present with tip at cavoatrial junction.  Thoracic aortic atheromatous changes and ectasia are stable.  Heart size is within normal limits.  Bilateral perihilar airspace disease not significantly changed since   prior study. No significant pleural effusion. No pneumothorax  No acute bony findings.    IMPRESSION:   1. Persistent perihilar airspace disease  2. No significant pleural effusion.    < end of copied text >    Imaging Personally Reviewed:  [X] YES  [ ] NO    Consultant(s) Notes Reviewed:  [X] YES  [ ] NO    PHYSICAL EXAM:  GENERAL: Uncomfortable, anxious, cachetic  HEAD: Bald, Atraumatic, Normocephalic  EYES: conjunctiva and sclera clear  CHEST/LUNG: crackles left lower lobe, decreased breath sounds b/l, no wheezing, no rhonchi  HEART: irregularly irregular, no murmurs, no rubs, no gallops  ABDOMEN: Soft, Nontender, Nondistended  EXTREMITIES: 4+ pitting edema with weeping, 2+ radial pulses, non-palpable dorsalis pedis 2/2 edema  SKIN: healing linear scab right anterior leg    Care Discussed with Consultants/Other Providers [X] YES  [ ] NO Mr. San is a 59y old  Male who presents with a chief complaint of PNA (04 Dec 2019 08:48)      INTERVAL HPI/OVERNIGHT EVENTS: Pt seen and examined at bedside. Pt noted his shortness of breath is worsening, did not sleep well overnight, and feels "awful" this AM. Lower back pain still aching, fentanyl patch minimal relief. No other new complaints at this time. Overnight, patient had a. fib and PVCs around 1AM on telemetry monitor, HRmax of 143 @ 7:11AM.    T(C): 36.4 (12-04-19 @ 08:09), Max: 36.4 (12-03-19 @ 23:17)  HR: 115 (12-04-19 @ 08:09) (90 - 140)  BP: 92/65 (12-04-19 @ 08:09) (90/58 - 103/69)  RR: 20 (12-04-19 @ 08:09) (19 - 21)  SpO2: 91% (12-04-19 @ 04:21) (90% - 100%)  Wt(kg): --  I&O's Summary    03 Dec 2019 07:01  -  04 Dec 2019 07:00  --------------------------------------------------------  IN: 384 mL / OUT: 150 mL / NET: 234 mL        LABS:                        10.4   39.01 )-----------( 147      ( 04 Dec 2019 05:23 )             32.5     12-04    130<L>  |  94<L>  |  34<H>  ----------------------------<  118<H>  3.9   |  26  |  0.67    Ca    8.1<L>      04 Dec 2019 05:23      PTT - ( 04 Dec 2019 05:23 )  PTT:76.1 sec    CAPILLARY BLOOD GLUCOSE        MEDICATIONS  (STANDING):  acetylcysteine 10%  Inhalation 5 milliLiter(s) Inhalation four times a day  budesonide 160 MICROgram(s)/formoterol 4.5 MICROgram(s) Inhaler 2 Puff(s) Inhalation two times a day  chlorhexidine 4% Liquid 1 Application(s) Topical daily  digoxin     Tablet 0.25 milliGRAM(s) Oral daily  dronabinol 5 milliGRAM(s) Oral every 12 hours  fentaNYL   Patch  25 MICROgram(s)/Hr 1 Patch Transdermal every 72 hours  furosemide   Injectable 40 milliGRAM(s) IV Push daily  heparin  Infusion.  Unit(s)/Hr (15 mL/Hr) IV Continuous <Continuous>  ipratropium    for Nebulization 500 MICROGram(s) Nebulizer every 6 hours  loratadine 10 milliGRAM(s) Oral daily  magnesium oxide 400 milliGRAM(s) Oral daily  metoprolol succinate  milliGRAM(s) Oral daily  multivitamin 1 Tablet(s) Oral daily  predniSONE   Tablet 20 milliGRAM(s) Oral daily  sodium chloride 1 Gram(s) Oral three times a day  tiotropium 18 MICROgram(s) Capsule 1 Capsule(s) Inhalation daily    MEDICATIONS  (PRN):  guaiFENesin    Syrup 100 milliGRAM(s) Oral every 6 hours PRN Cough  heparin  Injectable 6500 Unit(s) IV Push every 6 hours PRN For aPTT less than 40  heparin  Injectable 3000 Unit(s) IV Push every 6 hours PRN For aPTT between 40 - 57  HYDROmorphone  Injectable 1 milliGRAM(s) IV Push every 4 hours PRN Severe Pain (7 - 10)  levalbuterol Inhalation 0.63 milliGRAM(s) Inhalation every 6 hours PRN shortness of breath  morphine  - Injectable 2 milliGRAM(s) IV Push every 4 hours PRN Moderate Pain (4 - 6)  morphine  - Injectable 1 milliGRAM(s) IV Push every 4 hours PRN Mild Pain (1 - 3)  ondansetron Injectable 4 milliGRAM(s) IV Push every 6 hours PRN Nausea  senna 8.6 milliGRAM(s) Oral Tablet - Peds 1 Tablet(s) Oral at bedtime PRN for constipation      REVIEW OF SYSTEMS:  CONSTITUTIONAL: admits fatigue, no fever  ENMT: no difficulty hearing.  RESPIRATORY: admits cough, admits SOB, no wheezing  CARDIOVASCULAR: admits chronic leg swelling, admits palpitations, no chest pain  GASTROINTESTINAL: no abdominal pain. no nausea, no vomiting; no diarrhea, no constipation.   GENITOURINARY: no dysuria, no frequency, no incontinence  NEUROLOGICAL: admits tremors, no headaches, no memory loss  SKIN: no itching, no burning, no rashes  ENDOCRINE: admits chemotherapy-induced hair loss  MUSCULOSKELETAL: admits lower back pain  PSYCHIATRIC: admits difficulty sleeping      RADIOLOGY & ADDITIONAL TESTS:    < from: Xray Chest 1 View- PORTABLE-Urgent (12.03.19 @ 11:33) >  FINDINGS:   Right-sided Port-A-Cath present with tip at cavoatrial junction.  Thoracic aortic atheromatous changes and ectasia are stable.  Heart size is within normal limits.  Bilateral perihilar airspace disease not significantly changed since   prior study. No significant pleural effusion. No pneumothorax  No acute bony findings.    IMPRESSION:   1. Persistent perihilar airspace disease  2. No significant pleural effusion.    < end of copied text >    Imaging Personally Reviewed:  [X] YES  [ ] NO    Consultant(s) Notes Reviewed:  [X] YES  [ ] NO    PHYSICAL EXAM:  GENERAL: Uncomfortable, anxious, cachetic  HEAD: Bald, Atraumatic, Normocephalic  EYES: conjunctiva and sclera clear  CHEST/LUNG: crackles left lower lobe, decreased breath sounds b/l, no wheezing, no rhonchi  HEART: irregularly irregular, no murmurs, no rubs, no gallops  ABDOMEN: Soft, Nontender, Nondistended  EXTREMITIES: 4+ pitting edema with weeping, 2+ radial pulses, non-palpable dorsalis pedis 2/2 edema  SKIN: fentanyl patch on left deltoid (marked with 12/3), healing linear scab right anterior leg    Care Discussed with Consultants/Other Providers [X] YES  [ ] NO Mr. San is a 59y old  Male who presents with a chief complaint of PNA (04 Dec 2019 08:48)      INTERVAL HPI/OVERNIGHT EVENTS: Pt seen and examined at bedside. Pt noted his shortness of breath is worsening, did not sleep well overnight, and feels "awful" this AM. Lower back pain still aching, fentanyl patch minimal relief. No other new complaints at this time. Overnight, patient had a. fib and PVCs around 1AM on telemetry monitor, HRmax of 143 @ 7:11AM.    T(C): 36.4 (12-04-19 @ 08:09), Max: 36.4 (12-03-19 @ 23:17)  HR: 115 (12-04-19 @ 08:09) (90 - 140)  BP: 92/65 (12-04-19 @ 08:09) (90/58 - 103/69)  RR: 20 (12-04-19 @ 08:09) (19 - 21)  SpO2: 91% (12-04-19 @ 04:21) (90% - 100%)  Wt(kg): --  I&O's Summary    03 Dec 2019 07:01  -  04 Dec 2019 07:00  --------------------------------------------------------  IN: 384 mL / OUT: 150 mL / NET: 234 mL        LABS:                        10.4   39.01 )-----------( 147      ( 04 Dec 2019 05:23 )             32.5     12-04    130<L>  |  94<L>  |  34<H>  ----------------------------<  118<H>  3.9   |  26  |  0.67    Ca    8.1<L>      04 Dec 2019 05:23      PTT - ( 04 Dec 2019 05:23 )  PTT:76.1 sec    CAPILLARY BLOOD GLUCOSE        MEDICATIONS  (STANDING):  acetylcysteine 10%  Inhalation 5 milliLiter(s) Inhalation four times a day  budesonide 160 MICROgram(s)/formoterol 4.5 MICROgram(s) Inhaler 2 Puff(s) Inhalation two times a day  chlorhexidine 4% Liquid 1 Application(s) Topical daily  digoxin     Tablet 0.25 milliGRAM(s) Oral daily  dronabinol 5 milliGRAM(s) Oral every 12 hours  fentaNYL   Patch  25 MICROgram(s)/Hr 1 Patch Transdermal every 72 hours  furosemide   Injectable 40 milliGRAM(s) IV Push daily  heparin  Infusion.  Unit(s)/Hr (15 mL/Hr) IV Continuous <Continuous>  ipratropium    for Nebulization 500 MICROGram(s) Nebulizer every 6 hours  loratadine 10 milliGRAM(s) Oral daily  magnesium oxide 400 milliGRAM(s) Oral daily  metoprolol succinate  milliGRAM(s) Oral daily  multivitamin 1 Tablet(s) Oral daily  predniSONE   Tablet 20 milliGRAM(s) Oral daily  sodium chloride 1 Gram(s) Oral three times a day  tiotropium 18 MICROgram(s) Capsule 1 Capsule(s) Inhalation daily    MEDICATIONS  (PRN):  guaiFENesin    Syrup 100 milliGRAM(s) Oral every 6 hours PRN Cough  heparin  Injectable 6500 Unit(s) IV Push every 6 hours PRN For aPTT less than 40  heparin  Injectable 3000 Unit(s) IV Push every 6 hours PRN For aPTT between 40 - 57  HYDROmorphone  Injectable 1 milliGRAM(s) IV Push every 4 hours PRN Severe Pain (7 - 10)  levalbuterol Inhalation 0.63 milliGRAM(s) Inhalation every 6 hours PRN shortness of breath  morphine  - Injectable 2 milliGRAM(s) IV Push every 4 hours PRN Moderate Pain (4 - 6)  morphine  - Injectable 1 milliGRAM(s) IV Push every 4 hours PRN Mild Pain (1 - 3)  ondansetron Injectable 4 milliGRAM(s) IV Push every 6 hours PRN Nausea  senna 8.6 milliGRAM(s) Oral Tablet - Peds 1 Tablet(s) Oral at bedtime PRN for constipation      REVIEW OF SYSTEMS:  CONSTITUTIONAL: admits fatigue, no fever  ENMT: no difficulty hearing.  RESPIRATORY: admits pleuritic chest pain, admits cough, admits SOB, no wheezing  CARDIOVASCULAR: admits chronic leg swelling, admits palpitations  GASTROINTESTINAL: no abdominal pain. no nausea, no vomiting; no diarrhea, no constipation.   GENITOURINARY: no dysuria, no frequency, no incontinence  NEUROLOGICAL: admits tremors, no headaches, no memory loss  SKIN: no itching, no burning, no rashes  ENDOCRINE: admits chemotherapy-induced hair loss  MUSCULOSKELETAL: admits lower back pain  PSYCHIATRIC: admits difficulty sleeping      RADIOLOGY & ADDITIONAL TESTS:    < from: Xray Chest 1 View- PORTABLE-Urgent (12.03.19 @ 11:33) >  FINDINGS:   Right-sided Port-A-Cath present with tip at cavoatrial junction.  Thoracic aortic atheromatous changes and ectasia are stable.  Heart size is within normal limits.  Bilateral perihilar airspace disease not significantly changed since   prior study. No significant pleural effusion. No pneumothorax  No acute bony findings.    IMPRESSION:   1. Persistent perihilar airspace disease  2. No significant pleural effusion.    < end of copied text >    Imaging Personally Reviewed:  [X] YES  [ ] NO    Consultant(s) Notes Reviewed:  [X] YES  [ ] NO    PHYSICAL EXAM:  GENERAL: Uncomfortable, anxious, cachetic  HEAD: Bald, Atraumatic, Normocephalic  EYES: conjunctiva and sclera clear  CHEST/LUNG: crackles left lower lobe, decreased breath sounds b/l, no wheezing, no rhonchi  HEART: irregularly irregular, no murmurs, no rubs, no gallops  ABDOMEN: Soft, Nontender, Nondistended  EXTREMITIES: 4+ pitting edema with weeping, 2+ radial pulses, non-palpable dorsalis pedis 2/2 edema  SKIN: fentanyl patch on left deltoid (marked with 12/3), healing linear scab right anterior leg    Care Discussed with Consultants/Other Providers [X] YES  [ ] NO Mr. San is a 59y old  Male who presents with a chief complaint of PNA (04 Dec 2019 08:48)      INTERVAL HPI/OVERNIGHT EVENTS: Pt seen and examined at bedside. Pt admits his shortness of breath is worsening, did not sleep well overnight, and feels "awful" this AM. Lower back pain still aching, fentanyl patch minimal relief. No other new complaints at this time. Overnight, patient had a. fib and PVCs around 1AM on telemetry monitor, HRmax of 143 @ 7:11AM.    T(C): 36.4 (12-04-19 @ 08:09), Max: 36.4 (12-03-19 @ 23:17)  HR: 115 (12-04-19 @ 08:09) (90 - 140)  BP: 92/65 (12-04-19 @ 08:09) (90/58 - 103/69)  RR: 20 (12-04-19 @ 08:09) (19 - 21)  SpO2: 91% (12-04-19 @ 04:21) (90% - 100%)  Wt(kg): --  I&O's Summary    03 Dec 2019 07:01  -  04 Dec 2019 07:00  --------------------------------------------------------  IN: 384 mL / OUT: 150 mL / NET: 234 mL        LABS:                        10.4   39.01 )-----------( 147      ( 04 Dec 2019 05:23 )             32.5     12-04    130<L>  |  94<L>  |  34<H>  ----------------------------<  118<H>  3.9   |  26  |  0.67    Ca    8.1<L>      04 Dec 2019 05:23      PTT - ( 04 Dec 2019 05:23 )  PTT:76.1 sec    CAPILLARY BLOOD GLUCOSE        MEDICATIONS  (STANDING):  acetylcysteine 10%  Inhalation 5 milliLiter(s) Inhalation four times a day  budesonide 160 MICROgram(s)/formoterol 4.5 MICROgram(s) Inhaler 2 Puff(s) Inhalation two times a day  chlorhexidine 4% Liquid 1 Application(s) Topical daily  digoxin     Tablet 0.25 milliGRAM(s) Oral daily  dronabinol 5 milliGRAM(s) Oral every 12 hours  fentaNYL   Patch  25 MICROgram(s)/Hr 1 Patch Transdermal every 72 hours  furosemide   Injectable 40 milliGRAM(s) IV Push daily  heparin  Infusion.  Unit(s)/Hr (15 mL/Hr) IV Continuous <Continuous>  ipratropium    for Nebulization 500 MICROGram(s) Nebulizer every 6 hours  loratadine 10 milliGRAM(s) Oral daily  magnesium oxide 400 milliGRAM(s) Oral daily  metoprolol succinate  milliGRAM(s) Oral daily  multivitamin 1 Tablet(s) Oral daily  predniSONE   Tablet 20 milliGRAM(s) Oral daily  sodium chloride 1 Gram(s) Oral three times a day  tiotropium 18 MICROgram(s) Capsule 1 Capsule(s) Inhalation daily    MEDICATIONS  (PRN):  guaiFENesin    Syrup 100 milliGRAM(s) Oral every 6 hours PRN Cough  heparin  Injectable 6500 Unit(s) IV Push every 6 hours PRN For aPTT less than 40  heparin  Injectable 3000 Unit(s) IV Push every 6 hours PRN For aPTT between 40 - 57  HYDROmorphone  Injectable 1 milliGRAM(s) IV Push every 4 hours PRN Severe Pain (7 - 10)  levalbuterol Inhalation 0.63 milliGRAM(s) Inhalation every 6 hours PRN shortness of breath  morphine  - Injectable 2 milliGRAM(s) IV Push every 4 hours PRN Moderate Pain (4 - 6)  morphine  - Injectable 1 milliGRAM(s) IV Push every 4 hours PRN Mild Pain (1 - 3)  ondansetron Injectable 4 milliGRAM(s) IV Push every 6 hours PRN Nausea  senna 8.6 milliGRAM(s) Oral Tablet - Peds 1 Tablet(s) Oral at bedtime PRN for constipation      REVIEW OF SYSTEMS:  CONSTITUTIONAL: admits fatigue, no fever  ENMT: no difficulty hearing.  RESPIRATORY: admits pleuritic chest pain, admits cough, admits SOB, no wheezing  CARDIOVASCULAR: admits chronic leg swelling, admits palpitations  GASTROINTESTINAL: no abdominal pain. no nausea, no vomiting; no diarrhea, no constipation.   GENITOURINARY: no dysuria, no frequency, no incontinence  NEUROLOGICAL: admits tremors, no headaches, no memory loss  SKIN: no itching, no burning, no rashes  ENDOCRINE: admits chemotherapy-induced hair loss  MUSCULOSKELETAL: admits lower back pain  PSYCHIATRIC: admits difficulty sleeping      RADIOLOGY & ADDITIONAL TESTS:    < from: Xray Chest 1 View- PORTABLE-Urgent (12.03.19 @ 11:33) >  FINDINGS:   Right-sided Port-A-Cath present with tip at cavoatrial junction.  Thoracic aortic atheromatous changes and ectasia are stable.  Heart size is within normal limits.  Bilateral perihilar airspace disease not significantly changed since   prior study. No significant pleural effusion. No pneumothorax  No acute bony findings.    IMPRESSION:   1. Persistent perihilar airspace disease  2. No significant pleural effusion.    < end of copied text >    Imaging Personally Reviewed:  [X] YES  [ ] NO    Consultant(s) Notes Reviewed:  [X] YES  [ ] NO    PHYSICAL EXAM:  GENERAL: Uncomfortable, anxious, cachetic  HEAD: Bald, Atraumatic, Normocephalic  EYES: conjunctiva and sclera clear  CHEST/LUNG: crackles left lower lobe, decreased breath sounds b/l, no wheezing, no rhonchi  HEART: irregularly irregular, no murmurs, no rubs, no gallops  ABDOMEN: Soft, Nontender, Nondistended  EXTREMITIES: 4+ pitting edema with weeping, 2+ radial pulses, non-palpable dorsalis pedis 2/2 edema  SKIN: fentanyl patch on left deltoid (marked with 12/3), healing linear scab right anterior leg    Care Discussed with Consultants/Other Providers [X] YES  [ ] NO Mr. San is a 59y old  Male who presents with a chief complaint of PNA (04 Dec 2019 08:48)      INTERVAL HPI/OVERNIGHT EVENTS: Pt seen and examined at bedside. Pt admits his shortness of breath is worsening, 10/10 lower back pain, did not sleep well overnight, and feels "awful" this AM. No other new complaints at this time. Overnight, patient had a. fib and PVCs around 1AM on telemetry monitor, HRmax of 143 @ 7:11AM.    T(C): 36.4 (12-04-19 @ 08:09), Max: 36.4 (12-03-19 @ 23:17)  HR: 115 (12-04-19 @ 08:09) (90 - 140)  BP: 92/65 (12-04-19 @ 08:09) (90/58 - 103/69)  RR: 20 (12-04-19 @ 08:09) (19 - 21)  SpO2: 91% (12-04-19 @ 04:21) (90% - 100%)  Wt(kg): --  I&O's Summary    03 Dec 2019 07:01  -  04 Dec 2019 07:00  --------------------------------------------------------  IN: 384 mL / OUT: 150 mL / NET: 234 mL        LABS:                        10.4   39.01 )-----------( 147      ( 04 Dec 2019 05:23 )             32.5     12-04    130<L>  |  94<L>  |  34<H>  ----------------------------<  118<H>  3.9   |  26  |  0.67    Ca    8.1<L>      04 Dec 2019 05:23      PTT - ( 04 Dec 2019 05:23 )  PTT:76.1 sec    CAPILLARY BLOOD GLUCOSE        MEDICATIONS  (STANDING):  acetylcysteine 10%  Inhalation 5 milliLiter(s) Inhalation four times a day  budesonide 160 MICROgram(s)/formoterol 4.5 MICROgram(s) Inhaler 2 Puff(s) Inhalation two times a day  chlorhexidine 4% Liquid 1 Application(s) Topical daily  digoxin     Tablet 0.25 milliGRAM(s) Oral daily  dronabinol 5 milliGRAM(s) Oral every 12 hours  fentaNYL   Patch  25 MICROgram(s)/Hr 1 Patch Transdermal every 72 hours  furosemide   Injectable 40 milliGRAM(s) IV Push daily  heparin  Infusion.  Unit(s)/Hr (15 mL/Hr) IV Continuous <Continuous>  ipratropium    for Nebulization 500 MICROGram(s) Nebulizer every 6 hours  loratadine 10 milliGRAM(s) Oral daily  magnesium oxide 400 milliGRAM(s) Oral daily  metoprolol succinate  milliGRAM(s) Oral daily  multivitamin 1 Tablet(s) Oral daily  predniSONE   Tablet 20 milliGRAM(s) Oral daily  sodium chloride 1 Gram(s) Oral three times a day  tiotropium 18 MICROgram(s) Capsule 1 Capsule(s) Inhalation daily    MEDICATIONS  (PRN):  guaiFENesin    Syrup 100 milliGRAM(s) Oral every 6 hours PRN Cough  heparin  Injectable 6500 Unit(s) IV Push every 6 hours PRN For aPTT less than 40  heparin  Injectable 3000 Unit(s) IV Push every 6 hours PRN For aPTT between 40 - 57  HYDROmorphone  Injectable 1 milliGRAM(s) IV Push every 4 hours PRN Severe Pain (7 - 10)  levalbuterol Inhalation 0.63 milliGRAM(s) Inhalation every 6 hours PRN shortness of breath  morphine  - Injectable 2 milliGRAM(s) IV Push every 4 hours PRN Moderate Pain (4 - 6)  morphine  - Injectable 1 milliGRAM(s) IV Push every 4 hours PRN Mild Pain (1 - 3)  ondansetron Injectable 4 milliGRAM(s) IV Push every 6 hours PRN Nausea  senna 8.6 milliGRAM(s) Oral Tablet - Peds 1 Tablet(s) Oral at bedtime PRN for constipation      REVIEW OF SYSTEMS:  CONSTITUTIONAL: admits fatigue, no fever  ENMT: no difficulty hearing.  RESPIRATORY: admits pleuritic chest pain, admits cough, admits SOB, no wheezing  CARDIOVASCULAR: admits chronic leg swelling, admits palpitations  GASTROINTESTINAL: no abdominal pain. no nausea, no vomiting; no diarrhea, no constipation.   GENITOURINARY: no dysuria, no frequency, no incontinence  NEUROLOGICAL: admits tremors, no headaches, no memory loss  SKIN: no itching, no burning, no rashes  ENDOCRINE: admits chemotherapy-induced hair loss  MUSCULOSKELETAL: admits lower back pain  PSYCHIATRIC: admits difficulty sleeping      RADIOLOGY & ADDITIONAL TESTS:    < from: Xray Chest 1 View- PORTABLE-Urgent (12.03.19 @ 11:33) >  FINDINGS:   Right-sided Port-A-Cath present with tip at cavoatrial junction.  Thoracic aortic atheromatous changes and ectasia are stable.  Heart size is within normal limits.  Bilateral perihilar airspace disease not significantly changed since   prior study. No significant pleural effusion. No pneumothorax  No acute bony findings.    IMPRESSION:   1. Persistent perihilar airspace disease  2. No significant pleural effusion.    < end of copied text >    Imaging Personally Reviewed:  [X] YES  [ ] NO    Consultant(s) Notes Reviewed:  [X] YES  [ ] NO    PHYSICAL EXAM:  GENERAL: Uncomfortable, anxious, cachetic  HEAD: Bald, Atraumatic, Normocephalic  EYES: conjunctiva and sclera clear  CHEST/LUNG: crackles left lower lobe, decreased breath sounds b/l, no wheezing, no rhonchi  HEART: irregularly irregular, no murmurs, no rubs, no gallops  ABDOMEN: Soft, Nontender, Nondistended  EXTREMITIES: 4+ pitting edema with weeping, 2+ radial pulses, non-palpable dorsalis pedis 2/2 edema  SKIN: fentanyl patch on left deltoid (marked with 12/3), healing linear scab right anterior leg    Care Discussed with Consultants/Other Providers [X] YES  [ ] NO Mr. San is a 59y old  Male who presents with a chief complaint of PNA (04 Dec 2019 08:48)      INTERVAL HPI/OVERNIGHT EVENTS: Pt seen and examined at bedside. Pt admits his shortness of breath is worsening, 10/10 lower back pain, did not sleep well overnight, and feels "awful" this AM. No other new complaints at this time. Overnight, patient had a. fib and PVCs around 1AM on telemetry monitor, HRmax of 143 @ 7:11AM.    T(C): 36.4 (12-04-19 @ 08:09), Max: 36.4 (12-03-19 @ 23:17)  HR: 115 (12-04-19 @ 08:09) (90 - 140)  BP: 92/65 (12-04-19 @ 08:09) (90/58 - 103/69)  RR: 20 (12-04-19 @ 08:09) (19 - 21)  SpO2: 91% (12-04-19 @ 04:21) (90% - 100%)  Wt(kg): --  I&O's Summary    03 Dec 2019 07:01  -  04 Dec 2019 07:00  --------------------------------------------------------  IN: 384 mL / OUT: 150 mL / NET: 234 mL        LABS:                        10.4   39.01 )-----------( 147      ( 04 Dec 2019 05:23 )             32.5     12-04    130<L>  |  94<L>  |  34<H>  ----------------------------<  118<H>  3.9   |  26  |  0.67    Ca    8.1<L>      04 Dec 2019 05:23      PTT - ( 04 Dec 2019 05:23 )  PTT:76.1 sec    CAPILLARY BLOOD GLUCOSE        MEDICATIONS  (STANDING):  acetylcysteine 10%  Inhalation 5 milliLiter(s) Inhalation four times a day  budesonide 160 MICROgram(s)/formoterol 4.5 MICROgram(s) Inhaler 2 Puff(s) Inhalation two times a day  chlorhexidine 4% Liquid 1 Application(s) Topical daily  digoxin     Tablet 0.25 milliGRAM(s) Oral daily  dronabinol 5 milliGRAM(s) Oral every 12 hours  fentaNYL   Patch  25 MICROgram(s)/Hr 1 Patch Transdermal every 72 hours  furosemide   Injectable 40 milliGRAM(s) IV Push daily  heparin  Infusion.  Unit(s)/Hr (15 mL/Hr) IV Continuous <Continuous>  ipratropium    for Nebulization 500 MICROGram(s) Nebulizer every 6 hours  loratadine 10 milliGRAM(s) Oral daily  magnesium oxide 400 milliGRAM(s) Oral daily  metoprolol succinate  milliGRAM(s) Oral daily  multivitamin 1 Tablet(s) Oral daily  predniSONE   Tablet 20 milliGRAM(s) Oral daily  sodium chloride 1 Gram(s) Oral three times a day  tiotropium 18 MICROgram(s) Capsule 1 Capsule(s) Inhalation daily    MEDICATIONS  (PRN):  guaiFENesin    Syrup 100 milliGRAM(s) Oral every 6 hours PRN Cough  heparin  Injectable 6500 Unit(s) IV Push every 6 hours PRN For aPTT less than 40  heparin  Injectable 3000 Unit(s) IV Push every 6 hours PRN For aPTT between 40 - 57  HYDROmorphone  Injectable 1 milliGRAM(s) IV Push every 4 hours PRN Severe Pain (7 - 10)  levalbuterol Inhalation 0.63 milliGRAM(s) Inhalation every 6 hours PRN shortness of breath  morphine  - Injectable 2 milliGRAM(s) IV Push every 4 hours PRN Moderate Pain (4 - 6)  morphine  - Injectable 1 milliGRAM(s) IV Push every 4 hours PRN Mild Pain (1 - 3)  ondansetron Injectable 4 milliGRAM(s) IV Push every 6 hours PRN Nausea  senna 8.6 milliGRAM(s) Oral Tablet - Peds 1 Tablet(s) Oral at bedtime PRN for constipation      REVIEW OF SYSTEMS:  CONSTITUTIONAL: admits fatigue, no fever  ENMT: no difficulty hearing.  RESPIRATORY: admits pleuritic chest pain, admits cough, admits SOB, no wheezing  CARDIOVASCULAR: admits chronic leg swelling, admits palpitations  GASTROINTESTINAL: no abdominal pain. no nausea, no vomiting; no diarrhea, no constipation.   GENITOURINARY: no dysuria, no frequency, no incontinence  NEUROLOGICAL: no headaches, no memory loss  SKIN: no itching, no burning, no rashes  ENDOCRINE: admits chemotherapy-induced hair loss  MUSCULOSKELETAL: admits lower back pain  PSYCHIATRIC: admits difficulty sleeping      RADIOLOGY & ADDITIONAL TESTS:    < from: Xray Chest 1 View- PORTABLE-Urgent (12.03.19 @ 11:33) >  FINDINGS:   Right-sided Port-A-Cath present with tip at cavoatrial junction.  Thoracic aortic atheromatous changes and ectasia are stable.  Heart size is within normal limits.  Bilateral perihilar airspace disease not significantly changed since   prior study. No significant pleural effusion. No pneumothorax  No acute bony findings.    IMPRESSION:   1. Persistent perihilar airspace disease  2. No significant pleural effusion.    < end of copied text >      Imaging Personally Reviewed:  [X] YES  [ ] NO    Consultant(s) Notes Reviewed:  [X] YES  [ ] NO    PHYSICAL EXAM:  GENERAL: Uncomfortable, anxious, cachectic  HEAD: Bald, Atraumatic, Normocephalic  EYES: conjunctiva and sclera clear  CHEST/LUNG: crackles left lower lobe, diminished breath sounds in lung bases b/l, no wheezing, no rhonchi  HEART: irregularly irregular, no murmurs, no rubs, no gallops  ABDOMEN: Soft, Nontender, Nondistended  EXTREMITIES: 4+ pitting edema with weeping, 2+ radial pulses, non-palpable dorsalis pedis 2/2 edema  SKIN: fentanyl patch on left deltoid (marked with 12/3), healing linear scab right anterior leg    Care Discussed with Consultants/Other Providers [X] YES  [ ] NO

## 2019-12-04 NOTE — PROGRESS NOTE ADULT - PROBLEM SELECTOR PLAN 2
- CTA Chest 11/22/2019 suggestive of pleural-based metastatic disease.  - Pulm following, continue IV heparin, O2, nebs.   - Heme/Onc following, change Heparin to Eliquis when possible, Head CT Scan outpt, continue on Fentanyl 12mcg 1 patch q72H.  - Continue Morphine 1mg or 2mg  IVp q4H PRN, re: mild or mod pain  - Continue Hydromorphone 1 mg IVp q4H PRN, re: severe pain.  - Continue Senna 8.6 mg 1 tab PO QHS.  - Continue Odansetron 4 mg IVp q6H PRN, re: nausea.   - Continue Magnesium Oxide 400 mg PO daily.   - Continue Dronabinol 5 mg PO q12H.

## 2019-12-04 NOTE — PROGRESS NOTE ADULT - ASSESSMENT
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    Adm now with weakness and dyspnea; found to have pulmonary embolism and further disease progression with pleural effusion, bone lesions, liver lesions and pancreatic mass.  s/p 700cc therapeutic thoracentesis 11/29/19 AM; despite this had re-accumulation of fluid and more sx  s/p Chest US with estimated 1000cc on 12/02/19    Unable to lie flat for MRI brain.   Unable to lie flat for CT scan.     RECOMMEND  Continue Heparin drip. Consider change to Eliquis when no procedures needed.     s/p therapeutic thoracentesis Fri 11/29/19. No pleurex catheter as insufficient fluid.;   however fluid has quickly re-accumulated and would consider repeat CT chest and possible pleurex placement  Suspect over all dyspnea due to combination: sx due to cancer, fluid, cardiac and PE.   However, remains SOB today.     Repeat CXR today.     Hold MRI brain.   Hold CT scans.     Chemotheray tomorrow.   with Topotecan 1.5mg/m2.       Continue acute care  Would add pain medication - fentanyl patch  Will follow.

## 2019-12-04 NOTE — PROGRESS NOTE ADULT - PROBLEM SELECTOR PLAN 10
- Continue Heparin 25kU @ 15mL/hr.     IMPROVE VTE Individual Risk Assessment    RISK                                                                Points  [  ] Previous VTE                                                  3  [  ] Thrombophilia                                               2  [  ] Lower limb paralysis                                      2        (unable to hold up >15 seconds)    [ x ] Current Cancer                                              2         (within 6 months)  [  ] Immobilization > 24 hrs                                1  [  ] ICU/CCU stay > 24 hours                              1  [ x ] Age > 60                                                      1  IMPROVE VTE Score: 3    IMPROVE Score 0-1: Low Risk, No VTE prophylaxis required for most patients, encourage ambulation.   IMPROVE Score 2-3: At risk, pharmacologic VTE prophylaxis is indicated for most patients (in the absence of a contraindication)  IMPROVE Score > or = 4: High Risk, pharmacologic VTE prophylaxis is indicated for most patients (in the absence of a contraindication). - Stopped Heparin Drip at 8AM for pre-procedure. Resume Heparin 25kU @ 15mL/hr post-procedure as long as there's no active bleeding.     IMPROVE VTE Individual Risk Assessment    RISK                                                                Points  [  ] Previous VTE                                                  3  [  ] Thrombophilia                                               2  [  ] Lower limb paralysis                                      2        (unable to hold up >15 seconds)    [ x ] Current Cancer                                              2         (within 6 months)  [  ] Immobilization > 24 hrs                                1  [  ] ICU/CCU stay > 24 hours                              1  [ x ] Age > 60                                                      1  IMPROVE VTE Score: 3    IMPROVE Score 0-1: Low Risk, No VTE prophylaxis required for most patients, encourage ambulation.   IMPROVE Score 2-3: At risk, pharmacologic VTE prophylaxis is indicated for most patients (in the absence of a contraindication)  IMPROVE Score > or = 4: High Risk, pharmacologic VTE prophylaxis is indicated for most patients (in the absence of a contraindication). resume heparin drip  IMPROVE VTE Individual Risk Assessment    RISK                                                                Points  [  ] Previous VTE                                                  3  [  ] Thrombophilia                                               2  [  ] Lower limb paralysis                                      2        (unable to hold up >15 seconds)    [ x ] Current Cancer                                              2         (within 6 months)  [  ] Immobilization > 24 hrs                                1  [  ] ICU/CCU stay > 24 hours                              1  [ x ] Age > 60                                                      1  IMPROVE VTE Score: 3    IMPROVE Score 0-1: Low Risk, No VTE prophylaxis required for most patients, encourage ambulation.   IMPROVE Score 2-3: At risk, pharmacologic VTE prophylaxis is indicated for most patients (in the absence of a contraindication)  IMPROVE Score > or = 4: High Risk, pharmacologic VTE prophylaxis is indicated for most patients (in the absence of a contraindication).

## 2019-12-04 NOTE — PROGRESS NOTE ADULT - SUBJECTIVE AND OBJECTIVE BOX
[facility], Division of Infectious Diseases  ANH Valderrama A. Lee  [phone]    Name: RAFA SHAFER  Age: 59y  Gender: Male  MRN: [id number]    Interval History--  Notes reviewed. No new complaints.   Tearful, considering comfort care      Past Medical History--  Lung cancer  Depression  Dyspnea  Mediastinal adenopathy  Pericardial effusion  AF (atrial fibrillation)  Hyponatremia  COPD (chronic obstructive pulmonary disease)  HTN (hypertension)  S/P pericardial window creation  No significant past surgical history      For details regarding the patient's social history, family history, and other miscellaneous elements, please refer the initial infectious diseases consultation and/or the admitting history and physical examination for this admission.    Allergies    penicillin (Unknown)    Intolerances        Medications--  Antibiotics:    Immunologic:    Other:  acetylcysteine 10%  Inhalation  budesonide 160 MICROgram(s)/formoterol 4.5 MICROgram(s) Inhaler  chlorhexidine 4% Liquid  digoxin     Tablet  dronabinol  fentaNYL   Patch  25 MICROgram(s)/Hr  guaiFENesin    Syrup PRN  heparin  Infusion.  heparin  Injectable  heparin  Injectable PRN  heparin  Injectable PRN  HYDROmorphone  Injectable PRN  ipratropium    for Nebulization  levalbuterol Inhalation PRN  loratadine  magnesium oxide  metoprolol succinate ER  morphine  - Injectable PRN  morphine  - Injectable PRN  multivitamin  ondansetron Injectable PRN  predniSONE   Tablet  senna 8.6 milliGRAM(s) Oral Tablet - Peds PRN  sodium chloride  tiotropium 18 MICROgram(s) Capsule      Review of Systems--  A 10-point review of systems was obtained.   Review of systems otherwise negative except as previously noted.    Physical Examination--  Vital Signs: T(F): 97.3 (12-04-19 @ 12:29), Max: 97.6 (12-03-19 @ 23:17)  HR: 98 (12-04-19 @ 13:25)  BP: 91/60 (12-04-19 @ 12:29)  RR: 24 (12-04-19 @ 12:29)  SpO2: 99% (12-04-19 @ 13:25)  Wt(kg): --  General: Chronically ill-appearing Male without respiratory distress.  HEENT: Alopecia. Anicteric. Conjunctiva pink and moist. Oropharynx clear.   Neck: Not rigid. No sense of mass.  Nodes: None palpable.  Lungs: Diminished breath sounds bilaterally with scattered rhonchi and rales  Heart: Regular rate and rhythm. No Murmur. No rub. No gallop. No palpable thrill.  Abdomen: Bowel sounds present and normoactive. Soft. Mildly distended. Nontender. No sense of mass. No organomegaly.  Extremities: No cyanosis or clubbing. 3+ LE edema, wrapped. UE wasted.  Skin: Warm. Dry. Good turgor. No rash. No vasculitic stigmata.  Psychiatric: Tearful, appropriately so.       Laboratory Studies--  CBC                        10.4   39.01 )-----------( 147      ( 04 Dec 2019 05:23 )             32.5       Chemistries  12-04    130<L>  |  94<L>  |  34<H>  ----------------------------<  118<H>  3.9   |  26  |  0.67    Ca    8.1<L>      04 Dec 2019 05:23        Culture Data  None as of yet

## 2019-12-04 NOTE — PROGRESS NOTE ADULT - ASSESSMENT
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 He also has a history of a pericardial effusion s/p window, and of a pleural effusion s/p thoracentesis.  Now found to have b/l PE'    Left pleural effusion   - s/p Left thoracentesis 700cc non-clotting hemorrhagic fluid drained 11/29/19 with post procedure CXR with no PTX   - Pulm following.  Ct chest ordered to determine appropriateness of repeat tapping due to reaccumulation, however, patient unable to tolerate it.  - s/p chest USD, noted to have at least >1L on left.  Planned for left thoracentesis and Pleurex catheter today.  He is as optimized as he can possibly be for planned procedure  - Will switch to AFM from NC temporarily  - Followed by Hem/onc  - Continue IV Lasix daily.  Monitor for contractile alkalosis.  He is still volume overloaded with effusions and severe weeping b/l LE edema    PE  - CTA chest showed segmental PE's in B/L lower lobe   - On Heparin drip which is on hold in anticipation of his procedure  - TTE showed hyperdynamic LV, normal RVSF with no significant valvular disease.  However, showed large left pleural effusion s/p thora  - Can keep off of aspirin to minimize risk of bleeding as he is going to be on full dose anticoagulation    Chronic Afib  - Mostly tachycardic overnight up to 140, likely reactive to his respiratory process, anemia, and hypotension  - Tachycardia multifactorial in setting of underlying lung cancer, PE, and anemia  - Continue Toprol 100 mg daily (takes 200mg daily  at home).  Unable to increase as there there is not much room given low BP (SBP 90's)  - Continue Digoxin (last level=1.2)  - Can hold Heparin drip in anticipation of his thora.  Please resume when cleared by Pulm  - Monitor and replete electrolytes. Keep K>4.0 and Mg>2.0.     HTN  - BP on the low side at 90's.   - Will tolerate for now as long as he is asymptomatic.  Will continue BB for rate-control.    - He takes Toprol  mg q12H at home.  However, there is no room for uptitration here given suboptimal BP    Lymphedema  - Please apply ace wraps to BLE  - Elevate at all times    Further cardiac w/u pending clinical course  To follow closely with you    Steph Pradhan DNP, NP-C  Cardiology   Spectra #[phone]

## 2019-12-04 NOTE — PROGRESS NOTE ADULT - PROBLEM SELECTOR PLAN 1
Likely multifactorial from b/l PE, PNA, and metastatic pleural effusions (patient has a past history of 2 pleural effusions requiring drainage as well cardiac window)  - s/p L thoracentesis 11/29, 700 ccs drained  - US Chest 12/2: Bilateral chest ultrasound showing left pleural effusion, estimated volume of 1033 cc.  - CXR 12/3: persistent perihilar airspace disease, no pleural effusion.  - Cardio following, continue 40 mg IV Lasix daily  - Pulm following, continue IV heparin, O2, nebs.   - Heme/Onc following, continue fentanyl patch.   - Tapering Steroids: Continue Prednisone 20mg daily   - Continue Ipratropium 500 mcg Nebs q6H.   - Continue Levalbuterol 0.63 mg inhalation q6H PRN, re: SOB.   - Continue Acetylcysteine 10% inhalation 5mL QID.   - Currently NPO for repeat Thoracentesis and Pleurex Cath today by IR. Restart diet after procedure.   - STOP HEPARIN DRIP 1 HOUR BEFORE PROCEDURE, RESUME POST-PROCEDURE AS LONG AS THERE IS NO ACTIVE BLEEDING Likely multifactorial from b/l PE, PNA, and metastatic pleural effusions (patient has a past history of 2 pleural effusions requiring drainage as well cardiac window)  - s/p L thoracentesis 11/29, 700 ccs drained  - US Chest 12/2: Bilateral chest ultrasound showing left pleural effusion, estimated volume of 1033 cc.  - CXR 12/3: persistent perihilar airspace disease, no pleural effusion.  - Cardio following, continue 40 mg IV Lasix daily  - Pulm following, continue IV heparin, O2, nebs.   - Heme/Onc following, continue fentanyl patch.   - Tapering Steroids: Continue Prednisone 20mg daily   - Continue Ipratropium 500 mcg Nebs q6H.   - Continue Levalbuterol 0.63 mg inhalation q6H PRN, re: SOB.   - Continue Acetylcysteine 10% inhalation 5mL QID.   - Currently NPO for repeat Thoracentesis and Pleurex Cath today by IR. Restart diet after procedure.   - Stopped Heparin Drip at 8AM (4 hours before procedure). Resume Heparin post-procedure as long as there's no active bleeding. Likely multifactorial from b/l PE, PNA, and metastatic pleural effusions (patient has a past history of 2 pleural effusions requiring drainage as well cardiac window)  - s/p L thoracentesis 11/29, 700 ccs drained  - US Chest 12/2: Bilateral chest ultrasound showing left pleural effusion, estimated volume of 1033 cc.  - CXR 12/3: persistent perihilar airspace disease, no pleural effusion.  - Cardio following, continue 40 mg IV Lasix daily  - Pulm following, continue IV heparin, O2, nebs.   - Heme/Onc following, continue fentanyl patch.   - ID following, suggested surveillance blood cultures, monitor WBC, decrease steroids as able.  - Tapering Steroids: Continue Prednisone 20mg daily   - Continue Ipratropium 500 mcg Nebs q6H.   - Continue Levalbuterol 0.63 mg inhalation q6H PRN, re: SOB.   - Continue Acetylcysteine 10% inhalation 5mL QID.   - Currently NPO for repeat Thoracentesis and Pleurex Cath today by IR. Restart diet after procedure.   - Stopped Heparin Drip at 8AM (4 hours before procedure). Resume Heparin post-procedure as long as there's no active bleeding. Likely multifactorial from b/l PE, PNA, and metastatic pleural effusions (patient has a past history of 2 pleural effusions requiring drainage as well cardiac window)  - s/p L thoracentesis 11/29, 700 ccs drained  - US Chest 12/2: Bilateral chest ultrasound showing left pleural effusion, estimated volume of 1033 cc.  - CXR 12/3: persistent perihilar airspace disease, no pleural effusion.  - Cardio following, continue 40 mg IV Lasix daily  - Pulm following, continue IV heparin, O2, nebs.   - Heme/Onc following, continue fentanyl patch.   - ID following, suggested surveillance blood cultures, monitor WBC, decrease steroids as able.  - Tapering Steroids: Continue Prednisone 20mg daily   - Continue Ipratropium 500 mcg Nebs q6H.   - Continue Levalbuterol 0.63 mg inhalation q6H PRN, re: SOB.   - Continue Acetylcysteine 10% inhalation 5mL QID.   - D/C Lasix. Given 500mL NS IV Bolus and 1mg Dilaudid IVp Stat.   - Currently NPO for repeat Thoracentesis and Pleurex Cath today by IR. Restart diet after procedure.   - Stopped Heparin Drip at 8AM (4 hours before procedure). Resume Heparin post-procedure as long as there's no active bleeding. Likely multifactorial from b/l PE, PNA, and metastatic pleural effusions (patient has a past history of 2 pleural effusions requiring drainage as well cardiac window)  - s/p L thoracentesis 11/29, 700 ccs drained  - US Chest 12/2: Bilateral chest ultrasound showing left pleural effusion, estimated volume of 1033 cc.  - CXR 12/3: persistent perihilar airspace disease, no pleural effusion.  - Cardio following  - Pulm following, continue IV heparin, O2, nebs.   - Heme/Onc following, continue fentanyl patch.   - ID following, suggested surveillance blood cultures, monitor WBC, decrease steroids as able.  - Tapering Steroids: Continue Prednisone 20mg daily   - Continue Ipratropium 500 mcg Nebs q6H.   - Continue Levalbuterol 0.63 mg inhalation q6H PRN, re: SOB.   - Continue Acetylcysteine 10% inhalation 5mL QID.   - D/C Lasix as patient's BP this morning 80s/60s Given 500mL NS IV Bolus and 1mg Dilaudid IVp Stat for pain  - IR unable to take patient for pleurex catheter today due to patient's low BP. Will restart diet and heparin drip for now.  Will need to discuss GOC with patient and wife

## 2019-12-04 NOTE — PROGRESS NOTE ADULT - ATTENDING COMMENTS
pt seen and examined w housestaff  now w hypoxic resp failure  overall prognosis extremely poor   hypotensive, hypoxic  comfort measures may be most appropriate

## 2019-12-04 NOTE — PROGRESS NOTE ADULT - SUBJECTIVE AND OBJECTIVE BOX
[INTERVAL HX: ]  Patient seen and examined;  Chart reviewed and events noted;   more SOB. Unable to lie for CT scan.   Discussed terminal prognosis.   Discussed option of hospice care vs attempt with chemo with likely high risk for mortality.   Pt opts for chemotx.     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 Patient states the SOB has been worsening and that's what prompted him to come to the ED. He denies recent fevers, chills but admits he's been shaking since he's been on chemotherapy. Admits on and off nausea, and episodes of vomiting over the past week. Denies hematemesis/hemoptysis. Denies chest pain, palpitations, abdominal pain, diarrhea, constipation. Admits LE edema which has been worsening in past week, and reports 100 lb weight loss since January. He has had no appetite, and reports not eating anything at all. He notes he recently changed chem to Paclitaxel, as per patient a body scan demonstrated mets to brain and bone (R shoulder, L ribs). His last radiation dose was August 2019.     In the ED:  T: 97 HR: 98 BP: 86/58 RR: 22 92% RA   WBC: 4.24 h/h: 9.2/27.6 coags wnl, D-Dimer 2411  Lactate 2.5 Na: 135, K: 3.0  Cr: 0.57 glucose 134 TSH: 2.37 proBNP 1397  s/p duonebs, 100 mg hydrocortisone, potassium chloride 10x3  U/S doppler LE: L superificial thrombus noted  -CTA and CT abdomen and pelvis pending  -EKG, significant artificant due to shaking. Official read pending (22 Nov 2019 20:57)            MEDICATIONS  (STANDING):  acetylcysteine 10%  Inhalation 5 milliLiter(s) Inhalation four times a day  budesonide 160 MICROgram(s)/formoterol 4.5 MICROgram(s) Inhaler 2 Puff(s) Inhalation two times a day  chlorhexidine 4% Liquid 1 Application(s) Topical daily  digoxin     Tablet 0.25 milliGRAM(s) Oral daily  dronabinol 5 milliGRAM(s) Oral every 12 hours  fentaNYL   Patch  25 MICROgram(s)/Hr 1 Patch Transdermal every 72 hours  heparin  Infusion.  Unit(s)/Hr (15 mL/Hr) IV Continuous <Continuous>  heparin  Injectable 6500 Unit(s) IV Push once  ipratropium    for Nebulization 500 MICROGram(s) Nebulizer every 6 hours  loratadine 10 milliGRAM(s) Oral daily  magnesium oxide 400 milliGRAM(s) Oral daily  metoprolol succinate  milliGRAM(s) Oral daily  multivitamin 1 Tablet(s) Oral daily  predniSONE   Tablet 20 milliGRAM(s) Oral daily  sodium chloride 1 Gram(s) Oral three times a day  tiotropium 18 MICROgram(s) Capsule 1 Capsule(s) Inhalation daily    MEDICATIONS  (PRN):  guaiFENesin    Syrup 100 milliGRAM(s) Oral every 6 hours PRN Cough  heparin  Injectable 6500 Unit(s) IV Push every 6 hours PRN For aPTT less than 40  heparin  Injectable 3000 Unit(s) IV Push every 6 hours PRN For aPTT between 40 - 57  HYDROmorphone  Injectable 1 milliGRAM(s) IV Push every 4 hours PRN Severe Pain (7 - 10)  levalbuterol Inhalation 0.63 milliGRAM(s) Inhalation every 6 hours PRN shortness of breath  morphine  - Injectable 2 milliGRAM(s) IV Push every 4 hours PRN Moderate Pain (4 - 6)  morphine  - Injectable 1 milliGRAM(s) IV Push every 4 hours PRN Mild Pain (1 - 3)  ondansetron Injectable 4 milliGRAM(s) IV Push every 6 hours PRN Nausea  senna 8.6 milliGRAM(s) Oral Tablet - Peds 1 Tablet(s) Oral at bedtime PRN for constipation      Vital Signs Last 24 Hrs  T(C): 36.3 (04 Dec 2019 12:29), Max: 36.4 (03 Dec 2019 23:17)  T(F): 97.3 (04 Dec 2019 12:29), Max: 97.6 (03 Dec 2019 23:17)  HR: 98 (04 Dec 2019 13:25) (90 - 140)  BP: 91/60 (04 Dec 2019 12:29) (82/60 - 96/60)  BP(mean): --  RR: 24 (04 Dec 2019 12:29) (19 - 26)  SpO2: 99% (04 Dec 2019 13:25) (90% - 100%)      [PHYSICAL EXAM]  General: adult in NAD,  WN,  WD.  HEENT: clear oropharynx, anicteric sclera, pink conjunctivae.  Neck: supple, no masses.  CV: normal S1S2, no murmur, no rubs, no gallops.  Lungs: dec BS L side, 1/4 up no wheezes, no rales, no rhonchi.  Abdomen: soft, non-tender, non-distended, no hepatosplenomegaly, normal BS, no guarding.  Ext: no clubbing, no cyanosis, + edema.  Skin: no rashes,  no petechiae, no venous stasis changes.  Neuro: alert and oriented X3  , no focal motor deficits.  LN: no SC STEVE.      [LABS:]                        10.4   39.01 )-----------( 147      ( 04 Dec 2019 05:23 )             32.5     12-04    130<L>  |  94<L>  |  34<H>  ----------------------------<  118<H>  3.9   |  26  |  0.67    Ca    8.1<L>      04 Dec 2019 05:23      PTT - ( 04 Dec 2019 13:50 )  PTT:25.5 sec              [RADIOLOGY STUDIES:]

## 2019-12-04 NOTE — PROGRESS NOTE ADULT - ASSESSMENT
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

## 2019-12-04 NOTE — PROGRESS NOTE ADULT - PROBLEM SELECTOR PLAN 3
Tachycardic overnight up to 140, likely reactive to respiratory distress, anemia, and hypotension  - Cardio following; recs appreciated.  - Continue Digoxin 0.25 mg PO daily.   - Continue Metoprolol 100 mg PO daily with hold parameters.  - Continue Heparin 25k U @ 15ml/hr until 1 hour before procedure, continue post-procedure if no active signs of bleeding. Tachycardic overnight up to 140, likely reactive to respiratory distress, anemia, and hypotension  - Cardio following; recs appreciated.  - Continue Digoxin 0.25 mg PO daily.   - Continue Metoprolol 100 mg PO daily with hold parameters.  - Stopped Heparin Drip at 8AM pre-procedure. Resume Heparin 25k U @ 15ml/hr post-procedure as long as there's no active bleeding. Tachycardic overnight up to 140, likely reactive to respiratory distress, anemia, and hypotension  - Cardio following; recs appreciated.  - Continue Digoxin 0.25 mg PO daily.   - Continue Metoprolol 100 mg PO daily with hold parameters.

## 2019-12-04 NOTE — PROGRESS NOTE ADULT - ASSESSMENT
Leukemoid reaction  Concern for acute infectious process here is low  Steroids playing a role in leukocytosis  Leukemoid reaction due to malignancy in/of itself here is well within the DDx  Less likel;y neulasta at this point in time  Would view all care decisions through the lens of his progressive, incurable, and likely terminal malignancy.

## 2019-12-04 NOTE — PROGRESS NOTE ADULT - PROBLEM SELECTOR PLAN 9
Pt with shortness of breath, orthopnea, and 4+ pitting b/l LE.   - Patient would likely benefit from lymphedema pumps outpatient  - Continue Furosemide 40 mg IV daily  - Encourage lower extremity elevation.  - Apply Ace Wraps around Bilateral Lower Extremity, per cardio. Pt with shortness of breath, orthopnea, and 4+ pitting b/l LE.   - Patient would likely benefit from lymphedema pumps outpatient  - D/C Furosemide 40 mg IV daily  - Encourage lower extremity elevation.  - Apply Ace Wraps around Bilateral Lower Extremity, per cardio.

## 2019-12-05 LAB
APTT BLD: 28.8 SEC — SIGNIFICANT CHANGE UP (ref 28.5–37)
APTT BLD: >200 SEC — CRITICAL HIGH (ref 28.5–37)
HCT VFR BLD CALC: 31.3 % — LOW (ref 39–50)
HGB BLD-MCNC: 10 G/DL — LOW (ref 13–17)
MCHC RBC-ENTMCNC: 26.9 PG — LOW (ref 27–34)
MCHC RBC-ENTMCNC: 31.9 GM/DL — LOW (ref 32–36)
MCV RBC AUTO: 84.1 FL — SIGNIFICANT CHANGE UP (ref 80–100)
NRBC # BLD: 0 /100 WBCS — SIGNIFICANT CHANGE UP (ref 0–0)
PLATELET # BLD AUTO: 134 K/UL — LOW (ref 150–400)
RBC # BLD: 3.72 M/UL — LOW (ref 4.2–5.8)
RBC # FLD: 18.6 % — HIGH (ref 10.3–14.5)
WBC # BLD: 34.44 K/UL — HIGH (ref 3.8–10.5)
WBC # FLD AUTO: 34.44 K/UL — HIGH (ref 3.8–10.5)

## 2019-12-05 PROCEDURE — 99232 SBSQ HOSP IP/OBS MODERATE 35: CPT

## 2019-12-05 RX ORDER — ONDANSETRON 8 MG/1
8 TABLET, FILM COATED ORAL ONCE
Refills: 0 | Status: COMPLETED | OUTPATIENT
Start: 2019-12-05 | End: 2019-12-05

## 2019-12-05 RX ORDER — HYDROMORPHONE HYDROCHLORIDE 2 MG/ML
1 INJECTION INTRAMUSCULAR; INTRAVENOUS; SUBCUTANEOUS ONCE
Refills: 0 | Status: DISCONTINUED | OUTPATIENT
Start: 2019-12-05 | End: 2019-12-05

## 2019-12-05 RX ORDER — TOPOTECAN 4 MG/4ML
3 INJECTION, POWDER, LYOPHILIZED, FOR SOLUTION INTRAVENOUS ONCE
Refills: 0 | Status: COMPLETED | OUTPATIENT
Start: 2019-12-05 | End: 2019-12-05

## 2019-12-05 RX ORDER — SODIUM CHLORIDE 9 MG/ML
250 INJECTION INTRAMUSCULAR; INTRAVENOUS; SUBCUTANEOUS ONCE
Refills: 0 | Status: COMPLETED | OUTPATIENT
Start: 2019-12-05 | End: 2019-12-05

## 2019-12-05 RX ORDER — METOPROLOL TARTRATE 50 MG
5 TABLET ORAL ONCE
Refills: 0 | Status: COMPLETED | OUTPATIENT
Start: 2019-12-05 | End: 2019-12-05

## 2019-12-05 RX ADMIN — FENTANYL CITRATE 1 PATCH: 50 INJECTION INTRAVENOUS at 19:32

## 2019-12-05 RX ADMIN — TIOTROPIUM BROMIDE 1 CAPSULE(S): 18 CAPSULE ORAL; RESPIRATORY (INHALATION) at 07:29

## 2019-12-05 RX ADMIN — Medication 500 MICROGRAM(S): at 20:19

## 2019-12-05 RX ADMIN — HEPARIN SODIUM 1200 UNIT(S)/HR: 5000 INJECTION INTRAVENOUS; SUBCUTANEOUS at 00:40

## 2019-12-05 RX ADMIN — MAGNESIUM OXIDE 400 MG ORAL TABLET 400 MILLIGRAM(S): 241.3 TABLET ORAL at 12:58

## 2019-12-05 RX ADMIN — SODIUM CHLORIDE 1000 MILLILITER(S): 9 INJECTION INTRAMUSCULAR; INTRAVENOUS; SUBCUTANEOUS at 14:30

## 2019-12-05 RX ADMIN — SODIUM CHLORIDE 1 GRAM(S): 9 INJECTION INTRAMUSCULAR; INTRAVENOUS; SUBCUTANEOUS at 12:58

## 2019-12-05 RX ADMIN — HYDROMORPHONE HYDROCHLORIDE 1 MILLIGRAM(S): 2 INJECTION INTRAMUSCULAR; INTRAVENOUS; SUBCUTANEOUS at 02:26

## 2019-12-05 RX ADMIN — Medication 500 MICROGRAM(S): at 07:53

## 2019-12-05 RX ADMIN — Medication 5 MILLIGRAM(S): at 20:29

## 2019-12-05 RX ADMIN — HYDROMORPHONE HYDROCHLORIDE 1 MILLIGRAM(S): 2 INJECTION INTRAMUSCULAR; INTRAVENOUS; SUBCUTANEOUS at 06:29

## 2019-12-05 RX ADMIN — HYDROMORPHONE HYDROCHLORIDE 1 MILLIGRAM(S): 2 INJECTION INTRAMUSCULAR; INTRAVENOUS; SUBCUTANEOUS at 23:50

## 2019-12-05 RX ADMIN — HEPARIN SODIUM 6500 UNIT(S): 5000 INJECTION INTRAVENOUS; SUBCUTANEOUS at 19:09

## 2019-12-05 RX ADMIN — Medication 300 UNIT(S): at 17:37

## 2019-12-05 RX ADMIN — HYDROMORPHONE HYDROCHLORIDE 1 MILLIGRAM(S): 2 INJECTION INTRAMUSCULAR; INTRAVENOUS; SUBCUTANEOUS at 15:36

## 2019-12-05 RX ADMIN — HYDROMORPHONE HYDROCHLORIDE 1 MILLIGRAM(S): 2 INJECTION INTRAMUSCULAR; INTRAVENOUS; SUBCUTANEOUS at 23:16

## 2019-12-05 RX ADMIN — Medication 0.25 MILLIGRAM(S): at 05:19

## 2019-12-05 RX ADMIN — Medication 20 MILLIGRAM(S): at 05:19

## 2019-12-05 RX ADMIN — Medication 500 MICROGRAM(S): at 01:30

## 2019-12-05 RX ADMIN — HYDROMORPHONE HYDROCHLORIDE 1 MILLIGRAM(S): 2 INJECTION INTRAMUSCULAR; INTRAVENOUS; SUBCUTANEOUS at 12:59

## 2019-12-05 RX ADMIN — CHLORHEXIDINE GLUCONATE 1 APPLICATION(S): 213 SOLUTION TOPICAL at 13:07

## 2019-12-05 RX ADMIN — HYDROMORPHONE HYDROCHLORIDE 1 MILLIGRAM(S): 2 INJECTION INTRAMUSCULAR; INTRAVENOUS; SUBCUTANEOUS at 02:56

## 2019-12-05 RX ADMIN — HEPARIN SODIUM 1300 UNIT(S)/HR: 5000 INJECTION INTRAVENOUS; SUBCUTANEOUS at 19:04

## 2019-12-05 RX ADMIN — BUDESONIDE AND FORMOTEROL FUMARATE DIHYDRATE 2 PUFF(S): 160; 4.5 AEROSOL RESPIRATORY (INHALATION) at 07:29

## 2019-12-05 RX ADMIN — MORPHINE SULFATE 2 MILLIGRAM(S): 50 CAPSULE, EXTENDED RELEASE ORAL at 20:27

## 2019-12-05 RX ADMIN — HYDROMORPHONE HYDROCHLORIDE 1 MILLIGRAM(S): 2 INJECTION INTRAMUSCULAR; INTRAVENOUS; SUBCUTANEOUS at 15:21

## 2019-12-05 RX ADMIN — MORPHINE SULFATE 2 MILLIGRAM(S): 50 CAPSULE, EXTENDED RELEASE ORAL at 00:45

## 2019-12-05 RX ADMIN — BUDESONIDE AND FORMOTEROL FUMARATE DIHYDRATE 2 PUFF(S): 160; 4.5 AEROSOL RESPIRATORY (INHALATION) at 18:40

## 2019-12-05 RX ADMIN — HEPARIN SODIUM 900 UNIT(S)/HR: 5000 INJECTION INTRAVENOUS; SUBCUTANEOUS at 09:43

## 2019-12-05 RX ADMIN — Medication 500 MICROGRAM(S): at 14:08

## 2019-12-05 RX ADMIN — LORATADINE 10 MILLIGRAM(S): 10 TABLET ORAL at 12:58

## 2019-12-05 RX ADMIN — HEPARIN SODIUM 0 UNIT(S)/HR: 5000 INJECTION INTRAVENOUS; SUBCUTANEOUS at 08:42

## 2019-12-05 RX ADMIN — SODIUM CHLORIDE 1 GRAM(S): 9 INJECTION INTRAMUSCULAR; INTRAVENOUS; SUBCUTANEOUS at 05:19

## 2019-12-05 RX ADMIN — FENTANYL CITRATE 1 PATCH: 50 INJECTION INTRAVENOUS at 07:20

## 2019-12-05 RX ADMIN — Medication 5 MILLIGRAM(S): at 05:24

## 2019-12-05 RX ADMIN — ONDANSETRON 108 MILLIGRAM(S): 8 TABLET, FILM COATED ORAL at 15:51

## 2019-12-05 RX ADMIN — MORPHINE SULFATE 2 MILLIGRAM(S): 50 CAPSULE, EXTENDED RELEASE ORAL at 01:42

## 2019-12-05 RX ADMIN — TOPOTECAN 106 MILLIGRAM(S): 4 INJECTION, POWDER, LYOPHILIZED, FOR SOLUTION INTRAVENOUS at 16:42

## 2019-12-05 RX ADMIN — HYDROMORPHONE HYDROCHLORIDE 1 MILLIGRAM(S): 2 INJECTION INTRAMUSCULAR; INTRAVENOUS; SUBCUTANEOUS at 13:14

## 2019-12-05 NOTE — CHART NOTE - NSCHARTNOTEFT_GEN_A_CORE
Called by RN for bradycardia. Patient just finished chemotherapy and HR dropped to 58. Went down to assess patient and HR jumped back up to 118 which has been his baseline while he has been in the hospital. Patient continues to have shortness of breath and back pain.    Vital Signs Last 24 Hrs  T(C): 36.8 (05 Dec 2019 17:13), Max: 36.8 (05 Dec 2019 17:13)  T(F): 98.2 (05 Dec 2019 17:13), Max: 98.2 (05 Dec 2019 17:13)  HR: 118 (05 Dec 2019 17:33) (58 - 135)  BP: 87/70 (05 Dec 2019 17:13) (84/62 - 117/62)  BP(mean): --  RR: 15 (05 Dec 2019 17:13) (15 - 28)  SpO2: 95% (05 Dec 2019 17:13) (94% - 100%)    Physical Exam:  General: cachectic male  HEENT: NCAT  Respiratory: crackles in left lung base, otherwise clear to auscultation b/l  CV: irregularly irregular rate and rhythm, +S1/S2, no murmurs, rubs or gallops    A/P: Patient had one episode of bradycardia which promptly resolved  -continue to monitor on tele

## 2019-12-05 NOTE — PROGRESS NOTE ADULT - ASSESSMENT
60 yo male with small cell Lung CA and now with Leukemoid reaction  Concern for acute infectious process here is low  Steroids playing a role in leukocytosis  Leukemoid reaction due to malignancy in/of itself here is well within the DDx  Less likel;y neulasta at this point in time  Would view all care decisions through the lens of his progressive, incurable, and likely terminal malignancy.

## 2019-12-05 NOTE — PROGRESS NOTE ADULT - PROBLEM SELECTOR PLAN 10
on heparin drip  IMPROVE VTE Individual Risk Assessment    RISK                                                                Points  [  ] Previous VTE                                                  3  [  ] Thrombophilia                                               2  [  ] Lower limb paralysis                                      2        (unable to hold up >15 seconds)    [ x ] Current Cancer                                              2         (within 6 months)  [  ] Immobilization > 24 hrs                                1  [  ] ICU/CCU stay > 24 hours                              1  [ x ] Age > 60                                                      1  IMPROVE VTE Score: 3    IMPROVE Score 0-1: Low Risk, No VTE prophylaxis required for most patients, encourage ambulation.   IMPROVE Score 2-3: At risk, pharmacologic VTE prophylaxis is indicated for most patients (in the absence of a contraindication)  IMPROVE Score > or = 4: High Risk, pharmacologic VTE prophylaxis is indicated for most patients (in the absence of a contraindication).

## 2019-12-05 NOTE — PROGRESS NOTE ADULT - SUBJECTIVE AND OBJECTIVE BOX
[INTERVAL HX: ]  Patient seen and examined;  Chart reviewed and events noted;   Denies CP, +SOB.   Affirms decision to undergo chemotx. Acknowledges risk of death and complicaiton.   However, feels rather try than not given how cancer is progressing.     Son at bedside, with URI, using mask, had flu vaccine.         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 Patient states the SOB has been worsening and that's what prompted him to come to the ED. He denies recent fevers, chills but admits he's been shaking since he's been on chemotherapy. Admits on and off nausea, and episodes of vomiting over the past week. Denies hematemesis/hemoptysis. Denies chest pain, palpitations, abdominal pain, diarrhea, constipation. Admits LE edema which has been worsening in past week, and reports 100 lb weight loss since January. He has had no appetite, and reports not eating anything at all. He notes he recently changed chem to Paclitaxel, as per patient a body scan demonstrated mets to brain and bone (R shoulder, L ribs). His last radiation dose was August 2019.     In the ED:  T: 97 HR: 98 BP: 86/58 RR: 22 92% RA   WBC: 4.24 h/h: 9.2/27.6 coags wnl, D-Dimer 2411  Lactate 2.5 Na: 135, K: 3.0  Cr: 0.57 glucose 134 TSH: 2.37 proBNP 1397  s/p duonebs, 100 mg hydrocortisone, potassium chloride 10x3  U/S doppler LE: L superificial thrombus noted  -CTA and CT abdomen and pelvis pending  -EKG, significant artificant due to shaking. Official read pending (22 Nov 2019 20:57)            MEDICATIONS  (STANDING):  acetylcysteine 10%  Inhalation 5 milliLiter(s) Inhalation four times a day  budesonide 160 MICROgram(s)/formoterol 4.5 MICROgram(s) Inhaler 2 Puff(s) Inhalation two times a day  chlorhexidine 4% Liquid 1 Application(s) Topical daily  digoxin     Tablet 0.25 milliGRAM(s) Oral daily  dronabinol 5 milliGRAM(s) Oral every 12 hours  fentaNYL   Patch  25 MICROgram(s)/Hr 1 Patch Transdermal every 72 hours  heparin  Infusion.  Unit(s)/Hr (15 mL/Hr) IV Continuous <Continuous>  ipratropium    for Nebulization 500 MICROGram(s) Nebulizer every 6 hours  loratadine 10 milliGRAM(s) Oral daily  magnesium oxide 400 milliGRAM(s) Oral daily  metoprolol succinate  milliGRAM(s) Oral daily  multivitamin 1 Tablet(s) Oral daily  ondansetron  IVPB 8 milliGRAM(s) IV Intermittent once  predniSONE   Tablet 20 milliGRAM(s) Oral daily  sodium chloride 1 Gram(s) Oral three times a day  tiotropium 18 MICROgram(s) Capsule 1 Capsule(s) Inhalation daily  topotecan IVPB (eMAR) 3 milliGRAM(s) IV Intermittent once    MEDICATIONS  (PRN):  guaiFENesin    Syrup 100 milliGRAM(s) Oral every 6 hours PRN Cough  heparin  Injectable 6500 Unit(s) IV Push every 6 hours PRN For aPTT less than 40  heparin  Injectable 3000 Unit(s) IV Push every 6 hours PRN For aPTT between 40 - 57  HYDROmorphone  Injectable 1 milliGRAM(s) IV Push every 4 hours PRN Severe Pain (7 - 10)  levalbuterol Inhalation 0.63 milliGRAM(s) Inhalation every 6 hours PRN shortness of breath  morphine  - Injectable 2 milliGRAM(s) IV Push every 4 hours PRN Moderate Pain (4 - 6)  morphine  - Injectable 1 milliGRAM(s) IV Push every 4 hours PRN Mild Pain (1 - 3)  ondansetron Injectable 4 milliGRAM(s) IV Push every 6 hours PRN Nausea  senna 8.6 milliGRAM(s) Oral Tablet - Peds 1 Tablet(s) Oral at bedtime PRN for constipation      Vital Signs Last 24 Hrs  T(C): 36.3 (05 Dec 2019 11:46), Max: 36.4 (04 Dec 2019 23:53)  T(F): 97.4 (05 Dec 2019 11:46), Max: 97.5 (04 Dec 2019 23:53)  HR: 123 (05 Dec 2019 11:46) (98 - 135)  BP: 94/55 (05 Dec 2019 11:46) (88/56 - 117/62)  BP(mean): --  RR: 28 (05 Dec 2019 11:46) (22 - 28)  SpO2: 100% (05 Dec 2019 05:31) (94% - 100%)      [PHYSICAL EXAM]  General: adult in NAD,  WN,  WD. cachetic  HEENT: clear oropharynx, anicteric sclera, pink conjunctivae.  Neck: supple, no masses.  CV: normal S1S2, no murmur, no rubs, no gallops.  Lungs: dec BS L base to auscultation, no wheezes, no rales, no rhonchi.  Abdomen: soft, non-tender, non-distended, no hepatosplenomegaly, normal BS, no guarding.  Ext: no clubbing, no cyanosis, +edema.  Skin: no rashes,  no petechiae, no venous stasis changes.  Neuro: alert and oriented X3  , no focal motor deficits.  LN: no SC STEVE.      [LABS:]                        10.0   34.44 )-----------( 134      ( 05 Dec 2019 06:20 )             31.3     12-04    130<L>  |  94<L>  |  34<H>  ----------------------------<  118<H>  3.9   |  26  |  0.67    Ca    8.1<L>      04 Dec 2019 05:23      PTT - ( 05 Dec 2019 07:11 )  PTT:>200.0 sec              [RADIOLOGY STUDIES:]  < from: Xray Chest 1 View- PORTABLE-Routine (12.04.19 @ 15:39) >  EXAM:  XR CHEST PORTABLE ROUTINE 1V                            PROCEDURE DATE:  12/04/2019          INTERPRETATION:  Chest one view    HISTORY: Lung cancer    COMPARISON STUDY: 12/3/2019    Frontal expiratory view of the chest shows the heart to besimilar in   size. The lungs show progression of right lung infiltrate with similar   left markings and there is no evidence of pneumothorax nor pleural   effusion. Sclerotic right humeral head compatible with metastatic disease   is unchanged. Rightchest port with tip in the right atrium is again   noted.    IMPRESSION:  Progression of right infiltrate.    Thank you for the courtesy of this referral.    < end of copied text >

## 2019-12-05 NOTE — PROGRESS NOTE ADULT - PROBLEM SELECTOR PLAN 8
Stage II pressure ulcer on sacrum  - Seen by Wound Care, IVETH Farrell RN; keep moisture free, apply cavilon daily and criticaid q shift and PRN.  - new stage 2 pressure injury 1 x 1 at his right elbow, wound care following

## 2019-12-05 NOTE — PROGRESS NOTE ADULT - SUBJECTIVE AND OBJECTIVE BOX
infectious diseases progress note:    RAFA SHAFER is a 59y y. o. Male patient    Patient reports: "I can not get enough air to breath"    ROS:    EYES:  Negative  blurry vision or double vision  GASTROINTESTINAL:  Negative for nausea, vomiting, diarrhea  -otherwise negative except for subjective    Allergies    penicillin (Unknown)    Intolerances        ANTIBIOTICS/RELEVANT:  antimicrobials    immunologic:    OTHER:  acetylcysteine 10%  Inhalation 5 milliLiter(s) Inhalation four times a day  budesonide 160 MICROgram(s)/formoterol 4.5 MICROgram(s) Inhaler 2 Puff(s) Inhalation two times a day  chlorhexidine 4% Liquid 1 Application(s) Topical daily  digoxin     Tablet 0.25 milliGRAM(s) Oral daily  dronabinol 5 milliGRAM(s) Oral every 12 hours  fentaNYL   Patch  25 MICROgram(s)/Hr 1 Patch Transdermal every 72 hours  guaiFENesin    Syrup 100 milliGRAM(s) Oral every 6 hours PRN  heparin  Infusion.  Unit(s)/Hr IV Continuous <Continuous>  heparin  Injectable 6500 Unit(s) IV Push every 6 hours PRN  heparin  Injectable 3000 Unit(s) IV Push every 6 hours PRN  HYDROmorphone  Injectable 1 milliGRAM(s) IV Push every 4 hours PRN  ipratropium    for Nebulization 500 MICROGram(s) Nebulizer every 6 hours  levalbuterol Inhalation 0.63 milliGRAM(s) Inhalation every 6 hours PRN  loratadine 10 milliGRAM(s) Oral daily  magnesium oxide 400 milliGRAM(s) Oral daily  metoprolol succinate  milliGRAM(s) Oral daily  morphine  - Injectable 2 milliGRAM(s) IV Push every 4 hours PRN  morphine  - Injectable 1 milliGRAM(s) IV Push every 4 hours PRN  multivitamin 1 Tablet(s) Oral daily  ondansetron  IVPB 8 milliGRAM(s) IV Intermittent once  ondansetron Injectable 4 milliGRAM(s) IV Push every 6 hours PRN  predniSONE   Tablet 20 milliGRAM(s) Oral daily  senna 8.6 milliGRAM(s) Oral Tablet - Peds 1 Tablet(s) Oral at bedtime PRN  sodium chloride 1 Gram(s) Oral three times a day  tiotropium 18 MICROgram(s) Capsule 1 Capsule(s) Inhalation daily  topotecan IVPB (eMAR) 3 milliGRAM(s) IV Intermittent once      Objective:  Vital Signs Last 24 Hrs  T(C): 36.3 (05 Dec 2019 11:46), Max: 36.4 (04 Dec 2019 23:53)  T(F): 97.4 (05 Dec 2019 11:46), Max: 97.5 (04 Dec 2019 23:53)  HR: 123 (05 Dec 2019 11:46) (98 - 135)  BP: 94/55 (05 Dec 2019 11:46) (88/56 - 117/62)  BP(mean): --  RR: 28 (05 Dec 2019 11:46) (22 - 28)  SpO2: 100% (05 Dec 2019 05:31) (94% - 100%)    T(C): 36.3 (12-05-19 @ 11:46), Max: 36.4 (12-03-19 @ 23:17)  T(C): 36.3 (12-05-19 @ 11:46), Max: 36.8 (12-02-19 @ 23:41)  T(C): 36.3 (12-05-19 @ 11:46), Max: 36.8 (12-02-19 @ 23:41)    PHYSICAL EXAM:  Constitutional: Well-developed, well nourished  Eyes: PERRLA, EOMI  Ear/Nose/Throat: oropharynx normal	  Neck: no JVD, no lymphadenopathy, supple  Respiratory: no accessory muscle use  Cardiovascular: RRR,   Gastrointestinal: soft, NT  Extremities: no clubbing, no cyanosis, legs swollen wrapped      LABS:                        10.0   34.44 )-----------( 134      ( 05 Dec 2019 06:20 )             31.3       34.44 12-05 @ 06:20  32.05 12-04 @ 22:30  39.01 12-04 @ 05:23  26.56 12-03 @ 07:16  23.94 12-02 @ 06:20  21.77 12-01 @ 06:27  20.24 11-30 @ 07:54  20.70 11-29 @ 22:33  15.88 11-29 @ 06:58      12-04    130<L>  |  94<L>  |  34<H>  ----------------------------<  118<H>  3.9   |  26  |  0.67    Ca    8.1<L>      04 Dec 2019 05:23        Creatinine, Serum: 0.67 mg/dL (12-04-19 @ 05:23)  Creatinine, Serum: 0.57 mg/dL (12-03-19 @ 07:16)  Creatinine, Serum: 0.62 mg/dL (12-02-19 @ 11:41)  Creatinine, Serum: 0.55 mg/dL (11-30-19 @ 07:54)  Creatinine, Serum: 0.60 mg/dL (11-29-19 @ 06:58)      PTT - ( 05 Dec 2019 07:11 )  PTT:>200.0 sec          MICROBIOLOGY:              RADIOLOGY & ADDITIONAL STUDIES:

## 2019-12-05 NOTE — PROGRESS NOTE ADULT - PROBLEM SELECTOR PLAN 1
Likely multifactorial from b/l PE, PNA, and metastatic pleural effusions (patient has a past history of 2 pleural effusions requiring drainage as well cardiac window)  - s/p L thoracentesis 11/29, 700 ccs drained  - US Chest 12/2: Bilateral chest ultrasound showing left pleural effusion, estimated volume of 1033 cc.  - CXR 12/3: persistent perihilar airspace disease, no pleural effusion.  - CXR 12/4: progression of R infiltrate  - Cardio following  - Pulm following, continue IV heparin, O2, nebs.   - Heme/Onc following, continue fentanyl patch.   - ID following, suggested surveillance blood cultures, monitor WBC, decrease steroids as able.  - Tapering Steroids: Continue Prednisone 20mg daily   - Continue Ipratropium 500 mcg Nebs q6H.   - Continue Levalbuterol 0.63 mg inhalation q6H PRN, re: SOB.   - Continue Acetylcysteine 10% inhalation 5mL QID.   - D/C Lasix as patient's BP continues to be low, given 250 cc bolus and IV Dilaudid  - IR unable to take patient for pleurex catheter yesterday due to patient's low BP.  Will need to discuss GOC with patient and wife

## 2019-12-05 NOTE — PROGRESS NOTE ADULT - PROBLEM SELECTOR PLAN 5
BP continues to be low, D/C LASIX  - Cardio following; recs appreciated.  - Continue to HOLD Diltiazem.  - Continue Metoprolol 100 mg PO daily with hold parameters.

## 2019-12-05 NOTE — PROGRESS NOTE ADULT - SUBJECTIVE AND OBJECTIVE BOX
Patient is a 59y old  Male who presents with a chief complaint of PNA (05 Dec 2019 10:10)      INTERVAL HPI/OVERNIGHT EVENTS:    Continues to have severe shortness of breath    MEDICATIONS  (STANDING):  acetylcysteine 10%  Inhalation 5 milliLiter(s) Inhalation four times a day  budesonide 160 MICROgram(s)/formoterol 4.5 MICROgram(s) Inhaler 2 Puff(s) Inhalation two times a day  chlorhexidine 4% Liquid 1 Application(s) Topical daily  digoxin     Tablet 0.25 milliGRAM(s) Oral daily  dronabinol 5 milliGRAM(s) Oral every 12 hours  fentaNYL   Patch  25 MICROgram(s)/Hr 1 Patch Transdermal every 72 hours  heparin  Infusion.  Unit(s)/Hr (15 mL/Hr) IV Continuous <Continuous>  ipratropium    for Nebulization 500 MICROGram(s) Nebulizer every 6 hours  loratadine 10 milliGRAM(s) Oral daily  magnesium oxide 400 milliGRAM(s) Oral daily  metoprolol succinate  milliGRAM(s) Oral daily  multivitamin 1 Tablet(s) Oral daily  ondansetron  IVPB 8 milliGRAM(s) IV Intermittent once  predniSONE   Tablet 20 milliGRAM(s) Oral daily  sodium chloride 1 Gram(s) Oral three times a day  tiotropium 18 MICROgram(s) Capsule 1 Capsule(s) Inhalation daily  topotecan IVPB (eMAR) 3 milliGRAM(s) IV Intermittent once      MEDICATIONS  (PRN):  guaiFENesin    Syrup 100 milliGRAM(s) Oral every 6 hours PRN Cough  heparin  Injectable 6500 Unit(s) IV Push every 6 hours PRN For aPTT less than 40  heparin  Injectable 3000 Unit(s) IV Push every 6 hours PRN For aPTT between 40 - 57  HYDROmorphone  Injectable 1 milliGRAM(s) IV Push every 4 hours PRN Severe Pain (7 - 10)  levalbuterol Inhalation 0.63 milliGRAM(s) Inhalation every 6 hours PRN shortness of breath  morphine  - Injectable 2 milliGRAM(s) IV Push every 4 hours PRN Moderate Pain (4 - 6)  morphine  - Injectable 1 milliGRAM(s) IV Push every 4 hours PRN Mild Pain (1 - 3)  ondansetron Injectable 4 milliGRAM(s) IV Push every 6 hours PRN Nausea  senna 8.6 milliGRAM(s) Oral Tablet - Peds 1 Tablet(s) Oral at bedtime PRN for constipation      Allergies    penicillin (Unknown)    Intolerances        PAST MEDICAL & SURGICAL HISTORY:  Lung cancer  Depression  Dyspnea  Mediastinal adenopathy  Pericardial effusion  AF (atrial fibrillation)  Hyponatremia  COPD (chronic obstructive pulmonary disease)  HTN (hypertension)  S/P pericardial window creation      Vital Signs Last 24 Hrs  T(C): 36.3 (05 Dec 2019 07:55), Max: 36.4 (04 Dec 2019 23:53)  T(F): 97.4 (05 Dec 2019 07:55), Max: 97.5 (04 Dec 2019 23:53)  HR: 116 (05 Dec 2019 07:55) (98 - 135)  BP: 116/73 (05 Dec 2019 07:55) (88/56 - 117/62)  BP(mean): --  RR: 27 (05 Dec 2019 07:55) (22 - 27)  SpO2: 100% (05 Dec 2019 05:31) (94% - 100%)    PHYSICAL EXAMINATION:    GENERAL: The patient is awake and alert in no apparent distress.     HEENT: Head is normocephalic and atraumatic. Extraocular muscles are intact. Mucous membranes are moist.    NECK: Supple.    LUNGS: diminished breath sounds left base with scattered rhonchi    HEART: Regular rate and rhythm without murmur.    ABDOMEN: Soft, nontender, and nondistended.      EXTREMITIES: massive edema bilateral    NEUROLOGIC: Grossly intact.    SKIN: No ulceration or induration present.      LABS:                        10.0   34.44 )-----------( 134      ( 05 Dec 2019 06:20 )             31.3     12-04    130<L>  |  94<L>  |  34<H>  ----------------------------<  118<H>  3.9   |  26  |  0.67    Ca    8.1<L>      04 Dec 2019 05:23      PTT - ( 05 Dec 2019 07:11 )  PTT:>200.0 sec                    MICROBIOLOGY:      RADIOLOGY & ADDITIONAL STUDIES:    Assessment:    Widespread small cell carcinoma  Acute hypoxic respiratory failure  Pulmonary emboli  Left pleural effusion    Plan:    For left pleural drainage  Continue IV heparin + oxygen + nebulized bronchodilators

## 2019-12-05 NOTE — PROGRESS NOTE ADULT - SUBJECTIVE AND OBJECTIVE BOX
[facility] -- Gurpreet Bush, Emily, Corey, Sajan Pickard Savella  Office # [phone]      Follow Up:    Afib with RVR, SOB  Subjective/Observations:   No events overnight resting comfortably in bed.  No complaints of chest pain, dyspnea, or palpitations reported. No signs of orthopnea or PND.     REVIEW OF SYSTEMS: All other review of systems is negative unless indicated above    PAST MEDICAL & SURGICAL HISTORY:  Lung cancer  Depression  Dyspnea  Mediastinal adenopathy  Pericardial effusion  AF (atrial fibrillation)  Hyponatremia  COPD (chronic obstructive pulmonary disease)  HTN (hypertension)  S/P pericardial window creation      MEDICATIONS  (STANDING):  acetylcysteine 10%  Inhalation 5 milliLiter(s) Inhalation four times a day  budesonide 160 MICROgram(s)/formoterol 4.5 MICROgram(s) Inhaler 2 Puff(s) Inhalation two times a day  chlorhexidine 4% Liquid 1 Application(s) Topical daily  digoxin     Tablet 0.25 milliGRAM(s) Oral daily  dronabinol 5 milliGRAM(s) Oral every 12 hours  fentaNYL   Patch  25 MICROgram(s)/Hr 1 Patch Transdermal every 72 hours  heparin  Infusion.  Unit(s)/Hr (15 mL/Hr) IV Continuous <Continuous>  ipratropium    for Nebulization 500 MICROGram(s) Nebulizer every 6 hours  loratadine 10 milliGRAM(s) Oral daily  magnesium oxide 400 milliGRAM(s) Oral daily  metoprolol succinate  milliGRAM(s) Oral daily  multivitamin 1 Tablet(s) Oral daily  predniSONE   Tablet 20 milliGRAM(s) Oral daily  sodium chloride 1 Gram(s) Oral three times a day  tiotropium 18 MICROgram(s) Capsule 1 Capsule(s) Inhalation daily    MEDICATIONS  (PRN):  guaiFENesin    Syrup 100 milliGRAM(s) Oral every 6 hours PRN Cough  heparin  Injectable 6500 Unit(s) IV Push every 6 hours PRN For aPTT less than 40  heparin  Injectable 3000 Unit(s) IV Push every 6 hours PRN For aPTT between 40 - 57  HYDROmorphone  Injectable 1 milliGRAM(s) IV Push every 4 hours PRN Severe Pain (7 - 10)  levalbuterol Inhalation 0.63 milliGRAM(s) Inhalation every 6 hours PRN shortness of breath  morphine  - Injectable 2 milliGRAM(s) IV Push every 4 hours PRN Moderate Pain (4 - 6)  morphine  - Injectable 1 milliGRAM(s) IV Push every 4 hours PRN Mild Pain (1 - 3)  ondansetron Injectable 4 milliGRAM(s) IV Push every 6 hours PRN Nausea  senna 8.6 milliGRAM(s) Oral Tablet - Peds 1 Tablet(s) Oral at bedtime PRN for constipation      Allergies    penicillin (Unknown)    Intolerances        Vital Signs Last 24 Hrs  T(C): 36.3 (05 Dec 2019 07:55), Max: 36.4 (04 Dec 2019 23:53)  T(F): 97.4 (05 Dec 2019 07:55), Max: 97.5 (04 Dec 2019 23:53)  HR: 116 (05 Dec 2019 07:55) (98 - 135)  BP: 116/73 (05 Dec 2019 07:55) (82/60 - 117/62)  BP(mean): --  RR: 27 (05 Dec 2019 07:55) (22 - 27)  SpO2: 100% (05 Dec 2019 05:31) (94% - 100%)    I&O's Summary    04 Dec 2019 07:01  -  05 Dec 2019 07:00  --------------------------------------------------------  IN: 1400 mL / OUT: 475 mL / NET: 925 mL          PHYSICAL EXAM:  TELE: Afib   Constitutional: NAD, awake and alert, well-developed  HEENT: Moist Mucous Membranes, Anicteric  Pulmonary: Non-labored, breath sounds are clear bilaterally, No wheezing, crackles or rhonchi  Cardiovascular: Irregular, S1 and S2 nl, No murmurs, rubs, gallops or clicks  Gastrointestinal: Bowel Sounds present, soft, nontender.   Lymph: No lymphadenopathy. No peripheral edema.  Skin: No visible rashes or ulcers.  Psych:  Mood & affect appropriate    LABS: All Labs Reviewed:                        10.0   34.44 )-----------( 134      ( 05 Dec 2019 06:20 )             31.3                         9.9    32.05 )-----------( 139      ( 04 Dec 2019 22:30 )             30.7                         10.4   39.01 )-----------( 147      ( 04 Dec 2019 05:23 )             32.5     04 Dec 2019 05:23    130    |  94     |  34     ----------------------------<  118    3.9     |  26     |  0.67   03 Dec 2019 07:16    131    |  94     |  27     ----------------------------<  123    3.6     |  29     |  0.57   02 Dec 2019 11:41    134    |  97     |  27     ----------------------------<  136    4.5     |  29     |  0.62     Ca    8.1        04 Dec 2019 05:23  Ca    7.7        03 Dec 2019 07:16  Ca    8.5        02 Dec 2019 11:41      PTT - ( 05 Dec 2019 07:11 )  PTT:>200.0 sec    < from: TTE Echo Doppler w/o Cont (11.23.19 @ 15:29) >     EXAM:  ECHO TTE WO CON COMP W DOPPLR         PROCEDURE DATE:  11/23/2019        INTERPRETATION:  Ordering Physician: DOMINIC SERRATO [id number]    Indication: Pulmonary embolism  Technologist Initials: DL  Study Quality: Technically difficult and limited   A complete echocardiographic study was performed utilizing standard   protocol including spectral and color Doppler in all echocardiographic   windows.    Height: 183 cm  Weight: 82 kg  BSA: 2.0 sq m  Blood Pressure: 101/66    MEASUREMENTS  IVS: 1.2cm  PWT: 1.1cm  LA: 3.0cm  AO: 3.8cm  LVIDd: 3.7cm  LVIDs: 2.0cm    LVEF: 75%    FINDINGS  Left Ventricle: Hyperdynamic left ventricular systolic function.  Aortic Valve: The aortic valve is not well-visualized. It appears   calcified. No significant aortic insufficiency.  Mitral Valve: Mitral annular calcification calcified mitral valve   leaflets. Mild mitral insufficiency.  Tricuspid Valve: Not well visualized  Pulmonic Valve: Not well visualized  Left Atrium: Grossly normal  Right Ventricle: The right ventricle is not well visualized. Grossly,   normal right ventricular size and systolic function.  Right Atrium: Grossly normal    Pericardium/Pleura: Large left pleural effusion.  Echogenic material is   noted in the pleural space.    CONCLUSIONS:  Technically difficult and limited study.  1. Hyperdynamic left ventricular systolic function.  2. Mitral annular calcification and calcified mitral valve leaflets with   mild mitral insufficiency.  3. Grossly normal left atrial size.  4. The right ventricle is not well visualized. Grossly, normal right   ventricular size and systolic function.  5.  Large left pleural effusion which echogenic material noted in the   pleural space.    QAMAR ECHEVARRIA M.D., ATTENDING CARDIOLOGIST  This document has been electronically signed. Nov 24 2019  7:29AM      < end of copied text >  < from: US Chest (12.02.19 @ 10:21) >    EXAM:  US CHEST                          PROCEDURE DATE:  12/02/2019      INTERPRETATION:  Indication: Evaluate for pleural effusion. Antecedent   history of lung carcinoma.    Bilateral chest ultrasound.    No pleural fluid is detected on the right. On the left, there is a   pleural effusion. Measurements submitted are consistent with a volume of   1033 cc.    Impression: Left pleural effusion as described    JERICA FOSS M.D., ATTENDING RADIOLOGIST  This document has beenelectronically signed. Dec  2 2019 10:59AM      < end of copied text >    < from: 12 Lead ECG (11.22.19 @ 21:48) >    Ventricular Rate 159 BPM    Atrial Rate 258 BPM    QRS Duration 120 ms    Q-T Interval 296 ms    QTC Calculation(Bezet) 481 ms    R Axis 34 degrees    T Axis 30 degrees    Diagnosis Line Artifact.  Repeat  Confirmed by QAMAR PICKARD ([id number]) on 11/23/2019 1:42:28 PM    < end of copied text >

## 2019-12-05 NOTE — PROGRESS NOTE ADULT - PROBLEM SELECTOR PLAN 9
Pt with shortness of breath, orthopnea, and 4+ pitting b/l LE.   - Patient would likely benefit from lymphedema pumps outpatient  - D/C Furosemide 40 mg IV daily  - Encourage lower extremity elevation.  - Apply Ace Wraps around Bilateral Lower Extremity, per cardio.

## 2019-12-05 NOTE — PROGRESS NOTE ADULT - ASSESSMENT
58 yo man well known to our group, w met small cell ca of lung first presented as limited stage anrimeb40/2018 post Carbo//16/Atezolizumab with concurrent RT with initial response but then brain mets 8/2019 s/p WBRT and systemic disease progression on PET/CT 9/2019 with intra-abdominal, bone and pleural disease, started on weekly Taxol with GCSF support due to cytopenia with tx.    Adm now with weakness and dyspnea; found to have pulmonary embolism and further disease progression with pleural effusion, bone lesions, liver lesions and pancreatic mass.  s/p 700cc therapeutic thoracentesis 11/29/19 AM; despite this had re-accumulation of fluid and more sx  s/p Chest US with estimated 1000cc on 12/02/19    Unable to lie flat for MRI brain.   Unable to lie flat for CT scan.     s/p therapeutic thoracentesis Fri 11/29/19. No pleurex catheter as insufficient fluid.;   Concern that fluid has quickly re-accumulated.   Suspect over all dyspnea due to combination: sx due to cancer, fluid, cardiac and PE.   However, remains SOB today.     Plan for Topotecan 1.5mg/m2 x3-5d, depending on clinical course.       RECOMMEND  Continue Heparin drip. Consider change to Eliquis when no procedures needed.     Repeat CXR today.     Hold MRI brain.   Hold CT scans.     Chemotheray today  with Topotecan 1.5mg/m2.     Continue acute care  Continue pain medication - fentanyl patch  Will follow.     Long term prognosis poor.   High risk for complications from chemo.   Decline hospice placement.   Understands risk/benefits for tx.

## 2019-12-05 NOTE — PROGRESS NOTE ADULT - PROBLEM SELECTOR PLAN 7
- patient would likely benefit from lymphedema pumps outpatient  - Encourage lower extremity elevation.

## 2019-12-05 NOTE — PROGRESS NOTE ADULT - PROBLEM SELECTOR PLAN 3
Tachycardic, likely reactive to respiratory distress, anemia, and hypotension  - Cardio following; recs appreciated.  - Continue Digoxin 0.25 mg PO daily.   - Continue Metoprolol 100 mg PO daily with hold parameters.

## 2019-12-05 NOTE — PROGRESS NOTE ADULT - ASSESSMENT
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 He also has a history of a pericardial effusion s/p window, and of a pleural effusion s/p thoracentesis.  Now found to have b/l PE'    Left pleural effusion   - s/p Left thoracentesis 700cc non-clotting hemorrhagic fluid drained 11/29/19 with post procedure CXR with no PTX   - Pulm following.  Ct chest ordered to determine appropriateness of repeat tapping due to reaccumulation, however, patient unable to tolerate it.  - s/p chest USD, noted to have at least >1L on left.  Planned for left thoracentesis and Pleurex catheter yesterday but procedure cancelled for soft SBP.  He is as optimized as he can possibly be for planned procedure  - Will switch to AFM from NC temporarily  - Followed by Hem/onc  - Continue IV Lasix daily.  Monitor for contractile alkalosis.  He is still volume overloaded with effusions and severe weeping b/l LE edema    PE  - CTA chest showed segmental PE's in B/L lower lobe   - On Heparin drip which is on hold in anticipation of his procedure  - TTE showed hyperdynamic LV, normal RVSF with no significant valvular disease.  However, showed large left pleural effusion s/p thora  - Can keep off of aspirin to minimize risk of bleeding as he is going to be on full dose anticoagulation    Chronic Afib  - Mostly tachycardic overnight up to 120, likely reactive to his respiratory process, anemia, and hypotension  - Tachycardia multifactorial in setting of underlying lung cancer, PE, and anemia  - Continue Toprol 100 mg daily (takes 200mg daily  at home).  Unable to increase as there there is not much room given low BP (SBP 90's)  - Continue Digoxin (last level=1.2)  - Can hold Heparin drip in anticipation of his thora.  Please resume when cleared by Pulm  - Monitor and replete electrolytes. Keep K>4.0 and Mg>2.0.     HTN  - BP on the low side at 90's.   - Will tolerate for now as long as he is asymptomatic.  Will continue BB for rate-control.    - He takes Toprol  mg q12H at home.  However, there is no room for uptitration here given suboptimal BP    Lymphedema  - Please apply ace wraps to BLE  - Elevate at all times    Further cardiac w/u pending clinical course  To follow closely with you    Jermaine Dave DNP  Cardiology   Spectra #[phone]

## 2019-12-05 NOTE — PROGRESS NOTE ADULT - SUBJECTIVE AND OBJECTIVE BOX
RAFA SHAFER  59y  Male      Patient is a 59y old  Male who presents with a chief complaint of PNA (05 Dec 2019 12:54)      INTERVAL HPI/OVERNIGHT EVENTS: Pt seen and examined at bedside. No acute events overnight, but patient still feeling awful overall. Admits to shortness of breath, orthopnea, and cough. Appetite is gone and swelling in the legs remains the same. Patient is continuing to have severe back pain only getting mild relief with Dilaudid. Is going to try chemotherapy today.      REVIEW OF SYSTEMS:  CONSTITUTIONAL: Admits poor appetite, No fever, chills  EYES: No eye pain, visual disturbances, or discharge  ENMT:  No difficulty hearing, tinnitus, vertigo; No sinus or throat pain  NECK: No pain or stiffness  RESPIRATORY: Admits cough, sob, orthopnea  CARDIOVASCULAR: Admits palpitations, dizziness when standing, chronic leg swelling. No chest pain.  GASTROINTESTINAL: Admits loose stools. No abdominal or epigastric pain. No nausea, vomiting. No melena or hematochezia.  GENITOURINARY: No dysuria, frequency, hematuria, or incontinence  NEUROLOGICAL: Admits memory loss from radiation therapy, No headaches.  ENDOCRINE: No heat or cold intolerance  MUSCULOSKELETAL: Admits severe L lower back pain, No extremity pain  PSYCHIATRIC: No depression, anxiety, mood swings, or difficulty sleeping  HEME/LYMPH: No easy bruising, or bleeding gums  ALLERY AND IMMUNOLOGIC: No hives or eczema    T(C): 36.3 (12-05-19 @ 11:46), Max: 36.4 (12-04-19 @ 23:53)  HR: 112 (12-05-19 @ 14:20) (112 - 135)  BP: 86/54 (12-05-19 @ 14:20) (86/54 - 117/62)  RR: 28 (12-05-19 @ 11:46) (22 - 28)  SpO2: 100% (12-05-19 @ 05:31) (94% - 100%)  Wt(kg): --Vital Signs Last 24 Hrs  T(C): 36.3 (05 Dec 2019 11:46), Max: 36.4 (04 Dec 2019 23:53)  T(F): 97.4 (05 Dec 2019 11:46), Max: 97.5 (04 Dec 2019 23:53)  HR: 112 (05 Dec 2019 14:20) (112 - 135)  BP: 86/54 (05 Dec 2019 14:20) (86/54 - 117/62)  BP(mean): --  RR: 28 (05 Dec 2019 11:46) (22 - 28)  SpO2: 100% (05 Dec 2019 05:31) (94% - 100%)    PHYSICAL EXAM:  GENERAL: NAD, well-groomed, well-developed  HEAD:  Atraumatic, Normocephalic  EYES: EOMI, PERRLA, conjunctiva and sclera clear  ENMT: No tonsillar erythema, exudates, or enlargement; Moist mucous membranes, Good dentition, No lesions  NECK: Supple, No JVD, Normal thyroid  NERVOUS SYSTEM:  Alert & Oriented X3, Good concentration; Motor Strength 5/5 B/L upper and lower extremities; DTRs 2+ intact and symmetric  CHEST/LUNG: Clear to percussion bilaterally; No rales, rhonchi, wheezing, or rubs  HEART: Regular rate and rhythm; No murmurs, rubs, or gallops  ABDOMEN: Soft, Nontender, Nondistended; Bowel sounds present  EXTREMITIES:  2+ Peripheral Pulses, No clubbing, cyanosis, or edema  LYMPH: No lymphadenopathy noted  SKIN: No rashes or lesions    Consultant(s) Notes Reviewed:  [x ] YES  [ ] NO  Care Discussed with Consultants/Other Providers [ x] YES  [ ] NO    LABS:                        10.0   34.44 )-----------( 134      ( 05 Dec 2019 06:20 )             31.3     12-04    130<L>  |  94<L>  |  34<H>  ----------------------------<  118<H>  3.9   |  26  |  0.67    Ca    8.1<L>      04 Dec 2019 05:23      PTT - ( 05 Dec 2019 07:11 )  PTT:>200.0 sec    CAPILLARY BLOOD GLUCOSE                RADIOLOGY & ADDITIONAL TESTS:    Imaging Personally Reviewed:  [ x ] YES  [ ] NO    HEALTH ISSUES - PROBLEM Dx:  Leukemoid reaction: Leukemoid reaction  AF (atrial fibrillation): AF (atrial fibrillation)  Need for prophylactic measure: Need for prophylactic measure  Lower extremity edema: Lower extremity edema  Pressure ulcer: Pressure ulcer  Thrombophlebitis: Thrombophlebitis  DNR (do not resuscitate): DNR (do not resuscitate)  HTN (hypertension): HTN (hypertension)  COPD (chronic obstructive pulmonary disease): COPD (chronic obstructive pulmonary disease)  Lung cancer: Lung cancer  SOB (shortness of breath): SOB (shortness of breath) RAFA SHAFER  59y  Male      Patient is a 59y old  Male who presents with a chief complaint of PNA (05 Dec 2019 12:54)      INTERVAL HPI/OVERNIGHT EVENTS: Pt seen and examined at bedside. No acute events overnight, but patient still feeling awful overall. Admits to shortness of breath, orthopnea, and cough. Appetite is gone and swelling in the legs remains the same. Patient is continuing to have severe back pain only getting mild relief with Dilaudid. Is going to try chemotherapy today.    ***called by RN for low BP and severe pain, will give 250 cc bolus and IV Dilaudid and reassess.      REVIEW OF SYSTEMS:  CONSTITUTIONAL: Admits poor appetite, No fever, chills  EYES: No eye pain, visual disturbances, or discharge  ENMT:  No difficulty hearing, tinnitus, vertigo; No sinus or throat pain  NECK: No pain or stiffness  RESPIRATORY: Admits cough, sob, orthopnea  CARDIOVASCULAR: Admits palpitations, dizziness when standing, chronic leg swelling. No chest pain.  GASTROINTESTINAL: Admits loose stools. No abdominal or epigastric pain. No nausea, vomiting. No melena or hematochezia.  GENITOURINARY: No dysuria, frequency, hematuria, or incontinence  NEUROLOGICAL: Admits memory loss from radiation therapy, No headaches.  ENDOCRINE: No heat or cold intolerance  MUSCULOSKELETAL: Admits severe L lower back pain, No extremity pain  PSYCHIATRIC: Admits difficulty sleeping  ALLERGY AND IMMUNOLOGIC: No hives or eczema    T(C): 36.3 (12-05-19 @ 11:46), Max: 36.4 (12-04-19 @ 23:53)  HR: 112 (12-05-19 @ 14:20) (112 - 135)  BP: 86/54 (12-05-19 @ 14:20) (86/54 - 117/62)  RR: 28 (12-05-19 @ 11:46) (22 - 28)  SpO2: 100% (12-05-19 @ 05:31) (94% - 100%)  Wt(kg): --Vital Signs Last 24 Hrs  T(C): 36.3 (05 Dec 2019 11:46), Max: 36.4 (04 Dec 2019 23:53)  T(F): 97.4 (05 Dec 2019 11:46), Max: 97.5 (04 Dec 2019 23:53)  HR: 112 (05 Dec 2019 14:20) (112 - 135)  BP: 86/54 (05 Dec 2019 14:20) (86/54 - 117/62)  BP(mean): --  RR: 28 (05 Dec 2019 11:46) (22 - 28)  SpO2: 100% (05 Dec 2019 05:31) (94% - 100%)    PHYSICAL EXAM:  GENERAL: NAD, cachectic male  HEAD:  Atraumatic, Normocephalic, bald  EYES: EOMI, PERRLA, conjunctiva and sclera clear  ENMT: No tonsillar erythema, exudates, or enlargement; Moist mucous membranes  NECK: Supple, No JVD, Normal thyroid  NERVOUS SYSTEM:  Alert & Oriented X2-3, Good concentration  CHEST/LUNG: Crackles in left lung base but otherwise clear to auscultation; No rales, rhonchi, wheezing, or rubs  HEART: irregularly irregular rate and rhythm; No murmurs, rubs, or gallops  ABDOMEN: Soft, Nontender, Nondistended; Bowel sounds present  EXTREMITIES: 4+ pitting edema with weeping, ACE wraps over lower extremities b/l, No clubbing or cyanosis    Consultant(s) Notes Reviewed:  [x ] YES  [ ] NO  Care Discussed with Consultants/Other Providers [ x] YES  [ ] NO    LABS:                        10.0   34.44 )-----------( 134      ( 05 Dec 2019 06:20 )             31.3     12-04    130<L>  |  94<L>  |  34<H>  ----------------------------<  118<H>  3.9   |  26  |  0.67    Ca    8.1<L>      04 Dec 2019 05:23      PTT - ( 05 Dec 2019 07:11 )  PTT:>200.0 sec          RADIOLOGY & ADDITIONAL TESTS:  < from: Xray Chest 1 View- PORTABLE-Routine (12.04.19 @ 15:39) >  IMPRESSION:  Progression of right infiltrate.    < end of copied text >      Imaging Personally Reviewed:  [ x ] YES  [ ] NO    HEALTH ISSUES - PROBLEM Dx:  Leukemoid reaction: Leukemoid reaction  AF (atrial fibrillation): AF (atrial fibrillation)  Need for prophylactic measure: Need for prophylactic measure  Lower extremity edema: Lower extremity edema  Pressure ulcer: Pressure ulcer  Thrombophlebitis: Thrombophlebitis  DNR (do not resuscitate): DNR (do not resuscitate)  HTN (hypertension): HTN (hypertension)  COPD (chronic obstructive pulmonary disease): COPD (chronic obstructive pulmonary disease)  Lung cancer: Lung cancer  SOB (shortness of breath): SOB (shortness of breath)

## 2019-12-05 NOTE — PROVIDER CONTACT NOTE (OTHER) - SITUATION
Pt has labored breathing. RR 22  SPO2 100% on aresol mask. 1 mg of dilaudid given for pain. Pt boosted in chair and was in tripod position and pulling off mask yelling I can't breathe.

## 2019-12-06 VITALS
RESPIRATION RATE: 24 BRPM | HEART RATE: 110 BPM | TEMPERATURE: 97 F | OXYGEN SATURATION: 90 % | SYSTOLIC BLOOD PRESSURE: 69 MMHG

## 2019-12-06 LAB
BASE EXCESS BLDA CALC-SCNC: 0.1 MMOL/L — SIGNIFICANT CHANGE UP (ref -2–2)
BLOOD GAS COMMENTS ARTERIAL: SIGNIFICANT CHANGE UP
HCO3 BLDA-SCNC: 25 MMOL/L — SIGNIFICANT CHANGE UP (ref 23–27)
HOROWITZ INDEX BLDA+IHG-RTO: 21 — SIGNIFICANT CHANGE UP
PCO2 BLDA: 33 MMHG — SIGNIFICANT CHANGE UP (ref 32–46)
PH BLDA: 7.46 — HIGH (ref 7.35–7.45)
PO2 BLDA: 52 MMHG — LOW (ref 74–108)
SAO2 % BLDA: 84 % — LOW (ref 92–96)

## 2019-12-06 PROCEDURE — 80053 COMPREHEN METABOLIC PANEL: CPT

## 2019-12-06 PROCEDURE — C1729: CPT

## 2019-12-06 PROCEDURE — 74177 CT ABD & PELVIS W/CONTRAST: CPT

## 2019-12-06 PROCEDURE — 99232 SBSQ HOSP IP/OBS MODERATE 35: CPT

## 2019-12-06 PROCEDURE — 93306 TTE W/DOPPLER COMPLETE: CPT

## 2019-12-06 PROCEDURE — 87086 URINE CULTURE/COLONY COUNT: CPT

## 2019-12-06 PROCEDURE — 99221 1ST HOSP IP/OBS SF/LOW 40: CPT

## 2019-12-06 PROCEDURE — 36415 COLL VENOUS BLD VENIPUNCTURE: CPT

## 2019-12-06 PROCEDURE — 71275 CT ANGIOGRAPHY CHEST: CPT

## 2019-12-06 PROCEDURE — 76604 US EXAM CHEST: CPT

## 2019-12-06 PROCEDURE — 96365 THER/PROPH/DIAG IV INF INIT: CPT | Mod: XU

## 2019-12-06 PROCEDURE — 85027 COMPLETE CBC AUTOMATED: CPT

## 2019-12-06 PROCEDURE — 85379 FIBRIN DEGRADATION QUANT: CPT

## 2019-12-06 PROCEDURE — 99231 SBSQ HOSP IP/OBS SF/LOW 25: CPT

## 2019-12-06 PROCEDURE — 71045 X-RAY EXAM CHEST 1 VIEW: CPT | Mod: 26

## 2019-12-06 PROCEDURE — 94640 AIRWAY INHALATION TREATMENT: CPT

## 2019-12-06 PROCEDURE — 83605 ASSAY OF LACTIC ACID: CPT

## 2019-12-06 PROCEDURE — 82803 BLOOD GASES ANY COMBINATION: CPT

## 2019-12-06 PROCEDURE — 82553 CREATINE MB FRACTION: CPT

## 2019-12-06 PROCEDURE — 71046 X-RAY EXAM CHEST 2 VIEWS: CPT

## 2019-12-06 PROCEDURE — 84484 ASSAY OF TROPONIN QUANT: CPT

## 2019-12-06 PROCEDURE — 86803 HEPATITIS C AB TEST: CPT

## 2019-12-06 PROCEDURE — 80162 ASSAY OF DIGOXIN TOTAL: CPT

## 2019-12-06 PROCEDURE — 32555 ASPIRATE PLEURA W/ IMAGING: CPT

## 2019-12-06 PROCEDURE — 82550 ASSAY OF CK (CPK): CPT

## 2019-12-06 PROCEDURE — 96375 TX/PRO/DX INJ NEW DRUG ADDON: CPT

## 2019-12-06 PROCEDURE — 99285 EMERGENCY DEPT VISIT HI MDM: CPT | Mod: 25

## 2019-12-06 PROCEDURE — 71045 X-RAY EXAM CHEST 1 VIEW: CPT

## 2019-12-06 PROCEDURE — 80048 BASIC METABOLIC PNL TOTAL CA: CPT

## 2019-12-06 PROCEDURE — 81001 URINALYSIS AUTO W/SCOPE: CPT

## 2019-12-06 PROCEDURE — 87040 BLOOD CULTURE FOR BACTERIA: CPT

## 2019-12-06 PROCEDURE — 85730 THROMBOPLASTIN TIME PARTIAL: CPT

## 2019-12-06 PROCEDURE — 93005 ELECTROCARDIOGRAM TRACING: CPT

## 2019-12-06 PROCEDURE — 85610 PROTHROMBIN TIME: CPT

## 2019-12-06 PROCEDURE — 84443 ASSAY THYROID STIM HORMONE: CPT

## 2019-12-06 PROCEDURE — 94760 N-INVAS EAR/PLS OXIMETRY 1: CPT

## 2019-12-06 PROCEDURE — 82962 GLUCOSE BLOOD TEST: CPT

## 2019-12-06 PROCEDURE — 83880 ASSAY OF NATRIURETIC PEPTIDE: CPT

## 2019-12-06 PROCEDURE — 93970 EXTREMITY STUDY: CPT

## 2019-12-06 RX ORDER — SODIUM CHLORIDE 9 MG/ML
250 INJECTION INTRAMUSCULAR; INTRAVENOUS; SUBCUTANEOUS ONCE
Refills: 0 | Status: COMPLETED | OUTPATIENT
Start: 2019-12-06 | End: 2019-12-06

## 2019-12-06 RX ORDER — MORPHINE SULFATE 50 MG/1
4 CAPSULE, EXTENDED RELEASE ORAL
Qty: 100 | Refills: 0 | Status: DISCONTINUED | OUTPATIENT
Start: 2019-12-06 | End: 2019-12-07

## 2019-12-06 RX ORDER — HYDROCORTISONE 20 MG
100 TABLET ORAL ONCE
Refills: 0 | Status: COMPLETED | OUTPATIENT
Start: 2019-12-06 | End: 2019-12-06

## 2019-12-06 RX ORDER — LIDOCAINE 4 G/100G
1 CREAM TOPICAL DAILY
Refills: 0 | Status: DISCONTINUED | OUTPATIENT
Start: 2019-12-06 | End: 2019-12-06

## 2019-12-06 RX ORDER — MORPHINE SULFATE 50 MG/1
2 CAPSULE, EXTENDED RELEASE ORAL
Refills: 0 | Status: DISCONTINUED | OUTPATIENT
Start: 2019-12-06 | End: 2019-12-07

## 2019-12-06 RX ORDER — MIDODRINE HYDROCHLORIDE 2.5 MG/1
10 TABLET ORAL ONCE
Refills: 0 | Status: COMPLETED | OUTPATIENT
Start: 2019-12-06 | End: 2019-12-06

## 2019-12-06 RX ORDER — ATROPINE SULFATE 1 %
1 DROPS OPHTHALMIC (EYE) EVERY 6 HOURS
Refills: 0 | Status: DISCONTINUED | OUTPATIENT
Start: 2019-12-06 | End: 2019-12-07

## 2019-12-06 RX ADMIN — Medication 100 MILLIGRAM(S): at 01:00

## 2019-12-06 RX ADMIN — MORPHINE SULFATE 4 MG/HR: 50 CAPSULE, EXTENDED RELEASE ORAL at 12:17

## 2019-12-06 RX ADMIN — MORPHINE SULFATE 4 MG/HR: 50 CAPSULE, EXTENDED RELEASE ORAL at 21:00

## 2019-12-06 RX ADMIN — MORPHINE SULFATE 4 MG/HR: 50 CAPSULE, EXTENDED RELEASE ORAL at 19:53

## 2019-12-06 RX ADMIN — LIDOCAINE 1 PATCH: 4 CREAM TOPICAL at 01:58

## 2019-12-06 RX ADMIN — HYDROMORPHONE HYDROCHLORIDE 1 MILLIGRAM(S): 2 INJECTION INTRAMUSCULAR; INTRAVENOUS; SUBCUTANEOUS at 04:18

## 2019-12-06 RX ADMIN — FENTANYL CITRATE 1 PATCH: 50 INJECTION INTRAVENOUS at 07:30

## 2019-12-06 RX ADMIN — MORPHINE SULFATE 2 MILLIGRAM(S): 50 CAPSULE, EXTENDED RELEASE ORAL at 17:35

## 2019-12-06 RX ADMIN — MORPHINE SULFATE 2 MILLIGRAM(S): 50 CAPSULE, EXTENDED RELEASE ORAL at 13:09

## 2019-12-06 RX ADMIN — FENTANYL CITRATE 1 PATCH: 50 INJECTION INTRAVENOUS at 13:55

## 2019-12-06 RX ADMIN — HYDROMORPHONE HYDROCHLORIDE 1 MILLIGRAM(S): 2 INJECTION INTRAMUSCULAR; INTRAVENOUS; SUBCUTANEOUS at 20:10

## 2019-12-06 RX ADMIN — MORPHINE SULFATE 2 MG/HR: 50 CAPSULE, EXTENDED RELEASE ORAL at 11:52

## 2019-12-06 RX ADMIN — SODIUM CHLORIDE 250 MILLILITER(S): 9 INJECTION INTRAMUSCULAR; INTRAVENOUS; SUBCUTANEOUS at 01:01

## 2019-12-06 RX ADMIN — MIDODRINE HYDROCHLORIDE 10 MILLIGRAM(S): 2.5 TABLET ORAL at 01:58

## 2019-12-06 RX ADMIN — HYDROMORPHONE HYDROCHLORIDE 1 MILLIGRAM(S): 2 INJECTION INTRAMUSCULAR; INTRAVENOUS; SUBCUTANEOUS at 09:28

## 2019-12-06 RX ADMIN — Medication 0.5 MILLIGRAM(S): at 20:55

## 2019-12-06 RX ADMIN — HYDROMORPHONE HYDROCHLORIDE 1 MILLIGRAM(S): 2 INJECTION INTRAMUSCULAR; INTRAVENOUS; SUBCUTANEOUS at 09:13

## 2019-12-06 RX ADMIN — MORPHINE SULFATE 2 MILLIGRAM(S): 50 CAPSULE, EXTENDED RELEASE ORAL at 13:24

## 2019-12-06 RX ADMIN — MORPHINE SULFATE 2 MILLIGRAM(S): 50 CAPSULE, EXTENDED RELEASE ORAL at 17:20

## 2019-12-06 RX ADMIN — MORPHINE SULFATE 2 MILLIGRAM(S): 50 CAPSULE, EXTENDED RELEASE ORAL at 15:56

## 2019-12-06 RX ADMIN — Medication 500 MICROGRAM(S): at 01:16

## 2019-12-06 RX ADMIN — LIDOCAINE 1 PATCH: 4 CREAM TOPICAL at 08:14

## 2019-12-06 RX ADMIN — MORPHINE SULFATE 2 MILLIGRAM(S): 50 CAPSULE, EXTENDED RELEASE ORAL at 15:41

## 2019-12-06 RX ADMIN — HYDROMORPHONE HYDROCHLORIDE 1 MILLIGRAM(S): 2 INJECTION INTRAMUSCULAR; INTRAVENOUS; SUBCUTANEOUS at 04:45

## 2019-12-06 RX ADMIN — HYDROMORPHONE HYDROCHLORIDE 1 MILLIGRAM(S): 2 INJECTION INTRAMUSCULAR; INTRAVENOUS; SUBCUTANEOUS at 19:49

## 2019-12-06 NOTE — PROGRESS NOTE ADULT - SUBJECTIVE AND OBJECTIVE BOX
[facility] -- Gurpreet Bush, Emily, Corey, Sajan Pickard Savella  Office # [phone]      Follow Up:    Afib w/ RVR   Subjective/Observations:   Overnight events noted, s/p RRT for hypotension sitting in chair with increased WOB. +dyspnea bed.  No complaints of chest pain, or palpitations reported. +orthopnea     REVIEW OF SYSTEMS: All other review of systems is negative unless indicated above    PAST MEDICAL & SURGICAL HISTORY:  Lung cancer  Depression  Dyspnea  Mediastinal adenopathy  Pericardial effusion  AF (atrial fibrillation)  Hyponatremia  COPD (chronic obstructive pulmonary disease)  HTN (hypertension)  S/P pericardial window creation      MEDICATIONS  (STANDING):  dronabinol 5 milliGRAM(s) Oral every 12 hours  fentaNYL   Patch  25 MICROgram(s)/Hr 1 Patch Transdermal every 72 hours  lidocaine   Patch 1 Patch Transdermal daily    MEDICATIONS  (PRN):  atropine 1% Ophthalmic Solution for SubLingual Use 1 Drop(s) SubLingual every 6 hours PRN secretion  HYDROmorphone  Injectable 1 milliGRAM(s) IV Push every 4 hours PRN Severe Pain (7 - 10)  morphine  - Injectable 2 milliGRAM(s) IV Push every 4 hours PRN Moderate Pain (4 - 6)  morphine  - Injectable 1 milliGRAM(s) IV Push every 4 hours PRN Mild Pain (1 - 3)  ondansetron Injectable 4 milliGRAM(s) IV Push every 6 hours PRN Nausea  senna 8.6 milliGRAM(s) Oral Tablet - Peds 1 Tablet(s) Oral at bedtime PRN for constipation      Allergies    penicillin (Unknown)    Intolerances        Vital Signs Last 24 Hrs  T(C): 36.2 (06 Dec 2019 02:30), Max: 36.8 (05 Dec 2019 17:13)  T(F): 97.2 (06 Dec 2019 02:30), Max: 98.2 (05 Dec 2019 17:13)  HR: 110 (06 Dec 2019 02:30) (58 - 130)  BP: 69/- (06 Dec 2019 02:30) (62/- - 120/56)  BP(mean): --  RR: 24 (06 Dec 2019 02:30) (15 - 28)  SpO2: 90% (06 Dec 2019 02:30) (88% - 98%)    I&O's Summary    05 Dec 2019 07:01  -  06 Dec 2019 07:00  --------------------------------------------------------  IN: 621 mL / OUT: 0 mL / NET: 621 mL          PHYSICAL EXAM:  TELE: Afib   Constitutional: NAD, awake and alert, well-developed  HEENT: Moist Mucous Membranes, Anicteric  Pulmonary: labored, breath sounds with Bilaterally diminished at bases  No  wheezes, crackles or rhonchi   Cardiovascular: Irregular, S1 and S2 nl, No murmurs, rubs, gallops or clicks  Gastrointestinal: Bowel Sounds present, soft, nontender.   Lymph: No lymphadenopathy. No peripheral edema.  Skin: No visible rashes or ulcers.  Psych:  Mood & affect appropriate    LABS: All Labs Reviewed:                        10.0   34.44 )-----------( 134      ( 05 Dec 2019 06:20 )             31.3                         9.9    32.05 )-----------( 139      ( 04 Dec 2019 22:30 )             30.7                         10.4   39.01 )-----------( 147      ( 04 Dec 2019 05:23 )             32.5     04 Dec 2019 05:23    130    |  94     |  34     ----------------------------<  118    3.9     |  26     |  0.67     Ca    8.1        04 Dec 2019 05:23      PTT - ( 05 Dec 2019 15:40 )  PTT:28.8 sec      from: TTE Echo Doppler w/o Cont (11.23.19 @ 15:29) >     EXAM:  ECHO TTE WO CON COMP W DOPPLR         PROCEDURE DATE:  11/23/2019        INTERPRETATION:  Ordering Physician: DOMINIC SERRATO [id number]    Indication: Pulmonary embolism  Technologist Initials: DL  Study Quality: Technically difficult and limited   A complete echocardiographic study was performed utilizing standard   protocol including spectral and color Doppler in all echocardiographic   windows.    Height: 183 cm  Weight: 82 kg  BSA: 2.0 sq m  Blood Pressure: 101/66    MEASUREMENTS  IVS: 1.2cm  PWT: 1.1cm  LA: 3.0cm  AO: 3.8cm  LVIDd: 3.7cm  LVIDs: 2.0cm    LVEF: 75%    FINDINGS  Left Ventricle: Hyperdynamic left ventricular systolic function.  Aortic Valve: The aortic valve is not well-visualized. It appears   calcified. No significant aortic insufficiency.  Mitral Valve: Mitral annular calcification calcified mitral valve   leaflets. Mild mitral insufficiency.  Tricuspid Valve: Not well visualized  Pulmonic Valve: Not well visualized  Left Atrium: Grossly normal  Right Ventricle: The right ventricle is not well visualized. Grossly,   normal right ventricular size and systolic function.  Right Atrium: Grossly normal    Pericardium/Pleura: Large left pleural effusion.  Echogenic material is   noted in the pleural space.    CONCLUSIONS:  Technically difficult and limited study.  1. Hyperdynamic left ventricular systolic function.  2. Mitral annular calcification and calcified mitral valve leaflets with   mild mitral insufficiency.  3. Grossly normal left atrial size.  4. The right ventricle is not well visualized. Grossly, normal right   ventricular size and systolic function.  5.  Large left pleural effusion which echogenic material noted in the   pleural space.    QAMAR ECHEVARRIA M.D., ATTENDING CARDIOLOGIST  This document has been electronically signed. Nov 24 2019  7:29AM      < end of copied text >  < from: US Chest (12.02.19 @ 10:21) >    EXAM:  US CHEST                          PROCEDURE DATE:  12/02/2019      INTERPRETATION:  Indication: Evaluate for pleural effusion. Antecedent   history of lung carcinoma.    Bilateral chest ultrasound.    No pleural fluid is detected on the right. On the left, there is a   pleural effusion. Measurements submitted are consistent with a volume of   1033 cc.    Impression: Left pleural effusion as described    JERICA FOSS M.D., ATTENDING RADIOLOGIST  This document has beenelectronically signed. Dec  2 2019 10:59AM      < end of copied text >    < from: 12 Lead ECG (11.22.19 @ 21:48) >    Ventricular Rate 159 BPM    Atrial Rate 258 BPM    QRS Duration 120 ms    Q-T Interval 296 ms    QTC Calculation(Bezet) 481 ms    R Axis 34 degrees    T Axis 30 degrees    Diagnosis Line Artifact.  Repeat  Confirmed by QAMAR PICKARD ([id number]) on 11/23/2019 1:42:28 PM    < end of copied text >

## 2019-12-06 NOTE — PROGRESS NOTE ADULT - PROBLEM SELECTOR PROBLEM 7
Thrombophlebitis

## 2019-12-06 NOTE — PROGRESS NOTE ADULT - SUBJECTIVE AND OBJECTIVE BOX
Interval History:  events of above noted including rapid response    Chart reviewed and events noted;   Overnight events:    MEDICATIONS  (STANDING):  dronabinol 5 milliGRAM(s) Oral every 12 hours  morphine  Infusion 4 mG/Hr (4 mL/Hr) IV Continuous <Continuous>    MEDICATIONS  (PRN):  atropine 1% Ophthalmic Solution for SubLingual Use 1 Drop(s) SubLingual every 6 hours PRN secretion  HYDROmorphone  Injectable 1 milliGRAM(s) IV Push every 4 hours PRN Severe Pain (7 - 10)  morphine  - Injectable 2 milliGRAM(s) IV Push every 1 hour PRN dyspnea, distress, suffering, pain,  ondansetron Injectable 4 milliGRAM(s) IV Push every 6 hours PRN Nausea  senna 8.6 milliGRAM(s) Oral Tablet - Peds 1 Tablet(s) Oral at bedtime PRN for constipation      Vital Signs Last 24 Hrs  T(C): 36.2 (06 Dec 2019 02:30), Max: 36.2 (06 Dec 2019 00:27)  T(F): 97.2 (06 Dec 2019 02:30), Max: 97.2 (06 Dec 2019 00:27)  HR: 110 (06 Dec 2019 02:30) (110 - 130)  BP: 69/- (06 Dec 2019 02:30) (62/- - 100/60)  BP(mean): --  RR: 24 (06 Dec 2019 02:30) (24 - 24)  SpO2: 90% (06 Dec 2019 02:30) (88% - 91%)    PHYSICAL EXAM  General: lethargic chronically ill  HEENT: clear oropharynx, anicteric sclera, pink conjunctivae  Neck: supple  CV: normal S1S2 with no murmur rubs or gallops  Lungs: clear to auscultation, no wheezes, no rhales  Abdomen: soft non-tender non-distended, no hepato/splenomegaly  Ext: 4+ edema  Skin: no rashes and no petichiae  Neuro: lethargic      LABS:  CBC Full  -  ( 05 Dec 2019 06:20 )  WBC Count : 34.44 K/uL  RBC Count : 3.72 M/uL  Hemoglobin : 10.0 g/dL  Hematocrit : 31.3 %  Platelet Count - Automated : 134 K/uL  Mean Cell Volume : 84.1 fl  Mean Cell Hemoglobin : 26.9 pg  Mean Cell Hemoglobin Concentration : 31.9 gm/dL  Auto Neutrophil # : x  Auto Lymphocyte # : x  Auto Monocyte # : x  Auto Eosinophil # : x  Auto Basophil # : x  Auto Neutrophil % : x  Auto Lymphocyte % : x  Auto Monocyte % : x  Auto Eosinophil % : x  Auto Basophil % : x          PTT - ( 05 Dec 2019 15:40 )  PTT:28.8 sec    fe studies      WBC trend  34.44 K/uL (12-05-19 @ 06:20)  32.05 K/uL (12-04-19 @ 22:30)  39.01 K/uL (12-04-19 @ 05:23)      Hgb trend  10.0 g/dL (12-05-19 @ 06:20)  9.9 g/dL (12-04-19 @ 22:30)  10.4 g/dL (12-04-19 @ 05:23)      plt trend  134 K/uL (12-05-19 @ 06:20)  139 K/uL (12-04-19 @ 22:30)  147 K/uL (12-04-19 @ 05:23)        RADIOLOGY & ADDITIONAL STUDIES:

## 2019-12-06 NOTE — PROGRESS NOTE ADULT - PROBLEM SELECTOR PROBLEM 8
Pressure ulcer

## 2019-12-06 NOTE — PROGRESS NOTE ADULT - ASSESSMENT
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 He also has a history of a pericardial effusion s/p window, and of a pleural effusion s/p thoracentesis.  Now found to have b/l PE'      Comfort care  - Pt s/p RRT pt placed on CC measures with all aggressive medical treatment D/C'd    Left pleural effusion   - s/p Left thoracentesis 700cc non-clotting hemorrhagic fluid drained 11/29/19 with post procedure CXR with no PTX   - Pulm following.  Ct chest ordered to determine appropriateness of repeat tapping due to reaccumulation, however, patient unable to tolerate it.  - s/p chest USD, noted to have at least >1L on left.  Planned for left thoracentesis and Pleurex catheter yesterday but procedure cancelled for soft SBP.    - Followed by Hem/onc  -  He is still volume overloaded with effusions and severe weeping b/l LE edema    PE  - CTA chest showed segmental PE's in B/L lower lobe   - AC D/C'd as pt is CC  - TTE showed hyperdynamic LV, normal RVSF with no significant valvular disease.  However, showed large left pleural effusion s/p thora    Chronic Afib  - Mostly tachycardic overnight up to 120, likely reactive to his respiratory process, anemia, and hypotension  - Tachycardia multifactorial in setting of underlying lung cancer, PE, and anemia  - Medications D/C as pt is CC    HTN  - BP on the low side at 90's.   - Medications D/C as pt is CC    Lymphedema  - Please apply ace wraps to BLE  - Elevate at all times    We will sign off as the pt is Comfort care please reconsult as necessary    Jermaine Dave [facility]  Cardiology   Spectra #[phone]

## 2019-12-06 NOTE — CONSULT NOTE ADULT - PROBLEM SELECTOR RECOMMENDATION 9
end of life care  lung ca  discussed with Onc and family and PMD and house staff  will initiate Morphine gtt - will dc Patch  Morphine and Dilaudid IV PRN  atropine SL prn  oral and skin care  supportive comfort measures  vitals daily  no tests, no labs, no aggressive - invasive measures  pt is DNR DNI  plan for hospice inn - end of life care - comfort measures status only

## 2019-12-06 NOTE — CHART NOTE - NSCHARTNOTEFT_GEN_A_CORE
Notified by RN as patient restless and uncomfortable. Patient seen, noted to be sitting up in chair, restless and continually trying to change position to minimize discomfort. When asked how he is patient stating "not good." Patient is currently full comfort care.     - One time dose 0.5mg IVP Ativan ordered for discomfort and restlessness  - Will discuss with primary team in morning regarding Ativan PRN for patient comfort  - Will continue to monitor  - RN to notify with changes

## 2019-12-06 NOTE — PROGRESS NOTE ADULT - PROBLEM SELECTOR PROBLEM 2
Leukemoid reaction
Leukemoid reaction
Lung cancer

## 2019-12-06 NOTE — PROGRESS NOTE ADULT - SUBJECTIVE AND OBJECTIVE BOX
Rapid response:      59M with metastatic SCLCA with progressive disease  DNR/I offered chemo 12/5, now hypotensive after BB (given fro rapid afib) and narcotic.   BP 60's HR to 120's baseline dyspnea multifactorial.      Fluids given   Stress steroids.  ID without concern for infection.    House staff having GOC discussion with Patient and wife.   Patient would not benefit from ICU level services may actually be harmed.      He is most concerned about being left alone and comfortable.      Reassess BP after above interventions . Rapid response: CCT 32      59M with metastatic (bioones/brain RP pleura liver and pancreas)  SCLCA with progressive disease  PE" s DNR/I offered chemo 12/5, now hypotensive after BB (given fro rapid afib) and narcotic.   BP 60's HR to 120's baseline dyspnea multifactorial with hypoxemia on ABG placed on oxygen.       Fluids given   Stress steroids.  Midodrine to be ordered.    ID without concern for infection, though has a leukemoid response and a progressive infiltrate on CXR.     GOC discussion with Patient and wife.   Patient is a level 5 patient  would not benefit from ICU level services and may actually be harmed.      He is most concerned about being left alone and comfortable.  That would not happen in an ICU setting.      Reassess BP after above interventions . Rapid response: CCT 32      59M with metastatic (bioones/brain RP pleura liver and pancreas)  SCLCA with progressive disease  PE" s DNR/I offered chemo 12/5, now hypotensive after BB (given fro rapid afib) and narcotic.   BP 60's HR to 120's baseline dyspnea multifactorial with hypoxemia on ABG placed on oxygen.       Fluids given   Stress steroids.  Midodrine to be ordered.    ID without concern for infection, though has a leukemoid response and a progressive infiltrate on CXR.     GOC discussion with Patient and wife.   Patient is a level 5 patient  would not benefit from ICU level services and may actually be harmed.      He is most concerned about being left alone and comfortable.  That would not happen in an ICU setting.      Reassess BP after above interventions .     Addendum 4am.    BP still low.  Patient is delirious, he does not have capacity to make decisions.  His wife, (HCP) does not want any aggressive measures, pressors or transfer to the ICU.      The patient is in pain, and is requesting additional pain medications.  MOLST to be completed by HCP.

## 2019-12-06 NOTE — PROGRESS NOTE ADULT - PROBLEM SELECTOR PROBLEM 9
Lower extremity edema

## 2019-12-06 NOTE — PROGRESS NOTE ADULT - PROBLEM SELECTOR PLAN 6
- Patient DNR/DNI, MOLST in chart.   - Seen by Palliative Care, MARY Monteiro; pt agrees with DNR/DNI.  - on comfort care

## 2019-12-06 NOTE — CONSULT NOTE ADULT - SUBJECTIVE AND OBJECTIVE BOX
Date/Time Patient Seen:  		  Referring MD:   Data Reviewed	       Patient is a 59y old  Male who presents with a chief complaint of PNA (06 Dec 2019 09:27)      Subjective/HPI    in bed  seen and examined  vs and meds reviewed  labs reviewed  h and p reviewed  er provider note reviewed  spoke with wife and house staff        Assessment/Plan: 58 y/o M with 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     - Supplemental oxygen uptitrated to 5L nasal cannula  - Repeat blood pressure with doppler: systolic 62  - s/p 250mg IVF bolus and Midodrine 10mg for hypotension without significant improvement in BP  - Patient's last dose of Dilaudid prior to goals of care discussion was 12/5 at 2300    - Wife Yuly (Health Care Proxy) arrived at bedside for goals of care discussion. Wife endorsing that she would like her  to be comfortable, does not want aggressive treatments including feeding tube, pressors, and transfer to the ICU.     Per wife, they had extensive conversation about his GOC after he was diagnosed with cancer, and told her that he does not want any aggressive interventions at the end of his life. During the GOC converstation at bedside, Patient repeatedly stating he just wants to be comfortable, wants pain medication, and understands that with administration of pain medication at low blood pressures his heart can stop and he can die. Other times, patient stating he would like to continue chemotherapy treatments and pursue other more aggressive measures. Patient would initially state he does not want to go to ICU or have any more interventions performed and his goal is just to be comfortable, falls asleep and immediately after being woken up, states that he wants NGT.        PAST MEDICAL & SURGICAL HISTORY:  Lung cancer  Depression  Dyspnea  Mediastinal adenopathy  Pericardial effusion  AF (atrial fibrillation)  Hyponatremia  COPD (chronic obstructive pulmonary disease)  HTN (hypertension)  S/P pericardial window creation  No significant past surgical history        Medication list         MEDICATIONS  (STANDING):  dronabinol 5 milliGRAM(s) Oral every 12 hours  morphine  Infusion 2 mG/Hr (2 mL/Hr) IV Continuous <Continuous>    MEDICATIONS  (PRN):  atropine 1% Ophthalmic Solution for SubLingual Use 1 Drop(s) SubLingual every 6 hours PRN secretion  HYDROmorphone  Injectable 1 milliGRAM(s) IV Push every 4 hours PRN Severe Pain (7 - 10)  morphine  - Injectable 2 milliGRAM(s) IV Push every 1 hour PRN dyspnea, distress, suffering, pain,  ondansetron Injectable 4 milliGRAM(s) IV Push every 6 hours PRN Nausea  senna 8.6 milliGRAM(s) Oral Tablet - Peds 1 Tablet(s) Oral at bedtime PRN for constipation         Vitals log        ICU Vital Signs Last 24 Hrs  T(C): 36.2 (06 Dec 2019 02:30), Max: 36.8 (05 Dec 2019 17:13)  T(F): 97.2 (06 Dec 2019 02:30), Max: 98.2 (05 Dec 2019 17:13)  HR: 110 (06 Dec 2019 02:30) (58 - 130)  BP: 69/- (06 Dec 2019 02:30) (62/- - 120/56)  BP(mean): --  ABP: --  ABP(mean): --  RR: 24 (06 Dec 2019 02:30) (15 - 28)  SpO2: 90% (06 Dec 2019 02:30) (88% - 98%)           Input and Output:  I&O's Detail    05 Dec 2019 07:01  -  06 Dec 2019 07:00  --------------------------------------------------------  IN:    heparin  Infusion.: 171 mL    Oral Fluid: 100 mL    Sodium Chloride 0.9% IV Bolus: 250 mL    Solution: 50 mL    Solution: 50 mL  Total IN: 621 mL    OUT:  Total OUT: 0 mL    Total NET: 621 mL          Lab Data                        10.0   34.44 )-----------( 134      ( 05 Dec 2019 06:20 )             31.3           ABG - ( 06 Dec 2019 00:55 )  pH, Arterial: 7.46  pH, Blood: x     /  pCO2: 33    /  pO2: 52    / HCO3: 25    / Base Excess: 0.1   /  SaO2: 84            retired  ex smoker    has 2 children  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  family hx - ashd            Review of Systems	  altered     Objective     Physical Examination    heart s1s2  lung dec BS  abd soft  cachexia  extr edema  frail  weak      Pertinent Lab findings & Imaging      Lewis:  NO   Adequate UO     I&O's Detail    05 Dec 2019 07:01  -  06 Dec 2019 07:00  --------------------------------------------------------  IN:    heparin  Infusion.: 171 mL    Oral Fluid: 100 mL    Sodium Chloride 0.9% IV Bolus: 250 mL    Solution: 50 mL    Solution: 50 mL  Total IN: 621 mL    OUT:  Total OUT: 0 mL    Total NET: 621 mL               Discussed with:     Cultures:	        Radiology              EXAM:  XR CHEST PORTABLE IMMED 1V                            PROCEDURE DATE:  12/06/2019          INTERPRETATION:  Chest one view    HISTORY: Lung cancer with shortness of breath    COMPARISON STUDY: 12/4/2019    Frontal expiratory view of the chest shows the heart to be borderline in   size. The lungs show progression of right base infiltrate. Left lung   markings are similar and there is no evidence of pneumothorax nor pleural   effusion. Right chest port is again noted.    IMPRESSION:  Progression of right infiltrate.    Thank you for the courtesy of this referral.                BEV JORDAN M.D., ATTENDING RADIOLOGIST  This document has been electronically signed. Dec  6 2019 10:31AM

## 2019-12-06 NOTE — PROGRESS NOTE ADULT - SUBJECTIVE AND OBJECTIVE BOX
SONIRAFA TRINH  59y  Male      Patient is a 59y old  Male who presents with a chief complaint of PNA (06 Dec 2019 10:46)      INTERVAL HPI/OVERNIGHT EVENTS: Pt seen and examined in chair. Overnight, rapid response was called for hypotension and patient decided with HCP to be full comfort care. This morning, patient has no new complaints but states that everything is "okay."      REVIEW OF SYSTEMS:  CONSTITUTIONAL: No fever, chills  EYES: No eye pain, visual disturbances, or discharge  ENMT:  No difficulty hearing, tinnitus, vertigo; No sinus or throat pain  NECK: No pain or stiffness  RESPIRATORY: Admits sob, cough  CARDIOVASCULAR: Admits dizziness, leg swelling, No chest pain, palpitations  GASTROINTESTINAL: No abdominal or epigastric pain. No nausea, vomiting  GENITOURINARY: No dysuria, frequency, hematuria, or incontinence  LYMPH NODES: No enlarged glands  ENDOCRINE: No heat or cold intolerance; No hair loss  MUSCULOSKELETAL: No joint pain or swelling; No muscle, back, or extremity pain  PSYCHIATRIC: No depression, anxiety, mood swings, or difficulty sleeping  HEME/LYMPH: No easy bruising, or bleeding gums  ALLERY AND IMMUNOLOGIC: No hives or eczema    T(C): 36.2 (12-06-19 @ 02:30), Max: 36.8 (12-05-19 @ 17:13)  HR: 110 (12-06-19 @ 02:30) (58 - 130)  BP: 69/- (12-06-19 @ 02:30) (62/- - 120/56)  RR: 24 (12-06-19 @ 02:30) (15 - 26)  SpO2: 90% (12-06-19 @ 02:30) (88% - 98%)  Wt(kg): --Vital Signs Last 24 Hrs  T(C): 36.2 (06 Dec 2019 02:30), Max: 36.8 (05 Dec 2019 17:13)  T(F): 97.2 (06 Dec 2019 02:30), Max: 98.2 (05 Dec 2019 17:13)  HR: 110 (06 Dec 2019 02:30) (58 - 130)  BP: 69/- (06 Dec 2019 02:30) (62/- - 120/56)  BP(mean): --  RR: 24 (06 Dec 2019 02:30) (15 - 26)  SpO2: 90% (06 Dec 2019 02:30) (88% - 98%)    PHYSICAL EXAM:  GENERAL: NAD, well-groomed, well-developed  HEAD:  Atraumatic, Normocephalic  EYES: EOMI, PERRLA, conjunctiva and sclera clear  ENMT: No tonsillar erythema, exudates, or enlargement; Moist mucous membranes, Good dentition, No lesions  NECK: Supple, No JVD, Normal thyroid  NERVOUS SYSTEM:  Alert & Oriented X3, Good concentration; Motor Strength 5/5 B/L upper and lower extremities; DTRs 2+ intact and symmetric  CHEST/LUNG: Clear to percussion bilaterally; No rales, rhonchi, wheezing, or rubs  HEART: Regular rate and rhythm; No murmurs, rubs, or gallops  ABDOMEN: Soft, Nontender, Nondistended; Bowel sounds present  EXTREMITIES:  2+ Peripheral Pulses, No clubbing, cyanosis, or edema  LYMPH: No lymphadenopathy noted  SKIN: No rashes or lesions    Consultant(s) Notes Reviewed:  [x ] YES  [ ] NO  Care Discussed with Consultants/Other Providers [ x] YES  [ ] NO    LABS:                        10.0   34.44 )-----------( 134      ( 05 Dec 2019 06:20 )             31.3           PTT - ( 05 Dec 2019 15:40 )  PTT:28.8 sec    CAPILLARY BLOOD GLUCOSE      POCT Blood Glucose.: 104 mg/dL (06 Dec 2019 00:30)      ABG - ( 06 Dec 2019 00:55 )  pH, Arterial: 7.46  pH, Blood: x     /  pCO2: 33    /  pO2: 52    / HCO3: 25    / Base Excess: 0.1   /  SaO2: 84                    RADIOLOGY & ADDITIONAL TESTS:    Imaging Personally Reviewed:  [ ] YES  [ ] NO    HEALTH ISSUES - PROBLEM Dx:  Leukemoid reaction: Leukemoid reaction  AF (atrial fibrillation): AF (atrial fibrillation)  Need for prophylactic measure: Need for prophylactic measure  Lower extremity edema: Lower extremity edema  Pressure ulcer: Pressure ulcer  Thrombophlebitis: Thrombophlebitis  DNR (do not resuscitate): DNR (do not resuscitate)  HTN (hypertension): HTN (hypertension)  COPD (chronic obstructive pulmonary disease): COPD (chronic obstructive pulmonary disease)  Lung cancer: Lung cancer  SOB (shortness of breath): SOB (shortness of breath) SONIRAFA TRINH  59y  Male      Patient is a 59y old  Male who presents with a chief complaint of PNA (06 Dec 2019 10:46)      INTERVAL HPI/OVERNIGHT EVENTS: Pt seen and examined in chair. Overnight, rapid response was called for hypotension and patient decided with HCP to be full comfort care. This morning, patient has no new complaints but states that everything is "okay."      REVIEW OF SYSTEMS:  CONSTITUTIONAL: No fever, chills  EYES: No eye pain, visual disturbances, or discharge  ENMT:  No difficulty hearing, tinnitus, vertigo; No sinus or throat pain  NECK: No pain or stiffness  RESPIRATORY: Admits sob, cough  CARDIOVASCULAR: Admits dizziness, leg swelling, No chest pain, palpitations  GASTROINTESTINAL: No abdominal or epigastric pain. No nausea, vomiting  GENITOURINARY: No dysuria, frequency, hematuria, or incontinence  ENDOCRINE: No heat or cold intolerance  MUSCULOSKELETAL: Admits back pain  PSYCHIATRIC: Admits difficulty sleeping  ALLERY AND IMMUNOLOGIC: No hives or eczema    T(C): 36.2 (12-06-19 @ 02:30), Max: 36.8 (12-05-19 @ 17:13)  HR: 110 (12-06-19 @ 02:30) (58 - 130)  BP: 69/- (12-06-19 @ 02:30) (62/- - 120/56)  RR: 24 (12-06-19 @ 02:30) (15 - 26)  SpO2: 90% (12-06-19 @ 02:30) (88% - 98%)  Wt(kg): --Vital Signs Last 24 Hrs  T(C): 36.2 (06 Dec 2019 02:30), Max: 36.8 (05 Dec 2019 17:13)  T(F): 97.2 (06 Dec 2019 02:30), Max: 98.2 (05 Dec 2019 17:13)  HR: 110 (06 Dec 2019 02:30) (58 - 130)  BP: 69/- (06 Dec 2019 02:30) (62/- - 120/56)  BP(mean): --  RR: 24 (06 Dec 2019 02:30) (15 - 26)  SpO2: 90% (06 Dec 2019 02:30) (88% - 98%)    PHYSICAL EXAM:  GENERAL: NAD, well-groomed, well-developed  HEAD:  Atraumatic, Normocephalic  EYES: EOMI, PERRLA, conjunctiva and sclera clear  ENMT: No tonsillar erythema, exudates, or enlargement; Moist mucous membranes, Good dentition, No lesions  NECK: Supple, No JVD, Normal thyroid  NERVOUS SYSTEM:  Alert & Oriented X3, Good concentration; Motor Strength 5/5 B/L upper and lower extremities; DTRs 2+ intact and symmetric  CHEST/LUNG: Clear to percussion bilaterally; No rales, rhonchi, wheezing, or rubs  HEART: Regular rate and rhythm; No murmurs, rubs, or gallops  ABDOMEN: Soft, Nontender, Nondistended; Bowel sounds present  EXTREMITIES:  2+ Peripheral Pulses, No clubbing, cyanosis, or edema  LYMPH: No lymphadenopathy noted  SKIN: No rashes or lesions    Consultant(s) Notes Reviewed:  [x ] YES  [ ] NO  Care Discussed with Consultants/Other Providers [ x] YES  [ ] NO    LABS:                        10.0   34.44 )-----------( 134      ( 05 Dec 2019 06:20 )             31.3           PTT - ( 05 Dec 2019 15:40 )  PTT:28.8 sec    CAPILLARY BLOOD GLUCOSE      POCT Blood Glucose.: 104 mg/dL (06 Dec 2019 00:30)      ABG - ( 06 Dec 2019 00:55 )  pH, Arterial: 7.46  pH, Blood: x     /  pCO2: 33    /  pO2: 52    / HCO3: 25    / Base Excess: 0.1   /  SaO2: 84                    RADIOLOGY & ADDITIONAL TESTS:    Imaging Personally Reviewed:  [ ] YES  [ ] NO    HEALTH ISSUES - PROBLEM Dx:  Leukemoid reaction: Leukemoid reaction  AF (atrial fibrillation): AF (atrial fibrillation)  Need for prophylactic measure: Need for prophylactic measure  Lower extremity edema: Lower extremity edema  Pressure ulcer: Pressure ulcer  Thrombophlebitis: Thrombophlebitis  DNR (do not resuscitate): DNR (do not resuscitate)  HTN (hypertension): HTN (hypertension)  COPD (chronic obstructive pulmonary disease): COPD (chronic obstructive pulmonary disease)  Lung cancer: Lung cancer  SOB (shortness of breath): SOB (shortness of breath) SONI RAFA  59y  Male      Patient is a 59y old  Male who presents with a chief complaint of PNA (06 Dec 2019 10:46)      INTERVAL HPI/OVERNIGHT EVENTS: Pt seen and examined in chair. Overnight, rapid response was called for hypotension and patient decided with HCP to be full comfort care. This morning, patient has no new complaints but states that everything is "okay."      REVIEW OF SYSTEMS:  CONSTITUTIONAL: No fever, chills  EYES: No eye pain, visual disturbances, or discharge  ENMT:  No difficulty hearing, tinnitus, vertigo; No sinus or throat pain  NECK: No pain or stiffness  RESPIRATORY: Admits sob, cough  CARDIOVASCULAR: Admits dizziness, leg swelling, No chest pain, palpitations  GASTROINTESTINAL: No abdominal or epigastric pain. No nausea, vomiting  GENITOURINARY: No dysuria, frequency, hematuria, or incontinence  ENDOCRINE: No heat or cold intolerance  MUSCULOSKELETAL: Admits back pain  PSYCHIATRIC: Admits difficulty sleeping  ALLERY AND IMMUNOLOGIC: No hives or eczema    T(C): 36.2 (12-06-19 @ 02:30), Max: 36.8 (12-05-19 @ 17:13)  HR: 110 (12-06-19 @ 02:30) (58 - 130)  BP: 69/- (12-06-19 @ 02:30) (62/- - 120/56)  RR: 24 (12-06-19 @ 02:30) (15 - 26)  SpO2: 90% (12-06-19 @ 02:30) (88% - 98%)  Wt(kg): --Vital Signs Last 24 Hrs  T(C): 36.2 (06 Dec 2019 02:30), Max: 36.8 (05 Dec 2019 17:13)  T(F): 97.2 (06 Dec 2019 02:30), Max: 98.2 (05 Dec 2019 17:13)  HR: 110 (06 Dec 2019 02:30) (58 - 130)  BP: 69/- (06 Dec 2019 02:30) (62/- - 120/56)  BP(mean): --  RR: 24 (06 Dec 2019 02:30) (15 - 26)  SpO2: 90% (06 Dec 2019 02:30) (88% - 98%)    PHYSICAL EXAM:  GENERAL: NAD, cachectic, appears lethargic  HEAD: Bald, Atraumatic, Normocephalic  EYES: EOMI, PERRLA, conjunctiva and sclera clear  ENMT: No tonsillar erythema, exudates, or enlargement; Moist mucous membranes  NECK: Supple, No JVD, Normal thyroid  NERVOUS SYSTEM:  Alert & Oriented X2-3, Poor Concentration  CHEST/LUNG: Crackles in left lung base, no wheezes, rubs, rales  HEART: Irregularly irregular rate and rhythm; No murmurs, rubs, or gallops  ABDOMEN: Soft, Nontender, Nondistended; Bowel sounds present  EXTREMITIES:  2+ Peripheral Pulses, No clubbing, cyanosis, or edema  LYMPH: No lymphadenopathy noted  SKIN: No rashes or lesions    Consultant(s) Notes Reviewed:  [x ] YES  [ ] NO  Care Discussed with Consultants/Other Providers [ x] YES  [ ] NO    LABS:                        10.0   34.44 )-----------( 134      ( 05 Dec 2019 06:20 )             31.3           PTT - ( 05 Dec 2019 15:40 )  PTT:28.8 sec    CAPILLARY BLOOD GLUCOSE      POCT Blood Glucose.: 104 mg/dL (06 Dec 2019 00:30)      ABG - ( 06 Dec 2019 00:55 )  pH, Arterial: 7.46  pH, Blood: x     /  pCO2: 33    /  pO2: 52    / HCO3: 25    / Base Excess: 0.1   /  SaO2: 84                    RADIOLOGY & ADDITIONAL TESTS: NONE    Imaging Personally Reviewed:  [ x ] YES  [ ] NO    HEALTH ISSUES - PROBLEM Dx:  Leukemoid reaction: Leukemoid reaction  AF (atrial fibrillation): AF (atrial fibrillation)  Need for prophylactic measure: Need for prophylactic measure  Lower extremity edema: Lower extremity edema  Pressure ulcer: Pressure ulcer  Thrombophlebitis: Thrombophlebitis  DNR (do not resuscitate): DNR (do not resuscitate)  HTN (hypertension): HTN (hypertension)  COPD (chronic obstructive pulmonary disease): COPD (chronic obstructive pulmonary disease)  Lung cancer: Lung cancer  SOB (shortness of breath): SOB (shortness of breath)

## 2019-12-06 NOTE — PROGRESS NOTE ADULT - PROBLEM SELECTOR PLAN 1
Likely multifactorial from b/l PE, PNA, and metastatic pleural effusions (patient has a past history of 2 pleural effusions requiring drainage as well cardiac window)  - s/p L thoracentesis 11/29, 700 ccs drained  - US Chest 12/2: Bilateral chest ultrasound showing left pleural effusion, estimated volume of 1033 cc.  - CXR 12/3: persistent perihilar airspace disease, no pleural effusion.  - CXR 12/4: progression of R infiltrate  - Cardio following  - Pulm following  - Heme/Onc following  - ID following  - stop meds except for comfort care medications  - palliative Dr. Pino consulted  - IR unable to take patient for pleurex catheter yesterday due to patient's low BP.  - on morphine drip

## 2019-12-06 NOTE — PROGRESS NOTE ADULT - SUBJECTIVE AND OBJECTIVE BOX
Comfort care only.  Will no longer follow.  Thank you for the courtesy of this referral.  Jorge Hussein MD  [phone]

## 2019-12-06 NOTE — CHART NOTE - NSCHARTNOTEFT_GEN_A_CORE
Assessment:  Pt seen for malnutrition follow up.  Chart reviewed, hospital course noted.  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   Rapid Response earlier today 2/2 low BP.  Pt placed on comfort measures today and family awaiting evaluation for Hospice INN.  Last noted BM 12/3.     Factors impacting intake: [ ] none [ ] nausea  [ ] vomiting [ ] diarrhea [ ] constipation  [ ]chewing problems [ ] swallowing issues  [x] other: lethargy, pain, poor appetite in setting metastatic cancer    Diet Presciption: Diet, Regular:   Supplement Feeding Modality:  Oral  Ensure Enlive Cans or Servings Per Day:  1       Frequency:  Three Times a day (11-22-19 @ 22:48)    Intake: poor    Current Weight: no new wts  % Weight Change    Pertinent Medications: MEDICATIONS  (STANDING):  dronabinol 5 milliGRAM(s) Oral every 12 hours  morphine  Infusion 2 mG/Hr (2 mL/Hr) IV Continuous <Continuous>    MEDICATIONS  (PRN):  atropine 1% Ophthalmic Solution for SubLingual Use 1 Drop(s) SubLingual every 6 hours PRN secretion  HYDROmorphone  Injectable 1 milliGRAM(s) IV Push every 4 hours PRN Severe Pain (7 - 10)  morphine  - Injectable 2 milliGRAM(s) IV Push every 1 hour PRN dyspnea, distress, suffering, pain,  ondansetron Injectable 4 milliGRAM(s) IV Push every 6 hours PRN Nausea  senna 8.6 milliGRAM(s) Oral Tablet - Peds 1 Tablet(s) Oral at bedtime PRN for constipation    Pertinent Labs: 12-04 Na130 mmol/L<L> Glu 118 mg/dL<H> K+ 3.9 mmol/L Cr  0.67 mg/dL BUN 34 mg/dL<H>     CAPILLARY BLOOD GLUCOSE      POCT Blood Glucose.: 104 mg/dL (06 Dec 2019 00:30)    Skin: R elbow II, buttocks II  Edema: 4+ legs    Estimated Needs:   [x] no change since previous assessment  [ ] recalculated:     Previous Nutrition Diagnosis:    [x] Malnutrition (chronic, severe)    Nutrition Diagnosis is [x] ongoing  [ ] resolved [ ] not applicable     New Nutrition Diagnosis: [x] not applicable.  Pt now on comfort measures awaiting eval for Hospice INN.       Interventions:   Recommend  [ ] Change Diet To:  [ ] Nutrition Supplement  [ ] Nutrition Support  [x] Other: continue regular diet with Ensure enlive tid as tolerated.    Monitoring and Evaluation:   [x] PO intake [ x ] Tolerance to diet prescription [ x ] weights [ x ] labs [ x ] follow up per protocol  [ ] other:

## 2019-12-06 NOTE — GOALS OF CARE CONVERSATION - ADVANCED CARE PLANNING - CONVERSATION DETAILS
Reviewed hospice services with patient's spouse.  Patient is approved for inpatient care at [facility] pending bed availability.  Please call [facility] with nurse to nurse report when ready for transfer.  [facility] [phone].  Please call [facility] with any questions/concerns at [phone].    Vy Bone RN.

## 2019-12-06 NOTE — CHART NOTE - NSCHARTNOTEFT_GEN_A_CORE
Resident Rapid Response Note    Patient is a 59y old  Male who presents with a chief complaint of PNA (05 Dec 2019 14:46)      Rapid response was called at on this 59y Male patient for hypotension and bradycardia     Patient was seen and examined at the bedside by the rapid response team and primary team.  ___ () at bedside.    Rapid Response Vital Signs:  BP: 50 systolic with doppler  HR: 100-120s on monitor  RR:   SpO2: 93% on non-rebreather  Temp: unable to obtain  FS: 104    Vital Signs Last 24 Hrs  T(C): 36.5 (05 Dec 2019 20:01), Max: 36.8 (05 Dec 2019 17:13)  T(F): 97.7 (05 Dec 2019 20:01), Max: 98.2 (05 Dec 2019 17:13)  HR: 100 (05 Dec 2019 21:00) (58 - 135)  BP: 110/58 (05 Dec 2019 21:00) (84/62 - 120/56)  RR: 20 (05 Dec 2019 20:01) (15 - 28)  SpO2: 95% (05 Dec 2019 20:24) (95% - 100%)    Physical Exam:  General: Well developed, well nourished, in no acute distress  HEENT: NCAT, PERRLA, EOMI bl, moist mucous membranes   Neck: Supple, nontender, no mass  Neurology: A&Ox3, nonfocal, CN II-XII grossly intact, sensation intact, no gait abnormalities   Respiratory: CTA B/L, No wheezing, rales, or rhonchi  [id number]: RRR, S1/S2 present, no murmurs, rubs, or gallops  Abdominal: Soft, nontender, non-distended, normoactive bowel sounds  Extremities: No C/C/E, peripheral pulses present  MSK: Normal ROM, no joint erythema or warmth, no joint swelling   Skin: warm, dry, normal color, no obvious rash or abnormal lesions    LABS:                        10.0   34.44 )-----------( 134      ( 05 Dec 2019 06:20 )             31.3       Ca    8.1        04 Dec 2019 05:23      PTT - ( 05 Dec 2019 15:40 )  PTT:28.8 sec    CAPILLARY BLOOD GLUCOSE      POCT Blood Glucose.: 104 mg/dL (06 Dec 2019 00:30)        RADIOLOGY & ADDITIONAL TESTS:      Assessment/Plan:  -ABG done shows:   -Patient put on nasal cannula; saturation 93% on room air   -Patient started on 250 cc bolus   -Elevated legs to increase BP   -CXR STAT:   -Will continue to follow, RN to call if any changes. Resident Rapid Response Note    Rapid response was called at on this 59y Male patient for hypotension with BP systolic 50 on doppler.     Patient was seen and examined at the bedside by the rapid response team and primary team. Dr. Ayoub and ICU PA at bedside.    Rapid Response Vital Signs:  BP: 50 systolic with doppler  HR: 130-140s AFib on monitor  RR: 20  SpO2: 88% RA  Temp: unable to obtain  FS: 104    Vital Signs Last 24 Hrs  T(C): 36.5 (05 Dec 2019 20:01), Max: 36.8 (05 Dec 2019 17:13)  T(F): 97.7 (05 Dec 2019 20:01), Max: 98.2 (05 Dec 2019 17:13)  HR: 100 (05 Dec 2019 21:00) (58 - 135)  BP: 110/58 (05 Dec 2019 21:00) (84/62 - 120/56)  RR: 20 (05 Dec 2019 20:01) (15 - 28)  SpO2: 95% (05 Dec 2019 20:24) (95% - 100%)    Physical Exam:  General: Cachetic male in moderate acute distress secondary to situation  Neurology: A&Ox3, nonfocal  Respiratory: Diminished breath sounds b/l, No wheezing, rales, or rhonchi  CV: Tachycardic, irregular rhythm, S1/S2 present  Extremities: Bilateral 4+ pitting edema of LEs with weeping present    LABS:                        10.0   34.44 )-----------( 134      ( 05 Dec 2019 06:20 )             31.3       Ca    8.1        04 Dec 2019 05:23      PTT - ( 05 Dec 2019 15:40 )  PTT:28.8 sec    CAPILLARY BLOOD GLUCOSE      POCT Blood Glucose.: 104 mg/dL (06 Dec 2019 00:30)        RADIOLOGY & ADDITIONAL TESTS:      Assessment/Plan: 60 y/o M with 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   - ABG done shows: pH 7.46, CO2 33, pO2 52, HCO3 25, 84% RA  - Patient placed on nasal cannula as not tolerated ventimask and continually removing it  - Patient started on 250cc bolus over 1hr for hypotension  - Elevated legs to increase BP, but patient could not tolerate secondary to pain   - Patient given 100mg hydrocortisone IVP for hypotension  - CXR STAT appears to have slightly increased right sided infiltrate from prior on 12/4. Patient noted to have elevated WBC count due to leukemoid reaction. ID note reviewed and no abx given at this time.  - No labs were able to be drawn as lab tach unable to get a flash of blood  - Patient noted to be in pain and acute distress during rapid response, asking team to leave him alone and stating he just wants to be comfortable. When asked if he wanted to discontinue all aggressive treatment including increasing his blood pressure with fluids and pressors prior to received pain medication, patient asked team to discuss with his wife, Yuly (HCP), regarding his goals of care.  - Yuly (HCP) contacted to come in for further discussion of goals of care and wishes for life saving treatments  - To follow up repeat blood pressure after completion of bolus and when patient less distressed  - Will continue to follow, RN to call if any changes    Addendum: Resident Rapid Response Note    Rapid response was called at on this 59y Male patient for hypotension with BP systolic 50 on doppler. Initial O2 saturation unable to be obtained.     Patient was seen and examined at the bedside by the rapid response team and ICU. Patient was sitting in chair, awake, and alert during rapid response. He was able to answer questions appropriately. Complained of pain and discomfort. Patient continued to pull off his O2 mask throughout the rapid response, complained of it being very uncomfortable.     Rapid Response Vital Signs:  BP: 50 systolic with doppler  HR: 130-140s AFib on monitor  RR: 20  SpO2: 88% RA  Temp: unable to obtain  FS: 104    Vital Signs Last 24 Hrs  T(C): 36.5 (05 Dec 2019 20:01), Max: 36.8 (05 Dec 2019 17:13)  T(F): 97.7 (05 Dec 2019 20:01), Max: 98.2 (05 Dec 2019 17:13)  HR: 100 (05 Dec 2019 21:00) (58 - 135)  BP: 110/58 (05 Dec 2019 21:00) (84/62 - 120/56)  RR: 20 (05 Dec 2019 20:01) (15 - 28)  SpO2: 95% (05 Dec 2019 20:24) (95% - 100%)    Physical Exam:  General: Cachetic male in moderate acute distress secondary to situation  Neurology: A&Ox4, nonfocal  Respiratory: Diminished breath sounds b/l, No wheezing, rales, or rhonchi  CV: Tachycardic, irregular rhythm, S1/S2 present  Extremities: Bilateral 4+ pitting edema of LEs with weeping present    LABS:                        10.0   34.44 )-----------( 134      ( 05 Dec 2019 06:20 )             31.3       Ca    8.1        04 Dec 2019 05:23      PTT - ( 05 Dec 2019 15:40 )  PTT:28.8 sec    CAPILLARY BLOOD GLUCOSE      POCT Blood Glucose.: 104 mg/dL (06 Dec 2019 00:30)        RADIOLOGY & ADDITIONAL TESTS:      Assessment/Plan: 60 y/o M with 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   - ABG done shows: pH 7.46, CO2 33, pO2 52, HCO3 25, 84% RA  - Patient placed on nasal cannula as not tolerated ventimask and continually removing it  - Patient started on 250cc bolus over 1hr for hypotension  - Elevated legs to increase BP, but patient could not tolerate secondary to pain   - Patient given stress dose of 100mg hydrocortisone IVP for hypotension  - CXR STAT appears to have slightly increased right sided infiltrate from prior on 12/4. Patient noted to have elevated WBC count likely due to leukemoid reaction. ID note reviewed and no abx given at this time. Blood culture negative to date.   - No labs were able to be drawn as lab tach unable to get a flash of blood  - Patient noted to be in pain and acute distress during rapid response, asking team to leave him alone and stating he just wants to be comfortable. When asked if he wanted to discontinue all aggressive treatment including increasing his blood pressure with fluids and pressors prior to received pain medication, patient asked team to discuss with his wife, Yuly (HCP), regarding his goals of care.  - Yuly (HCP) contacted to come in for further discussion of goals of care  - To follow up repeat blood pressure after completion of bolus and when patient less distressed  - Dr. Perlman called, aware, agrees with management  - Will continue to follow, RN to call if any changes Resident Rapid Response Note    Rapid response was called at on this 59y Male patient for hypotension with BP systolic 50 on doppler. Initial O2 saturation unable to be obtained.     Patient was seen and examined at the bedside by the rapid response team and ICU. Patient was sitting in chair, awake, and alert during rapid response. He was able to answer questions appropriately. Complained of pain and discomfort. Patient continued to pull off his O2 mask throughout the rapid response, complained of it being very uncomfortable.     Rapid Response Vital Signs:  BP: 50 systolic with doppler  HR: 130-140s AFib on monitor  RR: 20  SpO2: 88% RA  Temp: unable to obtain  FS: 104    Vital Signs Last 24 Hrs  T(C): 36.5 (05 Dec 2019 20:01), Max: 36.8 (05 Dec 2019 17:13)  T(F): 97.7 (05 Dec 2019 20:01), Max: 98.2 (05 Dec 2019 17:13)  HR: 100 (05 Dec 2019 21:00) (58 - 135)  BP: 110/58 (05 Dec 2019 21:00) (84/62 - 120/56)  RR: 20 (05 Dec 2019 20:01) (15 - 28)  SpO2: 95% (05 Dec 2019 20:24) (95% - 100%)    Physical Exam:  General: Cachetic male in moderate acute distress secondary to situation  Neurology: A&Ox4, but appeared to be confused and pulling at objects that are not present  Respiratory: Diminished breath sounds b/l, No wheezing, rales, or rhonchi  CV: Tachycardic, irregular rhythm, S1/S2 present  Extremities: Bilateral 4+ pitting edema of LEs with weeping present    LABS:                        10.0   34.44 )-----------( 134      ( 05 Dec 2019 06:20 )             31.3       Ca    8.1        04 Dec 2019 05:23      PTT - ( 05 Dec 2019 15:40 )  PTT:28.8 sec    CAPILLARY BLOOD GLUCOSE      POCT Blood Glucose.: 104 mg/dL (06 Dec 2019 00:30)        RADIOLOGY & ADDITIONAL TESTS:      Assessment/Plan: 58 y/o M with 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   - ABG done shows: pH 7.46, CO2 33, pO2 52, HCO3 25, 84% RA  - Patient placed on nasal cannula as not tolerated ventimask and continually removing it  - Patient started on 250cc bolus over 1hr for hypotension  - Elevated legs to increase BP, but patient could not tolerate secondary to pain   - Patient given stress dose of 100mg hydrocortisone IVP for hypotension  - CXR STAT appears to have slightly increased right sided infiltrate from prior on 12/4. Patient noted to have elevated WBC count likely due to leukemoid reaction. ID note reviewed and no abx given at this time. Blood culture negative to date.   - No labs were able to be drawn as lab tach unable to get a flash of blood  - Patient noted to be in pain and acute distress during rapid response, asking team to leave him alone and stating he just wants to be comfortable. When asked if he wanted to discontinue all aggressive treatment including increasing his blood pressure with fluids and pressors prior to received pain medication, patient asked team to discuss with his wife, Yuly (HCP), regarding his goals of care.  - Yuly (HCP) contacted to come in for further discussion of goals of care  - To follow up repeat blood pressure after completion of bolus and when patient less distressed  - Dr. Perlman called, aware, agrees with management  - Will continue to follow, RN to call if any changes

## 2019-12-06 NOTE — PROGRESS NOTE ADULT - PROBLEM SELECTOR PLAN 3
Tachycardic, likely reactive to respiratory distress, anemia, and hypotension  - Cardio following; recs appreciated.

## 2019-12-06 NOTE — PROGRESS NOTE ADULT - ASSESSMENT
58 yo man well known to our group, w met small cell ca of lung first presented as limited stage pmtvano12/2018 post Carbo//16/Atezolizumab with concurrent RT with initial response but then brain mets 8/2019 s/p WBRT and systemic disease progression on PET/CT 9/2019 with intra-abdominal, bone and pleural disease, started on weekly Taxol with GCSF support due to cytopenia with tx.    Adm now with weakness and dyspnea; found to have pulmonary embolism and further disease progression with pleural effusion, bone lesions, liver lesions and pancreatic mass.  s/p 700cc therapeutic thoracentesis 11/29/19 AM; despite this had re-accumulation of fluid and more sx  s/p Chest US with estimated 1000cc on 12/02/19    Unable to lie flat for MRI brain.   Unable to lie flat for CT scan.     s/p therapeutic thoracentesis Fri 11/29/19. No pleurex catheter as insufficient fluid.;   Concern that fluid has quickly re-accumulated.   Suspect over all dyspnea due to combination: sx due to cancer, fluid, cardiac and PE.   However, remains SOB today.     s/p rapid response last night  patient with continued clinical decline  discussed with patient's wife. hospice evaluation    RECOMMEND  patient has continued wtih rapid clinical deteriation   to hold additional chemotherapy  discussed with Dr. Pino. to start MS drip  hospice evaluation   patient is a candidate for transfer to the [facility].  have discussed with the patient's wife and Dr. Pino

## 2019-12-06 NOTE — PROGRESS NOTE ADULT - ASSESSMENT
59 M PMH COPD, HTN, LE edema Small cell lung cancer with mets to brain and bone on chemo presents to ED c/o SOB and cough for the past 7 days, admitted for PNA, on full comfort care

## 2019-12-06 NOTE — PROGRESS NOTE ADULT - PROBLEM SELECTOR PLAN 2
- CTA Chest 11/22/2019 suggestive of pleural-based metastatic disease.  - Pulm following  - Heme/Onc following  - On Morphine drip  - Continue Senna 8.6 mg 1 tab PO QHS.  - Continue Odansetron 4 mg IVp q6H PRN, re: nausea.   - Continue Dronabinol 5 mg PO q12H.

## 2019-12-06 NOTE — PROVIDER CONTACT NOTE (OTHER) - ACTION/TREATMENT ORDERED:
Unable to draw blood , pt refusing labs to be drawn. Explained to pt the importance of labs . Pt state " I do not want to  be bother "
No new orders given , Pt placed on O2 2L NC
No new orders given will continue to monitor
Pt sustains in 120s in the past. will continue to monitor.
No new orders given will continue to monitor

## 2019-12-06 NOTE — CHART NOTE - NSCHARTNOTEFT_GEN_A_CORE
Rapid Response Follow Up Note    Rapid response was called previously for hypotension with BP systolic 50 on doppler.     On follow up, patient was sitting up in chair, awake and alert with wife at bedside. Patient with no new acute complaints since rapid response.    Vital Signs Last 24 Hrs  T(C): 36.2 (06 Dec 2019 02:30), Max: 36.8 (05 Dec 2019 17:13)  T(F): 97.2 (06 Dec 2019 02:30), Max: 98.2 (05 Dec 2019 17:13)  HR: 110 (06 Dec 2019 02:30) (58 - 135)  BP: 69/- (06 Dec 2019 02:30) (62/- - 120/56)  RR: 24 (06 Dec 2019 02:30) (15 - 28)  SpO2: 90% (06 Dec 2019 02:30) (88% - 100%)    Physical Exam:  General: Cachetic male sitting comfortably in   Neurology: A&Ox4, nonfocal  Respiratory: Diminished breath sounds b/l, No wheezing, rales, or rhonchi  CV: Tachycardic, irregular rhythm, S1/S2 present  Extremities: Bilateral 4+ pitting edema of LEs with weeping present    LABS:                        10.0   34.44 )-----------( 134      ( 05 Dec 2019 06:20 )             31.3       Ca    8.1        04 Dec 2019 05:23      PTT - ( 05 Dec 2019 15:40 )  PTT:28.8 sec    CAPILLARY BLOOD GLUCOSE      POCT Blood Glucose.: 104 mg/dL (06 Dec 2019 00:30)        RADIOLOGY & ADDITIONAL TESTS:      Assessment/Plan: 58 y/o M with 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   - Supplemental oxygen uptitrated to 5L nasal cannula  - Repeat blood pressure with doppler: systolic 62  - Midodrine 10mg given for hypotension  - Wife Yuly (HCP) arrived at bedside for goals of care discussion. Wife endorsing that she would like her  to be comfortable   - Patient   - Rapid Response Follow Up Note    Rapid response was called previously for hypotension with BP systolic 50 on doppler.     On follow up, patient was sitting up in chair, awake and alert with wife at bedside. Patient with no new acute complaints since rapid response.    Vital Signs Last 24 Hrs  T(C): 36.2 (06 Dec 2019 02:30), Max: 36.8 (05 Dec 2019 17:13)  T(F): 97.2 (06 Dec 2019 02:30), Max: 98.2 (05 Dec 2019 17:13)  HR: 110 (06 Dec 2019 02:30) (58 - 135)  BP: 69/- (06 Dec 2019 02:30) (62/- - 120/56)  RR: 24 (06 Dec 2019 02:30) (15 - 28)  SpO2: 90% (06 Dec 2019 02:30) (88% - 100%)    Physical Exam:  General: Cachetic male sitting comfortably in   Neurology: A&Ox4, nonfocal  Respiratory: Diminished breath sounds b/l, No wheezing, rales, or rhonchi  CV: Tachycardic, irregular rhythm, S1/S2 present  Extremities: Bilateral 4+ pitting edema of LEs with weeping present    LABS:                        10.0   34.44 )-----------( 134      ( 05 Dec 2019 06:20 )             31.3       Ca    8.1        04 Dec 2019 05:23      PTT - ( 05 Dec 2019 15:40 )  PTT:28.8 sec    CAPILLARY BLOOD GLUCOSE      POCT Blood Glucose.: 104 mg/dL (06 Dec 2019 00:30)        RADIOLOGY & ADDITIONAL TESTS:      Assessment/Plan: 60 y/o M with 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   - Supplemental oxygen uptitrated to 5L nasal cannula  - Repeat blood pressure with doppler: systolic 62  - Midodrine 10mg given for hypotension  - Wife Yuly (HCP) arrived at bedside for goals of care discussion. Wife endorsing that she would like her  to be comfortable, does not want aggressive treatments including feeding tube, pressors, and transfer to the ICU. Goals of care discussed with patient, noted to be AAOx4. Patient's lucidity waxes and wanes, with periods of delirium and hallucinations including seeing bugs on the floor and pulling at objects not present. Patient repeatedly stating he just wants to be comfortable, wants pain medication, and understands that with administration of pain medication at low blood pressures his heart can stop and he can die. Patient's poor prognosis in light of SCLCA with widespread metastases and high risk of mortality with attempt at chemotherapy was reinforced. When MOLST form reviewed with patient, patient stating that he would only like measures to enhance his comfort, but also admitting to wanting a feeding tube.    - Will continue to follow Rapid Response Follow Up Note    Rapid response was called previously for hypotension with BP systolic 50 on doppler.     On follow up, patient was sitting up in chair, awake and alert with wife at bedside. Patient with no new acute complaints since rapid response.    Vital Signs Last 24 Hrs  T(C): 36.2 (06 Dec 2019 02:30), Max: 36.8 (05 Dec 2019 17:13)  T(F): 97.2 (06 Dec 2019 02:30), Max: 98.2 (05 Dec 2019 17:13)  HR: 110 (06 Dec 2019 02:30) (58 - 135)  BP: 69/- (06 Dec 2019 02:30) (62/- - 120/56)  RR: 24 (06 Dec 2019 02:30) (15 - 28)  SpO2: 90% (06 Dec 2019 02:30) (88% - 100%)    Physical Exam:  General: Cachetic male sitting comfortably in   Neurology: A&Ox4, nonfocal  Respiratory: Diminished breath sounds b/l, No wheezing, rales, or rhonchi  CV: Tachycardic, irregular rhythm, S1/S2 present  Extremities: Bilateral 4+ pitting edema of LEs with weeping present    LABS:                        10.0   34.44 )-----------( 134      ( 05 Dec 2019 06:20 )             31.3       Ca    8.1        04 Dec 2019 05:23      PTT - ( 05 Dec 2019 15:40 )  PTT:28.8 sec    CAPILLARY BLOOD GLUCOSE      POCT Blood Glucose.: 104 mg/dL (06 Dec 2019 00:30)        RADIOLOGY & ADDITIONAL TESTS:      Assessment/Plan: 60 y/o M with 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   - Supplemental oxygen uptitrated to 5L nasal cannula  - Repeat blood pressure with doppler: systolic 62  - Midodrine 10mg given for hypotension  - Patient's last dose of Dilaudid prior to goals of care discussion was 12/5 at 2300  - Wife Yuly (Health Care Proxy) arrived at bedside for goals of care discussion. Wife endorsing that she would like her  to be comfortable, does not want aggressive treatments including feeding tube, pressors, and transfer to the ICU. Patient repeatedly stating he just wants to be comfortable, wants pain medication, and understands that with administration of pain medication at low blood pressures his heart can stop and he can die. Other times, patient stating he would like to continue chemotherapy treatments and pursue other more aggressive measures. Throughout goals of care conversation with patient, noted to be lethargic necessitating repeated stimulation for alertness and declining orientation to time and situation. Patient's lucidity also waxes and wanes, with periods of delirium and hallucinations including seeing bugs on the floor and pulling at objects not present.   - Due to mental status, patient was determined to lack capacity to make medical decisions for himself in light of delirium in the setting of advanced illness. Lack of capacity and medical criteria documentation for MOLST was completed. Patient's medical decisions were deferred to patient's Health Care Proxy, wife Yuly.   - Health care proxy's wishes for patient to be made comfort care with MOLST form was updated with Health Care Proxy   - Will continue to follow Rapid Response Follow Up Note    Rapid response was called previously for hypotension with BP systolic 50 on doppler.     On follow up, patient was sitting up in chair, awake and alert with wife at bedside. Patient with no new acute complaints since rapid response.    Vital Signs Last 24 Hrs  T(C): 36.2 (06 Dec 2019 02:30), Max: 36.8 (05 Dec 2019 17:13)  T(F): 97.2 (06 Dec 2019 02:30), Max: 98.2 (05 Dec 2019 17:13)  HR: 110 (06 Dec 2019 02:30) (58 - 135)  BP: 69/- (06 Dec 2019 02:30) (62/- - 120/56)  RR: 24 (06 Dec 2019 02:30) (15 - 28)  SpO2: 90% (06 Dec 2019 02:30) (88% - 100%)    Physical Exam:  General: Cachetic male sitting comfortably in   Neurology: A&Ox4, nonfocal  Respiratory: Diminished breath sounds b/l, No wheezing, rales, or rhonchi  CV: Tachycardic, irregular rhythm, S1/S2 present  Extremities: Bilateral 4+ pitting edema of LEs with weeping present    LABS:                        10.0   34.44 )-----------( 134      ( 05 Dec 2019 06:20 )             31.3       Ca    8.1        04 Dec 2019 05:23      PTT - ( 05 Dec 2019 15:40 )  PTT:28.8 sec    CAPILLARY BLOOD GLUCOSE      POCT Blood Glucose.: 104 mg/dL (06 Dec 2019 00:30)        RADIOLOGY & ADDITIONAL TESTS:      Assessment/Plan: 58 y/o M with 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   - Supplemental oxygen uptitrated to 5L nasal cannula  - Repeat blood pressure with doppler: systolic 62  - Midodrine 10mg given for hypotension  - Patient's last dose of Dilaudid prior to goals of care discussion was 12/5 at 2300  - Wife Yuly (Health Care Proxy) arrived at bedside for goals of care discussion. Wife endorsing that she would like her  to be comfortable, does not want aggressive treatments including feeding tube, pressors, and transfer to the ICU. Patient repeatedly stating he just wants to be comfortable, wants pain medication, and understands that with administration of pain medication at low blood pressures his heart can stop and he can die. Other times, patient stating he would like to continue chemotherapy treatments and pursue other more aggressive measures. Throughout goals of care conversation with patient, noted to be lethargic necessitating repeated stimulation for alertness and declining orientation to time and situation. Patient's lucidity also waxes and wanes, with periods of delirium and hallucinations including seeing bugs on the floor and pulling at objects not present.   - Due to mental status, patient was determined to lack capacity to make medical decisions for himself in light of delirium in the setting of advanced illness. Lack of capacity and medical criteria documentation for MOLST was completed. Patient's medical decisions were deferred to patient's Health Care Proxy, wife Yuly.   - Health care proxy's wishes for patient to be made comfort measures only, with IV fluids and supplemental oxygen for patient's comfort. MOLST form was updated with Health Care Proxy. Rapid Response Follow Up Note    Rapid response was called previously for hypotension with BP systolic 50 on doppler.     On follow up, patient was sitting up in chair, sleeping but easily awoken with wife at bedside. Patient with no new acute complaints since rapid response.     Vital Signs Last 24 Hrs  T(C): 36.2 (06 Dec 2019 02:30), Max: 36.8 (05 Dec 2019 17:13)  T(F): 97.2 (06 Dec 2019 02:30), Max: 98.2 (05 Dec 2019 17:13)  HR: 110 (06 Dec 2019 02:30) (58 - 135)  BP: 69/- (06 Dec 2019 02:30) (62/- - 120/56)  RR: 24 (06 Dec 2019 02:30) (15 - 28)  SpO2: 90% (06 Dec 2019 02:30) (88% - 100%)    Physical Exam:  General: Cachetic male sitting comfortably in   Neurology: alternating episodes of lucidity and confusion, patient able to provide accurate name/date//location, but then stated that he sees bugs crawling all over the floor, unable to provide details about the rapid response that was called, patient appeared to be very sleepy, wakes up after name was called but would fall asleep mid conversation.   Respiratory: Diminished breath sounds b/l, No wheezing, rales, or rhonchi  CV: Tachycardic, irregular rhythm, S1/S2 present  Extremities: Bilateral 4+ pitting edema of LEs with weeping present    LABS:                        10.0   34.44 )-----------( 134      ( 05 Dec 2019 06:20 )             31.3       Ca    8.1        04 Dec 2019 05:23      PTT - ( 05 Dec 2019 15:40 )  PTT:28.8 sec    CAPILLARY BLOOD GLUCOSE      POCT Blood Glucose.: 104 mg/dL (06 Dec 2019 00:30)        RADIOLOGY & ADDITIONAL TESTS:      Assessment/Plan: 60 y/o M with 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     - Supplemental oxygen uptitrated to 5L nasal cannula  - Repeat blood pressure with doppler: systolic 62  - s/p 250mg IVF bolus and Midodrine 10mg for hypotension without significant improvement in BP  - Patient's last dose of Dilaudid prior to goals of care discussion was  at 2300    - Wife Yuly (Health Care Proxy) arrived at bedside for goals of care discussion. Wife endorsing that she would like her  to be comfortable, does not want aggressive treatments including feeding tube, pressors, and transfer to the ICU.     Per wife, they had extensive conversation about his GOC after he was diagnosed with cancer, and told her that he does not want any aggressive interventions at the end of his life. During the GOC converstation at bedside, Patient repeatedly stating he just wants to be comfortable, wants pain medication, and understands that with administration of pain medication at low blood pressures his heart can stop and he can die. Other times, patient stating he would like to continue chemotherapy treatments and pursue other more aggressive measures. Patient would initially state he does not want to go to ICU or have any more interventions performed and his goal is just to be comfortable, falls asleep and immediately after being woken up, states that he wants NGT.     Throughout goals of care conversation with patient, noted to be lethargic necessitating repeated stimulation for alertness and declining orientation to time and situation. Patient's lucidity also waxes and wanes, with periods of delirium and hallucinations including seeing bugs on the floor and pulling at objects not present.     - Due to mental status, patient was determined to lack capacity to make medical decisions for himself in light of delirium in the setting of advanced illness. Lack of capacity and medical criteria documentation for MOLST was completed. Patient's medical decisions were deferred to patient's Health Care Proxy, wife Yuly.   - Health care proxy's wishes for patient to be made comfort measures only, with IV fluids and supplemental oxygen for patient's comfort. MOLST form was updated with Health Care Proxy.

## 2019-12-07 DIAGNOSIS — Z51.5 ENCOUNTER FOR PALLIATIVE CARE: ICD-10-CM

## 2019-12-07 NOTE — PROGRESS NOTE ADULT - SUBJECTIVE AND OBJECTIVE BOX
Date/Time Patient Seen:  		  Referring MD:   Data Reviewed	       Patient is a 59y old  Male who presents with a chief complaint of PNA (06 Dec 2019 22:28)      Subjective/HPI     PAST MEDICAL & SURGICAL HISTORY:  Lung cancer  Depression  Dyspnea  Mediastinal adenopathy  Pericardial effusion  AF (atrial fibrillation)  Hyponatremia  COPD (chronic obstructive pulmonary disease)  HTN (hypertension)  S/P pericardial window creation  No significant past surgical history        Medication list         MEDICATIONS  (STANDING):  dronabinol 5 milliGRAM(s) Oral every 12 hours  morphine  Infusion 4 mG/Hr (4 mL/Hr) IV Continuous <Continuous>    MEDICATIONS  (PRN):  atropine 1% Ophthalmic Solution for SubLingual Use 1 Drop(s) SubLingual every 6 hours PRN secretion  HYDROmorphone  Injectable 1 milliGRAM(s) IV Push every 4 hours PRN Severe Pain (7 - 10)  LORazepam   Injectable 1 milliGRAM(s) IV Push every 30 minutes PRN distress, suffering, dyspnea, anxiety, palliative measures  morphine  - Injectable 2 milliGRAM(s) IV Push every 1 hour PRN dyspnea, distress, suffering, pain,  ondansetron Injectable 4 milliGRAM(s) IV Push every 6 hours PRN Nausea  senna 8.6 milliGRAM(s) Oral Tablet - Peds 1 Tablet(s) Oral at bedtime PRN for constipation         Vitals log        ICU Vital Signs Last 24 Hrs  T(C): --  T(F): --  HR: --  BP: --  BP(mean): --  ABP: --  ABP(mean): --  RR: --  SpO2: --           Input and Output:  I&O's Detail    05 Dec 2019 07:01  -  06 Dec 2019 07:00  --------------------------------------------------------  IN:    heparin  Infusion.: 171 mL    Oral Fluid: 100 mL    Sodium Chloride 0.9% IV Bolus: 250 mL    Solution: 50 mL    Solution: 50 mL  Total IN: 621 mL    OUT:  Total OUT: 0 mL    Total NET: 621 mL      06 Dec 2019 07:01  -  07 Dec 2019 06:11  --------------------------------------------------------  IN:    morphine  Infusion: 20 mL  Total IN: 20 mL    OUT:  Total OUT: 0 mL    Total NET: 20 mL          Lab Data                        10.0   34.44 )-----------( 134      ( 05 Dec 2019 06:20 )             31.3           ABG - ( 06 Dec 2019 00:55 )  pH, Arterial: 7.46  pH, Blood: x     /  pCO2: 33    /  pO2: 52    / HCO3: 25    / Base Excess: 0.1   /  SaO2: 84                      Review of Systems	      Objective     Physical Examination    head at  heart s1s2  lung dec BS  abd soft  cachexia      Pertinent Lab findings & Imaging      Joshua:  NO   Adequate UO     I&O's Detail    05 Dec 2019 07:01  -  06 Dec 2019 07:00  --------------------------------------------------------  IN:    heparin  Infusion.: 171 mL    Oral Fluid: 100 mL    Sodium Chloride 0.9% IV Bolus: 250 mL    Solution: 50 mL    Solution: 50 mL  Total IN: 621 mL    OUT:  Total OUT: 0 mL    Total NET: 621 mL      06 Dec 2019 07:01  -  07 Dec 2019 06:11  --------------------------------------------------------  IN:    morphine  Infusion: 20 mL  Total IN: 20 mL    OUT:  Total OUT: 0 mL    Total NET: 20 mL               Discussed with:     Cultures:	        Radiology

## 2019-12-07 NOTE — CHART NOTE - NSCHARTNOTEFT_GEN_A_CORE
Called by RN as patient appears to be no longer breathing. Patient seen and examined at bedside. Patient did not respond to verbal, physical, or painful stimuli. Absent heart and breath sounds on auscultation. Pupils are fixed and dilated, absent corneal reflex. No palpable pulses at radial, carotid, or femoral arteries.    Time of Death: 8:25am  Dr. Perlman notified via phone.  Wife, Yuly notified via phone; will be coming to hospital shortly

## 2019-12-07 NOTE — PROGRESS NOTE ADULT - PROBLEM SELECTOR PLAN 1
end of life care  opioid gtt in effect  pt is DNR DNI  opioids and benzos for sx management  lung ca - mets - end stage -   cachexia  supportive palliative measures  will follow  discussed prognosis with wife  pt is actively dying

## 2019-12-07 NOTE — PROGRESS NOTE ADULT - PROBLEM SELECTOR PROBLEM 1
End of life care
Lung cancer
Lung cancer
SOB (shortness of breath)

## 2019-12-26 NOTE — PROGRESS NOTE ADULT - REASON FOR ADMISSION
Pt notified of results. Pt verbalized understanding. Letter mailed. MM
PNA
PNA/ pulmonary emboli
PNA; pulmonary embolism
PNA

## 2020-01-13 NOTE — ED ADULT TRIAGE NOTE - AS O2 DELIVERY
From: Stephanie L Lombardo  To: Parker George MD  Sent: 1/12/2020 11:15 PM CST  Subject: Other    Pap smear completed 09/05/2019 with [facility]  
Updated patient's chart  
room air

## 2020-01-22 ENCOUNTER — RX RENEWAL (OUTPATIENT)
Age: 60
End: 2020-01-22

## 2020-03-17 NOTE — PROGRESS NOTE ADULT - SUBJECTIVE AND OBJECTIVE BOX
[facility]     Patient is a 58y old  Male who presents with a chief complaint of sob (12 Jan 2019 14:16)      INTERVAL HPI/OVERNIGHT EVENTS:    Feels better. Shortness of breath has improved    MEDICATIONS  (STANDING):  ALBUTerol/ipratropium for Nebulization 3 milliLiter(s) Nebulizer every 6 hours  buDESOnide   0.5 milliGRAM(s) Respule 0.5 milliGRAM(s) Inhalation two times a day  chlorhexidine 2% Cloths 1 Application(s) Topical daily  digoxin     Tablet 0.25 milliGRAM(s) Oral daily  diltiazem    Tablet 120 milliGRAM(s) Oral every 6 hours  [id number]-sndz Injectable 480 MICROGram(s) SubCutaneous every 24 hours  furosemide   Injectable 40 milliGRAM(s) IV Push every 12 hours  heparin  Injectable 5000 Unit(s) SubCutaneous every 8 hours  influenza   Vaccine 0.5 milliLiter(s) IntraMuscular once  lactobacillus acidophilus 1 Tablet(s) Oral three times a day with meals  loratadine 10 milliGRAM(s) Oral daily  metoprolol succinate  milliGRAM(s) Oral daily  ondansetron  IVPB 10 milliGRAM(s) IV Intermittent once  sodium chloride 1 Gram(s) Oral two times a day  tiotropium 18 MICROgram(s) Capsule 1 Capsule(s) Inhalation daily  verapamil 120 milliGRAM(s) Oral every 8 hours      MEDICATIONS  (PRN):  ALBUTerol    90 MICROgram(s) HFA Inhaler 2 Puff(s) Inhalation every 6 hours PRN Shortness of Breath  guaiFENesin    Syrup 200 milliGRAM(s) Oral every 6 hours PRN Cough  ondansetron  IVPB 10 milliGRAM(s) IV Intermittent every 8 hours PRN Nausea and/or Vomiting  prochlorperazine   Tablet 10 milliGRAM(s) Oral every 6 hours PRN nause and vomiting  promethazine 25 milliGRAM(s) Oral four times a day PRN breakthrough cough      Allergies    penicillin (Unknown)    Intolerances        PAST MEDICAL & SURGICAL HISTORY:  Dyspnea  Mediastinal adenopathy  Pericardial effusion  AF (atrial fibrillation)  Hyponatremia  COPD (chronic obstructive pulmonary disease)  HTN (hypertension)  S/P pericardial window creation      Vital Signs Last 24 Hrs  T(C): 36.6 (12 Jan 2019 13:54), Max: 36.6 (11 Jan 2019 20:49)  T(F): 97.8 (12 Jan 2019 13:54), Max: 97.9 (12 Jan 2019 04:45)  HR: 86 (12 Jan 2019 19:35) (76 - 108)  BP: 118/67 (12 Jan 2019 15:40) (108/66 - 124/72)  BP(mean): --  RR: 18 (12 Jan 2019 13:54) (18 - 20)  SpO2: 98% (12 Jan 2019 19:35) (92% - 98%)    PHYSICAL EXAMINATION:    GENERAL: The patient is awake and alert in no apparent distress.     HEENT: Head is normocephalic and atraumatic. Extraocular muscles are intact. Mucous membranes are moist.    NECK: Supple.    LUNGS: occasional scattered bilateral rhonchi    HEART: Regular rate and rhythm without murmur.    ABDOMEN: Soft, nontender, and nondistended.      EXTREMITIES: Without any cyanosis, clubbing, rash, lesions or edema.    NEUROLOGIC: Grossly intact.    SKIN: No ulceration or induration present.      LABS:                        10.4   13.86 )-----------( 183      ( 12 Jan 2019 13:44 )             31.4     01-11    135  |  93<L>  |  23  ----------------------------<  167<H>  4.4   |  35<H>  |  0.68    Ca    8.1<L>      11 Jan 2019 09:08  Phos  4.0     01-11  Mg     2.3     01-11                          MICROBIOLOGY:      RADIOLOGY & ADDITIONAL STUDIES:    Assessment:    Small cell carcinoma of lung with mediastinal, hilar, pleural, and pericardial involvement - S/P Chemo  COPD  SIADH    Plan:    Continue nebulizer treatments  Arrange for home nebulizer  Patient does not qualify for home oxygen  Discharge planning [id number]

## 2020-07-21 RX ORDER — FUROSEMIDE 40 MG/1
40 TABLET ORAL
Qty: 270 | Refills: 3 | Status: ACTIVE | COMMUNITY
Start: 2020-01-22 | End: 1900-01-01

## 2021-01-19 NOTE — GOALS OF CARE CONVERSATION - ADVANCED CARE PLANNING - AGENT'S NAME
Discontinue Regimen: Ximino 90 mg capsule, extended release: Take 1 tab po QDAY with food\\n\\nArazlo 0.045 % lotion: Apply a thin layer QHS to acne of face\\n\\nEpiduo 0.1 %-2.5 % topical gel with pump Qhs: Apply to face qhs
Continue Regimen: Clindacin P 1 % topical swab: Wipe face, chest, back QAM after shower\\nBenzePrO 6 % towelette : Wash affected area of acne of face qam during shower
Render In Strict Bullet Format?: No
Yuly San
Detail Level: Zone
Yuly San
Initiate Treatment: tazarotene 0.1 % topical cream: Apply to affected area of face every other night

## 2021-01-20 RX ORDER — METOPROLOL SUCCINATE 200 MG/1
200 TABLET, EXTENDED RELEASE ORAL DAILY
Qty: 90 | Refills: 3 | Status: ACTIVE | COMMUNITY
Start: 2020-01-22 | End: 1900-01-01

## 2021-03-12 NOTE — PROGRESS NOTE ADULT - SUBJECTIVE AND OBJECTIVE BOX
Not needed [facility] -- Gurpreet Bush, Emily, Corey, Sajan Abebe Savella  Office # [phone]      Follow Up:  AF, tamponade    Subjective/Observations: Patient seen and examined. Events noted. Resting comfortably in bed. No complaints of chest pain, dyspnea, or palpitations reported.  Now s/p pericardial window. Feeling ok but still groggy recovering from anesthesia.   650cc of bloody pericardial fluid drained.       REVIEW OF SYSTEMS: All other review of systems is negative unless indicated above    PAST MEDICAL & SURGICAL HISTORY:  HTN (hypertension)  No significant past surgical history      MEDICATIONS  (STANDING):  buDESOnide   0.5 milliGRAM(s) Respule 0.5 milliGRAM(s) Inhalation two times a day  dextrose 5%. 1000 milliLiter(s) (75 mL/Hr) IV Continuous <Continuous>  influenza   Vaccine 0.5 milliLiter(s) IntraMuscular once  loratadine 10 milliGRAM(s) Oral daily  magnesium oxide 400 milliGRAM(s) Oral daily  metoprolol tartrate 50 milliGRAM(s) Oral every 6 hours  metoprolol tartrate Injectable 5 milliGRAM(s) IV Push every 6 hours  nicotine - 21 mG/24Hr(s) Patch 1 Patch Transdermal daily  predniSONE   Tablet 40 milliGRAM(s) Oral daily  tiotropium 18 MICROgram(s) Capsule 1 Capsule(s) Inhalation daily    MEDICATIONS  (PRN):  guaiFENesin    Syrup 100 milliGRAM(s) Oral every 6 hours PRN Cough  HYDROmorphone  Injectable 0.5 milliGRAM(s) IV Push every 10 minutes PRN Moderate Pain (4 - 6)  HYDROmorphone  Injectable 1 milliGRAM(s) IV Push every 10 minutes PRN Severe Pain (7 - 10)  HYDROmorphone  Injectable 0.5 milliGRAM(s) IV Push every 4 hours PRN Moderate Pain (4 - 6)  HYDROmorphone  Injectable 1 milliGRAM(s) IV Push every 4 hours PRN Severe Pain (7 - 10)  meclizine 12.5 milliGRAM(s) Oral three times a day PRN Dizziness  ondansetron Injectable 4 milliGRAM(s) IV Push once PRN Nausea and/or Vomiting      Allergies    penicillin (Unknown)    Intolerances            Vital Signs Last 24 Hrs  T(C): 36.7 (15 Dec 2018 10:05), Max: 37.2 (14 Dec 2018 15:58)  T(F): 98.1 (15 Dec 2018 10:05), Max: 98.9 (14 Dec 2018 15:58)  HR: 130 (15 Dec 2018 10:30) (90 - 133)  BP: 146/77 (15 Dec 2018 10:30) (95/68 - 169/67)  BP(mean): 103 (15 Dec 2018 10:30) (74 - 105)  RR: 17 (15 Dec 2018 10:30) (14 - 35)  SpO2: 94% (15 Dec 2018 10:30) (91% - 100%)    I&O's Summary    14 Dec 2018 07:01  -  15 Dec 2018 07:00  --------------------------------------------------------  IN: 420 mL / OUT: 4700 mL / NET: -4280 mL    15 Dec 2018 07:01  -  15 Dec 2018 10:52  --------------------------------------------------------  IN: 150 mL / OUT: 950 mL / NET: -800 mL      Weight (kg): 106.5 (12-15 @ 05:34)    PHYSICAL EXAM:  TELE: AF 130s  Constitutional: NAD, awake    HEENT: Moist Mucous Membranes, Anicteric  Pulmonary: Decreased breath sounds b/l. coarse sounds b/l  Cardiovascular: IRRR tachy, S1 and S2, distant   Gastrointestinal: Bowel Sounds present, soft, nontender.   Lymph: No peripheral edema. No lymphadenopathy.  Skin: No visible rashes or ulcers.  Psych:  Mood & affect appropriate for situation    LABS: All Labs Reviewed:                        11.9   10.85 )-----------( 226      ( 15 Dec 2018 05:32 )             34.5                         12.0   13.60 )-----------( 220      ( 14 Dec 2018 09:07 )             33.4                         12.2   8.71  )-----------( 182      ( 12 Dec 2018 21:38 )             33.8     15 Dec 2018 05:32    134    |  96     |  12     ----------------------------<  106    4.1     |  35     |  0.75   14 Dec 2018 23:53    128    |  88     |  14     ----------------------------<  100    3.7     |  31     |  0.61   14 Dec 2018 18:22    123    |  85     |  12     ----------------------------<  140    3.7     |  28     |  0.69     Ca    8.6        15 Dec 2018 05:32  Ca    8.4        14 Dec 2018 23:53  Ca    8.5        14 Dec 2018 18:22  Phos  3.1       15 Dec 2018 05:32  Phos  3.4       14 Dec 2018 18:22  Phos  3.4       14 Dec 2018 03:50  Mg     2.3       15 Dec 2018 05:32  Mg     2.0       14 Dec 2018 18:22  Mg     1.6       14 Dec 2018 03:50    TPro  6.2    /  Alb  3.4    /  TBili  0.7    /  DBili  x      /  AST  38     /  ALT  43     /  AlkPhos  78     15 Dec 2018 05:32  TPro  6.5    /  Alb  3.6    /  TBili  1.0    /  DBili  x      /  AST  27     /  ALT  33     /  AlkPhos  96     12 Dec 2018 21:39    PT/INR - ( 15 Dec 2018 05:32 )   PT: 13.9 sec;   INR: 1.22 ratio         PTT - ( 15 Dec 2018 05:32 )  PTT:30.3 sec  CARDIAC MARKERS ( 15 Dec 2018 05:32 )  x     / x     / 451 U/L / x     / x      CARDIAC MARKERS ( 14 Dec 2018 20:47 )  x     / x     / 647 U/L / x     / x      CARDIAC MARKERS ( 14 Dec 2018 14:39 )  .053 ng/mL / x     / 596 U/L / x     / x      CARDIAC MARKERS ( 14 Dec 2018 03:50 )  .054 ng/mL / x     / 624 U/L / x     / 15.0 ng/mL

## 2021-03-17 NOTE — H&P ADULT - PROBLEM SELECTOR PLAN 3
[Obstructive Sleep Apnea] : obstructive sleep apnea [Witnessed Apneas] : witnessed sleep apnea [Daytime Somnolence] : daytime somnolence [ESS: ___] : ESS score [unfilled] [Unintentional Sleep While Inactive] : unintentional sleep while inactive [Awakes Unrefreshed] : awakening unrefreshed [Recent  Weight Gain] : recent weight gain [To Bed: ___] : ~he/she~ goes to bed at [unfilled] [Arises: ___] : arises at [unfilled] [Sleep Onset Latency: ___ minutes] : sleep onset latency of [unfilled] minutes reported [Nocturnal Awakenings: ___] : ~he/she~ typically has [unfilled] nocturnal awakenings [WASO: ___] : Wake time after sleep onset is [unfilled] [TST: ___] : Total sleep time is [unfilled] [Daytime Sleep: ___] : daytime sleep: [unfilled] [Regional Soft Tissue Swelling Both Lower Extremities] : stable lower extremity edema [Chest Pain Or Discomfort] : denies chest pain [Fever] : denies fever [Wt Gain ___ Lbs] : recent [unfilled] ~Upound(s) weight gain [Difficulty Breathing During Exertion] : denies dyspnea on exertion [Feelings Of Weakness On Exertion] : denies exercise intolerance [Cough] : denies coughing -Continue ipratropium  -Duonebs Q4H PRN  -Spiriva  -Continue advair diskus [Wheezing] : denies wheezing [FreeTextEntry1] : 67 y/o F non smoker, h/o obesity HTN hypercholesterolemia, s/p spinal fusion c3-7 on 2/27/18, OA s/p left hip surgery and left shoulder problem/pain, asthma and Severe CHANTELL. Recently fell and broke her wrist in a cast decreased physical activity. Denies hx of COVID 19 infection.\par \par Asthma: diagnosed 20 yrs ago, \par Was on Advair 500 tapered down to 250 tolerated well but has been off of it for 3 months due to high out of pocket cost. Pt has used albuterol hfa 1x in the last 3 months. Has been on Prednisone daily dosing for >1 year for PMR has recently tapered down to 5mg daily. Denies acute respiratory symptoms. No fevers, chills, cough, sob, wheeze, chest pain or tightness. Decreased in Fev1 last PFT in 2017. Chronic intermittent LE Edema. \par \par CHANTELL: Severe CHANTELL (AHI of 80), dx'ed 3/29/2010\par Underwent PAP titration in lab on 6/16/19 with frequent desaturations in oxygen. Pt was started on therapy used very briefly and device was taken back due to non compliance. Was on APAP [id number]. Did not like variety of face masks. Endorses sleep talking and feeling un-refreshed. No additional naps, No caffeine\par \par DME: [employer]\par \par PND: on azelastine. (stopped Flonase)\par \par GERD: PPI and Ranitidine 150 mg po bid and carafate [Snoring] : no snoring [Frequent Nocturnal Awakening] : no nocturnal awakening [Unintentional Sleep while Active] : no unintentional sleep while active [Awakes with Headache] : no headache upon awakening [Awakening With Dry Mouth] : no dry mouth upon awakening [DIS] : no DIS [DMS] : no DMS [Unusual Sleep Behavior] : no unusual sleep behavior [Hypersomnolence] : no hypersomnolence [Cataplexy] : no cataplexy [Sleep Paralysis] : no sleep paralysis [Hypnagogic Hallucinations] : no hallucinations when falling asleep [Hypnopompic Hallucinations] : no hallucinations when awakening [Lower Extremity Discomfort] : no lower extremity discomfort in evening or at bedtime [Oxygen] : the patient uses no supplemental oxygen

## 2021-04-22 NOTE — H&P ADULT - HISTORY OF PRESENT ILLNESS
pt is a 57yo male with pmhx of htn, copd, cardiac tamponade s/p window 12/2018 c/o cough x days. pt reports non-productive cough with associated dyspnea on exertion. pt reports "it feels like something is stuck in my throat". pt reports bl le swelling without pain. pt recently admitted for pericardial effusion s/p window. pt quit smoking aprox 2 weeks ago.  In ER patient was found to have PNA on CXR.  Patient is being admitted for further work up and treatment. No

## 2021-04-26 NOTE — PHYSICAL THERAPY INITIAL EVALUATION ADULT - DID THE PATIENT HAVE SURGERY?
yes/Venous access port placement 1 yo f with complaints of vaginal pain over the last few days. does not appear to be dysuria, no fevers, no vomiting, no diarrhea. pt seems to have a bM every other day and npot hard in appearance. npo blood noted in urine.

## 2021-09-14 NOTE — ED PROVIDER NOTE - CONSTITUTIONAL DEVELOPMENT, MLM
PT: 31.1  INR: 2.6    Warfarin 7.5mg on Friday and 6mg the rest of the week (6 days).   well developed

## 2021-09-20 NOTE — PROGRESS NOTE ADULT - ASSESSMENT
Chronic ongoing problem, unfortunately the lab did not draw the A1c or collect the urine microalbumin from the orders I gave her in March.  This is not fasting she will plan to go later this week to get the stone so we can follow-up on her diabetes which is currently diet controlled.  Additional recommendations to follow.  Continue statin and ACEI.   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 He also has a history of a pericardial effusion s/p window, and of a pleural effusion s/p thoracentesis.  Now found to have b/l PE'    Left pleural effusion   - s/p Left thoracentesis 700cc non-clotting hemorrhagic fluid drained 11/29/19.    - post procedure CXR with no PTX   - Pulm following.  Ct chest ordered to determine appropriateness of repeat tapping due to reaccumulation, however, patient unable to tolerate it  -Followed by Hem onc- no need for pleurex at this time as insufficient fluid     PE  - CTA chest showed segmental PE's in B/L lower lobe   - Continue Heparin drip   - TTE showed hyperdynamic LV, normal RVSF with no significant valvular disease.  However, showed large left pleural effusion s/p thora  - CT chest showed moderate left pleural effusion.  Pulmonary recommending steroids, oxygen and Duoneb  - Can keep off of aspirin to minimize risk of bleeding as he is going to be on full dose anticoagulation      Chronic Afib  - Telemetry revealing afib 100-120   - Tachycardia multifactorial in setting of underlying lung cancer, PE and anemia  - Continue Toprol 100 mg daily ( takes 200mg daily  at home )   - Continue digoxin (last level=1.2)  - Monitor and replete lytes.   Monitor and replete electrolytes. Keep K>4.0 and Mg>2.0.     HTN  - Continue beta blocker     Further cardiac w/u pending clinical course  To follow closely with you  Valentina Elder FNP-C  Cardiology NP  [facility] [phone] 59 M PMH atrial fibrillation, COPD, HTN, LE edema 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 He also has a history of a pericardial effusion s/p window, and of a pleural effusion s/p thoracentesis.  Now found to have b/l PE'    Left pleural effusion   - s/p Left thoracentesis 700cc non-clotting hemorrhagic fluid drained 11/29/19.    - post procedure CXR with no PTX   - Pulm following.  Ct chest ordered to determine appropriateness of repeat tapping due to reaccumulation, however, patient unable to tolerate it  -Followed by Hem onc- no need for pleurex at this time as insufficient fluid   -sp 40mg Iv lasix yesterday, give additional 40mg IV today     PE  - CTA chest showed segmental PE's in B/L lower lobe   - Continue Heparin drip   - TTE showed hyperdynamic LV, normal RVSF with no significant valvular disease.  However, showed large left pleural effusion s/p thora  - CT chest showed moderate left pleural effusion.  Pulmonary recommending steroids, oxygen and Duoneb  - Can keep off of aspirin to minimize risk of bleeding as he is going to be on full dose anticoagulation      Chronic Afib  - Telemetry revealing afib 100-120   - Tachycardia multifactorial in setting of underlying lung cancer, PE and anemia  - Continue Toprol 100 mg daily ( takes 200mg daily  at home )   - Continue digoxin (last level=1.2)  - Monitor and replete lytes.   Monitor and replete electrolytes. Keep K>4.0 and Mg>2.0.     HTN  - Continue beta blocker     Lymphedema  obtain Lymphedema pumps    Further cardiac w/u pending clinical course  To follow closely with you  Valentina Elder FNP-C  Cardiology NP  [facility] [phone] 59 M PMH atrial fibrillation, COPD, HTN, LE edema 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 He also has a history of a pericardial effusion s/p window, and of a pleural effusion s/p thoracentesis.  Now found to have b/l PE'    Left pleural effusion   - s/p Left thoracentesis 700cc non-clotting hemorrhagic fluid drained 11/29/19.    - post procedure CXR with no PTX   - Pulm following.  Ct chest ordered to determine appropriateness of repeat tapping due to reaccumulation, however, patient unable to tolerate it...US chest today  -Followed by Hem onc- no need for pleurex at this time as insufficient fluid   -sp 40mg Iv lasix yesterday, give additional 40mg IV today     PE  - CTA chest showed segmental PE's in B/L lower lobe   - Continue Heparin drip   - TTE showed hyperdynamic LV, normal RVSF with no significant valvular disease.  However, showed large left pleural effusion s/p thora  - Can keep off of aspirin to minimize risk of bleeding as he is going to be on full dose anticoagulation      Chronic Afib  - Telemetry revealing afib 100-120   - Tachycardia multifactorial in setting of underlying lung cancer, PE and anemia  - Continue Toprol 100 mg daily ( takes 200mg daily  at home )   - Continue digoxin (last level=1.2)  - Monitor and replete lytes.   Monitor and replete electrolytes. Keep K>4.0 and Mg>2.0.     HTN  - Continue beta blocker     Lymphedema  obtain Lymphedema pumps    Further cardiac w/u pending clinical course  To follow closely with you  Valentina Elder FNP-C  Cardiology NP  [facility] [phone]

## 2021-10-03 NOTE — PROGRESS NOTE ADULT - SUBJECTIVE AND OBJECTIVE BOX
Stable. Avoid salt, caffeine, NSAIDs. Work on weight loss. Lab today. CPM Assess 6 mos   Interval History:  tolerated chemotherapy well without complication  still with cough and some SOB  Chart reviewed and events noted;   Overnight events:    MEDICATIONS  (STANDING):  ALBUTerol/ipratropium for Nebulization 3 milliLiter(s) Nebulizer every 6 hours  buDESOnide   0.5 milliGRAM(s) Respule 0.5 milliGRAM(s) Inhalation two times a day  dexamethasone  IVPB 10 milliGRAM(s) IV Intermittent once  digoxin     Tablet 0.25 milliGRAM(s) Oral daily  diltiazem    Tablet 120 milliGRAM(s) Oral every 6 hours  etoposide IVPB (eMAR) 230 milliGRAM(s) IV Intermittent once  etoposide phosphate 100 milliGRAM(s) Injectable IVPB (Rx Quick Charge) 70 milliGRAM(s) Waste   furosemide   Injectable 40 milliGRAM(s) IV Push daily  heparin  Injectable 5000 Unit(s) SubCutaneous every 8 hours  influenza   Vaccine 0.5 milliLiter(s) IntraMuscular once  lactobacillus acidophilus 1 Tablet(s) Oral three times a day with meals  loratadine 10 milliGRAM(s) Oral daily  metoprolol succinate  milliGRAM(s) Oral daily  ondansetron  IVPB 10 milliGRAM(s) IV Intermittent once  ondansetron  IVPB 10 milliGRAM(s) IV Intermittent once  sodium chloride 1 Gram(s) Oral three times a day  tiotropium 18 MICROgram(s) Capsule 1 Capsule(s) Inhalation daily  verapamil 80 milliGRAM(s) Oral every 8 hours    MEDICATIONS  (PRN):  ALBUTerol    90 MICROgram(s) HFA Inhaler 2 Puff(s) Inhalation every 6 hours PRN Shortness of Breath  ALPRAZolam 0.25 milliGRAM(s) Oral three times a day PRN anxiety  guaiFENesin    Syrup 200 milliGRAM(s) Oral every 6 hours PRN Cough  ondansetron  IVPB 10 milliGRAM(s) IV Intermittent every 8 hours PRN Nausea and/or Vomiting  prochlorperazine   Tablet 10 milliGRAM(s) Oral every 6 hours PRN nause and vomiting  promethazine 25 milliGRAM(s) Oral four times a day PRN breakthrough cough      Vital Signs Last 24 Hrs  T(C): 36.7 (10 Lisandro 2019 12:31), Max: 37.1 (09 Jan 2019 13:47)  T(F): 98 (10 Lisandro 2019 12:31), Max: 98.7 (09 Jan 2019 13:47)  HR: 115 (10 Lisandro 2019 12:31) (82 - 121)  BP: 113/77 (10 Lisandro 2019 12:31) (113/65 - 125/71)  BP(mean): --  RR: 18 (10 Lisandro 2019 12:31) (17 - 18)  SpO2: 95% (10 Lisandro 2019 12:31) (94% - 99%)    PHYSICAL EXAM  General: adult in NAD, chronically ill appearing  HEENT: clear oropharynx, anicteric sclera, pink conjunctivae  Neck: supple  CV: normal S1S2 with no murmur rubs or gallops  Lungs: decreased BS bilaterally  Abdomen: soft non-tender non-distended, no hepato/splenomegaly  Ext: no clubbing cyanosis or edema  Skin: no rashes and no petichiae  Neuro: alert and oriented X3 no focal deficits      LABS:  CBC Full  -  ( 10 Lisandro 2019 08:39 )  WBC Count : 17.41 K/uL  Hemoglobin : 11.1 g/dL  Hematocrit : 33.8 %  Platelet Count - Automated : 288 K/uL  Mean Cell Volume : 83.7 fl  Mean Cell Hemoglobin : 27.5 pg  Mean Cell Hemoglobin Concentration : 32.8 gm/dL  Auto Neutrophil # : 15.18 K/uL  Auto Lymphocyte # : 0.91 K/uL  Auto Monocyte # : 1.15 K/uL  Auto Eosinophil # : 0.00 K/uL  Auto Basophil # : 0.01 K/uL  Auto Neutrophil % : 87.2 %  Auto Lymphocyte % : 5.2 %  Auto Monocyte % : 6.6 %  Auto Eosinophil % : 0.0 %  Auto Basophil % : 0.1 %    01-10    135  |  93<L>  |  21  ----------------------------<  159<H>  4.2   |  36<H>  |  0.60    Ca    8.0<L>      10 Lisandro 2019 08:39  Phos  4.2     01-10    TPro  5.9<L>  /  Alb  2.6<L>  /  TBili  0.7  /  DBili  x   /  AST  18  /  ALT  77  /  AlkPhos  101  01-10        fe studies      WBC trend  17.41 K/uL (01-10-19 @ 08:39)      Hgb trend  11.1 g/dL (01-10-19 @ 08:39)      plt trend  288 K/uL (01-10-19 @ 08:39)        RADIOLOGY & ADDITIONAL STUDIES:

## 2022-07-08 NOTE — ED ADULT TRIAGE NOTE - HEART RATE (BEATS/MIN)
Future Appointments    This patient does not currently have any appointments scheduled.     Past Visits    Date Provider Specialty Visit Type Primary Dx   06/23/2022 Merrick Stewart MD Primary Care Office Visit Pre-syncope 98

## 2022-07-24 NOTE — PROGRESS NOTE ADULT - ASSESSMENT
59 yo M with PMH of HTN presents with PARKS.     Course has been complicated by findings of hilar adenopathy on CT. Developed AF with RVR. Large pericardial effusion with signs of increased pericardial pressures on echo. Now s/p pericardial window with 650cc of blood fluid drained on 12/15.    - pt with likely malignant process given clinical senario.   - Will await path from pericardium  - Likely will need Heme/Onc input    - Rapid AF. Likely from anesthesia  - Please dose metoprolol and cont with 50mg PO q6  - If needed can add cardizem  - CHADS-VaSc = 1. Would at this point just give ASA.     - Maintain adequately hydrated.   - Will need pulm f/u    - Monitor and replete electrolytes. Keep K>4.0 and Mg>2.0.   - Further cardiac workup will depend on clinical course.     Patient at high risk for decompensation. Critically ill. >35 minutes of critical care time was spent with this patient. PAST MEDICAL HISTORY:  No pertinent past medical history

## 2022-12-27 NOTE — PROGRESS NOTE ADULT - SUBJECTIVE AND OBJECTIVE BOX
Patient is a 58y old  Male who presents with a chief complaint of sob (08 Jan 2019 19:13)      INTERVAL HPI/OVERNIGHT EVENTS:    No change in shortness of breath    MEDICATIONS  (STANDING):  ALBUTerol/ipratropium for Nebulization 3 milliLiter(s) Nebulizer every 6 hours  buDESOnide   0.5 milliGRAM(s) Respule 0.5 milliGRAM(s) Inhalation two times a day  digoxin     Tablet 0.25 milliGRAM(s) Oral daily  diltiazem    Tablet 120 milliGRAM(s) Oral every 6 hours  heparin  Injectable 5000 Unit(s) SubCutaneous every 8 hours  influenza   Vaccine 0.5 milliLiter(s) IntraMuscular once  lactobacillus acidophilus 1 Tablet(s) Oral three times a day with meals  loratadine 10 milliGRAM(s) Oral daily  methylPREDNISolone sodium succinate Injectable 40 milliGRAM(s) IV Push three times a day  metoprolol succinate  milliGRAM(s) Oral daily  sodium chloride 1 Gram(s) Oral three times a day  tiotropium 18 MICROgram(s) Capsule 1 Capsule(s) Inhalation daily  verapamil 80 milliGRAM(s) Oral every 8 hours      MEDICATIONS  (PRN):  ALBUTerol    90 MICROgram(s) HFA Inhaler 2 Puff(s) Inhalation every 6 hours PRN Shortness of Breath  ALPRAZolam 0.25 milliGRAM(s) Oral three times a day PRN anxiety  guaiFENesin    Syrup 200 milliGRAM(s) Oral every 6 hours PRN Cough  promethazine 25 milliGRAM(s) Oral four times a day PRN breakthrough cough      Allergies    penicillin (Unknown)    Intolerances        PAST MEDICAL & SURGICAL HISTORY:  Dyspnea  Mediastinal adenopathy  Pericardial effusion  AF (atrial fibrillation)  Hyponatremia  COPD (chronic obstructive pulmonary disease)  HTN (hypertension)  S/P pericardial window creation      Vital Signs Last 24 Hrs  T(C): 36.7 (08 Jan 2019 21:20), Max: 37.6 (08 Jan 2019 13:44)  T(F): 98 (08 Jan 2019 21:20), Max: 99.7 (08 Jan 2019 13:44)  HR: 116 (08 Jan 2019 21:20) (105 - 117)  BP: 132/68 (08 Jan 2019 21:20) (113/77 - 148/86)  BP(mean): --  RR: 16 (08 Jan 2019 21:20) (16 - 19)  SpO2: 95% (08 Jan 2019 21:20) (94% - 98%)    PHYSICAL EXAMINATION:    GENERAL: The patient is awake and alert in no apparent distress.     HEENT: Head is normocephalic and atraumatic. Extraocular muscles are intact. Mucous membranes are moist.    NECK: Supple.    LUNGS: bilateral mild wheezes and rhonchi    HEART: Regular rate and rhythm without murmur.    ABDOMEN: Soft, nontender, and nondistended.      EXTREMITIES: Without any cyanosis, clubbing, rash, lesions or edema.    NEUROLOGIC: Grossly intact.    SKIN: No ulceration or induration present.      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

## 2023-08-07 NOTE — PATIENT PROFILE ADULT - NSPROMEDSHERBAL_GEN_A_NUR
[FreeTextEntry1] : MARTINEZ is s/p augmentation 2012 and is here for evaluation of her implants and breasts.  she has 275 cc implants in place 
no

## 2023-08-31 NOTE — PROGRESS NOTE ADULT - SUBJECTIVE AND OBJECTIVE BOX
Patient is wanting to go over her lab results. She has more questions pertaining them and the [facility].    Patient is a 59y old  Male who presents with a chief complaint of PNA (04 Dec 2019 15:59)      INTERVAL HPI/OVERNIGHT EVENTS:    No change in severe shortness of breath    MEDICATIONS  (STANDING):  acetylcysteine 10%  Inhalation 5 milliLiter(s) Inhalation four times a day  budesonide 160 MICROgram(s)/formoterol 4.5 MICROgram(s) Inhaler 2 Puff(s) Inhalation two times a day  chlorhexidine 4% Liquid 1 Application(s) Topical daily  digoxin     Tablet 0.25 milliGRAM(s) Oral daily  dronabinol 5 milliGRAM(s) Oral every 12 hours  fentaNYL   Patch  25 MICROgram(s)/Hr 1 Patch Transdermal every 72 hours  heparin  Infusion.  Unit(s)/Hr (15 mL/Hr) IV Continuous <Continuous>  ipratropium    for Nebulization 500 MICROGram(s) Nebulizer every 6 hours  loratadine 10 milliGRAM(s) Oral daily  magnesium oxide 400 milliGRAM(s) Oral daily  metoprolol succinate  milliGRAM(s) Oral daily  multivitamin 1 Tablet(s) Oral daily  predniSONE   Tablet 20 milliGRAM(s) Oral daily  sodium chloride 1 Gram(s) Oral three times a day  tiotropium 18 MICROgram(s) Capsule 1 Capsule(s) Inhalation daily      MEDICATIONS  (PRN):  guaiFENesin    Syrup 100 milliGRAM(s) Oral every 6 hours PRN Cough  heparin  Injectable 6500 Unit(s) IV Push every 6 hours PRN For aPTT less than 40  heparin  Injectable 3000 Unit(s) IV Push every 6 hours PRN For aPTT between 40 - 57  HYDROmorphone  Injectable 1 milliGRAM(s) IV Push every 4 hours PRN Severe Pain (7 - 10)  levalbuterol Inhalation 0.63 milliGRAM(s) Inhalation every 6 hours PRN shortness of breath  morphine  - Injectable 2 milliGRAM(s) IV Push every 4 hours PRN Moderate Pain (4 - 6)  morphine  - Injectable 1 milliGRAM(s) IV Push every 4 hours PRN Mild Pain (1 - 3)  ondansetron Injectable 4 milliGRAM(s) IV Push every 6 hours PRN Nausea  senna 8.6 milliGRAM(s) Oral Tablet - Peds 1 Tablet(s) Oral at bedtime PRN for constipation      Allergies    penicillin (Unknown)    Intolerances        PAST MEDICAL & SURGICAL HISTORY:  Lung cancer  Depression  Dyspnea  Mediastinal adenopathy  Pericardial effusion  AF (atrial fibrillation)  Hyponatremia  COPD (chronic obstructive pulmonary disease)  HTN (hypertension)  S/P pericardial window creation      Vital Signs Last 24 Hrs  T(C): 36.2 (04 Dec 2019 20:30), Max: 36.4 (03 Dec 2019 23:17)  T(F): 97.2 (04 Dec 2019 20:30), Max: 97.6 (03 Dec 2019 23:17)  HR: 124 (04 Dec 2019 20:30) (98 - 140)  BP: 89/64 (04 Dec 2019 20:30) (82/60 - 108/60)  BP(mean): --  RR: 22 (04 Dec 2019 20:30) (19 - 26)  SpO2: 98% (04 Dec 2019 20:30) (91% - 100%)    PHYSICAL EXAMINATION:    GENERAL: The patient is awake and alert in no apparent distress.     HEENT: Head is normocephalic and atraumatic. Extraocular muscles are intact. Mucous membranes are moist.    NECK: Supple.    LUNGS: diminished breath sounds left base; right clear    HEART: Regular rate and rhythm without murmur.    ABDOMEN: Soft, nontender, and nondistended.      EXTREMITIES: massive edema bilateral    NEUROLOGIC: Grossly intact.    SKIN: No ulceration or induration present.      LABS:                        10.4   39.01 )-----------( 147      ( 04 Dec 2019 05:23 )             32.5     12-04    130<L>  |  94<L>  |  34<H>  ----------------------------<  118<H>  3.9   |  26  |  0.67    Ca    8.1<L>      04 Dec 2019 05:23      PTT - ( 04 Dec 2019 13:50 )  PTT:25.5 sec        Assessment:    Acute Hypoxic respiratory failure  widespread small cell carcinoma of lung  Pulmonary emboli  COPD  Malignant effusion    Plan:    Continue oxygen + IV heparin  Chemotherapy per oncology  Nebulized bronchodilators  Poor prognosis

## 2023-09-17 NOTE — DIETITIAN INITIAL EVALUATION ADULT. - PROBLEM SELECTOR PROBLEM 2
Contacted the on-call service for Dr. Felipe Erm the patient's primary care, spoke with Dr. Dany Smith he will sign the death certificate.      Jenelle Cisneros RN  09/17/23 [id number]
Dr. Camila Lockett, [facility] , report given, he stated that the  can be released to the  home.      Letha Bautista RN  [phone]
Per [facility] it is ok to release the .      Jana Ambrocio RN  [phone]
Lung cancer

## 2024-01-05 NOTE — CONSULT NOTE ADULT - CONSULT REQUESTED DATE/TIME
[facility] - Inpatient Rehabilitation Department   Phone: [phone]    Physical Therapy    [x] Initial Evaluation            [] Daily Treatment Note         [] Discharge Summary      Patient: Troy Venegas   : 1939   MRN: [id number]   Date of Service:  2024  Admitting Diagnosis: Acute retention of urine  Current Admission Summary: Troy Venegas is a 84 y.o. male who presents to the emergency department complaining of urine retention since 4:30 PM today. He has had this numerous times in the past however states that he had a TURP years ago which resolved issues with urine retention. 2 or 3 weeks ago patient states that he had parts of his bladder cancer removed and biopsied with a cystoscopy. On 2023, he was diagnosed with unstable angina and subsequently 3 stents were placed and patient was discharged on Plavix and Eliquis. Since his discharge from the hospital, he has had intermittent hematuria but states that he is started passing clots since yesterday.   Past Medical History:  has a past medical history of Arrhythmia, Arthritis, Atrial fibrillation (HCC), Atrial tachycardia, Bladder cancer (HCC), CAD (coronary artery disease), Cancer (HCC), Diabetes mellitus (HCC), Diverticulitis, Enlarged prostate, History of blood transfusion, Hyperlipidemia, Hypertension, Neuropathy, Pacemaker, Pneumonia, and Vasodepressor syncope.  Past Surgical History:  has a past surgical history that includes shoulder surgery (Bilateral); Finger surgery; Coronary angioplasty with stent (); Cataract removal (Bilateral); ablation of dysrhythmic focus; Cystoscopy (2012); TURP (2013); other surgical history (Left, 2014); Carpal tunnel release; Finger trigger release; Cystocopy (10/08/2015); Spinal fusion (N/A); Colonoscopy; pacemaker placement; Cystoscopy (N/A, 10/17/2023); Cystoscopy (N/A, 2023); Skin cancer excision; and other surgical history.  Discharge Recommendations:  03-Dec-2019 14:48 WFL  Therapist Clinical Decision Making (Complexity): low complexity  Clinical Presentation: evolving      Subjective  General: Pt semi-fowlers in bed on arrival, agreeable to participate in PT/OT initial evaluation. Wife, Jazmin present.  Pain: 0/10  Pain Interventions: not applicable     Functional Mobility  Bed Mobility:  Supine to Sit: supervision  Scooting: supervision  Comments:  Transfers:  Sit to stand transfer: supervision  Stand to sit transfer: supervision  Comments:  Ambulation:  Surface:level surface  Assistive Device: no device  Assistance: stand by assistance  Distance: 15' + 15' + 150'    Gait Mechanics: Decreased step lengths, decreased speed, no overt losses of balance, steady  Comments:    Stair Mobility:  Stair mobility not completed on this date.  Comments:  Wheelchair Mobility:  No w/c mobility completed on this date.  Comments:  Balance:  Static Sitting Balance: good: independent with functional balance in unsupported position  Dynamic Sitting Balance: fair (+): maintains balance at SBA/supervision without use of UE support  Static Standing Balance: fair (+): maintains balance at SBA/supervision without use of UE support  Dynamic Standing Balance: fair (+): maintains balance at SBA/supervision without use of UE support  Comments:    Other Therapeutic Interventions  Pt assisted with upper and lower body dressing and bathing, oral care, and grooming. See OT note for assist levels.    Functional Outcomes  AM-PAC Inpatient Mobility Raw Score : 20              Cognition  WFL  Orientation:    alert and oriented x 4  Command Following:   WFL    Education  Barriers To Learning: hearing  Patient Education: patient educated on goals, PT role and benefits, plan of care, general safety, functional mobility training, disease specific education, transfer training, discharge recommendations  Learning Assessment:  patient verbalizes and demonstrates understanding    Assessment  Activity Tolerance: Pt tolerated

## 2024-01-24 NOTE — ED ADULT NURSE NOTE - TEMPLATE LIST FOR HEAD TO TOE ASSESSMENT
[Fatigue] : fatigue [Recent Weight Gain (___ Lbs)] : recent weight gain: [unfilled] lbs [Dysphonia] : dysphonia [Shortness Of Breath] : shortness of breath [Diarrhea] : diarrhea [Polyuria] : polyuria [Tremors] : tremors [Dysphagia] : no dysphagia [Neck Pain] : no neck pain [Chest Pain] : no chest pain [Palpitations] : no palpitations [Constipation] : no constipation [Nausea] : no nausea [Abdominal Pain] : no abdominal pain [Vomiting] : no vomiting [Irregular Menses] : regular menses [Pain/Numbness of Digits] : no pain/numbness of digits [Depression] : no depression [Anxiety] : no anxiety [Polydipsia] : no polydipsia [Cold Intolerance] : no cold intolerance Respiratory [Heat Intolerance] : no heat intolerance

## 2024-04-16 NOTE — ED ADULT NURSE NOTE - TEMPLATE
Alert-The patient is alert, awake and responds to voice. The patient is oriented to time, place, and person. The triage nurse is able to obtain subjective information.
Respiratory

## 2024-04-22 NOTE — PROGRESS NOTE ADULT - PROBLEM SELECTOR PLAN 7
- Stable  - patient would likely benefit from lymphedema pumps outpatient  - Encourage lower extremity elevation. El-DO: XR concerning for radial head fx. Extremity immobilized, sling applied. Discussed ortho follow up, splint care, return precautions, mother understood and agreeable with plan.

## 2024-05-28 NOTE — PROGRESS NOTE ADULT - SUBJECTIVE AND OBJECTIVE BOX
BEH IP Unit Acuity Rating Score (UARS)  Patient is given one point for every criteria they meet.     CRITERIA SCORING   On a 72 hour hold, court hold, committed, stay of commitment, or revocation. 0    Patient LOS on BEH unit exceeds 20 days. 0  LOS: 7   Patient under guardianship, 55+, otherwise medically complex, or under age 11. 1   Suicide ideation without relief of precipitating factors. 1   Current plan for suicide. 0   Current plan for homicide. 0   Imminent risk or actual attempt to seriously harm another without relief of factors precipitating the attempt. 0   Severe dysfunction in daily living (ex: complete neglect for self care, extreme disruption in vegetative function, extreme deterioration in social interactions). 1   Recent (last 7 days) or current physical aggression in the ED or on unit. 0   Restraints or seclusion episode in past 72 hours. 0   Recent (last 7 days) or current verbal aggression, agitation, yelling, etc., while in the ED or unit. 0   Active psychosis. 0   Need for constant or near constant redirection (from leaving, from others, etc).  0   Intrusive or disruptive behaviors. 0   Patient requires 3 or more hours of individualized nursing care per 8-hour shift (i.e. for ADLs, meds, therapeutic interventions). 0   TOTAL 3       NEPHROLOGY INTERVAL HPI/OVERNIGHT EVENTS:  HPI:  pt is a 59yo male with pmhx of htn, copd, cardiac tamponade s/p window 12/2018 c/o cough x days. pt reports non-productive cough with associated dyspnea on exertion. pt reports "it feels like something is stuck in my throat". pt reports bl le swelling without pain. pt recently admitted for pericardial effusion s/p window. pt quit smoking aprox 2 weeks ago.  In ER patient was found to have PNA on CXR.  Patient is being admitted for further work up and treatment. (31 Dec 2018 21:51)    Follow up hyponatremia  Was having worsening dyspnea; s/p CTA with no evidence of PE overnight  No feeling better    PAST MEDICAL & SURGICAL HISTORY:  Dyspnea  Mediastinal adenopathy  Pericardial effusion  AF (atrial fibrillation)  Hyponatremia  COPD (chronic obstructive pulmonary disease)  HTN (hypertension)  S/P pericardial window creation      MEDICATIONS  (STANDING):  ALBUTerol/ipratropium for Nebulization 3 milliLiter(s) Nebulizer every 6 hours  buDESOnide   0.5 milliGRAM(s) Respule 0.5 milliGRAM(s) Inhalation two times a day  digoxin     Tablet 0.125 milliGRAM(s) Oral daily  heparin  Injectable 5000 Unit(s) SubCutaneous every 8 hours  influenza   Vaccine 0.5 milliLiter(s) IntraMuscular once  lactobacillus acidophilus 1 Tablet(s) Oral three times a day with meals  loratadine 10 milliGRAM(s) Oral daily  methylPREDNISolone sodium succinate Injectable 40 milliGRAM(s) IV Push three times a day  metoprolol succinate  milliGRAM(s) Oral daily  sodium chloride 1 Gram(s) Oral three times a day  tiotropium 18 MICROgram(s) Capsule 1 Capsule(s) Inhalation daily    MEDICATIONS  (PRN):  ALBUTerol    90 MICROgram(s) HFA Inhaler 2 Puff(s) Inhalation every 6 hours PRN Shortness of Breath  ALPRAZolam 0.25 milliGRAM(s) Oral three times a day PRN anxiety  guaiFENesin    Syrup 200 milliGRAM(s) Oral every 6 hours PRN Cough  promethazine 25 milliGRAM(s) Oral four times a day PRN breakthrough cough      Allergies    penicillin (Unknown)    Intolerances        Vital Signs Last 24 Hrs  T(C): 36.7 (05 Jan 2019 05:02), Max: 36.7 (04 Jan 2019 13:42)  T(F): 98.1 (05 Jan 2019 05:02), Max: 98.1 (05 Jan 2019 05:02)  HR: 113 (05 Jan 2019 05:02) (94 - 122)  BP: 123/75 (05 Jan 2019 05:02) (118/68 - 150/83)  BP(mean): --  RR: 20 (05 Jan 2019 05:02) (17 - 20)  SpO2: 97% (05 Jan 2019 05:02) (97% - 98%)  Daily     Daily     PHYSICAL EXAM:  GENERAL: no distress, sitting in chair  HEAD:  Atraumatic, Normocephalic  EYES: Conjunctiva and sclera clear  ENMT: Moist mucous membranes, Good dentition, No lesions  NECK: Supple  NERVOUS SYSTEM:  Alert & Oriented X3, Good concentration; Motor Strength wnl upper and lower extremities  CHEST/LUNG: Scattered rhonchi B/L  HEART: Regular rate and rhythm; No murmurs, rubs, or gallops  ABDOMEN: Soft, Nontender, Nondistended; Bowel sounds present  EXTREMITIES: trace Edema  LYMPH: No lymphadenopathy noted  SKIN: No rashes or lesions      LABS:                        11.1   10.91 )-----------( 283      ( 04 Jan 2019 10:22 )             32.6     01-04    133<L>  |  95<L>  |  20  ----------------------------<  173<H>  4.1   |  29  |  0.61    Ca    8.7      04 Jan 2019 21:04    TPro  6.3  /  Alb  2.5<L>  /  TBili  0.5  /  DBili  x   /  AST  52<H>  /  ALT  55  /  AlkPhos  100  01-04              RADIOLOGY & ADDITIONAL TESTS:

## 2024-08-20 NOTE — PROVIDER CONTACT NOTE (OTHER) - DATE AND TIME:
A pre-sedation assessment was completed by the physician immediately prior to sedation start. 
Anesthesia Type: RN IV Sedation
Right: Chest.   Scrubbed with Chlorhexidine/Alcohol.    Hair: Clipped.  Skin prep dry before draping.  Prepped by: Nan Hawk RT 8/20/2024 8:57 AM.
01-Jan-2019 19:42
02-Jan-2019 20:10
03-Jan-2019 18:00
03-Jan-2019 21:35
04-Jan-2019 03:55
10-Lisandro-2019 22:30

## 2024-11-12 NOTE — PROGRESS NOTE ADULT - ASSESSMENT
58M pmhx afib diagnosed December 2018 (not on AC), COPD, HTN, cardiac tamponade s/p window 12/2018 discharged from [facility] ~2 weeks for hyponatremia and found to have mediastinal adenopathy and moderate sized pericardial effusion with tamponade physiology with hospital course further complicated by new onset afib with RVR, who presented with worsening SOB and cough and was found to be hyponatremic and have pna. Found to have a malignancy. Patient had bronchoscopy and s/p bronch, patient went into rapid a fib which resolved on own. remains in rapid af now, unclear why not responding appropriately to meds    Recommend:  - s/p RIJ venous cath insertion.  s/p chemo, tolerated it well  - Continue Lasix 40 mg IV q12H for now.  Patient is still diuresing well.  Today, net negative 3.6L  - Continue strict I & O's  - Monitor daily standing weights  - Monitor electrolytes, replete to keep K>4 and Mag>2  - TTE does not reveal recurrent pericardial effusion of any significance  - CxR showed Pna with superimposed intersitial edema with small B/L pleural effusion  - Afib remains rate controlled.  Reducing dose of Verapamil.  Goal is to get him off of it and maintain him on BB, CCB, and Dig and eventually get him off of Dig if we are able  - Continue  Toprol  mg daily  - Continue Cardizem 120 mg po q 6hours  - Continue verapamil to 40 mg q 8 hours   - Cont Digoxin 250 qd.  dig level 1/9 = 0.5.  Please repeat in 3 days  - Continue tele monitor   - Pulm f/u.  Continue bronchodilators and steroids.  Encourage incentive spirometer  - BP well controlled, monitor routine hemodynamics.  - Monitor and replete lytes, keep K>4, Mg>2.  - Other cardiovascular workup will depend on clinical course.  - All other workup per primary team.  - Will continue to follow.    Marielle Dobson DNP  Cardiology 58M pmhx afib diagnosed December 2018 (not on AC), COPD, HTN, cardiac tamponade s/p window 12/2018 discharged from [facility] ~2 weeks for hyponatremia and found to have mediastinal adenopathy and moderate sized pericardial effusion with tamponade physiology with hospital course further complicated by new onset afib with RVR, who presented with worsening SOB and cough and was found to be hyponatremic and have pna. Found to have a malignancy. Patient had bronchoscopy and s/p bronch, patient went into rapid a fib which resolved on own. remains in rapid af now, unclear why not responding appropriately to meds    Recommend:  - s/p RIJ venous cath insertion.  s/p chemo, tolerated it well  - Continue Lasix 40 mg IV q12H for now.  Patient is still diuresing well.  Today, net negative 3.6L  - Continue strict I & O's  - Monitor daily standing weights  - Monitor electrolytes, replete to keep K>4 and Mag>2  - TTE does not reveal recurrent pericardial effusion of any significance  - CxR showed Pna with superimposed intersitial edema with small B/L pleural effusion  - Afib remains rate controlled.  Reducing dose of Verapamil.  Goal is to get him off of it and maintain him on BB, CCB, and Dig and eventually get him off of Dig if we are able  - Continue  Toprol  mg daily  - Continue Cardizem 120 mg po q 6hours  - Continue verapamil to 40 mg q 8 hours   - Cont Digoxin 250 qd.  dig level 1/9 = 0.5.  1/14 repeat Dig Level today  - Continue tele monitor   - Pulm f/u.  Continue bronchodilators and steroids.  Encourage incentive spirometer  - BP well controlled, monitor routine hemodynamics.  - Monitor and replete lytes, keep K>4, Mg>2.  - Other cardiovascular workup will depend on clinical course.  - All other workup per primary team.  - Will continue to follow.    Marielle Dobson DNP  Cardiology no

## 2025-01-11 NOTE — CONSULT NOTE ADULT - ASSESSMENT
Leukemoid reaction  I would obtain surveillance cultures but concern for acute infectious process here is low  Steroids playing a role in leukocytosis  In D/W H/O, neulasta effect could persist into this period as well  Leukemoid reaction due to malignancy in/of itself here is well within the DDx    Suggestions--  Would check surveillance blood cultures  If patient undergoes additional thoracocentesis, would send for cell count, chemistries, bacterial cx etc though suspect yield low  Monitor WBC  Decreased steroids as able  Defer antibiotics at this time    Thank you for the courtesy of this referral.  Left message for Dr. Perlman.    Jorge Hussein MD  [phone] English

## 2025-04-20 NOTE — H&P ADULT - NSHPLABSRESULTS_GEN_ALL_CORE
02-05    135  |  95<L>  |  10  ----------------------------<  110<H>  3.1<L>   |  32<H>  |  0.56    Ca    8.4<L>      05 Feb 2019 07:56  Mg     1.9     02-05    TPro  6.2  /  Alb  2.7<L>  /  TBili  0.9  /  DBili  x   /  AST  19  /  ALT  22  /  AlkPhos  97  02-04                            11.2   13.39 )-----------( 325      ( 05 Feb 2019 08:03 )             34.4       CARDIAC MARKERS ( 04 Feb 2019 22:21 )  <.015 ng/mL / x     / x     / x     / x            LIVER FUNCTIONS - ( 04 Feb 2019 22:21 )  Alb: 2.7 g/dL / Pro: 6.2 g/dL / ALK PHOS: 97 U/L / ALT: 22 U/L / AST: 19 U/L / GGT: x             PT/INR - ( 04 Feb 2019 22:21 )   PT: 15.9 sec;   INR: 1.39 ratio English